# Patient Record
Sex: FEMALE | Race: WHITE | NOT HISPANIC OR LATINO | Employment: OTHER | URBAN - METROPOLITAN AREA
[De-identification: names, ages, dates, MRNs, and addresses within clinical notes are randomized per-mention and may not be internally consistent; named-entity substitution may affect disease eponyms.]

---

## 2023-03-06 ENCOUNTER — OFFICE VISIT (OUTPATIENT)
Dept: PRIMARY CARE | Facility: CLINIC | Age: 78
End: 2023-03-06
Payer: MEDICARE

## 2023-03-06 VITALS
OXYGEN SATURATION: 97 % | DIASTOLIC BLOOD PRESSURE: 71 MMHG | HEART RATE: 77 BPM | SYSTOLIC BLOOD PRESSURE: 106 MMHG | TEMPERATURE: 97.5 F

## 2023-03-06 DIAGNOSIS — N30.90 CYSTITIS: Primary | ICD-10-CM

## 2023-03-06 DIAGNOSIS — R05.1 ACUTE COUGH: ICD-10-CM

## 2023-03-06 LAB
POC APPEARANCE, URINE: CLEAR
POC BILIRUBIN, URINE: NEGATIVE
POC BLOOD, URINE: NEGATIVE
POC COLOR, URINE: YELLOW
POC GLUCOSE, URINE: NEGATIVE MG/DL
POC KETONES, URINE: NEGATIVE MG/DL
POC LEUKOCYTES, URINE: NEGATIVE
POC NITRITE,URINE: NEGATIVE
POC PH, URINE: 6 PH
POC PROTEIN, URINE: NORMAL MG/DL
POC SPECIFIC GRAVITY, URINE: >=1.03
POC UROBILINOGEN, URINE: 0.2 EU/DL

## 2023-03-06 PROCEDURE — 87636 SARSCOV2 & INF A&B AMP PRB: CPT

## 2023-03-06 PROCEDURE — 81003 URINALYSIS AUTO W/O SCOPE: CPT | Performed by: INTERNAL MEDICINE

## 2023-03-06 PROCEDURE — 87086 URINE CULTURE/COLONY COUNT: CPT

## 2023-03-06 PROCEDURE — U0005 INFEC AGEN DETEC AMPLI PROBE: HCPCS

## 2023-03-06 PROCEDURE — U0004 COV-19 TEST NON-CDC HGH THRU: HCPCS

## 2023-03-06 PROCEDURE — 99213 OFFICE O/P EST LOW 20 MIN: CPT | Performed by: INTERNAL MEDICINE

## 2023-03-06 NOTE — PROGRESS NOTES
Subjective complains of fatigue, uti symptoms and cough     [facility] 77 [facility] who presents for a check on if she still has a UTI. She took the antibiotic and was feeling better but at the same time she also got taken off her antiarrythmic.  She is feeling very tired.  She was feeling more tired the last week.  She doesn't know if its because she has an new infection or her afib.   She is in constant afib.  But She feels her urine is cloudy and dark.  Looks like she is dehydrated but she is drinking constantly. She is very thristy.  She had been on cipro for 7 days about 2 weeks ago and was better  for a couple of weeks.  For the past week she has noticed an increase in her urine frequency.  She had a fever last week. She denies any nausea or vomiting.     The ROS was negative except what was documented in the HPI.        Objective   /71 (Patient Position: Sitting)   Pulse 77   Temp 36.4 °C (97.5 °F)   SpO2 97%    Physical Exam  Constitutional:       Appearance: Normal appearance.   HENT:      Mouth/Throat:      Comments: tacky  Eyes:      Conjunctiva/sclera: Conjunctivae normal.   Cardiovascular:      Rate and Rhythm: Normal rate. Rhythm irregular.   Pulmonary:      Effort: Pulmonary effort is normal.      Breath sounds: Normal breath sounds.   Abdominal:      Tenderness: There is no abdominal tenderness. There is no right CVA tenderness or left CVA tenderness.   Musculoskeletal:         General: Normal range of motion.   Lymphadenopathy:      Cervical: No cervical adenopathy.   Skin:     General: Skin is warm and dry.   Neurological:      Mental Status: She is alert.         Assessment/Plan   Mild dehydration, Encouraged fluids   Will repeat urine culture  For her fatigue and cough will check covid  Problem List Items Addressed This Visit    None

## 2023-03-07 ENCOUNTER — TELEPHONE (OUTPATIENT)
Dept: PRIMARY CARE | Facility: CLINIC | Age: 78
End: 2023-03-07
Payer: MEDICARE

## 2023-03-07 LAB
FLU A RESULT: NOT DETECTED
FLU B RESULT: NOT DETECTED
SARS-COV-2 RESULT: NOT DETECTED

## 2023-03-07 NOTE — TELEPHONE ENCOUNTER
I called to let her know that her urine appeared concentrated but there did not appear to be any infection  She states that she has been drinking Gatorade and increasing her fluids and is feeling less fatigued and overall a little better.  She does inform me that she did talk to Dr. Ramirez who restarted another medication for her and put in lab work orders for her.

## 2023-03-09 LAB — URINE CULTURE: NORMAL

## 2023-03-21 LAB
ALBUMIN (G/DL) IN SER/PLAS: 3.6 G/DL (ref 3.4–5)
ANION GAP IN SER/PLAS: 14 MMOL/L (ref 10–20)
CALCIUM (MG/DL) IN SER/PLAS: 9.6 MG/DL (ref 8.6–10.6)
CARBON DIOXIDE, TOTAL (MMOL/L) IN SER/PLAS: 27 MMOL/L (ref 21–32)
CHLORIDE (MMOL/L) IN SER/PLAS: 101 MMOL/L (ref 98–107)
CREATININE (MG/DL) IN SER/PLAS: 0.91 MG/DL (ref 0.5–1.05)
GFR FEMALE: 65 ML/MIN/1.73M2
GLUCOSE (MG/DL) IN SER/PLAS: 116 MG/DL (ref 74–99)
NATRIURETIC PEPTIDE B (PG/ML) IN SER/PLAS: 160 PG/ML (ref 0–99)
PHOSPHATE (MG/DL) IN SER/PLAS: 4.7 MG/DL (ref 2.5–4.9)
POTASSIUM (MMOL/L) IN SER/PLAS: 4 MMOL/L (ref 3.5–5.3)
SODIUM (MMOL/L) IN SER/PLAS: 138 MMOL/L (ref 136–145)
UREA NITROGEN (MG/DL) IN SER/PLAS: 15 MG/DL (ref 6–23)

## 2023-04-03 DIAGNOSIS — I10 PRIMARY HYPERTENSION: Primary | ICD-10-CM

## 2023-04-03 DIAGNOSIS — E11.69 TYPE 2 DIABETES MELLITUS WITH OTHER SPECIFIED COMPLICATION, WITHOUT LONG-TERM CURRENT USE OF INSULIN (MULTI): ICD-10-CM

## 2023-04-03 RX ORDER — AMLODIPINE BESYLATE 2.5 MG/1
2.5 TABLET ORAL DAILY
Qty: 90 TABLET | Refills: 1 | Status: SHIPPED | OUTPATIENT
Start: 2023-04-03 | End: 2023-11-13

## 2023-04-03 RX ORDER — DAPAGLIFLOZIN 10 MG/1
10 TABLET, FILM COATED ORAL DAILY
Qty: 90 TABLET | Refills: 1 | Status: SHIPPED | OUTPATIENT
Start: 2023-04-03 | End: 2023-09-28 | Stop reason: SDUPTHER

## 2023-04-03 RX ORDER — AMLODIPINE BESYLATE 2.5 MG/1
1 TABLET ORAL DAILY
COMMUNITY
Start: 2023-04-01 | End: 2023-04-03 | Stop reason: SDUPTHER

## 2023-04-03 RX ORDER — DAPAGLIFLOZIN 10 MG/1
1 TABLET, FILM COATED ORAL DAILY
COMMUNITY
Start: 2023-03-31 | End: 2023-04-03 | Stop reason: SDUPTHER

## 2023-06-26 DIAGNOSIS — F32.A DEPRESSION, UNSPECIFIED DEPRESSION TYPE: Primary | ICD-10-CM

## 2023-06-27 RX ORDER — DULOXETIN HYDROCHLORIDE 60 MG/1
CAPSULE, DELAYED RELEASE ORAL
Qty: 90 CAPSULE | Refills: 3 | Status: SHIPPED | OUTPATIENT
Start: 2023-06-27 | End: 2024-03-18

## 2023-07-31 DIAGNOSIS — K27.9 PUD (PEPTIC ULCER DISEASE): Primary | ICD-10-CM

## 2023-07-31 RX ORDER — PANTOPRAZOLE SODIUM 40 MG/1
40 TABLET, DELAYED RELEASE ORAL 2 TIMES DAILY
COMMUNITY
Start: 2020-08-19 | End: 2023-07-31 | Stop reason: SDUPTHER

## 2023-07-31 RX ORDER — PANTOPRAZOLE SODIUM 40 MG/1
40 TABLET, DELAYED RELEASE ORAL 2 TIMES DAILY
Qty: 180 TABLET | Refills: 3 | Status: SHIPPED | OUTPATIENT
Start: 2023-07-31 | End: 2024-03-18

## 2023-08-18 DIAGNOSIS — I50.20 HFREF (HEART FAILURE WITH REDUCED EJECTION FRACTION) (MULTI): Primary | ICD-10-CM

## 2023-08-19 PROBLEM — I50.20 HFREF (HEART FAILURE WITH REDUCED EJECTION FRACTION) (MULTI): Status: ACTIVE | Noted: 2023-08-19

## 2023-08-19 RX ORDER — FUROSEMIDE 20 MG/1
20 TABLET ORAL DAILY
Qty: 90 TABLET | Refills: 3 | Status: SHIPPED | OUTPATIENT
Start: 2023-08-19 | End: 2024-03-18

## 2023-09-28 DIAGNOSIS — E11.69 TYPE 2 DIABETES MELLITUS WITH OTHER SPECIFIED COMPLICATION, WITHOUT LONG-TERM CURRENT USE OF INSULIN (MULTI): ICD-10-CM

## 2023-09-28 RX ORDER — DAPAGLIFLOZIN 10 MG/1
10 TABLET, FILM COATED ORAL DAILY
Qty: 90 TABLET | Refills: 1 | Status: SHIPPED | OUTPATIENT
Start: 2023-09-28 | End: 2023-10-23 | Stop reason: SDUPTHER

## 2023-10-04 PROBLEM — N30.01 ACUTE CYSTITIS WITH HEMATURIA: Status: ACTIVE | Noted: 2023-10-04

## 2023-10-04 PROBLEM — I47.19 ATRIAL TACHYCARDIA (CMS-HCC): Status: ACTIVE | Noted: 2023-10-04

## 2023-10-04 PROBLEM — B37.31 VAGINAL YEAST INFECTION: Status: ACTIVE | Noted: 2023-10-04

## 2023-10-04 PROBLEM — I25.10 CAD (CORONARY ARTERY DISEASE): Status: ACTIVE | Noted: 2023-10-04

## 2023-10-04 PROBLEM — I48.19 ATRIAL FIBRILLATION, PERSISTENT (MULTI): Status: ACTIVE | Noted: 2023-10-04

## 2023-10-04 PROBLEM — G45.1 CAROTID ARTERY OCCLUSION SYNDROME: Status: ACTIVE | Noted: 2023-10-04

## 2023-10-04 PROBLEM — R53.83 FATIGUE: Status: ACTIVE | Noted: 2023-10-04

## 2023-10-04 PROBLEM — R26.89 POOR BALANCE: Status: ACTIVE | Noted: 2023-10-04

## 2023-10-04 PROBLEM — Z85.3 PERSONAL HISTORY OF BREAST CANCER: Status: ACTIVE | Noted: 2023-10-04

## 2023-10-04 PROBLEM — R09.81 NASAL SINUS CONGESTION: Status: ACTIVE | Noted: 2023-10-04

## 2023-10-04 PROBLEM — I73.9 CLAUDICATION, INTERMITTENT (CMS-HCC): Status: ACTIVE | Noted: 2023-10-04

## 2023-10-04 PROBLEM — K21.9 ESOPHAGEAL REFLUX: Status: ACTIVE | Noted: 2023-10-04

## 2023-10-04 PROBLEM — D50.0 IRON DEFICIENCY ANEMIA DUE TO CHRONIC BLOOD LOSS: Status: ACTIVE | Noted: 2023-10-04

## 2023-10-04 PROBLEM — K21.9 REFLUX LARYNGITIS: Status: ACTIVE | Noted: 2023-10-04

## 2023-10-04 PROBLEM — R06.09 DYSPNEA ON EXERTION: Status: ACTIVE | Noted: 2023-10-04

## 2023-10-04 PROBLEM — C02.3 CANCER OF ANTERIOR TWO-THIRDS OF TONGUE (MULTI): Status: ACTIVE | Noted: 2023-10-04

## 2023-10-04 PROBLEM — E55.9 VITAMIN D DEFICIENCY: Status: ACTIVE | Noted: 2023-10-04

## 2023-10-04 PROBLEM — E78.2 MIXED HYPERLIPIDEMIA: Status: ACTIVE | Noted: 2023-10-04

## 2023-10-04 PROBLEM — I65.29 CAROTID STENOSIS: Status: ACTIVE | Noted: 2023-10-04

## 2023-10-04 PROBLEM — I71.21 ANEURYSM, ASCENDING AORTA (CMS-HCC): Status: ACTIVE | Noted: 2023-10-04

## 2023-10-04 PROBLEM — Z15.01 BRCA POSITIVE: Status: ACTIVE | Noted: 2023-10-04

## 2023-10-04 PROBLEM — M19.90 OSTEOARTHRITIS: Status: ACTIVE | Noted: 2023-10-04

## 2023-10-04 PROBLEM — I73.9 PAD (PERIPHERAL ARTERY DISEASE) (CMS-HCC): Status: ACTIVE | Noted: 2023-10-04

## 2023-10-04 PROBLEM — J45.909 ASTHMA (HHS-HCC): Status: ACTIVE | Noted: 2023-10-04

## 2023-10-04 PROBLEM — I34.0 MITRAL REGURGITATION: Status: ACTIVE | Noted: 2023-10-04

## 2023-10-04 PROBLEM — F32.A DEPRESSION: Status: ACTIVE | Noted: 2023-10-04

## 2023-10-04 PROBLEM — J04.0 REFLUX LARYNGITIS: Status: ACTIVE | Noted: 2023-10-04

## 2023-10-04 PROBLEM — R43.0 ANOSMIA: Status: ACTIVE | Noted: 2023-10-04

## 2023-10-04 PROBLEM — Z15.09 BRCA POSITIVE: Status: ACTIVE | Noted: 2023-10-04

## 2023-10-04 PROBLEM — R00.2 PALPITATIONS: Status: ACTIVE | Noted: 2023-10-04

## 2023-10-04 PROBLEM — I10 HYPERTENSION: Status: ACTIVE | Noted: 2023-10-04

## 2023-10-04 PROBLEM — E04.1 THYROID NODULE: Status: ACTIVE | Noted: 2023-10-04

## 2023-10-04 PROBLEM — Z95.818 PRESENCE OF WATCHMAN LEFT ATRIAL APPENDAGE CLOSURE DEVICE: Status: ACTIVE | Noted: 2023-10-04

## 2023-10-04 RX ORDER — BUPROPION HYDROCHLORIDE 300 MG/1
1 TABLET ORAL DAILY
COMMUNITY
Start: 2021-03-31 | End: 2024-01-09

## 2023-10-04 RX ORDER — DOFETILIDE 0.25 MG/1
CAPSULE ORAL EVERY 12 HOURS
COMMUNITY
Start: 2016-11-03 | End: 2023-12-13 | Stop reason: ALTCHOICE

## 2023-10-04 RX ORDER — ATORVASTATIN CALCIUM 20 MG/1
20 TABLET, FILM COATED ORAL
COMMUNITY
Start: 2019-07-24 | End: 2023-12-04

## 2023-10-04 RX ORDER — CLOPIDOGREL BISULFATE 75 MG/1
75 TABLET ORAL DAILY
COMMUNITY
Start: 2019-06-10 | End: 2023-12-14

## 2023-10-04 RX ORDER — SPIRONOLACTONE 25 MG/1
25 TABLET ORAL DAILY
COMMUNITY
Start: 2023-06-14

## 2023-10-04 RX ORDER — METOPROLOL TARTRATE 25 MG/1
2 TABLET, FILM COATED ORAL 2 TIMES DAILY
COMMUNITY
Start: 2020-08-20 | End: 2024-02-22 | Stop reason: SDUPTHER

## 2023-10-04 RX ORDER — FLUCONAZOLE 150 MG/1
150 TABLET ORAL
COMMUNITY
Start: 2022-10-25 | End: 2024-01-09 | Stop reason: ALTCHOICE

## 2023-10-04 RX ORDER — MAGNESIUM HYDROXIDE 400 MG/5ML
1 SUSPENSION, ORAL (FINAL DOSE FORM) ORAL DAILY
COMMUNITY

## 2023-10-04 RX ORDER — METOPROLOL SUCCINATE 25 MG/1
25 TABLET, EXTENDED RELEASE ORAL 2 TIMES DAILY
COMMUNITY
End: 2023-12-13 | Stop reason: ALTCHOICE

## 2023-10-04 RX ORDER — LANOLIN ALCOHOL/MO/W.PET/CERES
1 CREAM (GRAM) TOPICAL DAILY
COMMUNITY
Start: 2014-01-30

## 2023-10-04 RX ORDER — TRIAMCINOLONE ACETONIDE 1 MG/G
CREAM TOPICAL
COMMUNITY
Start: 2023-05-23 | End: 2024-01-09 | Stop reason: ALTCHOICE

## 2023-10-04 RX ORDER — CHOLECALCIFEROL (VITAMIN D3) 25 MCG
1 TABLET ORAL 2 TIMES DAILY
COMMUNITY
Start: 2014-01-30

## 2023-10-04 RX ORDER — BUPROPION HYDROCHLORIDE 150 MG/1
150 TABLET, EXTENDED RELEASE ORAL 2 TIMES DAILY
COMMUNITY
Start: 2023-01-16

## 2023-10-04 RX ORDER — FLUTICASONE PROPIONATE AND SALMETEROL 100; 50 UG/1; UG/1
1 POWDER RESPIRATORY (INHALATION) EVERY 12 HOURS
Status: ON HOLD | COMMUNITY
Start: 2021-08-10 | End: 2024-01-19 | Stop reason: ENTERED-IN-ERROR

## 2023-10-04 RX ORDER — DOXYCYCLINE HYCLATE 100 MG
1 TABLET ORAL 2 TIMES DAILY
COMMUNITY
Start: 2022-10-11 | End: 2024-01-09 | Stop reason: ALTCHOICE

## 2023-10-04 RX ORDER — LOSARTAN POTASSIUM 50 MG/1
1 TABLET ORAL DAILY
COMMUNITY
Start: 2013-11-18

## 2023-10-06 ENCOUNTER — OFFICE VISIT (OUTPATIENT)
Dept: PRIMARY CARE | Facility: CLINIC | Age: 78
End: 2023-10-06
Payer: MEDICARE

## 2023-10-06 VITALS
SYSTOLIC BLOOD PRESSURE: 118 MMHG | OXYGEN SATURATION: 98 % | HEART RATE: 78 BPM | DIASTOLIC BLOOD PRESSURE: 78 MMHG | TEMPERATURE: 97.8 F

## 2023-10-06 DIAGNOSIS — M15.9 PRIMARY OSTEOARTHRITIS INVOLVING MULTIPLE JOINTS: Primary | ICD-10-CM

## 2023-10-06 PROBLEM — H40.003 GLAUCOMA SUSPECT, BOTH EYES: Status: ACTIVE | Noted: 2023-08-24

## 2023-10-06 PROBLEM — H34.231 BRANCH RETINAL ARTERY OCCLUSION OF RIGHT EYE: Status: RESOLVED | Noted: 2023-08-24 | Resolved: 2023-10-06

## 2023-10-06 PROBLEM — I21.9 MYOCARDIAL INFARCTION (MULTI): Status: RESOLVED | Noted: 2023-10-06 | Resolved: 2023-10-06

## 2023-10-06 PROBLEM — Z95.5 H/O HEART ARTERY STENT: Status: ACTIVE | Noted: 2018-01-25

## 2023-10-06 PROCEDURE — 1126F AMNT PAIN NOTED NONE PRSNT: CPT | Performed by: INTERNAL MEDICINE

## 2023-10-06 PROCEDURE — 3074F SYST BP LT 130 MM HG: CPT | Performed by: INTERNAL MEDICINE

## 2023-10-06 PROCEDURE — 1159F MED LIST DOCD IN RCRD: CPT | Performed by: INTERNAL MEDICINE

## 2023-10-06 PROCEDURE — 3078F DIAST BP <80 MM HG: CPT | Performed by: INTERNAL MEDICINE

## 2023-10-06 PROCEDURE — 99213 OFFICE O/P EST LOW 20 MIN: CPT | Performed by: INTERNAL MEDICINE

## 2023-10-06 RX ORDER — ACETAMINOPHEN 325 MG/1
650 TABLET ORAL EVERY 6 HOURS PRN
COMMUNITY

## 2023-10-06 NOTE — PROGRESS NOTES
Amita Todd is a 78 y.o. female who presents Neck Pain      HPI:   Right shoulder pain and pain goes to her hand at times.  Her shoulder is cracking and certain movements hurt.  The pain is going up into her neck.  The pain occurs if she turns her head the wrong way.  Her shoulder clicks and hurts.  She thinks she may have strained it lifting something.   Pain for a couple a months.     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)    Carotid artery occlusion syndrome    Carotid stenosis    Claudication, intermittent (CMS/HCC)    Depression    Dyspnea on exertion    Esophageal reflux    Fatigue    Hypertension    Iron deficiency anemia due to chronic blood loss    Mitral regurgitation    Mixed hyperlipidemia    Nasal sinus congestion    Osteoarthritis    PAD (peripheral artery disease) (CMS/HCC)    Palpitations    Personal history of breast cancer    Poor balance    Presence of Watchman left atrial appendage closure device    Reflux laryngitis    Thyroid nodule    Vaginal yeast infection    Vitamin D deficiency        Patient Active Problem List   Diagnosis    HFrEF (heart failure with reduced ejection fraction) (CMS/HCC)    Acute cystitis with hematuria    Aneurysm, ascending aorta (CMS/HCC)    Anosmia    Asthma    Atrial fibrillation, persistent (CMS/HCC)    Atrial tachycardia    BRCA positive    CAD (coronary artery disease)    Cancer of anterior two-thirds of tongue (CMS/HCC)    Carotid artery occlusion syndrome    Carotid stenosis    Claudication, intermittent (CMS/HCC)    Depression    Dyspnea on exertion    Esophageal reflux    Fatigue    Hypertension    Iron deficiency anemia  due to chronic blood loss    Mitral regurgitation    Mixed hyperlipidemia    Nasal sinus congestion    Osteoarthritis    PAD (peripheral artery disease) (CMS/[facility])    Palpitations    Personal history of breast cancer    Poor balance    Presence of Watchman left atrial appendage closure device    Reflux laryngitis    Thyroid nodule    Vaginal yeast infection    Vitamin D deficiency        Past Medical History:   Diagnosis Date    Acute vaginitis 10/25/2021    Acute vaginitis    Alcohol dependence, in remission (CMS/[facility]) 06/16/2016    History of alcoholism    Other bursitis of hip, unspecified hip 11/21/2017    Hip bursitis    Other conditions influencing health status 01/05/2014    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    Pain in leg, unspecified 01/25/2022    Leg pain    Pain in right shoulder 07/01/2020    Bilateral shoulder pain    Pain in unspecified knee 03/31/2021    Joint pain, knee    Paroxysmal atrial fibrillation (CMS/[facility]) 02/15/2022    Paroxysmal atrial fibrillation    Personal history of diseases of the blood and blood-forming organs and certain disorders involving the immune mechanism 02/09/2019    History of anemia    Personal history of malignant neoplasm of breast 04/29/2014    History of malignant neoplasm of female breast    Personal history of other diseases of the circulatory system 04/03/2014    Personal history of subarachnoid hemorrhage    Personal history of other diseases of the digestive system 03/08/2021    History of upper gastrointestinal hemorrhage    Personal history of other diseases of the digestive system 03/08/2021    History of melena    Personal history of other diseases of the nervous system and sense organs 08/12/2020    History of Bell's palsy    Personal history of other infectious and parasitic diseases 06/14/2019    History of herpes zoster    Personal history of other specified conditions 03/03/2020    History of lump of left breast    Personal history of other  specified conditions 02/22/2021    History of shortness of breath    Personal history of transient ischemic attack (TIA), and cerebral infarction without residual deficits 04/24/2022    History of transient ischemic attack    Trigger finger, unspecified finger 04/24/2017    Acquired trigger finger        Past Surgical History:   Procedure Laterality Date    CT HEAD ANGIO W AND WO IV CONTRAST  6/6/2019    CT HEAD ANGIO W AND WO IV CONTRAST 6/6/2019 [facility] CLINICAL LEGACY    CT HEAD ANGIO W AND WO IV CONTRAST  1/4/2021    CT HEAD ANGIO W AND WO IV CONTRAST 1/4/2021 [facility] ANCILLARY LEGACY    CT NECK ANGIO W AND WO IV CONTRAST  6/6/2019    CT NECK ANGIO W AND WO IV CONTRAST 6/6/2019 [facility] CLINICAL LEGACY    CT NECK ANGIO W AND WO IV CONTRAST  1/4/2021    CT NECK ANGIO W AND WO IV CONTRAST 1/4/2021 [facility]LL[facility]    MR HEAD ANGIO WO IV CONTRAST  6/6/2019    MR HEAD ANGIO WO IV CONTRAST 6/6/2019 [facility] CLINICAL LEGACY    MR NECK ANGIO WO IV CONTRAST  6/6/2019    MR NECK ANGIO WO IV CONTRAST 6/6/2019 [facility] CLINICAL LEGACY    OTHER SURGICAL HISTORY  03/03/2020    Oophorectomy    OTHER SURGICAL HISTORY  06/17/2019    Miscarriage surgical treatment    OTHER SURGICAL HISTORY  08/29/2019    Catheter Ablation Atrial Flutter    OTHER SURGICAL HISTORY  04/23/2014    Previous Stent Placement    SEPTOPLASTY  09/22/2017    Septoplasty               Current Outpatient Medications:     amLODIPine (Norvasc) 2.5 mg tablet, Take 1 tablet (2.5 mg) by mouth once daily., Disp: 90 tablet, Rfl: 1    atorvastatin (Lipitor) 20 mg tablet, Take 1 tablet (20 mg) by mouth. PER DIRECTED, Disp: , Rfl:     buPROPion SR (Wellbutrin SR) 150 mg 12 hr tablet, Take 1 tablet (150 mg) by mouth. TAKE PER DIRECTED, Disp: , Rfl:     buPROPion XL (Wellbutrin XL) 300 mg 24 hr tablet, Take 1 tablet (300 mg) by mouth once daily., Disp: , Rfl:     cholecalciferol (Vitamin D-3) 25 MCG (1000 UT) tablet, Take 1 tablet (25 mcg) by mouth 2 times a day., Disp: , Rfl:      clopidogrel (Plavix) 75 mg tablet, Take 1 tablet (75 mg) by mouth once daily., Disp: , Rfl:     dapagliflozin propanediol (Farxiga) 10 mg, Take 1 tablet (10 mg) by mouth once daily., Disp: 90 tablet, Rfl: 1    dofetilide (Tikosyn) 250 mcg capsule, Take by mouth every 12 hours. 250 MCG ORAL CAPSULE  TAKE PER DIRECTED, Disp: , Rfl:     doxycycline (Vibra-Tabs) 100 mg tablet, Take 1 tablet (100 mg) by mouth 2 times a day., Disp: , Rfl:     DULoxetine (Cymbalta) 60 mg DR capsule, TAKE 1 CAPSULE BY MOUTH  DAILY, Disp: 90 capsule, Rfl: 3    fluconazole (Diflucan) 150 mg tablet, Take 1 tablet (150 mg) by mouth. TAKE PER DIRECTED, Disp: , Rfl:     fluticasone propion-salmeteroL (Advair Diskus) 100-50 mcg/dose diskus inhaler, Inhale 1 puff every 12 hours., Disp: , Rfl:     furosemide (Lasix) 20 mg tablet, TAKE 1 TABLET BY MOUTH  DAILY, Disp: 90 tablet, Rfl: 3    herbal complex no.239 (Whole Body Joint Support) capsule, Take 1 capsule by mouth once daily., Disp: , Rfl:     losartan (Cozaar) 50 mg tablet, Take 1 tablet (50 mg) by mouth once daily., Disp: , Rfl:     magnesium oxide (Mag-Ox) 400 mg (241.3 mg magnesium) tablet, Take 1 tablet (400 mg) by mouth once daily., Disp: , Rfl:     metoprolol succinate XL (Toprol-XL) 25 mg 24 hr tablet, Take 1 tablet (25 mg) by mouth twice a day., Disp: , Rfl:     metoprolol tartrate (Lopressor) 25 mg tablet, Take 2 tablets (50 mg) by mouth 2 times a day., Disp: , Rfl:     pantoprazole (ProtoNix) 40 mg EC tablet, Take 1 tablet (40 mg) by mouth 2 times a day., Disp: 180 tablet, Rfl: 3    spironolactone (Aldactone) 25 mg tablet, Take 1 tablet (25 mg) by mouth. PER DIRECTED, Disp: , Rfl:     triamcinolone (Kenalog) 0.1 % cream, Apply topically. APPLY PER DIRECTED, Disp: , Rfl:      Allergies   Allergen Reactions    Oxycodone-Acetaminophen Unknown       Review of Systems     /78 (BP Location: Left arm, Patient Position: Sitting)   Pulse 78   Temp 36.6 °C (97.8 °F)   SpO2 98%  There is  no height or weight on file to calculate BMI.     Physical Exam  Musculoskeletal:      Right shoulder: Tenderness present. No swelling, deformity, effusion or crepitus. Normal range of motion. Normal strength. Normal pulse.      Left shoulder: No swelling or deformity.        Arms:       Comments: Area of tenderness to palpation   Neg near sigmn            Problem List Items Addressed This Visit       Osteoarthritis - Primary    Relevant Orders    Referral to Physical Therapy        Assessment/Plan   Neck strain and shoulder OA with potential rotator cuff disease. Offered an injection today which she declines.  She is unable to take NSAIDs because of the plaquenil she is on.  She does take tylenol with some relief. Topical Voltaren not too effective for her. She will increase her use of both the tylenol and the Voltaren gel and try to stretch.        Caitie Leal MD    TVT of 20 minutes with greater than 50% spent FTF in assessment/discussion and care coordination

## 2023-10-23 DIAGNOSIS — E11.69 TYPE 2 DIABETES MELLITUS WITH OTHER SPECIFIED COMPLICATION, WITHOUT LONG-TERM CURRENT USE OF INSULIN (MULTI): ICD-10-CM

## 2023-10-23 RX ORDER — DAPAGLIFLOZIN 10 MG/1
10 TABLET, FILM COATED ORAL DAILY
Qty: 30 TABLET | Refills: 1 | Status: SHIPPED | OUTPATIENT
Start: 2023-10-23 | End: 2024-01-02 | Stop reason: SDUPTHER

## 2023-11-03 ENCOUNTER — TELEPHONE (OUTPATIENT)
Dept: PRIMARY CARE | Facility: CLINIC | Age: 78
End: 2023-11-03
Payer: MEDICARE

## 2023-11-03 DIAGNOSIS — M25.511 RIGHT SHOULDER PAIN, UNSPECIFIED CHRONICITY: Primary | ICD-10-CM

## 2023-11-03 NOTE — TELEPHONE ENCOUNTER
Update-  Patient still in pain right shoulder.    She wants to know if she should get an xray?    Please advise  I can call her back  [phone]

## 2023-11-07 ENCOUNTER — ANCILLARY PROCEDURE (OUTPATIENT)
Dept: RADIOLOGY | Facility: CLINIC | Age: 78
End: 2023-11-07
Payer: MEDICARE

## 2023-11-07 DIAGNOSIS — M25.511 RIGHT SHOULDER PAIN, UNSPECIFIED CHRONICITY: ICD-10-CM

## 2023-11-07 PROCEDURE — 73030 X-RAY EXAM OF SHOULDER: CPT | Mod: RT,FY

## 2023-11-07 PROCEDURE — 73030 X-RAY EXAM OF SHOULDER: CPT | Mod: RIGHT SIDE | Performed by: RADIOLOGY

## 2023-11-13 DIAGNOSIS — I10 PRIMARY HYPERTENSION: ICD-10-CM

## 2023-11-13 RX ORDER — AMLODIPINE BESYLATE 2.5 MG/1
2.5 TABLET ORAL DAILY
Qty: 90 TABLET | Refills: 2 | Status: SHIPPED | OUTPATIENT
Start: 2023-11-13 | End: 2023-12-13 | Stop reason: SINTOL

## 2023-11-22 ENCOUNTER — APPOINTMENT (OUTPATIENT)
Dept: ORTHOPEDIC SURGERY | Facility: HOSPITAL | Age: 78
End: 2023-11-22
Payer: MEDICARE

## 2023-11-22 ENCOUNTER — OFFICE VISIT (OUTPATIENT)
Dept: ORTHOPEDIC SURGERY | Facility: HOSPITAL | Age: 78
End: 2023-11-22
Payer: MEDICARE

## 2023-11-22 VITALS — BODY MASS INDEX: 24.91 KG/M2 | WEIGHT: 155 LBS | HEIGHT: 66 IN

## 2023-11-22 DIAGNOSIS — M25.511 RIGHT SHOULDER PAIN, UNSPECIFIED CHRONICITY: ICD-10-CM

## 2023-11-22 DIAGNOSIS — M75.81 ROTATOR CUFF TENDINITIS, RIGHT: ICD-10-CM

## 2023-11-22 PROCEDURE — 1159F MED LIST DOCD IN RCRD: CPT | Performed by: ORTHOPAEDIC SURGERY

## 2023-11-22 PROCEDURE — 99213 OFFICE O/P EST LOW 20 MIN: CPT | Performed by: ORTHOPAEDIC SURGERY

## 2023-11-22 PROCEDURE — 1036F TOBACCO NON-USER: CPT | Performed by: ORTHOPAEDIC SURGERY

## 2023-11-22 PROCEDURE — 1126F AMNT PAIN NOTED NONE PRSNT: CPT | Performed by: ORTHOPAEDIC SURGERY

## 2023-11-22 ASSESSMENT — PAIN - FUNCTIONAL ASSESSMENT: PAIN_FUNCTIONAL_ASSESSMENT: 0-10

## 2023-11-22 ASSESSMENT — PAIN SCALES - GENERAL: PAINLEVEL_OUTOF10: 3

## 2023-11-22 ASSESSMENT — PAIN DESCRIPTION - DESCRIPTORS: DESCRIPTORS: DULL;STABBING

## 2023-11-22 NOTE — PROGRESS NOTES
HPI  This is a pleasant 78 y.o. female here today for evaluation of right  shoulder pain.  The pain started years ago and was previously seen by Dr Mayer. She was diagnosed with RC disease at that time but underwent conservative management with near-complete resolution of symptoms. Her pain then returned 5 months ago. It is worse with lifting and reaching, and has woken her from sleep.  It is improved with rest and tylenol .  The pain comes on only intermittently with movement and is described as aching and sharp at a 4/10 in severity. Mainly located in her posterior shoulder. She does also report some neck pain and occasional shock like sensations down the arm. They have not had any treatment such as PT or injections. They are here today for further evaluation. She is relatively active but this pain is making it difficult for her to take care of her  who injured his clavicle.      ROS  Reviewed and all pertinent positives mentioned in HPI.      EXAM  Patient in no acute distress, alert and oriented x3, of normal mood and affect    There is no cervical or axillary lymphadenopathy.  They are breathing without any evidence of accessory musculature.  EOMI.    Their neck is supple, nontender  They have intact sensation to light touch in all upper extremity dermatomes.  Their skin is intact  Forward flexion and abduction of the shoulder  160 °, external rotation of  80 °  5 out of 5 Rotator cuff strength testing with resisted supraspinatus testing, 5/5 strength with infraspinatus testing  They have positive belly press and lift off      They have a negative Neer test.  positive Rodriguez test    Positive for AC joint tenderness.      IMAGING  X-rays reviewed today reveal no gross fracture or dislocation. Moderate glenohumeral OA.  MRI reveals No MRI available for review      ASSESSMENT  Right Rotator cuff tendonitis and osteoarthritis      PLAN  78F presenting today with intermittent atraumatic right shoulder  pain with activity. Good RC strength, pain with some maneuvers. We will have the patient initiate a course of physical therapy and continue NSAIDs and activity modification.  If symptoms persist, we will see them back for re-evaluation and discussion of a corticosteroid injection.     Matthew Edwards MD

## 2023-12-02 DIAGNOSIS — E78.2 MIXED HYPERLIPIDEMIA: ICD-10-CM

## 2023-12-04 RX ORDER — ATORVASTATIN CALCIUM 20 MG/1
20 TABLET, FILM COATED ORAL DAILY
Qty: 90 TABLET | Refills: 3 | Status: SHIPPED | OUTPATIENT
Start: 2023-12-04 | End: 2024-03-18

## 2023-12-05 NOTE — PROGRESS NOTES
Provider Impressions     No evidence of any recurrence of the patient's previously treated anterior tongue cancer.      Recent issues with dysphagia.  She now has a visible lesion in the left hypopharynx.  A CT scan of her neck will be obtained.  I will also bring her to the operating room for panendoscopy and biopsy.  She understands the surgery and wishes to proceed.    I will see her at the time of the surgery.    Chief Complaint     Follow-up status post treatment of an anterior tongue cancer      History of Present Illness    This patient has been followed for quite some time. She had a T1 N0 lesion of her right lateral tongue which was removed back in May of 2008. She did not receive any further treatments. Her last chest x-ray was in October 2022. This was normal. She also has gastroesophageal reflux disease for which she is now on omeprazole 20 mg daily. She was also having some issues with her sense of smell and taste. I did put her on Flonase. She said that it helped with the congestion but did not change her sense of smell and taste. She rarely uses the spray. In March 2019 I ordered a swallow evaluation. This was normal.  She comes in today complaining that a few weeks ago she got a pill stuck in her throat and that she has had discomfort since.    Physical Exam    Examination of the oral cavity and oropharynx is normal. There is no evidence of any mucosal lesions. There is good mobility of the tongue and palate. She does have some scarring on the right side of her tongue where the lesion was previously. There is good mandibular excursion. Palpation of the parotid, neck, and thyroid field fails to show any worrisome masses or adenopathies.     A flexible laryngoscopy was carried out. Under topical Xylocaine and Fran-Synephrine the scope was introduced through the nostril. The nasopharynx, base of tongue, hypopharynx, and larynx are visualized. The vocal cords are normally mobile.  There is a definite  lesion involving the left piriform sinus.  That measures more than a centimeter.

## 2023-12-06 ENCOUNTER — OFFICE VISIT (OUTPATIENT)
Dept: OTOLARYNGOLOGY | Facility: CLINIC | Age: 78
End: 2023-12-06
Payer: MEDICARE

## 2023-12-06 ENCOUNTER — TELEPHONE (OUTPATIENT)
Dept: PRIMARY CARE | Facility: CLINIC | Age: 78
End: 2023-12-06

## 2023-12-06 ENCOUNTER — LAB (OUTPATIENT)
Dept: LAB | Facility: LAB | Age: 78
End: 2023-12-06
Payer: MEDICARE

## 2023-12-06 VITALS — HEIGHT: 66 IN | WEIGHT: 159 LBS | TEMPERATURE: 97.6 F | BODY MASS INDEX: 25.55 KG/M2

## 2023-12-06 DIAGNOSIS — C02.3 CANCER OF ANTERIOR TWO-THIRDS OF TONGUE (MULTI): Primary | ICD-10-CM

## 2023-12-06 DIAGNOSIS — C02.3 CANCER OF ANTERIOR TWO-THIRDS OF TONGUE (MULTI): ICD-10-CM

## 2023-12-06 DIAGNOSIS — R13.12 OROPHARYNGEAL DYSPHAGIA: ICD-10-CM

## 2023-12-06 DIAGNOSIS — K21.9 REFLUX LARYNGITIS: ICD-10-CM

## 2023-12-06 DIAGNOSIS — J04.0 REFLUX LARYNGITIS: ICD-10-CM

## 2023-12-06 DIAGNOSIS — J39.2 HYPOPHARYNGEAL LESION: ICD-10-CM

## 2023-12-06 LAB
ALBUMIN SERPL BCP-MCNC: 4.1 G/DL (ref 3.4–5)
ALP SERPL-CCNC: 73 U/L (ref 33–136)
ALT SERPL W P-5'-P-CCNC: 16 U/L (ref 7–45)
ANION GAP SERPL CALC-SCNC: 13 MMOL/L (ref 10–20)
AST SERPL W P-5'-P-CCNC: 23 U/L (ref 9–39)
BILIRUB SERPL-MCNC: 0.9 MG/DL (ref 0–1.2)
BUN SERPL-MCNC: 26 MG/DL (ref 6–23)
CALCIUM SERPL-MCNC: 9.9 MG/DL (ref 8.6–10.6)
CHLORIDE SERPL-SCNC: 98 MMOL/L (ref 98–107)
CO2 SERPL-SCNC: 30 MMOL/L (ref 21–32)
CREAT SERPL-MCNC: 1.27 MG/DL (ref 0.5–1.05)
ERYTHROCYTE [DISTWIDTH] IN BLOOD BY AUTOMATED COUNT: 13.9 % (ref 11.5–14.5)
GFR SERPL CREATININE-BSD FRML MDRD: 44 ML/MIN/1.73M*2
GLUCOSE SERPL-MCNC: 91 MG/DL (ref 74–99)
HCT VFR BLD AUTO: 43.1 % (ref 36–46)
HGB BLD-MCNC: 13.8 G/DL (ref 12–16)
MCH RBC QN AUTO: 30.1 PG (ref 26–34)
MCHC RBC AUTO-ENTMCNC: 32 G/DL (ref 32–36)
MCV RBC AUTO: 94 FL (ref 80–100)
NRBC BLD-RTO: 0 /100 WBCS (ref 0–0)
PLATELET # BLD AUTO: 220 X10*3/UL (ref 150–450)
POTASSIUM SERPL-SCNC: 4.3 MMOL/L (ref 3.5–5.3)
PROT SERPL-MCNC: 7.5 G/DL (ref 6.4–8.2)
RBC # BLD AUTO: 4.58 X10*6/UL (ref 4–5.2)
SODIUM SERPL-SCNC: 137 MMOL/L (ref 136–145)
TSH SERPL-ACNC: 1.36 MIU/L (ref 0.44–3.98)
WBC # BLD AUTO: 8.3 X10*3/UL (ref 4.4–11.3)

## 2023-12-06 PROCEDURE — 1036F TOBACCO NON-USER: CPT | Performed by: OTOLARYNGOLOGY

## 2023-12-06 PROCEDURE — 1126F AMNT PAIN NOTED NONE PRSNT: CPT | Performed by: OTOLARYNGOLOGY

## 2023-12-06 PROCEDURE — 36415 COLL VENOUS BLD VENIPUNCTURE: CPT

## 2023-12-06 PROCEDURE — 1160F RVW MEDS BY RX/DR IN RCRD: CPT | Performed by: OTOLARYNGOLOGY

## 2023-12-06 PROCEDURE — 31575 DIAGNOSTIC LARYNGOSCOPY: CPT | Performed by: OTOLARYNGOLOGY

## 2023-12-06 PROCEDURE — 84443 ASSAY THYROID STIM HORMONE: CPT

## 2023-12-06 PROCEDURE — 85027 COMPLETE CBC AUTOMATED: CPT

## 2023-12-06 PROCEDURE — 99214 OFFICE O/P EST MOD 30 MIN: CPT | Performed by: OTOLARYNGOLOGY

## 2023-12-06 PROCEDURE — 1159F MED LIST DOCD IN RCRD: CPT | Performed by: OTOLARYNGOLOGY

## 2023-12-06 PROCEDURE — 80053 COMPREHEN METABOLIC PANEL: CPT

## 2023-12-06 ASSESSMENT — PATIENT HEALTH QUESTIONNAIRE - PHQ9
2. FEELING DOWN, DEPRESSED OR HOPELESS: NOT AT ALL
SUM OF ALL RESPONSES TO PHQ9 QUESTIONS 1 AND 2: 0
1. LITTLE INTEREST OR PLEASURE IN DOING THINGS: NOT AT ALL

## 2023-12-06 NOTE — TELEPHONE ENCOUNTER
Patient was in to see Dr. Tobin for fuv.  She is scheduled for a biopsy of left side throat and possibly surgery in January.    Please call to discuss  [phone]

## 2023-12-07 ENCOUNTER — ANCILLARY PROCEDURE (OUTPATIENT)
Dept: RADIOLOGY | Facility: CLINIC | Age: 78
End: 2023-12-07
Payer: MEDICARE

## 2023-12-07 DIAGNOSIS — R13.12 OROPHARYNGEAL DYSPHAGIA: ICD-10-CM

## 2023-12-07 DIAGNOSIS — J39.2 HYPOPHARYNGEAL LESION: ICD-10-CM

## 2023-12-07 DIAGNOSIS — C02.3 CANCER OF ANTERIOR TWO-THIRDS OF TONGUE (MULTI): ICD-10-CM

## 2023-12-07 PROCEDURE — 71046 X-RAY EXAM CHEST 2 VIEWS: CPT | Performed by: RADIOLOGY

## 2023-12-07 PROCEDURE — 71046 X-RAY EXAM CHEST 2 VIEWS: CPT

## 2023-12-08 ENCOUNTER — APPOINTMENT (OUTPATIENT)
Dept: PHYSICAL THERAPY | Facility: CLINIC | Age: 78
End: 2023-12-08
Payer: MEDICARE

## 2023-12-11 ENCOUNTER — HOSPITAL ENCOUNTER (OUTPATIENT)
Dept: RADIOLOGY | Facility: HOSPITAL | Age: 78
Discharge: HOME | End: 2023-12-11
Payer: MEDICARE

## 2023-12-11 DIAGNOSIS — I71.21 ANEURYSM OF THE ASCENDING AORTA, WITHOUT RUPTURE (CMS-HCC): ICD-10-CM

## 2023-12-11 PROCEDURE — 71275 CT ANGIOGRAPHY CHEST: CPT | Performed by: INTERNAL MEDICINE

## 2023-12-11 PROCEDURE — 2550000001 HC RX 255 CONTRASTS: Performed by: PHYSICIAN ASSISTANT

## 2023-12-11 PROCEDURE — 71275 CT ANGIOGRAPHY CHEST: CPT

## 2023-12-11 RX ADMIN — IOHEXOL 75 ML: 350 INJECTION, SOLUTION INTRAVENOUS at 11:25

## 2023-12-12 ENCOUNTER — ANCILLARY PROCEDURE (OUTPATIENT)
Dept: RADIOLOGY | Facility: CLINIC | Age: 78
End: 2023-12-12
Payer: MEDICARE

## 2023-12-12 ENCOUNTER — APPOINTMENT (OUTPATIENT)
Dept: RADIOLOGY | Facility: CLINIC | Age: 78
End: 2023-12-12
Payer: MEDICARE

## 2023-12-12 DIAGNOSIS — J39.2 HYPOPHARYNGEAL LESION: ICD-10-CM

## 2023-12-12 DIAGNOSIS — C02.3 CANCER OF ANTERIOR TWO-THIRDS OF TONGUE (MULTI): ICD-10-CM

## 2023-12-12 PROCEDURE — 70491 CT SOFT TISSUE NECK W/DYE: CPT

## 2023-12-12 PROCEDURE — 70491 CT SOFT TISSUE NECK W/DYE: CPT | Performed by: RADIOLOGY

## 2023-12-12 PROCEDURE — 2550000001 HC RX 255 CONTRASTS: Performed by: OTOLARYNGOLOGY

## 2023-12-12 RX ADMIN — IOHEXOL 75 ML: 350 INJECTION, SOLUTION INTRAVENOUS at 11:32

## 2023-12-13 ENCOUNTER — OFFICE VISIT (OUTPATIENT)
Dept: CARDIOLOGY | Facility: CLINIC | Age: 78
End: 2023-12-13
Payer: MEDICARE

## 2023-12-13 VITALS
HEART RATE: 88 BPM | BODY MASS INDEX: 25.32 KG/M2 | WEIGHT: 157.56 LBS | DIASTOLIC BLOOD PRESSURE: 68 MMHG | OXYGEN SATURATION: 96 % | SYSTOLIC BLOOD PRESSURE: 101 MMHG | HEIGHT: 66 IN

## 2023-12-13 DIAGNOSIS — I25.118 CORONARY ARTERY DISEASE OF NATIVE ARTERY OF NATIVE HEART WITH STABLE ANGINA PECTORIS (CMS-HCC): ICD-10-CM

## 2023-12-13 DIAGNOSIS — I73.9 CLAUDICATION, INTERMITTENT (CMS-HCC): ICD-10-CM

## 2023-12-13 DIAGNOSIS — I73.9 PAD (PERIPHERAL ARTERY DISEASE) (CMS-HCC): ICD-10-CM

## 2023-12-13 DIAGNOSIS — I10 PRIMARY HYPERTENSION: ICD-10-CM

## 2023-12-13 DIAGNOSIS — I65.21 STENOSIS OF RIGHT CAROTID ARTERY: ICD-10-CM

## 2023-12-13 DIAGNOSIS — I48.19 ATRIAL FIBRILLATION, PERSISTENT (MULTI): ICD-10-CM

## 2023-12-13 DIAGNOSIS — I71.21 ANEURYSM OF ASCENDING AORTA WITHOUT RUPTURE (CMS-HCC): ICD-10-CM

## 2023-12-13 DIAGNOSIS — I50.20 HFREF (HEART FAILURE WITH REDUCED EJECTION FRACTION) (MULTI): Primary | ICD-10-CM

## 2023-12-13 PROCEDURE — 93005 ELECTROCARDIOGRAM TRACING: CPT | Mod: PO | Performed by: INTERNAL MEDICINE

## 2023-12-13 PROCEDURE — 3074F SYST BP LT 130 MM HG: CPT | Performed by: INTERNAL MEDICINE

## 2023-12-13 PROCEDURE — 99215 OFFICE O/P EST HI 40 MIN: CPT | Mod: PO,25 | Performed by: INTERNAL MEDICINE

## 2023-12-13 PROCEDURE — 93010 ELECTROCARDIOGRAM REPORT: CPT | Performed by: INTERNAL MEDICINE

## 2023-12-13 PROCEDURE — 1036F TOBACCO NON-USER: CPT | Performed by: INTERNAL MEDICINE

## 2023-12-13 PROCEDURE — 3078F DIAST BP <80 MM HG: CPT | Performed by: INTERNAL MEDICINE

## 2023-12-13 PROCEDURE — 99215 OFFICE O/P EST HI 40 MIN: CPT | Performed by: INTERNAL MEDICINE

## 2023-12-13 PROCEDURE — 1160F RVW MEDS BY RX/DR IN RCRD: CPT | Performed by: INTERNAL MEDICINE

## 2023-12-13 PROCEDURE — 1126F AMNT PAIN NOTED NONE PRSNT: CPT | Performed by: INTERNAL MEDICINE

## 2023-12-13 PROCEDURE — 1159F MED LIST DOCD IN RCRD: CPT | Performed by: INTERNAL MEDICINE

## 2023-12-13 ASSESSMENT — ENCOUNTER SYMPTOMS
DEPRESSION: 0
OCCASIONAL FEELINGS OF UNSTEADINESS: 1
LOSS OF SENSATION IN FEET: 0

## 2023-12-13 ASSESSMENT — COLUMBIA-SUICIDE SEVERITY RATING SCALE - C-SSRS
2. HAVE YOU ACTUALLY HAD ANY THOUGHTS OF KILLING YOURSELF?: NO
6. HAVE YOU EVER DONE ANYTHING, STARTED TO DO ANYTHING, OR PREPARED TO DO ANYTHING TO END YOUR LIFE?: NO
1. IN THE PAST MONTH, HAVE YOU WISHED YOU WERE DEAD OR WISHED YOU COULD GO TO SLEEP AND NOT WAKE UP?: NO

## 2023-12-13 ASSESSMENT — PAIN SCALES - GENERAL: PAINLEVEL: 0-NO PAIN

## 2023-12-13 NOTE — PROGRESS NOTES
Primary Care Physician: Caitie Leal MD  Date of Visit: 12/13/2023  1:20 PM EST  Location of visit: [facility] [place] [address]   Last office visit: Visit date not found     Chief Complaint:     Follow-up 6 months.    HPI/Summary  Amtia Todd is a 78 y.o. female who presents for followup cardiology evaluation.     The patient presents with longstanding coronary artery disease, atrial fibrillation/flutter, multiple catheter ablations for management of atrial fibrillation and atrial flutter, peripheral vascular disease with a stable 4.4 cm enlargement of the mid ascending aorta, and a history of abdominal aortic aneurysm in 2019.  At that time, she was recognized with severe long segment right carotid stenosis.  Medical therapy has been maintained.  She has also been documented with lower extremity peripheral vascular disease.  PVR with exercise in 2022 showed moderate right lower extremity disease.  CT angiogram on December 16, 2021 showed stable 4.4 cm ascending aneurysm.  She is status post watchman left atrial appendage occlusion on April 29, 2021.  In 2022, we added amlodipine for management of hypertension.  A repeat echocardiogram on February 17, 2022 showed mild MR and TR, and LV ejection fraction 45 to 50%.  She was seen by electrophysiology for persistent atrial fibrillation, Tikosyn was withdrawn, a BNP was 177.  An echocardiogram on March 6, 2023 showed that the ejection fraction was 50 to 55%, with mild aortic regurgitation.  The ascending aorta measured 4.2 cm, stable since the previous study.  Ezetimibe was added for management of hyperlipidemia.    She is now treated with amlodipine, atorvastatin, Plavix, Farxiga, furosemide, losartan and metoprolol.    She reports fatigue and occasional lightheadedness.  She has noticed a resting tremor, mainly in the right upper extremity.  She is scheduled for interventional cardiology evaluation tomorrow.  She will undergo follow-up PVR with exercise and a carotid  duplex.  CT angiography on December 11, 2023 shows a stable thoracic aortic aneurysm measuring 4.5 cm.    Specialty Problems          Cardiology Problems    H/O heart artery stent    HFrEF (heart failure with reduced ejection fraction) (CMS/[facility])    Aneurysm, ascending aorta (CMS/[facility])     December, 2022: Stable 4.5 cm thoracic aortic aneurysm.  First noted, 2018.         Atrial fibrillation, persistent (CMS/[facility])    Atrial tachycardia    CAD (coronary artery disease)     · Status post multiple percutaneous coronary interventions, from 2005 through May 03,      2007. Coronary angiography at [facility] on January 14, 2013      demonstrated widely patent stents and no flow-limiting lesions.         Carotid stenosis    Claudication, intermittent (CMS/[facility])    Dyspnea on exertion    Hypertension    Mitral regurgitation    Mixed hyperlipidemia    PAD (peripheral artery disease) (CMS/[facility])    Palpitations    Presence of Watchman left atrial appendage closure device     April, 2021             Past Medical History:   Diagnosis Date    Acute vaginitis 10/25/2021    Acute vaginitis    Alcohol dependence, in remission (CMS/[facility]) 06/16/2016    History of alcoholism    Branch retinal artery occlusion of right eye 08/24/2023    Myocardial infarction (CMS/[facility]) 10/06/2023    Other bursitis of hip, unspecified hip 11/21/2017    Hip bursitis    Other conditions influencing health status 01/05/2014    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    Pain in leg, unspecified 01/25/2022    Leg pain    Pain in right shoulder 07/01/2020    Bilateral shoulder pain    Pain in unspecified knee 03/31/2021    Joint pain, knee    Personal history of diseases of the blood and blood-forming organs and certain disorders involving the immune mechanism 02/09/2019    History of anemia    Personal history of malignant neoplasm of breast 04/29/2014    History of malignant neoplasm of female breast    Personal history of other diseases  of the circulatory system 04/03/2014    Personal history of subarachnoid hemorrhage    Personal history of other diseases of the digestive system 03/08/2021    History of upper gastrointestinal hemorrhage    Personal history of other diseases of the digestive system 03/08/2021    History of melena    Personal history of other diseases of the nervous system and sense organs 08/12/2020    History of Bell's palsy    Personal history of other infectious and parasitic diseases 06/14/2019    History of herpes zoster    Personal history of other specified conditions 03/03/2020    History of lump of left breast    Personal history of other specified conditions 02/22/2021    History of shortness of breath    Personal history of transient ischemic attack (TIA), and cerebral infarction without residual deficits 04/24/2022    History of transient ischemic attack    Trigger finger, unspecified finger 04/24/2017    Acquired trigger finger          Past Surgical History:   Procedure Laterality Date    CT ANGIO NECK  6/6/2019    CT NECK ANGIO W AND WO IV CONTRAST 6/6/2019 [facility]ICAL [facility]GAC[facility]    CT ANGIO NECK  1/4/2021    CT NECK ANGIO W AND WO IV CONTRAST 1/4/2021 [facility]    CT HEAD ANGIO W AND WO IV CONTRAST  6/6/2019    CT HEAD ANGIO W AND WO IV CONTRAST 6/6/2019 [facility] CLINICAL LEGACY    CT HEAD ANGIO W AND WO IV CONTRAST  1/4/2021    CT HEAD ANGIO W AND WO IV CONTRAST 1/4/2021 [facility]    MR HEAD ANGIO WO IV CONTRAST  6/6/2019    MR HEAD ANGIO WO IV CONTRAST 6/6/2019 [facility] CLINICAL LEGACY    MR NECK ANGIO WO IV CONTRAST  6/6/2019    MR NECK ANGIO WO IV CONTRAST 6/6/2019 [facility] CLINICAL LEGAC[facility]    OTHER SURGICAL HISTORY  03/03/2020    Oophorectomy    OTHER SURGICAL HISTORY  06/17/2019    Miscarriage surgical treatment    OTHER SURGICAL HISTORY  08/29/2019    Catheter Ablation Atrial Flutter    OTHER SURGICAL HISTORY  04/23/2014    Previous Stent Placement    SEPTOPLASTY  09/22/2017    Septoplasty             Social History     Tobacco Use    Smoking status: Never    Smokeless tobacco: Never   Vaping Use    Vaping Use: Never used   Substance Use Topics    Alcohol use: Never    Drug use: Never        Physical Activity: Not on file            Allergies   Allergen Reactions    Oxycodone Hcl-Oxycodone-Asa Unknown    Oxycodone-Acetaminophen Unknown    Penicillin G Other     Nausea         Current Outpatient Medications   Medication Instructions    acetaminophen (TYLENOL) 650 mg, oral, Every 6 hours PRN    amLODIPine (NORVASC) 2.5 mg, oral, Daily    atorvastatin (LIPITOR) 20 mg, oral, Daily, as directed    buPROPion SR (WELLBUTRIN SR) 150 mg, oral, TAKE PER DIRECTED    buPROPion XL (Wellbutrin XL) 300 mg 24 hr tablet 1 tablet, oral, Daily    cholecalciferol (Vitamin D-3) 25 MCG (1000 UT) tablet 1 tablet, oral, 2 times daily    clopidogrel (PLAVIX) 75 mg, oral, Daily    dapagliflozin propanediol (FARXIGA) 10 mg, oral, Daily    dofetilide (Tikosyn) 250 mcg capsule oral, Every 12 hours, 250 MCG ORAL CAPSULE  TAKE PER DIRECTED    doxycycline (Vibra-Tabs) 100 mg tablet 1 tablet, oral, 2 times daily    DULoxetine (Cymbalta) 60 mg DR capsule TAKE 1 CAPSULE BY MOUTH  DAILY    fluconazole (DIFLUCAN) 150 mg, oral, TAKE PER DIRECTED    fluticasone propion-salmeteroL (Advair Diskus) 100-50 mcg/dose diskus inhaler 1 puff, inhalation, Every 12 hours    furosemide (LASIX) 20 mg, oral, Daily    herbal complex no.239 (Whole Body Joint Support) capsule 1 capsule, oral, Daily    losartan (Cozaar) 50 mg tablet 1 tablet, oral, Daily    magnesium oxide (Mag-Ox) 400 mg (241.3 mg magnesium) tablet 1 tablet, oral, Daily    metoprolol succinate XL (TOPROL-XL) 25 mg, oral, 2 times daily    metoprolol tartrate (Lopressor) 25 mg tablet 2 tablets, oral, 2 times daily    pantoprazole (PROTONIX) 40 mg, oral, 2 times daily    spironolactone (ALDACTONE) 25 mg, oral, PER DIRECTED    triamcinolone (Kenalog) 0.1 % cream Topical, APPLY PER DIRECTED       ROS  "    Vital Signs:  Vitals:    12/13/23 1329   BP: 101/68   BP Location: Left arm   Patient Position: Sitting   BP Cuff Size: Adult   Pulse: 88   SpO2: 96%   Weight: 71.5 kg (157 lb 9 oz)   Height: 1.676 m (5' 6\")     Wt Readings from Last 5 Encounters:   12/13/23 71.5 kg (157 lb 9 oz)   12/06/23 72.1 kg (159 lb)   11/22/23 70.3 kg (155 lb)   06/14/23 73.7 kg (162 lb 6 oz)   06/07/23 74.4 kg (164 lb)     Body mass index is 25.43 kg/m².     Physical Exam:    She appeared in no distress.  She appeared somewhat less animated than previously.  The lungs were clear.  Heart sounds were irregular.  I did not detect a significant murmur.  No edema.  We did not check peripheral pulses.     Last Labs:  CMP:  Recent Labs     12/06/23  1318 03/21/23  1008 10/18/22  1548 07/25/22  1331 12/09/21  1502    138 142 139 141   K 4.3 4.0 4.0 3.9 4.1   CL 98 101 102 102 103   CO2 30 27 27 29 30   ANIONGAP 13 14 17 12 12   BUN 26* 15 15 14 12   CREATININE 1.27* 0.91 1.04 0.91 0.99   EGFR 44*  --   --   --   --    GLUCOSE 91 116* 114* 90 100*     Recent Labs     12/06/23  1318 03/21/23  1008 10/18/22  1548 07/25/22  1331 12/09/21  1502 11/09/21  1509 06/15/21  1021 03/29/21  1227 08/13/20  0540 08/12/20  1601   ALBUMIN 4.1 3.6 4.1 3.9 4.0 3.9 4.0 4.1   < > 3.8   ALKPHOS 73  --   --  78  --  71 75 67   < > 51   ALT 16  --   --  18  --  17 18 16   < > 15   AST 23  --   --  23  --  21 28 23   < > 20   BILITOT 0.9  --   --  0.8  --  0.7 0.8 0.8   < > 0.6   LIPASE  --   --   --   --   --   --   --   --   --  44    < > = values in this interval not displayed.     CBC:  Recent Labs     12/06/23  1318 10/18/22  1548 07/25/22  1331 11/09/21  1509 06/15/21  1021   WBC 8.3 7.7 6.1 6.0 6.1   HGB 13.8 15.2 13.0 12.8 12.9   HCT 43.1 47.6* 40.5 42.0 39.8    240 215 220 228   MCV 94 94 92 95 89     COAG:   Recent Labs     03/29/21  1229 02/17/21  1627 08/12/20  1601 06/06/19  1826 10/16/18  1010   INR 1.5* 1.6* 1.4* 1.5*  --    DDIMERVTE  --   " --   --   --  538*     HEME/ENDO:  Recent Labs     12/06/23  1318 11/09/21  1509 03/29/21  1227 03/02/21  1158 12/04/20  1215 09/09/20  1341 03/09/20  0926 12/05/19  1008   FERRITIN  --   --   --   --  140 38 47 78   IRONSAT  --   --   --   --  30 14* 19* 21*   TSH 1.36 0.92 0.82 0.79  --   --   --   --       CARDIAC:   Recent Labs     03/21/23  1008 02/17/23  1115 10/25/18  1153   LDH  --   --  129   * 177*  --      Recent Labs     03/29/21  1227 03/02/21  1158 07/23/20  1226   CHOL 123 118 125   LDLF 54 53 57   HDL 50.5 41.6 49.7   TRIG 92 119 92       Last Cardiology Tests:    ECG:    Atrial fibrillation and voltage LVH.  Nonspecific ST abnormality.    Echo:  Echo Results:  No results found for this or any previous visit from the past 3650 days.       Cath:      Stress Test:  Stress Results:  No results found for this or any previous visit from the past 365 days.         Cardiac Imaging:        Assessment/Plan     In summary, the patient has generalized atherosclerosis with stable carotid atherosclerosis, peripheral vascular disease, and coronary artery disease.  No recent anginal symptoms.  She has undergone multiple remote percutaneous interventions.  She does not have evidence for decompensated heart failure.  She has a stable thoracic aortic aneurysm which is being monitored.  She is scheduled for interventional cardiology follow-up shortly.  The lightheadedness may reflect relatively low blood pressures.  We reviewed all of her medications and decided to discontinue amlodipine.  She will continue to record her blood pressures at home and when she is seen by her various physicians.  We suggested that she contact her primary care physician for consideration of a neurology consultation.  She has some features which may suggest early parkinsonism.  Return visit in 6 months.      Orders:  Orders Placed This Encounter   Procedures    ECG 12 lead (Clinic Performed)      Followup Appts:  Future Appointments    Date Time Provider Department [place]   12/14/2023  8:30 AM [facility] [place] PAT NURSE 01 [facility]   12/14/2023  9:00 AM [facility] VASC 2 [facility] Rad   12/14/2023 10:00 AM [facility] VASC 2 [facility] U Rad   12/14/2023 11:00 AM Marlin Tarango PA-C [id number] [facility]   12/15/2023  9:30 AM [facility] [place] PAT ROOM 06 [facility]   1/3/2024  2:00 PM Clemente Tobin MD [id number] [facility]           ____________________________________________________________  Ulysses Nova MD    Senior Attending Physician  [facility] [place]  [facility]    [facility]  [employer]

## 2023-12-14 ENCOUNTER — APPOINTMENT (OUTPATIENT)
Dept: VASCULAR MEDICINE | Facility: HOSPITAL | Age: 78
End: 2023-12-14
Payer: MEDICARE

## 2023-12-14 ENCOUNTER — DOCUMENTATION (OUTPATIENT)
Dept: OTOLARYNGOLOGY | Facility: HOSPITAL | Age: 78
End: 2023-12-14
Payer: MEDICARE

## 2023-12-14 ENCOUNTER — APPOINTMENT (OUTPATIENT)
Dept: PREADMISSION TESTING | Facility: HOSPITAL | Age: 78
End: 2023-12-14
Payer: MEDICARE

## 2023-12-14 ENCOUNTER — APPOINTMENT (OUTPATIENT)
Dept: CARDIOLOGY | Facility: HOSPITAL | Age: 78
End: 2023-12-14
Payer: MEDICARE

## 2023-12-14 DIAGNOSIS — I65.21 STENOSIS OF RIGHT CAROTID ARTERY: Primary | ICD-10-CM

## 2023-12-14 RX ORDER — CLOPIDOGREL BISULFATE 75 MG/1
75 TABLET ORAL DAILY
Qty: 90 TABLET | Refills: 3 | Status: SHIPPED | OUTPATIENT
Start: 2023-12-14 | End: 2024-03-18

## 2023-12-15 ENCOUNTER — APPOINTMENT (OUTPATIENT)
Dept: VASCULAR MEDICINE | Facility: HOSPITAL | Age: 78
End: 2023-12-15
Payer: MEDICARE

## 2023-12-15 ENCOUNTER — PRE-ADMISSION TESTING (OUTPATIENT)
Dept: PREADMISSION TESTING | Facility: HOSPITAL | Age: 78
End: 2023-12-15
Payer: MEDICARE

## 2023-12-15 VITALS
BODY MASS INDEX: 25.43 KG/M2 | TEMPERATURE: 96.4 F | DIASTOLIC BLOOD PRESSURE: 70 MMHG | WEIGHT: 158.2 LBS | HEART RATE: 71 BPM | OXYGEN SATURATION: 96 % | HEIGHT: 66 IN | RESPIRATION RATE: 71 BRPM | SYSTOLIC BLOOD PRESSURE: 104 MMHG

## 2023-12-15 DIAGNOSIS — Z01.818 ENCOUNTER FOR PREADMISSION TESTING: Primary | ICD-10-CM

## 2023-12-15 LAB
ABO GROUP (TYPE) IN BLOOD: NORMAL
ALBUMIN SERPL BCP-MCNC: 3.6 G/DL (ref 3.4–5)
ALP SERPL-CCNC: 67 U/L (ref 33–136)
ALT SERPL W P-5'-P-CCNC: 14 U/L (ref 7–45)
ANION GAP SERPL CALC-SCNC: 14 MMOL/L (ref 10–20)
ANTIBODY SCREEN: NORMAL
AST SERPL W P-5'-P-CCNC: 24 U/L (ref 9–39)
BILIRUB SERPL-MCNC: 1 MG/DL (ref 0–1.2)
BUN SERPL-MCNC: 13 MG/DL (ref 6–23)
CALCIUM SERPL-MCNC: 9.3 MG/DL (ref 8.6–10.6)
CHLORIDE SERPL-SCNC: 105 MMOL/L (ref 98–107)
CO2 SERPL-SCNC: 23 MMOL/L (ref 21–32)
CREAT SERPL-MCNC: 1.09 MG/DL (ref 0.5–1.05)
ERYTHROCYTE [DISTWIDTH] IN BLOOD BY AUTOMATED COUNT: 14 % (ref 11.5–14.5)
GFR SERPL CREATININE-BSD FRML MDRD: 52 ML/MIN/1.73M*2
GLUCOSE SERPL-MCNC: 93 MG/DL (ref 74–99)
HCT VFR BLD AUTO: 40.2 % (ref 36–46)
HGB BLD-MCNC: 12.9 G/DL (ref 12–16)
MCH RBC QN AUTO: 29.8 PG (ref 26–34)
MCHC RBC AUTO-ENTMCNC: 32.1 G/DL (ref 32–36)
MCV RBC AUTO: 93 FL (ref 80–100)
NRBC BLD-RTO: 0 /100 WBCS (ref 0–0)
PLATELET # BLD AUTO: 210 X10*3/UL (ref 150–450)
POTASSIUM SERPL-SCNC: 4.3 MMOL/L (ref 3.5–5.3)
PROT SERPL-MCNC: 7 G/DL (ref 6.4–8.2)
RBC # BLD AUTO: 4.33 X10*6/UL (ref 4–5.2)
RH FACTOR (ANTIGEN D): NORMAL
SODIUM SERPL-SCNC: 138 MMOL/L (ref 136–145)
WBC # BLD AUTO: 8 X10*3/UL (ref 4.4–11.3)

## 2023-12-15 PROCEDURE — 85027 COMPLETE CBC AUTOMATED: CPT | Performed by: ANESTHESIOLOGY

## 2023-12-15 PROCEDURE — 86901 BLOOD TYPING SEROLOGIC RH(D): CPT | Performed by: ANESTHESIOLOGY

## 2023-12-15 PROCEDURE — 80053 COMPREHEN METABOLIC PANEL: CPT | Performed by: ANESTHESIOLOGY

## 2023-12-15 PROCEDURE — 87081 CULTURE SCREEN ONLY: CPT | Performed by: ANESTHESIOLOGY

## 2023-12-15 PROCEDURE — 36415 COLL VENOUS BLD VENIPUNCTURE: CPT

## 2023-12-15 ASSESSMENT — ENCOUNTER SYMPTOMS
RESPIRATORY NEGATIVE: 1
MUSCULOSKELETAL NEGATIVE: 1
PALPITATIONS: 1
GASTROINTESTINAL NEGATIVE: 1
EYES NEGATIVE: 1
CONSTITUTIONAL NEGATIVE: 1
NEUROLOGICAL NEGATIVE: 1
NECK NEGATIVE: 1
ENDOCRINE NEGATIVE: 1

## 2023-12-15 ASSESSMENT — DUKE ACTIVITY SCORE INDEX (DASI)
CAN YOU DO YARD WORK LIKE RAKING LEAVES, WEEDING OR PUSHING A MOWER: NO
CAN YOU DO LIGHT WORK AROUND THE HOUSE LIKE DUSTING OR WASHING DISHES: YES
CAN YOU RUN A SHORT DISTANCE: NO
CAN YOU PARTICIPATE IN MODERATE RECREATIONAL ACTIVITIES LIKE GOLF, BOWLING, DANCING, DOUBLES TENNIS OR THROWING A BASEBALL OR FOOTBALL: NO
CAN YOU PARTICIPATE IN STRENOUS SPORTS LIKE SWIMMING, SINGLES TENNIS, FOOTBALL, BASKETBALL, OR SKIING: NO
CAN YOU TAKE CARE OF YOURSELF (EAT, DRESS, BATHE, OR USE TOILET): YES
CAN YOU DO MODERATE WORK AROUND THE HOUSE LIKE VACUUMING, SWEEPING FLOORS OR CARRYING GROCERIES: YES
CAN YOU CLIMB A FLIGHT OF STAIRS OR WALK UP A HILL: YES
CAN YOU WALK A BLOCK OR TWO ON LEVEL GROUND: YES
CAN YOU HAVE SEXUAL RELATIONS: NO
CAN YOU WALK INDOORS, SUCH AS AROUND YOUR HOUSE: YES
DASI METS SCORE: 5.1
TOTAL_SCORE: 18.95
CAN YOU DO HEAVY WORK AROUND THE HOUSE LIKE SCRUBBING FLOORS OR LIFTING AND MOVING HEAVY FURNITURE: NO

## 2023-12-15 ASSESSMENT — CHADS2 SCORE
CHF: YES
CHADS2 SCORE: 5
DIABETES: NO
PRIOR STROKE OR TIA OR THROMBOEMBOLISM: YES
AGE GREATER THAN OR EQUAL TO 75: YES
HYPERTENSION: YES

## 2023-12-15 ASSESSMENT — LIFESTYLE VARIABLES: SMOKING_STATUS: NONSMOKER

## 2023-12-15 NOTE — CPM/PAT H&P
CPM/PAT Evaluation       Name: Amita CHARLES Todd (Amita Todd)  /Age: 1945/78 y.o.     In-Person       Chief Complaint: perioperative risk stratification and optimization    HPI  78yF scheduled for DL and biopsy on  with Dr. Tobin for anterior tongue cancer and hypopharynx lesion.  PMHX includes CKD, MI 10/23, CAD, HTN, ascending aortic aneurysm, a fib, asthma, R carotid stenosis, R eye retinal artery occlusion.  Presents to CPM today for perioperative risk stratification and optimization    Past Medical History:   Diagnosis Date    Alcohol dependence, in remission (CMS/[facility]) 2016    History of alcoholism    Anemia     Aneurysm of ascending aorta (CMS/[facility])     Arrhythmia     a-fib. a-flutter    Branch retinal artery occlusion of right eye 2023    CKD (chronic kidney disease)     Coronary artery disease     Depression     GERD (gastroesophageal reflux disease)     Hypertension     Intermittent claudication (CMS/[facility])     Myocardial infarction (CMS/[facility]) 10/06/2023    Other bursitis of hip, unspecified hip 2017    Hip bursitis    Other conditions influencing health status 2014    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    Pain in leg, unspecified 2022    Leg pain    Pain in right shoulder 2020    Bilateral shoulder pain    Pain in unspecified knee 2021    Joint pain, knee    Personal history of diseases of the blood and blood-forming organs and certain disorders involving the immune mechanism 2019    History of anemia    Personal history of malignant neoplasm of breast 2014    History of malignant neoplasm of female breast    Personal history of other diseases of the circulatory system 2014    Personal history of subarachnoid hemorrhage    Personal history of other diseases of the digestive system 2021    History of upper gastrointestinal hemorrhage    Personal history of other diseases of the digestive system  03/08/2021    History of melena    Personal history of other diseases of the nervous system and sense organs 08/12/2020    History of Bell's palsy    Personal history of other infectious and parasitic diseases 06/14/2019    History of herpes zoster    Personal history of other specified conditions 03/03/2020    History of lump of left breast    Personal history of other specified conditions 02/22/2021    History of shortness of breath    Personal history of transient ischemic attack (TIA), and cerebral infarction without residual deficits 04/24/2022    History of transient ischemic attack    Stenosis of carotid artery     right    Trigger finger, unspecified finger 04/24/2017    Acquired trigger finger       Past Surgical History:   Procedure Laterality Date    CT ANGIO NECK  06/06/2019    CT NECK ANGIO W AND WO IV CONTRAST 6/6/2019 [facility] CLINICAL LEGACY    CT ANGIO NECK  01/04/2021    CT NECK ANGIO W AND WO IV CONTRAST 1/4/2021 [facility]    CT HEAD ANGIO W AND WO IV CONTRAST  06/06/2019    CT HEAD ANGIO W AND WO IV CONTRAST 6/6/2019 [facility] CLINICAL LEGACY    CT HEAD ANGIO W AND WO IV CONTRAST  01/04/2021    CT HEAD ANGIO W AND WO IV CONTRAST 1/4/2021 [facility]    MR HEAD ANGIO WO IV CONTRAST  06/06/2019    MR HEAD ANGIO WO IV CONTRAST 6/6/2019 [facility] CLINICAL LEGACY    MR NECK ANGIO WO IV CONTRAST  06/06/2019    MR NECK ANGIO WO IV CONTRAST 6/6/2019 [facility] CLINICAL LEGACY    OTHER SURGICAL HISTORY  03/03/2020    Oophorectomy    OTHER SURGICAL HISTORY  06/17/2019    Miscarriage surgical treatment    OTHER SURGICAL HISTORY  08/29/2019    multiple Catheter Ablation Atrial Flutter    OTHER SURGICAL HISTORY  04/23/2014    Previous Stent Placement    SEPTOPLASTY  09/22/2017    Septoplasty       Patient  has no history on file for sexual activity.    Family History   Problem Relation Name Age of Onset    Breast cancer Mother      Other (GOODPASTURE'S DISEASE) Father      BRCA2 Positive Sister      Lung  cancer Sister      Neuroblastoma Sister      BRCA2 Positive Daughter      Breast cancer Other G/P        Allergies   Allergen Reactions    Oxycodone Hcl-Oxycodone-Asa Unknown    Oxycodone-Acetaminophen Unknown    Penicillin G Other     Nausea       Prior to Admission medications    Medication Sig Start Date End Date Taking? Authorizing Provider   acetaminophen (Tylenol) 325 mg tablet Take 2 tablets (650 mg) by mouth every 6 hours if needed.    Historical Provider, MD   atorvastatin (Lipitor) 20 mg tablet TAKE 1 TABLET BY MOUTH DAILY AS  DIRECTED 12/4/23   Ulysses Nova MD   buPROPion SR (Wellbutrin SR) 150 mg 12 hr tablet Take 1 tablet (150 mg) by mouth. TAKE PER DIRECTED 1/16/23   Historical Provider, MD   buPROPion XL (Wellbutrin XL) 300 mg 24 hr tablet Take 1 tablet (300 mg) by mouth once daily. 3/31/21   Historical Provider, MD   cholecalciferol (Vitamin D-3) 25 MCG (1000 UT) tablet Take 1 tablet (25 mcg) by mouth 2 times a day. 1/30/14   Historical Provider, MD   clopidogrel (Plavix) 75 mg tablet TAKE 1 TABLET BY MOUTH DAILY 12/14/23   Caitie Leal MD   dapagliflozin propanediol (Farxiga) 10 mg Take 1 tablet (10 mg) by mouth once daily. 10/23/23   Caitie Leal MD   doxycycline (Vibra-Tabs) 100 mg tablet Take 1 tablet (100 mg) by mouth 2 times a day. 10/11/22   Historical Provider, MD   DULoxetine (Cymbalta) 60 mg DR capsule TAKE 1 CAPSULE BY MOUTH  DAILY 6/27/23   Caitie Leal MD   fluconazole (Diflucan) 150 mg tablet Take 1 tablet (150 mg) by mouth. TAKE PER DIRECTED 10/25/22   Historical Provider, MD   fluticasone propion-salmeteroL (Advair Diskus) 100-50 mcg/dose diskus inhaler Inhale 1 puff every 12 hours. 8/10/21   Historical Provider, MD   furosemide (Lasix) 20 mg tablet TAKE 1 TABLET BY MOUTH  DAILY 8/19/23   Caitie Leal MD   herbal complex no.239 (Whole Body Joint Support) capsule Take 1 capsule by mouth once daily.    Historical Provider, MD   losartan (Cozaar) 50 mg tablet Take 1 tablet (50  mg) by mouth once daily. 11/18/13   Historical Provider, MD   magnesium oxide (Mag-Ox) 400 mg (241.3 mg magnesium) tablet Take 1 tablet (400 mg) by mouth once daily. 1/30/14   Historical Provider, MD   metoprolol tartrate (Lopressor) 25 mg tablet Take 2 tablets (50 mg) by mouth 2 times a day. 8/20/20   Historical Provider, MD   pantoprazole (ProtoNix) 40 mg EC tablet Take 1 tablet (40 mg) by mouth 2 times a day. 7/31/23 7/30/24  Caitie Leal MD   spironolactone (Aldactone) 25 mg tablet Take 1 tablet (25 mg) by mouth. PER DIRECTED 6/14/23   Historical Provider, MD   triamcinolone (Kenalog) 0.1 % cream Apply topically. APPLY PER DIRECTED 5/23/23   Historical Provider, MD   amLODIPine (Norvasc) 2.5 mg tablet TAKE 1 TABLET BY MOUTH EVERY DAY 11/13/23 12/13/23  Ulysses Nova MD   clopidogrel (Plavix) 75 mg tablet Take 1 tablet (75 mg) by mouth once daily. 6/10/19 12/14/23  Historical Provider, MD   dofetilide (Tikosyn) 250 mcg capsule Take by mouth every 12 hours. 250 MCG ORAL CAPSULE  TAKE PER DIRECTED 11/3/16 12/13/23  Historical Provider, MD   metoprolol succinate XL (Toprol-XL) 25 mg 24 hr tablet Take 1 tablet (25 mg) by mouth twice a day.  12/13/23  Historical Provider, MD JORDAN ROS:   Constitutional:   neg    Neuro/Psych:   neg    Eyes:   neg    Ears:   neg    Nose:   neg    Mouth:   neg    Throat:   neg    Neck:   neg    Cardio:    chronic   palpitations  Respiratory:   neg    Endocrine:   neg    GI:   neg    :   neg    Musculoskeletal:   neg    Hematologic:   neg    Skin:  neg        Physical Exam  Constitutional:       Appearance: Normal appearance.   HENT:      Head: Normocephalic.      Comments: Small swelling in L parietal-occipital region from fall  Eyes:      Extraocular Movements: Extraocular movements intact.   Cardiovascular:      Rate and Rhythm: Normal rate. Rhythm irregular.      Heart sounds: Normal heart sounds.   Pulmonary:      Effort: Pulmonary effort is normal.      Breath sounds:  Normal breath sounds.   Abdominal:      Palpations: Abdomen is soft.   Musculoskeletal:         General: Normal range of motion.   Skin:     General: Skin is warm and dry.   Neurological:      General: No focal deficit present.      Mental Status: She is alert and oriented to person, place, and time.   Psychiatric:         Mood and Affect: Mood normal.         Behavior: Behavior normal.         Thought Content: Thought content normal.          PAT AIRWAY:   Airway:     Mallampati::  III    TM distance::  >3 FB    Neck ROM::  Limited  normal        Visit Vitals  /70   Pulse 71   Temp 35.8 °C (96.4 °F)   Resp (!) 71       DASI Risk Score      Flowsheet Row Most Recent Value   DASI SCORE 18.95   METS Score (Will be calculated only when all the questions are answered) 5.1          Caprini DVT Assessment    No data to display       Modified Frailty Index    No data to display       CHADS2 Stroke Risk  Current as of 21 minutes ago        18.2% 3 - 100%: High Risk   2 - 3%: Medium Risk   0 - 2%: Low Risk     Last Change:           This score determines the patient's risk of having a stroke if the patient has atrial fibrillation.          Points Metrics   1 Has Congestive Heart Failure:  Yes     Patients with congestive heart failure get 1 point.    Current as of 21 minutes ago   1 Has Hypertension:  Yes     Patients with hypertension get 1 point.    Current as of 21 minutes ago   1 Age:  78     Patients who are 75 years of age or older get 1 point.    Current as of 21 minutes ago   1 Has Diabetes:  Yes     Patients with diabetes get 1 point.    Current as of 21 minutes ago   2 Had Stroke:  No  Had TIA:  Yes  Had Thromboembolism:  No     Patients who have had a stroke, TIA, or thromboembolism get 2 points.    Current as of 21 minutes ago             Revised Cardiac Risk Index    No data to display       Apfel Simplified Score    No data to display       Risk Analysis Index Results This Encounter    No data found in  the last 1 encounters.       Stop Bang Score      Flowsheet Row Most Recent Value   Do you snore loudly? 0   Do you often feel tired or fatigued after your sleep? 1   Has anyone ever observed you stop breathing in your sleep? 0   Do you have or are you being treated for high blood pressure? 1   Recent BMI (Calculated) 25.4   Is BMI greater than 35 kg/m2? 0=No   Age older than 50 years old? 1=Yes   Is your neck circumference greater than 17 inches (Male) or 16 inches (Female)? 0   Gender - Male 0=No   STOP-BANG Total Score 3          Results for orders placed or performed in visit on 12/15/23 (from the past 24 hour(s))   Comprehensive Metabolic Panel   Result Value Ref Range    Glucose 93 74 - 99 mg/dL    Sodium 138 136 - 145 mmol/L    Potassium 4.3 3.5 - 5.3 mmol/L    Chloride 105 98 - 107 mmol/L    Bicarbonate 23 21 - 32 mmol/L    Anion Gap 14 10 - 20 mmol/L    Urea Nitrogen 13 6 - 23 mg/dL    Creatinine 1.09 (H) 0.50 - 1.05 mg/dL    eGFR 52 (L) >60 mL/min/1.73m*2    Calcium 9.3 8.6 - 10.6 mg/dL    Albumin 3.6 3.4 - 5.0 g/dL    Alkaline Phosphatase 67 33 - 136 U/L    Total Protein 7.0 6.4 - 8.2 g/dL    AST 24 9 - 39 U/L    Bilirubin, Total 1.0 0.0 - 1.2 mg/dL    ALT 14 7 - 45 U/L   CBC   Result Value Ref Range    WBC 8.0 4.4 - 11.3 x10*3/uL    nRBC 0.0 0.0 - 0.0 /100 WBCs    RBC 4.33 4.00 - 5.20 x10*6/uL    Hemoglobin 12.9 12.0 - 16.0 g/dL    Hematocrit 40.2 36.0 - 46.0 %    MCV 93 80 - 100 fL    MCH 29.8 26.0 - 34.0 pg    MCHC 32.1 32.0 - 36.0 g/dL    RDW 14.0 11.5 - 14.5 %    Platelets 210 150 - 450 x10*3/uL        Assessment and Plan:   78yF scheduled for DL and biopsy on 12/21 with Dr. Tobin for anterior tongue cancer and hypopharynx lesion.  PMHX includes CKD, MI 10/23, CAD, HTN, ascending aortic aneurysm, a fib, asthma, R carotid stenosis, R eye retinal artery occlusion, breast CA BRCA pos sp lumpectomy and radiation,  Presents to CPM today for perioperative risk stratification and optimization    Neuro:  R  eye retinal a. occlusion, depression, carotid stenosis, TIA, bells palsy. On plavix Recent fall, seen in ED-no hemorrhage. Currently in process of work up  - can continue cymbalta and wellbutrin until night of surgery  - follows with cardiology for carotid stenosis with regular imaging. R side mostly occluded and L side ~50%. Scheduled duplex on 12/28   - elevated risk for stroke     HEENT/Airway:  Tongue cancer sp removal 2008, hypopharynx lesion, post nasal drip.  - scheduled for surgery. Not on medications     Cardiovascular:  HFrEF, Afib s/p watchman, HTN, HLD, CAD sp LAD stent (2006), mid RCA (2005), & RCA (2007), MI, aortic aneurysm, PAD, mitral regurg, carotid stenosis  - has palpitations 2/2 afib, denied chest pain and [place]  - on Norvasc, atorvastatin, Plavix, furosemide, losartan, metoprolol, farxiga  - will continue metoprolol morning of surgery and hold others night before surgery  - has stopped taking plavix already. Will be held for 5 days   METS: 5  RCRI: 2 point, 10% risk for postoperative MACE   DENISE: 1.1% risk for postoperative MACE  EKG - afib  ECHO - Ef ~55%, mild AI  High risk for afib stroke    Pulmonary:  Asthma, doesn't use inhalers but has albuterol if needed  ARISCAT: <26 points, 1.6% risk of in-hospital postoperative pulmonary complication  PRODIGY: Moderate risk for opioid induced respiratory depression  Discussed smoking cessation and deep breathing handout given    Renal:   CKD, stable  Pt at High risk for perioperative NAM based on Dynamic Predictive Scoring Tool for Perioperative NAM  CMP ordered - sCr 1.09    Endocrine:  Thyroid nodule    Hematologic:  Anemia, holding plavix  Hgb 12.9  T&S, CBC ordered   Caprini Score 7, patient at high risk for postoperative DVT. Pt supplied education/VTE handout    Gastrointestinal:   GERD on omeprazole, diverticulitis sp colectomy 2005, GI bleed 2021,     Infectious disease:   No diagnosis or significant findings on chart review or clinical  presentation and evaluation.   [facility] screen collected    Musculoskeletal:   OA, tylenol as needed. Scc of L eyelid sp excision, RLE cancer sp excision    Anesthesia:  No anesthesia complications    Labs & Imaging ordered:  CBC, CMP, MRSA, T&S    Overall, patient at moderate risk for the scheduled surgery, though surgery itself is low risk. Okay to proceed. Discussed with patient medication instructions, NPO guidelines, and any questions or concerns.     Patient seen & discussed with attending, Dr. Santos .

## 2023-12-15 NOTE — PREPROCEDURE INSTRUCTIONS
Medication List            Accurate as of December 15, 2023 11:11 AM. Always use your most recent med list.                acetaminophen 325 mg tablet  Commonly known as: Tylenol  Medication Adjustments for Surgery: Take morning of surgery with sip of water, no other fluids     atorvastatin 20 mg tablet  Commonly known as: Lipitor  TAKE 1 TABLET BY MOUTH DAILY AS  DIRECTED  Medication Adjustments for Surgery: Continue until night before surgery     * buPROPion  mg 24 hr tablet  Commonly known as: Wellbutrin XL  Medication Adjustments for Surgery: Continue until night before surgery     * Wellbutrin  mg 12 hr tablet  Generic drug: buPROPion SR  Medication Adjustments for Surgery: Continue until night before surgery     cholecalciferol 25 MCG (1000 UT) tablet  Commonly known as: Vitamin D-3  Medication Adjustments for Surgery: Continue until night before surgery     clopidogrel 75 mg tablet  Commonly known as: Plavix  TAKE 1 TABLET BY MOUTH DAILY  Medication Adjustments for Surgery: Other (Comment)  Notes to patient: Stop 5 days prior to surgery     dapagliflozin propanediol 10 mg  Commonly known as: Farxiga  Take 1 tablet (10 mg) by mouth once daily.  Medication Adjustments for Surgery: Stop 3 days before surgery     doxycycline 100 mg tablet  Commonly known as: Vibra-Tabs  Medication Adjustments for Surgery: Other (Comment)     DULoxetine 60 mg DR capsule  Commonly known as: Cymbalta  TAKE 1 CAPSULE BY MOUTH  DAILY  Medication Adjustments for Surgery: Continue until night before surgery     fluconazole 150 mg tablet  Commonly known as: Diflucan  Medication Adjustments for Surgery: Other (Comment)     fluticasone propion-salmeteroL 100-50 mcg/dose diskus inhaler  Commonly known as: Advair Diskus  Medication Adjustments for Surgery: Other (Comment)     furosemide 20 mg tablet  Commonly known as: Lasix  TAKE 1 TABLET BY MOUTH  DAILY  Medication Adjustments for Surgery: Continue until night before  surgery     losartan 50 mg tablet  Commonly known as: Cozaar  Medication Adjustments for Surgery: Continue until night before surgery     magnesium oxide 400 mg (241.3 mg magnesium) tablet  Commonly known as: Mag-Ox  Medication Adjustments for Surgery: Continue until night before surgery     metoprolol tartrate 25 mg tablet  Commonly known as: Lopressor  Medication Adjustments for Surgery: Take morning of surgery with sip of water, no other fluids     pantoprazole 40 mg EC tablet  Commonly known as: ProtoNix  Take 1 tablet (40 mg) by mouth 2 times a day.  Medication Adjustments for Surgery: Continue until night before surgery     spironolactone 25 mg tablet  Commonly known as: Aldactone  Medication Adjustments for Surgery: Continue until night before surgery     triamcinolone 0.1 % cream  Commonly known as: Kenalog  Medication Adjustments for Surgery: Continue until night before surgery     Whole Body Joint Support capsule  Generic drug: herbal complex no.239  Medication Adjustments for Surgery: Continue until night before surgery           * This list has 2 medication(s) that are the same as other medications prescribed for you. Read the directions carefully, and ask your doctor or other care provider to review them with you.                                  NPO Instructions:    Do not eat any food after midnight the night before your surgery/procedure.  You may have clear liquids until TWO hours before surgery/procedure. This includes water, black tea/coffee, (no milk or cream) apple juice and electrolyte drinks (Gatorade).  You may chew gum up to TWO hours before your surgery/procedure.    Additional Instructions:     Review your medication instructions, take indicated medications  You may have clear liquids until TWO hours before surgery/procedure.  This includes water, black tea/coffee, (no milk or cream) apple juice and electrolyte drinks (Gatorade)  You may chew gum up to TWO hours before your  surgery/procedure  Wear  comfortable loose fitting clothing  Do not use moisturizers, creams, lotions or perfume  All jewelry and valuables should be left at home                                                 no

## 2023-12-17 LAB — STAPHYLOCOCCUS SPEC CULT: NORMAL

## 2023-12-20 ENCOUNTER — ANESTHESIA EVENT (OUTPATIENT)
Dept: OPERATING ROOM | Facility: HOSPITAL | Age: 78
End: 2023-12-20
Payer: MEDICARE

## 2023-12-20 ENCOUNTER — APPOINTMENT (OUTPATIENT)
Dept: VASCULAR MEDICINE | Facility: HOSPITAL | Age: 78
End: 2023-12-20
Payer: MEDICARE

## 2023-12-21 ENCOUNTER — HOSPITAL ENCOUNTER (OUTPATIENT)
Facility: HOSPITAL | Age: 78
Setting detail: OUTPATIENT SURGERY
Discharge: HOME | End: 2023-12-21
Attending: OTOLARYNGOLOGY | Admitting: OTOLARYNGOLOGY
Payer: MEDICARE

## 2023-12-21 ENCOUNTER — ANESTHESIA (OUTPATIENT)
Dept: OPERATING ROOM | Facility: HOSPITAL | Age: 78
End: 2023-12-21
Payer: MEDICARE

## 2023-12-21 VITALS
TEMPERATURE: 97.3 F | SYSTOLIC BLOOD PRESSURE: 130 MMHG | DIASTOLIC BLOOD PRESSURE: 68 MMHG | RESPIRATION RATE: 16 BRPM | HEART RATE: 70 BPM | OXYGEN SATURATION: 97 %

## 2023-12-21 DIAGNOSIS — J39.2 HYPOPHARYNGEAL LESION: ICD-10-CM

## 2023-12-21 LAB
ATRIAL RATE: 90 BPM
Q ONSET: 220 MS
QRS COUNT: 13 BEATS
QRS DURATION: 96 MS
QT INTERVAL: 382 MS
QTC CALCULATION(BAZETT): 446 MS
QTC FREDERICIA: 424 MS
R AXIS: 80 DEGREES
T AXIS: 122 DEGREES
T OFFSET: 411 MS
VENTRICULAR RATE: 82 BPM

## 2023-12-21 PROCEDURE — 31622 DX BRONCHOSCOPE/WASH: CPT | Performed by: OTOLARYNGOLOGY

## 2023-12-21 PROCEDURE — 3700000001 HC GENERAL ANESTHESIA TIME - INITIAL BASE CHARGE: Performed by: OTOLARYNGOLOGY

## 2023-12-21 PROCEDURE — 2500000001 HC RX 250 WO HCPCS SELF ADMINISTERED DRUGS (ALT 637 FOR MEDICARE OP): Performed by: OTOLARYNGOLOGY

## 2023-12-21 PROCEDURE — 43200 ESOPHAGOSCOPY FLEXIBLE BRUSH: CPT | Performed by: OTOLARYNGOLOGY

## 2023-12-21 PROCEDURE — 99100 ANES PT EXTEME AGE<1 YR&>70: CPT | Performed by: ANESTHESIOLOGY

## 2023-12-21 PROCEDURE — 3700000002 HC GENERAL ANESTHESIA TIME - EACH INCREMENTAL 1 MINUTE: Performed by: OTOLARYNGOLOGY

## 2023-12-21 PROCEDURE — 3600000003 HC OR TIME - INITIAL BASE CHARGE - PROCEDURE LEVEL THREE: Performed by: OTOLARYNGOLOGY

## 2023-12-21 PROCEDURE — 2500000004 HC RX 250 GENERAL PHARMACY W/ HCPCS (ALT 636 FOR OP/ED)

## 2023-12-21 PROCEDURE — 2500000005 HC RX 250 GENERAL PHARMACY W/O HCPCS

## 2023-12-21 PROCEDURE — 2500000004 HC RX 250 GENERAL PHARMACY W/ HCPCS (ALT 636 FOR OP/ED): Performed by: OTOLARYNGOLOGY

## 2023-12-21 PROCEDURE — 88305 TISSUE EXAM BY PATHOLOGIST: CPT | Mod: TC,SUR | Performed by: OTOLARYNGOLOGY

## 2023-12-21 PROCEDURE — 31535 LARYNGOSCOPY W/BIOPSY: CPT | Performed by: OTOLARYNGOLOGY

## 2023-12-21 PROCEDURE — 7100000002 HC RECOVERY ROOM TIME - EACH INCREMENTAL 1 MINUTE: Performed by: OTOLARYNGOLOGY

## 2023-12-21 PROCEDURE — A31535 PR LARYNGOSCOPY,DIRCT,OP,BIOPSY: Performed by: ANESTHESIOLOGY

## 2023-12-21 PROCEDURE — A31535 PR LARYNGOSCOPY,DIRCT,OP,BIOPSY

## 2023-12-21 PROCEDURE — 7100000009 HC PHASE TWO TIME - INITIAL BASE CHARGE: Performed by: OTOLARYNGOLOGY

## 2023-12-21 PROCEDURE — 88305 TISSUE EXAM BY PATHOLOGIST: CPT | Performed by: PATHOLOGY

## 2023-12-21 PROCEDURE — 88332 PATH CONSLTJ SURG EA ADD BLK: CPT | Performed by: PATHOLOGY

## 2023-12-21 PROCEDURE — 88331 PATH CONSLTJ SURG 1 BLK 1SPC: CPT | Mod: TC,SUR | Performed by: PATHOLOGY

## 2023-12-21 PROCEDURE — 3600000008 HC OR TIME - EACH INCREMENTAL 1 MINUTE - PROCEDURE LEVEL THREE: Performed by: OTOLARYNGOLOGY

## 2023-12-21 PROCEDURE — 7100000010 HC PHASE TWO TIME - EACH INCREMENTAL 1 MINUTE: Performed by: OTOLARYNGOLOGY

## 2023-12-21 PROCEDURE — A4217 STERILE WATER/SALINE, 500 ML: HCPCS | Performed by: OTOLARYNGOLOGY

## 2023-12-21 PROCEDURE — 7100000001 HC RECOVERY ROOM TIME - INITIAL BASE CHARGE: Performed by: OTOLARYNGOLOGY

## 2023-12-21 RX ORDER — FENTANYL CITRATE 50 UG/ML
50 INJECTION, SOLUTION INTRAMUSCULAR; INTRAVENOUS EVERY 5 MIN PRN
Status: DISCONTINUED | OUTPATIENT
Start: 2023-12-21 | End: 2023-12-21 | Stop reason: HOSPADM

## 2023-12-21 RX ORDER — LIDOCAINE HCL/PF 100 MG/5ML
SYRINGE (ML) INTRAVENOUS AS NEEDED
Status: DISCONTINUED | OUTPATIENT
Start: 2023-12-21 | End: 2023-12-21

## 2023-12-21 RX ORDER — WATER 1 ML/ML
IRRIGANT IRRIGATION AS NEEDED
Status: DISCONTINUED | OUTPATIENT
Start: 2023-12-21 | End: 2023-12-21 | Stop reason: HOSPADM

## 2023-12-21 RX ORDER — OXYCODONE HYDROCHLORIDE 5 MG/1
5 TABLET ORAL EVERY 4 HOURS PRN
Status: DISCONTINUED | OUTPATIENT
Start: 2023-12-21 | End: 2023-12-21 | Stop reason: HOSPADM

## 2023-12-21 RX ORDER — ONDANSETRON HYDROCHLORIDE 2 MG/ML
4 INJECTION, SOLUTION INTRAVENOUS ONCE AS NEEDED
Status: DISCONTINUED | OUTPATIENT
Start: 2023-12-21 | End: 2023-12-21 | Stop reason: HOSPADM

## 2023-12-21 RX ORDER — ACETAMINOPHEN 325 MG/1
650 TABLET ORAL EVERY 4 HOURS PRN
Status: DISCONTINUED | OUTPATIENT
Start: 2023-12-21 | End: 2023-12-21 | Stop reason: HOSPADM

## 2023-12-21 RX ORDER — ONDANSETRON HYDROCHLORIDE 2 MG/ML
INJECTION, SOLUTION INTRAVENOUS AS NEEDED
Status: DISCONTINUED | OUTPATIENT
Start: 2023-12-21 | End: 2023-12-21

## 2023-12-21 RX ORDER — PROPOFOL 10 MG/ML
INJECTION, EMULSION INTRAVENOUS AS NEEDED
Status: DISCONTINUED | OUTPATIENT
Start: 2023-12-21 | End: 2023-12-21

## 2023-12-21 RX ORDER — FENTANYL CITRATE 50 UG/ML
INJECTION, SOLUTION INTRAMUSCULAR; INTRAVENOUS AS NEEDED
Status: DISCONTINUED | OUTPATIENT
Start: 2023-12-21 | End: 2023-12-21

## 2023-12-21 RX ORDER — SODIUM CHLORIDE, SODIUM LACTATE, POTASSIUM CHLORIDE, CALCIUM CHLORIDE 600; 310; 30; 20 MG/100ML; MG/100ML; MG/100ML; MG/100ML
100 INJECTION, SOLUTION INTRAVENOUS CONTINUOUS
Status: DISCONTINUED | OUTPATIENT
Start: 2023-12-21 | End: 2023-12-21 | Stop reason: HOSPADM

## 2023-12-21 RX ORDER — ROCURONIUM BROMIDE 10 MG/ML
INJECTION, SOLUTION INTRAVENOUS AS NEEDED
Status: DISCONTINUED | OUTPATIENT
Start: 2023-12-21 | End: 2023-12-21

## 2023-12-21 RX ORDER — PHENYLEPHRINE HCL IN 0.9% NACL 0.4MG/10ML
SYRINGE (ML) INTRAVENOUS AS NEEDED
Status: DISCONTINUED | OUTPATIENT
Start: 2023-12-21 | End: 2023-12-21

## 2023-12-21 RX ORDER — LIDOCAINE HYDROCHLORIDE 10 MG/ML
0.1 INJECTION INFILTRATION; PERINEURAL ONCE
Status: DISCONTINUED | OUTPATIENT
Start: 2023-12-21 | End: 2023-12-21 | Stop reason: HOSPADM

## 2023-12-21 RX ORDER — DEXAMETHASONE SODIUM PHOSPHATE 4 MG/ML
INJECTION, SOLUTION INTRA-ARTICULAR; INTRALESIONAL; INTRAMUSCULAR; INTRAVENOUS; SOFT TISSUE AS NEEDED
Status: DISCONTINUED | OUTPATIENT
Start: 2023-12-21 | End: 2023-12-21

## 2023-12-21 RX ADMIN — SUGAMMADEX 200 MG: 100 INJECTION, SOLUTION INTRAVENOUS at 09:35

## 2023-12-21 RX ADMIN — ROCURONIUM BROMIDE 50 MG: 10 INJECTION INTRAVENOUS at 08:55

## 2023-12-21 RX ADMIN — PROPOFOL 120 MG: 10 INJECTION, EMULSION INTRAVENOUS at 08:54

## 2023-12-21 RX ADMIN — PROPOFOL 30 MG: 10 INJECTION, EMULSION INTRAVENOUS at 09:07

## 2023-12-21 RX ADMIN — FENTANYL CITRATE 25 MCG: 50 INJECTION, SOLUTION INTRAMUSCULAR; INTRAVENOUS at 08:54

## 2023-12-21 RX ADMIN — DEXAMETHASONE SODIUM PHOSPHATE 10 MG: 4 INJECTION, SOLUTION INTRA-ARTICULAR; INTRALESIONAL; INTRAMUSCULAR; INTRAVENOUS; SOFT TISSUE at 09:07

## 2023-12-21 RX ADMIN — LIDOCAINE HYDROCHLORIDE 70 MG: 20 INJECTION INTRAVENOUS at 08:54

## 2023-12-21 RX ADMIN — FENTANYL CITRATE 25 MCG: 50 INJECTION, SOLUTION INTRAMUSCULAR; INTRAVENOUS at 09:02

## 2023-12-21 RX ADMIN — Medication 120 MCG: at 09:17

## 2023-12-21 RX ADMIN — SODIUM CHLORIDE, SODIUM LACTATE, POTASSIUM CHLORIDE, AND CALCIUM CHLORIDE: 600; 310; 30; 20 INJECTION, SOLUTION INTRAVENOUS at 08:54

## 2023-12-21 RX ADMIN — ONDANSETRON 4 MG: 2 INJECTION INTRAMUSCULAR; INTRAVENOUS at 09:07

## 2023-12-21 ASSESSMENT — PAIN - FUNCTIONAL ASSESSMENT
PAIN_FUNCTIONAL_ASSESSMENT: 0-10

## 2023-12-21 ASSESSMENT — PAIN SCALES - GENERAL
PAINLEVEL_OUTOF10: 0 - NO PAIN

## 2023-12-21 ASSESSMENT — COLUMBIA-SUICIDE SEVERITY RATING SCALE - C-SSRS
2. HAVE YOU ACTUALLY HAD ANY THOUGHTS OF KILLING YOURSELF?: NO
1. IN THE PAST MONTH, HAVE YOU WISHED YOU WERE DEAD OR WISHED YOU COULD GO TO SLEEP AND NOT WAKE UP?: NO
6. HAVE YOU EVER DONE ANYTHING, STARTED TO DO ANYTHING, OR PREPARED TO DO ANYTHING TO END YOUR LIFE?: NO

## 2023-12-21 NOTE — ANESTHESIA POSTPROCEDURE EVALUATION
Patient: Amita Todd    Procedure Summary       Date: 12/21/23 Room / Location: [facility] OR 03 / [facility]    Anesthesia Start: 0846 Anesthesia Stop: 0951    Procedures:       Direct Laryngoscopy      Bronchoscopy      Esophagoscopy Diagnosis:       Hypopharyngeal lesion      (Hypopharyngeal lesion [J39.2])    Surgeons: Clemente Tobin MD Responsible Provider: Karissa Santos MD    Anesthesia Type: general ASA Status: 3            Anesthesia Type: general    Vitals Value Taken Time   /69 12/21/23 1033   Temp 36.3 °C (97.3 °F) 12/21/23 0945   Pulse 79 12/21/23 1040   Resp 15 12/21/23 1040   SpO2 98 % 12/21/23 1040   Vitals shown include unvalidated device data.    Anesthesia Post Evaluation    Patient location during evaluation: PACU  Patient participation: complete - patient participated  Level of consciousness: awake  Pain management: adequate  Airway patency: patent  Cardiovascular status: acceptable  Respiratory status: acceptable  Hydration status: acceptable  Postoperative Nausea and Vomiting: none    No notable events documented.

## 2023-12-21 NOTE — ANESTHESIA PROCEDURE NOTES
Airway  Date/Time: 12/21/2023 8:57 AM  Urgency: elective    Airway not difficult    Staffing  Performed: SRNA   Authorized by: Karissa Santos MD    Performed by: JESSICA Corley  Patient location during procedure: OR    Indications and Patient Condition  Indications for airway management: anesthesia  Spontaneous Ventilation: absent  Sedation level: deep  Preoxygenated: yes  Patient position: sniffing  Mask difficulty assessment: 2 - vent by mask + OA or adjuvant +/- NMBA  Planned trial extubation    Final Airway Details  Final airway type: endotracheal airway      Successful airway: ETT  Cuffed: yes   Successful intubation technique: video laryngoscopy  Facilitating devices/methods: intubating stylet and anterior pressure/BURP  Endotracheal tube insertion site: oral  Blade size: #3  ETT size (mm): 5.5  Cormack-Lehane Classification: grade I - full view of glottis  Placement verified by: bronchoscopy and capnometry   Measured from: lips  ETT to lips (cm): 20  Number of attempts at approach: 1

## 2023-12-21 NOTE — OP NOTE
[facility] - Operative Report  Name: Amita Todd   MRN: [id number]  Date of Procedure: 12/21/23  Location: [facility]    Attending Surgeon: Clemente Tobin MD     Resident/Fellow: Alba Quach    Preoperative Diagnosis:  Hypopharyngeal lesion    Postoperative Diagnosis:  Hypopharyngeal cancer    Operative indications:  This patient recently presented with a mass in the postcricoid area.  This is highly suspicious.  The patient is here today to undergo a panendoscopy and a biopsy.  She understands the surgery and the possible complication and wishes to proceed.    Procedure:  Direct laryngoscopy and biopsy, flexible bronchoscopy, flexible esophagoscopy  Operative procedure:  Under general anesthesia the patient was properly positioned.  The Dedo laryngoscope was introduced.  Careful examination of the oral cavity oropharynx hypopharynx and larynx is carried out.The only finding is this exophytic lesion in the right postcricoid area.  Multiple biopsies were obtained.  This was sent for frozen section.  In the meantime a flexible bronchoscopy was carried out.  The laryngoscope was suspended over the larynx.  The bronchoscope was inserted through the laryngoscope beside the endotracheal tube into the tracheobronchial tree.  All the different bronchial segments are visualized and are felt to be within normal limits.  The bronchoscope was then pulled.  The laryngoscope was resuspended over the esophageal enteritis.  The gastroscope was introduced all the way down into the stomach.  The entire length of the esophagus is visualized on the way out and found to be negative.  The frozen section was suspicious for squamous cell carcinoma.  Further biopsies were obtained.  The procedure was terminated and the patient was sent back to recovery room in fair condition.  This is dictated on December 21, 2023    I was present for the entire procedure    Clemente Tobin MD

## 2023-12-21 NOTE — ANESTHESIA PREPROCEDURE EVALUATION
Patient: Amita Todd    Procedure Information       Date/Time: 12/21/23 1435    Procedures:       Direct Laryngoscopy      Bronchoscopy      Esophagoscopy    Location: [facility] OR 03 / [facility] OR    Surgeons: Clemente Tobin MD            Relevant Problems   Cardiovascular   (+) Aneurysm, ascending aorta (CMS/HCC)   (+) Atrial fibrillation, persistent (CMS/HCC)   (+) Atrial tachycardia   (+) CAD (coronary artery disease)   (+) Carotid stenosis   (+) HFrEF (heart failure with reduced ejection fraction) (CMS/HCC)   (+) Hypertension   (+) Mitral regurgitation   (+) Mixed hyperlipidemia   (+) PAD (peripheral artery disease) (CMS/HCC)      GI   (+) Esophageal reflux      /Renal   (+) Acute cystitis with hematuria      Neuro/Psych   (+) Carotid stenosis   (+) Depression      Pulmonary   (+) Asthma   (+) Dyspnea on exertion      GI/Hepatic   (+) Cancer of anterior two-thirds of tongue (CMS/HCC)      Hematology   (+) Iron deficiency anemia due to chronic blood loss      Musculoskeletal   (+) Osteoarthritis      Infectious Disease   (+) Vaginal yeast infection       Clinical information reviewed:                   NPO Detail:  No data recorded     Physical Exam    Airway  Mallampati: III  TM distance: >3 FB  Neck ROM: limited     Cardiovascular    Dental - normal exam     Pulmonary    Abdominal        Anesthesia Plan    ASA 3     general     intravenous induction   Postoperative administration of opioids is intended.  Trial extubation is planned.  Anesthetic plan and risks discussed with patient.

## 2023-12-21 NOTE — BRIEF OP NOTE
Date: 2023  OR Location: [facility] OR    Name: Amita Todd, : 1945, Age: 78 y.o., MRN: [id number], Sex: female    Diagnosis  Pre-op Diagnosis     * Hypopharyngeal lesion [J39.2] Post-op Diagnosis     * Hypopharyngeal lesion [J39.2]     Procedures  Direct Laryngoscopy  00334 - AL LARYNGOSCOPY DIRECT OPERATIVE W/BIOPSY    Bronchoscopy  73567 - AL [facility] INCL FLUOR GDNCE DX W/CELL WASHG SPX    Esophagoscopy  61254 - AL ESOPHAGOSCOPY FLEXIBLE TRANSORAL DIAGNOSTIC      Surgeons      * Clemente Tobin - Primary    Resident/Fellow/Other Assistant:  Surgeon(s) and Role:     * Alba Quach MD - Resident - Assisting    Procedure Summary  Anesthesia: General  ASA: III  Anesthesia Staff: Anesthesiologist: Karissa Santos MD  CRNA: Angel Chavez APRN-CRNA  SRNA: KIMBER Corley-CRNA  Estimated Blood Loss: 2mL  Intra-op Medications: * No intraprocedure medications in log *        Specimen:   ID Type Source Tests Collected by Time   1 : LEFT HYPOPHARYNGEAL LESION Tissue PHARYNX BIOPSY SURGICAL PATHOLOGY EXAM Clemente Tobin MD 2023 0903        Staff:   Circulator: Radha Chance RN  Scrub Person: Angelica Arredondo; Maritza Bergeron; Jo Ann Gaviria          Findings: Left 2-3cm exophytic mass along aryepiglottic fold with extension into piriform sinus, normal bronchoscopy and esophagoscopy. Frozen showing atypical cells most consistent with carcinoma.     Complications:  None; patient tolerated the procedure well.     Disposition: PACU - hemodynamically stable.  Condition: stable  Specimens Collected:   ID Type Source Tests Collected by Time   1 : LEFT HYPOPHARYNGEAL LESION Tissue PHARYNX BIOPSY SURGICAL PATHOLOGY EXAM Clemente Tobin MD 2023 0903     Attending Attestation:     Clemente Tobin  Phone Number: [phone]

## 2023-12-22 ASSESSMENT — PAIN SCALES - GENERAL: PAINLEVEL_OUTOF10: 1

## 2023-12-28 ENCOUNTER — HOSPITAL ENCOUNTER (OUTPATIENT)
Dept: VASCULAR MEDICINE | Facility: HOSPITAL | Age: 78
Discharge: HOME | End: 2023-12-28
Payer: MEDICARE

## 2023-12-28 ENCOUNTER — APPOINTMENT (OUTPATIENT)
Dept: CARDIOLOGY | Facility: HOSPITAL | Age: 78
End: 2023-12-28
Payer: MEDICARE

## 2023-12-28 DIAGNOSIS — G45.1 CAROTID ARTERY SYNDROME (HEMISPHERIC): ICD-10-CM

## 2023-12-28 DIAGNOSIS — I65.23 OCCLUSION AND STENOSIS OF BILATERAL CAROTID ARTERIES: ICD-10-CM

## 2023-12-28 DIAGNOSIS — I73.9 PERIPHERAL VASCULAR DISEASE, UNSPECIFIED (CMS-HCC): ICD-10-CM

## 2023-12-28 DIAGNOSIS — I65.29 OCCLUSION AND STENOSIS OF UNSPECIFIED CAROTID ARTERY: ICD-10-CM

## 2023-12-28 PROCEDURE — 93922 UPR/L XTREMITY ART 2 LEVELS: CPT | Performed by: SURGERY

## 2023-12-28 PROCEDURE — 93880 EXTRACRANIAL BILAT STUDY: CPT | Performed by: SURGERY

## 2023-12-28 PROCEDURE — 93922 UPR/L XTREMITY ART 2 LEVELS: CPT

## 2023-12-28 PROCEDURE — 93880 EXTRACRANIAL BILAT STUDY: CPT

## 2023-12-29 ENCOUNTER — APPOINTMENT (OUTPATIENT)
Dept: VASCULAR MEDICINE | Facility: HOSPITAL | Age: 78
End: 2023-12-29
Payer: MEDICARE

## 2024-01-02 DIAGNOSIS — E11.69 TYPE 2 DIABETES MELLITUS WITH OTHER SPECIFIED COMPLICATION, WITHOUT LONG-TERM CURRENT USE OF INSULIN (MULTI): ICD-10-CM

## 2024-01-02 DIAGNOSIS — C13.9 MALIGNANT NEOPLASM OF HYPOPHARYNX (MULTI): ICD-10-CM

## 2024-01-02 RX ORDER — DAPAGLIFLOZIN 10 MG/1
10 TABLET, FILM COATED ORAL DAILY
Qty: 90 TABLET | Refills: 1 | Status: SHIPPED | OUTPATIENT
Start: 2024-01-02 | End: 2024-01-23 | Stop reason: SDUPTHER

## 2024-01-02 NOTE — PROGRESS NOTES
Provider Impressions     No evidence of any recurrence of the patient's previously treated anterior tongue cancer.      Recent diagnosis of a hypopharyngeal lesion which is most likely squamous cell carcinoma.  The final pathology report is not available.  I discussed with her and her family the treatment options.  She will be sent for chemotherapy and radiation.  She will be presented at our tumor board in the near future.    I will see her in 1 month.  Chief Complaint     Follow-up status post endoscopy and biopsy of a hypopharyngeal lesion     History of Present Illness    This patient has been followed for quite some time. She had a T1 N0 lesion of her right lateral tongue which was removed back in May of 2008. She did not receive any further treatments. Her last chest x-ray was in October 2022. This was normal. She also has gastroesophageal reflux disease for which she is now on omeprazole 20 mg daily. She was also having some issues with her sense of smell and taste. I did put her on Flonase. She said that it helped with the congestion but did not change her sense of smell and taste. She rarely uses the spray. In March 2019 I ordered a swallow evaluation. This was normal.  She was seen in December and was found to have a lesion in her hypopharynx.  On December 21, 2023 she underwent biopsy.  The frozen section was suspicious for squamous cell carcinoma.  The final pathology report is not available.  She had a CT scan of her neck which was done on December 12, 2023 which showed the hypopharyngeal lesion as well as at least 1 suspicious node in the left level 2.  She is here today to discuss further management.    Physical Exam

## 2024-01-03 ENCOUNTER — OFFICE VISIT (OUTPATIENT)
Dept: OTOLARYNGOLOGY | Facility: CLINIC | Age: 79
End: 2024-01-03
Payer: MEDICARE

## 2024-01-03 VITALS — WEIGHT: 156 LBS | BODY MASS INDEX: 25.18 KG/M2 | TEMPERATURE: 97.5 F

## 2024-01-03 DIAGNOSIS — C13.9 MALIGNANT NEOPLASM OF HYPOPHARYNX (MULTI): Primary | ICD-10-CM

## 2024-01-03 PROCEDURE — 1126F AMNT PAIN NOTED NONE PRSNT: CPT | Performed by: OTOLARYNGOLOGY

## 2024-01-03 PROCEDURE — 1036F TOBACCO NON-USER: CPT | Performed by: OTOLARYNGOLOGY

## 2024-01-03 PROCEDURE — 1159F MED LIST DOCD IN RCRD: CPT | Performed by: OTOLARYNGOLOGY

## 2024-01-03 PROCEDURE — 99214 OFFICE O/P EST MOD 30 MIN: CPT | Performed by: OTOLARYNGOLOGY

## 2024-01-03 ASSESSMENT — PATIENT HEALTH QUESTIONNAIRE - PHQ9: 2. FEELING DOWN, DEPRESSED OR HOPELESS: NOT AT ALL

## 2024-01-04 LAB
LABORATORY COMMENT REPORT: NORMAL
Lab: NORMAL
PATH REPORT.FINAL DX SPEC: NORMAL
PATH REPORT.GROSS SPEC: NORMAL
PATH REPORT.RELEVANT HX SPEC: NORMAL
PATH REPORT.TOTAL CANCER: NORMAL

## 2024-01-05 ENCOUNTER — TUMOR BOARD CONFERENCE (OUTPATIENT)
Dept: HEMATOLOGY/ONCOLOGY | Facility: HOSPITAL | Age: 79
End: 2024-01-05
Payer: MEDICARE

## 2024-01-05 DIAGNOSIS — C13.9 MALIGNANT NEOPLASM OF HYPOPHARYNX (MULTI): ICD-10-CM

## 2024-01-05 NOTE — TUMOR BOARD NOTE
OTOLARYNGOLOGY - HEAD AND NECK SURGERY MULTIDISCIPLINARY TUMOR BOARD NOTE    Amita Todd is a 78 y.o. female who was presented at the multidisciplinary Head and Neck Tumor Board on 1/5/24.    Review of history:  She has a hx of a T1N0 R tongue lesion s/presection in 2008 and has been followed by Dr. Tobin since, presented to Dr. Tobin's clinic in December 2023 for complaints of difficulty swallowing x a few months.  Office history and examination revealed a left hypopharyngeal mass.    PMH/PSH  Symptomatic CAD, afib/flutter s/p multiple ablations, PVD - with 4.4 cm dilation of mid ascending aorta, hx of AAA in 2019, severe long segment right carotid stenosis  T1N0M0 tongue cancer s/p resection in 2008    Social history:  Social History     Tobacco Use    Smoking status: Never    Smokeless tobacco: Never   Vaping Use    Vaping Use: Never used   Substance Use Topics    Alcohol use: Never    Drug use: Never       Workup:  1. Imaging:  CTN 12/12/2023 with a 2.5 x 2.5 cm hypopharyngeal lesion (within the L AE fold) and multiple suspicious level 2 lymph nodes, the largest of which is 1x1.5 cm     2. Surgery: was taken to the OR for triple with biopsy on 12/21/23 with Dr. Tobin Intraoperative findings included exophytic lesion in the right postcricoid region    3. 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 Surgical resection.     Mervin Dawn MD  Dept. of Otolaryngology - Head and Neck Surgery, PGY-2    Note:  Current national standards and recommendations, as well as review of the current literature, were included in the discussions that led to the recommendations above.

## 2024-01-08 ENCOUNTER — TELEPHONE (OUTPATIENT)
Dept: RADIATION ONCOLOGY | Facility: HOSPITAL | Age: 79
End: 2024-01-08
Payer: MEDICARE

## 2024-01-08 NOTE — TELEPHONE ENCOUNTER
Called pt to remind of appointment on 1/9/24 at 1:30 Pt confirmed appointment.     Pascual Glasgow MA

## 2024-01-09 ENCOUNTER — HOSPITAL ENCOUNTER (OUTPATIENT)
Dept: RADIATION ONCOLOGY | Facility: HOSPITAL | Age: 79
Setting detail: RADIATION/ONCOLOGY SERIES
Discharge: HOME | End: 2024-01-09
Payer: MEDICARE

## 2024-01-09 ENCOUNTER — LAB (OUTPATIENT)
Dept: LAB | Facility: HOSPITAL | Age: 79
End: 2024-01-09
Payer: MEDICARE

## 2024-01-09 ENCOUNTER — NUTRITION (OUTPATIENT)
Dept: HEMATOLOGY/ONCOLOGY | Facility: HOSPITAL | Age: 79
End: 2024-01-09
Payer: MEDICARE

## 2024-01-09 VITALS
WEIGHT: 156.53 LBS | TEMPERATURE: 97.5 F | OXYGEN SATURATION: 95 % | BODY MASS INDEX: 25.26 KG/M2 | HEART RATE: 117 BPM | DIASTOLIC BLOOD PRESSURE: 71 MMHG | SYSTOLIC BLOOD PRESSURE: 107 MMHG | RESPIRATION RATE: 18 BRPM

## 2024-01-09 DIAGNOSIS — C13.9 MALIGNANT NEOPLASM OF HYPOPHARYNX (MULTI): Primary | ICD-10-CM

## 2024-01-09 DIAGNOSIS — C76.0 HEAD AND NECK CANCER (MULTI): Primary | ICD-10-CM

## 2024-01-09 DIAGNOSIS — C13.9 MALIGNANT NEOPLASM OF HYPOPHARYNX (MULTI): ICD-10-CM

## 2024-01-09 LAB
ALBUMIN SERPL BCP-MCNC: 3.9 G/DL (ref 3.4–5)
ALP SERPL-CCNC: 69 U/L (ref 33–136)
ALT SERPL W P-5'-P-CCNC: 13 U/L (ref 7–45)
ANION GAP SERPL CALC-SCNC: 14 MMOL/L (ref 10–20)
AST SERPL W P-5'-P-CCNC: 19 U/L (ref 9–39)
BASOPHILS # BLD AUTO: 0.07 X10*3/UL (ref 0–0.1)
BASOPHILS NFR BLD AUTO: 1 %
BILIRUB SERPL-MCNC: 1 MG/DL (ref 0–1.2)
BUN SERPL-MCNC: 16 MG/DL (ref 6–23)
CALCIUM SERPL-MCNC: 9.8 MG/DL (ref 8.6–10.3)
CHLORIDE SERPL-SCNC: 100 MMOL/L (ref 98–107)
CO2 SERPL-SCNC: 28 MMOL/L (ref 21–32)
CREAT SERPL-MCNC: 1.13 MG/DL (ref 0.5–1.05)
EGFRCR SERPLBLD CKD-EPI 2021: 50 ML/MIN/1.73M*2
EOSINOPHIL # BLD AUTO: 0.21 X10*3/UL (ref 0–0.4)
EOSINOPHIL NFR BLD AUTO: 3.1 %
ERYTHROCYTE [DISTWIDTH] IN BLOOD BY AUTOMATED COUNT: 14.9 % (ref 11.5–14.5)
GLUCOSE SERPL-MCNC: 108 MG/DL (ref 74–99)
HCT VFR BLD AUTO: 43.3 % (ref 36–46)
HGB BLD-MCNC: 14.1 G/DL (ref 12–16)
IMM GRANULOCYTES # BLD AUTO: 0.02 X10*3/UL (ref 0–0.5)
IMM GRANULOCYTES NFR BLD AUTO: 0.3 % (ref 0–0.9)
LYMPHOCYTES # BLD AUTO: 0.85 X10*3/UL (ref 0.8–3)
LYMPHOCYTES NFR BLD AUTO: 12.5 %
MCH RBC QN AUTO: 30.2 PG (ref 26–34)
MCHC RBC AUTO-ENTMCNC: 32.6 G/DL (ref 32–36)
MCV RBC AUTO: 93 FL (ref 80–100)
MONOCYTES # BLD AUTO: 0.55 X10*3/UL (ref 0.05–0.8)
MONOCYTES NFR BLD AUTO: 8.1 %
NEUTROPHILS # BLD AUTO: 5.11 X10*3/UL (ref 1.6–5.5)
NEUTROPHILS NFR BLD AUTO: 75 %
NRBC BLD-RTO: 0 /100 WBCS (ref 0–0)
PLATELET # BLD AUTO: 230 X10*3/UL (ref 150–450)
POTASSIUM SERPL-SCNC: 3.9 MMOL/L (ref 3.5–5.3)
PROT SERPL-MCNC: 7.6 G/DL (ref 6.4–8.2)
RBC # BLD AUTO: 4.67 X10*6/UL (ref 4–5.2)
SODIUM SERPL-SCNC: 138 MMOL/L (ref 136–145)
WBC # BLD AUTO: 6.8 X10*3/UL (ref 4.4–11.3)

## 2024-01-09 PROCEDURE — 85025 COMPLETE CBC W/AUTO DIFF WBC: CPT

## 2024-01-09 PROCEDURE — 99205 OFFICE O/P NEW HI 60 MIN: CPT | Performed by: RADIOLOGY

## 2024-01-09 PROCEDURE — 99215 OFFICE O/P EST HI 40 MIN: CPT | Performed by: RADIOLOGY

## 2024-01-09 PROCEDURE — 80053 COMPREHEN METABOLIC PANEL: CPT

## 2024-01-09 PROCEDURE — 36415 COLL VENOUS BLD VENIPUNCTURE: CPT

## 2024-01-09 ASSESSMENT — ENCOUNTER SYMPTOMS
LIGHT-HEADEDNESS: 1
LOSS OF SENSATION IN FEET: 0
VOICE CHANGE: 1
CARDIOVASCULAR NEGATIVE: 1
ENDOCRINE NEGATIVE: 1
GASTROINTESTINAL NEGATIVE: 1
COUGH: 1
APPETITE CHANGE: 1
CHEST TIGHTNESS: 1
DEPRESSION: 0
NECK PAIN: 1
PSYCHIATRIC NEGATIVE: 1
OCCASIONAL FEELINGS OF UNSTEADINESS: 0
HEMATOLOGIC/LYMPHATIC NEGATIVE: 1
TROUBLE SWALLOWING: 1
EYES NEGATIVE: 1

## 2024-01-09 ASSESSMENT — PATIENT HEALTH QUESTIONNAIRE - PHQ9
10. IF YOU CHECKED OFF ANY PROBLEMS, HOW DIFFICULT HAVE THESE PROBLEMS MADE IT FOR YOU TO DO YOUR WORK, TAKE CARE OF THINGS AT HOME, OR GET ALONG WITH OTHER PEOPLE: SOMEWHAT DIFFICULT
1. LITTLE INTEREST OR PLEASURE IN DOING THINGS: MORE THAN HALF THE DAYS
SUM OF ALL RESPONSES TO PHQ9 QUESTIONS 1 AND 2: 2
2. FEELING DOWN, DEPRESSED OR HOPELESS: NOT AT ALL

## 2024-01-09 ASSESSMENT — COLUMBIA-SUICIDE SEVERITY RATING SCALE - C-SSRS
1. IN THE PAST MONTH, HAVE YOU WISHED YOU WERE DEAD OR WISHED YOU COULD GO TO SLEEP AND NOT WAKE UP?: NO
2. HAVE YOU ACTUALLY HAD ANY THOUGHTS OF KILLING YOURSELF?: NO
6. HAVE YOU EVER DONE ANYTHING, STARTED TO DO ANYTHING, OR PREPARED TO DO ANYTHING TO END YOUR LIFE?: NO

## 2024-01-09 NOTE — PROGRESS NOTES
Radiation Oncology Outpatient Consult    Patient Name:  Amita Todd  MRN:  [id number]  :  1945    Referring Provider: Clemente Tobin MD  Primary Care Provider: Caitie Leal MD  Care Team: Patient Care Team:  Caitie Leal MD as PCP - General  Caitie Leal MD as PCP - [facility] [place] [place] [facility] Attributed Provider  Kyunghee Burkitt, DO as Consulting Physician (Hematology and Oncology)    Date of Service: 2024     SUBJECTIVE  History of Present Illness:  Amita Todd is a 78 y.o. female who was referred by Clemente Tobin MD, for a consultation to the [facility] Department of Radiation Oncology.  She is presenting for evaluation and management of T2N1M0 hypopharynx SCC.     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  In 2008 she underwent excision of a right lateral tongue lesion.  Did not receive any adjuvant therapy.    She has been following up with Dr. Tobin ever since her oral cavity cancer.  She presented to Dr. Tobin's clinic in 2023 for complaints of new difficulty swallowing over the last several months.  On fiberoptic laryngoscopy, she was found to have a lesion involving the left piriform sinus measuring more than 1 cm.    CT neck from 23 revealed:  FINDINGS:  *  The visualized paranasal sinuses nasopharynx and oropharynx are  unremarkable. *The mandible, maxilla and temporomandibular joints are  normal. *The major salivary glands are normal.  *There is a 2.5 cm by 2.5 cm x 2 cm mass within the left  aryepiglottic fold. There is extension to the left piriformis sinus.  The mass extends to the margin of the laryngeal cartilages without  cartilaginous changes or extra laryngeal abnormality. There is no  evidence of extension to the left carotid sheath. *There is a 1 cm x  1.5 cm lymph node at level 2A/2B on the left with peripheral  enhancement consistent with extra capsular extension. There is a 5 mm  enhancing  lymph node at left level 3 with central lucency concerning  for additional chris metastasis. *The thyroid gland is normal.  *Unchanged dilatation of the ascending aorta which currently measures  a proximally 4.3 cm in diameter. The thoracic inlet is otherwise  normal.    On 12/21/23 she underwent triple endoscopy:  Operative indications:  This patient recently presented with a mass in the postcricoid area.  This is highly suspicious.  The patient is here today to undergo a panendoscopy and a biopsy.  She understands the surgery and the possible complication and wishes to proceed.  Procedure:  Direct laryngoscopy and biopsy, flexible bronchoscopy, flexible esophagoscopy  Operative procedure:  Under general anesthesia the patient was properly positioned.  The Dedo laryngoscope was introduced.  Careful examination of the oral cavity oropharynx hypopharynx and larynx is carried out.The only finding is this exophytic lesion in the right postcricoid area.  Multiple biopsies were obtained.  This was sent for frozen section.  In the meantime a flexible bronchoscopy was carried out.  The laryngoscope was suspended over the larynx.  The bronchoscope was inserted through the laryngoscope beside the endotracheal tube into the tracheobronchial tree.  All the different bronchial segments are visualized and are felt to be within normal limits.  The bronchoscope was then pulled.  The laryngoscope was resuspended over the esophageal enteritis.  The gastroscope was introduced all the way down into the stomach.  The entire length of the esophagus is visualized on the way out and found to be negative.  The frozen section was suspicious for squamous cell carcinoma.  Further biopsies were obtained.  The procedure was terminated and the patient was sent back to recovery room in fair condition.  This is dictated on December 21, 2023    Currently, the patient reports that she has difficulty with thick secretions feeling like they are  choking her at night.  She is difficulty clearing the secretions.  She also reports hoarseness.  She has trouble swallowing solids and liquids.  She reports that she has regurgitation of some foods on a daily basis due to trouble swallowing.  She has been eating softer foods including bananas and applesauce, but has persistent difficulties with some regurgitation.  She has lost approximately 10 pounds recently.  She reports that she lost her sense of taste back in 2011 after head injury and this has been stable since then.    She continues to see Dr. Ramirez for her longstanding history of cardiovascular disease, atrial fibrillation, and aneurysm.  She takes multiple medications for her cardiovascular history and does not report any swelling in her lower extremities or shortness of breath.  She does not report any COPD exacerbations that she can remember.  She reports that she last had symptoms of a TIA 6 or 7 years ago.  She has known carotid stenosis with no recent symptoms.    Of note, she has a history of smoking from her 20s up until her 60s for several packs a day, so at least 70 pack years.  She quit in 2006 use of all tobacco.    Prior Radiotherapy:  Yes, describe: left breast 2001  Radiation Treatments       No radiation treatments to show. (Treatments may have been administered in another system.)          Current Systemic Treatment:  No     Presence of Pacemaker or ICD:  No    Past Medical History:    - 3/2001: s/p lumpectomy for left breast cancer followed by adjuvant RT and adjuvant tamoxifen   -Symptomatic CAD, afib/flutter s/p multiple ablations, PVD - with 4.4 cm dilation of mid ascending aorta, hx of AAA in 2019, severe long segment right carotid stenosis  Most recent echo was in 3/2023 and showed normal ejection fraction.    Past Medical History:   Diagnosis Date    Alcohol dependence, in remission (CMS/[facility]) 06/16/2016    History of alcoholism    Anemia     Aneurysm of ascending aorta (CMS/[facility])      Arrhythmia     a-fib. a-flutter    Branch retinal artery occlusion of right eye 08/24/2023    CKD (chronic kidney disease)     Coronary artery disease     Depression     GERD (gastroesophageal reflux disease)     Hypertension     Intermittent claudication (CMS/[facility])     Myocardial infarction (CMS/HCC) 10/06/2023    Other bursitis of hip, unspecified hip 11/21/2017    Hip bursitis    Other conditions influencing health status 01/05/2014    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    Pain in leg, unspecified 01/25/2022    Leg pain    Pain in right shoulder 07/01/2020    Bilateral shoulder pain    Pain in unspecified knee 03/31/2021    Joint pain, knee    Personal history of diseases of the blood and blood-forming organs and certain disorders involving the immune mechanism 02/09/2019    History of anemia    Personal history of malignant neoplasm of breast 04/29/2014    History of malignant neoplasm of female breast    Personal history of other diseases of the circulatory system 04/03/2014    Personal history of subarachnoid hemorrhage    Personal history of other diseases of the digestive system 03/08/2021    History of upper gastrointestinal hemorrhage    Personal history of other diseases of the digestive system 03/08/2021    History of melena    Personal history of other diseases of the nervous system and sense organs 08/12/2020    History of Bell's palsy    Personal history of other infectious and parasitic diseases 06/14/2019    History of herpes zoster    Personal history of other specified conditions 03/03/2020    History of lump of left breast    Personal history of other specified conditions 02/22/2021    History of shortness of breath    Personal history of transient ischemic attack (TIA), and cerebral infarction without residual deficits 04/24/2022    History of transient ischemic attack    Stenosis of carotid artery     right    Trigger finger, unspecified finger 04/24/2017    Acquired trigger  finger        Past Surgical History:    Past Surgical History:   Procedure Laterality Date    CT ANGIO NECK  06/06/2019    CT NECK ANGIO W AND WO IV CONTRAST 6/6/2019 [facility] CLINICAL LEGACY    CT ANGIO NECK  01/04/2021    CT NECK ANGIO W AND WO IV CONTRAST 1/4/2021 [facility]    CT HEAD ANGIO W AND WO IV CONTRAST  06/06/2019    CT HEAD ANGIO W AND WO IV CONTRAST 6/6/2019 [facility] CLINICAL LEGACY    CT HEAD ANGIO W AND WO IV CONTRAST  01/04/2021    CT HEAD ANGIO W AND WO IV CONTRAST 1/4/2021 [facility]    MR HEAD ANGIO WO IV CONTRAST  06/06/2019    MR HEAD ANGIO WO IV CONTRAST 6/6/2019 [facility] CLINICAL LEGACY    MR NECK ANGIO WO IV CONTRAST  06/06/2019    MR NECK ANGIO WO IV CONTRAST 6/6/2019 [facility] CLINICAL LEGACY    OTHER SURGICAL HISTORY  03/03/2020    Oophorectomy    OTHER SURGICAL HISTORY  06/17/2019    Miscarriage surgical treatment    OTHER SURGICAL HISTORY  08/29/2019    multiple Catheter Ablation Atrial Flutter    OTHER SURGICAL HISTORY  04/23/2014    Previous Stent Placement    SEPTOPLASTY  09/22/2017    Septoplasty        Family History:  Cancer-related family history includes Breast cancer in her mother and another family member; Lung cancer in her sister.    Social History:    History of tobacco use as described in the HPI      Allergies:    Allergies   Allergen Reactions    Oxycodone Hcl-Oxycodone-Asa Unknown    Oxycodone-Acetaminophen Unknown    Penicillin G Other     Nausea    Penicillins GI Upset and Other        Medications:    Current Outpatient Medications:     acetaminophen (Tylenol) 325 mg tablet, Take 2 tablets (650 mg) by mouth every 6 hours if needed., Disp: , Rfl:     atorvastatin (Lipitor) 20 mg tablet, TAKE 1 TABLET BY MOUTH DAILY AS  DIRECTED, Disp: 90 tablet, Rfl: 3    buPROPion SR (Wellbutrin SR) 150 mg 12 hr tablet, Take 1 tablet (150 mg) by mouth. TAKE PER DIRECTED, Disp: , Rfl:     cholecalciferol (Vitamin D-3) 25 MCG (1000 UT) tablet, Take 1 tablet (25 mcg) by mouth 2 times  a day., Disp: , Rfl:     clopidogrel (Plavix) 75 mg tablet, TAKE 1 TABLET BY MOUTH DAILY, Disp: 90 tablet, Rfl: 3    dapagliflozin propanediol (Farxiga) 10 mg, Take 1 tablet (10 mg) by mouth once daily., Disp: 90 tablet, Rfl: 1    DULoxetine (Cymbalta) 60 mg DR capsule, TAKE 1 CAPSULE BY MOUTH  DAILY, Disp: 90 capsule, Rfl: 3    fluticasone propion-salmeteroL (Advair Diskus) 100-50 mcg/dose diskus inhaler, Inhale 1 puff every 12 hours., Disp: , Rfl:     furosemide (Lasix) 20 mg tablet, TAKE 1 TABLET BY MOUTH  DAILY, Disp: 90 tablet, Rfl: 3    losartan (Cozaar) 50 mg tablet, Take 1 tablet (50 mg) by mouth once daily., Disp: , Rfl:     magnesium oxide (Mag-Ox) 400 mg (241.3 mg magnesium) tablet, Take 1 tablet (400 mg) by mouth once daily., Disp: , Rfl:     metoprolol tartrate (Lopressor) 25 mg tablet, Take 2 tablets (50 mg) by mouth 2 times a day., Disp: , Rfl:     pantoprazole (ProtoNix) 40 mg EC tablet, Take 1 tablet (40 mg) by mouth 2 times a day., Disp: 180 tablet, Rfl: 3    spironolactone (Aldactone) 25 mg tablet, Take 1 tablet (25 mg) by mouth. PER DIRECTED, Disp: , Rfl:     herbal complex no.239 (Whole Body Joint Support) capsule, Take 1 capsule by mouth once daily., Disp: , Rfl:       Review of Systems:  Review of Systems   Constitutional:  Positive for appetite change.   HENT:   Positive for lump/mass, tinnitus, trouble swallowing and voice change.    Eyes: Negative.    Respiratory:  Positive for chest tightness and cough.    Cardiovascular: Negative.    Gastrointestinal: Negative.    Endocrine: Negative.    Genitourinary: Negative.     Musculoskeletal:  Positive for gait problem and neck pain.   Skin: Negative.    Neurological:  Positive for gait problem and light-headedness.   Hematological: Negative.    Psychiatric/Behavioral: Negative.       The patient's current pain level was assessed.  They report currently having a pain of 4 out of 10.  They feel their pain is under control with the use of pain  medications.    Performance Status:  The Karnofsky performance scale today is 100, Fully active, able to carry on all pre-disease performed without restriction (ECOG equivalent 0).        OBJECTIVE  Physical Exam:  /71   Pulse (!) 117   Temp 36.4 °C (97.5 °F) (Skin)   Resp 18   Wt 71 kg (156 lb 8.4 oz)   SpO2 95%   BMI 25.26 kg/m²    Physical Exam   HEENT: No masses appreciated in the oral cavity on inspection or palpation.  Dentition in fair repair.  No masses palpable in the bilateral necks.  Heart irregular rate and rhythm  Lungs: Clear to auscultation bilaterally  Abdomen: Soft nontender nondistended  Extremities no cyanosis clubbing or edema  Psych normal affect, patient has fair insight into her medical history, but her daughter does answer specific questions about dates  Neuro: normal grossly  Skin: No lesions visualized    ASSESSMENT:  Amita Todd is a 78 y.o. female with No matching staging information was found for the patient..  She has a locally advanced hypopharynx cancer.    PLAN:    I discussed with the patient our recommendations that she proceed with definitive chemoradiation with the goal of preserving her larynx/pharynx and curing her cancer.  The other alternative would be for her to undergo laryngectomy/pharyngectomy followed by adjuvant therapy guided by postoperative/pathologic risk factors.  She has a strong preference to not undergo laryngectomy/pharyngectomy and to attempt a larynx preservation.    We discussed the chance for larynx preservation with CRT. While the rates of larynx preservation were 42% in [place]OR[place] 88148, this study investigated use of induction chemo followed by RT. The combined use of CRT has much better rates of complete response/local regional control for larynx cancer in 80% of patients who were treated on RTOG 91-11. I told her our recommendations for combined CRT. She will meet with Dr. Burkitt to review options for concurrent systemic therapy.      We discussed the acute side effects of radiation including fatigue, skin redness/peeling, mucositis and pain with swallowing, risk of needing a feeding tube in the short and/or long-term due to worsening trouble swallowing, infection, dehydration.  We discussed long-term risks including feeding tube dependence, worsening carotid stenosis and stroke, hypothyroidism, neck fibrosis, altered taste, and dry mouth.  The patient consents to proceed with chemoradiation.    I reached out to the patient's primary physician as well as cardiologist to review use of diuretics and to determine whether the dose could be reduced in order to reduce her risk of dehydration and altered kidney function during chemoradiation.      I have scheduled her to complete a staging PET/CT scan as well as CT simulation scan later this week.  She will also meet with our speech-language pathologist and undergo modified barium swallow to determine whether she has aspiration and if there are any techniques and/or exercises to improve her swallowing function and reduce risk of aspiration.  She understands that it is possible she might need a feeding tube placed if she has abnormalities seen on her swallow functioning.    NCCN Guidelines were applicable to guide this patients treatment plan.   -Tahira Cochran MD    ADDENDUM 1/12/24:  A fiberoptic NPL was performed after viscous lidocaine was applied to the R naris. The visualized NP, OP were within normal limits. There was a notable mass in the L hypopharynx involving the postcricoid region and extending to the medial wall of the HP. The lateral and posterior walls of the HP were not involved. There was mass effect on the L larynx with thickening of the L AE fold and extrinsic compression of the L larynx to obscure the view of the vocal cords. but no surface mucosal abnormalities of the larynx. The vocal cords were mobile and approximated well bilaterally.

## 2024-01-09 NOTE — PROGRESS NOTES
NUTRITION Assessment NOTE    Nutrition Assessment     Reason for Visit:  Amita Todd is a 78 y.o. 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 I was asked to see her in RT today as he is meeting with MD.    5-2008 had a lesion on her right lateral tongue removed- she did not receive further intervention since then she was being followed closely until December when a lesion was found and biopsied     Has a MBSS scheduled for 1/15/2024    Tati is daughter and she is with the patient     Lab Results   Component Value Date/Time    GLUCOSE 108 (H) 01/09/2024 1619     01/09/2024 1619    K 3.9 01/09/2024 1619     01/09/2024 1619    CO2 28 01/09/2024 1619    ANIONGAP 14 01/09/2024 1619    BUN 16 01/09/2024 1619    CREATININE 1.13 (H) 01/09/2024 1619    EGFR 50 (L) 01/09/2024 1619    CALCIUM 9.8 01/09/2024 1619    ALBUMIN 3.9 01/09/2024 1619    ALKPHOS 69 01/09/2024 1619    PROT 7.6 01/09/2024 1619    AST 19 01/09/2024 1619    BILITOT 1.0 01/09/2024 1619    ALT 13 01/09/2024 1619     Lab Results   Component Value Date/Time    VITD25 83 03/29/2021 1227       Anthropometrics:       HT: 167.6 cm (5'6)  BMI: 25.28  IBW: 59.1  120.1% IBW  In the past month she is down 1.1 kg (1.5%)   In the pat year she is down 3.6 kg (4.8%)     Wt Readings from Last 10 Encounters:   01/09/24 71 kg (156 lb 8.4 oz)   01/03/24 70.8 kg (156 lb)   12/15/23 71.8 kg (158 lb 3.2 oz)   12/13/23 71.5 kg (157 lb 9 oz)   12/06/23 72.1 kg (159 lb)   11/22/23 70.3 kg (155 lb)   06/14/23 73.7 kg (162 lb 6 oz)   06/07/23 74.4 kg (164 lb)   02/17/23 74 kg (163 lb 3 oz)   01/09/23 74.6 kg (164 lb 8 oz)        Food And Nutrient Intake:      Patient reports = Pain with swallowing  Crushing pills and putting in applesauce   Had been taking a variety of vitamins and minerals from [employer] - I did not get full list as patient has not recently been taking them due to  "swallowing issues her main focus are her prescription meds.        Modified diet  Applesauce   Mac and cheese  Pears   bananas  berries  Gatorade  Ensure Complete- 1 per day - dilutes with skim milk  350 calories and 30 g protein   Still sometimes has to regurgitate these types of foods as well  This might happen if takes more than a tsp     Needs tender meats- fish  Not able to chew beef and pork- gets stuck on left side- might cough and spit out food-     SLP will see next week  She is coughing a lot- even with supplements  Cold is not good  Does better with room temperature foods and beverages     Has moucus drainage down the back of her throat.   Does want a suction machine.   Starting mucinex     Not hungry  Brain bleed years ago and sense of taste and smell are not there   She grazes    She was able to trial Boost VHC in clinic - she felt that this was \"smoother\" going down.  She is aware of calories provided in product and how to obtain                                                              Nutrition Focused Physical Exam Findings:      Subcutaneous Fat Loss  Orbital Fat Pads: Well nourshed (slightly bulging fat pads)  Buccal Fat Pads: Mild-Moderate (flat cheeks, minimal bounce)    Muscle Wasting  Temporalis: Well nourished (well-defined muscle)  Pectoralis (Clavicular Region): Well nourished (clavicle not visible)  Quadriceps: Well nourished (well developed, well rounded)    Edema  Edema: none    Physical Findings (Nutrition Deficiency/Toxicity)  Hair: Negative  Eyes: Negative  Mouth: Negative  Nails: Negative  Skin: Negative    Energy Needs  Estimated Energy Needs  Total Energy Estimated Needs (kCal):  (2130)  Total Estimated Energy Need per Day (kCal/kg):  (30 rick/kg)  Estimated Fluid Needs  Total Fluid Estimated Needs (mL):  (2100)  Total Fluid Estimated Needs (mL/kg):  (30)  Estimated Protein Needs  Total Protein Estimated Needs (g):  (70 to 85)  Total Protein Estimated Needs (g/kg):  (1.2 g/kg of " actual body weight and IBW)        Nutrition Diagnosis   Malnutrition Diagnosis  Patient has Malnutrition Diagnosis: No    Nutrition Diagnosis  Patient has Nutrition Diagnosis: Yes  Diagnosis Status (1): New  Nutrition Diagnosis 1: Swallowing difficulity  Related to (1): diagnosis  As Evidenced by (1): kofin reports and need for MBSS    Nutrition Interventions/Recommendations   Nutrition Prescription  Individualized Nutrition Prescription Provided for : Conitnue with soft moist foods for now as well as high calorie beverages (Pateint is aware that diet instructions might change after MBSS and there might be a need for a feeding tube)    Food and Nutrition Delivery  Food and Nutrition Delivery  Medical Food Supplement: Boost Very High Calorie (vignesheitheidi is aware that she can sustain herself on this if needed. Discussed where to purchase. Pharmacy numbers provided)    Nutrition Education       Coordination of Care  Coordination of Nutrition Care by a Nutrition Professional  Collaboration and referral of nutrition care: Collaboration by nutrition professional with other providers (Dr. Cochran was present for some of the discussion with patient)    There are no Patient Instructions on file for this visit.    Nutrition Monitoring and Evaluation   Food/Nutrient Related History Monitoring  Monitoring and Evaluation Plan: Energy intake  Plan to review results of MBSS on 1/15/2023 and follow up accordingly         Time Spent  Prep time on day of patient encounter: 10 minutes  Time spent directly with patient, family or caregiver: 20 minutes  Additional Time Spent on Patient Care Activities: 5 minutes  Documentation Time: 20 minutes  Other Time Spent: 5 minutes  Total: 60 minutes

## 2024-01-10 ENCOUNTER — OFFICE VISIT (OUTPATIENT)
Dept: HEMATOLOGY/ONCOLOGY | Facility: HOSPITAL | Age: 79
End: 2024-01-10
Payer: MEDICARE

## 2024-01-10 VITALS
HEART RATE: 72 BPM | TEMPERATURE: 98.2 F | BODY MASS INDEX: 25.56 KG/M2 | RESPIRATION RATE: 18 BRPM | DIASTOLIC BLOOD PRESSURE: 66 MMHG | OXYGEN SATURATION: 96 % | HEIGHT: 65 IN | WEIGHT: 153.44 LBS | SYSTOLIC BLOOD PRESSURE: 99 MMHG

## 2024-01-10 DIAGNOSIS — C13.9 MALIGNANT NEOPLASM OF HYPOPHARYNX (MULTI): Primary | ICD-10-CM

## 2024-01-10 PROCEDURE — 99215 OFFICE O/P EST HI 40 MIN: CPT | Performed by: INTERNAL MEDICINE

## 2024-01-10 PROCEDURE — 3074F SYST BP LT 130 MM HG: CPT | Performed by: INTERNAL MEDICINE

## 2024-01-10 PROCEDURE — 1036F TOBACCO NON-USER: CPT | Performed by: INTERNAL MEDICINE

## 2024-01-10 PROCEDURE — 3078F DIAST BP <80 MM HG: CPT | Performed by: INTERNAL MEDICINE

## 2024-01-10 PROCEDURE — 99205 OFFICE O/P NEW HI 60 MIN: CPT | Performed by: INTERNAL MEDICINE

## 2024-01-10 PROCEDURE — 1126F AMNT PAIN NOTED NONE PRSNT: CPT | Performed by: INTERNAL MEDICINE

## 2024-01-10 PROCEDURE — 1159F MED LIST DOCD IN RCRD: CPT | Performed by: INTERNAL MEDICINE

## 2024-01-10 PROCEDURE — G2212 PROLONG OUTPT/OFFICE VIS: HCPCS | Performed by: INTERNAL MEDICINE

## 2024-01-10 RX ORDER — PROCHLORPERAZINE MALEATE 10 MG
10 TABLET ORAL EVERY 6 HOURS PRN
Qty: 30 TABLET | Refills: 5 | Status: SHIPPED | OUTPATIENT
Start: 2024-01-10 | End: 2024-03-18

## 2024-01-10 RX ORDER — ONDANSETRON HYDROCHLORIDE 8 MG/1
8 TABLET, FILM COATED ORAL EVERY 8 HOURS PRN
Qty: 30 TABLET | Refills: 5 | Status: SHIPPED | OUTPATIENT
Start: 2024-01-10 | End: 2024-03-18

## 2024-01-10 ASSESSMENT — ENCOUNTER SYMPTOMS
DIFFICULTY URINATING: 0
UNEXPECTED WEIGHT CHANGE: 1
ABDOMINAL PAIN: 0
BLOOD IN STOOL: 0
HEADACHES: 0
VOICE CHANGE: 1
MYALGIAS: 0
DEPRESSION: 0
OCCASIONAL FEELINGS OF UNSTEADINESS: 0
DIARRHEA: 0
TROUBLE SWALLOWING: 1
CONSTIPATION: 0
CHILLS: 0
VOMITING: 0
FEVER: 0
LEG SWELLING: 0
ARTHRALGIAS: 0
EYE PROBLEMS: 0
COUGH: 0
SHORTNESS OF BREATH: 0
CONFUSION: 0
LOSS OF SENSATION IN FEET: 0
NAUSEA: 0
SORE THROAT: 0
NUMBNESS: 0

## 2024-01-10 ASSESSMENT — PAIN SCALES - GENERAL: PAINLEVEL: 0-NO PAIN

## 2024-01-10 NOTE — PROGRESS NOTES
Patient ID: Amita Todd is a 78 y.o. female.  Diagnosis: Hypopharyngeal SCC  Staging: [id number]  Date of Diagnosis: 12/21/23    Providers:  ENT Surgeon: Dr. Mahad Aguila: De. Burkitt RadOnc: Dr. 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 a-flutter  08/24/2023: Branch retinal artery occlusion of right eye  No date: CKD (chronic kidney disease)  No date: Coronary artery disease  No date: Depression  No date: GERD (gastroesophageal reflux disease)  No date: Hypertension  No date: Intermittent claudication (CMS/[facility])  10/06/2023: Myocardial infarction (CMS/[facility])  11/21/2017: Other bursitis of hip, unspecified hip      Comment:  Hip bursitis  01/05/2014: Other conditions influencing health status      Comment:  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  01/25/2022: Pain in leg, unspecified      Comment:  Leg pain  07/01/2020: Pain in right shoulder      Comment:  Bilateral shoulder pain  03/31/2021: Pain in unspecified knee      Comment:  Joint pain, knee  02/09/2019: Personal history of diseases of the blood and blood-  forming organs and certain disorders involving the immune mechanism       Comment:  History of anemia  04/29/2014: Personal history of malignant neoplasm of breast      Comment:  History of malignant neoplasm of female breast  04/03/2014: Personal history of other diseases of the circulatory   system      Comment:  Personal history of subarachnoid hemorrhage  03/08/2021: Personal history of other diseases of the digestive system      Comment:  History of upper gastrointestinal hemorrhage  03/08/2021: Personal history of other diseases of the digestive system      Comment:  History of melena  08/12/2020: Personal history of other diseases of the nervous system   and sense organs      Comment:  History of Bell's palsy  06/14/2019: Personal history of other infectious and parasitic   diseases      Comment:  History of herpes zoster  03/03/2020: Personal history of other specified conditions      Comment:  History of lump of left breast  02/22/2021: Personal history of other specified conditions      Comment:  History of shortness of breath  04/24/2022: Personal history of transient ischemic attack (TIA), and   cerebral infarction without residual deficits      Comment:  History of transient ischemic attack  No date: Stenosis of carotid artery      Comment:  right  04/24/2017: Trigger finger, unspecified finger      Comment:  Acquired trigger finger   Surgical History:    Past Surgical History:   Procedure Laterality Date    CT ANGIO NECK  06/06/2019    CT NECK ANGIO W AND WO IV CONTRAST 6/6/2019 [facility] CLINICAL LEGACY    CT ANGIO NECK  01/04/2021    CT NECK ANGIO W AND WO IV CONTRAST 1/4/2021 [facility] ANCILLARY LEGACY    CT HEAD ANGIO W AND WO IV CONTRAST  06/06/2019    CT HEAD ANGIO W AND WO IV CONTRAST 6/6/2019 [facility] CLINICAL LEGACY    CT HEAD ANGIO W AND WO IV CONTRAST  01/04/2021    CT HEAD ANGIO W AND WO IV CONTRAST 1/4/2021 [facility] ANCILLARY LEGACY    MR HEAD ANGIO WO IV CONTRAST  06/06/2019    MR HEAD ANGIO WO IV CONTRAST 6/6/2019 [facility] CLINICAL LEGACY    MR NECK ANGIO WO IV CONTRAST  06/06/2019     MR NECK ANGIO WO IV CONTRAST 6/6/2019 [facility] CLINICAL LEGACY    OTHER SURGICAL HISTORY  03/03/2020    Oophorectomy    OTHER SURGICAL HISTORY  06/17/2019    Miscarriage surgical treatment    OTHER SURGICAL HISTORY  08/29/2019    multiple Catheter Ablation Atrial Flutter    OTHER SURGICAL HISTORY  04/23/2014    Previous Stent Placement    SEPTOPLASTY  09/22/2017    Septoplasty      Family History:    Family History   Problem Relation Name Age of Onset    Breast cancer Mother      Other (GOODPASTURE'S DISEASE) Father      BRCA2 Positive Sister      Lung cancer Sister      Neuroblastoma Sister      BRCA2 Positive Daughter      Breast cancer Other G/P      Family Oncology History:    Cancer-related family history includes Breast cancer in her mother and another family member; Lung cancer in her sister.  Social History:    Social History     Tobacco Use    Smoking status: Never    Smokeless tobacco: Never   Vaping Use    Vaping Use: Never used   Substance Use Topics    Alcohol use: Never    Drug use: Never          Subjective   Chief Complaint: Establish Care for Hypopharyngeal SCC    HPI  Interval History  Amita Todd is a 78 y.o. female with a PMH of CAD, aflutter s/p multiple ablations, PVD, AAA, right carotid stenosis, heart failure, and CKD. Patient present for new diagnosis of hypopharyngeal SCC.     Patient reports she feels overall well. She does reports new onset dysphagia over the past month but has been able to swallow solids and liquids without major issue. Patient denies odynophagia or pain in her throat. She does endorse a 5-10 lbs weight loss over the past 3-4 months. She is able to walk unassisted, but experience SOB after walking 20 feet due to her heart failure diagnosis. Otherwise, she denies fevers/chills, CP, SOB, N/V, abdominal pain, or diarrhea.     Of note, patient reports 60 pack year history of smoking and prior ETOH use; she quit both in early 2000s after she had a heart attack. She  reports a strong family history of cancer.       ROS  Review of Systems   Constitutional:  Positive for unexpected weight change. Negative for chills and fever.   HENT:   Positive for trouble swallowing and voice change. Negative for hearing loss, mouth sores, sore throat and tinnitus.    Eyes:  Negative for eye problems.   Respiratory:  Negative for cough and shortness of breath.    Cardiovascular:  Negative for chest pain and leg swelling.   Gastrointestinal:  Negative for abdominal pain, blood in stool, constipation, diarrhea, nausea and vomiting.   Genitourinary:  Negative for difficulty urinating.    Musculoskeletal:  Negative for arthralgias, gait problem and myalgias.   Skin:  Negative for rash.   Neurological:  Negative for gait problem, headaches and numbness.   Psychiatric/Behavioral:  Negative for confusion and depression.        Allergies  Allergies   Allergen Reactions    Oxycodone Hcl-Oxycodone-Asa Unknown    Oxycodone-Acetaminophen Unknown    Penicillin G Other     Nausea    Penicillins GI Upset and Other        Medications  Current Outpatient Medications   Medication Instructions    acetaminophen (TYLENOL) 650 mg, oral, Every 6 hours PRN    atorvastatin (LIPITOR) 20 mg, oral, Daily, as directed    buPROPion SR (WELLBUTRIN SR) 150 mg, oral, TAKE PER DIRECTED    cholecalciferol (Vitamin D-3) 25 MCG (1000 UT) tablet 1 tablet, oral, 2 times daily    clopidogrel (PLAVIX) 75 mg, oral, Daily    dapagliflozin propanediol (FARXIGA) 10 mg, oral, Daily    DULoxetine (Cymbalta) 60 mg DR capsule TAKE 1 CAPSULE BY MOUTH  DAILY    fluticasone propion-salmeteroL (Advair Diskus) 100-50 mcg/dose diskus inhaler 1 puff, inhalation, Every 12 hours    furosemide (LASIX) 20 mg, oral, Daily    herbal complex no.239 (Whole Body Joint Support) capsule 1 capsule, oral, Daily    losartan (Cozaar) 50 mg tablet 1 tablet, oral, Daily    magnesium oxide (Mag-Ox) 400 mg (241.3 mg magnesium) tablet 1 tablet, oral, Daily    metoprolol  tartrate (Lopressor) 25 mg tablet 2 tablets, oral, 2 times daily    pantoprazole (PROTONIX) 40 mg, oral, 2 times daily    spironolactone (ALDACTONE) 25 mg, oral, PER DIRECTED          Objective   VS: There were no vitals taken for this visit.  Weight: Daily Weight  01/09/24 : 71 kg (156 lb 8.4 oz)  01/03/24 : 70.8 kg (156 lb)  12/15/23 : 71.8 kg (158 lb 3.2 oz)  12/13/23 : 71.5 kg (157 lb 9 oz)  12/06/23 : 72.1 kg (159 lb)  11/22/23 : 70.3 kg (155 lb)  06/14/23 : 73.7 kg (162 lb 6 oz)      Physical Exam  Constitutional:       General: She is not in acute distress.     Appearance: Normal appearance.   HENT:      Head: Normocephalic and atraumatic.      Nose: Nose normal.      Mouth/Throat:      Mouth: Mucous membranes are moist.   Eyes:      Extraocular Movements: Extraocular movements intact.      Conjunctiva/sclera: Conjunctivae normal.   Cardiovascular:      Rate and Rhythm: Normal rate. Rhythm irregular.      Heart sounds: No murmur heard.  Pulmonary:      Effort: Pulmonary effort is normal. No respiratory distress.   Abdominal:      General: There is no distension.      Palpations: Abdomen is soft. There is no mass.      Tenderness: There is no abdominal tenderness.   Musculoskeletal:         General: No tenderness.      Cervical back: Normal range of motion and neck supple.   Skin:     General: Skin is warm and dry.   Neurological:      General: No focal deficit present.      Mental Status: She is alert and oriented to person, place, and time. Mental status is at baseline.   Psychiatric:         Mood and Affect: Mood normal.         Diagnostic Results   Labs  Below labs are reviewed today.  Results from last 7 days   Lab Units 01/09/24  1619   WBC AUTO x10*3/uL 6.8   HEMOGLOBIN g/dL 14.1   HEMATOCRIT % 43.3   PLATELETS AUTO x10*3/uL 230   NEUTROS ABS x10*3/uL 5.11   LYMPHS ABS AUTO x10*3/uL 0.85   MONOS ABS AUTO x10*3/uL 0.55   EOS ABS AUTO x10*3/uL 0.21   NEUTROS PCT AUTO % 75.0   LYMPHS PCT AUTO % 12.5    MONOS PCT AUTO % 8.1   EOS PCT AUTO % 3.1      Results from last 7 days   Lab Units 01/09/24  1619   GLUCOSE mg/dL 108*   SODIUM mmol/L 138   POTASSIUM mmol/L 3.9   CHLORIDE mmol/L 100   CO2 mmol/L 28   BUN mg/dL 16   CREATININE mg/dL 1.13*   EGFR mL/min/1.73m*2 50*   CALCIUM mg/dL 9.8   ALBUMIN g/dL 3.9   PROTEIN TOTAL g/dL 7.6   BILIRUBIN TOTAL mg/dL 1.0   ALK PHOS U/L 69   ALT U/L 13   AST U/L 19                   Images     CT Neck 12/12/23  FINDINGS:  *  The visualized paranasal sinuses nasopharynx and oropharynx are  unremarkable. *The mandible, maxilla and temporomandibular joints are  normal. *The major salivary glands are normal.  *There is a 2.5 cm by 2.5 cm x 2 cm mass within the left  aryepiglottic fold. There is extension to the left piriformis sinus.  The mass extends to the margin of the laryngeal cartilages without  cartilaginous changes or extra laryngeal abnormality. There is no  evidence of extension to the left carotid sheath. *There is a 1 cm x  1.5 cm lymph node at level 2A/2B on the left with peripheral  enhancement consistent with extra capsular extension. There is a 5 mm  enhancing lymph node at left level 3 with central lucency concerning  for additional chris metastasis. *The thyroid gland is normal.  *Unchanged dilatation of the ascending aorta which currently measures  a proximally 4.3 cm in diameter. The thoracic inlet is otherwise  normal.      IMPRESSION:  *Left-sided hypopharyngeal mass without extra laryngeal extension  *Left-sided regional lymph node metastases as described    Pathology  Lab Results   Component Value Date    PATHREP  02/18/2021     Name ANDER AVELAR                                                                                                   Accession #: [id number]            Pathologist:                   J LUIS DOWD MD  Date of Procedure:    2/18/2021  Date Received:          2/18/2021  Date Reported           2/24/2021  Submitting Physician:  "  SAGAR MARIN MD  Location:                    [facility]  Other External #                                                                    FINAL DIAGNOSIS  A.  GASTRIC ANTRUM & BODY COLD BX:    --GASTRIC MUCOSA WITH CHANGES CONSISTENT WITH IRON PILL GASTRITIS.  SEE NOTE.    Note: An iron stain highlights mucosal iron deposition.                                                                                                                                                                                                                                                                                                                                                                                                                                                                                       Electronically Signed Out By J LUIS DOWD MD/RFA  By the signature on this report, the individual or group listed as making the  Final Interpretation/Diagnosis certifies that they have reviewed this case.           Clinical History:  Gastritis    Specimens Submitted As:  A: GASTRIC ANTRUM & BODY COLD BX     Gross Description:  Received in formalin, labeled with the patient's name and hospital number and  \"A. Gastric antrum body\", are 2 fragments of tan, soft tissue aggregating to  0.3 x 0.3 x 0.2 cm and 0.4 x 0.3 x 0.2 cm. The specimen is submitted in toto in  one cassette.  MO    o/2/18/2021           The assays/tests were performed with appropriate positive and negative controls  which stained appropriately.    [facility]  Department of Pathology   [address] [place] [address]        PATHREP  01/15/2015     Name ANDER AVELAR                                                                                                   Accession #: [id number]            Pathologist:                   CAMPOS VILLANUEVA DO  Date of Procedure:    1/15/2015  Date Received:          " 1/15/2015  Date Reported           1/19/2015  Submitting Physician:   SHER CHAVEZ M.D.  Location:                    [facility]  Other External #                                                                    FINAL DIAGNOSIS  A.  RIGHT FALLOPIAN TUBE + OVARY AND LEFT FALLOPIAN TUBE, RIGHT  SALPINGO-OOPHORECTOMY AND LEFT SALPINGECTOMY:  -- LEFT OVARY: ATROPHIC OVARIAN PARENCHYMA WITH A BENIGN ABHINAV TUMOR (1.0  CM). SEE NOTE.  -- LEFT FALLOPIAN TUBE: PARATUBAL CYSTS.   -- RIGHT OVARY: ATROPHIC OVARIAN PARENCHYMA WITH CORTICAL AND SURFACE  INCLUSIONS.   -- RIGHT FALLOPIAN TUBE: PARATUBAL CYSTS.  -- SEE NOTE.    Note:  There is a 1.6 cm fragment of atrophic ovary attached to the left  fallopian tube which contains a 1.0 cm benign abhinav tumor.  The specimen was  submitted entirely, and there is no evidence of carcinoma.                                                                                                                                                                                                                                                                                                                                                                                                                                                                                        Electronically Signed Out By CAMPOS VILLANUEVA DO/SHAYAN  By the signature on this report, the individual or group listed as making the  Final Interpretation/Diagnosis certifies that they have reviewed this case.           Clinical History:  BRCA gene      Specimens Submitted As:  A: RIGHT TUBE + OVARY AND LEFT TUBE     Gross Description:  A.Received in formalin labeled with patient's name and hospital number,  is a  right ovary and attached fallopian tube, and left fallopian tube.  The right  fallopian tube measures 4.0 x 0.5 x 0.5 cm and has a no fimbriated end. A  paratubal cyst is present measuring 0.3 cm in greatest dimension.   The ovary  measures 2.0 x 1.5 x 1.5 cm and has a white crinkled surface.  The specimen  weighs 5.5 grams.  The specimen is inked and serially sectioned from the  isthmus end.  Sectioning of the ovary reveals yellow corpora.   The left  fallopian tube measures 3.5 x 0.5 x 0.5 cm and has a no fimbriated end. A  paratubal cyst is present measuring 0.2 cm in greatest dimension.   The  fallopian tubes and ovary are entirely submitted in 12 cassettes.  TARAH    Cassette Summary  A     1     right fallopian tube, interstitial       2     right fallopian tube isthmus       3     right fallopian tube ampullary       4-7     right ovary and tube       8     left fallopian tube, isthmus       9-12     left fallopian tube ampullary           .            tarah/1/15/2015               [facility]  Department of Pathology   [id number] [place] [address]             Assessment/Plan   ASSESSMENT  Amita Todd is a 78 y.o. female with [id number] hypopharyngeal squamous mariana carcinoma.     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  - no plans for surgery as patient wishes for laryngeal preservation  - we discussed treatment with definitive concurrent chemoRT; would favor treatment with weekly carboplatin + paclitaxel vs cisplatin given decreased GFR, heart failure, age, and other co-morbidities  - planned PET scan 1/12  - upcoming barium swallow; we discussed the role of PEG tube placement  - plan to start concurrent chemoRT 1/24/24 with weekly carboplatin + paclitaxel x 7 weeks    #Dysphagia  - upcoming barium swallow  - discussed role of PEG tube placement    #Congestive Heart Failure  - follows with Cardiology  - hold lasix unless >3 lbs weight gain at home    PLAN  - to start concurrent definitive  chemoRT 1/24/24 with weekly carboplatin + paclitaxel x 7 weeks    Patient seen and discussed with attending physician, Dr. Burkitt, who agrees with the above.      Lc Louis MD  Heme/Onc Fellow, PGY5  [employer] Preferred

## 2024-01-10 NOTE — PROGRESS NOTES
History of Present Illness  Amita Todd is a 78 y.o. year old female with PMH of CAD, s/p multiple stents (pt of Dr Nova), CONNOR, known JILLIAN occlusion, stable AAA (4.4 cm), H/o TIAs in the past, A-fib, s/p RFA, s/p Watchman placement, hip and knee arthritis, H/o anterior tongue CA, recent diagnosis of a locally advanced hypopharynx squamous cell carcinoma (planning to treat with chemoradiation), who we follow for AAA, PAD and carotid stenosis.    On 8/3/22 diagnostic carotid angiogram revealed: Calcified left ICA with 40% stenosis with excellent collaterals from left intracerebral circulation to the right with robust flow in right KATARINA and MCA. Right ICA was not visualized on cranial images. No interventions were performed. Pt was discharged home on Plavix and statin.  She is presenting today  with complains of bilateral  leg tightness with walking R>L, no pain but rather a sensation of tight socks (up to 3/10), relieved with walking. It does not limit her activities.  No recent stroke like symptoms. No CP/SOB.     Past Medical History:   Diagnosis Date    Alcohol dependence, in remission (CMS/[facility]) 06/16/2016    History of alcoholism    Anemia     Aneurysm of ascending aorta (CMS/[facility])     Arrhythmia     a-fib. a-flutter    Branch retinal artery occlusion of right eye 08/24/2023    CKD (chronic kidney disease)     Coronary artery disease     Depression     GERD (gastroesophageal reflux disease)     Hypertension     Intermittent claudication (CMS/[facility])     Myocardial infarction (CMS/[facility]) 10/06/2023    Other bursitis of hip, unspecified hip 11/21/2017    Hip bursitis    Other conditions influencing health status 01/05/2014    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    Pain in leg, unspecified 01/25/2022    Leg pain    Pain in right shoulder 07/01/2020    Bilateral shoulder pain    Pain in unspecified knee 03/31/2021    Joint pain, knee    Personal history of diseases of the blood and blood-forming  organs and certain disorders involving the immune mechanism 02/09/2019    History of anemia    Personal history of malignant neoplasm of breast 04/29/2014    History of malignant neoplasm of female breast    Personal history of other diseases of the circulatory system 04/03/2014    Personal history of subarachnoid hemorrhage    Personal history of other diseases of the digestive system 03/08/2021    History of upper gastrointestinal hemorrhage    Personal history of other diseases of the digestive system 03/08/2021    History of melena    Personal history of other diseases of the nervous system and sense organs 08/12/2020    History of Bell's palsy    Personal history of other infectious and parasitic diseases 06/14/2019    History of herpes zoster    Personal history of other specified conditions 03/03/2020    History of lump of left breast    Personal history of other specified conditions 02/22/2021    History of shortness of breath    Personal history of transient ischemic attack (TIA), and cerebral infarction without residual deficits 04/24/2022    History of transient ischemic attack    Stenosis of carotid artery     right    Trigger finger, unspecified finger 04/24/2017    Acquired trigger finger       Social History     Tobacco Use    Smoking status: Never    Smokeless tobacco: Never   Vaping Use    Vaping Use: Never used   Substance Use Topics    Alcohol use: Never    Drug use: Never       Family History   Problem Relation Name Age of Onset    Breast cancer Mother      Other (GOODPASTURE'S DISEASE) Father      BRCA2 Positive Sister      Lung cancer Sister      Neuroblastoma Sister      BRCA2 Positive Daughter      Breast cancer Other G/P        Review of Systems  As per HPI, all other systems reviewed and negative.    Outpatient Medications:  Current Outpatient Medications   Medication Instructions    acetaminophen (TYLENOL) 650 mg, oral, Every 6 hours PRN    atorvastatin (LIPITOR) 20 mg, oral, Daily, as  directed    buPROPion SR (WELLBUTRIN SR) 150 mg, oral, TAKE PER DIRECTED    cholecalciferol (Vitamin D-3) 25 MCG (1000 UT) tablet 1 tablet, oral, 2 times daily    clopidogrel (PLAVIX) 75 mg, oral, Daily    dapagliflozin propanediol (FARXIGA) 10 mg, oral, Daily    DULoxetine (Cymbalta) 60 mg DR capsule TAKE 1 CAPSULE BY MOUTH  DAILY    fluticasone propion-salmeteroL (Advair Diskus) 100-50 mcg/dose diskus inhaler 1 puff, inhalation, Every 12 hours    furosemide (LASIX) 20 mg, oral, Daily    herbal complex no.239 (Whole Body Joint Support) capsule 1 capsule, oral, Daily    losartan (Cozaar) 50 mg tablet 1 tablet, oral, Daily    magnesium oxide (Mag-Ox) 400 mg (241.3 mg magnesium) tablet 1 tablet, oral, Daily    metoprolol tartrate (Lopressor) 25 mg tablet 2 tablets, oral, 2 times daily    ondansetron (ZOFRAN) 8 mg, oral, Every 8 hours PRN    pantoprazole (PROTONIX) 40 mg, oral, 2 times daily    prochlorperazine (COMPAZINE) 10 mg, oral, Every 6 hours PRN    spironolactone (ALDACTONE) 25 mg, oral, PER DIRECTED         Vitals:  Vitals:    01/11/24 0936   BP: 122/80   Pulse: 82   SpO2: 96%       Physical Exam:  AOx3, NAD  Appropriate mood and behavior  Breathing unlabored,  JACKSON  Skin: Warm and dry, No discolorations, dry toe calluses   Extremities: No edema, no open sores, Distal Pulses dopplerable, skin warm to touch, motor and sensitivity preserved       Reviewed Study(s):    Vascular US carotid artery duplex bilateral 12/28/2023  [place] [address]  Tel [phone] and Fax [phone]    Vascular Lab Report  [facility] US CAROTID ARTERY DUPLEX BILATERAL    Patient Name:      ANDER AVELAR Reading Physician:  [id number] Sean Holcomb DO  Study Date:        12/28/2023           Ordering Physician: [id number] MANN  MRN/PID:           [id number]             Technologist:       Santo Doran RVT  Accession#:        [id number]         Technologist 2:  Date of  Birth/Age: 1945 / 78 years Encounter#:         [id number]  Gender:            F  Admission Status:  Outpatient           Location Performed: [facility]    Diagnosis/ICD: Occlusion and stenosis of bilateral carotid arteries-I65.23  CPT Codes:     09485 Cerebrovascular Carotid Duplex scan complete      CONCLUSIONS:  Right Carotid: Findings are consistent with an occlusion of the right internal carotid artery. Unable to obtain the previosly documented velocities in the right external carotid artery may be due to calcified plaque/shadowing. Unable to rule out stenosis in the right common carotid artery due to calcified plaque. The right vertebral artery is not visualized. No evidence of hemodynamically significant stenosis in the right subclavian artery.  Left Carotid: Findings are consistent with 50 to 69% stenosis of the left proximal internal carotid artery. Left external carotid artery appears patent with no evidence of stenosis. The left vertebral artery is patent with antegrade flow. No evidence of hemodynamically significant stenosis in the left subclavian artery.    Comparison:  Compared with study from 12/15/2022, no significant change.    Imaging & Doppler Findings:  Right Plaque Morph: The proximal right internal carotid artery demonstrates heterogenous plaque. The proximal right external carotid artery demonstrates heterogenous and calcified plaque. The proximal right common carotid artery demonstrates calcified plaque. The mid right common carotid artery demonstrates calcified plaque. The distal right common carotid artery demonstrates calcified plaque.  Left Plaque Morph: The proximal left internal carotid artery demonstrates heterogenous and calcified plaque. The proximal left external carotid artery demonstrates calcified plaque. The proximal left common carotid artery demonstrates heterogenous plaque. The mid left common carotid artery demonstrates calcified plaque. The distal left common  carotid artery demonstrates calcified plaque. The left carotid bulb demonstrates calcified plaque.    Right                       Left  PSV     EDV                PSV      EDV  32 cm/s           CCA P    67 cm/s  16 cm/s  31 cm/s           CCA D    77 cm/s  28 cm/s  0 cm/s  0 cm/s   ICA P    154 cm/s 41 cm/s  ICA M    59 cm/s  20 cm/s  ICA D    59 cm/s  17 cm/s  147 cm/s           ECA     48 cm/s  8 cm/s  Vertebral  42 cm/s  12 cm/s  122 cm/s        Subclavian 68 cm/s    Right Left  ICA/CCA Ratio  0.0  2.0        [id number] Sean Holcomb DO  Electronically signed by [id number] Sean Holcomb DO on 12/29/2023 at 9:33:28 AM          Vascular US ankle brachial index (DILLON) without exercise 12/28/2023    [place] [address]  Tel [phone] and Fax [phone]      Vascular Lab Report  VASC US ANKLE BRACHIAL INDEX (DILLON) WITHOUT EXERCISE      Patient Name:      ANDER AVELAR Reading Physician:  [id number] Sena Holcomb DO  Study Date:        12/28/2023           Ordering Physician: [id number] MANN  MRN/PID:           [id number]             Technologist:       Santo Doran RVT  Accession#:        [id number]         Technologist 2:  Date of Birth/Age: 1945 / 78 years Encounter#:         [id number]  Gender:            F  Admission Status:  Outpatient           Location Performed: [facility]      Diagnosis/ICD: Peripheral vascular disease, unspecified-I73.9  CPT Codes:     60284 Peripheral artery DILLON Only      CONCLUSIONS:  Right Lower PVR: Evidence of mild arterial occlusive disease in the right lower extremity at rest. Decreased digital perfusion noted. Biphasic flow is noted in the right posterior tibial artery and right dorsalis pedis artery. Triphasic flow is noted in the right common femoral artery.  Left Lower PVR: No evidence of arterial occlusive disease in the left lower extremity at rest. Decreased digital perfusion noted. Triphasic flow is noted in  the left common femoral artery, left posterior tibial artery and left dorsalis pedis artery. Patient refused to exercise.    Comparison:  Compared with study from 1/27/2022, no significant change in bilateral ABIs and tracings at rest. Left TBI has decreased from 0.75 to 0.37.    Imaging & Doppler Findings:    RIGHT Lower PVR                Pressures Ratios  Right Posterior Tibial (Ankle) 111 mmHg  0.89  Right Dorsalis Pedis (Ankle)   69 mmHg   0.55  Right Digit (Great Toe)        60 mmHg   0.48        LEFT Lower PVR                Pressures Ratios  Left Posterior Tibial (Ankle) 138 mmHg  1.10  Left Dorsalis Pedis (Ankle)   123 mmHg  0.98  Left Digit (Great Toe)        46 mmHg   0.37      Right     Left  Brachial Pressure 125 mmHg 110 mmHg        [id number] Sean Holcomb DO  Electronically signed by [id number] Sean Holcomb DO on 12/28/2023 at 3:18:49 PM        ** Final **     Assessment/Plan     Amita Todd is a 78 y.o. year old female with PMH of CAD, s/p multiple stents (pt of Dr Nova), CONNOR, known JILLIAN occlusion, stable AAA (4.4 cm), H/o TIAs in the past, A-fib, s/p RFA, s/p Watchman placement, CHF, hip and knee arthritis, H/o breast CA and anterior tongue CA, recent diagnosis of a locally advanced hypopharynx squamous cell carcinoma (planning to treat with chemoradiation), who we follow for AAA, PAD and carotid stenosis.    On 8/3/22 diagnostic carotid angiogram revealed: Calcified left ICA with 40% stenosis with excellent collaterals from left intracerebral circulation to the right with robust flow in right KATARINA and MCA. Right ICA was not visualized on cranial images. No interventions were performed. Pt was discharged home on Plavix and statin.  She is presenting today  with complains of bilateral  leg tightness with walking R>L, no pain but rather a sensation of tight socks (up to 3/10), relieved with walking. It does not limit her activities.  No recent stroke like symptoms. No CP/SOB.     Carotid artery duplex  US- no significant change from 12/22.  DILLON/TBI ( with no exercise portion)- Compared with study from 1/27/2022, no significant change in bilateral ABIs and tracings at rest. Left TBI has decreased from 0.75 to 0.37 - since there ar no open sores, no indications for invasive procedures at this time.  CTA of the thoracic aorta: Unchanged aneurysmal dilatation of the thoracic aorta measuring up to 4.5 cm in mid ascending portion.    Pt is to continue with current medical treatment, walking/exercise, foot protection, podiatry follow up and to keep skin moisturized.   F/u in 6 month with repeat PVR WITH EXERCISE and carotid duplex US, or sooner if needed.       Marlin Tarango PA-C

## 2024-01-11 ENCOUNTER — OFFICE VISIT (OUTPATIENT)
Dept: CARDIOLOGY | Facility: HOSPITAL | Age: 79
End: 2024-01-11
Payer: MEDICARE

## 2024-01-11 ENCOUNTER — TELEPHONE (OUTPATIENT)
Dept: RADIATION ONCOLOGY | Facility: HOSPITAL | Age: 79
End: 2024-01-11

## 2024-01-11 ENCOUNTER — HOSPITAL ENCOUNTER (OUTPATIENT)
Dept: RADIOLOGY | Facility: EXTERNAL LOCATION | Age: 79
Discharge: HOME | End: 2024-01-11

## 2024-01-11 VITALS
HEART RATE: 82 BPM | WEIGHT: 155 LBS | OXYGEN SATURATION: 96 % | BODY MASS INDEX: 24.33 KG/M2 | SYSTOLIC BLOOD PRESSURE: 122 MMHG | DIASTOLIC BLOOD PRESSURE: 80 MMHG | HEIGHT: 67 IN

## 2024-01-11 DIAGNOSIS — I73.9 PAD (PERIPHERAL ARTERY DISEASE) (CMS-HCC): ICD-10-CM

## 2024-01-11 DIAGNOSIS — I65.29 ASYMPTOMATIC CAROTID ARTERY STENOSIS, UNSPECIFIED LATERALITY: Primary | ICD-10-CM

## 2024-01-11 DIAGNOSIS — C13.9 MALIGNANT NEOPLASM OF HYPOPHARYNX (MULTI): ICD-10-CM

## 2024-01-11 DIAGNOSIS — I71.21 ANEURYSM OF ASCENDING AORTA WITHOUT RUPTURE (CMS-HCC): ICD-10-CM

## 2024-01-11 DIAGNOSIS — C32.9 LARYNGEAL CANCER (MULTI): ICD-10-CM

## 2024-01-11 DIAGNOSIS — I65.21 OCCLUSION OF RIGHT CAROTID ARTERY: ICD-10-CM

## 2024-01-11 PROCEDURE — 99214 OFFICE O/P EST MOD 30 MIN: CPT | Performed by: PHYSICIAN ASSISTANT

## 2024-01-11 PROCEDURE — 1126F AMNT PAIN NOTED NONE PRSNT: CPT | Performed by: PHYSICIAN ASSISTANT

## 2024-01-11 PROCEDURE — 3074F SYST BP LT 130 MM HG: CPT | Performed by: PHYSICIAN ASSISTANT

## 2024-01-11 PROCEDURE — 1159F MED LIST DOCD IN RCRD: CPT | Performed by: PHYSICIAN ASSISTANT

## 2024-01-11 PROCEDURE — 1036F TOBACCO NON-USER: CPT | Performed by: PHYSICIAN ASSISTANT

## 2024-01-11 PROCEDURE — 1160F RVW MEDS BY RX/DR IN RCRD: CPT | Performed by: PHYSICIAN ASSISTANT

## 2024-01-11 PROCEDURE — 3079F DIAST BP 80-89 MM HG: CPT | Performed by: PHYSICIAN ASSISTANT

## 2024-01-11 ASSESSMENT — ENCOUNTER SYMPTOMS
OCCASIONAL FEELINGS OF UNSTEADINESS: 0
DEPRESSION: 0
LOSS OF SENSATION IN FEET: 0

## 2024-01-11 NOTE — TELEPHONE ENCOUNTER
Called pt to remind of appointment on 1/12/2024 at 8:30. Pt's phone went to voicemail left number if needs to reschedule.

## 2024-01-11 NOTE — ADDENDUM NOTE
Encounter addended by: Tahira Cochran MD on: 1/11/2024 12:05 PM   Actions taken: Order list changed, Diagnosis association updated

## 2024-01-11 NOTE — ADDENDUM NOTE
Encounter addended by: Tahira Cochran MD on: 1/11/2024 11:46 AM   Actions taken: Level of Service modified, Clinical Note Signed

## 2024-01-11 NOTE — PATIENT INSTRUCTIONS
Thank you for coming to see us in the [facility] today.   We have reviewed the results of the vascular test, the blood flow to your legs and carotid arteries looks stable. The aortic aneurysm is also stable from last year.  Please continue taking all your medications, continue walking/exercise. Keep your leg/foot skin moisturized to prevent skin brakes. Please see a podiatrist for toe nail care and calluses.   The chemotherapy and radiation might affect your carotid stenosis, therefore, we will be closely watching them for a sign of worsening.  We will see you back in 6 months with repeat carotid ultrasound and vascular leg test with exercise.   If you start experiencing pain at rest, severe pain that is different from baseline, or develop any new open wounds on your lower legs, please call and schedule an appointment sooner

## 2024-01-12 ENCOUNTER — RADIATION ONCOLOGY OTV (OUTPATIENT)
Dept: RADIATION ONCOLOGY | Facility: HOSPITAL | Age: 79
End: 2024-01-12

## 2024-01-12 ENCOUNTER — HOSPITAL ENCOUNTER (OUTPATIENT)
Dept: RADIOLOGY | Facility: HOSPITAL | Age: 79
Discharge: HOME | End: 2024-01-12
Payer: MEDICARE

## 2024-01-12 VITALS
RESPIRATION RATE: 18 BRPM | OXYGEN SATURATION: 95 % | HEIGHT: 66 IN | WEIGHT: 155.65 LBS | BODY MASS INDEX: 25.01 KG/M2 | SYSTOLIC BLOOD PRESSURE: 91 MMHG | HEART RATE: 73 BPM | TEMPERATURE: 96.8 F | DIASTOLIC BLOOD PRESSURE: 64 MMHG

## 2024-01-12 DIAGNOSIS — C13.9 MALIGNANT NEOPLASM OF HYPOPHARYNX (MULTI): ICD-10-CM

## 2024-01-12 LAB — GLUCOSE BLD MANUAL STRIP-MCNC: 101 MG/DL (ref 74–99)

## 2024-01-12 PROCEDURE — 3430000001 HC RX 343 DIAGNOSTIC RADIOPHARMACEUTICALS: Mod: MUE | Performed by: RADIOLOGY

## 2024-01-12 PROCEDURE — 82947 ASSAY GLUCOSE BLOOD QUANT: CPT

## 2024-01-12 PROCEDURE — A9552 F18 FDG: HCPCS | Mod: MUE | Performed by: RADIOLOGY

## 2024-01-12 PROCEDURE — 78815 PET IMAGE W/CT SKULL-THIGH: CPT | Mod: PI

## 2024-01-12 PROCEDURE — 78815 PET IMAGE W/CT SKULL-THIGH: CPT | Mod: PET TUMOR INIT TX STRAT | Performed by: STUDENT IN AN ORGANIZED HEALTH CARE EDUCATION/TRAINING PROGRAM

## 2024-01-12 RX ORDER — LIDOCAINE HYDROCHLORIDE 20 MG/ML
JELLY TOPICAL
Status: DISCONTINUED
Start: 2024-01-12 | End: 2024-01-12 | Stop reason: HOSPADM

## 2024-01-12 RX ORDER — FLUDEOXYGLUCOSE F 18 200 MCI/ML
11.9 INJECTION, SOLUTION INTRAVENOUS
Status: COMPLETED | OUTPATIENT
Start: 2024-01-12 | End: 2024-01-12

## 2024-01-12 RX ADMIN — FLUDEOXYGLUCOSE F 18 11.9 MILLICURIE: 200 INJECTION, SOLUTION INTRAVENOUS at 13:31

## 2024-01-12 NOTE — ADDENDUM NOTE
Encounter addended by: Tahira Cochran MD on: 1/12/2024 2:16 PM   Actions taken: Clinical Note Signed

## 2024-01-15 ENCOUNTER — APPOINTMENT (OUTPATIENT)
Dept: RADIOLOGY | Facility: HOSPITAL | Age: 79
End: 2024-01-15
Payer: MEDICARE

## 2024-01-16 ENCOUNTER — HOSPITAL ENCOUNTER (OUTPATIENT)
Dept: RADIOLOGY | Facility: HOSPITAL | Age: 79
Discharge: HOME | End: 2024-01-16
Payer: MEDICARE

## 2024-01-16 ENCOUNTER — HOSPITAL ENCOUNTER (OUTPATIENT)
Dept: RADIATION ONCOLOGY | Facility: HOSPITAL | Age: 79
Setting detail: RADIATION/ONCOLOGY SERIES
Discharge: HOME | End: 2024-01-16
Payer: MEDICARE

## 2024-01-16 ENCOUNTER — EVALUATION (OUTPATIENT)
Dept: SPEECH THERAPY | Facility: HOSPITAL | Age: 79
End: 2024-01-16
Payer: MEDICARE

## 2024-01-16 ENCOUNTER — LAB (OUTPATIENT)
Dept: LAB | Facility: HOSPITAL | Age: 79
End: 2024-01-16
Payer: MEDICARE

## 2024-01-16 DIAGNOSIS — C13.9 MALIGNANT NEOPLASM OF HYPOPHARYNX (MULTI): ICD-10-CM

## 2024-01-16 DIAGNOSIS — R13.12 OROPHARYNGEAL DYSPHAGIA: Primary | ICD-10-CM

## 2024-01-16 DIAGNOSIS — C76.0 HEAD AND NECK CANCER (MULTI): ICD-10-CM

## 2024-01-16 LAB
ALBUMIN SERPL BCP-MCNC: 3.9 G/DL (ref 3.4–5)
ALP SERPL-CCNC: 65 U/L (ref 33–136)
ALT SERPL W P-5'-P-CCNC: 12 U/L (ref 7–45)
ANION GAP SERPL CALC-SCNC: 13 MMOL/L (ref 10–20)
AST SERPL W P-5'-P-CCNC: 18 U/L (ref 9–39)
BASOPHILS # BLD AUTO: 0.06 X10*3/UL (ref 0–0.1)
BASOPHILS NFR BLD AUTO: 0.8 %
BILIRUB SERPL-MCNC: 1 MG/DL (ref 0–1.2)
BUN SERPL-MCNC: 18 MG/DL (ref 6–23)
CALCIUM SERPL-MCNC: 10 MG/DL (ref 8.6–10.3)
CHLORIDE SERPL-SCNC: 101 MMOL/L (ref 98–107)
CO2 SERPL-SCNC: 29 MMOL/L (ref 21–32)
CREAT SERPL-MCNC: 1.02 MG/DL (ref 0.5–1.05)
EGFRCR SERPLBLD CKD-EPI 2021: 57 ML/MIN/1.73M*2
EOSINOPHIL # BLD AUTO: 0.26 X10*3/UL (ref 0–0.4)
EOSINOPHIL NFR BLD AUTO: 3.3 %
ERYTHROCYTE [DISTWIDTH] IN BLOOD BY AUTOMATED COUNT: 14.7 % (ref 11.5–14.5)
GLUCOSE SERPL-MCNC: 128 MG/DL (ref 74–99)
HBV CORE AB SER QL: NONREACTIVE
HBV SURFACE AB SER-ACNC: <3.1 MIU/ML
HBV SURFACE AG SERPL QL IA: NONREACTIVE
HCT VFR BLD AUTO: 41.8 % (ref 36–46)
HGB BLD-MCNC: 13.8 G/DL (ref 12–16)
IMM GRANULOCYTES # BLD AUTO: 0.02 X10*3/UL (ref 0–0.5)
IMM GRANULOCYTES NFR BLD AUTO: 0.3 % (ref 0–0.9)
LYMPHOCYTES # BLD AUTO: 0.68 X10*3/UL (ref 0.8–3)
LYMPHOCYTES NFR BLD AUTO: 8.6 %
MAGNESIUM SERPL-MCNC: 2.09 MG/DL (ref 1.6–2.4)
MCH RBC QN AUTO: 30.1 PG (ref 26–34)
MCHC RBC AUTO-ENTMCNC: 33 G/DL (ref 32–36)
MCV RBC AUTO: 91 FL (ref 80–100)
MONOCYTES # BLD AUTO: 0.51 X10*3/UL (ref 0.05–0.8)
MONOCYTES NFR BLD AUTO: 6.4 %
NEUTROPHILS # BLD AUTO: 6.39 X10*3/UL (ref 1.6–5.5)
NEUTROPHILS NFR BLD AUTO: 80.6 %
NRBC BLD-RTO: 0 /100 WBCS (ref 0–0)
PLATELET # BLD AUTO: 237 X10*3/UL (ref 150–450)
POTASSIUM SERPL-SCNC: 4.6 MMOL/L (ref 3.5–5.3)
PROT SERPL-MCNC: 7.4 G/DL (ref 6.4–8.2)
RBC # BLD AUTO: 4.58 X10*6/UL (ref 4–5.2)
SODIUM SERPL-SCNC: 138 MMOL/L (ref 136–145)
TSH SERPL-ACNC: 1.52 MIU/L (ref 0.44–3.98)
WBC # BLD AUTO: 7.9 X10*3/UL (ref 4.4–11.3)

## 2024-01-16 PROCEDURE — 77470 SPECIAL RADIATION TREATMENT: CPT | Performed by: RADIOLOGY

## 2024-01-16 PROCEDURE — 87340 HEPATITIS B SURFACE AG IA: CPT | Performed by: INTERNAL MEDICINE

## 2024-01-16 PROCEDURE — 84443 ASSAY THYROID STIM HORMONE: CPT | Performed by: INTERNAL MEDICINE

## 2024-01-16 PROCEDURE — 83735 ASSAY OF MAGNESIUM: CPT

## 2024-01-16 PROCEDURE — 86704 HEP B CORE ANTIBODY TOTAL: CPT | Performed by: INTERNAL MEDICINE

## 2024-01-16 PROCEDURE — 74230 X-RAY XM SWLNG FUNCJ C+: CPT

## 2024-01-16 PROCEDURE — 77263 THER RADIOLOGY TX PLNG CPLX: CPT | Performed by: RADIOLOGY

## 2024-01-16 PROCEDURE — 86706 HEP B SURFACE ANTIBODY: CPT | Performed by: INTERNAL MEDICINE

## 2024-01-16 PROCEDURE — 80053 COMPREHEN METABOLIC PANEL: CPT

## 2024-01-16 PROCEDURE — 92526 ORAL FUNCTION THERAPY: CPT | Mod: GN

## 2024-01-16 PROCEDURE — 3430000001 HC RX 343 DIAGNOSTIC RADIOPHARMACEUTICALS: Performed by: RADIOLOGY

## 2024-01-16 PROCEDURE — 85025 COMPLETE CBC W/AUTO DIFF WBC: CPT

## 2024-01-16 PROCEDURE — 92611 MOTION FLUOROSCOPY/SWALLOW: CPT | Mod: GN

## 2024-01-16 PROCEDURE — 2500000001 HC RX 250 WO HCPCS SELF ADMINISTERED DRUGS (ALT 637 FOR MEDICARE OP): Performed by: RADIOLOGY

## 2024-01-16 PROCEDURE — 36415 COLL VENOUS BLD VENIPUNCTURE: CPT

## 2024-01-16 RX ADMIN — BARIUM SULFATE 30 ML: 400 PASTE ORAL at 11:07

## 2024-01-16 RX ADMIN — BARIUM SULFATE 20 ML: 400 SUSPENSION ORAL at 11:04

## 2024-01-16 RX ADMIN — BARIUM SULFATE 60 ML: 400 SUSPENSION ORAL at 11:08

## 2024-01-16 RX ADMIN — BARIUM SULFATE 20 ML: 400 SUSPENSION ORAL at 11:06

## 2024-01-16 NOTE — PROGRESS NOTES
Speech-Language Pathology        Modified Barium Swallow Study     Patient Name: Amita Todd  MRN: [id number]  : 1945  Today's Date: 24          Recommendations:  Began #5 minced moist solids with thin liquids.  Chin tuck with every swallow.  Double swallows per bite and sip.  Occasional throat clear or cough through meal.  Small frequent meals throughout the day.  Meds crushed in purees.  Continue outpatient SLP services for treatment give post radiation effects of the swallow mechanism.     Assessment/Impression:  Mild Oropharyngeal dysphagia secondary to hypopharyngeal mass.   Pt with intact oral prepping across all consistencies. Notable tissue protrusions within the hypopharynx. During the swallow bolus flow impeded by mass presence which resulted in nasal regurgitation of thin liquids despite overall intact velopharyngeal port closure. Additional bolus flow of thicker viscosities (moderately thick and mildly thick liquids), purees and solids impeded by mass; resulting in increasing accumulating pharyngeal stasis after the swallow. Pharyngeal stasis of thin liquids resulted in trace reoccurring penetration above the cords and deep penetration with overt aspiration of trace to min amounts of thicker viscosities, purees and solids. Pt with reflexive throat clear or cough to clear aspirate materials. Compensatory strategies of head rotation and chin tuck initiated. Chin tuck resulted in elimination of nasal regurgitation and improved bolus clearance of thin liquids and purees through the pharynx/past the mass; with less opportunities for aspiration of pharyngeal stasis after the swallow. The A-P bolus follow-through is not intended to be utilized as a diagnostic assessment of the esophagus, rather a tool to observe the biomechanically aspects of the swallow continuum and to inform the need for further evaluation by medical specialists, as applicable.    Biofeedback provided during exam  "utilizing videofluoroscopy to train chin tuck and effortful swallow techniques.       Full detailed SLP/Radiologist Modified Barium Swallow study report can be found under Chart Review tab, Imaging tab and  titled \"[place] Modified Barium Swallow Study\"        Plan:  Treatment/Interventions: Pharyngeal exercises, Oral motor exercises, Patient/family education, Bolus trials  SLP Plan: Skilled SLP warranted  SLP Frequency: Next appointment 3 weeks after initiation of chemoRT 1/24/24  Duration: 30 days    Discussed POC: Patient, daughter  Discussed Risks/Benefits: Yes  Patient/Caregiver Agreeable: Yes    GOALS:  Pt. to tolerate least restrictive diet without pulmonary compromise  Pt will utilize and recall compensatory strategies independent of cues 100% of the time.  Pt will complete effortful swallows 10 reps, 3 times per day for 7 days a week.   Pt will complete isometric shaker for 1 min 3 reps at 3 sets.   Pt will complete Arminda maneuver 10 times 3 times per day for 7 days a week.  Pt will sustain tongue protrusion for 5 seconds 10 reps, 3 times per day, 7 days a week.  Pt will retract tongue far back into the mouth and hold for 5 seconds 10 reps, 3 times per day, 7 days a week.  Pt will lateralize tongue to the left and right with 5 sec hold 10 reps, 3 times per day for 7 days a week.  Pt will extend tongue tip upward with wide open mouth and hold for 5 seconds, 10 reps, 3 times per day for 7 days.    Tx- session: Dysphagia exercises provided with demonstration. Pt able to demon and recall compensatory strategies without difficulty. Pt tolerated small sips of water via chin tuck. Additional discussion regarding weight loss and feeding tubes. Pt encouraged to discuss options to max caloric intake with MD and dietician. Over all patient encouraged to consume PO throughout treatment as able utilizing compensatory strategies and diet modifications to prevent further decline in swallow function. Patient is at a low risk " of developing pulmonary complications associated with suspected aspiration given the identified risk factors and prognostic factors.      Education:   Pt. Educated on results of MBS study, recommended diet and recommended safe swallow strategies. Handouts provided.

## 2024-01-17 ENCOUNTER — HOSPITAL ENCOUNTER (OUTPATIENT)
Dept: RADIATION ONCOLOGY | Facility: HOSPITAL | Age: 79
Setting detail: RADIATION/ONCOLOGY SERIES
Discharge: HOME | End: 2024-01-17
Payer: MEDICARE

## 2024-01-17 PROCEDURE — 77300 RADIATION THERAPY DOSE PLAN: CPT | Performed by: RADIOLOGY

## 2024-01-17 PROCEDURE — 77301 RADIOTHERAPY DOSE PLAN IMRT: CPT | Performed by: RADIOLOGY

## 2024-01-17 PROCEDURE — 77338 DESIGN MLC DEVICE FOR IMRT: CPT | Performed by: RADIOLOGY

## 2024-01-18 ENCOUNTER — APPOINTMENT (OUTPATIENT)
Dept: RADIOLOGY | Facility: HOSPITAL | Age: 79
End: 2024-01-18
Payer: MEDICARE

## 2024-01-18 ENCOUNTER — NURSE TRIAGE (OUTPATIENT)
Dept: ADMISSION | Facility: HOSPITAL | Age: 79
End: 2024-01-18
Payer: MEDICARE

## 2024-01-18 ENCOUNTER — HOSPITAL ENCOUNTER (OUTPATIENT)
Facility: HOSPITAL | Age: 79
Setting detail: OBSERVATION
Discharge: HOME | End: 2024-01-19
Attending: GENERAL PRACTICE | Admitting: INTERNAL MEDICINE
Payer: MEDICARE

## 2024-01-18 DIAGNOSIS — I10 PRIMARY HYPERTENSION: ICD-10-CM

## 2024-01-18 DIAGNOSIS — I50.20 HFREF (HEART FAILURE WITH REDUCED EJECTION FRACTION) (MULTI): ICD-10-CM

## 2024-01-18 DIAGNOSIS — I34.0 MITRAL VALVE INSUFFICIENCY, UNSPECIFIED ETIOLOGY: ICD-10-CM

## 2024-01-18 DIAGNOSIS — R06.09 EXERTIONAL DYSPNEA: Primary | ICD-10-CM

## 2024-01-18 LAB
ALBUMIN SERPL BCP-MCNC: 3.8 G/DL (ref 3.4–5)
ALP SERPL-CCNC: 70 U/L (ref 33–136)
ALT SERPL W P-5'-P-CCNC: 14 U/L (ref 7–45)
ANION GAP BLDV CALCULATED.4IONS-SCNC: 8 MMOL/L (ref 10–25)
ANION GAP SERPL CALC-SCNC: 14 MMOL/L (ref 10–20)
AST SERPL W P-5'-P-CCNC: 22 U/L (ref 9–39)
BASE EXCESS BLDV CALC-SCNC: 2.5 MMOL/L (ref -2–3)
BASOPHILS # BLD AUTO: 0.08 X10*3/UL (ref 0–0.1)
BASOPHILS NFR BLD AUTO: 1 %
BILIRUB SERPL-MCNC: 0.9 MG/DL (ref 0–1.2)
BNP SERPL-MCNC: 232 PG/ML (ref 0–99)
BODY TEMPERATURE: 37 DEGREES CELSIUS
BUN SERPL-MCNC: 24 MG/DL (ref 6–23)
CA-I BLDV-SCNC: 1.27 MMOL/L (ref 1.1–1.33)
CALCIUM SERPL-MCNC: 9.8 MG/DL (ref 8.6–10.3)
CARDIAC TROPONIN I PNL SERPL HS: 8 NG/L (ref 0–13)
CHLORIDE BLDV-SCNC: 101 MMOL/L (ref 98–107)
CHLORIDE SERPL-SCNC: 100 MMOL/L (ref 98–107)
CO2 SERPL-SCNC: 26 MMOL/L (ref 21–32)
CREAT SERPL-MCNC: 1.06 MG/DL (ref 0.5–1.05)
EGFRCR SERPLBLD CKD-EPI 2021: 54 ML/MIN/1.73M*2
EOSINOPHIL # BLD AUTO: 0.21 X10*3/UL (ref 0–0.4)
EOSINOPHIL NFR BLD AUTO: 2.7 %
ERYTHROCYTE [DISTWIDTH] IN BLOOD BY AUTOMATED COUNT: 14.8 % (ref 11.5–14.5)
FLUAV RNA RESP QL NAA+PROBE: NOT DETECTED
FLUBV RNA RESP QL NAA+PROBE: NOT DETECTED
GLUCOSE BLDV-MCNC: 126 MG/DL (ref 74–99)
GLUCOSE SERPL-MCNC: 120 MG/DL (ref 74–99)
HCO3 BLDV-SCNC: 29.3 MMOL/L (ref 22–26)
HCT VFR BLD AUTO: 44.6 % (ref 36–46)
HCT VFR BLD EST: 44 % (ref 36–46)
HGB BLD-MCNC: 14.1 G/DL (ref 12–16)
HGB BLDV-MCNC: 14.5 G/DL (ref 12–16)
IMM GRANULOCYTES # BLD AUTO: 0.02 X10*3/UL (ref 0–0.5)
IMM GRANULOCYTES NFR BLD AUTO: 0.3 % (ref 0–0.9)
INHALED O2 CONCENTRATION: 21 %
LACTATE BLDV-SCNC: 2.2 MMOL/L (ref 0.4–2)
LYMPHOCYTES # BLD AUTO: 0.84 X10*3/UL (ref 0.8–3)
LYMPHOCYTES NFR BLD AUTO: 10.9 %
MAGNESIUM SERPL-MCNC: 1.9 MG/DL (ref 1.6–2.4)
MCH RBC QN AUTO: 30.6 PG (ref 26–34)
MCHC RBC AUTO-ENTMCNC: 31.6 G/DL (ref 32–36)
MCV RBC AUTO: 97 FL (ref 80–100)
MONOCYTES # BLD AUTO: 0.55 X10*3/UL (ref 0.05–0.8)
MONOCYTES NFR BLD AUTO: 7.2 %
NEUTROPHILS # BLD AUTO: 5.99 X10*3/UL (ref 1.6–5.5)
NEUTROPHILS NFR BLD AUTO: 77.9 %
NRBC BLD-RTO: 0 /100 WBCS (ref 0–0)
OXYHGB MFR BLDV: 47.6 % (ref 45–75)
PCO2 BLDV: 53 MM HG (ref 41–51)
PH BLDV: 7.35 PH (ref 7.33–7.43)
PLATELET # BLD AUTO: 248 X10*3/UL (ref 150–450)
PO2 BLDV: 35 MM HG (ref 35–45)
POTASSIUM BLDV-SCNC: 4.4 MMOL/L (ref 3.5–5.3)
POTASSIUM SERPL-SCNC: 4.2 MMOL/L (ref 3.5–5.3)
PROT SERPL-MCNC: 7.3 G/DL (ref 6.4–8.2)
RBC # BLD AUTO: 4.61 X10*6/UL (ref 4–5.2)
SAO2 % BLDV: 48 % (ref 45–75)
SARS-COV-2 RNA RESP QL NAA+PROBE: NOT DETECTED
SODIUM BLDV-SCNC: 134 MMOL/L (ref 136–145)
SODIUM SERPL-SCNC: 136 MMOL/L (ref 136–145)
WBC # BLD AUTO: 7.7 X10*3/UL (ref 4.4–11.3)

## 2024-01-18 PROCEDURE — 71045 X-RAY EXAM CHEST 1 VIEW: CPT

## 2024-01-18 PROCEDURE — 2500000004 HC RX 250 GENERAL PHARMACY W/ HCPCS (ALT 636 FOR OP/ED): Mod: MUE | Performed by: PHYSICIAN ASSISTANT

## 2024-01-18 PROCEDURE — 87636 SARSCOV2 & INF A&B AMP PRB: CPT | Performed by: GENERAL PRACTICE

## 2024-01-18 PROCEDURE — 71275 CT ANGIOGRAPHY CHEST: CPT

## 2024-01-18 PROCEDURE — 85025 COMPLETE CBC W/AUTO DIFF WBC: CPT | Performed by: GENERAL PRACTICE

## 2024-01-18 PROCEDURE — G0378 HOSPITAL OBSERVATION PER HR: HCPCS

## 2024-01-18 PROCEDURE — 83735 ASSAY OF MAGNESIUM: CPT | Performed by: GENERAL PRACTICE

## 2024-01-18 PROCEDURE — 36415 COLL VENOUS BLD VENIPUNCTURE: CPT | Performed by: GENERAL PRACTICE

## 2024-01-18 PROCEDURE — 83880 ASSAY OF NATRIURETIC PEPTIDE: CPT | Performed by: GENERAL PRACTICE

## 2024-01-18 PROCEDURE — 84484 ASSAY OF TROPONIN QUANT: CPT | Performed by: GENERAL PRACTICE

## 2024-01-18 PROCEDURE — 99285 EMERGENCY DEPT VISIT HI MDM: CPT | Performed by: GENERAL PRACTICE

## 2024-01-18 PROCEDURE — 71045 X-RAY EXAM CHEST 1 VIEW: CPT | Performed by: RADIOLOGY

## 2024-01-18 PROCEDURE — 71275 CT ANGIOGRAPHY CHEST: CPT | Performed by: RADIOLOGY

## 2024-01-18 PROCEDURE — 84132 ASSAY OF SERUM POTASSIUM: CPT | Mod: 59,91 | Performed by: GENERAL PRACTICE

## 2024-01-18 PROCEDURE — 94640 AIRWAY INHALATION TREATMENT: CPT

## 2024-01-18 PROCEDURE — 84132 ASSAY OF SERUM POTASSIUM: CPT | Performed by: GENERAL PRACTICE

## 2024-01-18 PROCEDURE — 2550000001 HC RX 255 CONTRASTS: Performed by: GENERAL PRACTICE

## 2024-01-18 PROCEDURE — 2500000001 HC RX 250 WO HCPCS SELF ADMINISTERED DRUGS (ALT 637 FOR MEDICARE OP): Performed by: PHYSICIAN ASSISTANT

## 2024-01-18 PROCEDURE — 2500000002 HC RX 250 W HCPCS SELF ADMINISTERED DRUGS (ALT 637 FOR MEDICARE OP, ALT 636 FOR OP/ED): Performed by: GENERAL PRACTICE

## 2024-01-18 RX ORDER — PANTOPRAZOLE SODIUM 40 MG/1
40 TABLET, DELAYED RELEASE ORAL 2 TIMES DAILY
Status: DISCONTINUED | OUTPATIENT
Start: 2024-01-18 | End: 2024-01-19 | Stop reason: HOSPADM

## 2024-01-18 RX ORDER — METOPROLOL TARTRATE 50 MG/1
50 TABLET ORAL 2 TIMES DAILY
Status: DISCONTINUED | OUTPATIENT
Start: 2024-01-18 | End: 2024-01-19 | Stop reason: HOSPADM

## 2024-01-18 RX ORDER — IPRATROPIUM BROMIDE AND ALBUTEROL SULFATE 2.5; .5 MG/3ML; MG/3ML
3 SOLUTION RESPIRATORY (INHALATION) ONCE
Status: COMPLETED | OUTPATIENT
Start: 2024-01-18 | End: 2024-01-18

## 2024-01-18 RX ORDER — CLOPIDOGREL BISULFATE 75 MG/1
75 TABLET ORAL DAILY
Status: DISCONTINUED | OUTPATIENT
Start: 2024-01-19 | End: 2024-01-19 | Stop reason: HOSPADM

## 2024-01-18 RX ORDER — DULOXETIN HYDROCHLORIDE 30 MG/1
60 CAPSULE, DELAYED RELEASE ORAL DAILY
Status: DISCONTINUED | OUTPATIENT
Start: 2024-01-18 | End: 2024-01-19 | Stop reason: HOSPADM

## 2024-01-18 RX ORDER — PANTOPRAZOLE SODIUM 40 MG/1
40 TABLET, DELAYED RELEASE ORAL
Status: DISCONTINUED | OUTPATIENT
Start: 2024-01-19 | End: 2024-01-18 | Stop reason: SDUPTHER

## 2024-01-18 RX ORDER — PROCHLORPERAZINE MALEATE 5 MG
10 TABLET ORAL EVERY 6 HOURS PRN
Status: DISCONTINUED | OUTPATIENT
Start: 2024-01-18 | End: 2024-01-19 | Stop reason: HOSPADM

## 2024-01-18 RX ORDER — NITROGLYCERIN 0.4 MG/1
0.4 TABLET SUBLINGUAL EVERY 5 MIN PRN
Status: DISCONTINUED | OUTPATIENT
Start: 2024-01-18 | End: 2024-01-19 | Stop reason: HOSPADM

## 2024-01-18 RX ORDER — FLUTICASONE FUROATE AND VILANTEROL 100; 25 UG/1; UG/1
1 POWDER RESPIRATORY (INHALATION)
Status: DISCONTINUED | OUTPATIENT
Start: 2024-01-19 | End: 2024-01-18

## 2024-01-18 RX ORDER — ONDANSETRON 4 MG/1
4 TABLET, ORALLY DISINTEGRATING ORAL ONCE
Status: DISCONTINUED | OUTPATIENT
Start: 2024-01-18 | End: 2024-01-19 | Stop reason: HOSPADM

## 2024-01-18 RX ORDER — TALC
3 POWDER (GRAM) TOPICAL
Status: DISCONTINUED | OUTPATIENT
Start: 2024-01-19 | End: 2024-01-19 | Stop reason: HOSPADM

## 2024-01-18 RX ORDER — DOCUSATE SODIUM 100 MG/1
100 CAPSULE, LIQUID FILLED ORAL 2 TIMES DAILY
Status: DISCONTINUED | OUTPATIENT
Start: 2024-01-18 | End: 2024-01-19 | Stop reason: HOSPADM

## 2024-01-18 RX ORDER — ATORVASTATIN CALCIUM 20 MG/1
20 TABLET, FILM COATED ORAL DAILY
Status: DISCONTINUED | OUTPATIENT
Start: 2024-01-19 | End: 2024-01-19 | Stop reason: HOSPADM

## 2024-01-18 RX ORDER — BUPROPION HYDROCHLORIDE 150 MG/1
150 TABLET, EXTENDED RELEASE ORAL DAILY
Status: DISCONTINUED | OUTPATIENT
Start: 2024-01-18 | End: 2024-01-19

## 2024-01-18 RX ORDER — HEPARIN SODIUM,PORCINE/PF 10 UNIT/ML
50 SYRINGE (ML) INTRAVENOUS AS NEEDED
Status: CANCELLED | OUTPATIENT
Start: 2024-01-18

## 2024-01-18 RX ORDER — SPIRONOLACTONE 25 MG/1
25 TABLET ORAL
Status: DISCONTINUED | OUTPATIENT
Start: 2024-01-19 | End: 2024-01-19 | Stop reason: HOSPADM

## 2024-01-18 RX ORDER — LANOLIN ALCOHOL/MO/W.PET/CERES
400 CREAM (GRAM) TOPICAL DAILY
Status: DISCONTINUED | OUTPATIENT
Start: 2024-01-19 | End: 2024-01-19 | Stop reason: HOSPADM

## 2024-01-18 RX ORDER — HEPARIN 100 UNIT/ML
500 SYRINGE INTRAVENOUS AS NEEDED
Status: CANCELLED | OUTPATIENT
Start: 2024-01-18

## 2024-01-18 RX ORDER — LOSARTAN POTASSIUM 50 MG/1
50 TABLET ORAL DAILY
Status: DISCONTINUED | OUTPATIENT
Start: 2024-01-19 | End: 2024-01-18

## 2024-01-18 RX ORDER — DULOXETIN HYDROCHLORIDE 30 MG/1
60 CAPSULE, DELAYED RELEASE ORAL DAILY
Status: DISCONTINUED | OUTPATIENT
Start: 2024-01-19 | End: 2024-01-18

## 2024-01-18 RX ORDER — DAPAGLIFLOZIN 10 MG/1
10 TABLET, FILM COATED ORAL DAILY
Status: DISCONTINUED | OUTPATIENT
Start: 2024-01-19 | End: 2024-01-19 | Stop reason: HOSPADM

## 2024-01-18 RX ORDER — BUPROPION HYDROCHLORIDE 150 MG/1
150 TABLET, EXTENDED RELEASE ORAL DAILY
Status: DISCONTINUED | OUTPATIENT
Start: 2024-01-19 | End: 2024-01-18

## 2024-01-18 RX ADMIN — METOPROLOL TARTRATE 50 MG: 50 TABLET, FILM COATED ORAL at 23:25

## 2024-01-18 RX ADMIN — PANTOPRAZOLE SODIUM 40 MG: 40 TABLET, DELAYED RELEASE ORAL at 23:25

## 2024-01-18 RX ADMIN — IPRATROPIUM BROMIDE AND ALBUTEROL SULFATE 3 ML: 2.5; .5 SOLUTION RESPIRATORY (INHALATION) at 17:54

## 2024-01-18 RX ADMIN — IOHEXOL 65 ML: 350 INJECTION, SOLUTION INTRAVENOUS at 18:59

## 2024-01-18 SDOH — SOCIAL STABILITY: SOCIAL INSECURITY: DO YOU FEEL UNSAFE GOING BACK TO THE PLACE WHERE YOU ARE LIVING?: NO

## 2024-01-18 SDOH — SOCIAL STABILITY: SOCIAL INSECURITY: WERE YOU ABLE TO COMPLETE ALL THE BEHAVIORAL HEALTH SCREENINGS?: YES

## 2024-01-18 SDOH — SOCIAL STABILITY: SOCIAL INSECURITY: ABUSE: ADULT

## 2024-01-18 SDOH — SOCIAL STABILITY: SOCIAL INSECURITY: HAVE YOU HAD THOUGHTS OF HARMING ANYONE ELSE?: NO

## 2024-01-18 SDOH — SOCIAL STABILITY: SOCIAL INSECURITY: ARE YOU OR HAVE YOU BEEN THREATENED OR ABUSED PHYSICALLY, EMOTIONALLY, OR SEXUALLY BY ANYONE?: NO

## 2024-01-18 SDOH — SOCIAL STABILITY: SOCIAL INSECURITY: HAS ANYONE EVER THREATENED TO HURT YOUR FAMILY OR YOUR PETS?: NO

## 2024-01-18 SDOH — SOCIAL STABILITY: SOCIAL INSECURITY: DOES ANYONE TRY TO KEEP YOU FROM HAVING/CONTACTING OTHER FRIENDS OR DOING THINGS OUTSIDE YOUR HOME?: NO

## 2024-01-18 SDOH — SOCIAL STABILITY: SOCIAL INSECURITY: DO YOU FEEL ANYONE HAS EXPLOITED OR TAKEN ADVANTAGE OF YOU FINANCIALLY OR OF YOUR PERSONAL PROPERTY?: NO

## 2024-01-18 SDOH — SOCIAL STABILITY: SOCIAL INSECURITY: ARE THERE ANY APPARENT SIGNS OF INJURIES/BEHAVIORS THAT COULD BE RELATED TO ABUSE/NEGLECT?: NO

## 2024-01-18 ASSESSMENT — PATIENT HEALTH QUESTIONNAIRE - PHQ9
1. LITTLE INTEREST OR PLEASURE IN DOING THINGS: NOT AT ALL
2. FEELING DOWN, DEPRESSED OR HOPELESS: NOT AT ALL
SUM OF ALL RESPONSES TO PHQ9 QUESTIONS 1 & 2: 0

## 2024-01-18 ASSESSMENT — PAIN SCALES - GENERAL
PAINLEVEL_OUTOF10: 0 - NO PAIN
PAINLEVEL_OUTOF10: 0 - NO PAIN

## 2024-01-18 ASSESSMENT — COGNITIVE AND FUNCTIONAL STATUS - GENERAL
MOBILITY SCORE: 24
DAILY ACTIVITIY SCORE: 24
PATIENT BASELINE BEDBOUND: NO

## 2024-01-18 ASSESSMENT — PAIN - FUNCTIONAL ASSESSMENT
PAIN_FUNCTIONAL_ASSESSMENT: 0-10
PAIN_FUNCTIONAL_ASSESSMENT: 0-10

## 2024-01-18 ASSESSMENT — ACTIVITIES OF DAILY LIVING (ADL)
WALKS IN HOME: INDEPENDENT
TOILETING: INDEPENDENT
GROOMING: INDEPENDENT
PATIENT'S MEMORY ADEQUATE TO SAFELY COMPLETE DAILY ACTIVITIES?: YES
BATHING: INDEPENDENT
LACK_OF_TRANSPORTATION: NO
HEARING - RIGHT EAR: FUNCTIONAL
FEEDING YOURSELF: INDEPENDENT
ADEQUATE_TO_COMPLETE_ADL: YES
DRESSING YOURSELF: INDEPENDENT
HEARING - LEFT EAR: FUNCTIONAL
JUDGMENT_ADEQUATE_SAFELY_COMPLETE_DAILY_ACTIVITIES: YES

## 2024-01-18 ASSESSMENT — PAIN DESCRIPTION - PROGRESSION: CLINICAL_PROGRESSION: NOT CHANGED

## 2024-01-18 ASSESSMENT — LIFESTYLE VARIABLES
AUDIT-C TOTAL SCORE: 0
AUDIT-C TOTAL SCORE: 0
SUBSTANCE_ABUSE_PAST_12_MONTHS: NO
HOW OFTEN DO YOU HAVE A DRINK CONTAINING ALCOHOL: NEVER
HOW MANY STANDARD DRINKS CONTAINING ALCOHOL DO YOU HAVE ON A TYPICAL DAY: PATIENT DOES NOT DRINK
SKIP TO QUESTIONS 9-10: 1
HOW OFTEN DO YOU HAVE 6 OR MORE DRINKS ON ONE OCCASION: NEVER
PRESCIPTION_ABUSE_PAST_12_MONTHS: NO

## 2024-01-18 ASSESSMENT — COLUMBIA-SUICIDE SEVERITY RATING SCALE - C-SSRS
6. HAVE YOU EVER DONE ANYTHING, STARTED TO DO ANYTHING, OR PREPARED TO DO ANYTHING TO END YOUR LIFE?: NO
2. HAVE YOU ACTUALLY HAD ANY THOUGHTS OF KILLING YOURSELF?: NO
1. IN THE PAST MONTH, HAVE YOU WISHED YOU WERE DEAD OR WISHED YOU COULD GO TO SLEEP AND NOT WAKE UP?: NO

## 2024-01-18 NOTE — TELEPHONE ENCOUNTER
Patient returned call. States after discussing with  will try some other techniques and go to ED tomorrow if they do not work. Advised that with chest pressure and sob should not delay ED evaluation. Patient agreeable and will go now.

## 2024-01-18 NOTE — TELEPHONE ENCOUNTER
"Patient called the office because her chest feels \"mildly heavy\" and she is experiencing intermittent SOB since yesterday.  States she was at the dentist today as well and felt she could not catch her breath so she had him stop the cleaning so that she could sit up and catch her breath.  She has had 2-3 episodes of this in the past 24 hours.   Outside of the dentist, the SOB seems to happen with activity.   Last night she noted some wheezing sounds from her chest when laying in bed.   She does have CHF and A-Fib as well.  No fever or chills.   Patient going to [facility] ED to be evaluated.   Team notified.     Additional Information   Negative: Do you have a cough?     Not coughing though feels like she needs to   Commented on: Do you have chest pain?     Chest feels \"mildly\" heavy per patient   Commented on: Has your doctor or nurse told you to limit how much fluid you drink?     She does have CHF and A-Fib    Protocols used: Dyspena (Shortness of Breath)    "

## 2024-01-18 NOTE — ED PROVIDER NOTES
HPI   Chief Complaint   Patient presents with    Shortness of Breath       HPI: 78-year-old female with a history of active throat cancer and CAD status post PCI with stenting presents with chest heaviness and shortness of breath.  For the past days she has been experiencing exertional dyspnea and chest heaviness.  She denies any provocative or palliative factors associated with the chest heaviness and states that it is intermittent.  She denies leg swelling history of DVT/PE.  She is scheduled to start chemotherapy next week for her throat cancer.  She denies fever, chills, cough, abdominal pain, nausea and vomiting.      Limitations to history: None  Independent Historians: Patient  External Records Reviewed: HIE, outpatient notes, inpatient notes  ------------------------------------------------------------------------------------------------------------------------------------------  ROS: a ten point review of systems was performed and was negative except as per HPI.  ------------------------------------------------------------------------------------------------------------------------------------------  PMH / PSH: as per HPI, otherwise reviewed in EMR  MEDS: as per HPI, otherwise reviewed in EMR  ALLERGIES: as per HPI, otherwise reviewed in EMR  SocH:  as per HPI, otherwise reviewed in EMR  FH:  as per HPI, otherwise reviewed in EMR  ------------------------------------------------------------------------------------------------------------------------------------------  Physical Exam:  VS: As documented in the triage note and EMR flowsheet from this visit was reviewed  General: Well appearing. No acute distress.   Eyes:  Extraocular movements grossly intact. No scleral icterus. No discharge  HEENT:  Normocephalic.  Atraumatic  Neck: Moves neck freely. No gross masses  CV: Regular rhythm. No murmurs, rubs or gallops   Resp: Clear to auscultation bilaterally. No respiratory distress.    GI: Soft, no masses,  nontender. No rebound tenderness or guarding  MSK: Symmetric muscle bulk. No deformities. No lower extremity edema.    Skin: Warm, dry, intact.   Neuro: No focal deficits.  A&O x3.   Psych: Appropriate for situation  ------------------------------------------------------------------------------------------------------------------------------------------  Hospital Course / Medical Decision Making:  Independent Interpretations: CT chest  EKG as interpreted by me: Atrial fibrillation with an approximate ventricular rate of 81 bpm with a normal axis, no bundle branch block and no signs of acute ischemia    MDM: This is a 78-year-old female with a history of active throat cancer and CAD status post PCI with stenting presenting with chest heaviness and shortness of breath.  She is not hypoxic in the ED and is in no distress.  She has no shortness of breath at rest and has no stridor or drooling.  BNP elevated at 232.  Troponin nonelevated.  She is not acidotic.  CT shows no pulmonary embolism.  There is aneurysmal dilation of the ascending and descending thoracic aorta that is similar to recent CT.  There is partially visualized pharyngeal mass with effacement of the left sinus compatible with the recent malignancy.  The patient has no stridor or drooling and is reporting chest heaviness.  Her symptoms are exertional in nature.  I do not feel that her shortness of breath is related to her malignancy; I am concerned that her symptoms are cardiac in nature and feel she should be admitted for cardiac evaluation.  She agrees to admission.  Patient was admitted to the medicine service    Discussion of Management with Other Providers:   I discussed the patient/results with: Emergency medicine team    Final diagnosis and disposition as below.    Results for orders placed or performed during the hospital encounter of 01/18/24  -CBC and Auto Differential:        Result                      Value             Ref Range           WBC                          7.7               4.4 - 11.3 x*       nRBC                        0.0               0.0 - 0.0 /1*       RBC                         4.61              4.00 - 5.20 *       Hemoglobin                  14.1              12.0 - 16.0 *       Hematocrit                  44.6              36.0 - 46.0 %       MCV                         97                80 - 100 fL         MCH                         30.6              26.0 - 34.0 *       MCHC                        31.6 (L)          32.0 - 36.0 *       RDW                         14.8 (H)          11.5 - 14.5 %       Platelets                   248               150 - 450 x1*       Neutrophils %               77.9              40.0 - 80.0 %       Immature Granulocytes *     0.3               0.0 - 0.9 %         Lymphocytes %               10.9              13.0 - 44.0 %       Monocytes %                 7.2               2.0 - 10.0 %        Eosinophils %               2.7               0.0 - 6.0 %         Basophils %                 1.0               0.0 - 2.0 %         Neutrophils Absolute        5.99 (H)          1.60 - 5.50 *       Immature Granulocytes *     0.02              0.00 - 0.50 *       Lymphocytes Absolute        0.84              0.80 - 3.00 *       Monocytes Absolute          0.55              0.05 - 0.80 *       Eosinophils Absolute        0.21              0.00 - 0.40 *       Basophils Absolute          0.08              0.00 - 0.10 *  -Magnesium:        Result                      Value             Ref Range           Magnesium                   1.90              1.60 - 2.40 *  -Comprehensive metabolic panel:        Result                      Value             Ref Range           Glucose                     120 (H)           74 - 99 mg/dL       Sodium                      136               136 - 145 mm*       Potassium                   4.2               3.5 - 5.3 mm*       Chloride                    100               98 - 107  mmo*       Bicarbonate                 26                21 - 32 mmol*       Anion Gap                   14                10 - 20 mmol*       Urea Nitrogen               24 (H)            6 - 23 mg/dL        Creatinine                  1.06 (H)          0.50 - 1.05 *       eGFR                        54 (L)            >60 mL/min/1*       Calcium                     9.8               8.6 - 10.3 m*       Albumin                     3.8               3.4 - 5.0 g/*       Alkaline Phosphatase        70                33 - 136 U/L        Total Protein               7.3               6.4 - 8.2 g/*       AST                         22                9 - 39 U/L          Bilirubin, Total            0.9               0.0 - 1.2 mg*       ALT                         14                7 - 45 U/L     -Troponin I, High Sensitivity:        Result                      Value             Ref Range           Troponin I, High Sensi*     8                 0 - 13 ng/L    -B-Type Natriuretic Peptide:        Result                      Value             Ref Range           BNP                         232 (H)           0 - 99 pg/mL   -BLOOD GAS VENOUS FULL PANEL:        Result                      Value             Ref Range           POCT pH, Venous             7.35              7.33 - 7.43 *       POCT pCO2, Venous           53 (H)            41 - 51 mm Hg       POCT pO2, Venous            35                35 - 45 mm Hg       POCT SO2, Venous            48                45 - 75 %           POCT Oxy Hemoglobin, V*     47.6              45.0 - 75.0 %       POCT Hematocrit Calcul*     44.0              36.0 - 46.0 %       POCT Sodium, Venous         134 (L)           136 - 145 mm*       POCT Potassium, Venous      4.4               3.5 - 5.3 mm*       POCT Chloride, Venous       101               98 - 107 mmo*       POCT Ionized Calicum, *     1.27              1.10 - 1.33 *       POCT Glucose, Venous        126 (H)           74 - 99  mg/dL       POCT Lactate, Venous        2.2 (H)           0.4 - 2.0 mm*       POCT Base Excess, Veno*     2.5               -2.0 - 3.0 m*       POCT HCO3 Calculated, *     29.3 (H)          22.0 - 26.0 *       POCT Hemoglobin, Venous     14.5              12.0 - 16.0 *       POCT Anion Gap, Venous      8.0 (L)           10.0 - 25.0 *       Patient Temperature         37.0              degrees Cels*       FiO2                        21                %              -Sars-CoV-2 and Influenza A/B PCR:        Result                      Value             Ref Range           Flu A Result                Not Detected      Not Detected        Flu B Result                Not Detected      Not Detected        Coronavirus 2019, PCR       Not Detected      Not Detected   CT angio chest for pulmonary embolism   Final Result    1. No evidence of pulmonary embolism to the level of the segmental    arteries. Small distal subsegmental filling defects can not be    excluded.    2. Aneurysmal dilatation of the ascending and descending thoracic    aorta similar to recent CTA chest. Please see that examination for    further discussion and recommendations.    3. Cardiomegaly with particular prominence of the left and right    atria.    4. Mild septal thickening and scattered areas of ground-glass    suggestive of mild edema.    5. 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          MACRO:    None          Signed by: jA Almanza 1/18/2024 7:32 PM    Dictation workstation:   [id number]     XR chest 1 view   Final Result    1.  No radiographic evidence of acute cardiopulmonary process.                      MACRO:    None.          Signed by: Marlin Cisneros 1/18/2024 6:11 PM    Dictation workstation:   [id number]                                 Sharmaine Coma Scale Score: 15                  Patient History   Past Medical History:    Diagnosis Date    Alcohol dependence, in remission (CMS/Formerly [facility]) 06/16/2016    History of alcoholism    Anemia     Aneurysm of ascending aorta (CMS/Formerly [facility])     Arrhythmia     a-fib. 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    Pain in leg, unspecified 01/25/2022    Leg pain    Pain in right shoulder 07/01/2020    Bilateral shoulder pain    Pain in unspecified knee 03/31/2021    Joint pain, knee    Personal history of diseases of the blood and blood-forming organs and certain disorders involving the immune mechanism 02/09/2019    History of anemia    Personal history of malignant neoplasm of breast 04/29/2014    History of malignant neoplasm of female breast    Personal history of other diseases of the circulatory system 04/03/2014    Personal history of subarachnoid hemorrhage    Personal history of other diseases of the digestive system 03/08/2021    History of upper gastrointestinal hemorrhage    Personal history of other diseases of the digestive system 03/08/2021    History of melena    Personal history of other diseases of the nervous system and sense organs 08/12/2020    History of Bell's palsy    Personal history of other infectious and parasitic diseases 06/14/2019    History of herpes zoster    Personal history of other specified conditions 03/03/2020    History of lump of left breast    Personal history of other specified conditions 02/22/2021    History of shortness of breath    Personal history of transient ischemic attack (TIA), and cerebral infarction without residual deficits  04/24/2022    History of transient ischemic attack    Stenosis of carotid artery     right    Trigger finger, unspecified finger 04/24/2017    Acquired trigger finger     Past Surgical History:   Procedure Laterality Date    CT ANGIO NECK  06/06/2019    CT NECK ANGIO W AND WO IV CONTRAST 6/6/2019 [facility] CLINICAL LEGACY    CT ANGIO NECK  01/04/2021    CT NECK ANGIO W AND WO IV CONTRAST 1/4/2021 [facility] ANCILLARY LEGACY    CT HEAD ANGIO W AND WO IV CONTRAST  06/06/2019    CT HEAD ANGIO W AND WO IV CONTRAST 6/6/2019 [facility] CLINICAL LEGACY    CT HEAD ANGIO W AND WO IV CONTRAST  01/04/2021    CT HEAD ANGIO W AND WO IV CONTRAST 1/4/2021 [facility]    MR HEAD ANGIO WO IV CONTRAST  06/06/2019    MR HEAD ANGIO WO IV CONTRAST 6/6/2019 [facility] CLINICAL LEGACY    MR NECK ANGIO WO IV CONTRAST  06/06/2019    MR NECK ANGIO WO IV CONTRAST 6/6/2019 [facility] CLINICAL LEGACY    OTHER SURGICAL HISTORY  03/03/2020    Oophorectomy    OTHER SURGICAL HISTORY  06/17/2019    Miscarriage surgical treatment    OTHER SURGICAL HISTORY  08/29/2019    multiple Catheter Ablation Atrial Flutter    OTHER SURGICAL HISTORY  04/23/2014    Previous Stent Placement    SEPTOPLASTY  09/22/2017    Septoplasty     Family History   Problem Relation Name Age of Onset    Breast cancer Mother      Other (GOODPASTURE'S DISEASE) Father      BRCA2 Positive Sister      Lung cancer Sister      Neuroblastoma Sister      BRCA2 Positive Daughter      Breast cancer Other G/P      Social History     Tobacco Use    Smoking status: Never    Smokeless tobacco: Never   Vaping Use    Vaping Use: Never used   Substance Use Topics    Alcohol use: Never    Drug use: Never       Physical Exam   ED Triage Vitals [01/18/24 1632]   Temp Heart Rate Resp BP   36.9 °C (98.4 °F) 91 15 124/90      SpO2 Temp src Heart Rate Source Patient Position   99 % -- -- --      BP Location FiO2 (%)     -- --       Physical Exam    ED Course & MDM   Diagnoses as of 01/24/24 2032   Exertional dyspnea        Medical Decision Making      Procedure  Procedures     Steve Rubio,   01/24/24 2039

## 2024-01-19 ENCOUNTER — HOSPITAL ENCOUNTER (EMERGENCY)
Facility: HOSPITAL | Age: 79
Discharge: HOME | End: 2024-01-19
Attending: EMERGENCY MEDICINE
Payer: MEDICARE

## 2024-01-19 ENCOUNTER — APPOINTMENT (OUTPATIENT)
Dept: CARDIOLOGY | Facility: HOSPITAL | Age: 79
End: 2024-01-19
Payer: MEDICARE

## 2024-01-19 ENCOUNTER — APPOINTMENT (OUTPATIENT)
Dept: RADIOLOGY | Facility: HOSPITAL | Age: 79
End: 2024-01-19
Payer: MEDICARE

## 2024-01-19 VITALS
RESPIRATION RATE: 16 BRPM | SYSTOLIC BLOOD PRESSURE: 115 MMHG | HEART RATE: 83 BPM | OXYGEN SATURATION: 96 % | DIASTOLIC BLOOD PRESSURE: 78 MMHG | TEMPERATURE: 96.8 F

## 2024-01-19 VITALS
OXYGEN SATURATION: 94 % | WEIGHT: 153.66 LBS | HEART RATE: 71 BPM | SYSTOLIC BLOOD PRESSURE: 138 MMHG | TEMPERATURE: 97.2 F | HEIGHT: 66 IN | RESPIRATION RATE: 16 BRPM | BODY MASS INDEX: 24.7 KG/M2 | DIASTOLIC BLOOD PRESSURE: 93 MMHG

## 2024-01-19 DIAGNOSIS — R06.02 SOB (SHORTNESS OF BREATH): Primary | ICD-10-CM

## 2024-01-19 LAB
ALBUMIN SERPL BCP-MCNC: 3.3 G/DL (ref 3.4–5)
ALP SERPL-CCNC: 56 U/L (ref 33–136)
ALT SERPL W P-5'-P-CCNC: 12 U/L (ref 7–45)
ANION GAP BLDV CALCULATED.4IONS-SCNC: 13 MMOL/L (ref 10–25)
ANION GAP SERPL CALC-SCNC: 11 MMOL/L (ref 10–20)
AST SERPL W P-5'-P-CCNC: 18 U/L (ref 9–39)
BASE EXCESS BLDV CALC-SCNC: 1.5 MMOL/L (ref -2–3)
BASOPHILS # BLD AUTO: 0.05 X10*3/UL (ref 0–0.1)
BASOPHILS NFR BLD AUTO: 0.7 %
BILIRUB SERPL-MCNC: 0.6 MG/DL (ref 0–1.2)
BODY TEMPERATURE: 37 DEGREES CELSIUS
BUN SERPL-MCNC: 20 MG/DL (ref 6–23)
CA-I BLDV-SCNC: 1.24 MMOL/L (ref 1.1–1.33)
CALCIUM SERPL-MCNC: 9.6 MG/DL (ref 8.6–10.3)
CARDIAC TROPONIN I PNL SERPL HS: 6 NG/L (ref 0–13)
CARDIAC TROPONIN I PNL SERPL HS: 7 NG/L (ref 0–13)
CHLORIDE BLDV-SCNC: 105 MMOL/L (ref 98–107)
CHLORIDE SERPL-SCNC: 102 MMOL/L (ref 98–107)
CO2 SERPL-SCNC: 26 MMOL/L (ref 21–32)
CREAT SERPL-MCNC: 0.81 MG/DL (ref 0.5–1.05)
EGFRCR SERPLBLD CKD-EPI 2021: 75 ML/MIN/1.73M*2
EOSINOPHIL # BLD AUTO: 0.35 X10*3/UL (ref 0–0.4)
EOSINOPHIL NFR BLD AUTO: 5 %
ERYTHROCYTE [DISTWIDTH] IN BLOOD BY AUTOMATED COUNT: 14.9 % (ref 11.5–14.5)
GLUCOSE BLDV-MCNC: 134 MG/DL (ref 74–99)
GLUCOSE SERPL-MCNC: 99 MG/DL (ref 74–99)
HCO3 BLDV-SCNC: 26.6 MMOL/L (ref 22–26)
HCT VFR BLD AUTO: 39.9 % (ref 36–46)
HCT VFR BLD EST: 37 % (ref 36–46)
HGB BLD-MCNC: 12.6 G/DL (ref 12–16)
HGB BLDV-MCNC: 12.4 G/DL (ref 12–16)
HOLD SPECIMEN: NORMAL
IMM GRANULOCYTES # BLD AUTO: 0.03 X10*3/UL (ref 0–0.5)
IMM GRANULOCYTES NFR BLD AUTO: 0.4 % (ref 0–0.9)
LACTATE BLDV-SCNC: 1.7 MMOL/L (ref 0.4–2)
LYMPHOCYTES # BLD AUTO: 0.87 X10*3/UL (ref 0.8–3)
LYMPHOCYTES NFR BLD AUTO: 12.3 %
MAGNESIUM SERPL-MCNC: 1.8 MG/DL (ref 1.6–2.4)
MCH RBC QN AUTO: 29.4 PG (ref 26–34)
MCHC RBC AUTO-ENTMCNC: 31.6 G/DL (ref 32–36)
MCV RBC AUTO: 93 FL (ref 80–100)
MONOCYTES # BLD AUTO: 0.72 X10*3/UL (ref 0.05–0.8)
MONOCYTES NFR BLD AUTO: 10.2 %
NEUTROPHILS # BLD AUTO: 5.05 X10*3/UL (ref 1.6–5.5)
NEUTROPHILS NFR BLD AUTO: 71.4 %
NRBC BLD-RTO: 0 /100 WBCS (ref 0–0)
OXYHGB MFR BLDV: 45.9 % (ref 45–75)
PCO2 BLDV: 43 MM HG (ref 41–51)
PH BLDV: 7.4 PH (ref 7.33–7.43)
PLATELET # BLD AUTO: 212 X10*3/UL (ref 150–450)
PO2 BLDV: 33 MM HG (ref 35–45)
POTASSIUM BLDV-SCNC: 4.9 MMOL/L (ref 3.5–5.3)
POTASSIUM SERPL-SCNC: 4 MMOL/L (ref 3.5–5.3)
PROT SERPL-MCNC: 6.8 G/DL (ref 6.4–8.2)
Q ONSET: 221 MS
QRS COUNT: 13 BEATS
QRS DURATION: 90 MS
QT INTERVAL: 392 MS
QTC CALCULATION(BAZETT): 455 MS
QTC FREDERICIA: 433 MS
R AXIS: 21 DEGREES
RBC # BLD AUTO: 4.28 X10*6/UL (ref 4–5.2)
SAO2 % BLDV: 47 % (ref 45–75)
SODIUM BLDV-SCNC: 140 MMOL/L (ref 136–145)
SODIUM SERPL-SCNC: 135 MMOL/L (ref 136–145)
T AXIS: 82 DEGREES
T OFFSET: 417 MS
VENTRICULAR RATE: 81 BPM
WBC # BLD AUTO: 7.1 X10*3/UL (ref 4.4–11.3)

## 2024-01-19 PROCEDURE — 84132 ASSAY OF SERUM POTASSIUM: CPT

## 2024-01-19 PROCEDURE — 85025 COMPLETE CBC W/AUTO DIFF WBC: CPT | Performed by: PHYSICIAN ASSISTANT

## 2024-01-19 PROCEDURE — 80053 COMPREHEN METABOLIC PANEL: CPT | Performed by: PHYSICIAN ASSISTANT

## 2024-01-19 PROCEDURE — 2500000001 HC RX 250 WO HCPCS SELF ADMINISTERED DRUGS (ALT 637 FOR MEDICARE OP): Performed by: PHYSICIAN ASSISTANT

## 2024-01-19 PROCEDURE — G0378 HOSPITAL OBSERVATION PER HR: HCPCS

## 2024-01-19 PROCEDURE — 99222 1ST HOSP IP/OBS MODERATE 55: CPT | Performed by: PHYSICIAN ASSISTANT

## 2024-01-19 PROCEDURE — 31575 DIAGNOSTIC LARYNGOSCOPY: CPT | Performed by: STUDENT IN AN ORGANIZED HEALTH CARE EDUCATION/TRAINING PROGRAM

## 2024-01-19 PROCEDURE — 93005 ELECTROCARDIOGRAM TRACING: CPT

## 2024-01-19 PROCEDURE — 36415 COLL VENOUS BLD VENIPUNCTURE: CPT | Performed by: PHYSICIAN ASSISTANT

## 2024-01-19 PROCEDURE — 99284 EMERGENCY DEPT VISIT MOD MDM: CPT

## 2024-01-19 PROCEDURE — 99212 OFFICE O/P EST SF 10 MIN: CPT | Performed by: STUDENT IN AN ORGANIZED HEALTH CARE EDUCATION/TRAINING PROGRAM

## 2024-01-19 PROCEDURE — 99285 EMERGENCY DEPT VISIT HI MDM: CPT | Performed by: EMERGENCY MEDICINE

## 2024-01-19 PROCEDURE — 99283 EMERGENCY DEPT VISIT LOW MDM: CPT | Performed by: EMERGENCY MEDICINE

## 2024-01-19 PROCEDURE — 83735 ASSAY OF MAGNESIUM: CPT | Performed by: PHYSICIAN ASSISTANT

## 2024-01-19 PROCEDURE — 84484 ASSAY OF TROPONIN QUANT: CPT | Mod: 91 | Performed by: PHYSICIAN ASSISTANT

## 2024-01-19 PROCEDURE — 84484 ASSAY OF TROPONIN QUANT: CPT | Performed by: PHYSICIAN ASSISTANT

## 2024-01-19 PROCEDURE — 2500000004 HC RX 250 GENERAL PHARMACY W/ HCPCS (ALT 636 FOR OP/ED): Performed by: PHYSICIAN ASSISTANT

## 2024-01-19 RX ORDER — MULTIVIT-MIN/IRON FUM/FOLIC AC 7.5 MG-4
1 TABLET ORAL DAILY
COMMUNITY

## 2024-01-19 RX ORDER — ASCORBIC ACID 500 MG
500 TABLET ORAL DAILY
COMMUNITY

## 2024-01-19 RX ORDER — ENOXAPARIN SODIUM 100 MG/ML
40 INJECTION SUBCUTANEOUS DAILY
Status: DISCONTINUED | OUTPATIENT
Start: 2024-01-19 | End: 2024-01-19 | Stop reason: HOSPADM

## 2024-01-19 RX ORDER — BUPROPION HYDROCHLORIDE 150 MG/1
150 TABLET, EXTENDED RELEASE ORAL 2 TIMES DAILY
Status: DISCONTINUED | OUTPATIENT
Start: 2024-01-19 | End: 2024-01-19 | Stop reason: HOSPADM

## 2024-01-19 RX ORDER — LORATADINE 10 MG/1
10 TABLET ORAL DAILY
Status: DISCONTINUED | OUTPATIENT
Start: 2024-01-19 | End: 2024-01-19 | Stop reason: HOSPADM

## 2024-01-19 RX ORDER — EPINEPHRINE 0.22MG
100 AEROSOL WITH ADAPTER (ML) INHALATION 2 TIMES DAILY
COMMUNITY

## 2024-01-19 RX ADMIN — DOCUSATE SODIUM 100 MG: 100 CAPSULE, LIQUID FILLED ORAL at 08:57

## 2024-01-19 RX ADMIN — CLOPIDOGREL 75 MG: 75 TABLET ORAL at 08:57

## 2024-01-19 RX ADMIN — Medication 400 MG: at 08:57

## 2024-01-19 RX ADMIN — PANTOPRAZOLE SODIUM 40 MG: 40 TABLET, DELAYED RELEASE ORAL at 08:57

## 2024-01-19 RX ADMIN — DAPAGLIFLOZIN 10 MG: 10 TABLET, FILM COATED ORAL at 08:57

## 2024-01-19 RX ADMIN — DULOXETINE HYDROCHLORIDE 60 MG: 30 CAPSULE, DELAYED RELEASE ORAL at 00:19

## 2024-01-19 RX ADMIN — BUPROPION HYDROCHLORIDE 150 MG: 150 TABLET, EXTENDED RELEASE ORAL at 00:19

## 2024-01-19 RX ADMIN — SPIRONOLACTONE 25 MG: 25 TABLET ORAL at 08:57

## 2024-01-19 RX ADMIN — METOPROLOL TARTRATE 50 MG: 50 TABLET, FILM COATED ORAL at 08:57

## 2024-01-19 RX ADMIN — ENOXAPARIN SODIUM 40 MG: 40 INJECTION SUBCUTANEOUS at 08:57

## 2024-01-19 RX ADMIN — LORATADINE 10 MG: 10 TABLET ORAL at 10:15

## 2024-01-19 RX ADMIN — ATORVASTATIN CALCIUM 20 MG: 20 TABLET, FILM COATED ORAL at 08:57

## 2024-01-19 ASSESSMENT — COGNITIVE AND FUNCTIONAL STATUS - GENERAL
DAILY ACTIVITIY SCORE: 24
MOBILITY SCORE: 24

## 2024-01-19 ASSESSMENT — PAIN SCALES - GENERAL: PAINLEVEL_OUTOF10: 0 - NO PAIN

## 2024-01-19 NOTE — PROGRESS NOTES
Pharmacy Medication History Review    Amita Todd is a 78 y.o. female admitted for Exertional dyspnea. Pharmacy reviewed the patient's [id number] medications and allergies for accuracy.    Below are additional concerns with the patient's PTA list.  Collected from patient    The list below reflectives the updated PTA list. Please review each medication in order reconciliation for additional clarification and justification.  Medications Prior to Admission   Medication Sig Dispense Refill Last Dose    acetaminophen (Tylenol) 325 mg tablet Take 2 tablets (650 mg) by mouth every 6 hours if needed.   1/18/2024    albuterol 90 mcg/actuation aerosol powdr breath activated inhaler Inhale 2 puffs every 6 hours if needed for wheezing or shortness of breath.       ascorbic acid (Vitamin C) 500 mg tablet Take 1 tablet (500 mg) by mouth once daily.       atorvastatin (Lipitor) 20 mg tablet TAKE 1 TABLET BY MOUTH DAILY AS  DIRECTED 90 tablet 3 1/17/2024    buPROPion SR (Wellbutrin SR) 150 mg 12 hr tablet Take 1 tablet (150 mg) by mouth 2 times a day. TAKE PER DIRECTED   1/17/2024    cholecalciferol (Vitamin D-3) 25 MCG (1000 UT) tablet Take 1 tablet (25 mcg) by mouth 2 times a day.   1/18/2024    clopidogrel (Plavix) 75 mg tablet TAKE 1 TABLET BY MOUTH DAILY 90 tablet 3 1/18/2024    coenzyme Q-10 (CoQ-10) 100 mg capsule Take 1 capsule (100 mg) by mouth 2 times a day.       dapagliflozin propanediol (Farxiga) 10 mg Take 1 tablet (10 mg) by mouth once daily. 90 tablet 1 1/18/2024    DULoxetine (Cymbalta) 60 mg DR capsule TAKE 1 CAPSULE BY MOUTH  DAILY (Patient taking differently: Take 1 capsule (60 mg) by mouth once daily at bedtime.) 90 capsule 3 1/17/2024    furosemide (Lasix) 20 mg tablet TAKE 1 TABLET BY MOUTH  DAILY (Patient taking differently: Take 1 tablet (20 mg) by mouth once daily as needed (swelling or 3lb weight gain).) 90 tablet 3     herbal complex no.239 (Whole Body Joint Support) capsule Take 1  capsule by mouth once daily.   More than a month    losartan (Cozaar) 50 mg tablet Take 1 tablet (50 mg) by mouth once daily.   1/17/2024    magnesium oxide (Mag-Ox) 400 mg (241.3 mg magnesium) tablet Take 1 tablet (400 mg) by mouth once daily.   More than a month    metoprolol tartrate (Lopressor) 25 mg tablet Take 2 tablets (50 mg) by mouth 2 times a day.   1/18/2024    multivitamin with minerals tablet Take 1 tablet by mouth once daily.               pantoprazole (ProtoNix) 40 mg EC tablet Take 1 tablet (40 mg) by mouth 2 times a day. 180 tablet 3 1/18/2024            spironolactone (Aldactone) 25 mg tablet Take 1 tablet (25 mg) by mouth once daily. PER DIRECTED   1/18/2024              Angelina Croft, PharmD

## 2024-01-19 NOTE — CONSULTS
Nutrition Assessment Note  Nutrition Assessment      Reason for Assessment  Reason for Assessment: Admission nursing screening    Chart reviewed and pt visited.    Amita is a 78 y.o. female admitted for exertional dyspnea.  PMH includes: breast cancer, heart attack, cholecystectomy, a-fib, (CHF per family)    Visited pt- family was present.   Pt states that she is having surgery/chemo next week for a mass in her throat.  Per chart review- pt has been anxious. Pt states that she's been trying to gain wt by drinking boosts and snacking- pt checks her wts daily. Pt states she not a big eater. Pt states that it's uncomfortable to swallow d/t the cancer in her throat but does fine with soft foods- would like her diet changed to soft foods. Family states that pt can't smell or taste well- part of reason why po intake has decreased.     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    Dietary Orders (From admission, onward)       Start     Ordered    01/19/24 1145  Oral nutritional supplements  Until discontinued        Question Answer Comment   Deliver with Lunch    Deliver with Dinner    Select supplement: Ensure Clear        01/19/24 1144    01/18/24 2312  May Participate in Room Service  Once        Question:  .  Answer:  Yes    01/18/24 2312    01/18/24 2245  Adult diet Cardiac; 70 gm fat; 2 - 3 grams Sodium  Diet effective now        Question Answer Comment   Diet type Cardiac    Fat restriction: 70 gm fat    Sodium restriction: 2 - 3 grams Sodium        01/18/24 2244                  History:  Scheduled medications  atorvastatin, 20 mg, oral, Daily  buPROPion SR, 150 mg, oral, BID  clopidogrel, 75 mg, oral, Daily  dapagliflozin propanediol, 10 mg, oral, Daily  docusate sodium, 100 mg, oral, BID  DULoxetine, 60 mg, oral, Daily  enoxaparin, 40 mg, subcutaneous, Daily  loratadine, 10 mg, oral, Daily  magnesium oxide, 400 mg, oral, Daily  melatonin, 3 mg,  "oral, Daily  metoprolol tartrate, 50 mg, oral, BID  ondansetron ODT, 4 mg, oral, Once  pantoprazole, 40 mg, oral, BID  spironolactone, 25 mg, oral, q24h MINDA      Continuous medications     PRN medications  PRN medications: nitroglycerin, prochlorperazine     Latest Reference Range & Units 01/18/24 16:48 01/19/24 04:58   GLUCOSE 74 - 99 mg/dL 120 (H) 99   SODIUM 136 - 145 mmol/L 136 135 (L)   POTASSIUM 3.5 - 5.3 mmol/L 4.2 4.0   CHLORIDE 98 - 107 mmol/L 100 102   Bicarbonate 21 - 32 mmol/L 26 26   Anion Gap 10 - 20 mmol/L 14 11   Blood Urea Nitrogen 6 - 23 mg/dL 24 (H) 20   Creatinine 0.50 - 1.05 mg/dL 1.06 (H) 0.81   EGFR >60 mL/min/1.73m*2 54 (L) 75   Calcium 8.6 - 10.3 mg/dL 9.8 9.6   Albumin 3.4 - 5.0 g/dL 3.8 3.3 (L)   (H): Data is abnormally high  (L): Data is abnormally low  Rpt: View report in Results Review for more information  Food and Nutrient History  Energy Intake: Poor < 50 %  Food and Nutrient History: Pt states that she does not eat big meals- mostly grazes, small bites  Vitamin/Herbal Supplement Use: Boost at home     Anthropometrics:  Height: 167.6 cm (5' 5.98\")  Weight: 69.7 kg (153 lb 10.6 oz)  BMI (Calculated): 24.81    Weight Change: 0.43    Wt Readings from Last 10 Encounters:   01/19/24 69.7 kg (153 lb 10.6 oz)   01/12/24 70.6 kg (155 lb 10.3 oz)   01/11/24 70.3 kg (155 lb)   01/10/24 69.6 kg (153 lb 7 oz)   01/09/24 71 kg (156 lb 8.4 oz)   01/03/24 70.8 kg (156 lb)   12/15/23 71.8 kg (158 lb 3.2 oz)   12/13/23 71.5 kg (157 lb 9 oz)   12/06/23 72.1 kg (159 lb)   11/22/23 70.3 kg (155 lb)   Got a new wt- 69.7kg today    Weight Change  Significant Weight Loss: Yes  Interpretation of Weight Loss: 1-2% in 1 week     IBW/kg (Dietitian Calculated): 59.1 kg  Percent of IBW: 117 %     Energy Needs:  Calculated Energy Needs Using Equations  Height: 167.6 cm (5' 5.98\")  Temp: 36.2 °C (97.2 °F)    Estimated Energy Needs  Total Energy Estimated Needs (kCal): 2100 kCal  Total Estimated Energy Need per Day " (kCal/kg): 2400 kCal/kg  Method for Estimating Needs: 30-35kcal/kg    Estimated Protein Needs  Total Protein Estimated Needs (g): 104 g  Total Protein Estimated Needs (g/kg): 118 g/kg  Method for Estimating Needs: 1.5-1.7g/kg    Estimated Fluid Needs  Method for Estimating Needs: 1ml/kcal or MD recommendations     Nutrition Focused Physical Findings:  Subcutaneous Fat Loss  Orbital Fat Pads: Mild-Moderate (slight dark circles and slight hollowing)  Buccal Fat Pads: Mild-Moderate (flat cheeks, minimal bounce)  Triceps: Well nourished (ample fat tissue)    Muscle Wasting  Temporalis: Mild-Moderate (slight depression)  Pectoralis (Clavicular Region): Well nourished (clavicle not visible)  Interosseous: Well nourished (muscle bulges)    Edema  Edema: none     Physical Findings (Nutrition Deficiency/Toxicity)  Skin: Negative       Nutrition Diagnosis   Malnutrition Diagnosis  Patient has Malnutrition Diagnosis: Yes  Diagnosis Status: New  Malnutrition Diagnosis: Moderate malnutrition related to acute disease or injury  As Evidenced by: 1-2% wt loss in 1 week, mild subcutaneous fats losses, mild muscle wasting, and less than 75% of estimated energy needs consumed for greater than 7 days.       Nutrition Interventions/Recommendations      Food and/or Nutrient Delivery Interventions  Goal: As clinically indicated      Medical Food Supplement: Commercial beverage  Goal: Ensure clear BID    If appetite does not improve, pt may benefit from appetite stimulant.    Pt asking that diet changed to easy to chew.  - no current issues with soft foods but if that changes in the future, pt may benefit from SLP consult.    Pt has esophageal cancer- chemo and surgery scheduled for next week. Pt may have difficulties eating po, if that is the case,  may need to consider alternate route for nutrition.    Nutrition Monitoring and Evaluation   Food and Nutrient Related History  Energy Intake: Estimated energy intake  Criteria: Greater than  50% of estimated energy needs to be consumed.     Amount of Food: Medical food intake  Criteria: greater than 50% of nutritional supplement to be consumed.     Anthropometrics: Body Composition/Growth/Weight History  Weight: Measured weight, Weight change  Criteria: As clinically indicated    Weight Change: Weight change percentage, Weight loss, Weight gain  Criteria: As clinically indicated    Body Mass: Body mass index (BMI)  Criteria: As clinically indicated     Biochemical Data, Medical Tests and Procedures  Electrolyte and Renal Panel: Calcium, ionized, BUN, Calcium, serum, Chloride, Creatinine, Magnesium, Phosphorus, Potassium, Sodium  Criteria: As clinically indicated     Glucose/Endocrine Profile: Glucose, casual  Criteria: As clinically indicated    Nutritional Anemia Profile: Ferritin, serum, Folate, serum, Iron, serum, Total iron binding capacity  Criteria: As clinically indicated    Vitamin Profile: Vitamin C, plasma or serum, Vitamin D, 25 hydroxy  Criteria: As clinically indicated    Nutrition Focused Physical Findings  Adipose: Loss of subcutaneous fat  Criteria: As clinically indicated    Muscles: Muscle atrophy  Criteria: As clinically indicated     Follow Up  Time Spent (min): 70 minutes  Last Date of Nutrition Visit: 01/19/24  Nutrition Follow-Up Needed?: Dietitian to reassess per policy  Follow up Comment: Sahara SANDERS

## 2024-01-19 NOTE — CONSULTS
ENT DEPARTMENT CONSULTATION NOTE  Name: Amita Todd  MRN: [id number]  : 1945  Consulting Team: ED  Reason for Consult: SOB with diagnosis of Squamous Cell Carcinoma of the Hypopharynx (Biopsy proven on )     History of Present Illness  The patient is a 78 y.o. female who presented to [facility] on 2024 for shortness of breath.  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  Her recent appointment with Dr. Tobin on January on 24. Recommendations at that time were chemotherapy and radiation. On most recent flexible  Nasopharyngolaryngoscopy, both of the patient's vocal cords were mobile.     ENT was consulted for airway evaluation.         Review of Systems  14 point review of systems completed and all negative except as noted in HPI.    Past Medical History  Past Medical History:   Diagnosis Date    Alcohol dependence, in remission (CMS/[facility]) 2016    History of alcoholism    Anemia     Aneurysm of ascending aorta (CMS/[facility])     Arrhythmia     a-fib. 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    Pain in leg, unspecified 2022    Leg pain    Pain in right shoulder 2020    Bilateral shoulder pain    Pain in unspecified knee 2021    Joint pain, knee    Personal history of diseases of the blood and blood-forming organs and certain disorders involving the immune mechanism 2019    History of anemia    Personal history of malignant neoplasm of breast 2014     History of malignant neoplasm of female breast    Personal history of other diseases of the circulatory system 04/03/2014    Personal history of subarachnoid hemorrhage    Personal history of other diseases of the digestive system 03/08/2021    History of upper gastrointestinal hemorrhage    Personal history of other diseases of the digestive system 03/08/2021    History of melena    Personal history of other diseases of the nervous system and sense organs 08/12/2020    History of Bell's palsy    Personal history of other infectious and parasitic diseases 06/14/2019    History of herpes zoster    Personal history of other specified conditions 03/03/2020    History of lump of left breast    Personal history of other specified conditions 02/22/2021    History of shortness of breath    Personal history of transient ischemic attack (TIA), and cerebral infarction without residual deficits 04/24/2022    History of transient ischemic attack    Stenosis of carotid artery     right    Trigger finger, unspecified finger 04/24/2017    Acquired trigger finger       Past Surgical History  Past Surgical History:   Procedure Laterality Date    CT ANGIO NECK  06/06/2019    CT NECK ANGIO W AND WO IV CONTRAST 6/6/2019 [facility] CLINICAL LEGACY    CT ANGIO NECK  01/04/2021    CT NECK ANGIO W AND WO IV CONTRAST 1/4/2021 [facility]    CT HEAD ANGIO W AND WO IV CONTRAST  06/06/2019    CT HEAD ANGIO W AND WO IV CONTRAST 6/6/2019 [facility] CLINICAL LEGACY    CT HEAD ANGIO W AND WO IV CONTRAST  01/04/2021    CT HEAD ANGIO W AND WO IV CONTRAST 1/4/2021 [facility] ANCILLARY LEGACY    MR HEAD ANGIO WO IV CONTRAST  06/06/2019    MR HEAD ANGIO WO IV CONTRAST 6/6/2019 [facility] CLINICAL LEGACY    MR NECK ANGIO WO IV CONTRAST  06/06/2019    MR NECK ANGIO WO IV CONTRAST 6/6/2019 [facility] CLINICAL LEGACY    OTHER SURGICAL HISTORY  03/03/2020    Oophorectomy    OTHER SURGICAL HISTORY  06/17/2019    Miscarriage surgical treatment    OTHER SURGICAL HISTORY  08/29/2019     multiple Catheter Ablation Atrial Flutter    OTHER SURGICAL HISTORY  04/23/2014    Previous Stent Placement    SEPTOPLASTY  09/22/2017    Septoplasty       Allergies  Allergies   Allergen Reactions    Oxycodone Hcl-Oxycodone-Asa Unknown    Oxycodone-Acetaminophen Unknown    Penicillin G Other     Nausea    Penicillins GI Upset and Other       Medications  No current facility-administered medications for this encounter.    Current Outpatient Medications:     acetaminophen (Tylenol) 325 mg tablet, Take 2 tablets (650 mg) by mouth every 6 hours if needed., Disp: , Rfl:     albuterol 90 mcg/actuation aerosol powdr breath activated inhaler, Inhale 2 puffs every 6 hours if needed for wheezing or shortness of breath., Disp: , Rfl:     ascorbic acid (Vitamin C) 500 mg tablet, Take 1 tablet (500 mg) by mouth once daily., Disp: , Rfl:     atorvastatin (Lipitor) 20 mg tablet, TAKE 1 TABLET BY MOUTH DAILY AS  DIRECTED, Disp: 90 tablet, Rfl: 3    buPROPion SR (Wellbutrin SR) 150 mg 12 hr tablet, Take 1 tablet (150 mg) by mouth 2 times a day. TAKE PER DIRECTED, Disp: , Rfl:     cholecalciferol (Vitamin D-3) 25 MCG (1000 UT) tablet, Take 1 tablet (25 mcg) by mouth 2 times a day., Disp: , Rfl:     clopidogrel (Plavix) 75 mg tablet, TAKE 1 TABLET BY MOUTH DAILY, Disp: 90 tablet, Rfl: 3    coenzyme Q-10 (CoQ-10) 100 mg capsule, Take 1 capsule (100 mg) by mouth 2 times a day., Disp: , Rfl:     dapagliflozin propanediol (Farxiga) 10 mg, Take 1 tablet (10 mg) by mouth once daily., Disp: 90 tablet, Rfl: 1    DULoxetine (Cymbalta) 60 mg DR capsule, TAKE 1 CAPSULE BY MOUTH  DAILY (Patient taking differently: Take 1 capsule (60 mg) by mouth once daily at bedtime.), Disp: 90 capsule, Rfl: 3    furosemide (Lasix) 20 mg tablet, TAKE 1 TABLET BY MOUTH  DAILY (Patient taking differently: Take 1 tablet (20 mg) by mouth once daily as needed (swelling or 3lb weight gain).), Disp: 90 tablet, Rfl: 3    herbal complex no.239 (Whole Body Joint  Support) capsule, Take 1 capsule by mouth once daily., Disp: , Rfl:     losartan (Cozaar) 50 mg tablet, Take 1 tablet (50 mg) by mouth once daily., Disp: , Rfl:     magnesium oxide (Mag-Ox) 400 mg (241.3 mg magnesium) tablet, Take 1 tablet (400 mg) by mouth once daily., Disp: , Rfl:     metoprolol tartrate (Lopressor) 25 mg tablet, Take 2 tablets (50 mg) by mouth 2 times a day., Disp: , Rfl:     multivitamin with minerals tablet, Take 1 tablet by mouth once daily., Disp: , Rfl:     ondansetron (Zofran) 8 mg tablet, Take 1 tablet (8 mg) by mouth every 8 hours if needed for nausea or vomiting. (Patient not taking: Reported on 1/18/2024), Disp: 30 tablet, Rfl: 5    pantoprazole (ProtoNix) 40 mg EC tablet, Take 1 tablet (40 mg) by mouth 2 times a day., Disp: 180 tablet, Rfl: 3    prochlorperazine (Compazine) 10 mg tablet, Take 1 tablet (10 mg) by mouth every 6 hours if needed for nausea or vomiting. (Patient not taking: Reported on 1/18/2024), Disp: 30 tablet, Rfl: 5    spironolactone (Aldactone) 25 mg tablet, Take 1 tablet (25 mg) by mouth once daily. PER DIRECTED, Disp: , Rfl:     Facility-Administered Medications Ordered in Other Encounters:     [MAR Hold - Suspended Admission] atorvastatin (Lipitor) tablet 20 mg, 20 mg, oral, Daily, Alli Leal PA-C, 20 mg at 01/19/24 0857    [MAR Hold - Suspended Admission] buPROPion SR (Wellbutrin SR) 12 hr tablet 150 mg, 150 mg, oral, BID, Alli Leal PA-C    [MAR Hold - Suspended Admission] clopidogrel (Plavix) tablet 75 mg, 75 mg, oral, Daily, Alli Leal PA-C, 75 mg at 01/19/24 0857    [MAR Hold - Suspended Admission] dapagliflozin propanediol (Farxiga) tablet 10 mg, 10 mg, oral, Daily, Alli Leal PA-C, 10 mg at 01/19/24 0857    [MAR Hold - Suspended Admission] docusate sodium (Colace) capsule 100 mg, 100 mg, oral, BID, Alli Leal PA-C, 100 mg at 01/19/24 0857    [MAR Hold - Suspended Admission] DULoxetine (Cymbalta) DR capsule 60 mg,  60 mg, oral, Daily, Alli Leal PA-C, 60 mg at 01/19/24 0019    [MAR Hold - Suspended Admission] enoxaparin (Lovenox) syringe 40 mg, 40 mg, subcutaneous, Daily, Alli Leal PA-C, 40 mg at 01/19/24 0857    [MAR Hold - Suspended Admission] loratadine (Claritin) tablet 10 mg, 10 mg, oral, Daily, Alli Leal PA-C, 10 mg at 01/19/24 1015    [MAR Hold - Suspended Admission] magnesium oxide (Mag-Ox) tablet 400 mg, 400 mg, oral, Daily, Alli Leal PA-C, 400 mg at 01/19/24 0857    [MAR Hold - Suspended Admission] melatonin tablet 3 mg, 3 mg, oral, Daily, Alli Leal PA-C    [MAR Hold - Suspended Admission] metoprolol tartrate (Lopressor) tablet 50 mg, 50 mg, oral, BID, Alli Leal PA-C, 50 mg at 01/19/24 0857    [MAR Hold - Suspended Admission] nitroglycerin (Nitrostat) SL tablet 0.4 mg, 0.4 mg, sublingual, q5 min PRN, Alli Leal PA-C    [MAR Hold - Suspended Admission] ondansetron ODT (Zofran-ODT) disintegrating tablet 4 mg, 4 mg, oral, Once, Alli Leal PA-C    [MAR Hold - Suspended Admission] pantoprazole (ProtoNix) EC tablet 40 mg, 40 mg, oral, BID, Alli Leal PA-C, 40 mg at 01/19/24 0857    [MAR Hold - Suspended Admission] prochlorperazine (Compazine) tablet 10 mg, 10 mg, oral, q6h PRN, Alli Leal PA-C    [MAR Hold - Suspended Admission] spironolactone (Aldactone) tablet 25 mg, 25 mg, oral, q24h MINDA, Alli Leal PA-C, 25 mg at 01/19/24 0857    Family History  Family History   Problem Relation Name Age of Onset    Breast cancer Mother      Other (GOODPASTURE'S DISEASE) Father      BRCA2 Positive Sister      Lung cancer Sister      Neuroblastoma Sister      BRCA2 Positive Daughter      Breast cancer Other G/P        Social History  Social History     Socioeconomic History    Marital status:      Spouse name: Not on file    Number of children: Not on file    Years of education: Not on file    Highest education level: Not on  file   Occupational History    Not on file   Tobacco Use    Smoking status: Never    Smokeless tobacco: Never   Vaping Use    Vaping Use: Never used   Substance and Sexual Activity    Alcohol use: Never    Drug use: Never    Sexual activity: Not on file   Other Topics Concern    Not on file   Social History Narrative    Not on file     Social Determinants of Health     Financial Resource Strain: Low Risk  (1/18/2024)    Overall Financial Resource Strain (CARDIA)     Difficulty of Paying Living Expenses: Not hard at all   Food Insecurity: Not on file   Transportation Needs: No Transportation Needs (1/18/2024)    PRAPARE - Transportation     Lack of Transportation (Medical): No     Lack of Transportation (Non-Medical): No   Physical Activity: Not on file   Stress: Not on file   Social Connections: Not on file   Intimate Partner Violence: Not on file   Housing Stability: Low Risk  (1/18/2024)    Housing Stability Vital Sign     Unable to Pay for Housing in the Last Year: No     Number of Places Lived in the Last Year: 1     Unstable Housing in the Last Year: No       Vital Signs  Vitals:    01/19/24 1320   BP:    Pulse:    Resp:    Temp:    SpO2: 94%       Physical Examination  GEN: The patient appears stated age in no acute distress  VOICE: soft spoke, breathy   RESP: Unlabored on room air with no audible stertor or stridor  CV: Clinically well perfused   EYES: EOM grossly intact with no scleral icterus  NEURO: Alert and oriented with no focal deficits and CN II-XII symmetric and intact bilaterally  HEAD: Scalp is normocephalic and atraumatic  FACE: No abrasions or lacerations, no maxillary or mandibular stepoffs  SAL: No parotid or submandibular masses or tenderness to palpation and clear saliva expressed from ducts  EARS: Normal external ears, normal hearing to spoken voice  NOSE: External nose appears normal, no significant septal deviation, anterior rhinoscopy limited with no active bleeding or lesions  OC: Normal  lips, normal buccal mucosa, normal alveolar ridge, normal floor of mouth, normal tongue, normal hard palate, normal retromolar trigone  OP: normal pharyngeal walls, normal soft palate  NECK: Trachea midline, no significant lymphadenopathy    Laboratory and Data  Results for orders placed or performed during the hospital encounter of 01/19/24 (from the past 24 hour(s))   Blood Gas Venous Full Panel Unsolicited   Result Value Ref Range    POCT pH, Venous 7.40 7.33 - 7.43 pH    POCT pCO2, Venous 43 41 - 51 mm Hg    POCT pO2, Venous 33 (L) 35 - 45 mm Hg    POCT SO2, Venous 47 45 - 75 %    POCT Oxy Hemoglobin, Venous 45.9 45.0 - 75.0 %    POCT Hematocrit Calculated, Venous 37.0 36.0 - 46.0 %    POCT Sodium, Venous 140 136 - 145 mmol/L    POCT Potassium, Venous 4.9 3.5 - 5.3 mmol/L    POCT Chloride, Venous 105 98 - 107 mmol/L    POCT Ionized Calicum, Venous 1.24 1.10 - 1.33 mmol/L    POCT Glucose, Venous 134 (H) 74 - 99 mg/dL    POCT Lactate, Venous 1.7 0.4 - 2.0 mmol/L    POCT Base Excess, Venous 1.5 -2.0 - 3.0 mmol/L    POCT HCO3 Calculated, Venous 26.6 (H) 22.0 - 26.0 mmol/L    POCT Hemoglobin, Venous 12.4 12.0 - 16.0 g/dL    POCT Anion Gap, Venous 13.0 10.0 - 25.0 mmol/L    Patient Temperature 37.0 degrees Celsius       PROCEDURE NOTE:  Nasopharyngolaryngoscopy    For better visualization because of known squamous cell carcinoma of the hypopharynx, a flexible fiberoptic nasopharyngolaryngoscopy was performed. Patient was correctly identified and verbal consent obtained.  After topical anesthesia with atomized 4% Lidocaine with Afrin, the flexible scope was introduced into the right naris.    The following areas were visualized:  Nasal septum, turbinates, nasopharynx, oropharynx, hypopharynx, soft palate, base of tongue, epiglottis, and larynx.    These structures were found to be normal with the following notable findings:     - Lesion of of left pyriform sinus  - Both vocal cords are mobile; there is concern the left  cord is hypomobile      Assessment  Amita Todd is a 78 y.o. female who presented to the ED secondary to shortness of breath. She has biopsy proven squamous cell carcinoma of the hypopharynx. She is a patient of Dr. Tobin. Treatment plan is chemo/rads. She starts chemo 1/24/24.    Nasopharyngolaryngoscopy confirmed her known tumor. Her oxygen saturation was stable on room air.    Extensive discussion was had with the patient and her daughter about being sent home versus tracheostomy.  They were educated that the tumor has a chance of enlarging during the treatment process before decreasing in size.  But if the patient is able/ feels safe to eat, drink and maintain her oxygen, she does not need a trach at this time.  The patient states she is able to eat, drink and feels safe protecting her airway.    She is completing her treatment course at the [facility].  She and her family were informed that if she were to start experiencing airway symptoms she should immediately call the ENT department and/or present to the emergency department immediately for evaluation.    Recommendations  - No acute ENT intervention  - Continue with current treatment course  - Maintain already scheduled follow-up appointment with Dr. Wu 2    Jude Titus, DO PGY-2    This patient was evaluated and scoped bedside with Dr. Randhawa. She agrees with the plan.

## 2024-01-19 NOTE — Clinical Note
Amita Todd was seen and treated in our emergency department on 1/19/2024.  She may return to work on 01/22/2024.       If you have any questions or concerns, please don't hesitate to call.      Memo Rosenthal MD

## 2024-01-19 NOTE — DISCHARGE SUMMARY
Discharge Diagnosis  Exertional dyspnea    Issues Requiring Follow-Up  SOB/CP  Hypopharynx squamous cell carcinoma  HFrEF  CAD s/p PCI    Discharge Meds     Your medication list        ASK your doctor about these medications        Instructions Last Dose Given Next Dose Due   acetaminophen 325 mg tablet  Commonly known as: Tylenol           albuterol 90 mcg/actuation aerosol powdr breath activated inhaler           ascorbic acid 500 mg tablet  Commonly known as: Vitamin C           atorvastatin 20 mg tablet  Commonly known as: Lipitor      TAKE 1 TABLET BY MOUTH DAILY AS  DIRECTED       cholecalciferol 25 MCG (1000 UT) tablet  Commonly known as: Vitamin D-3           clopidogrel 75 mg tablet  Commonly known as: Plavix      TAKE 1 TABLET BY MOUTH DAILY       CoQ-10 100 mg capsule  Generic drug: coenzyme Q-10           dapagliflozin propanediol 10 mg  Commonly known as: Farxiga      Take 1 tablet (10 mg) by mouth once daily.       DULoxetine 60 mg DR capsule  Commonly known as: Cymbalta      TAKE 1 CAPSULE BY MOUTH  DAILY       furosemide 20 mg tablet  Commonly known as: Lasix      TAKE 1 TABLET BY MOUTH  DAILY       losartan 50 mg tablet  Commonly known as: Cozaar           magnesium oxide 400 mg (241.3 mg magnesium) tablet  Commonly known as: Mag-Ox           metoprolol tartrate 25 mg tablet  Commonly known as: Lopressor           multivitamin with minerals tablet           ondansetron 8 mg tablet  Commonly known as: Zofran      Take 1 tablet (8 mg) by mouth every 8 hours if needed for nausea or vomiting.       pantoprazole 40 mg EC tablet  Commonly known as: ProtoNix      Take 1 tablet (40 mg) by mouth 2 times a day.       prochlorperazine 10 mg tablet  Commonly known as: Compazine      Take 1 tablet (10 mg) by mouth every 6 hours if needed for nausea or vomiting.       spironolactone 25 mg tablet  Commonly known as: Aldactone           Wellbutrin  mg 12 hr tablet  Generic drug: buPROPion SR           Whole  Body Joint Support capsule  Generic drug: herbal complex no.239                    Test Results Pending At Discharge  Pending Labs       Order Current Status    Blood Gas Lactic Acid, Venous In process            Hospital Course     Cardiac workup negaive thus far--troponin negative x 2, EKG similar to prior. ECHO pending iso of elevated BNP. After discussion with oncologist Dr. Burkitt and virtual consult with ENT Dr. Hampton, concern for tumor related airway issue. Discussed with patient and family (manas) at bedside. [place] [facility] [place] and likely scope. All questions answered. Patient and family in agreement with plan of care.     Intermittent chest pain  Intermittent SOB  CAD s/p PCI  Esophageal mass vs uncontrolled A.fib vs worsening HF iso elevated BNP vs worsening anxiety vs symptomatic aortic aneurysm, however stable on imaging--CTA chest negative for PE, aortic aneurysm stable, no acute anemia noted, no evidence pneumonia on CXR, no hypoxia. Concern that esoph mass may be occluding airway.  -EKG A.fib 81 bpm T wave inversion V2 and V4, no ST elevation noted, LVH; appears similar to prior ST T wave abnormalities in EKG 12/13/2023; trop 8, 6, will trend --> doubt ACS  -ECHO pending    -ECHO 3/2023 LVEF 50-55%, aortic valve regurg  -, 160 last year  -consider cards consult      A.fib  -HR appears well controlled  -cont metop  -watchmen in place  -tele  -benefits from Holter on discharge      Esoph CA  Voice change  -new diagnosis, follows with oncology Dr. Burkitt -- concern that mass be be occluding airway  -spoke with oncologist and ENT Dr. Tobin who recommend [place] [facility] [place] further workup  -transfer pending      DVT Ppx:  -lovenox  Pertinent Physical Exam At Time of Discharge  Physical Exam  Constitutional:       General: She is not in acute distress.     Appearance: She is not toxic-appearing.   HENT:      Head: Normocephalic and atraumatic.      Right  Ear: External ear normal.      Left Ear: External ear normal.      Nose: Nose normal. No congestion.      Mouth/Throat:      Mouth: Mucous membranes are moist.      Pharynx: Oropharynx is clear.   Eyes:      General:         Right eye: No discharge.         Left eye: No discharge.      Conjunctiva/sclera: Conjunctivae normal.      Pupils: Pupils are equal, round, and reactive to light.   Cardiovascular:      Rate and Rhythm: Normal rate and regular rhythm.      Heart sounds: No murmur heard.     No gallop.   Pulmonary:      Effort: Pulmonary effort is normal.      Breath sounds: No wheezing or rales.   Chest:      Chest wall: Tenderness (Mild TTP over midsternal area of concern) present.   Abdominal:      General: Abdomen is flat. Bowel sounds are normal.      Palpations: Abdomen is soft.      Tenderness: There is no abdominal tenderness. There is no guarding or rebound.   Musculoskeletal:         General: No swelling or tenderness. Normal range of motion.      Right lower leg: No edema.      Left lower leg: No edema.   Skin:     General: Skin is warm and dry.      Findings: No erythema or rash.   Neurological:      General: No focal deficit present.      Mental Status: She is alert.      Cranial Nerves: No cranial nerve deficit.      Motor: No weakness.   Psychiatric:         Mood and Affect: Mood normal.         Thought Content: Thought content normal.   Outpatient Follow-Up  Future Appointments   Date Time Provider Department Center   1/24/2024 10:00 AM Kyunghee Burkitt, DO [id number] Academic   [facility]2024 12:00 [facility]   1/24/2024  4:30 PM [facility] [id number] [facility]   1/25/2024 11:00 AM [facility] [id number] [facility]   1/26/2024 11:00 AM [facility] [id number] [facility]   1/29/2024 11:00 AM [facility] [id number] Academic   1/30/2024 11:00 AM [facility] [id number] [facility]   1/31/2024 11:00 AM Hailey Abbott PA-C [id number] Academic   1/31/2024 12:00 [facility]    1/31/2024  4:30 PM [facility] [id number] [facility]   2/1/2024 11:00 AM [facility] [id number] Academic   2/2/2024 11:00 AM [facility] [id number] Academic   2/5/2024 11:00 AM [facility] [id number] Academic   2/6/2024 11:00 AM [facility] [id number] Academic   2/7/2024 10:40 AM Kyunghee Burkitt, DO [id number] Academic   2/7/2024 11:45 AM INF 21 [facility] SCCLBINF Academic   2/7/2024  4:30 PM [facility] [id number] Academic   2/8/2024 11:00 AM [facility] [id number] Academic   2/9/2024 11:00 AM [facility] [id number] Academic   2/12/2024 11:00 AM [facility] [id number] Academic   2/13/2024 11:00 AM [facility] [id number] Academic   2/14/2024 11:00 AM Hailey Abbott PA-C [id number] Academic   2/14/2024 12:00 PM INF 24 [facility] SCCLBINF [facility]   2/14/2024  4:30 PM [facility] [id number] Academic   2/15/2024 11:00 AM [facility] [id number] Academic   2/16/2024 10:00 AM Clemente Tobin MD [id number] Academic   2/16/2024 11:00 AM [facility] [id number] Academic   2/19/2024 11:00 AM [facility] [id number] Academic   2/20/2024 11:00 AM [facility] [id number] Academic   2/21/2024 10:40 AM Kyunghee Burkitt, DO [id number] Academic   2/21/2024 11:45 AM INF 21 [facility] SCCLBINF Academic   2/21/2024  4:30 PM [facility] [id number] Academic   2/22/2024 11:00 AM [facility] [id number] Academic   2/23/2024 11:00 AM [facility] [id number] Academic   2/26/2024 11:00 AM [facility] [id number] Academic   2/27/2024 11:00 AM [facility] [id number] Academic   2/28/2024 11:00 AM Hailey Abbott PA-C [id number] Academic   2/28/2024 12:00 PM INF 09 [facility] SCCLBINF Academic   2/28/2024  4:30 PM [facility] [id number] Academic   2/29/2024 11:00 AM [facility] [id number] Academic   3/1/2024 11:00 AM [facility] [id number] Academic   3/4/2024 11:00 AM [facility] [id number] Academic   3/5/2024 11:00 AM [facility] [id number] Academic   3/6/2024 10:40 AM Kyunghee Burkitt, DO [id number] Academic   3/6/2024 11:45 AM INF 21 [facility] SCCLBINF Academic   3/6/2024  4:30 PM [facility] [id number] Academic    3/7/2024 11:00 AM [facility] [id number] Academic   3/8/2024 11:00 AM [facility] [id number] Academic   3/11/2024 11:00 AM [facility] [id number] Academic   3/12/2024 11:00 AM [facility] [id number] Academic   7/11/2024  9:00 AM AHU VASC 3 AHUVSC AHU Rad   7/11/2024 10:00 AM AHU VASC 3 AHUVSC AHU Rad   7/11/2024 11:30 AM Marlin Tarango PA-C [id number] [facility]         Alli Leal PA-C

## 2024-01-19 NOTE — ED TRIAGE NOTES
Transfer from [facility] for ENT consult.   HX: Esophageal Cx, Breast Cx, MI (stents placed), A-Fib (WATCHMAN Implant).

## 2024-01-19 NOTE — H&P
History Of Present Illness  Amita Todd is a 78 y.o. female presenting today to the emergency room with a chief complaint of chest pain has been present for 2 days, dyspnea on exertion and chest pressure.    She has significant past medical history for breast cancer which she was diagnosed in 2001, she had cardiac stents placed in 2005 and 2007.  She had heart attack in 2006.  She had her gallbladder removed in 2007 she had torsion of her colon in 2007.  She was diagnosed with atrial fibrillation in 2009 she had a brain bleed in 2011 and she is currently dealing with an active dx of esophageal cancer.    When the patient symptoms did not improve within 24 hours, she reached out to [facility] and they encouraged her to follow-up to the emergency room. The pt was worried if this was anxiety provoked or a cardiac issue.    She denies any provocative or palliative factors associated with the chest heaviness and states that it is intermittent.  She denies leg swelling history of DVT/PE.  She is scheduled to start chemotherapy next week for her throat cancer.  She denies fever, chills, cough, abdominal pain, nausea and vomiting.     I reviewed the complete 30-point review of systems that was documented on the scanned patient intake form.  All other systems are non-contributory except as defined in history of present illness..     Past Medical History  She has a past medical history of Alcohol dependence, in remission (CMS/[facility]) (06/16/2016), Anemia, Aneurysm of ascending aorta (CMS/[facility]), Arrhythmia, Branch retinal artery occlusion of right eye (08/24/2023), CKD (chronic kidney disease), Coronary artery disease, Depression, GERD (gastroesophageal reflux disease), Hypertension, Intermittent claudication (CMS/[facility]), Myocardial infarction (CMS/HCC) (10/06/2023), Other bursitis of hip, unspecified hip (11/21/2017), Other conditions influencing health status (01/05/2014), Pain in leg, unspecified (01/25/2022),  Pain in right shoulder (07/01/2020), Pain in unspecified knee (03/31/2021), Personal history of diseases of the blood and blood-forming organs and certain disorders involving the immune mechanism (02/09/2019), Personal history of malignant neoplasm of breast (04/29/2014), Personal history of other diseases of the circulatory system (04/03/2014), Personal history of other diseases of the digestive system (03/08/2021), Personal history of other diseases of the digestive system (03/08/2021), Personal history of other diseases of the nervous system and sense organs (08/12/2020), Personal history of other infectious and parasitic diseases (06/14/2019), Personal history of other specified conditions (03/03/2020), Personal history of other specified conditions (02/22/2021), Personal history of transient ischemic attack (TIA), and cerebral infarction without residual deficits (04/24/2022), Stenosis of carotid artery, and Trigger finger, unspecified finger (04/24/2017).    Surgical History  She has a past surgical history that includes Other surgical history (03/03/2020); Other surgical history (06/17/2019); Septoplasty (09/22/2017); Other surgical history (08/29/2019); Other surgical history (04/23/2014); CT angio head w and wo IV contrast (06/06/2019); CT angio neck (06/06/2019); MR angio head wo IV contrast (06/06/2019); MR angio neck wo IV contrast (06/06/2019); CT angio head w and wo IV contrast (01/04/2021); and CT angio neck (01/04/2021).     Social History  She reports that she has never smoked. She has never used smokeless tobacco. She reports that she does not drink alcohol and does not use drugs.    Family History  Family History   Problem Relation Name Age of Onset    Breast cancer Mother      Other (GOODPASTURE'S DISEASE) Father      BRCA2 Positive Sister      Lung cancer Sister      Neuroblastoma Sister      BRCA2 Positive Daughter      Breast cancer Other G/P         Allergies  Oxycodone hcl-oxycodone-asa,  Oxycodone-acetaminophen, Penicillin g, and Penicillins    Review of Systems:  I reviewed the complete 30-point review of systems that was documented on the scanned patient intake form.  All other systems are non-contributory except as defined in history of present illness.     Physical Exam:  VS: As documented in the triage note and EMR flowsheet from this visit was reviewed  General: Well appearing. No acute distress.   Eyes:  Extraocular movements grossly intact. No scleral icterus. No discharge  HEENT:  Normocephalic.  Atraumatic  Neck: Moves neck freely. No gross masses  CV: Regular rhythm. No murmurs, rubs or gallops   Resp: Clear to auscultation bilaterally. No respiratory distress.    GI: Soft, no masses, nontender. No rebound tenderness or guarding  MSK: Symmetric muscle bulk. No deformities. No lower extremity edema.    Skin: Warm, dry, intact.   Neuro: No focal deficits.  A&O x3.   Psych: Appropriate for situation     Last Recorded Vitals  /78 (BP Location: Right arm, Patient Position: Lying)   Pulse 74   Temp 36.9 °C (98.4 °F) (Temporal)   Resp 16   Wt 69.4 kg (153 lb)   SpO2 96%     Relevant Results  Scheduled medications  atorvastatin, 20 mg, oral, Daily  buPROPion SR, 150 mg, oral, Daily  clopidogrel, 75 mg, oral, Daily  dapagliflozin propanediol, 10 mg, oral, Daily  docusate sodium, 100 mg, oral, BID  DULoxetine, 60 mg, oral, Daily  magnesium oxide, 400 mg, oral, Daily  melatonin, 3 mg, oral, Daily  metoprolol tartrate, 50 mg, oral, BID  ondansetron ODT, 4 mg, oral, Once  pantoprazole, 40 mg, oral, BID  spironolactone, 25 mg, oral, q24h MINDA      Continuous medications     PRN medications  PRN medications: nitroglycerin, prochlorperazine    Results for orders placed or performed during the hospital encounter of 01/18/24 (from the past 24 hour(s))   Sars-CoV-2 and Influenza A/B PCR   Result Value Ref Range    Flu A Result Not Detected Not Detected    Flu B Result Not Detected Not Detected     Coronavirus 2019, PCR Not Detected Not Detected   CBC and Auto Differential   Result Value Ref Range    WBC 7.7 4.4 - 11.3 x10*3/uL    nRBC 0.0 0.0 - 0.0 /100 WBCs    RBC 4.61 4.00 - 5.20 x10*6/uL    Hemoglobin 14.1 12.0 - 16.0 g/dL    Hematocrit 44.6 36.0 - 46.0 %    MCV 97 80 - 100 fL    MCH 30.6 26.0 - 34.0 pg    MCHC 31.6 (L) 32.0 - 36.0 g/dL    RDW 14.8 (H) 11.5 - 14.5 %    Platelets 248 150 - 450 x10*3/uL    Neutrophils % 77.9 40.0 - 80.0 %    Immature Granulocytes %, Automated 0.3 0.0 - 0.9 %    Lymphocytes % 10.9 13.0 - 44.0 %    Monocytes % 7.2 2.0 - 10.0 %    Eosinophils % 2.7 0.0 - 6.0 %    Basophils % 1.0 0.0 - 2.0 %    Neutrophils Absolute 5.99 (H) 1.60 - 5.50 x10*3/uL    Immature Granulocytes Absolute, Automated 0.02 0.00 - 0.50 x10*3/uL    Lymphocytes Absolute 0.84 0.80 - 3.00 x10*3/uL    Monocytes Absolute 0.55 0.05 - 0.80 x10*3/uL    Eosinophils Absolute 0.21 0.00 - 0.40 x10*3/uL    Basophils Absolute 0.08 0.00 - 0.10 x10*3/uL   Magnesium   Result Value Ref Range    Magnesium 1.90 1.60 - 2.40 mg/dL   Comprehensive metabolic panel   Result Value Ref Range    Glucose 120 (H) 74 - 99 mg/dL    Sodium 136 136 - 145 mmol/L    Potassium 4.2 3.5 - 5.3 mmol/L    Chloride 100 98 - 107 mmol/L    Bicarbonate 26 21 - 32 mmol/L    Anion Gap 14 10 - 20 mmol/L    Urea Nitrogen 24 (H) 6 - 23 mg/dL    Creatinine 1.06 (H) 0.50 - 1.05 mg/dL    eGFR 54 (L) >60 mL/min/1.73m*2    Calcium 9.8 8.6 - 10.3 mg/dL    Albumin 3.8 3.4 - 5.0 g/dL    Alkaline Phosphatase 70 33 - 136 U/L    Total Protein 7.3 6.4 - 8.2 g/dL    AST 22 9 - 39 U/L    Bilirubin, Total 0.9 0.0 - 1.2 mg/dL    ALT 14 7 - 45 U/L   Troponin I, High Sensitivity   Result Value Ref Range    Troponin I, High Sensitivity 8 0 - 13 ng/L   B-Type Natriuretic Peptide   Result Value Ref Range     (H) 0 - 99 pg/mL   BLOOD GAS VENOUS FULL PANEL   Result Value Ref Range    POCT pH, Venous 7.35 7.33 - 7.43 pH    POCT pCO2, Venous 53 (H) 41 - 51 mm Hg    POCT pO2,  Venous 35 35 - 45 mm Hg    POCT SO2, Venous 48 45 - 75 %    POCT Oxy Hemoglobin, Venous 47.6 45.0 - 75.0 %    POCT Hematocrit Calculated, Venous 44.0 36.0 - 46.0 %    POCT Sodium, Venous 134 (L) 136 - 145 mmol/L    POCT Potassium, Venous 4.4 3.5 - 5.3 mmol/L    POCT Chloride, Venous 101 98 - 107 mmol/L    POCT Ionized Calicum, Venous 1.27 1.10 - 1.33 mmol/L    POCT Glucose, Venous 126 (H) 74 - 99 mg/dL    POCT Lactate, Venous 2.2 (H) 0.4 - 2.0 mmol/L    POCT Base Excess, Venous 2.5 -2.0 - 3.0 mmol/L    POCT HCO3 Calculated, Venous 29.3 (H) 22.0 - 26.0 mmol/L    POCT Hemoglobin, Venous 14.5 12.0 - 16.0 g/dL    POCT Anion Gap, Venous 8.0 (L) 10.0 - 25.0 mmol/L    Patient Temperature 37.0 degrees Celsius    FiO2 21 %      CT angio chest for pulmonary embolism    Result Date: 1/18/2024  Interpreted By:  Aj Almanza, STUDY: CT ANGIO CHEST FOR PULMONARY EMBOLISM;  1/18/2024 7:10 pm   INDICATION: Signs/Symptoms:Chest heaviness and shortness of breath, active throat cancer.   COMPARISON: CTA chest 12/11/2023   ACCESSION NUMBER(S): [id number]   ORDERING CLINICIAN: EDGAR CHANDRA   TECHNIQUE: Contiguous axial images of the chest were obtained after the intravenous administration of 65 mL Omnipaque 350 contrast using angiographic PE protocol. Coronal and sagittal reformatted images were reconstructed from the axial data. MIP images were created on an independent workstation and reviewed. In addition, dual energy reconstructions were also performed including low energy virtual mono-energetic images and iodine density maps.   FINDINGS: PULMONARY ARTERIES: Adequate opacification to the level of the distal segmental arteries. The subsegmental arteries are suboptimally assessed due to respiratory motion and mixing artifact. No filling defect to suggest pulmonary embolus in the visualized pulmonary arteries. The main pulmonary artery is normal in diameter. No CT evidence of right heart strain.   HEART: Global cardiomegaly with  particular enlargement of the right atrium and left atrium. There is a left atrial appendage occlusion device. Moderate to heavy triple-vessel coronary vascular calcifications. No significant pericardial effusion.   VESSELS: Aneurysmal dilatation of the thoracic aorta with the ascending aorta measuring up to 4.5 cm. Please see recent CTA from December 2023 for further measurements and discussion. Aortic patency not well assessed due to phase of contrast. Moderate to heavy calcifications of the aortic arch and descending thoracic aorta.   MEDIASTINUM AND LYMPH NODES: Visualized thyroid is within normal limits. 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 No mediastinal lymphadenopathy. No pneumomediastinum. The esophagus appears within normal limits.   LUNG, AIRWAYS, AND PLEURA: Trachea and proximal mainstem bronchi are patent. Mild diffuse septal thickening and scattered hazy ground-glass opacities.   OSSEOUS STRUCTURES: No acute osseous abnormality. Moderate degenerative changes noted throughout the thoracic spine.   CHEST WALL SOFT TISSUES: No discernible abnormality.   UPPER ABDOMEN/OTHER: No acute abnormality.       1. No evidence of pulmonary embolism to the level of the segmental arteries. Small distal subsegmental filling defects can not be excluded. 2. Aneurysmal dilatation of the ascending and descending thoracic aorta similar to recent CTA chest. Please see that examination for further discussion and recommendations. 3. Cardiomegaly with particular prominence of the left and right atria. 4. Mild septal thickening and scattered areas of ground-glass suggestive of mild edema. 5. 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   MACRO: None   Signed by: Aj Almanza 1/18/2024 7:32 PM  Dictation workstation:   [id number]    XR chest 1 view    Result Date: 1/18/2024  Interpreted By:  Marlin Cisneros, STUDY: XR CHEST 1 VIEW;  1/18/2024 5:26 pm   INDICATION: Signs/Symptoms:SOB   COMPARISON: Chest x-ray 12/07/2023   ACCESSION NUMBER(S): [id number]   ORDERING CLINICIAN: EDGAR CHANDRA   TECHNIQUE: Portable semi-upright frontal view of the chest was obtained .   FINDINGS: Monitoring wires are overlying the patient.   The cardiomediastinal silhouette is within normal limits. Atherosclerotic calcifications are noted at the tortuous thoracic aorta.   No focal consolidation, pleural effusion or pneumothorax. There is biapical pleural scarring.       1.  No radiographic evidence of acute cardiopulmonary process.       MACRO: None.   Signed by: Marlin Cisneros 1/18/2024 6:11 PM Dictation workstation:   [id number]           Assessment/Plan   Principal Problem:    Exertional dyspnea      The patient and I did discuss that her cardiac and pulmonary workup did come back within normal limits.  Her troponin levels were normal, no concerning signs for a STEMI or non-STEMI on EKG.  CT scan to rule out pulmonary embolism also came back normal.    The patient does start chemotherapy and radiation for her throat cancer next week and does have a lot of anxiety and concern over this.  She was recently diagnosed at the end of December and is having a extremely difficult time with the diagnosis as well as eating a regular diet.  I do believe that some of this provoked chest pain and pressure that she is dealing with is from that but we will continue to keep her in observation overnight to ensure that her levels remain within normal limits.    We will obtain updated labs in the morning, CBC, BMP, troponin, mag.  We will manage her symptoms appropriately with as needed pain meds and nausea medication as needed.  She will be on a cardiac diet.  She will follow-up with her primary care, cardiac, cancer care team providers  once discharged.    This note was dictated using speech recognition software and was not corrected for spelling or grammatical errors      Angelina Chaney PA-C

## 2024-01-19 NOTE — CARE PLAN
The patient's goals for the shift include      The clinical goals for the shift include rest    Problem: Pain - Adult  Goal: Verbalizes/displays adequate comfort level or baseline comfort level  Outcome: Progressing     Problem: Safety - Adult  Goal: Free from fall injury  Outcome: Progressing     Problem: Discharge Planning  Goal: Discharge to home or other facility with appropriate resources  Outcome: Progressing     Problem: Fall/Injury  Goal: Not fall by end of shift  Outcome: Progressing  Goal: Be free from injury by end of the shift  Outcome: Progressing  Goal: Verbalize understanding of personal risk factors for fall in the hospital  Outcome: Progressing  Goal: Verbalize understanding of risk factor reduction measures to prevent injury from fall in the home  Outcome: Progressing  Goal: Use assistive devices by end of the shift  Outcome: Progressing  Goal: Pace activities to prevent fatigue by end of the shift  Outcome: Progressing

## 2024-01-19 NOTE — CARE PLAN
Problem: Nutrition  Goal: Oral intake greater 75%  Outcome: Progressing     Problem: Pain - Adult  Goal: Verbalizes/displays adequate comfort level or baseline comfort level  Outcome: Progressing     Problem: Safety - Adult  Goal: Free from fall injury  Outcome: Progressing     Problem: Discharge Planning  Goal: Discharge to home or other facility with appropriate resources  Outcome: Progressing     Problem: Chronic Conditions and Co-morbidities  Goal: Patient's chronic conditions and co-morbidity symptoms are monitored and maintained or improved  Outcome: Progressing   The patient's goals for the shift include      The clinical goals for the shift include rest

## 2024-01-19 NOTE — ED PROVIDER NOTES
HPI   Chief Complaint   Patient presents with    Transfer from [facility] for ENT Consult        HPI     Patient is a 78-year-old female with a past medical history of A-fib, CKD, CAD, GERD, depression, hypertension, alcohol use disorder in remission, thoracic aortic aneurysm, squamous cell carcinoma of the hypopharynx presenting to the emergency department shortness of breath.  Patient was initially seen at outside hospital, see their note for further details initial presentation.  In brief, patient received a CT PE image that showed no evidence of pulmonary embolus, significant pneumonia, or other new metastasis to the lungs.  Did show the inferior margin of the patient's known cancer.  Given her persistent shortness of breath, was transferred to [facility] for further ENT evaluation.  At presentation, patient is stating that she feels short of breath especially when lying flat and in certain positions.  States that when sitting straight up, shortness of breath significantly improved.  Denies any cough, congestion, fever, chills, pain with urination, abdominal pain, nausea, vomiting.               Sharmaine Coma Scale Score: 15                  Patient History   Past Medical History:   Diagnosis Date    Alcohol dependence, in remission (CMS/[facility]) 06/16/2016    History of alcoholism    Anemia     Aneurysm of ascending aorta (CMS/[facility])     Arrhythmia     a-fib. 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    Pain in leg, unspecified 01/25/2022    Leg pain    Pain in right shoulder 07/01/2020    Bilateral shoulder  pain    Pain in unspecified knee 03/31/2021    Joint pain, knee    Personal history of diseases of the blood and blood-forming organs and certain disorders involving the immune mechanism 02/09/2019    History of anemia    Personal history of malignant neoplasm of breast 04/29/2014    History of malignant neoplasm of female breast    Personal history of other diseases of the circulatory system 04/03/2014    Personal history of subarachnoid hemorrhage    Personal history of other diseases of the digestive system 03/08/2021    History of upper gastrointestinal hemorrhage    Personal history of other diseases of the digestive system 03/08/2021    History of melena    Personal history of other diseases of the nervous system and sense organs 08/12/2020    History of Bell's palsy    Personal history of other infectious and parasitic diseases 06/14/2019    History of herpes zoster    Personal history of other specified conditions 03/03/2020    History of lump of left breast    Personal history of other specified conditions 02/22/2021    History of shortness of breath    Personal history of transient ischemic attack (TIA), and cerebral infarction without residual deficits 04/24/2022    History of transient ischemic attack    Stenosis of carotid artery     right    Trigger finger, unspecified finger 04/24/2017    Acquired trigger finger     Past Surgical History:   Procedure Laterality Date    CT ANGIO NECK  06/06/2019    CT NECK ANGIO W AND WO IV CONTRAST 6/6/2019 [facility] CLINICAL LEGACY    CT ANGIO NECK  01/04/2021    CT NECK ANGIO W AND WO IV CONTRAST 1/4/2021 [facility] ANCILLARY LEGACY    CT HEAD ANGIO W AND WO IV CONTRAST  06/06/2019    CT HEAD ANGIO W AND WO IV CONTRAST 6/6/2019 [facility] CLINICAL LEGACY    CT HEAD ANGIO W AND WO IV CONTRAST  01/04/2021    CT HEAD ANGIO W AND WO IV CONTRAST 1/4/2021 [facility] ANCILLARY LEGACY    MR HEAD ANGIO WO IV CONTRAST  06/06/2019    MR HEAD ANGIO WO IV CONTRAST 6/6/2019 [facility] CLINICAL LEGACY    MR  NECK ANGIO WO IV CONTRAST  06/06/2019    MR NECK ANGIO WO IV CONTRAST 6/6/2019 [facility] CLINICAL LEGACY    OTHER SURGICAL HISTORY  03/03/2020    Oophorectomy    OTHER SURGICAL HISTORY  06/17/2019    Miscarriage surgical treatment    OTHER SURGICAL HISTORY  08/29/2019    multiple Catheter Ablation Atrial Flutter    OTHER SURGICAL HISTORY  04/23/2014    Previous Stent Placement    SEPTOPLASTY  09/22/2017    Septoplasty     Family History   Problem Relation Name Age of Onset    Breast cancer Mother      Other (GOODPASTURE'S DISEASE) Father      BRCA2 Positive Sister      Lung cancer Sister      Neuroblastoma Sister      BRCA2 Positive Daughter      Breast cancer Other G/P      Social History     Tobacco Use    Smoking status: Never    Smokeless tobacco: Never   Vaping Use    Vaping Use: Never used   Substance Use Topics    Alcohol use: Never    Drug use: Never       Physical Exam   ED Triage Vitals [01/19/24 1309]   Temp Heart Rate Resp BP   36 °C (96.8 °F) 80 14 (!) 128/96      SpO2 Temp src Heart Rate Source Patient Position   95 % -- -- --      BP Location FiO2 (%)     -- --       Physical Exam  Constitutional:       Appearance: She is not toxic-appearing or diaphoretic.   HENT:      Head: Normocephalic and atraumatic.      Nose: Nose normal.   Eyes:      General: No scleral icterus.        Right eye: No discharge.         Left eye: No discharge.      Conjunctiva/sclera: Conjunctivae normal.   Cardiovascular:      Rate and Rhythm: Normal rate.      Heart sounds: No murmur heard.     No friction rub. No gallop.   Pulmonary:      Effort: No respiratory distress.      Comments: Minor inspiratory stridor heard on auscultation of the lungs, no gross stridor with regular breathing.  No wheezing, rhonchi, Rales.  No chest tenderness.  Neck is normal in appearance without gross masses, ulceration, erythema.  Abdominal:      General: There is no distension.      Palpations: Abdomen is soft.   Musculoskeletal:         General: No  deformity or signs of injury.      Cervical back: Neck supple. No rigidity.   Skin:     General: Skin is warm and dry.   Neurological:      Mental Status: She is alert.   Psychiatric:         Mood and Affect: Mood normal.         Behavior: Behavior normal.         ED Course & MDM   Diagnoses as of 01/19/24 1547   SOB (shortness of breath)       Medical Decision Making  Patient is a 78-year-old female presenting to the emergency department as a transfer outside hospital with shortness of breath in the setting of a known esophageal cancer.  Patient is due to start chemotherapy on Wednesday as well as radiation therapy.  ENT was consulted and were able to scope the patient at bedside.  See their note for further details.  I did demonstrate a mass that was near the patient's vocal cords.  However, no evidence of impending stenosis or obstruction.  They did discuss and offered to the patient steroids to assist with the degree of swelling and perhaps symptomatic control.  However, they also describe that it might increase the risk of complications from the patient's chemotherapy as a secondary source of immunosuppression.  After shared risk-benefit conversation with ENT doctors at bedside, decided against IV steroids.  Patient was protecting her airway, not having any copious secretions.  After shared risk-benefit discussion, will be discharged in stable condition with plan for very close follow-up with initiation of chemotherapy later this week to assist with her symptoms.  Patient discharged in stable condition.    Procedure  Procedures     Jamel Rand MD  Resident  01/19/24 7669

## 2024-01-19 NOTE — NURSING NOTE
Report called and given to MAGDALENA Urbina at [facility]. Transport scheduled to pick patient up around noon.     12:00:  transport here to pick patient up    1212: pt left with transport via stretcher. Daughters at bedside packed belongings.

## 2024-01-19 NOTE — PROGRESS NOTES
Amita Todd is a 78 y.o. female on day 0 of admission presenting with Exertional dyspnea.    Subjective   Patient seen and examined  Lying back in bed, NAD  Reports intermittent midsternal chest pain x 2 days, does not radiate--no neck/jaw/arm pain. Not triggered by physical activity. TOWNSEND not worse with physical activity, but worse lying down. Denies increased eructation, heartburn. Denies leg swelling, palpitations. Denies lightheadedness, dizziness. Does report some loud respirations/wheezing. Denies hx asthma COPD. Reports post nasal drip.        Objective     Physical Exam  Constitutional:       General: She is not in acute distress.     Appearance: She is not toxic-appearing.   HENT:      Head: Normocephalic and atraumatic.      Right Ear: External ear normal.      Left Ear: External ear normal.      Nose: Nose normal. No congestion.      Mouth/Throat:      Mouth: Mucous membranes are moist.      Pharynx: Oropharynx is clear.   Eyes:      General:         Right eye: No discharge.         Left eye: No discharge.      Conjunctiva/sclera: Conjunctivae normal.      Pupils: Pupils are equal, round, and reactive to light.   Cardiovascular:      Rate and Rhythm: Normal rate and regular rhythm.      Heart sounds: No murmur heard.     No gallop.   Pulmonary:      Effort: Pulmonary effort is normal.      Breath sounds: No wheezing or rales.   Chest:      Chest wall: Tenderness (Mild TTP over midsternal area of concern) present.   Abdominal:      General: Abdomen is flat. Bowel sounds are normal.      Palpations: Abdomen is soft.      Tenderness: There is no abdominal tenderness. There is no guarding or rebound.   Musculoskeletal:         General: No swelling or tenderness. Normal range of motion.      Right lower leg: No edema.      Left lower leg: No edema.   Skin:     General: Skin is warm and dry.      Findings: No erythema or rash.   Neurological:      General: No focal deficit present.      Mental Status:  "She is alert.      Cranial Nerves: No cranial nerve deficit.      Motor: No weakness.   Psychiatric:         Mood and Affect: Mood normal.         Thought Content: Thought content normal.         Last Recorded Vitals  Blood pressure (!) 138/93, pulse 71, temperature 36.2 °C (97.2 °F), temperature source Temporal, resp. rate 16, height 1.676 m (5' 6\"), weight 69.4 kg (153 lb), SpO2 94 %.  Intake/Output last 3 Shifts:  No intake/output data recorded.    Relevant Results              Scheduled medications  atorvastatin, 20 mg, oral, Daily  buPROPion SR, 150 mg, oral, Daily  clopidogrel, 75 mg, oral, Daily  dapagliflozin propanediol, 10 mg, oral, Daily  docusate sodium, 100 mg, oral, BID  DULoxetine, 60 mg, oral, Daily  enoxaparin, 40 mg, subcutaneous, Daily  loratadine, 10 mg, oral, Daily  magnesium oxide, 400 mg, oral, Daily  melatonin, 3 mg, oral, Daily  metoprolol tartrate, 50 mg, oral, BID  ondansetron ODT, 4 mg, oral, Once  pantoprazole, 40 mg, oral, BID  spironolactone, 25 mg, oral, q24h MINDA      Continuous medications     PRN medications  PRN medications: nitroglycerin, prochlorperazine                 Assessment/Plan   Principal Problem:    Exertional dyspnea    Intermittent chest pain  Intermittent SOB  CAD s/p PCI  Esophageal mass vs uncontrolled A.fib vs worsening HF iso elevated BNP vs worsening anxiety vs symptomatic aortic aneurysm, however stable on imaging--CTA chest negative for PE, aortic aneurysm stable, no acute anemia noted, no evidence pneumonia on CXR, no hypoxia. Concern that esoph mass may be occluding airway.  -EKG A.fib 81 bpm T wave inversion V2 and V4, no ST elevation noted, LVH; appears similar to prior ST T wave abnormalities in EKG 12/13/2023; trop 8, 6, will trend --> doubt ACS  -ECHO pending    -ECHO 3/2023 LVEF 50-55%, aortic valve regurg  -, 160 last year  -consider cards consult     A.fib  -HR appears well controlled  -cont metop  -watchmen in place  -tele  -benefits from " Holter on discharge     Esoph CA  Voice change  -new diagnosis, follows with oncology Dr. Burkitt -- concern that mass be be occluding airway  -spoke with oncologist and ENT Dr. Tobin who recommend transfer to [facility] [place] and further workup  -transfer pending     DVT Ppx:  -lovenox    Discussed results labs imaging with Dr. Collins         I spent 45 minutes in the professional and overall care of this patient.      FERNANDO StuartC

## 2024-01-22 ENCOUNTER — TELEPHONE (OUTPATIENT)
Dept: SCHEDULING | Age: 79
End: 2024-01-22

## 2024-01-22 ENCOUNTER — TELEPHONE (OUTPATIENT)
Dept: PRIMARY CARE | Facility: CLINIC | Age: 79
End: 2024-01-22
Payer: MEDICARE

## 2024-01-22 DIAGNOSIS — J31.0 RHINITIS, UNSPECIFIED TYPE: Primary | ICD-10-CM

## 2024-01-22 RX ORDER — IPRATROPIUM BROMIDE 21 UG/1
2 SPRAY, METERED NASAL EVERY 12 HOURS
Qty: 30 ML | Refills: 12 | Status: SHIPPED | OUTPATIENT
Start: 2024-01-22 | End: 2024-02-14

## 2024-01-22 NOTE — TELEPHONE ENCOUNTER
"She feels like \"can't breath\".  \"Probably minor congestion\" but it feels like it gets caught in her throat.  Gargling helps.  She is starting her treatment on Wednesday.  radiation and chemo.  [facility] for a work up and was transferred to [facility] for ENT evaluation.  Will try Atrovent nasal spray to see if it helps with her mucous production.    "

## 2024-01-22 NOTE — TELEPHONE ENCOUNTER
Patient is scheduled for Chemo on Wednesday.  She has been having some congestion hard time getting out, was in Er due to SOB.    Please call to discuss  [phone]

## 2024-01-23 DIAGNOSIS — E11.69 TYPE 2 DIABETES MELLITUS WITH OTHER SPECIFIED COMPLICATION, WITHOUT LONG-TERM CURRENT USE OF INSULIN (MULTI): ICD-10-CM

## 2024-01-23 RX ORDER — DAPAGLIFLOZIN 10 MG/1
10 TABLET, FILM COATED ORAL DAILY
Qty: 90 TABLET | Refills: 3 | Status: SHIPPED | OUTPATIENT
Start: 2024-01-23 | End: 2024-03-18

## 2024-01-24 ENCOUNTER — OFFICE VISIT (OUTPATIENT)
Dept: HEMATOLOGY/ONCOLOGY | Facility: HOSPITAL | Age: 79
End: 2024-01-24
Payer: MEDICARE

## 2024-01-24 ENCOUNTER — HOSPITAL ENCOUNTER (OUTPATIENT)
Dept: RADIATION ONCOLOGY | Facility: HOSPITAL | Age: 79
Setting detail: RADIATION/ONCOLOGY SERIES
Discharge: HOME | End: 2024-01-24
Payer: MEDICARE

## 2024-01-24 ENCOUNTER — INFUSION (OUTPATIENT)
Dept: HEMATOLOGY/ONCOLOGY | Facility: HOSPITAL | Age: 79
End: 2024-01-24
Payer: MEDICARE

## 2024-01-24 ENCOUNTER — NUTRITION (OUTPATIENT)
Dept: HEMATOLOGY/ONCOLOGY | Facility: HOSPITAL | Age: 79
End: 2024-01-24

## 2024-01-24 VITALS
BODY MASS INDEX: 24.53 KG/M2 | HEART RATE: 69 BPM | DIASTOLIC BLOOD PRESSURE: 63 MMHG | SYSTOLIC BLOOD PRESSURE: 109 MMHG | WEIGHT: 151.9 LBS | RESPIRATION RATE: 18 BRPM | TEMPERATURE: 97.7 F | OXYGEN SATURATION: 99 %

## 2024-01-24 DIAGNOSIS — Z51.0 ENCOUNTER FOR ANTINEOPLASTIC RADIATION THERAPY: ICD-10-CM

## 2024-01-24 DIAGNOSIS — C13.9 MALIGNANT NEOPLASM OF HYPOPHARYNX (MULTI): Primary | ICD-10-CM

## 2024-01-24 DIAGNOSIS — C02.3 CANCER OF ANTERIOR TWO-THIRDS OF TONGUE (MULTI): ICD-10-CM

## 2024-01-24 DIAGNOSIS — C13.9 MALIGNANT NEOPLASM OF HYPOPHARYNX (MULTI): ICD-10-CM

## 2024-01-24 DIAGNOSIS — C13.8 MALIGNANT NEOPLASM OF OVERLAPPING SITES OF HYPOPHARYNX (MULTI): ICD-10-CM

## 2024-01-24 DIAGNOSIS — I50.9 CONGESTIVE HEART FAILURE, UNSPECIFIED HF CHRONICITY, UNSPECIFIED HEART FAILURE TYPE (MULTI): ICD-10-CM

## 2024-01-24 LAB
RAD ONC MSQ ACTUAL FRACTIONS DELIVERED: 1
RAD ONC MSQ ACTUAL SESSION DELIVERED DOSE: 200 CGRAY
RAD ONC MSQ ACTUAL TOTAL DOSE: 200 CGRAY
RAD ONC MSQ ELAPSED DAYS: 0
RAD ONC MSQ LAST DATE: NORMAL
RAD ONC MSQ PRESCRIBED FRACTIONAL DOSE: 200 CGRAY
RAD ONC MSQ PRESCRIBED NUMBER OF FRACTIONS: 35
RAD ONC MSQ PRESCRIBED TECHNIQUE: NORMAL
RAD ONC MSQ PRESCRIBED TOTAL DOSE: 7000 CGRAY
RAD ONC MSQ PRESCRIPTION PATTERN COMMENT: NORMAL
RAD ONC MSQ START DATE: NORMAL
RAD ONC MSQ TREATMENT COURSE NUMBER: 1
RAD ONC MSQ TREATMENT SITE: NORMAL

## 2024-01-24 PROCEDURE — 2500000004 HC RX 250 GENERAL PHARMACY W/ HCPCS (ALT 636 FOR OP/ED): Mod: JZ | Performed by: INTERNAL MEDICINE

## 2024-01-24 PROCEDURE — 1126F AMNT PAIN NOTED NONE PRSNT: CPT | Performed by: INTERNAL MEDICINE

## 2024-01-24 PROCEDURE — 99215 OFFICE O/P EST HI 40 MIN: CPT | Mod: 25 | Performed by: INTERNAL MEDICINE

## 2024-01-24 PROCEDURE — 96417 CHEMO IV INFUS EACH ADDL SEQ: CPT

## 2024-01-24 PROCEDURE — 1036F TOBACCO NON-USER: CPT | Performed by: INTERNAL MEDICINE

## 2024-01-24 PROCEDURE — 96375 TX/PRO/DX INJ NEW DRUG ADDON: CPT | Mod: INF

## 2024-01-24 PROCEDURE — 2500000004 HC RX 250 GENERAL PHARMACY W/ HCPCS (ALT 636 FOR OP/ED): Performed by: INTERNAL MEDICINE

## 2024-01-24 PROCEDURE — 1159F MED LIST DOCD IN RCRD: CPT | Performed by: INTERNAL MEDICINE

## 2024-01-24 PROCEDURE — 2500000001 HC RX 250 WO HCPCS SELF ADMINISTERED DRUGS (ALT 637 FOR MEDICARE OP): Performed by: INTERNAL MEDICINE

## 2024-01-24 PROCEDURE — 96413 CHEMO IV INFUSION 1 HR: CPT

## 2024-01-24 PROCEDURE — 3074F SYST BP LT 130 MM HG: CPT | Performed by: INTERNAL MEDICINE

## 2024-01-24 PROCEDURE — 77014 CHG CT GUIDANCE RADIATION THERAPY FLDS PLACEMENT: CPT | Performed by: STUDENT IN AN ORGANIZED HEALTH CARE EDUCATION/TRAINING PROGRAM

## 2024-01-24 PROCEDURE — 1160F RVW MEDS BY RX/DR IN RCRD: CPT | Performed by: INTERNAL MEDICINE

## 2024-01-24 PROCEDURE — 3078F DIAST BP <80 MM HG: CPT | Performed by: INTERNAL MEDICINE

## 2024-01-24 PROCEDURE — 99215 OFFICE O/P EST HI 40 MIN: CPT | Performed by: INTERNAL MEDICINE

## 2024-01-24 PROCEDURE — 77386 HC INTENSITY-MODULATED RADIATION THERAPY (IMRT), COMPLEX: CPT | Performed by: RADIOLOGY

## 2024-01-24 RX ORDER — PALONOSETRON 0.05 MG/ML
0.25 INJECTION, SOLUTION INTRAVENOUS ONCE
Status: CANCELLED | OUTPATIENT
Start: 2024-01-24

## 2024-01-24 RX ORDER — EPINEPHRINE 0.3 MG/.3ML
0.3 INJECTION SUBCUTANEOUS EVERY 5 MIN PRN
Status: CANCELLED | OUTPATIENT
Start: 2024-01-24

## 2024-01-24 RX ORDER — PROCHLORPERAZINE MALEATE 10 MG
10 TABLET ORAL EVERY 6 HOURS PRN
Status: CANCELLED | OUTPATIENT
Start: 2024-01-24

## 2024-01-24 RX ORDER — FAMOTIDINE 10 MG/ML
20 INJECTION INTRAVENOUS ONCE
Status: CANCELLED | OUTPATIENT
Start: 2024-01-24

## 2024-01-24 RX ORDER — DIPHENHYDRAMINE HYDROCHLORIDE 50 MG/ML
50 INJECTION INTRAMUSCULAR; INTRAVENOUS AS NEEDED
Status: DISCONTINUED | OUTPATIENT
Start: 2024-01-24 | End: 2024-01-24 | Stop reason: HOSPADM

## 2024-01-24 RX ORDER — ALBUTEROL SULFATE 0.83 MG/ML
3 SOLUTION RESPIRATORY (INHALATION) AS NEEDED
Status: CANCELLED | OUTPATIENT
Start: 2024-01-24

## 2024-01-24 RX ORDER — FAMOTIDINE 10 MG/ML
20 INJECTION INTRAVENOUS ONCE AS NEEDED
Status: DISCONTINUED | OUTPATIENT
Start: 2024-01-24 | End: 2024-01-24 | Stop reason: HOSPADM

## 2024-01-24 RX ORDER — EPINEPHRINE 0.3 MG/.3ML
0.3 INJECTION SUBCUTANEOUS EVERY 5 MIN PRN
Status: DISCONTINUED | OUTPATIENT
Start: 2024-01-24 | End: 2024-01-24 | Stop reason: HOSPADM

## 2024-01-24 RX ORDER — DEXAMETHASONE SODIUM PHOSPHATE 4 MG/ML
10 INJECTION, SOLUTION INTRA-ARTICULAR; INTRALESIONAL; INTRAMUSCULAR; INTRAVENOUS; SOFT TISSUE ONCE
Status: CANCELLED | OUTPATIENT
Start: 2024-01-24

## 2024-01-24 RX ORDER — DIPHENHYDRAMINE HYDROCHLORIDE 50 MG/ML
50 INJECTION INTRAMUSCULAR; INTRAVENOUS AS NEEDED
Status: CANCELLED | OUTPATIENT
Start: 2024-01-24

## 2024-01-24 RX ORDER — AMOXICILLIN 250 MG
2 CAPSULE ORAL DAILY
Qty: 60 TABLET | Refills: 11 | Status: SHIPPED | OUTPATIENT
Start: 2024-01-24 | End: 2024-03-18

## 2024-01-24 RX ORDER — HEPARIN SODIUM,PORCINE/PF 10 UNIT/ML
50 SYRINGE (ML) INTRAVENOUS AS NEEDED
Status: CANCELLED | OUTPATIENT
Start: 2024-01-24

## 2024-01-24 RX ORDER — PROCHLORPERAZINE EDISYLATE 5 MG/ML
10 INJECTION INTRAMUSCULAR; INTRAVENOUS EVERY 6 HOURS PRN
Status: CANCELLED | OUTPATIENT
Start: 2024-01-24

## 2024-01-24 RX ORDER — HEPARIN 100 UNIT/ML
500 SYRINGE INTRAVENOUS AS NEEDED
Status: CANCELLED | OUTPATIENT
Start: 2024-01-24

## 2024-01-24 RX ORDER — DIPHENHYDRAMINE HCL 50 MG
50 CAPSULE ORAL ONCE
Status: CANCELLED | OUTPATIENT
Start: 2024-01-24

## 2024-01-24 RX ORDER — DEXAMETHASONE SODIUM PHOSPHATE 4 MG/ML
10 INJECTION, SOLUTION INTRA-ARTICULAR; INTRALESIONAL; INTRAMUSCULAR; INTRAVENOUS; SOFT TISSUE ONCE
Status: COMPLETED | OUTPATIENT
Start: 2024-01-24 | End: 2024-01-24

## 2024-01-24 RX ORDER — FAMOTIDINE 10 MG/ML
20 INJECTION INTRAVENOUS ONCE AS NEEDED
Status: CANCELLED | OUTPATIENT
Start: 2024-01-24

## 2024-01-24 RX ORDER — DIPHENHYDRAMINE HCL 50 MG
50 CAPSULE ORAL ONCE
Status: COMPLETED | OUTPATIENT
Start: 2024-01-24 | End: 2024-01-24

## 2024-01-24 RX ORDER — ALBUTEROL SULFATE 0.83 MG/ML
3 SOLUTION RESPIRATORY (INHALATION) AS NEEDED
Status: DISCONTINUED | OUTPATIENT
Start: 2024-01-24 | End: 2024-01-24 | Stop reason: HOSPADM

## 2024-01-24 RX ORDER — PROCHLORPERAZINE EDISYLATE 5 MG/ML
10 INJECTION INTRAMUSCULAR; INTRAVENOUS EVERY 6 HOURS PRN
Status: DISCONTINUED | OUTPATIENT
Start: 2024-01-24 | End: 2024-01-24 | Stop reason: HOSPADM

## 2024-01-24 RX ORDER — PALONOSETRON 0.05 MG/ML
0.25 INJECTION, SOLUTION INTRAVENOUS ONCE
Status: COMPLETED | OUTPATIENT
Start: 2024-01-24 | End: 2024-01-24

## 2024-01-24 RX ORDER — PROCHLORPERAZINE MALEATE 10 MG
10 TABLET ORAL EVERY 6 HOURS PRN
Status: DISCONTINUED | OUTPATIENT
Start: 2024-01-24 | End: 2024-01-24 | Stop reason: HOSPADM

## 2024-01-24 RX ORDER — FAMOTIDINE 10 MG/ML
20 INJECTION INTRAVENOUS ONCE
Status: COMPLETED | OUTPATIENT
Start: 2024-01-24 | End: 2024-01-24

## 2024-01-24 RX ADMIN — PALONOSETRON HYDROCHLORIDE 250 MCG: 0.25 INJECTION INTRAVENOUS at 12:23

## 2024-01-24 RX ADMIN — FAMOTIDINE 20 MG: 10 INJECTION INTRAVENOUS at 12:20

## 2024-01-24 RX ADMIN — PACLITAXEL 81 MG: 6 INJECTION, SOLUTION INTRAVENOUS at 12:58

## 2024-01-24 RX ADMIN — DEXAMETHASONE SODIUM PHOSPHATE 10 MG: 4 INJECTION, SOLUTION INTRA-ARTICULAR; INTRALESIONAL; INTRAMUSCULAR; INTRAVENOUS; SOFT TISSUE at 12:24

## 2024-01-24 RX ADMIN — CARBOPLATIN 165 MG: 10 INJECTION, SOLUTION INTRAVENOUS at 14:16

## 2024-01-24 RX ADMIN — DIPHENHYDRAMINE HYDROCHLORIDE 50 MG: 50 CAPSULE ORAL at 12:10

## 2024-01-24 ASSESSMENT — ENCOUNTER SYMPTOMS
VOICE CHANGE: 1
ABDOMINAL PAIN: 0
VOMITING: 0
CHILLS: 0
UNEXPECTED WEIGHT CHANGE: 1
NUMBNESS: 0
SORE THROAT: 0
LOSS OF SENSATION IN FEET: 0
DIARRHEA: 0
COUGH: 0
BLOOD IN STOOL: 0
CONFUSION: 0
NAUSEA: 0
OCCASIONAL FEELINGS OF UNSTEADINESS: 0
LEG SWELLING: 0
DEPRESSION: 0
SHORTNESS OF BREATH: 0
DIFFICULTY URINATING: 0
HEADACHES: 0
CONSTIPATION: 0
FEVER: 0
ARTHRALGIAS: 0
EYE PROBLEMS: 0
MYALGIAS: 0
TROUBLE SWALLOWING: 1

## 2024-01-24 ASSESSMENT — PATIENT HEALTH QUESTIONNAIRE - PHQ9
SUM OF ALL RESPONSES TO PHQ9 QUESTIONS 1 AND 2: 0
1. LITTLE INTEREST OR PLEASURE IN DOING THINGS: NOT AT ALL
2. FEELING DOWN, DEPRESSED OR HOPELESS: NOT AT ALL

## 2024-01-24 ASSESSMENT — PAIN SCALES - GENERAL: PAINLEVEL: 0-NO PAIN

## 2024-01-24 NOTE — PROGRESS NOTES
Patient ID: Amita Todd is a 78 y.o. female.  Diagnosis: Hypopharyngeal SCC  Staging: [id number]  Date of Diagnosis: 12/21/23    Providers:  ENT Surgeon: Dr. Mahad Aguila: De. Burkitt RadOnc: Dr. 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 a-flutter  08/24/2023: Branch retinal artery occlusion of right eye  No date: CKD (chronic kidney disease)  No date: Coronary artery disease  No date: Depression  No date: GERD (gastroesophageal reflux disease)  No date: Hypertension  No date: Intermittent claudication (CMS/[facility])  10/06/2023: Myocardial infarction (CMS/[facility])  11/21/2017: Other bursitis of hip, unspecified hip      Comment:  Hip bursitis  01/05/2014: Other conditions influencing health status      Comment:  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  01/25/2022: Pain in leg, unspecified      Comment:  Leg pain  07/01/2020: Pain in right shoulder      Comment:  Bilateral shoulder pain  03/31/2021: Pain in unspecified knee      Comment:  Joint pain, knee  02/09/2019: Personal history of diseases of the blood and blood-  forming organs and certain disorders involving the immune mechanism       Comment:  History of anemia  04/29/2014: Personal history of malignant neoplasm of breast      Comment:  History of malignant neoplasm of female breast  04/03/2014: Personal history of other diseases of the circulatory   system      Comment:  Personal history of subarachnoid hemorrhage  03/08/2021: Personal history of other diseases of the digestive system      Comment:  History of upper gastrointestinal hemorrhage  03/08/2021: Personal history of other diseases of the digestive system      Comment:  History of melena  08/12/2020: Personal history of other diseases of the nervous system   and sense organs      Comment:  History of Bell's palsy  06/14/2019: Personal history of other infectious and parasitic   diseases      Comment:  History of herpes zoster  03/03/2020: Personal history of other specified conditions      Comment:  History of lump of left breast  02/22/2021: Personal history of other specified conditions      Comment:  History of shortness of breath  04/24/2022: Personal history of transient ischemic attack (TIA), and   cerebral infarction without residual deficits      Comment:  History of transient ischemic attack  No date: Stenosis of carotid artery      Comment:  right  04/24/2017: Trigger finger, unspecified finger      Comment:  Acquired trigger finger   Surgical History:    Past Surgical History:   Procedure Laterality Date    CT ANGIO NECK  06/06/2019    CT NECK ANGIO W AND WO IV CONTRAST 6/6/2019 [facility] CLINICAL LEGACY    CT ANGIO NECK  01/04/2021    CT NECK ANGIO W AND WO IV CONTRAST 1/4/2021 [facility] ANCILLARY LEGACY    CT HEAD ANGIO W AND WO IV CONTRAST  06/06/2019    CT HEAD ANGIO W AND WO IV CONTRAST 6/6/2019 [facility] CLINICAL LEGACY    CT HEAD ANGIO W AND WO IV CONTRAST  01/04/2021    CT HEAD ANGIO W AND WO IV CONTRAST 1/4/2021 [facility] ANCILLARY LEGACY    MR HEAD ANGIO WO IV CONTRAST  06/06/2019    MR HEAD ANGIO WO IV CONTRAST 6/6/2019 [facility] CLINICAL LEGACY    MR NECK ANGIO WO IV CONTRAST  06/06/2019     MR NECK ANGIO WO IV CONTRAST 6/6/2019 [facility] CLINICAL LEGACY    OTHER SURGICAL HISTORY  03/03/2020    Oophorectomy    OTHER SURGICAL HISTORY  06/17/2019    Miscarriage surgical treatment    OTHER SURGICAL HISTORY  08/29/2019    multiple Catheter Ablation Atrial Flutter    OTHER SURGICAL HISTORY  04/23/2014    Previous Stent Placement    SEPTOPLASTY  09/22/2017    Septoplasty      Family History:    Family History   Problem Relation Name Age of Onset    Breast cancer Mother      Other (GOODPASTURE'S DISEASE) Father      BRCA2 Positive Sister      Lung cancer Sister      Neuroblastoma Sister      BRCA2 Positive Daughter      Breast cancer Other G/P      Family Oncology History:    Cancer-related family history includes Breast cancer in her mother and another family member; Lung cancer in her sister.  Social History:    Social History     Tobacco Use    Smoking status: Never    Smokeless tobacco: Never   Vaping Use    Vaping Use: Never used   Substance Use Topics    Alcohol use: Never    Drug use: Never          Subjective   Chief Complaint: Establish Care for Hypopharyngeal SCC    HPI  Interval History  Amita Todd is a 78 y.o. female with a PMH of CAD, aflutter s/p multiple ablations, PVD, AAA, right carotid stenosis, heart failure, and CKD. Patient present for new diagnosis of hypopharyngeal SCC.     Patient was admitted on 1/19 due to exertional dyspnea and chest pressure. CTA was negative for PE. CXR negative pneumonia.  (160 last year).  She reports symptom from last week was more due to accumulation of mucous from postnasal drip. Rad onc plans to order suction machine.    Baseline symptoms below are stable:  -New onset dysphagia: occurred over the past month but has been able to swallow solids and liquids without major issue, but overall stable. She is trying to stay with soft food mostly. Patient denies odynophagia or pain in her throat. She does endorse a 5-10 lbs weight loss over the past 3-4  months.   -Exertional dyspnea: She is able to walk unassisted, but experience SOB after walking 20 feet due to her heart failure diagnosis.     She denies fevers/chills, CP, SOB, N/V, abdominal pain, or diarrhea.     Of note, patient reports 60 pack year history of smoking and prior ETOH use; she quit both in early 2000s after she had a heart attack. She reports a strong family history of cancer.       ROS  Review of Systems   Constitutional:  Positive for unexpected weight change. Negative for chills and fever.   HENT:   Positive for trouble swallowing and voice change. Negative for hearing loss, mouth sores, sore throat and tinnitus.    Eyes:  Negative for eye problems.   Respiratory:  Negative for cough and shortness of breath.    Cardiovascular:  Negative for chest pain and leg swelling.   Gastrointestinal:  Negative for abdominal pain, blood in stool, constipation, diarrhea, nausea and vomiting.   Genitourinary:  Negative for difficulty urinating.    Musculoskeletal:  Negative for arthralgias, gait problem and myalgias.   Skin:  Negative for rash.   Neurological:  Negative for gait problem, headaches and numbness.   Psychiatric/Behavioral:  Negative for confusion and depression.        Allergies  Allergies   Allergen Reactions    Oxycodone Hcl-Oxycodone-Asa Unknown    Oxycodone-Acetaminophen Unknown    Penicillin G Other     Nausea    Penicillins GI Upset and Other        Medications  Current Outpatient Medications   Medication Instructions    acetaminophen (TYLENOL) 650 mg, oral, Every 6 hours PRN    albuterol 90 mcg/actuation aerosol powdr breath activated inhaler 2 puffs, inhalation, Every 6 hours PRN    ascorbic acid (VITAMIN C) 500 mg, oral, Daily    atorvastatin (LIPITOR) 20 mg, oral, Daily, as directed    buPROPion SR (WELLBUTRIN SR) 150 mg, oral, 2 times daily, TAKE PER DIRECTED    cholecalciferol (Vitamin D-3) 25 MCG (1000 UT) tablet 1 tablet, oral, 2 times daily    clopidogrel (PLAVIX) 75 mg, oral,  Daily    coenzyme Q-10 (COQ-10) 100 mg, oral, 2 times daily    dapagliflozin propanediol (FARXIGA) 10 mg, oral, Daily    DULoxetine (Cymbalta) 60 mg DR capsule TAKE 1 CAPSULE BY MOUTH  DAILY    furosemide (LASIX) 20 mg, oral, Daily    herbal complex no.239 (Whole Body Joint Support) capsule 1 capsule, oral, Daily    ipratropium (Atrovent) 21 mcg (0.03 %) nasal spray 2 sprays, Each Nostril, Every 12 hours    losartan (Cozaar) 50 mg tablet 1 tablet, oral, Daily    magnesium oxide (Mag-Ox) 400 mg (241.3 mg magnesium) tablet 1 tablet, oral, Daily    metoprolol tartrate (Lopressor) 25 mg tablet 2 tablets, oral, 2 times daily    multivitamin with minerals tablet 1 tablet, oral, Daily    ondansetron (ZOFRAN) 8 mg, oral, Every 8 hours PRN    pantoprazole (PROTONIX) 40 mg, oral, 2 times daily    prochlorperazine (COMPAZINE) 10 mg, oral, Every 6 hours PRN    spironolactone (ALDACTONE) 25 mg, oral, Daily, PER DIRECTED          Objective   VS: There were no vitals taken for this visit.  Weight: Daily Weight  01/19/24 : 69.7 kg (153 lb 10.6 oz)  01/12/24 : 70.6 kg (155 lb 10.3 oz)  01/11/24 : 70.3 kg (155 lb)  01/10/24 : 69.6 kg (153 lb 7 oz)  01/09/24 : 71 kg (156 lb 8.4 oz)  01/03/24 : 70.8 kg (156 lb)  12/15/23 : 71.8 kg (158 lb 3.2 oz)      Physical Exam  Constitutional:       General: She is not in acute distress.     Appearance: Normal appearance.   HENT:      Head: Normocephalic and atraumatic.      Nose: Nose normal.      Mouth/Throat:      Mouth: Mucous membranes are moist.   Eyes:      Extraocular Movements: Extraocular movements intact.      Conjunctiva/sclera: Conjunctivae normal.   Cardiovascular:      Rate and Rhythm: Normal rate. Rhythm irregular.      Heart sounds: No murmur heard.  Pulmonary:      Effort: Pulmonary effort is normal. No respiratory distress.   Abdominal:      General: There is no distension.      Palpations: Abdomen is soft. There is no mass.      Tenderness: There is no abdominal tenderness.    Musculoskeletal:         General: No tenderness.      Cervical back: Normal range of motion and neck supple.   Skin:     General: Skin is warm and dry.   Neurological:      General: No focal deficit present.      Mental Status: She is alert and oriented to person, place, and time. Mental status is at baseline.   Psychiatric:         Mood and Affect: Mood normal.         Diagnostic Results   Labs  Below labs are reviewed today.  Results from last 7 days   Lab Units 01/19/24  0458 01/18/24  1648   WBC AUTO x10*3/uL 7.1 7.7   HEMOGLOBIN g/dL 12.6 14.1   HEMATOCRIT % 39.9 44.6   PLATELETS AUTO x10*3/uL 212 248   NEUTROS ABS x10*3/uL 5.05 5.99*   LYMPHS ABS AUTO x10*3/uL 0.87 0.84   MONOS ABS AUTO x10*3/uL 0.72 0.55   EOS ABS AUTO x10*3/uL 0.35 0.21   NEUTROS PCT AUTO % 71.4 77.9   LYMPHS PCT AUTO % 12.3 10.9   MONOS PCT AUTO % 10.2 7.2   EOS PCT AUTO % 5.0 2.7        Results from last 7 days   Lab Units 01/19/24  0458 01/18/24  1648   GLUCOSE mg/dL 99 120*   SODIUM mmol/L 135* 136   POTASSIUM mmol/L 4.0 4.2   CHLORIDE mmol/L 102 100   CO2 mmol/L 26 26   BUN mg/dL 20 24*   CREATININE mg/dL 0.81 1.06*   EGFR mL/min/1.73m*2 75 54*   CALCIUM mg/dL 9.6 9.8   MAGNESIUM mg/dL 1.80 1.90   ALBUMIN g/dL 3.3* 3.8   PROTEIN TOTAL g/dL 6.8 7.3   BILIRUBIN TOTAL mg/dL 0.6 0.9   ALK PHOS U/L 56 70   ALT U/L 12 14   AST U/L 18 22                     Images     CT Neck 12/12/23  FINDINGS:  *  The visualized paranasal sinuses nasopharynx and oropharynx are  unremarkable. *The mandible, maxilla and temporomandibular joints are  normal. *The major salivary glands are normal.  *There is a 2.5 cm by 2.5 cm x 2 cm mass within the left  aryepiglottic fold. There is extension to the left piriformis sinus.  The mass extends to the margin of the laryngeal cartilages without  cartilaginous changes or extra laryngeal abnormality. There is no  evidence of extension to the left carotid sheath. *There is a 1 cm x  1.5 cm lymph node at level 2A/2B on  the left with peripheral  enhancement consistent with extra capsular extension. There is a 5 mm  enhancing lymph node at left level 3 with central lucency concerning  for additional chris metastasis. *The thyroid gland is normal.  *Unchanged dilatation of the ascending aorta which currently measures  a proximally 4.3 cm in diameter. The thoracic inlet is otherwise  normal.      IMPRESSION:  *Left-sided hypopharyngeal mass without extra laryngeal extension  *Left-sided regional lymph node metastases as described    Pathology  Lab Results   Component Value Date    PATHREP  02/18/2021     Name ANDER AVELAR                                                                                                   Accession #: [id number]            Pathologist:                   J LUIS DOWD MD  Date of Procedure:    2/18/2021  Date Received:          2/18/2021  Date Reported           2/24/2021  Submitting Physician:   SAGAR MARIN MD  Location:                    [facility]  Other External #                                                                    FINAL DIAGNOSIS  A.  GASTRIC ANTRUM & BODY COLD BX:    --GASTRIC MUCOSA WITH CHANGES CONSISTENT WITH IRON PILL GASTRITIS.  SEE NOTE.    Note: An iron stain highlights mucosal iron deposition.                                                                                                                                                                                                                                                                                                                                                                                                                                                                                       Electronically Signed Out By J LUIS DOWD MD/RFA  By the signature on this report, the individual or group listed as making the  Final Interpretation/Diagnosis certifies that they have reviewed this case.       "     Clinical History:  Gastritis    Specimens Submitted As:  A: GASTRIC ANTRUM & BODY COLD BX     Gross Description:  Received in formalin, labeled with the patient's name and hospital number and  \"A. Gastric antrum body\", are 2 fragments of tan, soft tissue aggregating to  0.3 x 0.3 x 0.2 cm and 0.4 x 0.3 x 0.2 cm. The specimen is submitted in toto in  one cassette.  MO    mjo/2/18/2021           The assays/tests were performed with appropriate positive and negative controls  which stained appropriately.    [facility]  Department of Pathology   [address] [place] [address]        PATHREP  01/15/2015     Name ANDER AVELAR                                                                                                   Accession #: [id number]            Pathologist:                   CAMPOS VILLANUEVA DO  Date of Procedure:    1/15/2015  Date Received:          1/15/2015  Date Reported           1/19/2015  Submitting Physician:   SHER CHAVEZ M.D.  Location:                    [facility]  Other External #                                                                    FINAL DIAGNOSIS  A.  RIGHT FALLOPIAN TUBE + OVARY AND LEFT FALLOPIAN TUBE, RIGHT  SALPINGO-OOPHORECTOMY AND LEFT SALPINGECTOMY:  -- LEFT OVARY: ATROPHIC OVARIAN PARENCHYMA WITH A BENIGN ABHINAV TUMOR (1.0  CM). SEE NOTE.  -- LEFT FALLOPIAN TUBE: PARATUBAL CYSTS.   -- RIGHT OVARY: ATROPHIC OVARIAN PARENCHYMA WITH CORTICAL AND SURFACE  INCLUSIONS.   -- RIGHT FALLOPIAN TUBE: PARATUBAL CYSTS.  -- SEE NOTE.    Note:  There is a 1.6 cm fragment of atrophic ovary attached to the left  fallopian tube which contains a 1.0 cm benign abhinav tumor.  The specimen was  submitted entirely, and there is no evidence of carcinoma.                                                                                                                                                                                           "                                                                                                                                                                                                                                                                                              Electronically Signed Out By CAMPOS VILLANUEVA DO/SHAYAN  By the signature on this report, the individual or group listed as making the  Final Interpretation/Diagnosis certifies that they have reviewed this case.           Clinical History:  BRCA gene      Specimens Submitted As:  A: RIGHT TUBE + OVARY AND LEFT TUBE     Gross Description:  A.Received in formalin labeled with patient's name and hospital number,  is a  right ovary and attached fallopian tube, and left fallopian tube.  The right  fallopian tube measures 4.0 x 0.5 x 0.5 cm and has a no fimbriated end. A  paratubal cyst is present measuring 0.3 cm in greatest dimension.  The ovary  measures 2.0 x 1.5 x 1.5 cm and has a white crinkled surface.  The specimen  weighs 5.5 grams.  The specimen is inked and serially sectioned from the  isthmus end.  Sectioning of the ovary reveals yellow corpora.   The left  fallopian tube measures 3.5 x 0.5 x 0.5 cm and has a no fimbriated end. A  paratubal cyst is present measuring 0.2 cm in greatest dimension.   The  fallopian tubes and ovary are entirely submitted in 12 cassettes.  TARAH    Cassette Summary  A     1     right fallopian tube, interstitial       2     right fallopian tube isthmus       3     right fallopian tube ampullary       4-7     right ovary and tube       8     left fallopian tube, isthmus       9-12     left fallopian tube ampullary           .            tarah/1/15/2015               [facility]  Department of Pathology   [id number] [place] [address]             Assessment/Plan   ASSESSMENT  Amita Todd is a 78 y.o. female with [id number] hypopharyngeal squamous mariana  carcinoma.     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  - no plans for surgery as patient wishes for laryngeal preservation  - we discussed treatment with definitive concurrent chemoRT; would favor treatment with weekly carboplatin + paclitaxel vs cisplatin given decreased GFR, heart failure, age, and other co-morbidities  - PET scan 1/12 showed no distant mets  - upcoming barium swallow; we discussed the role of PEG tube placement  - ok to start concurrent chemoRT with weekly carboplatin + paclitaxel x 7 weeks today    # Hx of CHF, CAD s/p PCI, A.fib, AAA  -recent admission due to worsening exertional dyspnea  -CTA was negative for PE, aortic aneurysm stable, no acute anemia noted, no evidence pneumonia on CXR, no hypoxia.   -EKG A.fib rate controlled  -ECHO 3/2023 LVEF 50-55%, aortic valve regurg  -, 160 last year  -per Dr. Nova, hold lasix unless >3 lbs weight gain during treatment    # Dysphagia  - discussed role of PEG tube placement during initial visit  - sees SLP 1/25    PLAN  - ok to start concurrent definitive chemoRT 1/24/24 with weekly carboplatin + paclitaxel x 7 weeks  - RTC in 1 week  - follow up with cardiologist (Dr. Nova)

## 2024-01-24 NOTE — PROGRESS NOTES
Pt was seen by provider prior to infusion, Dr. Burkitt answered all her questions/concerns. Pt was educated on first dose Carbo/Taxol and given handouts on medications/side effects/ when and who to call with concerns. Pt tolerated infusions without incidence and discharged via wheelchair by daughter in stable condition.

## 2024-01-24 NOTE — PROGRESS NOTES
Patient to have cycle 1 of chemo today. Patient provided with print out of supportive home medication instructions. Dr. Burkitt also prescribed senna for constipation.     Patient appreciative.

## 2024-01-24 NOTE — PROGRESS NOTES
NUTRITION Assessment NOTE    Nutrition Assessment     Reason for Visit:  Amita Todd is a 78 y.o. 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 I was asked to see her in RT today as he is meeting with MD.    5-2008 had a lesion on her right lateral tongue removed- she did not receive further intervention since then she was being followed closely until December when a lesion was found and biopsied     Has a MBSS scheduled for 1/15/2024  Recommendations:  Began #5 minced moist solids with thin liquids.  Chin tuck with every swallow.  Double swallows per bite and sip.  Occasional throat clear or cough through meal.  Small frequent meals throughout the day.  Meds crushed in purees.  Continue outpatient SLP services for treatment give post radiation effects of the swallow mechanism.     Tati is daughter and she is with the patient     Lab Results   Component Value Date/Time    GLUCOSE 99 01/19/2024 0458     (L) 01/19/2024 0458    K 4.0 01/19/2024 0458     01/19/2024 0458    CO2 26 01/19/2024 0458    ANIONGAP 11 01/19/2024 0458    BUN 20 01/19/2024 0458    CREATININE 0.81 01/19/2024 0458    EGFR 75 01/19/2024 0458    CALCIUM 9.6 01/19/2024 0458    ALBUMIN 3.3 (L) 01/19/2024 0458    ALKPHOS 56 01/19/2024 0458    PROT 6.8 01/19/2024 0458    AST 18 01/19/2024 0458    BILITOT 0.6 01/19/2024 0458    ALT 12 01/19/2024 0458     Lab Results   Component Value Date/Time    VITD25 83 03/29/2021 1227       Anthropometrics:       HT: 167.6 cm (5'6)  BMI: 25.28  IBW: 59.1  120.1% IBW  In the past month she is down 1.1 kg (1.5%)   In the pat year she is down 3.6 kg (4.8%)     Wt Readings from Last 10 Encounters:   01/24/24 68.9 kg (151 lb 14.4 oz)   01/19/24 69.7 kg (153 lb 10.6 oz)   01/12/24 70.6 kg (155 lb 10.3 oz)   01/11/24 70.3 kg (155 lb)   01/10/24 69.6 kg (153 lb 7 oz)   01/09/24 71 kg (156 lb 8.4 oz)   01/03/24 70.8 kg (156 lb)  "  12/15/23 71.8 kg (158 lb 3.2 oz)   12/13/23 71.5 kg (157 lb 9 oz)   12/06/23 72.1 kg (159 lb)        Food And Nutrient Intake:      Patient reports = Pain with swallowing  Crushing pills and putting in applesauce   Had been taking a variety of vitamins and minerals from [employer] - I did not get full list as patient has not recently been taking them due to swallowing issues her main focus are her prescription meds.        Modified diet  Applesauce   Mac and cheese  Pears   bananas  berries  Gatorade  Ensure Complete- 1 per day - dilutes with skim milk  350 calories and 30 g protein   Still sometimes has to regurgitate these types of foods as well  This might happen if takes more than a tsp     Needs tender meats- fish  Not able to chew beef and pork- gets stuck on left side- might cough and spit out food-     SLP will see next week  She is coughing a lot- even with supplements  Cold is not good  Does better with room temperature foods and beverages     Has moucus drainage down the back of her throat.   Does want a suction machine.   Starting mucinex     Not hungry  Brain bleed years ago and sense of taste and smell are not there   She grazes    She was able to trial Boost VHC in clinic - she felt that this was \"smoother\" going down.  She is aware of calories provided in product and how to obtain                                                              Nutrition Focused Physical Exam Findings:                          Energy Needs           Nutrition Diagnosis             Nutrition Interventions/Recommendations   Nutrition Prescription       Food and Nutrition Delivery       Nutrition Education       Coordination of Care       There are no Patient Instructions on file for this visit.    Nutrition Monitoring and Evaluation      Plan to review results of MBSS on 1/15/2023 and follow up accordingly                    "

## 2024-01-25 ENCOUNTER — HOSPITAL ENCOUNTER (OUTPATIENT)
Dept: RADIATION ONCOLOGY | Facility: HOSPITAL | Age: 79
Setting detail: RADIATION/ONCOLOGY SERIES
Discharge: HOME | End: 2024-01-25
Payer: MEDICARE

## 2024-01-25 ENCOUNTER — TREATMENT (OUTPATIENT)
Dept: SPEECH THERAPY | Facility: HOSPITAL | Age: 79
End: 2024-01-25
Payer: MEDICARE

## 2024-01-25 DIAGNOSIS — R13.12 OROPHARYNGEAL DYSPHAGIA: Primary | ICD-10-CM

## 2024-01-25 DIAGNOSIS — C13.8 MALIGNANT NEOPLASM OF OVERLAPPING SITES OF HYPOPHARYNX (MULTI): ICD-10-CM

## 2024-01-25 DIAGNOSIS — Z51.0 ENCOUNTER FOR ANTINEOPLASTIC RADIATION THERAPY: ICD-10-CM

## 2024-01-25 LAB
RAD ONC MSQ ACTUAL FRACTIONS DELIVERED: 2
RAD ONC MSQ ACTUAL SESSION DELIVERED DOSE: 200 CGRAY
RAD ONC MSQ ACTUAL TOTAL DOSE: 400 CGRAY
RAD ONC MSQ ELAPSED DAYS: 1
RAD ONC MSQ LAST DATE: NORMAL
RAD ONC MSQ PRESCRIBED FRACTIONAL DOSE: 200 CGRAY
RAD ONC MSQ PRESCRIBED NUMBER OF FRACTIONS: 35
RAD ONC MSQ PRESCRIBED TECHNIQUE: NORMAL
RAD ONC MSQ PRESCRIBED TOTAL DOSE: 7000 CGRAY
RAD ONC MSQ PRESCRIPTION PATTERN COMMENT: NORMAL
RAD ONC MSQ START DATE: NORMAL
RAD ONC MSQ TREATMENT COURSE NUMBER: 1
RAD ONC MSQ TREATMENT SITE: NORMAL

## 2024-01-25 PROCEDURE — 92526 ORAL FUNCTION THERAPY: CPT | Mod: GN

## 2024-01-25 PROCEDURE — 77386 HC INTENSITY-MODULATED RADIATION THERAPY (IMRT), COMPLEX: CPT | Performed by: RADIOLOGY

## 2024-01-25 PROCEDURE — 77014 CHG CT GUIDANCE RADIATION THERAPY FLDS PLACEMENT: CPT | Performed by: RADIOLOGY

## 2024-01-25 NOTE — PROGRESS NOTES
Speech-Language Pathology    Outpatient Speech-Language Pathology Treatment     Patient Name: Amita Todd  MRN: [id number]  Today's Date: 1/25/2024            Current Problem:   MBSS completed on 1/16/24. Mild Oropharyngeal dysphagia secondary to hypopharyngeal mass. Pt currently completing radiation and chemo tx.  Pt to began week 2 of radiation.     Recommendations:  Continue #5 minced moist solids with thin liquids.  Chin tuck with every swallow.  Double swallows per bite and sip.  Occasional throat clear or cough through meal.  Small frequent meals throughout the day.  Meds crushed in purees.  Continue outpatient SLP services for treatment give post radiation effects of the swallow mechanism.  Schedule appointment the last week of radiation or if pt begins to have worsening swallow function.      Plan:  SLP TX Plan: Continue Plan of Care  SLP Frequency: BID  SLP Discharge Recommendations: Outpatient SLP  Discussed POC: Patient  Discussed Risks/Benefits: Yes  Patient/Caregiver Agreeable: Yes      Subjective : Pt alert and cooperative.   Current Problem:  Pharyngeal dysphagia with modified diet textures requiring compensatory strategies to consume PO to improve safety and efficiency. Pt currently receiving radiation and will need support for maintaining swallow function and diet tolerance given post radiation effects to the swallow.     Most Recent Visit:  SLP Received On: 01/25/24    General Visit Information:   Reason for Referral: Dysphagia treatement for pahryngeal dysphagia during radiation for laryngeal mass  Patient Seen During This Visit: Yes  Pt reports no weight loss, good appetite, no pain, no PNA's.    Pain Assessment:    0/10      Therapeutic Swallow:  Therapeutic Swallow Intervention : Compensatory Strategies, PO Trials, Respiratory Strength Training, Caregiver Education, Pharyngeal Strengthening Techniques  Pharyngeal Strengthening Techniques: Effortful Swallow, Arminda Maneuver  Solid Diet  Recommendations: Minced & moist/ground (IDDSI Level 5)  Liquid Diet Recommendations: Thin (IDDSI Level 0)  Swallow Comments: Chin tuck with all swallows    Prophoyaltic exercises reviewed with patient along with biofeedback from images of MBSS. Pt independently utilizing chin tuck maneuver during PO trials of purees and thin liquids. SLP noted occasional throat clear during PO trials during this session with purees and thin liquids  but no coughing or choking observed. Pt introduced to EMST 150. Pt completed 5 breaths at [id number] with fair-good effort. Pt demonstrated adequate utilization of pharyngeal, laryngeal, jaw and neck exercises.     Outpatient Education:  Adult Outpatient Education  Individual(s) Educated: Patient, Caregiver  Risk and Benefits Discussed with Patient/Caregiver/Other: yes  Patient/Caregiver Demonstrated Understanding: yes  Plan of Care Discussed and Agreed Upon: yes  Patient Response to Education: Patient/Caregiver Verbalized Understanding of Information        Consultations/Referrals/Coordination of Services: n/a

## 2024-01-26 ENCOUNTER — TELEPHONE (OUTPATIENT)
Dept: RADIATION ONCOLOGY | Facility: HOSPITAL | Age: 79
End: 2024-01-26
Payer: MEDICARE

## 2024-01-26 ENCOUNTER — RADIATION ONCOLOGY OTV (OUTPATIENT)
Dept: RADIATION ONCOLOGY | Facility: HOSPITAL | Age: 79
End: 2024-01-26
Payer: MEDICARE

## 2024-01-26 ENCOUNTER — HOSPITAL ENCOUNTER (OUTPATIENT)
Dept: RADIATION ONCOLOGY | Facility: HOSPITAL | Age: 79
Setting detail: RADIATION/ONCOLOGY SERIES
Discharge: HOME | End: 2024-01-26
Payer: MEDICARE

## 2024-01-26 VITALS
SYSTOLIC BLOOD PRESSURE: 112 MMHG | OXYGEN SATURATION: 98 % | WEIGHT: 154.54 LBS | HEART RATE: 78 BPM | RESPIRATION RATE: 18 BRPM | TEMPERATURE: 97.2 F | DIASTOLIC BLOOD PRESSURE: 75 MMHG | BODY MASS INDEX: 24.96 KG/M2

## 2024-01-26 DIAGNOSIS — Z51.0 ENCOUNTER FOR ANTINEOPLASTIC RADIATION THERAPY: ICD-10-CM

## 2024-01-26 DIAGNOSIS — C13.8 MALIGNANT NEOPLASM OF OVERLAPPING SITES OF HYPOPHARYNX (MULTI): ICD-10-CM

## 2024-01-26 DIAGNOSIS — C13.9 HYPOPHARYNGEAL CANCER (MULTI): Primary | ICD-10-CM

## 2024-01-26 LAB
RAD ONC MSQ ACTUAL FRACTIONS DELIVERED: 3
RAD ONC MSQ ACTUAL SESSION DELIVERED DOSE: 200 CGRAY
RAD ONC MSQ ACTUAL TOTAL DOSE: 600 CGRAY
RAD ONC MSQ ELAPSED DAYS: 2
RAD ONC MSQ LAST DATE: NORMAL
RAD ONC MSQ PRESCRIBED FRACTIONAL DOSE: 200 CGRAY
RAD ONC MSQ PRESCRIBED NUMBER OF FRACTIONS: 35
RAD ONC MSQ PRESCRIBED TECHNIQUE: NORMAL
RAD ONC MSQ PRESCRIBED TOTAL DOSE: 7000 CGRAY
RAD ONC MSQ PRESCRIPTION PATTERN COMMENT: NORMAL
RAD ONC MSQ START DATE: NORMAL
RAD ONC MSQ TREATMENT COURSE NUMBER: 1
RAD ONC MSQ TREATMENT SITE: NORMAL

## 2024-01-26 PROCEDURE — 77427 RADIATION TX MANAGEMENT X5: CPT | Performed by: RADIOLOGY

## 2024-01-26 PROCEDURE — 77386 HC INTENSITY-MODULATED RADIATION THERAPY (IMRT), COMPLEX: CPT | Performed by: RADIOLOGY

## 2024-01-26 PROCEDURE — 77014 CHG CT GUIDANCE RADIATION THERAPY FLDS PLACEMENT: CPT | Performed by: RADIOLOGY

## 2024-01-26 RX ORDER — DEXAMETHASONE 4 MG/1
4 TABLET ORAL
Qty: 60 TABLET | Refills: 0 | Status: SHIPPED | OUTPATIENT
Start: 2024-01-26 | End: 2024-02-25

## 2024-01-26 ASSESSMENT — PAIN SCALES - GENERAL: PAINLEVEL: 1

## 2024-01-26 NOTE — PROGRESS NOTES
Radiation Oncology On Treatment Visit    Patient Name:  Amita Todd  MRN:  [id number]  :  1945    Referring Provider: No ref. 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             SUBJECTIVE: Patient started CRT this week. She is feeling much better since hospital admission for SOB earlier this week. A cardiac workup was negative for worsening function and chest imaging was clear. She is bothered by thick post nasal drip. Able to swallow pureed foods and drinking 3-4 VHC daily.    OBJECTIVE:   Vital Signs:  /75   Pulse 78   Temp 36.2 °C (97.2 °F)   Resp 18   Wt 70.1 kg (154 lb 8.7 oz)   SpO2 98%   BMI 24.96 kg/m²    Pain Scale: The patient's current pain level was assessed.  They report currently having a pain of 1 out of 10.    Other Pertinent Findings:   No thrush, mucosits, or erythema of the neck  Labs including CMP/CBC from  look much improved with normalization of GFR  No LE edema    Toxicity Assessment          2024    10:50   Toxicity Assessment   Adverse Events Reviewed (WDL)  No (Exceptions to WDL)   Treatment  and neck   Anorexia Grade 0       First weight during treatment   Dehydration Grade 0       swallowing ok   Dermatitis Radiation Grade 0       creams provided   Fatigue Grade 0   Nausea Grade 0   Pain Grade 0   Tumor Pain Grade 0   Vomiting Grade 0   Dysphagia Grade 0       passed SLP appointment   Mucositis Oral Grade 0   Dry Mouth Grade 1       talked about OTC meds   Oral Pain Grade 0   Aspiration Grade 0   Hoarseness Grade 0   Voice Alteration Grade 0        Assessment / Plan:  The patient is tolerating radiation therapy as anticipated.  Continue per current treatment plan.       Reviewed with patient their Symptom Management Form for Head and Neck Cancer:    Cancer Treatment: patient is doing well. Started CRT with carbo/taxol this week. No nausea from chemo. She has ongoing dysphagia to solids and difficulty with clearing post nasal drip. However, breathing is improved since her hospitalization last week. No need currently for PEG or trach. I gave her a rx for Dex 4 mg BID to decrease change of tumor edema causing worsening symptoms as we start RT. Will watch her closely for changes. Also gave her omeprazole for GI ppx from steroid effects.    FEN: patient's weight has increased. Using liquid supplements. Aim for >2000 calories and >60grams of protein daily. Will see a dietician for more specific recommendations.  All diet is oral and no feeding tube.  Met with SLP prior to starting RT and doing daily exercises to maintain swallowing function.  Currently drinking 3-4 VHC daily and weight is stable      Fluid balance in setting of CHF: Currently appears to have good fluid balance off Lasix and Creatinine has normalized. No evidence of volume overloading with weight 156 at dx and 154 today. Will continue per Dr. Nova's directives to use Lasix PRN if weight increases by 3lbs in short time interval.    Nausea: Reviewed use of  Compazine/Zofran PRN for breakthrough  nausea    Pain control: Patient reporting no pain.    Oral Care: Patient will continue fluoride trays per dentist. Patient is using Glutamine 10g TID on days of RT to promote ongoing healing of mucositis. Rec uaifenesin for thick saliva or Xylimelt for dry mouth    Skin Care: Continue Aquaphor within the RT field. Reviewed how to apply and to avoid within 3 hours prior to RT.     Diarrhea/Constipation: Reviewed use of Immodium for diarrhea. Reviewed use of Miralax or Senna if develops constipation.

## 2024-01-28 DIAGNOSIS — E11.69 TYPE 2 DIABETES MELLITUS WITH OTHER SPECIFIED COMPLICATION, WITHOUT LONG-TERM CURRENT USE OF INSULIN (MULTI): ICD-10-CM

## 2024-01-28 RX ORDER — DAPAGLIFLOZIN 10 MG/1
10 TABLET, FILM COATED ORAL DAILY
Qty: 30 TABLET | Refills: 1 | Status: CANCELLED | OUTPATIENT
Start: 2024-01-28

## 2024-01-29 ENCOUNTER — OFFICE VISIT (OUTPATIENT)
Dept: HEMATOLOGY/ONCOLOGY | Facility: HOSPITAL | Age: 79
End: 2024-01-29
Payer: MEDICARE

## 2024-01-29 ENCOUNTER — HOSPITAL ENCOUNTER (OUTPATIENT)
Dept: RADIATION ONCOLOGY | Facility: HOSPITAL | Age: 79
Setting detail: RADIATION/ONCOLOGY SERIES
Discharge: HOME | End: 2024-01-29
Payer: MEDICARE

## 2024-01-29 VITALS
OXYGEN SATURATION: 99 % | WEIGHT: 150.8 LBS | BODY MASS INDEX: 24.35 KG/M2 | HEART RATE: 79 BPM | RESPIRATION RATE: 18 BRPM | DIASTOLIC BLOOD PRESSURE: 66 MMHG | SYSTOLIC BLOOD PRESSURE: 93 MMHG | TEMPERATURE: 96.8 F

## 2024-01-29 VITALS
SYSTOLIC BLOOD PRESSURE: 81 MMHG | WEIGHT: 151.3 LBS | HEART RATE: 80 BPM | DIASTOLIC BLOOD PRESSURE: 48 MMHG | BODY MASS INDEX: 24.43 KG/M2 | RESPIRATION RATE: 18 BRPM | TEMPERATURE: 96.6 F | OXYGEN SATURATION: 98 %

## 2024-01-29 DIAGNOSIS — Z15.09 BRCA POSITIVE: ICD-10-CM

## 2024-01-29 DIAGNOSIS — Z15.01 BRCA POSITIVE: ICD-10-CM

## 2024-01-29 DIAGNOSIS — Z51.0 ENCOUNTER FOR ANTINEOPLASTIC RADIATION THERAPY: ICD-10-CM

## 2024-01-29 DIAGNOSIS — C13.8 MALIGNANT NEOPLASM OF OVERLAPPING SITES OF HYPOPHARYNX (MULTI): ICD-10-CM

## 2024-01-29 DIAGNOSIS — E86.0 DEHYDRATION: Primary | ICD-10-CM

## 2024-01-29 DIAGNOSIS — C13.9 MALIGNANT NEOPLASM OF HYPOPHARYNX (MULTI): ICD-10-CM

## 2024-01-29 LAB
ALBUMIN SERPL BCP-MCNC: 3.7 G/DL (ref 3.4–5)
ALP SERPL-CCNC: 61 U/L (ref 33–136)
ALT SERPL W P-5'-P-CCNC: 18 U/L (ref 7–45)
ANION GAP SERPL CALC-SCNC: 12 MMOL/L (ref 10–20)
AST SERPL W P-5'-P-CCNC: 21 U/L (ref 9–39)
BASOPHILS # BLD AUTO: 0.04 X10*3/UL (ref 0–0.1)
BASOPHILS NFR BLD AUTO: 0.5 %
BILIRUB SERPL-MCNC: 1.8 MG/DL (ref 0–1.2)
BUN SERPL-MCNC: 19 MG/DL (ref 6–23)
BURR CELLS BLD QL SMEAR: NORMAL
CALCIUM SERPL-MCNC: 9.5 MG/DL (ref 8.6–10.3)
CHLORIDE SERPL-SCNC: 97 MMOL/L (ref 98–107)
CO2 SERPL-SCNC: 28 MMOL/L (ref 21–32)
CREAT SERPL-MCNC: 0.94 MG/DL (ref 0.5–1.05)
EGFRCR SERPLBLD CKD-EPI 2021: 63 ML/MIN/1.73M*2
EOSINOPHIL # BLD AUTO: 0.12 X10*3/UL (ref 0–0.4)
EOSINOPHIL NFR BLD AUTO: 1.5 %
ERYTHROCYTE [DISTWIDTH] IN BLOOD BY AUTOMATED COUNT: 14.4 % (ref 11.5–14.5)
GLUCOSE SERPL-MCNC: 91 MG/DL (ref 74–99)
HCT VFR BLD AUTO: 42.8 % (ref 36–46)
HGB BLD-MCNC: 13.4 G/DL (ref 12–16)
IMM GRANULOCYTES # BLD AUTO: 0.05 X10*3/UL (ref 0–0.5)
IMM GRANULOCYTES NFR BLD AUTO: 0.6 % (ref 0–0.9)
LYMPHOCYTES # BLD AUTO: 0.5 X10*3/UL (ref 0.8–3)
LYMPHOCYTES NFR BLD AUTO: 6.1 %
MAGNESIUM SERPL-MCNC: 1.93 MG/DL (ref 1.6–2.4)
MCH RBC QN AUTO: 30.4 PG (ref 26–34)
MCHC RBC AUTO-ENTMCNC: 31.3 G/DL (ref 32–36)
MCV RBC AUTO: 97 FL (ref 80–100)
MONOCYTES # BLD AUTO: 0.1 X10*3/UL (ref 0.05–0.8)
MONOCYTES NFR BLD AUTO: 1.2 %
NEUTROPHILS # BLD AUTO: 7.35 X10*3/UL (ref 1.6–5.5)
NEUTROPHILS NFR BLD AUTO: 90.1 %
NRBC BLD-RTO: 0 /100 WBCS (ref 0–0)
OVALOCYTES BLD QL SMEAR: NORMAL
PLATELET # BLD AUTO: 161 X10*3/UL (ref 150–450)
POTASSIUM SERPL-SCNC: 5 MMOL/L (ref 3.5–5.3)
PROT SERPL-MCNC: 7.3 G/DL (ref 6.4–8.2)
RAD ONC MSQ ACTUAL FRACTIONS DELIVERED: 4
RAD ONC MSQ ACTUAL SESSION DELIVERED DOSE: 200 CGRAY
RAD ONC MSQ ACTUAL TOTAL DOSE: 800 CGRAY
RAD ONC MSQ ELAPSED DAYS: 5
RAD ONC MSQ LAST DATE: NORMAL
RAD ONC MSQ PRESCRIBED FRACTIONAL DOSE: 200 CGRAY
RAD ONC MSQ PRESCRIBED NUMBER OF FRACTIONS: 35
RAD ONC MSQ PRESCRIBED TECHNIQUE: NORMAL
RAD ONC MSQ PRESCRIBED TOTAL DOSE: 7000 CGRAY
RAD ONC MSQ PRESCRIPTION PATTERN COMMENT: NORMAL
RAD ONC MSQ START DATE: NORMAL
RAD ONC MSQ TREATMENT COURSE NUMBER: 1
RAD ONC MSQ TREATMENT SITE: NORMAL
RBC # BLD AUTO: 4.41 X10*6/UL (ref 4–5.2)
RBC MORPH BLD: NORMAL
SODIUM SERPL-SCNC: 132 MMOL/L (ref 136–145)
WBC # BLD AUTO: 8.2 X10*3/UL (ref 4.4–11.3)

## 2024-01-29 PROCEDURE — 85025 COMPLETE CBC W/AUTO DIFF WBC: CPT | Performed by: NURSE PRACTITIONER

## 2024-01-29 PROCEDURE — 99215 OFFICE O/P EST HI 40 MIN: CPT | Mod: ZK | Performed by: NURSE PRACTITIONER

## 2024-01-29 PROCEDURE — 96360 HYDRATION IV INFUSION INIT: CPT | Mod: INF

## 2024-01-29 PROCEDURE — 1160F RVW MEDS BY RX/DR IN RCRD: CPT | Performed by: NURSE PRACTITIONER

## 2024-01-29 PROCEDURE — 83735 ASSAY OF MAGNESIUM: CPT | Performed by: NURSE PRACTITIONER

## 2024-01-29 PROCEDURE — 1126F AMNT PAIN NOTED NONE PRSNT: CPT | Performed by: NURSE PRACTITIONER

## 2024-01-29 PROCEDURE — 1159F MED LIST DOCD IN RCRD: CPT | Performed by: NURSE PRACTITIONER

## 2024-01-29 PROCEDURE — 2500000004 HC RX 250 GENERAL PHARMACY W/ HCPCS (ALT 636 FOR OP/ED): Performed by: NURSE PRACTITIONER

## 2024-01-29 PROCEDURE — 77386 HC INTENSITY-MODULATED RADIATION THERAPY (IMRT), COMPLEX: CPT | Performed by: RADIOLOGY

## 2024-01-29 PROCEDURE — 77014 CHG CT GUIDANCE RADIATION THERAPY FLDS PLACEMENT: CPT | Performed by: RADIOLOGY

## 2024-01-29 PROCEDURE — 3078F DIAST BP <80 MM HG: CPT | Performed by: NURSE PRACTITIONER

## 2024-01-29 PROCEDURE — 1036F TOBACCO NON-USER: CPT | Performed by: NURSE PRACTITIONER

## 2024-01-29 PROCEDURE — 80053 COMPREHEN METABOLIC PANEL: CPT | Performed by: NURSE PRACTITIONER

## 2024-01-29 PROCEDURE — 3074F SYST BP LT 130 MM HG: CPT | Performed by: NURSE PRACTITIONER

## 2024-01-29 PROCEDURE — 99215 OFFICE O/P EST HI 40 MIN: CPT | Performed by: NURSE PRACTITIONER

## 2024-01-29 PROCEDURE — 96361 HYDRATE IV INFUSION ADD-ON: CPT | Mod: INF

## 2024-01-29 RX ADMIN — SODIUM CHLORIDE 500 ML: 9 INJECTION, SOLUTION INTRAVENOUS at 13:32

## 2024-01-29 RX ADMIN — SODIUM CHLORIDE 250 ML: 9 INJECTION, SOLUTION INTRAVENOUS at 14:56

## 2024-01-29 ASSESSMENT — ENCOUNTER SYMPTOMS
DEPRESSION: 0
COUGH: 0
SORE THROAT: 0
TROUBLE SWALLOWING: 1
CHILLS: 0
VOICE CHANGE: 1
LEG SWELLING: 0
DIARRHEA: 0
ABDOMINAL PAIN: 0
MYALGIAS: 0
DIFFICULTY URINATING: 0
SHORTNESS OF BREATH: 0
EYE PROBLEMS: 0
BLOOD IN STOOL: 0
HEADACHES: 0
NUMBNESS: 0
CONFUSION: 0
CONSTIPATION: 0
FEVER: 0
NAUSEA: 0
VOMITING: 0
ARTHRALGIAS: 0

## 2024-01-29 ASSESSMENT — PAIN SCALES - GENERAL: PAINLEVEL: 0-NO PAIN

## 2024-01-29 NOTE — PROGRESS NOTES
Patient ID: Amita Todd is a 78 y.o. female  Diagnosis: Hypopharyngeal SCC  Staging: [id number]  Date of Diagnosis: 12/21/23    Providers:  ENT Surgeon: Dr. Mahad Aguila: De. Burkitt RadOnc: Dr. Cochran    Current Therapy  Paclitaxel/carboplatin to start 1/31 with concurrent radiation   Surgery  - 5/2008: s/p excision of right lateral tongue lesion     Sites of Disease  Hypopharynx, LN (Left level II)     Current Oncologic Issues  Dysphagia       Past Medical History:   Past Medical History:  06/16/2016: Alcohol dependence, in remission (CMS/[facility])      Comment:  History of alcoholism  No date: Anemia  No date: Aneurysm of ascending aorta (CMS/[facility])  No date: Arrhythmia      Comment:  a-fib. a-flutter  08/24/2023: Branch retinal artery occlusion of right eye  No date: CKD (chronic kidney disease)  No date: Coronary artery disease  No date: Depression  No date: GERD (gastroesophageal reflux disease)  No date: Hypertension  No date: Intermittent claudication (CMS/[facility])  10/06/2023: Myocardial infarction (CMS/[facility])  11/21/2017: Other bursitis of hip, unspecified hip      Comment:  Hip bursitis  01/05/2014: Other conditions influencing health status      Comment:  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  01/25/2022: Pain in leg, unspecified      Comment:  Leg pain  07/01/2020: Pain in right shoulder      Comment:  Bilateral shoulder pain  03/31/2021: Pain in unspecified knee      Comment:  Joint pain, knee  02/09/2019: Personal history of diseases of the blood and blood-  forming organs and certain disorders involving the immune mechanism      Comment:  History of anemia  04/29/2014: Personal history of malignant neoplasm of breast      Comment:  History of malignant neoplasm of female breast  04/03/2014: Personal history of other diseases of the circulatory   system      Comment:  Personal history of subarachnoid hemorrhage  03/08/2021: Personal history of other diseases of the digestive system       Comment:  History of upper gastrointestinal hemorrhage  03/08/2021: Personal history of other diseases of the digestive system      Comment:  History of melena  08/12/2020: Personal history of other diseases of the nervous system   and sense organs      Comment:  History of Bell's palsy  06/14/2019: Personal history of other infectious and parasitic   diseases      Comment:  History of herpes zoster  03/03/2020: Personal history of other specified conditions      Comment:  History of lump of left breast  02/22/2021: Personal history of other specified conditions      Comment:  History of shortness of breath  04/24/2022: Personal history of transient ischemic attack (TIA), and   cerebral infarction without residual deficits      Comment:  History of transient ischemic attack  No date: Stenosis of carotid artery      Comment:  right  04/24/2017: Trigger finger, unspecified finger      Comment:  Acquired trigger finger   Surgical History:    Past Surgical History:   Procedure Laterality Date    CT ANGIO NECK  06/06/2019    CT NECK ANGIO W AND WO IV CONTRAST 6/6/2019 [facility] CLINICAL LEGACY    CT ANGIO NECK  01/04/2021    CT NECK ANGIO W AND WO IV CONTRAST 1/4/2021 [facility]    CT HEAD ANGIO W AND WO IV CONTRAST  06/06/2019    CT HEAD ANGIO W AND WO IV CONTRAST 6/6/2019 [facility] CLINICAL LEGACY    CT HEAD ANGIO W AND WO IV CONTRAST  01/04/2021    CT HEAD ANGIO W AND WO IV CONTRAST 1/4/2021 [facility]    MR HEAD ANGIO WO IV CONTRAST  06/06/2019    MR HEAD ANGIO WO IV CONTRAST 6/6/2019 [facility] CLINICAL LEGACY    MR NECK ANGIO WO IV CONTRAST  06/06/2019    MR NECK ANGIO WO IV CONTRAST 6/6/2019 [facility] CLINICAL LEGACY    OTHER SURGICAL HISTORY  03/03/2020    Oophorectomy    OTHER SURGICAL HISTORY  06/17/2019    Miscarriage surgical treatment    OTHER SURGICAL HISTORY  08/29/2019    multiple Catheter Ablation Atrial Flutter    OTHER SURGICAL HISTORY  04/23/2014    Previous Stent Placement    SEPTOPLASTY  09/22/2017     Septoplasty      Family History:    Family History   Problem Relation Name Age of Onset    Breast cancer Mother      Other (GOODPASTURE'S DISEASE) Father      BRCA2 Positive Sister      Lung cancer Sister      Neuroblastoma Sister      BRCA2 Positive Daughter      Breast cancer Other G/P      Family Oncology History:    Cancer-related family history includes Breast cancer in her mother and another family member; Lung cancer in her sister.  Social History:    Social History     Tobacco Use    Smoking status: Never    Smokeless tobacco: Never   Vaping Use    Vaping Use: Never used   Substance Use Topics    Alcohol use: Never    Drug use: Never          Subjective   Chief Complaint: dehydration     ANAID Levi is a 78 y.o. female with  PMH of CAD, afib/aflutter s/p multiple ablations and watchman procedure, PVD, AAA, right carotid stenosis, heart failure, and CKD.  hypopharyngeal SCC, who presents to [facility] after radiation for hypotension, dizzy and nausea over the weekend. PO intake has been decreased and took in very little yesterday. Has not taken compazine or zofran for nausea. Denies nausea at this time. She did take her home losartan and spironolactone this am. Denies vomiting diarrhea or abdominal pain. Last BM was on 1/27, passing flatus. Did take mucinex this am for post nasal gtt. Pt denies chest pain, cough, SOB, headaches, blurry vision, falls, fever or chills,  or urinary complaints.          ROS  Review of Systems - Oncology    Allergies  Allergies   Allergen Reactions    Oxycodone Hcl-Oxycodone-Asa Unknown    Oxycodone-Acetaminophen Unknown    Penicillin G Other     Nausea    Penicillins GI Upset and Other        Medications  Current Outpatient Medications   Medication Instructions    acetaminophen (TYLENOL) 650 mg, oral, Every 6 hours PRN    albuterol 90 mcg/actuation aerosol powdr breath activated inhaler 2 puffs, inhalation, Every 6 hours PRN    ascorbic acid (VITAMIN C) 500 mg, oral, Daily     atorvastatin (LIPITOR) 20 mg, oral, Daily, as directed    buPROPion SR (WELLBUTRIN SR) 150 mg, oral, 2 times daily, TAKE PER DIRECTED    cholecalciferol (Vitamin D-3) 25 MCG (1000 UT) tablet 1 tablet, oral, 2 times daily    clopidogrel (PLAVIX) 75 mg, oral, Daily    coenzyme Q-10 (COQ-10) 100 mg, oral, 2 times daily    dapagliflozin propanediol (FARXIGA) 10 mg, oral, Daily    dexAMETHasone (DECADRON) 4 mg, oral, 2 times daily with meals    DULoxetine (Cymbalta) 60 mg DR capsule TAKE 1 CAPSULE BY MOUTH  DAILY    furosemide (LASIX) 20 mg, oral, Daily    herbal complex no.239 (Whole Body Joint Support) capsule 1 capsule, oral, Daily    ipratropium (Atrovent) 21 mcg (0.03 %) nasal spray 2 sprays, Each Nostril, Every 12 hours    losartan (Cozaar) 50 mg tablet 1 tablet, oral, Daily    magnesium oxide (Mag-Ox) 400 mg (241.3 mg magnesium) tablet 1 tablet, oral, Daily    metoprolol tartrate (Lopressor) 25 mg tablet 2 tablets, oral, 2 times daily    multivitamin with minerals tablet 1 tablet, oral, Daily    ondansetron (ZOFRAN) 8 mg, oral, Every 8 hours PRN    pantoprazole (PROTONIX) 40 mg, oral, 2 times daily    prochlorperazine (COMPAZINE) 10 mg, oral, Every 6 hours PRN    sennosides-docusate sodium (Kalee-Colace) 8.6-50 mg tablet 2 tablets, oral, Daily    spironolactone (ALDACTONE) 25 mg, oral, Daily, PER DIRECTED          Objective   Vitals: BP 94/51 (BP Location: Right arm, Patient Position: 1 minute standing)   Pulse 62   Temp 36 °C (96.8 °F) (Temporal)   Resp 18   Wt 68.4 kg (150 lb 12.8 oz)   SpO2 99%   BMI 24.35 kg/m²   Weight:   Vitals:    01/29/24 1258   Weight: 68.4 kg (150 lb 12.8 oz)         Physical Exam  Vitals reviewed.   HENT:      Nose: Congestion present.      Mouth/Throat:      Mouth: Mucous membranes are moist.      Pharynx: Oropharynx is clear.   Eyes:      Conjunctiva/sclera: Conjunctivae normal.   Cardiovascular:      Rate and Rhythm: Normal rate. Rhythm irregular.      Pulses: Normal pulses.       Heart sounds: Normal heart sounds.   Pulmonary:      Effort: Pulmonary effort is normal.      Breath sounds: Normal breath sounds.   Abdominal:      General: Abdomen is flat. Bowel sounds are normal. There is no distension.      Palpations: Abdomen is soft.      Tenderness: There is no abdominal tenderness.   Musculoskeletal:         General: No swelling.      Right lower leg: No edema.      Left lower leg: No edema.   Skin:     General: Skin is warm and dry.   Neurological:      General: No focal deficit present.      Mental Status: She is alert and oriented to person, place, and time.   Psychiatric:         Mood and Affect: Mood normal.         Behavior: Behavior normal.           Diagnostic Results     Labs  Results from last 7 days   Lab Units 01/29/24  1331   WBC AUTO x10*3/uL 8.2   HEMOGLOBIN g/dL 13.4   HEMATOCRIT % 42.8   PLATELETS AUTO x10*3/uL 161   NEUTROS ABS x10*3/uL 7.35*   LYMPHS ABS AUTO x10*3/uL 0.50*   MONOS ABS AUTO x10*3/uL 0.10   EOS ABS AUTO x10*3/uL 0.12   NEUTROS PCT AUTO % 90.1   LYMPHS PCT AUTO % 6.1   MONOS PCT AUTO % 1.2   EOS PCT AUTO % 1.5      Results from last 7 days   Lab Units 01/29/24  1331   GLUCOSE mg/dL 91   SODIUM mmol/L 132*   POTASSIUM mmol/L 5.0   CHLORIDE mmol/L 97*   CO2 mmol/L 28   BUN mg/dL 19   CREATININE mg/dL 0.94   EGFR mL/min/1.73m*2 63   CALCIUM mg/dL 9.5   MAGNESIUM mg/dL 1.93   ALBUMIN g/dL 3.7   PROTEIN TOTAL g/dL 7.3   BILIRUBIN TOTAL mg/dL 1.8*   ALK PHOS U/L 61   ALT U/L 18   AST U/L 21                     Images         Assessment/Plan   ASSESSMENT  Amita Todd is a 78 y.o. female with PMH of CAD, afib/aflutter s/p multiple ablations and watchman procedure, PVD, AAA, right carotid stenosis, heart failure, and CKD.  hypopharyngeal SCC, who presents to [facility] after radiation for hypotension, dizzy and nausea over the weekend likely secondary to dehydration.         [facility]  - [facility]  - Labs s/f mild hyponatremia, hyperbilirubinemia  - 750 ml NS for  dehydration, vitals improved post   - pre-scheduled for tomorrow after radiation to re-assess dehydration   - advised patient to take BP in am and hold losartan tomorrow am if hypotensive  - advised to start taking zofran or compazine for nausea as needed.         Dispo:  - Patient discharged home with no needs after ACC course complete  - Return to clinic/ED instructions given to patient  - Follow up w/ Oncology as scheduled        Gabby Arellano, KIMBER-CNP

## 2024-01-29 NOTE — PROGRESS NOTES
Patient ID: Amita Todd is a 78 y.o. female.  Diagnosis: Squamous Cell Carcinoma of Hypopharynx  Staging: [id number], stage 3  Date of Diagnosis: 12/21/23    Providers:  ENT Surgeon: Dr. Clemente Tobin  MedOnc: Dr. Kyunghee Burkitt / ZINA Hinojosa  RadOnc: Dr. Tahira Cochran    Current Therapy  1/24/24: Started concurrent chemoRT with weekly Carboplatin + Paclitaxel, planned for 7 cycles and 70 gy RT in 35fx.     Surgery  5/2008: s/p excision of right lateral tongue lesion. No adjuvant therapy    Sites of Disease  Hypopharynx, LN (Left level II)     Current Oncologic Issues  Dysphagia    ONCOLOGIC HISTORY  - 3/2001: S/p lumpectomy for left Breast Cancer followed by adjuvant RT and adjuvant tamoxifen  - 5/2008: History of Stage 1, T1N0M0 Oral Cavity cancer. Lesion of right lateral tongue removed, no adjuvant therapy.  - 12/6/23: Presented to Dr. Tobin with increased dysphagia over several months. Fiberoptic laryngoscopy reviewed lesion involving left piriform sinus, >1cm.  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 FDG- avid b/l cervical leverl II nodes. NO distant mets.   - 1/24/24: Started concurrent chemoRT with weekly Carboplatin + Paclitaxel    Admission  1/18 - 1/19/24: Admitted due to exertional dyspnea and chest pressure. CTA was negative for PE. CXR negative pneumonia.  (160 last year).    Past Medical History:   Past Medical History:  06/16/2016: Alcohol dependence, in remission (CMS/HCC)      Comment:  History of alcoholism  No date: Anemia  No date: Aneurysm of ascending aorta (CMS/HCC)  No date:  Arrhythmia      Comment:  a-fib. a-flutter  08/24/2023: Branch retinal artery occlusion of right eye  No date: CKD (chronic kidney disease)  No date: Coronary artery disease  No date: Depression  No date: GERD (gastroesophageal reflux disease)  No date: Hypertension  No date: Intermittent claudication (CMS/[facility])  10/06/2023: Myocardial infarction (CMS/[facility])  11/21/2017: Other bursitis of hip, unspecified hip      Comment:  Hip bursitis  01/05/2014: Other conditions influencing health status      Comment:  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  01/25/2022: Pain in leg, unspecified      Comment:  Leg pain  07/01/2020: Pain in right shoulder      Comment:  Bilateral shoulder pain  03/31/2021: Pain in unspecified knee      Comment:  Joint pain, knee  02/09/2019: Personal history of diseases of the blood and blood-  forming organs and certain disorders involving the immune mechanism      Comment:  History of anemia  04/29/2014: Personal history of malignant neoplasm of breast      Comment:  History of malignant neoplasm of female breast  04/03/2014: Personal history of other diseases of the circulatory   system      Comment:  Personal history of subarachnoid hemorrhage  03/08/2021: Personal history of other diseases of the digestive system      Comment:  History of upper gastrointestinal hemorrhage  03/08/2021: Personal history of other diseases of the digestive system      Comment:  History of melena  08/12/2020: Personal history of other diseases of the nervous system   and sense organs      Comment:  History of Bell's palsy  06/14/2019: Personal history of other infectious and parasitic   diseases      Comment:  History of herpes zoster  03/03/2020: Personal history of other specified conditions      Comment:  History of lump of left breast  02/22/2021: Personal history of other specified conditions      Comment:  History of shortness of breath  04/24/2022: Personal history of transient ischemic attack  (TIA), and   cerebral infarction without residual deficits      Comment:  History of transient ischemic attack  No date: Stenosis of carotid artery      Comment:  right  04/24/2017: Trigger finger, unspecified finger      Comment:  Acquired trigger finger   Surgical History:    Past Surgical History:   Procedure Laterality Date    CT ANGIO NECK  06/06/2019    CT NECK ANGIO W AND WO IV CONTRAST 6/6/2019 [facility] CLINICAL LEGACY    CT ANGIO NECK  01/04/2021    CT NECK ANGIO W AND WO IV CONTRAST 1/4/2021 [facility] ANCILLARY LEGACY    CT HEAD ANGIO W AND WO IV CONTRAST  06/06/2019    CT HEAD ANGIO W AND WO IV CONTRAST 6/6/2019 [facility] CLINICAL LEGACY    CT HEAD ANGIO W AND WO IV CONTRAST  01/04/2021    CT HEAD ANGIO W AND WO IV CONTRAST 1/4/2021 [facility]    MR HEAD ANGIO WO IV CONTRAST  06/06/2019    MR HEAD ANGIO WO IV CONTRAST 6/6/2019 [facility] CLINICAL LEGACY    MR NECK ANGIO WO IV CONTRAST  06/06/2019    MR NECK ANGIO WO IV CONTRAST 6/6/2019 [facility] CLINICAL LEGACY    OTHER SURGICAL HISTORY  03/03/2020    Oophorectomy    OTHER SURGICAL HISTORY  06/17/2019    Miscarriage surgical treatment    OTHER SURGICAL HISTORY  08/29/2019    multiple Catheter Ablation Atrial Flutter    OTHER SURGICAL HISTORY  04/23/2014    Previous Stent Placement    SEPTOPLASTY  09/22/2017    Septoplasty      Family History:    Family History   Problem Relation Name Age of Onset    Breast cancer Mother      Other (GOODPASTURE'S DISEASE) Father      BRCA2 Positive Sister      Lung cancer Sister      Neuroblastoma Sister      BRCA2 Positive Daughter      Breast cancer Other G/P      Family Oncology History:    Cancer-related family history includes Breast cancer in her mother and another family member; Lung cancer in her sister. She reports a strong family history of cancer.   Social History:    Social History     Tobacco Use    Smoking status: Former     Packs/day: 2.00     Years: 30.00     Additional pack years: 0.00     Total pack years: 60.00      Types: Cigarettes     Quit date: 2000     Years since quittin.0    Smokeless tobacco: Never   Vaping Use    Vaping Use: Never used   Substance Use Topics    Alcohol use: Not Currently     Comment: Prior Alcohol use. Stopped in early     Drug use: Never   60 pack year history of smoking and prior ETOH use; she quit both in early  after she had a heart attack.       Subjective   Chief Complaint: Hypopharyngeal SCC    HPI  Interval History  Amita Todd is a 78 y.o. female with a PMH of CAD, aflutter s/p multiple ablations, PVD, AAA, right carotid stenosis, heart failure, and CKD. Recently diagnosed with hypopharyngeal Squamous Cell Carcinoma, started concurrent chemoRT with weekly Carboplatin + Paclitaxel on 24.    Patient presents for C2 Carbo/Taxol:    She reports feeling well this week. Energy and appetite are stable.  Had mild nausea after C1, resolved with anti-emetics.   SOB and TOWNSEND has resolved since discharge from hospital on . She does endorse increased mucous production due to PND. She's taking Mucinex DM, 4 times a day. She has a suction machine and will reach out to [facility] on instruction to use.   Has dysphagia due to left side neck mass. Met with SLP and rec minced and moist solids with thin liquids. Denies odynophagia.  Patient's weight has been stable from last week    Baseline symptoms below are stable:  - 5-10 lbs weight loss over the past 3-4 months.   - Exertional dyspnea: She is able to walk unassisted, but experience SOB after walking 20 feet due to her heart failure diagnosis.     ROS  Review of Systems   Constitutional:  Negative for chills, fever and unexpected weight change.   HENT:   Positive for trouble swallowing and voice change. Negative for hearing loss, mouth sores, sore throat and tinnitus.    Eyes:  Negative for eye problems.   Respiratory:  Negative for cough and shortness of breath.    Cardiovascular:  Negative for chest pain and leg swelling.    Gastrointestinal:  Negative for abdominal pain, blood in stool, constipation, diarrhea, nausea and vomiting.   Genitourinary:  Negative for difficulty urinating.    Musculoskeletal:  Negative for arthralgias, gait problem and myalgias.   Skin:  Negative for rash.   Neurological:  Negative for gait problem, headaches and numbness.   Psychiatric/Behavioral:  Negative for confusion and depression.        Allergies  Allergies   Allergen Reactions    Oxycodone Hcl-Oxycodone-Asa Unknown    Oxycodone-Acetaminophen Unknown    Penicillin G Other     Nausea    Penicillins GI Upset and Other        Medications  Current Outpatient Medications   Medication Instructions    acetaminophen (TYLENOL) 650 mg, oral, Every 6 hours PRN    albuterol 90 mcg/actuation aerosol [facility] breath activated inhaler 2 puffs, inhalation, Every 6 hours PRN    ascorbic acid (VITAMIN C) 500 mg, oral, Daily    atorvastatin (LIPITOR) 20 mg, oral, Daily, as directed    buPROPion SR (WELLBUTRIN SR) 150 mg, oral, 2 times daily, TAKE PER DIRECTED    cholecalciferol (Vitamin D-3) 25 MCG (1000 UT) tablet 1 tablet, oral, 2 times daily    clopidogrel (PLAVIX) 75 mg, oral, Daily    coenzyme Q-10 (COQ-10) 100 mg, oral, 2 times daily    dapagliflozin propanediol (FARXIGA) 10 mg, oral, Daily    dexAMETHasone (DECADRON) 4 mg, oral, 2 times daily with meals    DULoxetine (Cymbalta) 60 mg DR capsule TAKE 1 CAPSULE BY MOUTH  DAILY    furosemide (LASIX) 20 mg, oral, Daily    herbal complex no.239 (Whole Body Joint Support) capsule 1 capsule, oral, Daily    ipratropium (Atrovent) 21 mcg (0.03 %) nasal spray 2 sprays, Each Nostril, Every 12 hours    losartan (Cozaar) 50 mg tablet 1 tablet, oral, Daily    magnesium oxide (Mag-Ox) 400 mg (241.3 mg magnesium) tablet 1 tablet, oral, Daily    metoprolol tartrate (Lopressor) 25 mg tablet 2 tablets, oral, 2 times daily    multivitamin with minerals tablet 1 tablet, oral, Daily    ondansetron (ZOFRAN) 8 mg, oral, Every 8 hours PRN     pantoprazole (PROTONIX) 40 mg, oral, 2 times daily    prochlorperazine (COMPAZINE) 10 mg, oral, Every 6 hours PRN    sennosides-docusate sodium (Kalee-Colace) 8.6-50 mg tablet 2 tablets, oral, Daily    spironolactone (ALDACTONE) 25 mg, oral, Daily, PER DIRECTED          Objective   VS: BP 99/64   Pulse 72   Temp 36.7 °C (98.1 °F) (Core)   Resp 18   Wt 69.1 kg (152 lb 5.4 oz)   SpO2 100%   BMI 24.60 kg/m²   Weight: Daily Weight  01/31/24 : 69.1 kg (152 lb 5.4 oz)  01/30/24 : 69.4 kg (153 lb 1.6 oz)  01/29/24 : 68.6 kg (151 lb 4.8 oz)  01/29/24 : 68.4 kg (150 lb 12.8 oz)  01/26/24 : 70.1 kg (154 lb 8.7 oz)  01/24/24 : 68.9 kg (151 lb 14.4 oz)  01/19/24 : 69.7 kg (153 lb 10.6 oz)      Physical Exam  Constitutional:       General: She is not in acute distress.     Appearance: Normal appearance.   HENT:      Head: Normocephalic and atraumatic.      Nose: Nose normal.      Mouth/Throat:      Lips: Pink.      Mouth: Mucous membranes are dry and cyanotic.   Eyes:      Extraocular Movements: Extraocular movements intact.      Conjunctiva/sclera: Conjunctivae normal.   Neck:      Comments: Left sided neck mass, non-tender to palpation    Cardiovascular:      Rate and Rhythm: Normal rate and regular rhythm.      Heart sounds: No murmur heard.  Pulmonary:      Effort: Pulmonary effort is normal. No respiratory distress.   Abdominal:      General: There is no distension.      Palpations: Abdomen is soft. There is no mass.      Tenderness: There is no abdominal tenderness.   Musculoskeletal:         General: No tenderness.      Cervical back: Normal range of motion and neck supple.   Skin:     General: Skin is warm and dry.   Neurological:      General: No focal deficit present.      Mental Status: She is alert and oriented to person, place, and time. Mental status is at baseline.   Psychiatric:         Mood and Affect: Mood normal.         Diagnostic Results   Labs  Below labs are reviewed today.  Results from last 7  days   Lab Units 01/30/24  1134 01/29/24  1331   WBC AUTO x10*3/uL 5.9 8.2   HEMOGLOBIN g/dL 12.5 13.4   HEMATOCRIT % 39.1 42.8   PLATELETS AUTO x10*3/uL 160 161   NEUTROS ABS x10*3/uL 5.22 7.35*   LYMPHS ABS AUTO x10*3/uL 0.35* 0.50*   MONOS ABS AUTO x10*3/uL 0.08 0.10   EOS ABS AUTO x10*3/uL 0.13 0.12   NEUTROS PCT AUTO % 88.1 90.1   LYMPHS PCT AUTO % 5.9 6.1   MONOS PCT AUTO % 1.3 1.2   EOS PCT AUTO % 2.2 1.5      Results from last 7 days   Lab Units 01/30/24  1134 01/29/24  1331   GLUCOSE mg/dL 94 91   SODIUM mmol/L 131* 132*   POTASSIUM mmol/L 4.5 5.0   CHLORIDE mmol/L 99 97*   CO2 mmol/L 25 28   BUN mg/dL 17 19   CREATININE mg/dL 0.90 0.94   EGFR mL/min/1.73m*2 66 63   CALCIUM mg/dL 9.1 9.5   MAGNESIUM mg/dL 1.79 1.93   ALBUMIN g/dL 3.6 3.7   PROTEIN TOTAL g/dL 6.9 7.3   BILIRUBIN TOTAL mg/dL 1.2 1.8*   ALK PHOS U/L 58 61   ALT U/L 16 18   AST U/L 18 21                   Images  12/12/23 CT Neck   FINDINGS:  *The visualized paranasal sinuses nasopharynx and oropharynx are unremarkable.   *The mandible, maxilla and temporomandibular joints are normal. *The major salivary glands are normal.  *There is a 2.5 cm by 2.5 cm x 2 cm mass within the left aryepiglottic fold. There is extension to the left piriformis sinus. The mass extends to the margin of the laryngeal cartilages without cartilaginous changes or extra laryngeal abnormality. There is no evidence of extension to the left carotid sheath. *There is a 1 cm x 1.5 cm lymph node at level 2A/2B on the left with peripheral enhancement consistent with extra capsular extension. There is a 5 mm enhancing lymph node at left level 3 with central lucency concerning for additional chris metastasis. *The thyroid gland is normal.  *Unchanged dilatation of the ascending aorta which currently measures a proximally 4.3 cm in diameter. The thoracic inlet is otherwise normal.      IMPRESSION:  *Left-sided hypopharyngeal mass without extra laryngeal extension  *Left-sided  regional lymph node metastases as described    1/12/2024 NM PET/CT Head & Neck Staging  IMPRESSION:  1. FDG avid large soft tissue mass in the left hypopharynx with extension to the level of the laryngeal cartilage, consistent with biopsy-proven squamous cell carcinoma.  2. FDG-avid bilateral cervical level II nodes, likely representing chris metastases.  3. No PET evidence of distant metastasis      Pathology        Assessment/Plan   ASSESSMENT  Amita Todd is a 78 y.o. female with [id number] hypopharyngeal squamous mariana carcinoma. Started concurrent chemoRT with weekly Carboplatin + Paclitaxel on 1/24/24.    #cT2N1 Hypopharyngeal SCC  - 12/12/23 CT neck showed 2.5 x 2.5 cm aryepiglottic mass, 1.5 x 1 cm LN at level 2A/2B  - 12/21/23 Hypopharyngeal mass biopsy  - invasive moderately differentiated keratinizing squamous cell carcinoma  - No plans for surgery as patient wishes for laryngeal preservation  - we discussed treatment with definitive concurrent chemoRT; would favor treatment with weekly carboplatin + paclitaxel vs cisplatin given decreased GFR, heart failure, age, and other co-morbidities  - 1/12/24 PET showed no distant mets  - 1/24/24: Started concurrent chemoRT with weekly Carboplatin + Paclitaxel  - 1/31/24: Patient tolerated C1 Carbo/Taxol well. Mild nausea controlled with anti-emetics. She's a bit hypotensive today and endorse occasional dizziness.     # Dysphagia  - 1/16/24 Barium Swallow showed mild oropharyngeal dysphagia secondary to hypopharyngeal mass.   - PEG placement discussed with patient and she's open to this if swallowing function declines during CRT.  - 1/31/24: Pt currently tolerating soft/minced foods well and weight is stable from last week. 1lb weight gain.     # Hx of CHF, CAD s/p PCI, A.fib, AAA  - 1/18 - 1/19/24 Admission due to worsening exertional dyspnea  - CTA was negative for PE, aortic aneurysm stable, no acute anemia noted, no evidence pneumonia on CXR, no hypoxia.    - EKG A.fib rate controlled  - ECHO 3/2023 LVEF 50-55%, aortic valve regurg  - , 160 last year  - per Dr. Nova, hold lasix unless >3 lbs weight gain during treatment  - 1/31/24: instructed patient to reach out to Cardiologist regarding any adjustments needed on her anti-hypertensives (Losartan, Metoprolol, Spironolactone) as patient has symptomatic hypotension .    # Hyponatremia  - 1/30 Na 131.  Encourage PO hydration with electrolytes    PLAN  -- Proceed to C2 Carbo/Taxol today.   -- RTC 1 week for C3  -- 2/12 FUV with SLP  -- 2/16 FUV w/ Dr. Tobin  -- Follow up with cardiologist (Dr. Nova/ ZINA Fernando) regarding hypotension. Next visit 7/11 with repeat PVR w/ exercise and carotid duplex US.

## 2024-01-29 NOTE — ADDENDUM NOTE
Addended by: RAMAKRISHNA BARROS on: 12/6/2023 11:51 AM     Modules accepted: Orders    
good, to achieve stated therapy goals

## 2024-01-30 ENCOUNTER — HOSPITAL ENCOUNTER (OUTPATIENT)
Dept: RADIATION ONCOLOGY | Facility: HOSPITAL | Age: 79
Setting detail: RADIATION/ONCOLOGY SERIES
Discharge: HOME | End: 2024-01-30
Payer: MEDICARE

## 2024-01-30 ENCOUNTER — OFFICE VISIT (OUTPATIENT)
Dept: HEMATOLOGY/ONCOLOGY | Facility: HOSPITAL | Age: 79
End: 2024-01-30
Payer: MEDICARE

## 2024-01-30 ENCOUNTER — NUTRITION (OUTPATIENT)
Dept: HEMATOLOGY/ONCOLOGY | Facility: HOSPITAL | Age: 79
End: 2024-01-30

## 2024-01-30 VITALS
HEART RATE: 85 BPM | RESPIRATION RATE: 18 BRPM | DIASTOLIC BLOOD PRESSURE: 65 MMHG | SYSTOLIC BLOOD PRESSURE: 118 MMHG | TEMPERATURE: 98.6 F | BODY MASS INDEX: 24.72 KG/M2 | OXYGEN SATURATION: 99 % | WEIGHT: 153.1 LBS

## 2024-01-30 DIAGNOSIS — C13.9 MALIGNANT NEOPLASM OF HYPOPHARYNX (MULTI): ICD-10-CM

## 2024-01-30 DIAGNOSIS — E86.0 DEHYDRATION: Primary | ICD-10-CM

## 2024-01-30 LAB
ALBUMIN SERPL BCP-MCNC: 3.6 G/DL (ref 3.4–5)
ALP SERPL-CCNC: 58 U/L (ref 33–136)
ALT SERPL W P-5'-P-CCNC: 16 U/L (ref 7–45)
ANION GAP SERPL CALC-SCNC: 12 MMOL/L (ref 10–20)
AST SERPL W P-5'-P-CCNC: 18 U/L (ref 9–39)
BASOPHILS # BLD AUTO: 0.05 X10*3/UL (ref 0–0.1)
BASOPHILS NFR BLD AUTO: 0.8 %
BILIRUB SERPL-MCNC: 1.2 MG/DL (ref 0–1.2)
BUN SERPL-MCNC: 17 MG/DL (ref 6–23)
CALCIUM SERPL-MCNC: 9.1 MG/DL (ref 8.6–10.3)
CHLORIDE SERPL-SCNC: 99 MMOL/L (ref 98–107)
CO2 SERPL-SCNC: 25 MMOL/L (ref 21–32)
CREAT SERPL-MCNC: 0.9 MG/DL (ref 0.5–1.05)
EGFRCR SERPLBLD CKD-EPI 2021: 66 ML/MIN/1.73M*2
EOSINOPHIL # BLD AUTO: 0.13 X10*3/UL (ref 0–0.4)
EOSINOPHIL NFR BLD AUTO: 2.2 %
ERYTHROCYTE [DISTWIDTH] IN BLOOD BY AUTOMATED COUNT: 14.2 % (ref 11.5–14.5)
GLUCOSE SERPL-MCNC: 94 MG/DL (ref 74–99)
HCT VFR BLD AUTO: 39.1 % (ref 36–46)
HGB BLD-MCNC: 12.5 G/DL (ref 12–16)
IMM GRANULOCYTES # BLD AUTO: 0.1 X10*3/UL (ref 0–0.5)
IMM GRANULOCYTES NFR BLD AUTO: 1.7 % (ref 0–0.9)
LYMPHOCYTES # BLD AUTO: 0.35 X10*3/UL (ref 0.8–3)
LYMPHOCYTES NFR BLD AUTO: 5.9 %
MAGNESIUM SERPL-MCNC: 1.79 MG/DL (ref 1.6–2.4)
MCH RBC QN AUTO: 30.6 PG (ref 26–34)
MCHC RBC AUTO-ENTMCNC: 32 G/DL (ref 32–36)
MCV RBC AUTO: 96 FL (ref 80–100)
MONOCYTES # BLD AUTO: 0.08 X10*3/UL (ref 0.05–0.8)
MONOCYTES NFR BLD AUTO: 1.3 %
NEUTROPHILS # BLD AUTO: 5.22 X10*3/UL (ref 1.6–5.5)
NEUTROPHILS NFR BLD AUTO: 88.1 %
NRBC BLD-RTO: 0 /100 WBCS (ref 0–0)
PLATELET # BLD AUTO: 160 X10*3/UL (ref 150–450)
POTASSIUM SERPL-SCNC: 4.5 MMOL/L (ref 3.5–5.3)
PROT SERPL-MCNC: 6.9 G/DL (ref 6.4–8.2)
RBC # BLD AUTO: 4.09 X10*6/UL (ref 4–5.2)
SODIUM SERPL-SCNC: 131 MMOL/L (ref 136–145)
WBC # BLD AUTO: 5.9 X10*3/UL (ref 4.4–11.3)

## 2024-01-30 PROCEDURE — 1036F TOBACCO NON-USER: CPT | Performed by: NURSE PRACTITIONER

## 2024-01-30 PROCEDURE — G2212 PROLONG OUTPT/OFFICE VIS: HCPCS | Performed by: NURSE PRACTITIONER

## 2024-01-30 PROCEDURE — 1159F MED LIST DOCD IN RCRD: CPT | Performed by: NURSE PRACTITIONER

## 2024-01-30 PROCEDURE — 77014 CHG CT GUIDANCE RADIATION THERAPY FLDS PLACEMENT: CPT | Performed by: RADIOLOGY

## 2024-01-30 PROCEDURE — 80053 COMPREHEN METABOLIC PANEL: CPT | Performed by: NURSE PRACTITIONER

## 2024-01-30 PROCEDURE — 1160F RVW MEDS BY RX/DR IN RCRD: CPT | Performed by: NURSE PRACTITIONER

## 2024-01-30 PROCEDURE — 3074F SYST BP LT 130 MM HG: CPT | Performed by: NURSE PRACTITIONER

## 2024-01-30 PROCEDURE — 1126F AMNT PAIN NOTED NONE PRSNT: CPT | Performed by: NURSE PRACTITIONER

## 2024-01-30 PROCEDURE — 36415 COLL VENOUS BLD VENIPUNCTURE: CPT

## 2024-01-30 PROCEDURE — 99215 OFFICE O/P EST HI 40 MIN: CPT | Performed by: NURSE PRACTITIONER

## 2024-01-30 PROCEDURE — 77336 RADIATION PHYSICS CONSULT: CPT | Performed by: RADIOLOGY

## 2024-01-30 PROCEDURE — 85025 COMPLETE CBC W/AUTO DIFF WBC: CPT | Performed by: NURSE PRACTITIONER

## 2024-01-30 PROCEDURE — 83735 ASSAY OF MAGNESIUM: CPT | Performed by: NURSE PRACTITIONER

## 2024-01-30 PROCEDURE — 77386 HC INTENSITY-MODULATED RADIATION THERAPY (IMRT), COMPLEX: CPT | Performed by: RADIOLOGY

## 2024-01-30 PROCEDURE — 99215 OFFICE O/P EST HI 40 MIN: CPT | Mod: ZK | Performed by: NURSE PRACTITIONER

## 2024-01-30 PROCEDURE — 3078F DIAST BP <80 MM HG: CPT | Performed by: NURSE PRACTITIONER

## 2024-01-30 ASSESSMENT — PAIN SCALES - GENERAL: PAINLEVEL: 0-NO PAIN

## 2024-01-30 NOTE — PROGRESS NOTES
NUTRITION Follow-up NOTE    Nutrition Assessment     Reason for Visit:  Amita Todd is a 78 y.o. female who presents for appointment in [facility] today  I am told this weekend she had nausea and was found to be dehydrated- yesterday she received 750 ml of IVF  Today I am seeing her in follow up  She is with her sister.    Please remember:  recent diagnosis of a hypopharyngeal lesion which is most likely squamous cell carcinoma. I  5-2008 had a lesion on her right lateral tongue removed- she did not receive further intervention since then she was being followed closely until December when a lesion was found and biopsied    MBSS scheduled on 1/15/2024  Recommendations:  Began #5 minced moist solids with thin liquids.  Chin tuck with every swallow.  Double swallows per bite and sip.  Occasional throat clear or cough through meal.  Small frequent meals throughout the day.  Meds crushed in purees.  Continue outpatient SLP services for treatment give post radiation effects of the swallow mechanism.         Lab Results   Component Value Date/Time    GLUCOSE 94 01/30/2024 1134     (L) 01/30/2024 1134    K 4.5 01/30/2024 1134    CL 99 01/30/2024 1134    CO2 25 01/30/2024 1134    ANIONGAP 12 01/30/2024 1134    BUN 17 01/30/2024 1134    CREATININE 0.90 01/30/2024 1134    EGFR 66 01/30/2024 1134    CALCIUM 9.1 01/30/2024 1134    ALBUMIN 3.6 01/30/2024 1134    ALKPHOS 58 01/30/2024 1134    PROT 6.9 01/30/2024 1134    AST 18 01/30/2024 1134    BILITOT 1.2 01/30/2024 1134    ALT 16 01/30/2024 1134     Lab Results   Component Value Date/Time    VITD25 83 03/29/2021 1227       Anthropometrics:       HT: 167.6 cm (5'6)  BMI: 24.7  IBW: 59.1  117.4% IBW    Wt Readings from Last 10 Encounters:   01/30/24 69.4 kg (153 lb 1.6 oz)   01/29/24 68.6 kg (151 lb 4.8 oz)   01/29/24 68.4 kg (150 lb 12.8 oz)   01/26/24 70.1 kg (154 lb 8.7 oz)   01/24/24 68.9 kg (151 lb 14.4 oz)   01/19/24 69.7 kg (153 lb 10.6 oz)   01/12/24 70.6 kg (155  lb 10.3 oz)   01/11/24 70.3 kg (155 lb)   01/10/24 69.6 kg (153 lb 7 oz)   01/09/24 71 kg (156 lb 8.4 oz)        Food And Nutrient Intake:      Patient is now back to more soft foods  Taking   Pieces of fruit  Crackers  Cheerios handful- no milk     Supplement  3 per day- Boost VHC  1590 calories and 66 g protein     Things are still getting caught  But whatever happened with biopsy is improved  Food is passing better and has not begun decadron yet - was planning on picking this up today                                                                        Nutrition Focused Physical Exam Findings:      Subcutaneous Fat Loss  Orbital Fat Pads: Mild-Moderate (slight dark circles and slight hollowing)  Buccal Fat Pads: Mild-Moderate (flat cheeks, minimal bounce)  Triceps: Well nourished (ample fat tissue)    Muscle Wasting  Temporalis: Mild-Moderate (slight depression)  Pectoralis (Clavicular Region): Well nourished (clavicle not visible)  Interosseous: Well nourished (muscle bulges)  Quadriceps: Well nourished (well developed, well rounded)    Edema  Edema: none         Energy Needs  Estimated Energy Needs  Total Energy Estimated Needs (kCal): 2100 kCal  Total Estimated Energy Need per Day (kCal/kg): 2400 kCal/kg  Method for Estimating Needs: 30-35kcal/kg  Estimated Fluid Needs  Total Fluid Estimated Needs (mL): 2100 mL  Estimated Protein Needs  Total Protein Estimated Needs (g):  (84 to 104)  Method for Estimating Needs: 1.2 to 1.5        Nutrition Diagnosis        Nutrition Diagnosis  Patient has Nutrition Diagnosis: Yes  Diagnosis Status (1): Ongoing  Nutrition Diagnosis 1: Swallowing difficulity  Related to (1): diagnosis  As Evidenced by (1): patietn reports and results of the  MBSS    Nutrition Interventions/Recommendations   Nutrition Prescription   At this time calorie needs appear met and approximating estimated  protein needs     Food and Nutrition Delivery   Continue with Diet and supplements   Monitor  impact of steroid on swallow function and appetite - feeding tube remains on hold       Nutrition Education       Coordination of Care   Med onc   Rad onc     There are no Patient Instructions on file for this visit.    Nutrition Monitoring and Evaluation      Will continue to follow up with patient weekly during her concurrent treatment         Time Spent  Prep time on day of patient encounter: 5 minutes  Time spent directly with patient, family or caregiver: 15 minutes  Additional Time Spent on Patient Care Activities: 5 minutes  Documentation Time: 10 minutes  Other Time Spent: 0 minutes  Total: 35 minutes                Anthropometrics:           Wt Readings from Last 10 Encounters:   01/30/24 69.4 kg (153 lb 1.6 oz)   01/29/24 68.6 kg (151 lb 4.8 oz)   01/29/24 68.4 kg (150 lb 12.8 oz)   01/26/24 70.1 kg (154 lb 8.7 oz)   01/24/24 68.9 kg (151 lb 14.4 oz)   01/19/24 69.7 kg (153 lb 10.6 oz)   01/12/24 70.6 kg (155 lb 10.3 oz)   01/11/24 70.3 kg (155 lb)   01/10/24 69.6 kg (153 lb 7 oz)   01/09/24 71 kg (156 lb 8.4 oz)        Food And Nutrient Intake:                                                                 Nutrition Focused Physical Exam Findings:      Subcutaneous Fat Loss  Orbital Fat Pads: Mild-Moderate (slight dark circles and slight hollowing)  Buccal Fat Pads: Mild-Moderate (flat cheeks, minimal bounce)  Triceps: Well nourished (ample fat tissue)    Muscle Wasting  Temporalis: Mild-Moderate (slight depression)  Pectoralis (Clavicular Region): Well nourished (clavicle not visible)  Interosseous: Well nourished (muscle bulges)  Quadriceps: Well nourished (well developed, well rounded)    Edema  Edema: none         Energy Needs  Estimated Energy Needs  Total Energy Estimated Needs (kCal): 2100 kCal  Total Estimated Energy Need per Day (kCal/kg): 2400 kCal/kg  Method for Estimating Needs: 30-35kcal/kg  Estimated Fluid Needs  Total Fluid Estimated Needs (mL): 2100 mL  Estimated Protein Needs  Total  Protein Estimated Needs (g):  (84 to 104)  Method for Estimating Needs: 1.2 to 1.5        Nutrition Diagnosis        Nutrition Diagnosis  Patient has Nutrition Diagnosis: Yes  Diagnosis Status (1): Ongoing  Nutrition Diagnosis 1: Swallowing difficulity  Related to (1): diagnosis  As Evidenced by (1): kofin reports and results of the  MBSS    Nutrition Interventions/Recommendations   Nutrition Prescription       Food and Nutrition Delivery       Nutrition Education       Coordination of Care       There are no Patient Instructions on file for this visit.    Nutrition Monitoring and Evaluation              Time Spent  Prep time on day of patient encounter: 5 minutes  Time spent directly with patient, family or caregiver: 15 minutes  Additional Time Spent on Patient Care Activities: 5 minutes  Documentation Time: 10 minutes  Other Time Spent: 0 minutes  Total: 35 minutes

## 2024-01-30 NOTE — PROGRESS NOTES
Patient ID: Amita Todd is a 78 y.o. female  Diagnosis: Hypopharyngeal SCC  Staging: [id number]  Date of Diagnosis: 12/21/23    Providers:  ENT Surgeon: Dr. Mahad Aguila: De. Burkitt RadOnc: Dr. Cochran    Current Therapy  Paclitaxel/carboplatin to start 1/31 with concurrent radiation   Surgery  - 5/2008: s/p excision of right lateral tongue lesion     Sites of Disease  Hypopharynx, LN (Left level II)     Current Oncologic Issues  Dysphagia       Past Medical History:   Past Medical History:  06/16/2016: Alcohol dependence, in remission (CMS/[facility])      Comment:  History of alcoholism  No date: Anemia  No date: Aneurysm of ascending aorta (CMS/[facility])  No date: Arrhythmia      Comment:  a-fib. 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  01/25/2022: Pain in leg, unspecified      Comment:  Leg pain  07/01/2020: Pain in right shoulder      Comment:  Bilateral shoulder pain  03/31/2021: Pain in unspecified knee      Comment:  Joint pain, knee  02/09/2019: Personal history of diseases of the blood and blood-  forming organs and certain disorders involving the immune mechanism      Comment:  History of anemia  04/29/2014: Personal history of malignant neoplasm of breast      Comment:  History of malignant neoplasm of female breast  04/03/2014: Personal history of other diseases of the circulatory   system      Comment:  Personal history of subarachnoid hemorrhage  03/08/2021: Personal history of other diseases of the digestive system       Comment:  History of upper gastrointestinal hemorrhage  03/08/2021: Personal history of other diseases of the digestive system      Comment:  History of melena  08/12/2020: Personal history of other diseases of the nervous system   and sense organs      Comment:  History of Bell's palsy  06/14/2019: Personal history of other infectious and parasitic   diseases      Comment:  History of herpes zoster  03/03/2020: Personal history of other specified conditions      Comment:  History of lump of left breast  02/22/2021: Personal history of other specified conditions      Comment:  History of shortness of breath  04/24/2022: Personal history of transient ischemic attack (TIA), and   cerebral infarction without residual deficits      Comment:  History of transient ischemic attack  No date: Stenosis of carotid artery      Comment:  right  04/24/2017: Trigger finger, unspecified finger      Comment:  Acquired trigger finger   Surgical History:    Past Surgical History:   Procedure Laterality Date    CT ANGIO NECK  06/06/2019    CT NECK ANGIO W AND WO IV CONTRAST 6/6/2019 [facility]GAC[facility]    CT ANGIO NECK  01/04/2021    CT NECK ANGIO W AND WO IV CONTRAST 1/4/2021 [facility]    CT HEAD ANGIO W AND WO IV CONTRAST  06/06/2019    CT HEAD ANGIO W AND WO IV CONTRAST 6/6/2019 [facility] CLINICAL LEGACY    CT HEAD ANGIO W AND WO IV CONTRAST  01/04/2021    CT HEAD ANGIO W AND WO IV CONTRAST 1/4/2021 [facility]    MR HEAD ANGIO WO IV CONTRAST  06/06/2019    MR HEAD ANGIO WO IV CONTRAST 6/6/2019 [facility] CLINICAL LEGACY    MR NECK ANGIO WO IV CONTRAST  06/06/2019    MR NECK ANGIO WO IV CONTRAST 6/6/2019 [facility] CLINICAL LEGACY    OTHER SURGICAL HISTORY  03/03/2020    Oophorectomy    OTHER SURGICAL HISTORY  06/17/2019    Miscarriage surgical treatment    OTHER SURGICAL HISTORY  08/29/2019    multiple Catheter Ablation Atrial Flutter    OTHER SURGICAL HISTORY  04/23/2014    Previous Stent Placement    SEPTOPLASTY  09/22/2017     Septoplasty      Family History:    Family History   Problem Relation Name Age of Onset    Breast cancer Mother      Other (GOODPASTURE'S DISEASE) Father      BRCA2 Positive Sister      Lung cancer Sister      Neuroblastoma Sister      BRCA2 Positive Daughter      Breast cancer Other G/P      Family Oncology History:    Cancer-related family history includes Breast cancer in her mother and another family member; Lung cancer in her sister.  Social History:    Social History     Tobacco Use    Smoking status: Never    Smokeless tobacco: Never   Vaping Use    Vaping Use: Never used   Substance Use Topics    Alcohol use: Never    Drug use: Never          Subjective   Chief Complaint: follow up dehydration/nausea     ANAID Levi is a 78 y.o. female with  PMH of CAD, afib/aflutter s/p multiple ablations and watchman procedure, PVD, AAA, right carotid stenosis, heart failure, and CKD.  hypopharyngeal SCC, who presents to [facility] for follow up. Was seen yesterday 1/29 for hypotension, dizziness and nausea. Feels better today, tolerated additional po fluids/food yesterday afternoon. Denies nausea at this time. Denies dizziness with standing. Did not take losartan this am. SBP 120s-130s in ACC.     Pt denies chest pain, cough, SOB, headaches, blurry vision, falls, fever or chills, n/v/d  abdominal or urinary complaints.          ROS  Review of Systems - Oncology    Allergies  Allergies   Allergen Reactions    Oxycodone Hcl-Oxycodone-Asa Unknown    Oxycodone-Acetaminophen Unknown    Penicillin G Other     Nausea    Penicillins GI Upset and Other        Medications  Current Outpatient Medications   Medication Instructions    acetaminophen (TYLENOL) 650 mg, oral, Every 6 hours PRN    albuterol 90 mcg/actuation aerosol powdr breath activated inhaler 2 puffs, inhalation, Every 6 hours PRN    ascorbic acid (VITAMIN C) 500 mg, oral, Daily    atorvastatin (LIPITOR) 20 mg, oral, Daily, as directed    buPROPion SR (WELLBUTRIN SR) 150  mg, oral, 2 times daily, TAKE PER DIRECTED    cholecalciferol (Vitamin D-3) 25 MCG (1000 UT) tablet 1 tablet, oral, 2 times daily    clopidogrel (PLAVIX) 75 mg, oral, Daily    coenzyme Q-10 (COQ-10) 100 mg, oral, 2 times daily    dapagliflozin propanediol (FARXIGA) 10 mg, oral, Daily    dexAMETHasone (DECADRON) 4 mg, oral, 2 times daily with meals    DULoxetine (Cymbalta) 60 mg DR capsule TAKE 1 CAPSULE BY MOUTH  DAILY    furosemide (LASIX) 20 mg, oral, Daily    herbal complex no.239 (Whole Body Joint Support) capsule 1 capsule, oral, Daily    ipratropium (Atrovent) 21 mcg (0.03 %) nasal spray 2 sprays, Each Nostril, Every 12 hours    losartan (Cozaar) 50 mg tablet 1 tablet, oral, Daily    magnesium oxide (Mag-Ox) 400 mg (241.3 mg magnesium) tablet 1 tablet, oral, Daily    metoprolol tartrate (Lopressor) 25 mg tablet 2 tablets, oral, 2 times daily    multivitamin with minerals tablet 1 tablet, oral, Daily    ondansetron (ZOFRAN) 8 mg, oral, Every 8 hours PRN    pantoprazole (PROTONIX) 40 mg, oral, 2 times daily    prochlorperazine (COMPAZINE) 10 mg, oral, Every 6 hours PRN    sennosides-docusate sodium (Kalee-Colace) 8.6-50 mg tablet 2 tablets, oral, Daily    spironolactone (ALDACTONE) 25 mg, oral, Daily, PER DIRECTED          Objective   Vitals: /65 (BP Location: Right arm, Patient Position: 1 minute standing)   Pulse 85   Temp 37 °C (98.6 °F) (Temporal)   Resp 18   Wt 69.4 kg (153 lb 1.6 oz)   SpO2 99%   BMI 24.72 kg/m²   Weight:   Vitals:    01/30/24 1114   Weight: 69.4 kg (153 lb 1.6 oz)         Physical Exam  Vitals reviewed.   HENT:      Nose: Congestion present.      Mouth/Throat:      Mouth: Mucous membranes are moist.      Pharynx: Oropharynx is clear.   Eyes:      Conjunctiva/sclera: Conjunctivae normal.   Cardiovascular:      Rate and Rhythm: Normal rate and regular rhythm.      Pulses: Normal pulses.      Heart sounds: Normal heart sounds.   Pulmonary:      Effort: Pulmonary effort is normal.       Breath sounds: Normal breath sounds.   Abdominal:      General: Abdomen is flat. Bowel sounds are normal. There is no distension.      Palpations: Abdomen is soft.      Tenderness: There is no abdominal tenderness.   Musculoskeletal:         General: No swelling.      Right lower leg: No edema.      Left lower leg: No edema.   Skin:     General: Skin is warm and dry.   Neurological:      General: No focal deficit present.      Mental Status: She is alert and oriented to person, place, and time.   Psychiatric:         Mood and Affect: Mood normal.         Behavior: Behavior normal.           Diagnostic Results     Labs  Results from last 7 days   Lab Units 01/30/24  1134 01/29/24  1331   WBC AUTO x10*3/uL 5.9 8.2   HEMOGLOBIN g/dL 12.5 13.4   HEMATOCRIT % 39.1 42.8   PLATELETS AUTO x10*3/uL 160 161   NEUTROS ABS x10*3/uL 5.22 7.35*   LYMPHS ABS AUTO x10*3/uL 0.35* 0.50*   MONOS ABS AUTO x10*3/uL 0.08 0.10   EOS ABS AUTO x10*3/uL 0.13 0.12   NEUTROS PCT AUTO % 88.1 90.1   LYMPHS PCT AUTO % 5.9 6.1   MONOS PCT AUTO % 1.3 1.2   EOS PCT AUTO % 2.2 1.5        Results from last 7 days   Lab Units 01/30/24  1134 01/29/24  1331   GLUCOSE mg/dL 94 91   SODIUM mmol/L 131* 132*   POTASSIUM mmol/L 4.5 5.0   CHLORIDE mmol/L 99 97*   CO2 mmol/L 25 28   BUN mg/dL 17 19   CREATININE mg/dL 0.90 0.94   EGFR mL/min/1.73m*2 66 63   CALCIUM mg/dL 9.1 9.5   MAGNESIUM mg/dL 1.79 1.93   ALBUMIN g/dL 3.6 3.7   PROTEIN TOTAL g/dL 6.9 7.3   BILIRUBIN TOTAL mg/dL 1.2 1.8*   ALK PHOS U/L 58 61   ALT U/L 16 18   AST U/L 18 21                       Images         Assessment/Plan   ASSESSMENT  Amita Todd is a 78 y.o. female with PMH of CAD, afib/aflutter s/p multiple ablations and watchman procedure, PVD, AAA, right carotid stenosis, heart failure, and CKD.  hypopharyngeal SCC, who presents to [facility]  for follow up. Hypotension improved today. She held her home losartan this am. Dizziness resolved.         [facility]  - Labs s/f mild  hyponatremia, hyperbilirubinemia resolved.   - advised patient to take BP in am with home BP machine and hold losartan tomorrow am if hypotensive, if blood pressure low again advised to call back  - advised to start taking zofran or compazine for nausea as needed.         Dispo:  - Patient discharged home with no needs after ACC course complete  - Return to clinic/ED instructions given to patient  - Follow up w/ Oncology as scheduled        KIMBER Zaldivar-CNP

## 2024-01-30 NOTE — PROGRESS NOTES
Soft foods  Pieces of fruit  Crackers  Cheerios handfull    Supplement  3 per day    Things are still getting caught  But whatever happened biopsy is improved  Food is passing better and has not begun decadron yet     Lab Results   Component Value Date/Time    GLUCOSE 94 01/30/2024 1134    GLUCOSE 91 01/29/2024 1331    GLUCOSE 99 01/19/2024 0458     (L) 01/30/2024 1134     (L) 01/29/2024 1331     (L) 01/19/2024 0458    K 4.5 01/30/2024 1134    K 5.0 01/29/2024 1331    K 4.0 01/19/2024 0458    CL 99 01/30/2024 1134    CL 97 (L) 01/29/2024 1331     01/19/2024 0458    CO2 25 01/30/2024 1134    CO2 28 01/29/2024 1331    CO2 26 01/19/2024 0458    ANIONGAP 12 01/30/2024 1134    ANIONGAP 12 01/29/2024 1331    ANIONGAP 11 01/19/2024 0458    BUN 17 01/30/2024 1134    BUN 19 01/29/2024 1331    BUN 20 01/19/2024 0458    CREATININE 0.90 01/30/2024 1134    CREATININE 0.94 01/29/2024 1331    CREATININE 0.81 01/19/2024 0458    EGFR 66 01/30/2024 1134    EGFR 63 01/29/2024 1331    EGFR 75 01/19/2024 0458    CALCIUM 9.1 01/30/2024 1134    CALCIUM 9.5 01/29/2024 1331    CALCIUM 9.6 01/19/2024 0458    ALBUMIN 3.6 01/30/2024 1134    ALBUMIN 3.7 01/29/2024 1331    ALBUMIN 3.3 (L) 01/19/2024 0458    ALKPHOS 58 01/30/2024 1134    ALKPHOS 61 01/29/2024 1331    ALKPHOS 56 01/19/2024 0458    PROT 6.9 01/30/2024 1134    PROT 7.3 01/29/2024 1331    PROT 6.8 01/19/2024 0458    AST 18 01/30/2024 1134    AST 21 01/29/2024 1331    AST 18 01/19/2024 0458    BILITOT 1.2 01/30/2024 1134    BILITOT 1.8 (H) 01/29/2024 1331    BILITOT 0.6 01/19/2024 0458    ALT 16 01/30/2024 1134    ALT 18 01/29/2024 1331    ALT 12 01/19/2024 0458     Lab Results   Component Value Date/Time    VITD25 83 03/29/2021 1227    VITD25 88 03/02/2021 1158

## 2024-01-31 ENCOUNTER — TELEPHONE (OUTPATIENT)
Dept: ADMISSION | Facility: HOSPITAL | Age: 79
End: 2024-01-31

## 2024-01-31 ENCOUNTER — OFFICE VISIT (OUTPATIENT)
Dept: HEMATOLOGY/ONCOLOGY | Facility: HOSPITAL | Age: 79
End: 2024-01-31
Payer: MEDICARE

## 2024-01-31 ENCOUNTER — HOSPITAL ENCOUNTER (OUTPATIENT)
Dept: RADIATION ONCOLOGY | Facility: HOSPITAL | Age: 79
Setting detail: RADIATION/ONCOLOGY SERIES
Discharge: HOME | End: 2024-01-31
Payer: MEDICARE

## 2024-01-31 ENCOUNTER — INFUSION (OUTPATIENT)
Dept: HEMATOLOGY/ONCOLOGY | Facility: HOSPITAL | Age: 79
End: 2024-01-31
Payer: MEDICARE

## 2024-01-31 VITALS — BODY MASS INDEX: 25.54 KG/M2 | HEIGHT: 65 IN

## 2024-01-31 VITALS
SYSTOLIC BLOOD PRESSURE: 99 MMHG | BODY MASS INDEX: 24.6 KG/M2 | OXYGEN SATURATION: 100 % | TEMPERATURE: 98.1 F | DIASTOLIC BLOOD PRESSURE: 64 MMHG | HEART RATE: 72 BPM | WEIGHT: 152.34 LBS | RESPIRATION RATE: 18 BRPM

## 2024-01-31 DIAGNOSIS — C13.9 MALIGNANT NEOPLASM OF HYPOPHARYNX (MULTI): ICD-10-CM

## 2024-01-31 DIAGNOSIS — R19.7 DIARRHEA, UNSPECIFIED TYPE: ICD-10-CM

## 2024-01-31 DIAGNOSIS — R19.7 DIARRHEA, UNSPECIFIED TYPE: Primary | ICD-10-CM

## 2024-01-31 DIAGNOSIS — C76.0 HEAD AND NECK CANCER (MULTI): Primary | ICD-10-CM

## 2024-01-31 DIAGNOSIS — Z95.828 PORT-A-CATH IN PLACE: ICD-10-CM

## 2024-01-31 DIAGNOSIS — R05.8 OTHER COUGH: ICD-10-CM

## 2024-01-31 DIAGNOSIS — C13.8 MALIGNANT NEOPLASM OF OVERLAPPING SITES OF HYPOPHARYNX (MULTI): ICD-10-CM

## 2024-01-31 DIAGNOSIS — C02.3 CANCER OF ANTERIOR TWO-THIRDS OF TONGUE (MULTI): ICD-10-CM

## 2024-01-31 DIAGNOSIS — E87.1 HYPONATREMIA: ICD-10-CM

## 2024-01-31 DIAGNOSIS — Z51.0 ENCOUNTER FOR ANTINEOPLASTIC RADIATION THERAPY: ICD-10-CM

## 2024-01-31 DIAGNOSIS — R13.10 DYSPHAGIA, UNSPECIFIED TYPE: ICD-10-CM

## 2024-01-31 DIAGNOSIS — I95.89 OTHER SPECIFIED HYPOTENSION: ICD-10-CM

## 2024-01-31 DIAGNOSIS — R68.89 COPIOUS ORAL SECRETIONS: ICD-10-CM

## 2024-01-31 DIAGNOSIS — C13.9 MALIGNANT NEOPLASM OF HYPOPHARYNX (MULTI): Primary | ICD-10-CM

## 2024-01-31 LAB
INR PPP: 1.1 (ref 0.9–1.1)
PROTHROMBIN TIME: 12.5 SECONDS (ref 9.8–12.8)
RAD ONC MSQ ACTUAL FRACTIONS DELIVERED: 6
RAD ONC MSQ ACTUAL SESSION DELIVERED DOSE: 200 CGRAY
RAD ONC MSQ ACTUAL TOTAL DOSE: 1200 CGRAY
RAD ONC MSQ ELAPSED DAYS: 7
RAD ONC MSQ LAST DATE: NORMAL
RAD ONC MSQ PRESCRIBED FRACTIONAL DOSE: 200 CGRAY
RAD ONC MSQ PRESCRIBED NUMBER OF FRACTIONS: 35
RAD ONC MSQ PRESCRIBED TECHNIQUE: NORMAL
RAD ONC MSQ PRESCRIBED TOTAL DOSE: 7000 CGRAY
RAD ONC MSQ PRESCRIPTION PATTERN COMMENT: NORMAL
RAD ONC MSQ START DATE: NORMAL
RAD ONC MSQ TREATMENT COURSE NUMBER: 1
RAD ONC MSQ TREATMENT SITE: NORMAL

## 2024-01-31 PROCEDURE — 2500000001 HC RX 250 WO HCPCS SELF ADMINISTERED DRUGS (ALT 637 FOR MEDICARE OP): Performed by: STUDENT IN AN ORGANIZED HEALTH CARE EDUCATION/TRAINING PROGRAM

## 2024-01-31 PROCEDURE — 99215 OFFICE O/P EST HI 40 MIN: CPT | Performed by: STUDENT IN AN ORGANIZED HEALTH CARE EDUCATION/TRAINING PROGRAM

## 2024-01-31 PROCEDURE — 1036F TOBACCO NON-USER: CPT | Performed by: STUDENT IN AN ORGANIZED HEALTH CARE EDUCATION/TRAINING PROGRAM

## 2024-01-31 PROCEDURE — 3074F SYST BP LT 130 MM HG: CPT | Performed by: STUDENT IN AN ORGANIZED HEALTH CARE EDUCATION/TRAINING PROGRAM

## 2024-01-31 PROCEDURE — 2500000001 HC RX 250 WO HCPCS SELF ADMINISTERED DRUGS (ALT 637 FOR MEDICARE OP): Performed by: INTERNAL MEDICINE

## 2024-01-31 PROCEDURE — 77386 HC INTENSITY-MODULATED RADIATION THERAPY (IMRT), COMPLEX: CPT | Performed by: RADIOLOGY

## 2024-01-31 PROCEDURE — 96375 TX/PRO/DX INJ NEW DRUG ADDON: CPT | Mod: INF

## 2024-01-31 PROCEDURE — 2500000004 HC RX 250 GENERAL PHARMACY W/ HCPCS (ALT 636 FOR OP/ED): Performed by: STUDENT IN AN ORGANIZED HEALTH CARE EDUCATION/TRAINING PROGRAM

## 2024-01-31 PROCEDURE — 1126F AMNT PAIN NOTED NONE PRSNT: CPT | Performed by: STUDENT IN AN ORGANIZED HEALTH CARE EDUCATION/TRAINING PROGRAM

## 2024-01-31 PROCEDURE — 96413 CHEMO IV INFUSION 1 HR: CPT

## 2024-01-31 PROCEDURE — 3078F DIAST BP <80 MM HG: CPT | Performed by: STUDENT IN AN ORGANIZED HEALTH CARE EDUCATION/TRAINING PROGRAM

## 2024-01-31 PROCEDURE — 85610 PROTHROMBIN TIME: CPT | Performed by: INTERNAL MEDICINE

## 2024-01-31 PROCEDURE — 77014 CHG CT GUIDANCE RADIATION THERAPY FLDS PLACEMENT: CPT | Performed by: RADIOLOGY

## 2024-01-31 PROCEDURE — 1160F RVW MEDS BY RX/DR IN RCRD: CPT | Performed by: STUDENT IN AN ORGANIZED HEALTH CARE EDUCATION/TRAINING PROGRAM

## 2024-01-31 PROCEDURE — 96417 CHEMO IV INFUS EACH ADDL SEQ: CPT

## 2024-01-31 PROCEDURE — 1159F MED LIST DOCD IN RCRD: CPT | Performed by: STUDENT IN AN ORGANIZED HEALTH CARE EDUCATION/TRAINING PROGRAM

## 2024-01-31 PROCEDURE — 99215 OFFICE O/P EST HI 40 MIN: CPT | Mod: 25 | Performed by: STUDENT IN AN ORGANIZED HEALTH CARE EDUCATION/TRAINING PROGRAM

## 2024-01-31 PROCEDURE — 1157F ADVNC CARE PLAN IN RCRD: CPT | Performed by: STUDENT IN AN ORGANIZED HEALTH CARE EDUCATION/TRAINING PROGRAM

## 2024-01-31 RX ORDER — DIPHENHYDRAMINE HCL 50 MG
50 CAPSULE ORAL ONCE
Status: CANCELLED | OUTPATIENT
Start: 2024-01-31

## 2024-01-31 RX ORDER — ALBUTEROL SULFATE 0.83 MG/ML
3 SOLUTION RESPIRATORY (INHALATION) AS NEEDED
Status: DISCONTINUED | OUTPATIENT
Start: 2024-01-31 | End: 2024-01-31 | Stop reason: HOSPADM

## 2024-01-31 RX ORDER — DEXAMETHASONE SODIUM PHOSPHATE 4 MG/ML
10 INJECTION, SOLUTION INTRA-ARTICULAR; INTRALESIONAL; INTRAMUSCULAR; INTRAVENOUS; SOFT TISSUE ONCE
Status: CANCELLED | OUTPATIENT
Start: 2024-01-31

## 2024-01-31 RX ORDER — HEPARIN SODIUM,PORCINE/PF 10 UNIT/ML
50 SYRINGE (ML) INTRAVENOUS AS NEEDED
Status: CANCELLED | OUTPATIENT
Start: 2024-01-31

## 2024-01-31 RX ORDER — FAMOTIDINE 10 MG/ML
20 INJECTION INTRAVENOUS ONCE
Status: COMPLETED | OUTPATIENT
Start: 2024-01-31 | End: 2024-01-31

## 2024-01-31 RX ORDER — DIPHENHYDRAMINE HYDROCHLORIDE 50 MG/ML
50 INJECTION INTRAMUSCULAR; INTRAVENOUS AS NEEDED
Status: CANCELLED | OUTPATIENT
Start: 2024-01-31

## 2024-01-31 RX ORDER — PROCHLORPERAZINE EDISYLATE 5 MG/ML
10 INJECTION INTRAMUSCULAR; INTRAVENOUS EVERY 6 HOURS PRN
Status: DISCONTINUED | OUTPATIENT
Start: 2024-01-31 | End: 2024-01-31 | Stop reason: HOSPADM

## 2024-01-31 RX ORDER — PALONOSETRON 0.05 MG/ML
0.25 INJECTION, SOLUTION INTRAVENOUS ONCE
Status: COMPLETED | OUTPATIENT
Start: 2024-01-31 | End: 2024-01-31

## 2024-01-31 RX ORDER — EPINEPHRINE 0.3 MG/.3ML
0.3 INJECTION SUBCUTANEOUS EVERY 5 MIN PRN
Status: CANCELLED | OUTPATIENT
Start: 2024-01-31

## 2024-01-31 RX ORDER — DIPHENHYDRAMINE HCL 50 MG
50 CAPSULE ORAL ONCE
Status: COMPLETED | OUTPATIENT
Start: 2024-01-31 | End: 2024-01-31

## 2024-01-31 RX ORDER — HEPARIN 100 UNIT/ML
500 SYRINGE INTRAVENOUS AS NEEDED
Status: CANCELLED | OUTPATIENT
Start: 2024-01-31

## 2024-01-31 RX ORDER — PALONOSETRON 0.05 MG/ML
0.25 INJECTION, SOLUTION INTRAVENOUS ONCE
Status: CANCELLED | OUTPATIENT
Start: 2024-01-31

## 2024-01-31 RX ORDER — LOPERAMIDE HYDROCHLORIDE 2 MG/1
2 CAPSULE ORAL ONCE
Status: COMPLETED | OUTPATIENT
Start: 2024-01-31 | End: 2024-01-31

## 2024-01-31 RX ORDER — DEXAMETHASONE SODIUM PHOSPHATE 4 MG/ML
10 INJECTION, SOLUTION INTRA-ARTICULAR; INTRALESIONAL; INTRAMUSCULAR; INTRAVENOUS; SOFT TISSUE ONCE
Status: COMPLETED | OUTPATIENT
Start: 2024-01-31 | End: 2024-01-31

## 2024-01-31 RX ORDER — FAMOTIDINE 10 MG/ML
20 INJECTION INTRAVENOUS ONCE
Status: CANCELLED | OUTPATIENT
Start: 2024-01-31

## 2024-01-31 RX ORDER — FAMOTIDINE 10 MG/ML
20 INJECTION INTRAVENOUS ONCE AS NEEDED
Status: DISCONTINUED | OUTPATIENT
Start: 2024-01-31 | End: 2024-01-31 | Stop reason: HOSPADM

## 2024-01-31 RX ORDER — PROCHLORPERAZINE MALEATE 10 MG
10 TABLET ORAL EVERY 6 HOURS PRN
Status: CANCELLED | OUTPATIENT
Start: 2024-01-31

## 2024-01-31 RX ORDER — ALBUTEROL SULFATE 0.83 MG/ML
3 SOLUTION RESPIRATORY (INHALATION) AS NEEDED
Status: CANCELLED | OUTPATIENT
Start: 2024-01-31

## 2024-01-31 RX ORDER — PROCHLORPERAZINE MALEATE 10 MG
10 TABLET ORAL EVERY 6 HOURS PRN
Status: DISCONTINUED | OUTPATIENT
Start: 2024-01-31 | End: 2024-01-31 | Stop reason: HOSPADM

## 2024-01-31 RX ORDER — FAMOTIDINE 10 MG/ML
20 INJECTION INTRAVENOUS ONCE AS NEEDED
Status: CANCELLED | OUTPATIENT
Start: 2024-01-31

## 2024-01-31 RX ORDER — LOPERAMIDE HCL 2 MG
2 TABLET ORAL ONCE
Status: CANCELLED | OUTPATIENT
Start: 2024-01-31 | End: 2024-01-31

## 2024-01-31 RX ORDER — EPINEPHRINE 0.3 MG/.3ML
0.3 INJECTION SUBCUTANEOUS EVERY 5 MIN PRN
Status: DISCONTINUED | OUTPATIENT
Start: 2024-01-31 | End: 2024-01-31 | Stop reason: HOSPADM

## 2024-01-31 RX ORDER — DIPHENHYDRAMINE HYDROCHLORIDE 50 MG/ML
50 INJECTION INTRAMUSCULAR; INTRAVENOUS AS NEEDED
Status: DISCONTINUED | OUTPATIENT
Start: 2024-01-31 | End: 2024-01-31 | Stop reason: HOSPADM

## 2024-01-31 RX ORDER — PROCHLORPERAZINE EDISYLATE 5 MG/ML
10 INJECTION INTRAMUSCULAR; INTRAVENOUS EVERY 6 HOURS PRN
Status: CANCELLED | OUTPATIENT
Start: 2024-01-31

## 2024-01-31 RX ADMIN — FAMOTIDINE 20 MG: 10 INJECTION INTRAVENOUS at 13:03

## 2024-01-31 RX ADMIN — PALONOSETRON HYDROCHLORIDE 250 MCG: 0.25 INJECTION INTRAVENOUS at 13:03

## 2024-01-31 RX ADMIN — CARBOPLATIN 150 MG: 10 INJECTION, SOLUTION INTRAVENOUS at 14:58

## 2024-01-31 RX ADMIN — LOPERAMIDE HYDROCHLORIDE 2 MG: 2 CAPSULE ORAL at 15:22

## 2024-01-31 RX ADMIN — DEXAMETHASONE SODIUM PHOSPHATE 10 MG: 4 INJECTION, SOLUTION INTRA-ARTICULAR; INTRALESIONAL; INTRAMUSCULAR; INTRAVENOUS; SOFT TISSUE at 13:03

## 2024-01-31 RX ADMIN — DIPHENHYDRAMINE HYDROCHLORIDE 50 MG: 50 CAPSULE ORAL at 13:03

## 2024-01-31 RX ADMIN — PACLITAXEL 81 MG: 6 INJECTION, SOLUTION INTRAVENOUS at 13:50

## 2024-01-31 ASSESSMENT — ENCOUNTER SYMPTOMS
DEPRESSION: 0
UNEXPECTED WEIGHT CHANGE: 0
LOSS OF SENSATION IN FEET: 0
OCCASIONAL FEELINGS OF UNSTEADINESS: 0

## 2024-01-31 ASSESSMENT — PAIN SCALES - GENERAL: PAINLEVEL: 0-NO PAIN

## 2024-01-31 NOTE — TELEPHONE ENCOUNTER
Call placed to daughter and let her know mediport orders are placed and a team member will be contacting her to schedule.   Daughter also advised that patient will need PT and INR drawn in the infusion department prior to procedure.

## 2024-01-31 NOTE — TELEPHONE ENCOUNTER
Patient's daughter Tati is calling to see about a mediport placement. The patient is currently receiving weekly chemo treatments, and the daughter reports difficulty getting IV access.

## 2024-02-01 ENCOUNTER — HOSPITAL ENCOUNTER (OUTPATIENT)
Dept: RADIATION ONCOLOGY | Facility: HOSPITAL | Age: 79
Setting detail: RADIATION/ONCOLOGY SERIES
Discharge: HOME | End: 2024-02-01
Payer: MEDICARE

## 2024-02-01 ENCOUNTER — TELEPHONE (OUTPATIENT)
Dept: HEMATOLOGY/ONCOLOGY | Facility: HOSPITAL | Age: 79
End: 2024-02-01
Payer: MEDICARE

## 2024-02-01 DIAGNOSIS — C13.9 MALIGNANT NEOPLASM OF HYPOPHARYNX (MULTI): Primary | ICD-10-CM

## 2024-02-01 DIAGNOSIS — C13.8 MALIGNANT NEOPLASM OF OVERLAPPING SITES OF HYPOPHARYNX (MULTI): ICD-10-CM

## 2024-02-01 DIAGNOSIS — Z51.0 ENCOUNTER FOR ANTINEOPLASTIC RADIATION THERAPY: ICD-10-CM

## 2024-02-01 LAB
RAD ONC MSQ ACTUAL FRACTIONS DELIVERED: 7
RAD ONC MSQ ACTUAL SESSION DELIVERED DOSE: 200 CGRAY
RAD ONC MSQ ACTUAL TOTAL DOSE: 1400 CGRAY
RAD ONC MSQ ELAPSED DAYS: 8
RAD ONC MSQ LAST DATE: NORMAL
RAD ONC MSQ PRESCRIBED FRACTIONAL DOSE: 200 CGRAY
RAD ONC MSQ PRESCRIBED NUMBER OF FRACTIONS: 35
RAD ONC MSQ PRESCRIBED TECHNIQUE: NORMAL
RAD ONC MSQ PRESCRIBED TOTAL DOSE: 7000 CGRAY
RAD ONC MSQ PRESCRIPTION PATTERN COMMENT: NORMAL
RAD ONC MSQ START DATE: NORMAL
RAD ONC MSQ TREATMENT COURSE NUMBER: 1
RAD ONC MSQ TREATMENT SITE: NORMAL

## 2024-02-01 PROCEDURE — 77386 HC INTENSITY-MODULATED RADIATION THERAPY (IMRT), COMPLEX: CPT | Performed by: RADIOLOGY

## 2024-02-01 PROCEDURE — 77014 CHG CT GUIDANCE RADIATION THERAPY FLDS PLACEMENT: CPT | Performed by: RADIOLOGY

## 2024-02-01 NOTE — TELEPHONE ENCOUNTER
Attempted to contact patient's daughter with two options for port placement.     The available options at the moment are Feb. 5th at 7AM and Feb. 6th at 12PM. I left the office number for her to call back and confirm if either time worked for the patient. Office number provided.

## 2024-02-01 NOTE — TELEPHONE ENCOUNTER
Attempted to contact patient for her port placement date and time. Patient's phone went to  so asked that the patient call back to confirm a date and time. Office number provided.

## 2024-02-02 ENCOUNTER — HOSPITAL ENCOUNTER (OUTPATIENT)
Dept: RADIATION ONCOLOGY | Facility: HOSPITAL | Age: 79
Setting detail: RADIATION/ONCOLOGY SERIES
Discharge: HOME | End: 2024-02-02
Payer: MEDICARE

## 2024-02-02 DIAGNOSIS — C13.8 MALIGNANT NEOPLASM OF OVERLAPPING SITES OF HYPOPHARYNX (MULTI): ICD-10-CM

## 2024-02-02 DIAGNOSIS — Z51.0 ENCOUNTER FOR ANTINEOPLASTIC RADIATION THERAPY: ICD-10-CM

## 2024-02-02 LAB
RAD ONC MSQ ACTUAL FRACTIONS DELIVERED: 8
RAD ONC MSQ ACTUAL SESSION DELIVERED DOSE: 200 CGRAY
RAD ONC MSQ ACTUAL TOTAL DOSE: 1600 CGRAY
RAD ONC MSQ ELAPSED DAYS: 9
RAD ONC MSQ LAST DATE: NORMAL
RAD ONC MSQ PRESCRIBED FRACTIONAL DOSE: 200 CGRAY
RAD ONC MSQ PRESCRIBED NUMBER OF FRACTIONS: 35
RAD ONC MSQ PRESCRIBED TECHNIQUE: NORMAL
RAD ONC MSQ PRESCRIBED TOTAL DOSE: 7000 CGRAY
RAD ONC MSQ PRESCRIPTION PATTERN COMMENT: NORMAL
RAD ONC MSQ START DATE: NORMAL
RAD ONC MSQ TREATMENT COURSE NUMBER: 1
RAD ONC MSQ TREATMENT SITE: NORMAL

## 2024-02-02 PROCEDURE — 77014 CHG CT GUIDANCE RADIATION THERAPY FLDS PLACEMENT: CPT | Performed by: RADIOLOGY

## 2024-02-02 PROCEDURE — 77386 HC INTENSITY-MODULATED RADIATION THERAPY (IMRT), COMPLEX: CPT | Performed by: RADIOLOGY

## 2024-02-02 NOTE — TELEPHONE ENCOUNTER
Called and left VM for patient's daughter informing her that the Mediport placement is confirmed for 2/6 at 12 and that radiation was moved to 10AM on 2/6. Instructed her to call back if she had any questions or concerns- office number provided.

## 2024-02-05 ENCOUNTER — RADIATION ONCOLOGY OTV (OUTPATIENT)
Dept: RADIATION ONCOLOGY | Facility: HOSPITAL | Age: 79
End: 2024-02-05
Payer: MEDICARE

## 2024-02-05 ENCOUNTER — HOSPITAL ENCOUNTER (OUTPATIENT)
Dept: RADIATION ONCOLOGY | Facility: HOSPITAL | Age: 79
Setting detail: RADIATION/ONCOLOGY SERIES
Discharge: HOME | End: 2024-02-05
Payer: MEDICARE

## 2024-02-05 VITALS
WEIGHT: 151.9 LBS | TEMPERATURE: 96.8 F | BODY MASS INDEX: 25.93 KG/M2 | HEIGHT: 64 IN | SYSTOLIC BLOOD PRESSURE: 97 MMHG | HEART RATE: 83 BPM | DIASTOLIC BLOOD PRESSURE: 67 MMHG | OXYGEN SATURATION: 100 % | RESPIRATION RATE: 18 BRPM

## 2024-02-05 DIAGNOSIS — Z51.0 ENCOUNTER FOR ANTINEOPLASTIC RADIATION THERAPY: ICD-10-CM

## 2024-02-05 DIAGNOSIS — C13.8 MALIGNANT NEOPLASM OF OVERLAPPING SITES OF HYPOPHARYNX (MULTI): ICD-10-CM

## 2024-02-05 LAB
RAD ONC MSQ ACTUAL FRACTIONS DELIVERED: 9
RAD ONC MSQ ACTUAL SESSION DELIVERED DOSE: 200 CGRAY
RAD ONC MSQ ACTUAL TOTAL DOSE: 1800 CGRAY
RAD ONC MSQ ELAPSED DAYS: 12
RAD ONC MSQ LAST DATE: NORMAL
RAD ONC MSQ PRESCRIBED FRACTIONAL DOSE: 200 CGRAY
RAD ONC MSQ PRESCRIBED NUMBER OF FRACTIONS: 35
RAD ONC MSQ PRESCRIBED TECHNIQUE: NORMAL
RAD ONC MSQ PRESCRIBED TOTAL DOSE: 7000 CGRAY
RAD ONC MSQ PRESCRIPTION PATTERN COMMENT: NORMAL
RAD ONC MSQ START DATE: NORMAL
RAD ONC MSQ TREATMENT COURSE NUMBER: 1
RAD ONC MSQ TREATMENT SITE: NORMAL

## 2024-02-05 PROCEDURE — 77386 HC INTENSITY-MODULATED RADIATION THERAPY (IMRT), COMPLEX: CPT | Performed by: RADIOLOGY

## 2024-02-05 PROCEDURE — 77014 CHG CT GUIDANCE RADIATION THERAPY FLDS PLACEMENT: CPT | Performed by: RADIOLOGY

## 2024-02-05 PROCEDURE — 77427 RADIATION TX MANAGEMENT X5: CPT | Performed by: RADIOLOGY

## 2024-02-05 ASSESSMENT — PAIN SCALES - GENERAL: PAINLEVEL: 1

## 2024-02-05 NOTE — PROGRESS NOTES
Radiation Oncology On Treatment Visit    Patient Name:  Amita Todd  MRN:  [id number]  :  1945    Referring Provider: No ref. provider found  Primary Care Provider: Caitie Leal MD  Care Team: Patient Care Team:  Caitie Leal MD as PCP - General  Cait[facility] Leal [facility]  Kyunghee Burkitt, DO as Medical Oncologist (Hematology and Oncology)  Clemente Tobin MD as Surgeon (Otolaryngology)  Tahira Cochran MD as Radiation Oncologist (Radiation Oncology)  Hailey Abbott PA-C as Physician Assistant (Hematology and Oncology)    Date of Service: 2024     Diagnosis:   Specialty Problems          Radiation Oncology Problems    Cancer of anterior two-thirds of tongue (CMS/HCC)        Malignant neoplasm of hypopharynx (CMS/HCC)         Treatment Summary:  Radiation Treatments       Active   hypophar + neck (Started on 2024)   Most recent fraction: 200 cGy given on 2024   Total given: 1,800 cGy / 7,000 cGy  (9 of 35 fractions)   Elapsed Days: 12   Technique: VMAT           Completed   No historical radiation treatments to show.             SUBJECTIVE: Patient is doing well.  Continues to use at least 3 very high-calorie shakes daily.  Also drinking Gatorade.  No trouble breathing or swelling that she reports.  No pain with swallowing.  She does not have nausea.  She does report intermittent diarrhea that has happened up to 3 times over the last week.  She reports some new blurriness in her vision that started after treatment began.  She has some stomach discomfort occurs when she lies down to sleep at night, but this is not severe and is intermittent.  No heartburn.  She started taking dexamethasone 4 mg daily and reports that her appetite is improved, as is her energy level.  She is currently able to swallow soft foods as well as drink liquids without    OBJECTIVE:   Vital Signs:  BP 97/67   Pulse 83   Temp 36 °C (96.8 °F) (Skin)   Resp 18   Ht 1.626 m (5'  "4\")   Wt 68.9 kg (151 lb 14.4 oz)   SpO2 100%   BMI 26.07 kg/m²    Pain Scale: The patient's current pain level was assessed.  They report currently having a pain of 1 out of 10.    Other Pertinent Findings:   No thrush, mucosits, or erythema of the neck  Labs including CMP/CBC from last week look much improved with normalization of GFR  No LE edema    Toxicity Assessment          1/26/2024    10:50 2/5/2024    12:01   Toxicity Assessment   Adverse Events Reviewed (WDL) No (Exceptions to WDL) No (Exceptions to WDL)   Treatment  and neck Head and neck   Anorexia Grade 0       First weight during treatment Grade 0       steroids are helping, increase in weight by .5lbs since last week   Dehydration Grade 0       swallowing ok Grade 0   Dermatitis Radiation Grade 0       creams provided Grade 0       creams at home   Diarrhea  Grade 2       having some episodes, discussed OTC meds   Fatigue Grade 0 Grade 1       starting to feel tired, Rest and activity balance   Nausea Grade 0 Grade 0   Pain Grade 0 Grade 0   Tumor Pain Grade 0    Vomiting Grade 0 Grade 0   Dysphagia Grade 0       passed SLP appointment Grade 1       better with steriods   Mucositis Oral Grade 0 Grade 0       GLutamine at home   Blurred Vision  Grade 1       both eyes, been consistent making it hard to read   Dry Mouth Grade 1       talked about OTC meds Grade 1       OTC meds at home   Oral Pain Grade 0 Grade 0   Aspiration Grade 0 Grade 0   Hoarseness Grade 0 Grade 0   Voice Alteration Grade 0 Grade 0        Assessment / Plan:  The patient is tolerating radiation therapy as anticipated.  Continue per current treatment plan.        Cancer Treatment: patient is doing well. Started CRT with carbo/taxol this week. No nausea from chemo. She has ongoing dysphagia to solids and difficulty with clearing post nasal drip. However, breathing is improved since her hospitalization last week. No need currently for PEG or trach. She is taking Dex 4 mg " daily to decrease chance of tumor edema causing worsening symptoms as we start RT. Will watch her closely for changes.      FEN: patient's weight is stable. Using liquid supplements. Aim for >2000 calories and >60grams of protein daily. Will see a dietician for more specific recommendations.  All diet is oral and no feeding tube.  Met with SLP prior to starting RT and doing daily exercises to maintain swallowing function.  Currently drinking 3-4 VHC daily and weight is stable     Fluid balance in setting of CHF: Currently appears to have good fluid balance off Lasix and Creatinine has normalized. No evidence of volume overloading with weight 156 at dx and 154 today. Will continue per Dr. Nova's directives to use Lasix PRN if weight increases by 3lbs in short time interval.     Nausea: Reviewed use of  Compazine/Zofran PRN for breakthrough nausea     Pain control: Patient reporting no pain.     Oral Care: Patient will continue fluoride trays per dentist. Patient is using Glutamine 10g TID on days of RT to promote ongoing healing of mucositis. Rec uaifenesin for thick saliva or Xylimelt for dry mouth     Skin Care: Continue Aquaphor within the RT field. Reviewed how to apply and to avoid within 3 hours prior to RT.      Diarrhea/Constipation: Reviewed use of Immodium for diarrhea. Reviewed use of Miralax or Senna if develops constipation.

## 2024-02-06 ENCOUNTER — NUTRITION (OUTPATIENT)
Dept: HEMATOLOGY/ONCOLOGY | Facility: HOSPITAL | Age: 79
End: 2024-02-06
Payer: MEDICARE

## 2024-02-06 ENCOUNTER — OFFICE VISIT (OUTPATIENT)
Dept: HEMATOLOGY/ONCOLOGY | Facility: HOSPITAL | Age: 79
End: 2024-02-06
Payer: MEDICARE

## 2024-02-06 ENCOUNTER — HOSPITAL ENCOUNTER (OUTPATIENT)
Dept: RADIOLOGY | Facility: HOSPITAL | Age: 79
Discharge: HOME | End: 2024-02-06
Payer: MEDICARE

## 2024-02-06 ENCOUNTER — TELEPHONE (OUTPATIENT)
Dept: ADMISSION | Facility: HOSPITAL | Age: 79
End: 2024-02-06
Payer: MEDICARE

## 2024-02-06 ENCOUNTER — HOSPITAL ENCOUNTER (EMERGENCY)
Facility: HOSPITAL | Age: 79
Discharge: HOME | End: 2024-02-07
Attending: EMERGENCY MEDICINE
Payer: MEDICARE

## 2024-02-06 ENCOUNTER — TELEPHONE (OUTPATIENT)
Dept: RADIATION ONCOLOGY | Facility: HOSPITAL | Age: 79
End: 2024-02-06
Payer: MEDICARE

## 2024-02-06 ENCOUNTER — APPOINTMENT (OUTPATIENT)
Dept: CARDIOLOGY | Facility: HOSPITAL | Age: 79
End: 2024-02-06
Payer: MEDICARE

## 2024-02-06 ENCOUNTER — APPOINTMENT (OUTPATIENT)
Dept: RADIOLOGY | Facility: HOSPITAL | Age: 79
End: 2024-02-06
Payer: MEDICARE

## 2024-02-06 ENCOUNTER — HOSPITAL ENCOUNTER (OUTPATIENT)
Dept: RADIATION ONCOLOGY | Facility: HOSPITAL | Age: 79
Setting detail: RADIATION/ONCOLOGY SERIES
Discharge: HOME | End: 2024-02-06
Payer: MEDICARE

## 2024-02-06 VITALS
BODY MASS INDEX: 25.39 KG/M2 | WEIGHT: 152.6 LBS | HEART RATE: 84 BPM | DIASTOLIC BLOOD PRESSURE: 62 MMHG | SYSTOLIC BLOOD PRESSURE: 108 MMHG | OXYGEN SATURATION: 97 % | TEMPERATURE: 96.6 F | RESPIRATION RATE: 18 BRPM

## 2024-02-06 VITALS
OXYGEN SATURATION: 99 % | SYSTOLIC BLOOD PRESSURE: 138 MMHG | DIASTOLIC BLOOD PRESSURE: 66 MMHG | HEART RATE: 106 BPM | RESPIRATION RATE: 17 BRPM

## 2024-02-06 VITALS
WEIGHT: 150 LBS | HEART RATE: 99 BPM | OXYGEN SATURATION: 98 % | RESPIRATION RATE: 18 BRPM | BODY MASS INDEX: 24.99 KG/M2 | HEIGHT: 65 IN | SYSTOLIC BLOOD PRESSURE: 119 MMHG | DIASTOLIC BLOOD PRESSURE: 78 MMHG

## 2024-02-06 DIAGNOSIS — C13.9 MALIGNANT NEOPLASM OF HYPOPHARYNX (MULTI): Primary | ICD-10-CM

## 2024-02-06 DIAGNOSIS — D70.1 CHEMOTHERAPY-INDUCED NEUTROPENIA (CMS-HCC): ICD-10-CM

## 2024-02-06 DIAGNOSIS — R11.0 NAUSEA: Primary | ICD-10-CM

## 2024-02-06 DIAGNOSIS — R11.2 CHEMOTHERAPY INDUCED NAUSEA AND VOMITING: ICD-10-CM

## 2024-02-06 DIAGNOSIS — T45.1X5A CHEMOTHERAPY INDUCED NAUSEA AND VOMITING: ICD-10-CM

## 2024-02-06 DIAGNOSIS — C76.0 HEAD AND NECK CANCER (MULTI): ICD-10-CM

## 2024-02-06 DIAGNOSIS — T45.1X5A CHEMOTHERAPY-INDUCED NEUTROPENIA (CMS-HCC): ICD-10-CM

## 2024-02-06 DIAGNOSIS — E87.1 HYPONATREMIA: ICD-10-CM

## 2024-02-06 DIAGNOSIS — R19.7 DIARRHEA, UNSPECIFIED TYPE: ICD-10-CM

## 2024-02-06 DIAGNOSIS — C13.8 MALIGNANT NEOPLASM OF OVERLAPPING SITES OF HYPOPHARYNX (MULTI): ICD-10-CM

## 2024-02-06 DIAGNOSIS — Z51.0 ENCOUNTER FOR ANTINEOPLASTIC RADIATION THERAPY: ICD-10-CM

## 2024-02-06 LAB
ALBUMIN SERPL BCP-MCNC: 3.1 G/DL (ref 3.4–5)
ALP SERPL-CCNC: 40 U/L (ref 33–136)
ALT SERPL W P-5'-P-CCNC: 15 U/L (ref 7–45)
ANION GAP SERPL CALC-SCNC: 13 MMOL/L (ref 10–20)
AST SERPL W P-5'-P-CCNC: 18 U/L (ref 9–39)
BASOPHILS # BLD AUTO: 0 X10*3/UL (ref 0–0.1)
BASOPHILS NFR BLD AUTO: 0 %
BILIRUB SERPL-MCNC: 1.6 MG/DL (ref 0–1.2)
BUN SERPL-MCNC: 29 MG/DL (ref 6–23)
BURR CELLS BLD QL SMEAR: NORMAL
CALCIUM SERPL-MCNC: 8.4 MG/DL (ref 8.6–10.3)
CHLORIDE SERPL-SCNC: 102 MMOL/L (ref 98–107)
CO2 SERPL-SCNC: 21 MMOL/L (ref 21–32)
CREAT SERPL-MCNC: 0.64 MG/DL (ref 0.5–1.05)
EGFRCR SERPLBLD CKD-EPI 2021: >90 ML/MIN/1.73M*2
EOSINOPHIL # BLD AUTO: 0.01 X10*3/UL (ref 0–0.4)
EOSINOPHIL NFR BLD AUTO: 3.1 %
ERYTHROCYTE [DISTWIDTH] IN BLOOD BY AUTOMATED COUNT: 14.5 % (ref 11.5–14.5)
GLUCOSE SERPL-MCNC: 139 MG/DL (ref 74–99)
HCT VFR BLD AUTO: 36.2 % (ref 36–46)
HGB BLD-MCNC: 11.8 G/DL (ref 12–16)
IMM GRANULOCYTES # BLD AUTO: 0 X10*3/UL (ref 0–0.5)
IMM GRANULOCYTES NFR BLD AUTO: 0 % (ref 0–0.9)
LYMPHOCYTES # BLD AUTO: 0.07 X10*3/UL (ref 0.8–3)
LYMPHOCYTES NFR BLD AUTO: 21.9 %
MAGNESIUM SERPL-MCNC: 1.7 MG/DL (ref 1.6–2.4)
MCH RBC QN AUTO: 30.4 PG (ref 26–34)
MCHC RBC AUTO-ENTMCNC: 32.6 G/DL (ref 32–36)
MCV RBC AUTO: 93 FL (ref 80–100)
MONOCYTES # BLD AUTO: 0.03 X10*3/UL (ref 0.05–0.8)
MONOCYTES NFR BLD AUTO: 9.4 %
NEUTROPHILS # BLD AUTO: 0.21 X10*3/UL (ref 1.6–5.5)
NEUTROPHILS NFR BLD AUTO: 65.6 %
NRBC BLD-RTO: 0 /100 WBCS (ref 0–0)
OVALOCYTES BLD QL SMEAR: NORMAL
PHOSPHATE SERPL-MCNC: 2.5 MG/DL (ref 2.5–4.9)
PLATELET # BLD AUTO: 104 X10*3/UL (ref 150–450)
POTASSIUM SERPL-SCNC: 4.6 MMOL/L (ref 3.5–5.3)
PROT SERPL-MCNC: 5.9 G/DL (ref 6.4–8.2)
RAD ONC MSQ ACTUAL FRACTIONS DELIVERED: 10
RAD ONC MSQ ACTUAL SESSION DELIVERED DOSE: 200 CGRAY
RAD ONC MSQ ACTUAL TOTAL DOSE: 2000 CGRAY
RAD ONC MSQ ELAPSED DAYS: 13
RAD ONC MSQ LAST DATE: NORMAL
RAD ONC MSQ PRESCRIBED FRACTIONAL DOSE: 200 CGRAY
RAD ONC MSQ PRESCRIBED NUMBER OF FRACTIONS: 35
RAD ONC MSQ PRESCRIBED TECHNIQUE: NORMAL
RAD ONC MSQ PRESCRIBED TOTAL DOSE: 7000 CGRAY
RAD ONC MSQ PRESCRIPTION PATTERN COMMENT: NORMAL
RAD ONC MSQ START DATE: NORMAL
RAD ONC MSQ TREATMENT COURSE NUMBER: 1
RAD ONC MSQ TREATMENT SITE: NORMAL
RBC # BLD AUTO: 3.88 X10*6/UL (ref 4–5.2)
RBC MORPH BLD: NORMAL
SODIUM SERPL-SCNC: 131 MMOL/L (ref 136–145)
WBC # BLD AUTO: 0.3 X10*3/UL (ref 4.4–11.3)

## 2024-02-06 PROCEDURE — 1036F TOBACCO NON-USER: CPT | Performed by: STUDENT IN AN ORGANIZED HEALTH CARE EDUCATION/TRAINING PROGRAM

## 2024-02-06 PROCEDURE — 77386 HC INTENSITY-MODULATED RADIATION THERAPY (IMRT), COMPLEX: CPT | Performed by: RADIOLOGY

## 2024-02-06 PROCEDURE — 1157F ADVNC CARE PLAN IN RCRD: CPT | Performed by: STUDENT IN AN ORGANIZED HEALTH CARE EDUCATION/TRAINING PROGRAM

## 2024-02-06 PROCEDURE — 3074F SYST BP LT 130 MM HG: CPT | Performed by: STUDENT IN AN ORGANIZED HEALTH CARE EDUCATION/TRAINING PROGRAM

## 2024-02-06 PROCEDURE — 96361 HYDRATE IV INFUSION ADD-ON: CPT | Performed by: EMERGENCY MEDICINE

## 2024-02-06 PROCEDURE — 83735 ASSAY OF MAGNESIUM: CPT

## 2024-02-06 PROCEDURE — 99284 EMERGENCY DEPT VISIT MOD MDM: CPT | Mod: 25 | Performed by: EMERGENCY MEDICINE

## 2024-02-06 PROCEDURE — 85025 COMPLETE CBC W/AUTO DIFF WBC: CPT

## 2024-02-06 PROCEDURE — 99215 OFFICE O/P EST HI 40 MIN: CPT | Performed by: STUDENT IN AN ORGANIZED HEALTH CARE EDUCATION/TRAINING PROGRAM

## 2024-02-06 PROCEDURE — 84100 ASSAY OF PHOSPHORUS: CPT

## 2024-02-06 PROCEDURE — 96374 THER/PROPH/DIAG INJ IV PUSH: CPT | Performed by: EMERGENCY MEDICINE

## 2024-02-06 PROCEDURE — 2500000004 HC RX 250 GENERAL PHARMACY W/ HCPCS (ALT 636 FOR OP/ED)

## 2024-02-06 PROCEDURE — 3078F DIAST BP <80 MM HG: CPT | Performed by: STUDENT IN AN ORGANIZED HEALTH CARE EDUCATION/TRAINING PROGRAM

## 2024-02-06 PROCEDURE — 84075 ASSAY ALKALINE PHOSPHATASE: CPT

## 2024-02-06 PROCEDURE — 77336 RADIATION PHYSICS CONSULT: CPT | Performed by: RADIOLOGY

## 2024-02-06 PROCEDURE — 1159F MED LIST DOCD IN RCRD: CPT | Performed by: STUDENT IN AN ORGANIZED HEALTH CARE EDUCATION/TRAINING PROGRAM

## 2024-02-06 PROCEDURE — 1126F AMNT PAIN NOTED NONE PRSNT: CPT | Performed by: STUDENT IN AN ORGANIZED HEALTH CARE EDUCATION/TRAINING PROGRAM

## 2024-02-06 PROCEDURE — 1160F RVW MEDS BY RX/DR IN RCRD: CPT | Performed by: STUDENT IN AN ORGANIZED HEALTH CARE EDUCATION/TRAINING PROGRAM

## 2024-02-06 PROCEDURE — 93005 ELECTROCARDIOGRAM TRACING: CPT

## 2024-02-06 PROCEDURE — 36415 COLL VENOUS BLD VENIPUNCTURE: CPT

## 2024-02-06 PROCEDURE — 77014 CHG CT GUIDANCE RADIATION THERAPY FLDS PLACEMENT: CPT | Performed by: RADIOLOGY

## 2024-02-06 RX ORDER — CLOPIDOGREL BISULFATE 75 MG/1
75 TABLET ORAL DAILY
COMMUNITY
End: 2024-02-06 | Stop reason: ENTERED-IN-ERROR

## 2024-02-06 RX ORDER — ONDANSETRON HYDROCHLORIDE 2 MG/ML
4 INJECTION, SOLUTION INTRAVENOUS ONCE
Status: COMPLETED | OUTPATIENT
Start: 2024-02-06 | End: 2024-02-06

## 2024-02-06 RX ADMIN — ONDANSETRON 4 MG: 2 INJECTION INTRAMUSCULAR; INTRAVENOUS at 08:51

## 2024-02-06 RX ADMIN — SODIUM CHLORIDE 500 ML: 9 INJECTION, SOLUTION INTRAVENOUS at 08:52

## 2024-02-06 ASSESSMENT — ENCOUNTER SYMPTOMS
UNEXPECTED WEIGHT CHANGE: 0
FEVER: 0
ARTHRALGIAS: 0
EYE PROBLEMS: 0
BLOOD IN STOOL: 0
MYALGIAS: 0
CONFUSION: 0
NAUSEA: 0
CHILLS: 0
TROUBLE SWALLOWING: 1
DIFFICULTY URINATING: 0
LEG SWELLING: 0
CONSTIPATION: 0
DIARRHEA: 0
SORE THROAT: 0
ABDOMINAL PAIN: 0
VOICE CHANGE: 1
VOMITING: 0
HEADACHES: 0
DEPRESSION: 0
SHORTNESS OF BREATH: 0
COUGH: 0
NUMBNESS: 0

## 2024-02-06 ASSESSMENT — PAIN SCALES - GENERAL
PAINLEVEL: 0-NO PAIN
PAINLEVEL_OUTOF10: 0 - NO PAIN

## 2024-02-06 ASSESSMENT — PAIN - FUNCTIONAL ASSESSMENT: PAIN_FUNCTIONAL_ASSESSMENT: 0-10

## 2024-02-06 NOTE — ED PROVIDER NOTES
HPI   No chief complaint on file.      HPI  HISTORY OF PRESENT ILLNESS:  78 y.o. 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  Patient states she woke up and felt very nauseous, has been dry heaving, but has had no actual vomiting.  She states that she has had a large amount of mucus in her mouth and throat as well, states that she has been pulling mucus out of her mouth.  She has not specifically been coughing up mucus, has no hemoptysis.  She is not vomiting up mucus.  She also states that she has had 2 episodes of nonbloody diarrhea this morning, no melena or hematochezia.  She denies abdominal pain.  Patient states that she took a dose of Zofran at about 4 AM, which did not provide much relief.  She feels weak and shaky.  She states that she has been n.p.o. since midnight in preparation for chemotherapy as well as a port placement today.  She is currently undergoing treatment for oropharyngeal cancer.  She states that she called [facility] this morning due to her symptoms, reached the physician on-call for [facility], and was told to come to the ED. she denies dizziness or lightheadedness, no syncope.  No falls or trauma.  Denies chest pain or shortness of breath.  Denies fevers or chills.  No headache, no blurry vision.  No neck pain or stiffness.  No difficulty swallowing, she was able to swallow Zofran pill this morning without difficulty.  No throat pain or neck pain.  No other complaints or symptoms voiced.    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        No data recorded                Patient History   Past Medical History:   Diagnosis Date    Alcohol dependence, in remission (CMS/[facility]) 06/16/2016    History of alcoholism    Anemia     Aneurysm of ascending aorta (CMS/[facility])     Arrhythmia     a-fib. 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    Pain in leg, unspecified 01/25/2022    Leg pain    Pain in right shoulder 07/01/2020    Bilateral shoulder pain    Pain in unspecified knee 03/31/2021    Joint pain, knee    Personal history of diseases of the blood and blood-forming organs and certain disorders involving the immune mechanism 02/09/2019    History of anemia    Personal history of malignant neoplasm of breast 04/29/2014    History of malignant neoplasm of female breast    Personal history of other diseases of the circulatory system 04/03/2014    Personal history of subarachnoid hemorrhage    Personal history of other diseases of the digestive system 03/08/2021    History of upper gastrointestinal hemorrhage    Personal history of other diseases of the digestive system 03/08/2021    History of melena    Personal history of other diseases of the nervous system and sense organs 08/12/2020    History of Bell's palsy    Personal history of other infectious and parasitic diseases 06/14/2019    History of herpes zoster    Personal history of other specified conditions 03/03/2020    History of lump of left breast    Personal history of other specified conditions  2021    History of shortness of breath    Personal history of transient ischemic attack (TIA), and cerebral infarction without residual deficits 2022    History of transient ischemic attack    Stenosis of carotid artery     right    Trigger finger, unspecified finger 2017    Acquired trigger finger     Past Surgical History:   Procedure Laterality Date    CT ANGIO NECK  2019    CT NECK ANGIO W AND WO IV CONTRAST 2019 [facility] CLINICAL LEGACY    CT ANGIO NECK  2021    CT NECK ANGIO W AND WO IV CONTRAST 2021 [facility] ANCILLARY LEGACY    CT HEAD ANGIO W AND WO IV CONTRAST  2019    CT HEAD ANGIO W AND WO IV CONTRAST 2019 [facility] CLINICAL LEGACY    CT HEAD ANGIO W AND WO IV CONTRAST  2021    CT HEAD ANGIO W AND WO IV CONTRAST 2021 [facility]    MR HEAD ANGIO WO IV CONTRAST  2019    MR HEAD ANGIO WO IV CONTRAST 2019 [facility] CLINICAL LEGACY    MR NECK ANGIO WO IV CONTRAST  2019    MR NECK ANGIO WO IV CONTRAST 2019 [facility] CLINICAL LEGACY    OTHER SURGICAL HISTORY  2020    Oophorectomy    OTHER SURGICAL HISTORY  2019    Miscarriage surgical treatment    OTHER SURGICAL HISTORY  2019    multiple Catheter Ablation Atrial Flutter    OTHER SURGICAL HISTORY  2014    Previous Stent Placement    SEPTOPLASTY  2017    Septoplasty     Family History   Problem Relation Name Age of Onset    Breast cancer Mother      Other (GOODPASTURE'S DISEASE) Father      BRCA2 Positive Sister      Lung cancer Sister      Neuroblastoma Sister      BRCA2 Positive Daughter      Breast cancer Other G/P      Social History     Tobacco Use    Smoking status: Former     Packs/day: 2.00     Years: 30.00     Additional pack years: 0.00     Total pack years: 60.00     Types: Cigarettes     Quit date:      Years since quittin.1    Smokeless tobacco: Never   Vaping Use    Vaping Use: Never used   Substance Use Topics    Alcohol use: Not Currently      Comment: Prior Alcohol use. Stopped in early 2000s    Drug use: Never       Physical Exam   ED Triage Vitals   Temp Pulse Resp BP   -- -- -- --      SpO2 Temp src Heart Rate Source Patient Position   -- -- -- --      BP Location FiO2 (%)     -- --       Physical Exam  Constitutional:       General: She is not in acute distress.     Appearance: She is not toxic-appearing.   HENT:      Head: Normocephalic and atraumatic.      Mouth/Throat:      Mouth: Mucous membranes are moist.      Pharynx: No oropharyngeal exudate or posterior oropharyngeal erythema.   Eyes:      General: No scleral icterus.     Extraocular Movements: Extraocular movements intact.   Cardiovascular:      Rate and Rhythm: Normal rate.      Pulses: Normal pulses.   Pulmonary:      Effort: Pulmonary effort is normal. No respiratory distress.      Breath sounds: Normal breath sounds. No wheezing, rhonchi or rales.   Abdominal:      General: There is no distension.      Palpations: Abdomen is soft.      Tenderness: There is no abdominal tenderness. There is no guarding.   Musculoskeletal:         General: Normal range of motion.      Cervical back: Normal range of motion and neck supple. No rigidity.      Right lower leg: No edema.      Left lower leg: No edema.   Lymphadenopathy:      Cervical: No cervical adenopathy.   Skin:     General: Skin is warm and dry.      Capillary Refill: Capillary refill takes less than 2 seconds.   Neurological:      General: No focal deficit present.      Mental Status: She is alert and oriented to person, place, and time.      Cranial Nerves: No cranial nerve deficit.      Sensory: No sensory deficit.      Motor: No weakness.      Coordination: Coordination normal.   Psychiatric:         Mood and Affect: Mood normal.         Behavior: Behavior normal.         Judgment: Judgment normal.     ED Course & MDM   ED Course as of 02/06/24 1015   Tue Feb 06, 2024   0817 Refused chest xray. []   0844 08:41 12 lead EKG  interpreted by myself and my ED attending reveals atrial fibrillation with a rate of 90 beats per minute.  Normal Axis.  There are no ST elevations. T wave inversions in V1, V2. No acute ischemic changes identified.  [EH]      ED Course User Index  [EH] Shona Huerta PA-C         Diagnoses as of 02/06/24 1015   Nausea       Medical Decision Making  ED course / MDM     Summary:  Patient presented with nausea that began about 4 hours prior to arrival, as well as dry heaving, and mucus production.  She is also had loose stool.  No abdominal pain.  Patient is currently undergoing chemotherapy for oropharyngeal cancer, she does have an appointment at [facility] at noon that she is hoping to make for chemotherapy and a port placement.  Vital signs are stable, patient is alert and oriented, nontoxic-appearing, no acute distress.  Nonlabored respirations.  Lungs clear to auscultation, heart is regular rate, irregular rhythm.  Abdomen is entirely soft and nontender.  No CVA tenderness.  Tolerating secretions without difficulty.  No trouble swallowing.  No peripheral edema.  EKG shows A-fib with normal ventricular response, no ischemic changes.  IV established, labs drawn.  Labs show minor electrolyte abnormalities, normal kidney function, normal transaminases.  Normal magnesium and phosphorus.  Leukopenia, but blood cell count is 0.3, this is decreased from 5.9 one week ago.  She also has slightly decreased platelets of 104, which was 160 one week ago.  Patient was given a dose of Zofran and IV fluids in the ED, which significantly improved her symptoms. Patient case discussed with ED attending Dr. Ross, who also saw and evaluated the patient. Results and differential were discussed in detail with the patient.  Patient is very eager to be discharged because she has an appointment for chemotherapy and port placement in about 1 hour.  Her symptoms have significantly improved, she does not feel nauseous, is longer dry  demetrisil.  She is instructed to be n.p.o. for the port placement, p.o. trial was not attempted.  She does feel improved, is able to ambulate, and her family has arrived to provide transportation to [facility], and she has very close follow-up in 1 hour, reasonable for discharge.  Her lab results were printed for her and she understands the importance of discussing these with her oncologist, as they may affect her procedure and chemotherapy. Patient was given strict return precautions, understands reasons to return to the ED. Also discussed supportive care instructions. I expressed the importance of outpatient follow up with their PCP. All questions were answered, patient expressed understanding and stated that they would comply.    Patient was advised to follow up with PCP or recommended provider in 2-3 days for another evaluation and exam. I advised patient and family/friend/caregiver/guardian to return or go to closest emergency room immediately if symptoms change, get worse, new symptoms develop prior to follow up. If there is no improvement in symptoms in the next 24 hours they are advised to return for further evaluation and exam. I also explained the plan and treatment course. Patient and family/friend/caregiver/guardian is in agreement with plan, treatment course, and follow up and states verbally that they will comply.    Impression:  1. See diagnosis    Plan: Homegoing. I discussed the differential, results, and discharge plan with the patient and family/friend/caregiver. I emphasized the importance of follow-up with the physician I referred them to in the timeframe recommended.  I explained reasons for the patient to return to the Emergency Department. They agreed that if they feel their condition is worsening or if they have any other concern they should call 911 immediately for further assistance. We also discussed medications that were prescribed including common side effects and interactions.  The patient was advised to abstain from driving, operating heavy machinery, or making significant decisions while taking medications such as opiates and muscle relaxers that may impair this. I gave the patient an opportunity to ask all questions they had and answered all of them accordingly. They understand return precautions and discharge instructions. The patient and family/friend/caregiver expressed understanding verbally and that they would comply.       Disposition: Discharge    Patient seen and discussed with Dr. Ross    This note has been transcribed using voice recognition and may contain grammatical errors, misplaced words, incorrect words, incorrect phrases or other errors.   Procedure  Procedures      ________________________________________________________________________    ED attending attestation note:    This patient was seen by the advanced practice provider/resident.  I have personally performed a substantive portion of the encounter.  I have seen and examined the patient, agree with the workup, evaluation, MDM, management and diagnosis.  The care plan has been discussed.    I personally saw the patient and made/improved the management of the plan and take responsibility for the patient management.    History: 78-year-old malignancy history as noted above.  Nausea, concern for dehydration.  No new pain is different.  Exam: Dry mucous membranes.  Vital signs stable.  Abdomen benign overall.  Extremities warm dry no signs of injury or trauma.  MDM: Hydrated.  Basic labs demonstrate leukopenia and slight lower platelet count but no dangerously low thrombocytopenia.  Patient and family made aware of her low blood cell counts.  She is not actively on chemo or radiation therapy for her malignancy.  They are made aware of the counts and need for follow-up.  She has an appointment today for port placement which they want to keep.  Family is comfortable and wants to take her to her port placement  appointment at [facility].  Aware of blood counts.  Comfortable for discharge at this time.      Jae Day MD,MPH  [facility] [place] Emergency Department  [phone]   _________________________________________________________________________        Shona Huerta PA-C  02/06/24 [id number]

## 2024-02-06 NOTE — TELEPHONE ENCOUNTER
Pt's port placement will likely need rescheduled. However, rad onc was able to move her radiation appt to 3:45pm today in case she is discharged from the ER. I left the spouse a VM with this information. If she is not discharged before that time or is not feeling up to it then rad onc will reschedule her. Angel in rad onc is aware and said he will check in later this afternoon.

## 2024-02-06 NOTE — ED TRIAGE NOTES
Patient presents to the ED for nausea. Patient states that she has been coughing up mucus and dry heaving since this morning. Patient was d/t receive chemo and radiation today along with port placement for throat CA. Patient states she took a dose of zofran around 0430 and another medication at 0630 but can't remember the name

## 2024-02-06 NOTE — TELEPHONE ENCOUNTER
Pt's spouse called- she is in the [facility] ER for nausea/vomiting. He said she was due to have radiation today and also a port placement. She is scheduled for radiation/chemo tomorrow as well. He is unsure if she will be admitted at [facility] or not. I will make the team aware in case tomorrow's appts/her port placement need rescheduled. The spouse asked that we let him know if her appts tomorrow will be changed.

## 2024-02-06 NOTE — PROGRESS NOTES
Patient ID: Amita Todd is a 78 y.o. female.  Diagnosis: Squamous Cell Carcinoma of Hypopharynx  Staging: [id number], stage 3  Date of Diagnosis: 12/21/23    Providers:  ENT Surgeon: Dr. Clemente Tobin  MedOnc: Dr. Kyunghee Burkitt / ZINA Hinojosa  RadOnc: Dr. Tahira Cochran    Current Therapy  1/24/24: Started concurrent chemoRT with weekly Carboplatin + Paclitaxel, planned for 7 cycles and 70 gy RT in 35fx.     Surgery  5/2008: s/p excision of right lateral tongue lesion. No adjuvant therapy    Sites of Disease  Hypopharynx, LN (Left level II)     Current Oncologic Issues  Dysphagia    ONCOLOGIC HISTORY  - 3/2001: S/p lumpectomy for left Breast Cancer followed by adjuvant RT and adjuvant tamoxifen  - 5/2008: History of Stage 1, T1N0M0 Oral Cavity cancer. Lesion of right lateral tongue removed, no adjuvant therapy.  - 12/6/23: Presented to Dr. Tobin with increased dysphagia over several months. Fiberoptic laryngoscopy reviewed lesion involving left piriform sinus, >1cm.  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 FDG- avid b/l cervical leverl II nodes. NO distant mets.   - 1/24/24: Started concurrent chemoRT with weekly Carboplatin + Paclitaxel    Admission  1/18 - 1/19/24: Admitted due to exertional dyspnea and chest pressure. CTA was negative for PE. CXR negative pneumonia.  (160 last year).    Past Medical History:   Past Medical History:  06/16/2016: Alcohol dependence, in remission (CMS/HCC)      Comment:  History of alcoholism  No date: Anemia  No date: Aneurysm of ascending aorta (CMS/HCC)  No date:  Arrhythmia      Comment:  a-fib. a-flutter  08/24/2023: Branch retinal artery occlusion of right eye  No date: CKD (chronic kidney disease)  No date: Coronary artery disease  No date: Depression  No date: GERD (gastroesophageal reflux disease)  No date: Hypertension  No date: Intermittent claudication (CMS/[facility])  10/06/2023: Myocardial infarction (CMS/[facility])  11/21/2017: Other bursitis of hip, unspecified hip      Comment:  Hip bursitis  01/05/2014: Other conditions influencing health status      Comment:  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  01/25/2022: Pain in leg, unspecified      Comment:  Leg pain  07/01/2020: Pain in right shoulder      Comment:  Bilateral shoulder pain  03/31/2021: Pain in unspecified knee      Comment:  Joint pain, knee  02/09/2019: Personal history of diseases of the blood and blood-  forming organs and certain disorders involving the immune mechanism      Comment:  History of anemia  04/29/2014: Personal history of malignant neoplasm of breast      Comment:  History of malignant neoplasm of female breast  04/03/2014: Personal history of other diseases of the circulatory   system      Comment:  Personal history of subarachnoid hemorrhage  03/08/2021: Personal history of other diseases of the digestive system      Comment:  History of upper gastrointestinal hemorrhage  03/08/2021: Personal history of other diseases of the digestive system      Comment:  History of melena  08/12/2020: Personal history of other diseases of the nervous system   and sense organs      Comment:  History of Bell's palsy  06/14/2019: Personal history of other infectious and parasitic   diseases      Comment:  History of herpes zoster  03/03/2020: Personal history of other specified conditions      Comment:  History of lump of left breast  02/22/2021: Personal history of other specified conditions      Comment:  History of shortness of breath  04/24/2022: Personal history of transient ischemic attack  (TIA), and   cerebral infarction without residual deficits      Comment:  History of transient ischemic attack  No date: Stenosis of carotid artery      Comment:  right  04/24/2017: Trigger finger, unspecified finger      Comment:  Acquired trigger finger   Surgical History:    Past Surgical History:   Procedure Laterality Date    CT ANGIO NECK  06/06/2019    CT NECK ANGIO W AND WO IV CONTRAST 6/6/2019 [facility] CLINICAL LEGACY    CT ANGIO NECK  01/04/2021    CT NECK ANGIO W AND WO IV CONTRAST 1/4/2021 [facility] ANCILLARY LEGACY    CT HEAD ANGIO W AND WO IV CONTRAST  06/06/2019    CT HEAD ANGIO W AND WO IV CONTRAST 6/6/2019 [facility] CLINICAL LEGACY    CT HEAD ANGIO W AND WO IV CONTRAST  01/04/2021    CT HEAD ANGIO W AND WO IV CONTRAST 1/4/2021 [facility]    MR HEAD ANGIO WO IV CONTRAST  06/06/2019    MR HEAD ANGIO WO IV CONTRAST 6/6/2019 [facility] CLINICAL LEGACY    MR NECK ANGIO WO IV CONTRAST  06/06/2019    MR NECK ANGIO WO IV CONTRAST 6/6/2019 [facility] CLINICAL LEGACY    OTHER SURGICAL HISTORY  03/03/2020    Oophorectomy    OTHER SURGICAL HISTORY  06/17/2019    Miscarriage surgical treatment    OTHER SURGICAL HISTORY  08/29/2019    multiple Catheter Ablation Atrial Flutter    OTHER SURGICAL HISTORY  04/23/2014    Previous Stent Placement    SEPTOPLASTY  09/22/2017    Septoplasty      Family History:    Family History   Problem Relation Name Age of Onset    Breast cancer Mother      Other (GOODPASTURE'S DISEASE) Father      BRCA2 Positive Sister      Lung cancer Sister      Neuroblastoma Sister      BRCA2 Positive Daughter      Breast cancer Other G/P      Family Oncology History:    Cancer-related family history includes Breast cancer in her mother and another family member; Lung cancer in her sister. She reports a strong family history of cancer.   Social History:    Social History     Tobacco Use    Smoking status: Former     Packs/day: 2.00     Years: 30.00     Additional pack years: 0.00     Total pack years: 60.00      Types: Cigarettes     Quit date:      Years since quittin.    Smokeless tobacco: Never   Vaping Use    Vaping Use: Never used   Substance Use Topics    Alcohol use: Not Currently     Comment: Prior Alcohol use. Stopped in early     Drug use: Never   60 pack year history of smoking and prior ETOH use; she quit both in early  after she had a heart attack.       Subjective   Chief Complaint: Hypopharyngeal SCC    HPI  Interval History  Amita Todd is a 78 y.o. female with a PMH of CAD, aflutter s/p multiple ablations, PVD, AAA, right carotid stenosis, heart failure, and CKD. Recently diagnosed with hypopharyngeal Squamous Cell Carcinoma, started concurrent chemoRT with weekly Carboplatin + Paclitaxel on 24.    Patient was nauseous early this morning for the first time since starting CRT. Took Zofran and Compazine but was still nauseous. She went to [facility] ED and received 500cc IVF. Pt then presented to IR for mediport placement procedure was postponed 2/2 neutropenia, .    Reports feeling tired. She has been fasting since midnight yesterday and has had no caloric intake in over 12 hrs.   She had several episodes of watery diarrhea in the last week. She did take Imodium once which helped.   Started to have significant mucous production today. She's sporadic with taking her Mucinex. Encouraged her to stay on TID schedule.  She continue to have dizziness with position change.   She is in afib at baseline which makes her tired. Her Cardiologist /EP is aware.   Her TOWNSEND is largely unchanged. Has not had more than 3 lb of weight gain. She has not needed to take furosemide.   She's on amlodipine, atorvastatin, Plavix, Farxiga, losartan, metoprolol.    Baseline symptoms below are stable:  - Exertional dyspnea: She is able to walk unassisted, but experience SOB after walking 20 feet due to her heart failure diagnosis.     ROS  Review of Systems   Constitutional:  Negative for chills, fever  and unexpected weight change.   HENT:   Positive for trouble swallowing and voice change. Negative for hearing loss, mouth sores, sore throat and tinnitus.    Eyes:  Negative for eye problems.   Respiratory:  Negative for cough and shortness of breath.    Cardiovascular:  Negative for chest pain and leg swelling.   Gastrointestinal:  Negative for abdominal pain, blood in stool, constipation, diarrhea, nausea and vomiting.   Genitourinary:  Negative for difficulty urinating.    Musculoskeletal:  Negative for arthralgias, gait problem and myalgias.   Skin:  Negative for rash.   Neurological:  Negative for gait problem, headaches and numbness.   Psychiatric/Behavioral:  Negative for confusion and depression.        Allergies  Allergies   Allergen Reactions    Oxycodone Hcl-Oxycodone-Asa Unknown    Oxycodone-Acetaminophen Unknown    Penicillin G Other     Nausea    Penicillins GI Upset and Other        Medications  Current Outpatient Medications   Medication Instructions    acetaminophen (TYLENOL) 650 mg, oral, Every 6 hours PRN    albuterol 90 mcg/actuation aerosol powdr breath activated inhaler 2 puffs, inhalation, Every 6 hours PRN    ascorbic acid (VITAMIN C) 500 mg, oral, Daily    atorvastatin (LIPITOR) 20 mg, oral, Daily, as directed    buPROPion SR (WELLBUTRIN SR) 150 mg, oral, 2 times daily, TAKE PER DIRECTED    cholecalciferol (Vitamin D-3) 25 MCG (1000 UT) tablet 1 tablet, oral, 2 times daily    clopidogrel (PLAVIX) 75 mg, oral, Daily    coenzyme Q-10 (COQ-10) 100 mg, oral, 2 times daily    dapagliflozin propanediol (FARXIGA) 10 mg, oral, Daily    dexAMETHasone (DECADRON) 4 mg, oral, 2 times daily with meals    DULoxetine (Cymbalta) 60 mg DR capsule TAKE 1 CAPSULE BY MOUTH  DAILY    furosemide (LASIX) 20 mg, oral, Daily    guaiFENesin 200 mg/5 mL liquid 10 mL, oral, 4 times daily    herbal complex no.239 (Whole Body Joint Support) capsule 1 capsule, oral, Daily    ipratropium (Atrovent) 21 mcg (0.03 %) nasal  spray 2 sprays, Each Nostril, Every 12 hours    losartan (Cozaar) 50 mg tablet 1 tablet, oral, Daily    magnesium oxide (Mag-Ox) 400 mg (241.3 mg magnesium) tablet 1 tablet, oral, Daily    metoprolol tartrate (Lopressor) 25 mg tablet 2 tablets, oral, 2 times daily    multivitamin with minerals tablet 1 tablet, oral, Daily    ondansetron (ZOFRAN) 8 mg, oral, Every 8 hours PRN    pantoprazole (PROTONIX) 40 mg, oral, 2 times daily    prochlorperazine (COMPAZINE) 10 mg, oral, Every 6 hours PRN    sennosides-docusate sodium (Kalee-Colace) 8.6-50 mg tablet 2 tablets, oral, Daily    spironolactone (ALDACTONE) 25 mg, oral, Daily, PER DIRECTED          Objective   VS: /62 (BP Location: Left arm, Patient Position: Sitting, BP Cuff Size: Adult)   Pulse 84   Temp 35.9 °C (96.6 °F)   Resp 18   Wt 69.2 kg (152 lb 9.6 oz)   SpO2 97%   BMI 25.39 kg/m²   Weight: Daily Weight  02/06/24 : 68 kg (150 lb)  02/06/24 : 69.2 kg (152 lb 9.6 oz)  02/05/24 : 68.9 kg (151 lb 14.4 oz)  01/31/24 : 69.1 kg (152 lb 5.4 oz)  01/30/24 : 69.4 kg (153 lb 1.6 oz)  01/29/24 : 68.6 kg (151 lb 4.8 oz)  01/29/24 : 68.4 kg (150 lb 12.8 oz)      Physical Exam  Constitutional:       General: She is not in acute distress.     Appearance: Normal appearance.   HENT:      Head: Normocephalic and atraumatic.      Nose: Nose normal.      Mouth/Throat:      Lips: Pink.      Mouth: Mucous membranes are dry and cyanotic.   Eyes:      Extraocular Movements: Extraocular movements intact.      Conjunctiva/sclera: Conjunctivae normal.   Neck:      Comments: Left sided neck mass, non-tender to palpation    Cardiovascular:      Rate and Rhythm: Normal rate and regular rhythm.      Heart sounds: No murmur heard.  Pulmonary:      Effort: Pulmonary effort is normal. No respiratory distress.   Abdominal:      General: There is no distension.      Palpations: Abdomen is soft. There is no mass.      Tenderness: There is no abdominal tenderness.   Musculoskeletal:          General: No tenderness.      Cervical back: Normal range of motion and neck supple.   Skin:     General: Skin is warm and dry.   Neurological:      General: No focal deficit present.      Mental Status: She is alert and oriented to person, place, and time. Mental status is at baseline.   Psychiatric:         Mood and Affect: Mood normal.         Diagnostic Results   Labs  Below labs are reviewed today.  Results from last 7 days   Lab Units 02/06/24  0946   WBC AUTO x10*3/uL 0.3*   HEMOGLOBIN g/dL 11.8*   HEMATOCRIT % 36.2   PLATELETS AUTO x10*3/uL 104*   NEUTROS ABS x10*3/uL 0.21*   LYMPHS ABS AUTO x10*3/uL 0.07*   MONOS ABS AUTO x10*3/uL 0.03*   EOS ABS AUTO x10*3/uL 0.01   NEUTROS PCT AUTO % 65.6   LYMPHS PCT AUTO % 21.9   MONOS PCT AUTO % 9.4   EOS PCT AUTO % 3.1      Results from last 7 days   Lab Units 02/06/24  0845   GLUCOSE mg/dL 139*   SODIUM mmol/L 131*   POTASSIUM mmol/L 4.6   CHLORIDE mmol/L 102   CO2 mmol/L 21   BUN mg/dL 29*   CREATININE mg/dL 0.64   EGFR mL/min/1.73m*2 >90   CALCIUM mg/dL 8.4*   MAGNESIUM mg/dL 1.70   PHOSPHORUS mg/dL 2.5   ALBUMIN g/dL 3.1*   PROTEIN TOTAL g/dL 5.9*   BILIRUBIN TOTAL mg/dL 1.6*   ALK PHOS U/L 40   ALT U/L 15   AST U/L 18                   Images  12/12/23 CT Neck   FINDINGS:  *The visualized paranasal sinuses nasopharynx and oropharynx are unremarkable.   *The mandible, maxilla and temporomandibular joints are normal. *The major salivary glands are normal.  *There is a 2.5 cm by 2.5 cm x 2 cm mass within the left aryepiglottic fold. There is extension to the left piriformis sinus. The mass extends to the margin of the laryngeal cartilages without cartilaginous changes or extra laryngeal abnormality. There is no evidence of extension to the left carotid sheath. *There is a 1 cm x 1.5 cm lymph node at level 2A/2B on the left with peripheral enhancement consistent with extra capsular extension. There is a 5 mm enhancing lymph node at left level 3 with central lucency  concerning for additional chris metastasis. *The thyroid gland is normal.  *Unchanged dilatation of the ascending aorta which currently measures a proximally 4.3 cm in diameter. The thoracic inlet is otherwise normal.      IMPRESSION:  *Left-sided hypopharyngeal mass without extra laryngeal extension  *Left-sided regional lymph node metastases as described    1/12/2024 NM PET/CT Head & Neck Staging  IMPRESSION:  1. FDG avid large soft tissue mass in the left hypopharynx with extension to the level of the laryngeal cartilage, consistent with biopsy-proven squamous cell carcinoma.  2. FDG-avid bilateral cervical level II nodes, likely representing chris metastases.  3. No PET evidence of distant metastasis      Pathology        Assessment/Plan   ASSESSMENT  Amita Todd is a 78 y.o. female with [id number] hypopharyngeal squamous mariana carcinoma. Started concurrent chemoRT with weekly Carboplatin + Paclitaxel on 1/24/24.    #cT2N1 Hypopharyngeal SCC  - 12/12/23 CT neck showed 2.5 x 2.5 cm aryepiglottic mass, 1.5 x 1 cm LN at level 2A/2B  - 12/21/23 Hypopharyngeal mass biopsy  - invasive moderately differentiated keratinizing squamous cell carcinoma  - No plans for surgery as patient wishes for laryngeal preservation  - we discussed treatment with definitive concurrent chemoRT; would favor treatment with weekly carboplatin + paclitaxel vs cisplatin given decreased GFR, heart failure, age, and other co-morbidities  - 1/12/24 PET showed no distant mets  - 1/24/24: Started concurrent chemoRT with weekly Carboplatin + Paclitaxel  - 1/31/24: Patient tolerated C1 Carbo/Taxol well. Mild nausea controlled with anti-emetics. She's a bit hypotensive today and endorse occasional dizziness.   - 2/6/24: Patient felt poorly today with nausea and episodes of diarrhea. Electrolytes are normal except Na. Pt already had 500cc IVF today. Will re-evaluate tomorrow to see if she needs further IVF. ANC too low for C3 Carbo/Taxol  tomorrow    # Neutropenia  - 2/6/24: Patient is severely neutropenic today with . Mediport placement postponed.   - Neutropenic fever precautions provided to patient her family. Pt to come to ED for fever >100.4F. Pt and her family voiced understanding    # Hyponatremia  - Pt has been hyponatremic for a few weeks. Patient was instructed alternate Gatorade and water intake and add salt in her fluids.      # Dysphagia  - 1/16/24 Barium Swallow showed mild oropharyngeal dysphagia secondary to hypopharyngeal mass.   - PEG placement discussed with patient and she's open to this if swallowing function declines during CRT.  - 1/31/24: Pt currently tolerating soft/minced foods well and weight is stable from last week. 1lb weight gain.     # Hx of CHF, CAD s/p PCI, A.fib, AAA  - 1/18 - 1/19/24 Admission due to worsening exertional dyspnea  - CTA was negative for PE, aortic aneurysm stable, no acute anemia noted, no evidence pneumonia on CXR, no hypoxia.   - EKG A.fib rate controlled  - ECHO 3/2023 LVEF 50-55%, aortic valve regurg  - , 160 last year  - per Dr. Nova, hold lasix unless >3 lbs weight gain during treatment  - 1/31/24: instructed patient to reach out to Cardiologist regarding any adjustments needed on her anti-hypertensives (Losartan, Metoprolol, Spironolactone) as patient may become hypotensive later in the course of chemoRT.  - 2/6/24: Patient had EKG in ED today. PT is in afib, EKG looks better than EKG on 12/13. Cardiologist aware she's in afib at baseline. Her fatigue is likely worsened by afib. She is on amlodipine, atorvastatin, Plavix, Farxiga, losartan, metoprolol.    # Hyponatremia  - 1/30 Na 131.  Encourage PO hydration with electrolytes    PLAN  -- 2/7 FUV with Dr. Burkitt. Evaluate need to IVF. ANC too low for C3 Carbo/Taxol tomorrow.   -- Repeat labs on Monday 2/12  -- Mediport placement on 2/13 (if counts are stable)  -- RTC 1 week for C4 Carbo/Taxol on 2/14  -- 2/12 FUV with SLP  --  2/16 FUV w/ Dr. Tobin  -- Follow up with cardiologist (Dr. Nova/ ZINA Fernando) regarding hypotension. Next visit 7/11 with repeat PVR w/ exercise and carotid duplex US.

## 2024-02-06 NOTE — PROGRESS NOTES
"NUTRITION Follow-up NOTE    Nutrition Assessment     Reason for Visit:  Amita Todd is a 78 y.o. female who presents for appointment in RT today  She was unable to have her port placed today ay ida- anc too low  Met in RT  She was with her sister  She was wanting a list of soft moist foods to try to eat and see if she could find something appealing  This was provided to her sister  Her sister reported- \"she just needs to eat\", I\"I am making her these foods\"    She is taking Boost VHC    Did not get a full diet history as she is tired.    She did have some nausea- chemotherapy will be held tomorrow due to counts    Will follow   ______________    Please remember:  recent diagnosis of a hypopharyngeal lesion which is most likely squamous cell carcinoma. I  5-2008 had a lesion on her right lateral tongue removed- she did not receive further intervention since then she was being followed closely until December when a lesion was found and biopsied    MBSS scheduled on 1/15/2024  Recommendations:  Began #5 minced moist solids with thin liquids.  Chin tuck with every swallow.  Double swallows per bite and sip.  Occasional throat clear or cough through meal.  Small frequent meals throughout the day.  Meds crushed in purees.  Continue outpatient SLP services for treatment give post radiation effects of the swallow mechanism.         Lab Results   Component Value Date/Time    GLUCOSE 139 (H) 02/06/2024 0845     (L) 02/06/2024 0845    K 4.6 02/06/2024 0845     02/06/2024 0845    CO2 21 02/06/2024 0845    ANIONGAP 13 02/06/2024 0845    BUN 29 (H) 02/06/2024 0845    CREATININE 0.64 02/06/2024 0845    EGFR >90 02/06/2024 0845    CALCIUM 8.4 (L) 02/06/2024 0845    ALBUMIN 3.1 (L) 02/06/2024 0845    ALKPHOS 40 02/06/2024 0845    PROT 5.9 (L) 02/06/2024 0845    AST 18 02/06/2024 0845    BILITOT 1.6 (H) 02/06/2024 0845    ALT 15 02/06/2024 0845     Lab Results   Component Value Date/Time    VITD25 83 03/29/2021 " 1227       Anthropometrics:       HT: 167.6 cm (5'6)  BMI: 24.7  IBW: 59.1  117.4% IBW    Wt Readings from Last 10 Encounters:   02/06/24 68 kg (150 lb)   02/06/24 69.2 kg (152 lb 9.6 oz)   02/05/24 68.9 kg (151 lb 14.4 oz)   01/31/24 69.1 kg (152 lb 5.4 oz)   01/30/24 69.4 kg (153 lb 1.6 oz)   01/29/24 68.6 kg (151 lb 4.8 oz)   01/29/24 68.4 kg (150 lb 12.8 oz)   01/26/24 70.1 kg (154 lb 8.7 oz)   01/24/24 68.9 kg (151 lb 14.4 oz)   01/19/24 69.7 kg (153 lb 10.6 oz)        Food And Nutrient Intake:      When I saw pt she was drinking a Boost VHC and requesting  Cottage cheese  Applesauce and fruit    Supplement  3 per day- Boost VHC  1590 calories and 66 g protein                                                                          Nutrition Focused Physical Exam Findings:                          Energy Needs           Nutrition Diagnosis             Nutrition Interventions/Recommendations   Nutrition Prescription       Food and Nutrition Delivery   Continue with Diet and supplements   Monitor impact of steroid on swallow function and appetite - feeding tube remains on hold       Nutrition Education       Coordination of Care   Med onc   Rad onc     There are no Patient Instructions on file for this visit.    Nutrition Monitoring and Evaluation      Will continue to follow up with patient weekly during her concurrent treatment

## 2024-02-06 NOTE — TELEPHONE ENCOUNTER
Received a telephone call back request at 6:50am from patient Amita Todd regarding vomiting refractory to oral Zofran and Compazine at home.    The caller was counseled over the phone to come to the ER to be evaluated for dehydration and for IV antiemetics. Patient is worried about rad/onc appointment today. This will likely be cancelled.    All questions were answered, and the caller voiced understanding.    Notably, I was not the overnight triage fellow the [employer] incorrectly routed it to me.    Twin Concepcion MD  Hematology/Oncology Fellow  2/6/2024

## 2024-02-07 ENCOUNTER — HOSPITAL ENCOUNTER (OUTPATIENT)
Dept: RADIATION ONCOLOGY | Facility: HOSPITAL | Age: 79
Setting detail: RADIATION/ONCOLOGY SERIES
Discharge: HOME | End: 2024-02-07
Payer: MEDICARE

## 2024-02-07 ENCOUNTER — APPOINTMENT (OUTPATIENT)
Dept: HEMATOLOGY/ONCOLOGY | Facility: HOSPITAL | Age: 79
End: 2024-02-07
Payer: MEDICARE

## 2024-02-07 ENCOUNTER — OFFICE VISIT (OUTPATIENT)
Dept: HEMATOLOGY/ONCOLOGY | Facility: HOSPITAL | Age: 79
End: 2024-02-07
Payer: MEDICARE

## 2024-02-07 ENCOUNTER — APPOINTMENT (OUTPATIENT)
Dept: OTOLARYNGOLOGY | Facility: CLINIC | Age: 79
End: 2024-02-07
Payer: MEDICARE

## 2024-02-07 VITALS
BODY MASS INDEX: 25.39 KG/M2 | RESPIRATION RATE: 20 BRPM | WEIGHT: 152.56 LBS | OXYGEN SATURATION: 99 % | SYSTOLIC BLOOD PRESSURE: 90 MMHG | HEART RATE: 69 BPM | DIASTOLIC BLOOD PRESSURE: 55 MMHG | TEMPERATURE: 97.7 F

## 2024-02-07 DIAGNOSIS — C13.9 MALIGNANT NEOPLASM OF HYPOPHARYNX (MULTI): ICD-10-CM

## 2024-02-07 DIAGNOSIS — C13.8 MALIGNANT NEOPLASM OF OVERLAPPING SITES OF HYPOPHARYNX (MULTI): ICD-10-CM

## 2024-02-07 DIAGNOSIS — Z51.0 ENCOUNTER FOR ANTINEOPLASTIC RADIATION THERAPY: ICD-10-CM

## 2024-02-07 LAB
RAD ONC MSQ ACTUAL FRACTIONS DELIVERED: 11
RAD ONC MSQ ACTUAL SESSION DELIVERED DOSE: 200 CGRAY
RAD ONC MSQ ACTUAL TOTAL DOSE: 2200 CGRAY
RAD ONC MSQ ELAPSED DAYS: 14
RAD ONC MSQ LAST DATE: NORMAL
RAD ONC MSQ PRESCRIBED FRACTIONAL DOSE: 200 CGRAY
RAD ONC MSQ PRESCRIBED NUMBER OF FRACTIONS: 35
RAD ONC MSQ PRESCRIBED TECHNIQUE: NORMAL
RAD ONC MSQ PRESCRIBED TOTAL DOSE: 7000 CGRAY
RAD ONC MSQ PRESCRIPTION PATTERN COMMENT: NORMAL
RAD ONC MSQ START DATE: NORMAL
RAD ONC MSQ TREATMENT COURSE NUMBER: 1
RAD ONC MSQ TREATMENT SITE: NORMAL

## 2024-02-07 PROCEDURE — 77014 CHG CT GUIDANCE RADIATION THERAPY FLDS PLACEMENT: CPT | Performed by: RADIOLOGY

## 2024-02-07 PROCEDURE — 1157F ADVNC CARE PLAN IN RCRD: CPT | Performed by: INTERNAL MEDICINE

## 2024-02-07 PROCEDURE — 3074F SYST BP LT 130 MM HG: CPT | Performed by: INTERNAL MEDICINE

## 2024-02-07 PROCEDURE — 1036F TOBACCO NON-USER: CPT | Performed by: INTERNAL MEDICINE

## 2024-02-07 PROCEDURE — 77386 HC INTENSITY-MODULATED RADIATION THERAPY (IMRT), COMPLEX: CPT | Performed by: RADIOLOGY

## 2024-02-07 PROCEDURE — 1160F RVW MEDS BY RX/DR IN RCRD: CPT | Performed by: INTERNAL MEDICINE

## 2024-02-07 PROCEDURE — 3078F DIAST BP <80 MM HG: CPT | Performed by: INTERNAL MEDICINE

## 2024-02-07 PROCEDURE — 99215 OFFICE O/P EST HI 40 MIN: CPT | Performed by: INTERNAL MEDICINE

## 2024-02-07 PROCEDURE — 1126F AMNT PAIN NOTED NONE PRSNT: CPT | Performed by: INTERNAL MEDICINE

## 2024-02-07 PROCEDURE — 1159F MED LIST DOCD IN RCRD: CPT | Performed by: INTERNAL MEDICINE

## 2024-02-07 ASSESSMENT — ENCOUNTER SYMPTOMS
NUMBNESS: 0
NAUSEA: 0
CONSTIPATION: 0
ARTHRALGIAS: 0
UNEXPECTED WEIGHT CHANGE: 0
EYE PROBLEMS: 0
CONFUSION: 0
SHORTNESS OF BREATH: 0
DEPRESSION: 0
DIFFICULTY URINATING: 0
HEADACHES: 0
FEVER: 0
MYALGIAS: 0
VOMITING: 0
COUGH: 0
TROUBLE SWALLOWING: 1
DIARRHEA: 0
BLOOD IN STOOL: 0
SORE THROAT: 0
LEG SWELLING: 0
ABDOMINAL PAIN: 0
VOICE CHANGE: 1
CHILLS: 0

## 2024-02-07 ASSESSMENT — PAIN SCALES - GENERAL: PAINLEVEL: 0-NO PAIN

## 2024-02-07 NOTE — PROGRESS NOTES
Patient ID: Amita Todd is a 78 y.o. female.  Diagnosis: Squamous Cell Carcinoma of Hypopharynx  Staging: [id number], stage 3  Date of Diagnosis: 12/21/23    Providers:  ENT Surgeon: Dr. Clemente Tobin  MedOnc: Dr. Kyunghee Burkitt / ZINA Hinojosa  RadOnc: Dr. Tahira Cochran    Current Therapy  1/24/24: Started concurrent chemoRT with weekly Carboplatin + Paclitaxel, planned for 7 cycles and 70 gy RT in 35fx.     Surgery  5/2008: s/p excision of right lateral tongue lesion. No adjuvant therapy    Sites of Disease  Hypopharynx, LN (Left level II)     Current Oncologic Issues  Dysphagia    ONCOLOGIC HISTORY  - 3/2001: S/p lumpectomy for left Breast Cancer followed by adjuvant RT and adjuvant tamoxifen  - 5/2008: History of Stage 1, T1N0M0 Oral Cavity cancer. Lesion of right lateral tongue removed, no adjuvant therapy.  - 12/6/23: Presented to Dr. Tobin with increased dysphagia over several months. Fiberoptic laryngoscopy reviewed lesion involving left piriform sinus, >1cm.  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 FDG- avid b/l cervical leverl II nodes. NO distant mets.   - 1/24/24: Started concurrent chemoRT with weekly Carboplatin + Paclitaxel    Admission  1/18 - 1/19/24: Admitted due to exertional dyspnea and chest pressure. CTA was negative for PE. CXR negative pneumonia.  (160 last year).    Past Medical History:   Past Medical History:  06/16/2016: Alcohol dependence, in remission (CMS/HCC)      Comment:  History of alcoholism  No date: Anemia  No date: Aneurysm of ascending aorta (CMS/HCC)  No date:  Arrhythmia      Comment:  a-fib. a-flutter  08/24/2023: Branch retinal artery occlusion of right eye  No date: CKD (chronic kidney disease)  No date: Coronary artery disease  No date: Depression  No date: GERD (gastroesophageal reflux disease)  No date: Hypertension  No date: Intermittent claudication (CMS/[facility])  10/06/2023: Myocardial infarction (CMS/[facility])  11/21/2017: Other bursitis of hip, unspecified hip      Comment:  Hip bursitis  01/05/2014: Other conditions influencing health status      Comment:  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  01/25/2022: Pain in leg, unspecified      Comment:  Leg pain  07/01/2020: Pain in right shoulder      Comment:  Bilateral shoulder pain  03/31/2021: Pain in unspecified knee      Comment:  Joint pain, knee  02/09/2019: Personal history of diseases of the blood and blood-  forming organs and certain disorders involving the immune mechanism      Comment:  History of anemia  04/29/2014: Personal history of malignant neoplasm of breast      Comment:  History of malignant neoplasm of female breast  04/03/2014: Personal history of other diseases of the circulatory   system      Comment:  Personal history of subarachnoid hemorrhage  03/08/2021: Personal history of other diseases of the digestive system      Comment:  History of upper gastrointestinal hemorrhage  03/08/2021: Personal history of other diseases of the digestive system      Comment:  History of melena  08/12/2020: Personal history of other diseases of the nervous system   and sense organs      Comment:  History of Bell's palsy  06/14/2019: Personal history of other infectious and parasitic   diseases      Comment:  History of herpes zoster  03/03/2020: Personal history of other specified conditions      Comment:  History of lump of left breast  02/22/2021: Personal history of other specified conditions      Comment:  History of shortness of breath  04/24/2022: Personal history of transient ischemic attack  (TIA), and   cerebral infarction without residual deficits      Comment:  History of transient ischemic attack  No date: Stenosis of carotid artery      Comment:  right  04/24/2017: Trigger finger, unspecified finger      Comment:  Acquired trigger finger   Surgical History:    Past Surgical History:   Procedure Laterality Date    CT ANGIO NECK  06/06/2019    CT NECK ANGIO W AND WO IV CONTRAST 6/6/2019 [facility] CLINICAL LEGACY    CT ANGIO NECK  01/04/2021    CT NECK ANGIO W AND WO IV CONTRAST 1/4/2021 [facility] ANCILLARY LEGACY    CT HEAD ANGIO W AND WO IV CONTRAST  06/06/2019    CT HEAD ANGIO W AND WO IV CONTRAST 6/6/2019 [facility] CLINICAL LEGACY    CT HEAD ANGIO W AND WO IV CONTRAST  01/04/2021    CT HEAD ANGIO W AND WO IV CONTRAST 1/4/2021 [facility]    MR HEAD ANGIO WO IV CONTRAST  06/06/2019    MR HEAD ANGIO WO IV CONTRAST 6/6/2019 [facility] CLINICAL LEGACY    MR NECK ANGIO WO IV CONTRAST  06/06/2019    MR NECK ANGIO WO IV CONTRAST 6/6/2019 [facility] CLINICAL LEGACY    OTHER SURGICAL HISTORY  03/03/2020    Oophorectomy    OTHER SURGICAL HISTORY  06/17/2019    Miscarriage surgical treatment    OTHER SURGICAL HISTORY  08/29/2019    multiple Catheter Ablation Atrial Flutter    OTHER SURGICAL HISTORY  04/23/2014    Previous Stent Placement    SEPTOPLASTY  09/22/2017    Septoplasty      Family History:    Family History   Problem Relation Name Age of Onset    Breast cancer Mother      Other (GOODPASTURE'S DISEASE) Father      BRCA2 Positive Sister      Lung cancer Sister      Neuroblastoma Sister      BRCA2 Positive Daughter      Breast cancer Other G/P      Family Oncology History:    Cancer-related family history includes Breast cancer in her mother and another family member; Lung cancer in her sister. She reports a strong family history of cancer.   Social History:    Social History     Tobacco Use    Smoking status: Former     Packs/day: 2.00     Years: 30.00     Additional pack years: 0.00     Total pack years: 60.00      Types: Cigarettes     Quit date:      Years since quittin.    Smokeless tobacco: Never   Vaping Use    Vaping Use: Never used   Substance Use Topics    Alcohol use: Not Currently     Comment: Prior Alcohol use. Stopped in early     Drug use: Never   60 pack year history of smoking and prior ETOH use; she quit both in early  after she had a heart attack.       Subjective   Chief Complaint: Hypopharyngeal SCC    HPI  Interval History  Amita Todd is a 78 y.o. female with a PMH of CAD, aflutter s/p multiple ablations, PVD, AAA, right carotid stenosis, heart failure, and CKD. Recently diagnosed with hypopharyngeal Squamous Cell Carcinoma, started concurrent chemoRT with weekly Carboplatin + Paclitaxel on 24.    She reports burning senstation in mouth, she uses glutamine, drink liquids ease the burning sensation, she doesn't require any pain medications.  She has been feeling very tired, but today she is feeling better.   She reports last episode of diarrhea 2 days ago.  Started to have significant mucous production yesterday, she has been taking Mucinex.  She continue to have dizziness with position change.   She is in afib at baseline which makes her tired. Her Cardiologist /EP is aware.   Her TOWNSEND is largely unchanged with stable wt at home.  She's on amlodipine, atorvastatin, Plavix, Farxiga, losartan, metoprolol.    Baseline symptoms below are stable:  - Exertional dyspnea: She is able to walk unassisted, but experience SOB after walking 20 feet due to her heart failure diagnosis.     ROS  Review of Systems   Constitutional:  Negative for chills, fever and unexpected weight change.   HENT:   Positive for trouble swallowing and voice change. Negative for hearing loss, mouth sores, sore throat and tinnitus.    Eyes:  Negative for eye problems.   Respiratory:  Negative for cough and shortness of breath.    Cardiovascular:  Negative for chest pain and leg swelling.   Gastrointestinal:   Negative for abdominal pain, blood in stool, constipation, diarrhea, nausea and vomiting.   Genitourinary:  Negative for difficulty urinating.    Musculoskeletal:  Negative for arthralgias, gait problem and myalgias.   Skin:  Negative for rash.   Neurological:  Negative for gait problem, headaches and numbness.   Psychiatric/Behavioral:  Negative for confusion and depression.        Allergies  Allergies   Allergen Reactions    Oxycodone Hcl-Oxycodone-Asa Unknown    Oxycodone-Acetaminophen Unknown    Penicillin G Other     Nausea    Penicillins GI Upset and Other        Medications  Current Outpatient Medications   Medication Instructions    acetaminophen (TYLENOL) 650 mg, oral, Every 6 hours PRN    albuterol 90 mcg/actuation aerosol [facility] breath activated inhaler 2 puffs, inhalation, Every 6 hours PRN    ascorbic acid (VITAMIN C) 500 mg, oral, Daily    atorvastatin (LIPITOR) 20 mg, oral, Daily, as directed    buPROPion SR (WELLBUTRIN SR) 150 mg, oral, 2 times daily, TAKE PER DIRECTED    cholecalciferol (Vitamin D-3) 25 MCG (1000 UT) tablet 1 tablet, oral, 2 times daily    clopidogrel (PLAVIX) 75 mg, oral, Daily    coenzyme Q-10 (COQ-10) 100 mg, oral, 2 times daily    dapagliflozin propanediol (FARXIGA) 10 mg, oral, Daily    dexAMETHasone (DECADRON) 4 mg, oral, 2 times daily with meals    DULoxetine (Cymbalta) 60 mg DR capsule TAKE 1 CAPSULE BY MOUTH  DAILY    furosemide (LASIX) 20 mg, oral, Daily    guaiFENesin 200 mg/5 mL liquid 10 mL, oral, 4 times daily    herbal complex no.239 (Whole Body Joint Support) capsule 1 capsule, oral, Daily    ipratropium (Atrovent) 21 mcg (0.03 %) nasal spray 2 sprays, Each Nostril, Every 12 hours    losartan (Cozaar) 50 mg tablet 1 tablet, oral, Daily    magnesium oxide (Mag-Ox) 400 mg (241.3 mg magnesium) tablet 1 tablet, oral, Daily    metoprolol tartrate (Lopressor) 25 mg tablet 2 tablets, oral, 2 times daily    multivitamin with minerals tablet 1 tablet, oral, Daily     ondansetron (ZOFRAN) 8 mg, oral, Every 8 hours PRN    pantoprazole (PROTONIX) 40 mg, oral, 2 times daily    prochlorperazine (COMPAZINE) 10 mg, oral, Every 6 hours PRN    sennosides-docusate sodium (Kalee-Colace) 8.6-50 mg tablet 2 tablets, oral, Daily    spironolactone (ALDACTONE) 25 mg, oral, Daily, PER DIRECTED          Objective   VS: BP 90/55   Pulse 69   Temp 36.5 °C (97.7 °F) (Core)   Resp 20   Wt 69.2 kg (152 lb 8.9 oz)   SpO2 99%   BMI 25.39 kg/m²   Weight: Daily Weight  02/06/24 : 68 kg (150 lb)  02/07/24 : 69.2 kg (152 lb 8.9 oz)  02/06/24 : 69.2 kg (152 lb 9.6 oz)  02/05/24 : 68.9 kg (151 lb 14.4 oz)  01/31/24 : 69.1 kg (152 lb 5.4 oz)  01/30/24 : 69.4 kg (153 lb 1.6 oz)  01/29/24 : 68.6 kg (151 lb 4.8 oz)      Physical Exam  Constitutional:       General: She is not in acute distress.     Appearance: Normal appearance.   HENT:      Head: Normocephalic and atraumatic.      Nose: Nose normal.      Mouth/Throat:      Lips: Pink.      Mouth: Mucous membranes are dry and cyanotic.   Eyes:      Extraocular Movements: Extraocular movements intact.      Conjunctiva/sclera: Conjunctivae normal.   Neck:      Comments: Left sided neck mass, non-tender to palpation    Cardiovascular:      Rate and Rhythm: Normal rate and regular rhythm.      Heart sounds: No murmur heard.  Pulmonary:      Effort: Pulmonary effort is normal. No respiratory distress.   Abdominal:      General: There is no distension.      Palpations: Abdomen is soft. There is no mass.      Tenderness: There is no abdominal tenderness.   Musculoskeletal:         General: No tenderness.      Cervical back: Normal range of motion and neck supple.   Skin:     General: Skin is warm and dry.   Neurological:      General: No focal deficit present.      Mental Status: She is alert and oriented to person, place, and time. Mental status is at baseline.   Psychiatric:         Mood and Affect: Mood normal.         Diagnostic Results   Labs  Below labs are  reviewed today.  Results from last 7 days   Lab Units 02/06/24  0946   WBC AUTO x10*3/uL 0.3*   HEMOGLOBIN g/dL 11.8*   HEMATOCRIT % 36.2   PLATELETS AUTO x10*3/uL 104*   NEUTROS ABS x10*3/uL 0.21*   LYMPHS ABS AUTO x10*3/uL 0.07*   MONOS ABS AUTO x10*3/uL 0.03*   EOS ABS AUTO x10*3/uL 0.01   NEUTROS PCT AUTO % 65.6   LYMPHS PCT AUTO % 21.9   MONOS PCT AUTO % 9.4   EOS PCT AUTO % 3.1        Results from last 7 days   Lab Units 02/06/24  0845   GLUCOSE mg/dL 139*   SODIUM mmol/L 131*   POTASSIUM mmol/L 4.6   CHLORIDE mmol/L 102   CO2 mmol/L 21   BUN mg/dL 29*   CREATININE mg/dL 0.64   EGFR mL/min/1.73m*2 >90   CALCIUM mg/dL 8.4*   MAGNESIUM mg/dL 1.70   PHOSPHORUS mg/dL 2.5   ALBUMIN g/dL 3.1*   PROTEIN TOTAL g/dL 5.9*   BILIRUBIN TOTAL mg/dL 1.6*   ALK PHOS U/L 40   ALT U/L 15   AST U/L 18                     Images  12/12/23 CT Neck   FINDINGS:  *The visualized paranasal sinuses nasopharynx and oropharynx are unremarkable.   *The mandible, maxilla and temporomandibular joints are normal. *The major salivary glands are normal.  *There is a 2.5 cm by 2.5 cm x 2 cm mass within the left aryepiglottic fold. There is extension to the left piriformis sinus. The mass extends to the margin of the laryngeal cartilages without cartilaginous changes or extra laryngeal abnormality. There is no evidence of extension to the left carotid sheath. *There is a 1 cm x 1.5 cm lymph node at level 2A/2B on the left with peripheral enhancement consistent with extra capsular extension. There is a 5 mm enhancing lymph node at left level 3 with central lucency concerning for additional chris metastasis. *The thyroid gland is normal.  *Unchanged dilatation of the ascending aorta which currently measures a proximally 4.3 cm in diameter. The thoracic inlet is otherwise normal.      IMPRESSION:  *Left-sided hypopharyngeal mass without extra laryngeal extension  *Left-sided regional lymph node metastases as described    1/12/2024 NM PET/CT Head &  Neck Staging  IMPRESSION:  1. FDG avid large soft tissue mass in the left hypopharynx with extension to the level of the laryngeal cartilage, consistent with biopsy-proven squamous cell carcinoma.  2. FDG-avid bilateral cervical level II nodes, likely representing chris metastases.  3. No PET evidence of distant metastasis      Pathology        Assessment/Plan   ASSESSMENT  Amita Todd is a 78 y.o. female with [id number] hypopharyngeal squamous mariana carcinoma. Started concurrent chemoRT with weekly Carboplatin + Paclitaxel on 1/24/24.    #cT2N1 Hypopharyngeal SCC  - 12/12/23 CT neck showed 2.5 x 2.5 cm aryepiglottic mass, 1.5 x 1 cm LN at level 2A/2B  - 12/21/23 Hypopharyngeal mass biopsy  - invasive moderately differentiated keratinizing squamous cell carcinoma  - No plans for surgery as patient wishes for laryngeal preservation  - we discussed treatment with definitive concurrent chemoRT; would favor treatment with weekly carboplatin + paclitaxel vs cisplatin given decreased GFR, heart failure, age, and other co-morbidities  - 1/12/24 PET showed no distant mets  - 1/24/24: Started concurrent chemoRT with weekly Carboplatin + Paclitaxel  - 1/31/24: Patient tolerated C1 Carbo/Taxol well. Mild nausea controlled with anti-emetics. She's a bit hypotensive today and endorse occasional dizziness.   - 2/7/24: ongoing sensation in mouth/fatigue, will hold  chemo today given low ANC.    # Neutropenia  - 2/6/24: Patient is severely neutropenic today with . Mediport placement postponed.   - Neutropenic fever precautions provided to patient her family. Pt to come to ED for fever >100.4F. Pt and her family voiced understanding    # Hyponatremia  - Pt has been hyponatremic for a few weeks. Patient was instructed alternate Gatorade and water intake and add salt in her fluids.      # Dysphagia  - 1/16/24 Barium Swallow showed mild oropharyngeal dysphagia secondary to hypopharyngeal mass.   - PEG placement discussed  with patient and she's open to this if swallowing function declines during CRT.  - 1/31/24: Pt currently tolerating soft/minced foods well and weight is stable from last week. 1lb weight gain.     # Hx of CHF, CAD s/p PCI, A.fib, AAA  - 1/18 - 1/19/24 Admission due to worsening exertional dyspnea  - CTA was negative for PE, aortic aneurysm stable, no acute anemia noted, no evidence pneumonia on CXR, no hypoxia.   - EKG A.fib rate controlled  - ECHO 3/2023 LVEF 50-55%, aortic valve regurg  - , 160 last year  - per Dr. Nova, hold lasix unless >3 lbs weight gain during treatment  - 1/31/24: instructed patient to reach out to Cardiologist regarding any adjustments needed on her anti-hypertensives (Losartan, Metoprolol, Spironolactone) as patient may become hypotensive later in the course of chemoRT.  - 2/6/24: Patient had EKG in ED today. PT is in afib, EKG looks better than EKG on 12/13. Cardiologist aware she's in afib at baseline. Her fatigue is likely worsened by afib. She is on amlodipine, atorvastatin, Plavix, Farxiga, losartan, metoprolol.    # Hyponatremia  - 1/30 Na 131.  Encourage PO hydration with electrolytes    PLAN  -- ANC too low for C3 Carbo/Taxol, will hold chemo today  -- Repeat labs on Monday 2/12  -- Mediport placement on 2/13 (if counts are stable)  -- RTC 1 week for C4 Carbo/Taxol on 2/14  -- 2/12 FUV with SLP  -- 2/16 FUV w/ Dr. Tobin  -- Follow up with cardiologist (I let Dr. Nova know about her recent hypotension today, no concern from him). Next visit 7/11 with repeat PVR w/ exercise and carotid duplex US.

## 2024-02-08 ENCOUNTER — HOSPITAL ENCOUNTER (OUTPATIENT)
Dept: RADIATION ONCOLOGY | Facility: HOSPITAL | Age: 79
Setting detail: RADIATION/ONCOLOGY SERIES
Discharge: HOME | End: 2024-02-08
Payer: MEDICARE

## 2024-02-08 VITALS
HEIGHT: 65 IN | OXYGEN SATURATION: 97 % | WEIGHT: 153.8 LBS | HEART RATE: 82 BPM | SYSTOLIC BLOOD PRESSURE: 107 MMHG | RESPIRATION RATE: 18 BRPM | TEMPERATURE: 96.6 F | DIASTOLIC BLOOD PRESSURE: 68 MMHG | BODY MASS INDEX: 25.62 KG/M2

## 2024-02-08 DIAGNOSIS — Z51.0 ENCOUNTER FOR ANTINEOPLASTIC RADIATION THERAPY: ICD-10-CM

## 2024-02-08 DIAGNOSIS — C13.8 MALIGNANT NEOPLASM OF OVERLAPPING SITES OF HYPOPHARYNX (MULTI): ICD-10-CM

## 2024-02-08 LAB
RAD ONC MSQ ACTUAL FRACTIONS DELIVERED: 12
RAD ONC MSQ ACTUAL SESSION DELIVERED DOSE: 200 CGRAY
RAD ONC MSQ ACTUAL TOTAL DOSE: 2400 CGRAY
RAD ONC MSQ ELAPSED DAYS: 15
RAD ONC MSQ LAST DATE: NORMAL
RAD ONC MSQ PRESCRIBED FRACTIONAL DOSE: 200 CGRAY
RAD ONC MSQ PRESCRIBED NUMBER OF FRACTIONS: 35
RAD ONC MSQ PRESCRIBED TECHNIQUE: NORMAL
RAD ONC MSQ PRESCRIBED TOTAL DOSE: 7000 CGRAY
RAD ONC MSQ PRESCRIPTION PATTERN COMMENT: NORMAL
RAD ONC MSQ START DATE: NORMAL
RAD ONC MSQ TREATMENT COURSE NUMBER: 1
RAD ONC MSQ TREATMENT SITE: NORMAL

## 2024-02-08 PROCEDURE — 77386 HC INTENSITY-MODULATED RADIATION THERAPY (IMRT), COMPLEX: CPT | Performed by: RADIOLOGY

## 2024-02-08 PROCEDURE — 77014 CHG CT GUIDANCE RADIATION THERAPY FLDS PLACEMENT: CPT | Performed by: RADIOLOGY

## 2024-02-09 ENCOUNTER — HOSPITAL ENCOUNTER (OUTPATIENT)
Dept: RADIATION ONCOLOGY | Facility: HOSPITAL | Age: 79
Setting detail: RADIATION/ONCOLOGY SERIES
Discharge: HOME | End: 2024-02-09
Payer: MEDICARE

## 2024-02-09 DIAGNOSIS — C13.8 MALIGNANT NEOPLASM OF OVERLAPPING SITES OF HYPOPHARYNX (MULTI): ICD-10-CM

## 2024-02-09 DIAGNOSIS — Z51.0 ENCOUNTER FOR ANTINEOPLASTIC RADIATION THERAPY: ICD-10-CM

## 2024-02-09 LAB
RAD ONC MSQ ACTUAL FRACTIONS DELIVERED: 13
RAD ONC MSQ ACTUAL SESSION DELIVERED DOSE: 200 CGRAY
RAD ONC MSQ ACTUAL TOTAL DOSE: 2600 CGRAY
RAD ONC MSQ ELAPSED DAYS: 16
RAD ONC MSQ LAST DATE: NORMAL
RAD ONC MSQ PRESCRIBED FRACTIONAL DOSE: 200 CGRAY
RAD ONC MSQ PRESCRIBED NUMBER OF FRACTIONS: 35
RAD ONC MSQ PRESCRIBED TECHNIQUE: NORMAL
RAD ONC MSQ PRESCRIBED TOTAL DOSE: 7000 CGRAY
RAD ONC MSQ PRESCRIPTION PATTERN COMMENT: NORMAL
RAD ONC MSQ START DATE: NORMAL
RAD ONC MSQ TREATMENT COURSE NUMBER: 1
RAD ONC MSQ TREATMENT SITE: NORMAL

## 2024-02-09 PROCEDURE — 77014 CHG CT GUIDANCE RADIATION THERAPY FLDS PLACEMENT: CPT | Performed by: RADIOLOGY

## 2024-02-09 PROCEDURE — 77386 HC INTENSITY-MODULATED RADIATION THERAPY (IMRT), COMPLEX: CPT | Performed by: RADIOLOGY

## 2024-02-10 DIAGNOSIS — C13.9 HYPOPHARYNGEAL CANCER (MULTI): Primary | ICD-10-CM

## 2024-02-10 RX ORDER — OXYCODONE HYDROCHLORIDE 5 MG/1
5 TABLET ORAL EVERY 6 HOURS PRN
Qty: 15 TABLET | Refills: 0 | Status: SHIPPED | OUTPATIENT
Start: 2024-02-10 | End: 2024-02-12 | Stop reason: ALTCHOICE

## 2024-02-12 ENCOUNTER — HOSPITAL ENCOUNTER (OUTPATIENT)
Dept: RADIATION ONCOLOGY | Facility: HOSPITAL | Age: 79
Setting detail: RADIATION/ONCOLOGY SERIES
Discharge: HOME | End: 2024-02-12
Payer: MEDICARE

## 2024-02-12 ENCOUNTER — RADIATION ONCOLOGY OTV (OUTPATIENT)
Dept: RADIATION ONCOLOGY | Facility: HOSPITAL | Age: 79
End: 2024-02-12
Payer: MEDICARE

## 2024-02-12 ENCOUNTER — TELEPHONE (OUTPATIENT)
Dept: RADIATION ONCOLOGY | Facility: HOSPITAL | Age: 79
End: 2024-02-12
Payer: MEDICARE

## 2024-02-12 ENCOUNTER — TREATMENT (OUTPATIENT)
Dept: SPEECH THERAPY | Facility: HOSPITAL | Age: 79
End: 2024-02-12
Payer: MEDICARE

## 2024-02-12 ENCOUNTER — LAB (OUTPATIENT)
Dept: LAB | Facility: HOSPITAL | Age: 79
End: 2024-02-12
Payer: MEDICARE

## 2024-02-12 VITALS
BODY MASS INDEX: 25.81 KG/M2 | OXYGEN SATURATION: 96 % | HEART RATE: 72 BPM | WEIGHT: 154.9 LBS | HEIGHT: 65 IN | RESPIRATION RATE: 18 BRPM | SYSTOLIC BLOOD PRESSURE: 93 MMHG | TEMPERATURE: 97 F | DIASTOLIC BLOOD PRESSURE: 60 MMHG

## 2024-02-12 DIAGNOSIS — C13.9 MALIGNANT NEOPLASM OF HYPOPHARYNX (MULTI): ICD-10-CM

## 2024-02-12 DIAGNOSIS — Z51.0 ENCOUNTER FOR ANTINEOPLASTIC RADIATION THERAPY: ICD-10-CM

## 2024-02-12 DIAGNOSIS — R13.12 OROPHARYNGEAL DYSPHAGIA: ICD-10-CM

## 2024-02-12 DIAGNOSIS — I50.9 CONGESTIVE HEART FAILURE, UNSPECIFIED HF CHRONICITY, UNSPECIFIED HEART FAILURE TYPE (MULTI): ICD-10-CM

## 2024-02-12 DIAGNOSIS — I50.20 HFREF (HEART FAILURE WITH REDUCED EJECTION FRACTION) (MULTI): Primary | ICD-10-CM

## 2024-02-12 DIAGNOSIS — C13.8 MALIGNANT NEOPLASM OF OVERLAPPING SITES OF HYPOPHARYNX (MULTI): ICD-10-CM

## 2024-02-12 DIAGNOSIS — C13.9: Primary | ICD-10-CM

## 2024-02-12 LAB
ALBUMIN SERPL BCP-MCNC: 3.1 G/DL (ref 3.4–5)
ALP SERPL-CCNC: 44 U/L (ref 33–136)
ALT SERPL W P-5'-P-CCNC: 22 U/L (ref 7–45)
ANION GAP SERPL CALC-SCNC: 10 MMOL/L (ref 10–20)
AST SERPL W P-5'-P-CCNC: 14 U/L (ref 9–39)
BASOPHILS # BLD AUTO: 0 X10*3/UL (ref 0–0.1)
BASOPHILS NFR BLD AUTO: 0 %
BILIRUB SERPL-MCNC: 0.8 MG/DL (ref 0–1.2)
BNP SERPL-MCNC: 235 PG/ML (ref 0–99)
BUN SERPL-MCNC: 27 MG/DL (ref 6–23)
CALCIUM SERPL-MCNC: 8.5 MG/DL (ref 8.6–10.3)
CHLORIDE SERPL-SCNC: 103 MMOL/L (ref 98–107)
CO2 SERPL-SCNC: 26 MMOL/L (ref 21–32)
CREAT SERPL-MCNC: 0.78 MG/DL (ref 0.5–1.05)
EGFRCR SERPLBLD CKD-EPI 2021: 78 ML/MIN/1.73M*2
EOSINOPHIL # BLD AUTO: 0 X10*3/UL (ref 0–0.4)
EOSINOPHIL NFR BLD AUTO: 0 %
ERYTHROCYTE [DISTWIDTH] IN BLOOD BY AUTOMATED COUNT: 15.9 % (ref 11.5–14.5)
GLUCOSE SERPL-MCNC: 97 MG/DL (ref 74–99)
HCT VFR BLD AUTO: 37.1 % (ref 36–46)
HGB BLD-MCNC: 12.1 G/DL (ref 12–16)
IMM GRANULOCYTES # BLD AUTO: 0.05 X10*3/UL (ref 0–0.5)
IMM GRANULOCYTES NFR BLD AUTO: 0.9 % (ref 0–0.9)
LYMPHOCYTES # BLD AUTO: 0.03 X10*3/UL (ref 0.8–3)
LYMPHOCYTES NFR BLD AUTO: 0.5 %
MAGNESIUM SERPL-MCNC: 1.96 MG/DL (ref 1.6–2.4)
MCH RBC QN AUTO: 30.6 PG (ref 26–34)
MCHC RBC AUTO-ENTMCNC: 32.6 G/DL (ref 32–36)
MCV RBC AUTO: 94 FL (ref 80–100)
MONOCYTES # BLD AUTO: 0.18 X10*3/UL (ref 0.05–0.8)
MONOCYTES NFR BLD AUTO: 3.2 %
NEUTROPHILS # BLD AUTO: 5.45 X10*3/UL (ref 1.6–5.5)
NEUTROPHILS NFR BLD AUTO: 95.4 %
NRBC BLD-RTO: 0.5 /100 WBCS (ref 0–0)
PLATELET # BLD AUTO: 111 X10*3/UL (ref 150–450)
POTASSIUM SERPL-SCNC: 4.2 MMOL/L (ref 3.5–5.3)
PROT SERPL-MCNC: 5.6 G/DL (ref 6.4–8.2)
Q ONSET: 224 MS
QRS COUNT: 14 BEATS
QRS DURATION: 86 MS
QT INTERVAL: 362 MS
QTC CALCULATION(BAZETT): 442 MS
QTC FREDERICIA: 414 MS
R AXIS: 35 DEGREES
RAD ONC MSQ ACTUAL FRACTIONS DELIVERED: 14
RAD ONC MSQ ACTUAL SESSION DELIVERED DOSE: 200 CGRAY
RAD ONC MSQ ACTUAL TOTAL DOSE: 2800 CGRAY
RAD ONC MSQ ELAPSED DAYS: 19
RAD ONC MSQ LAST DATE: NORMAL
RAD ONC MSQ PRESCRIBED FRACTIONAL DOSE: 200 CGRAY
RAD ONC MSQ PRESCRIBED NUMBER OF FRACTIONS: 35
RAD ONC MSQ PRESCRIBED TECHNIQUE: NORMAL
RAD ONC MSQ PRESCRIBED TOTAL DOSE: 7000 CGRAY
RAD ONC MSQ PRESCRIPTION PATTERN COMMENT: NORMAL
RAD ONC MSQ START DATE: NORMAL
RAD ONC MSQ TREATMENT COURSE NUMBER: 1
RAD ONC MSQ TREATMENT SITE: NORMAL
RBC # BLD AUTO: 3.95 X10*6/UL (ref 4–5.2)
SODIUM SERPL-SCNC: 135 MMOL/L (ref 136–145)
T AXIS: 86 DEGREES
T OFFSET: 405 MS
VENTRICULAR RATE: 90 BPM
WBC # BLD AUTO: 5.7 X10*3/UL (ref 4.4–11.3)

## 2024-02-12 PROCEDURE — 36415 COLL VENOUS BLD VENIPUNCTURE: CPT

## 2024-02-12 PROCEDURE — 77386 HC INTENSITY-MODULATED RADIATION THERAPY (IMRT), COMPLEX: CPT | Performed by: RADIOLOGY

## 2024-02-12 PROCEDURE — 77427 RADIATION TX MANAGEMENT X5: CPT | Performed by: RADIOLOGY

## 2024-02-12 PROCEDURE — 85025 COMPLETE CBC W/AUTO DIFF WBC: CPT

## 2024-02-12 PROCEDURE — 92526 ORAL FUNCTION THERAPY: CPT | Mod: GN

## 2024-02-12 PROCEDURE — 83735 ASSAY OF MAGNESIUM: CPT

## 2024-02-12 PROCEDURE — 77014 CHG CT GUIDANCE RADIATION THERAPY FLDS PLACEMENT: CPT | Performed by: RADIOLOGY

## 2024-02-12 PROCEDURE — 80053 COMPREHEN METABOLIC PANEL: CPT

## 2024-02-12 PROCEDURE — 83880 ASSAY OF NATRIURETIC PEPTIDE: CPT | Performed by: INTERNAL MEDICINE

## 2024-02-12 RX ORDER — OXYCODONE HYDROCHLORIDE 5 MG/1
5 TABLET ORAL EVERY 6 HOURS PRN
Qty: 120 TABLET | Refills: 0 | Status: SHIPPED | OUTPATIENT
Start: 2024-02-12 | End: 2024-03-18

## 2024-02-12 ASSESSMENT — ENCOUNTER SYMPTOMS
VOMITING: 0
BLOOD IN STOOL: 0
FEVER: 0
TROUBLE SWALLOWING: 1
LEG SWELLING: 0
CHILLS: 0
HEADACHES: 0
CONFUSION: 0
COUGH: 0
VOICE CHANGE: 1
ABDOMINAL PAIN: 0
NUMBNESS: 0
MYALGIAS: 0
NAUSEA: 0
DIFFICULTY URINATING: 0
DEPRESSION: 0
ARTHRALGIAS: 0
CONSTIPATION: 0
SORE THROAT: 0
UNEXPECTED WEIGHT CHANGE: 0
DIARRHEA: 0
SHORTNESS OF BREATH: 0

## 2024-02-12 NOTE — PROGRESS NOTES
Radiation Oncology On Treatment Visit    Patient Name:  Amita Todd  MRN:  [id number]  :  1945    Referring Provider: No ref. provider found  Primary Care Provider: Caitie Leal MD  Care Team: Patient Care Team:  Caitie Leal MD as PCP - General  Caitie Leal MD as PCP - [facility]ributed [facility]  Kyunghee Burkitt, DO as Medical Oncologist (Hematology and Oncology)  Clemente Tobin MD as Surgeon (Otolaryngology)  Tahira Cochran MD as Radiation Oncologist (Radiation Oncology)  Hailey Abbott PA-C as Physician Assistant (Hematology and Oncology)    Date of Service: 2024     Diagnosis:   Specialty Problems          Radiation Oncology Problems    Cancer of anterior two-thirds of tongue (CMS/HCC)        Malignant neoplasm of hypopharynx (CMS/HCC)         Treatment Summary:  Radiation Treatments       Active   hypophar + neck (Started on 2024)   Most recent fraction: 200 cGy given on 2024   Total given: 2,800 cGy / 7,000 cGy  (14 of 35 fractions)   Elapsed Days: 19   Technique: VMAT           Completed   No historical radiation treatments to show.             SUBJECTIVE: Patient is doing well.  Continues to use at least 3 very high-calorie shakes daily.  Also drinking Gatorade.  No trouble breathing or swelling that she reports.  2-3/10 pain with swallowing.  She does not have nausea.  She does report intermittent diarrhea that has happened up to 3 times over the last week.  She reports some new blurriness in her vision that started after treatment began.  She has some stomach discomfort occurs when she lies down to sleep at night, but this is not severe and is intermittent.  No heartburn.  She started taking dexamethasone 4 mg daily and reports that her appetite is improved, as is her energy level.  She is currently able to swallow soft foods as well as drink liquids without  trouble.     OBJECTIVE:   Vital Signs:  Wt 154-151-154 over the last 3-4 weeks, otherwise VSS  Pain  Scale: The patient's current pain level was assessed.  They report currently having a pain of 3 out of 10.    Other Pertinent Findings:   No thrush, mucosits, or erythema of the neck  Labs including CMP/CBC from last week look much improved with normalization of GFR  No LE edema   last week and , now WBC 5.7K    Toxicity Assessment          1/26/2024    10:50 2/5/2024    12:01 2/12/2024    11:40   Toxicity Assessment   Adverse Events Reviewed (WDL) No (Exceptions to WDL) No (Exceptions to WDL) No (Exceptions to WDL)   Treatment  and neck Head and neck Head and neck   Anorexia Grade 0       First weight during treatment Grade 0       steroids are helping, increase in weight by .5lbs since last week Grade 0       gained 3 lbs since last week   Dehydration Grade 0       swallowing ok Grade 0 Grade 0   Dermatitis Radiation Grade 0       creams provided Grade 0       creams at home Grade 0   Diarrhea  Grade 2       having some episodes, discussed OTC meds Grade 0       has cleared up since last week   Fatigue Grade 0 Grade 1       starting to feel tired, Rest and activity balance Grade 1       going to be earlier, Rest and activity balance   Nausea Grade 0 Grade 0 Grade 1       sometimes, comes and goes, meds at home just in case   Pain Grade 0 Grade 0    Tumor Pain Grade 0     Vomiting Grade 0 Grade 0 Grade 0   Dysphagia Grade 0       passed SLP appointment Grade 1       better with steriods Grade 0   Mucositis Oral Grade 0 Grade 0       GLutamine at home Grade 1       increase in redness, Glutamine at home   Blurred Vision  Grade 1       both eyes, been consistent making it hard to read Grade 2       worse from last week   Dry Mouth Grade 1       talked about OTC meds Grade 1       OTC meds at home Grade 1       sipping to help, mouth washes.   Oral Pain Grade 0 Grade 0 Grade 1       Burning with food, pain meds new script   Aspiration Grade 0 Grade 0 Grade 0   Hoarseness Grade 0 Grade 0 Grade 0    Voice Alteration Grade 0 Grade 0 Grade 0        Assessment / Plan:  The patient is tolerating radiation therapy as anticipated.  Continue per current treatment plan.         Cancer Treatment: patient is doing well undergoing carbo/taxol with RT. Chemo was held last week due to neutropenia and port had to be canceled.  I reviewed the daily CBCT alignment imaging for RT with the patient and her family. She has had a tremendous response to CRT with near radiologic resolution of tumor in her posterior pharyngeal wall. I am thrilled to see this -- early response to CRT predicts better cure rates with CRT.      FEN: patient's weight is stable. Using liquid supplements. Aim for >2000 calories and >60grams of protein daily. Will see a dietician for more specific recommendations.  All diet is oral and no feeding tube.  Met with SLP prior to starting RT and doing daily exercises to maintain swallowing function.  Currently drinking 3-4 VHC daily and weight is stable  Her weight is increased but no LE edema or SOB.     Fluid balance in setting of CHF: Currently appears to have good fluid balance off Lasix and Creatinine has normalized. No evidence of volume overloading with weight 156 at dx and 154 today. Will continue per Dr. Nova's directives to use Lasix PRN if weight increases by 3lbs in short time interval.     Nausea: Reviewed use of  Compazine/Zofran PRN for breakthrough nausea     Pain control: Patient trpotyinh psin 2-3/10. Using Tylenol but would like something stronger. I gave her a rx for oxycodone.      Oral Care: Patient will continue fluoride trays per dentist. Patient is using Glutamine 10g TID on days of RT to promote ongoing healing of mucositis. Rec uaifenesin for thick saliva or Xylimelt for dry mouth     Skin Care: Continue Aquaphor within the RT field. Reviewed how to apply and to avoid within 3 hours prior to RT.      Diarrhea/Constipation: Reviewed use of Immodium for diarrhea. Reviewed use of  Miralax or Senna if develops constipation.

## 2024-02-12 NOTE — PROGRESS NOTES
Speech-Language Pathology    Reason for visit:  Swallow reassessment with continued implementation of prophylactic exercise program. 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 Mass has reduced in size significantly per rad/onc rounds and imaging.     Recommendations:  Upgraded to Soft solids and Thin liquids.  Continue Chin tuck with every swallow.  Double swallows per bite and sip.  Alternate liquids and solids.   Meds whole in purees.  Continue outpatient SLP services for treatment give post radiation effects of the swallow mechanism.    Tx- session: Dysphagia exercises for tongue, jaw, neck, laryngeal and pharyngeal muscles reviewed with demonstration. Pt able to demonstrate and recall compensatory swallow strategies without difficulty. EMST utilization demonstrated and calibrated for patient. Pt tolerated PO trials of thin liquids and solids via chin tuck without overt difficulty. Over all patient endorsed much improved swallow function despite odynophagia with xerostomia. Pt consuming more softer solids instead of the minced moist or total liquid diet with a 3 lb weight gain. Pt encouraged and happy with progress.     Functional Oral Intake Scale   FIOS level Comment    Level 1  Nothing by Mouth    Level 2 Tube dependent with minimal attempts of food and liquid.    Level 3 Tube dependent with consistent oral intake of food and liquid.    Level 4 Total oral diet of a single consistency.   X Level 5 Total oral diet with multiple consistencies, but requires special preparations and compensations.    Level 6 Total oral diet with multiple consistencies without special preparations but specific food limitations.    Level 7 Total oral diet with no restrictions.      Lingual ROM assessment scale (Lazarus 2014)   Protrusion  __X__ 100 Normal (Tongue protrudes >=15mm past upper lip margin)  ____ 50 Mild-mod impairment (Tongue protrudes >1 but <15mm)  ____ 25 Severely impaired (Some movement  but fails to reach upper lip margin)  ____ 0 Totally impaired (No movement)   Lateralization (each side)  __X__ 100 Normal (Tongue touches corner of mouth)  ____ 50 Mild-mod impairment (<50% reduction in movement from corner of the mouth)  ____ 25 Severely impaired (>50% reduction in movement from corner of the mouth)  ____ 0 Totally impaired (No tongue movement in either direction)   Elevation  __X__ 100 Normal (Tongue tip contact with upper alveolar ridge)  ____ 50 Moderately impaired ( tip elevates but no contact with upper alveolar ridge)  ____ 0 Severely impaired (No visible tongue tip elevation)    Maximal Inter-incisor Distance ___WFL_____  Functional cut off 30mm  Normal cut off 40mm  Estimated incisor height is 10mm (adjust for missing dentition)  60mm is normal cut off for edentulous status.       PHYSICAL EXAMINATION including ORAL MECHANISM  Trigeminal Nerve (V)     Jaw ROM: Intact  Facial Nerve (VII)     Asymmetry at rest: Impaired- residual facial weakness on the left.     Lip pucker: Impaired-residual facial weakness on the left     Smile: Impaired- baseline weakness     Lip/smile alternation: Impaired- baseline weakness  Glossopharyngeal, Vagus (IX, X)     Uvula at rest: Intact     Palate at rest: Intact     Palatal elevation /a/: Intact  Hypoglossal (XII)     Tongue at rest: Intact     Tongue protrusion: Intact     Side to side: Intact     Tongue elevation: Intact.  Diadochokinetic Rate /puh/ /tuh/ /kuh/, /puhtuhkuh/: Intact    Plan:  Continue and upgraded po diet  Follow up with SLP during last week of radiation.      Frequency BID  Start date of tx: 1/16/24 Eval/MBSS  First session: 1/25/24- sw tx  Today 2/12/24- sw tx    Goals:  Pt. to tolerate least restrictive diet without pulmonary compromise  Pt will utilize and recall compensatory strategies independent of cues 100% of the time.  Pt will complete effortful swallows 10 reps, 3 times per day for 7 days a week.   Pt will complete isometric  shaker for 1 min 3 reps at 3 sets.   Pt will complete [place] maneuver 10 times 3 times per day for 7 days a week.  Pt will sustain tongue protrusion for 5 seconds 10 reps, 3 times per day, 7 days a week.  Pt will retract tongue far back into the mouth and hold for 5 seconds 10 reps, 3 times per day, 7 days a week.  Pt will lateralize tongue to the left and right with 5 sec hold 10 reps, 3 times per day for 7 days a week.  Pt will extend tongue tip upward with wide open mouth and hold for 5 seconds, 10 reps, 3 times per day for 7 days  Pt will complete EMST 5 reps, 5x a day for 5 days @ 40 cmH2o, independent of cues to-strengthening movement of the hyolaryngeal complex necessary for airway protection during the swallow.     Education: SLP reviewed the expected changes to speech and swallow function during treatment. Dental care, and oral hygiene in relation to aspiration discussed. As well as the additional prognostic indicators that result in higher risks of development of aspiration pneumonia. Xerostomia alleviation and treatment discussed with the importance of initiating exercises to maintain strength, mobility and endurance.

## 2024-02-12 NOTE — PROGRESS NOTES
"Radiation Oncology On Treatment Visit    Patient Name:  Amita Todd  MRN:  [id number]  :  1945    Referring Provider: No ref. 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             SUBJECTIVE: .......      OBJECTIVE:   Vital Signs:  BP 93/60   Pulse 72   Temp 36.1 °C (97 °F) (Temporal)   Resp 18   Ht 1.651 m (5' 5\")   Wt 70.3 kg (154 lb 14.4 oz)   SpO2 96%   BMI 25.78 kg/m²    Pain Scale: The patient's current pain level was assessed.  They report currently having a pain of 2 out of 10.    Other Pertinent Findings:     Toxicity Assessment          2024    10:50 2024    12:01 2024    11:40   Toxicity Assessment   Adverse Events Reviewed (WDL) No (Exceptions to WDL) No (Exceptions to WDL) No (Exceptions to WDL)   Treatment  and neck Head and neck Head and neck   Anorexia Grade 0       First weight during treatment Grade 0       steroids are helping, increase in weight by .5lbs since last week Grade 0       gained 3 lbs since last week   Dehydration Grade 0       swallowing ok " Grade 0 Grade 0   Dermatitis Radiation Grade 0       creams provided Grade 0       creams at home Grade 0   Diarrhea  Grade 2       having some episodes, discussed OTC meds Grade 0       has cleared up since last week   Fatigue Grade 0 Grade 1       starting to feel tired, Rest and activity balance Grade 1       going to be earlier, Rest and activity balance   Nausea Grade 0 Grade 0 Grade 1       sometimes, comes and goes, meds at home just in case   Pain Grade 0 Grade 0    Tumor Pain Grade 0     Vomiting Grade 0 Grade 0 Grade 0   Dysphagia Grade 0       passed SLP appointment Grade 1       better with steriods Grade 0   Mucositis Oral Grade 0 Grade 0       GLutamine at home Grade 1       increase in redness, Glutamine at home   Blurred Vision  Grade 1       both eyes, been consistent making it hard to read    Dry Mouth Grade 1       talked about OTC meds Grade 1       OTC meds at home Grade 1       sipping to help, mouth washes.   Oral Pain Grade 0 Grade 0 Grade 1       Burning with food, pain meds new script   Aspiration Grade 0 Grade 0 Grade 0   Hoarseness Grade 0 Grade 0 Grade 0   Voice Alteration Grade 0 Grade 0 Grade 0        Assessment / Plan:  The patient is tolerating radiation therapy as anticipated.  Continue per current treatment plan.

## 2024-02-12 NOTE — PROGRESS NOTES
Patient ID: Amita Todd is a 78 y.o. female.  Diagnosis: Squamous Cell Carcinoma of Hypopharynx  Staging: [id number], stage 3  Date of Diagnosis: 12/21/23    Providers:  ENT Surgeon: Dr. Clemente Tobin  MedOnc: Dr. Kyunghee Burkitt / ZINA Hinojosa  RadOnc: Dr. Tahira Cochran    Current Therapy  1/24/24: Started concurrent chemoRT with weekly Carboplatin + Paclitaxel, planned for 7 cycles and 70 gy RT in 35fx.     Surgery  5/2008: s/p excision of right lateral tongue lesion. No adjuvant therapy    Sites of Disease  Hypopharynx, LN (Left level II)     Current Oncologic Issues  Dysphagia    ONCOLOGIC HISTORY  - 3/2001: S/p lumpectomy for left Breast Cancer followed by adjuvant RT and adjuvant tamoxifen  - 5/2008: History of Stage 1, T1N0M0 Oral Cavity cancer. Lesion of right lateral tongue removed, no adjuvant therapy.  - 12/6/23: Presented to Dr. Tobin with increased dysphagia over several months. Fiberoptic laryngoscopy reviewed lesion involving left piriform sinus, >1cm.  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 FDG- avid b/l cervical leverl II nodes. NO distant mets.   - 1/24/24: Started concurrent chemoRT with weekly Carboplatin + Paclitaxel    Admission  1/18 - 1/19/24: Admitted due to exertional dyspnea and chest pressure. CTA was negative for PE. CXR negative pneumonia.  (160 last year).    Past Medical History:   Past Medical History:  06/16/2016: Alcohol dependence, in remission (CMS/HCC)      Comment:  History of alcoholism  No date: Anemia  No date: Aneurysm of ascending aorta (CMS/HCC)  No date:  Arrhythmia      Comment:  a-fib. a-flutter  08/24/2023: Branch retinal artery occlusion of right eye  No date: CKD (chronic kidney disease)  No date: Coronary artery disease  No date: Depression  No date: GERD (gastroesophageal reflux disease)  No date: Hypertension  No date: Intermittent claudication (CMS/[facility])  10/06/2023: Myocardial infarction (CMS/[facility])  11/21/2017: Other bursitis of hip, unspecified hip      Comment:  Hip bursitis  01/05/2014: Other conditions influencing health status      Comment:  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  01/25/2022: Pain in leg, unspecified      Comment:  Leg pain  07/01/2020: Pain in right shoulder      Comment:  Bilateral shoulder pain  03/31/2021: Pain in unspecified knee      Comment:  Joint pain, knee  02/09/2019: Personal history of diseases of the blood and blood-  forming organs and certain disorders involving the immune mechanism      Comment:  History of anemia  04/29/2014: Personal history of malignant neoplasm of breast      Comment:  History of malignant neoplasm of female breast  04/03/2014: Personal history of other diseases of the circulatory   system      Comment:  Personal history of subarachnoid hemorrhage  03/08/2021: Personal history of other diseases of the digestive system      Comment:  History of upper gastrointestinal hemorrhage  03/08/2021: Personal history of other diseases of the digestive system      Comment:  History of melena  08/12/2020: Personal history of other diseases of the nervous system   and sense organs      Comment:  History of Bell's palsy  06/14/2019: Personal history of other infectious and parasitic   diseases      Comment:  History of herpes zoster  03/03/2020: Personal history of other specified conditions      Comment:  History of lump of left breast  02/22/2021: Personal history of other specified conditions      Comment:  History of shortness of breath  04/24/2022: Personal history of transient ischemic attack  (TIA), and   cerebral infarction without residual deficits      Comment:  History of transient ischemic attack  No date: Stenosis of carotid artery      Comment:  right  04/24/2017: Trigger finger, unspecified finger      Comment:  Acquired trigger finger   Surgical History:    Past Surgical History:   Procedure Laterality Date    CT ANGIO NECK  06/06/2019    CT NECK ANGIO W AND WO IV CONTRAST 6/6/2019 [facility] CLINICAL LEGACY    CT ANGIO NECK  01/04/2021    CT NECK ANGIO W AND WO IV CONTRAST 1/4/2021 [facility] ANCILLARY LEGACY    CT HEAD ANGIO W AND WO IV CONTRAST  06/06/2019    CT HEAD ANGIO W AND WO IV CONTRAST 6/6/2019 [facility] CLINICAL LEGACY    CT HEAD ANGIO W AND WO IV CONTRAST  01/04/2021    CT HEAD ANGIO W AND WO IV CONTRAST 1/4/2021 [facility]    MR HEAD ANGIO WO IV CONTRAST  06/06/2019    MR HEAD ANGIO WO IV CONTRAST 6/6/2019 [facility] CLINICAL LEGACY    MR NECK ANGIO WO IV CONTRAST  06/06/2019    MR NECK ANGIO WO IV CONTRAST 6/6/2019 [facility] CLINICAL LEGACY    OTHER SURGICAL HISTORY  03/03/2020    Oophorectomy    OTHER SURGICAL HISTORY  06/17/2019    Miscarriage surgical treatment    OTHER SURGICAL HISTORY  08/29/2019    multiple Catheter Ablation Atrial Flutter    OTHER SURGICAL HISTORY  04/23/2014    Previous Stent Placement    SEPTOPLASTY  09/22/2017    Septoplasty      Family History:    Family History   Problem Relation Name Age of Onset    Breast cancer Mother      Other (GOODPASTURE'S DISEASE) Father      BRCA2 Positive Sister      Lung cancer Sister      Neuroblastoma Sister      BRCA2 Positive Daughter      Breast cancer Other G/P      Family Oncology History:    Cancer-related family history includes Breast cancer in her mother and another family member; Lung cancer in her sister. She reports a strong family history of cancer.   Social History:    Social History     Tobacco Use    Smoking status: Former     Packs/day: 2.00     Years: 30.00     Additional pack years: 0.00     Total pack years: 60.00      Types: Cigarettes     Quit date:      Years since quittin.    Smokeless tobacco: Never   Vaping Use    Vaping Use: Never used   Substance Use Topics    Alcohol use: Not Currently     Comment: Prior Alcohol use. Stopped in early     Drug use: Never   60 pack year history of smoking and prior ETOH use; she quit both in early  after she had a heart attack.       Subjective   Chief Complaint: Hypopharyngeal SCC    HPI  Interval History  Amita Todd is a 78 y.o. female with a PMH of CAD, aflutter s/p multiple ablations, PVD, AAA, right carotid stenosis, heart failure, and CKD. Recently diagnosed with hypopharyngeal Squamous Cell Carcinoma, started concurrent chemoRT with weekly Carboplatin + Paclitaxel on 24.    Pt presents for C4 Carbo/Taxol. C3 held 2/2 neutropenia.     Patient reports symptoms and SEs are overall stable. Still tired, continue to have blurry vision since starting chemo.   Per Dr. Cochran, her tumor is shrinking, her swallowing is improved. Troat pain is managed with Tylenol 500mg Q6hr, and steroids. Tolerating soft foods, liquids, and drinking boost.   She has mouth sores in the right buccal region and right soft palate. Managed well with diligent glutamine and baking soda/salt rinses.   Endorse 4 lb weight gain in the last week, ankles are a little swollen today. She took 1 dose of Lasix 20mg today.   She continue to have dizziness with position change.   She is in afib at baseline which makes her tired. Her Cardiologist /EP is aware. She's on amlodipine, atorvastatin, Plavix, Farxiga, losartan, metoprolol. Her BP has been up and down. Instructed pt to make log of morning BP.    Baseline symptoms below are stable:  - Exertional dyspnea: She is able to walk unassisted, but experience SOB after walking 20 feet due to her heart failure diagnosis.     ROS  Review of Systems   Constitutional:  Positive for fatigue. Negative for chills, fever and unexpected weight change.    HENT:   Positive for trouble swallowing and voice change. Negative for hearing loss, mouth sores, sore throat and tinnitus.    Eyes:  Positive for eye problems (blurry vision).   Respiratory:  Negative for cough and shortness of breath.    Cardiovascular:  Negative for chest pain and leg swelling.   Gastrointestinal:  Negative for abdominal pain, blood in stool, constipation, diarrhea, nausea and vomiting.   Genitourinary:  Negative for difficulty urinating.    Musculoskeletal:  Negative for arthralgias, gait problem and myalgias.   Skin:  Negative for rash.   Neurological:  Negative for gait problem, headaches and numbness.   Psychiatric/Behavioral:  Negative for confusion and depression.        Allergies  Allergies   Allergen Reactions    Oxycodone Hcl-Oxycodone-Asa Unknown    Oxycodone-Acetaminophen Unknown    Penicillin G Other     Nausea    Penicillins GI Upset and Other        Medications  Current Outpatient Medications   Medication Instructions    acetaminophen (TYLENOL) 650 mg, oral, Every 6 hours PRN    albuterol 90 mcg/actuation aerosol powdr breath activated inhaler 2 puffs, inhalation, Every 6 hours PRN    ascorbic acid (VITAMIN C) 500 mg, oral, Daily    atorvastatin (LIPITOR) 20 mg, oral, Daily, as directed    buPROPion SR (WELLBUTRIN SR) 150 mg, oral, 2 times daily, TAKE PER DIRECTED    cholecalciferol (Vitamin D-3) 25 MCG (1000 UT) tablet 1 tablet, oral, 2 times daily    clopidogrel (PLAVIX) 75 mg, oral, Daily    coenzyme Q-10 (COQ-10) 100 mg, oral, 2 times daily    dapagliflozin propanediol (FARXIGA) 10 mg, oral, Daily    dexAMETHasone (DECADRON) 4 mg, oral, 2 times daily with meals    DULoxetine (Cymbalta) 60 mg DR capsule TAKE 1 CAPSULE BY MOUTH  DAILY    furosemide (LASIX) 20 mg, oral, Daily    guaiFENesin 200 mg/5 mL liquid 10 mL, oral, 4 times daily    herbal complex no.239 (Whole Body Joint Support) capsule 1 capsule, oral, Daily    ipratropium (Atrovent) 21 mcg (0.03 %) nasal spray 2 sprays,  Each Nostril, Every 12 hours    losartan (Cozaar) 50 mg tablet 1 tablet, oral, Daily    magnesium oxide (Mag-Ox) 400 mg (241.3 mg magnesium) tablet 1 tablet, oral, Daily    metoprolol tartrate (Lopressor) 25 mg tablet 2 tablets, oral, 2 times daily    multivitamin with minerals tablet 1 tablet, oral, Daily    ondansetron (ZOFRAN) 8 mg, oral, Every 8 hours PRN    oxyCODONE (ROXICODONE) 5 mg, oral, Every 6 hours PRN    pantoprazole (PROTONIX) 40 mg, oral, 2 times daily    prochlorperazine (COMPAZINE) 10 mg, oral, Every 6 hours PRN    sennosides-docusate sodium (Kalee-Colace) 8.6-50 mg tablet 2 tablets, oral, Daily    spironolactone (ALDACTONE) 25 mg, oral, Daily, PER DIRECTED          Objective   VS: BP 91/54 Comment: stand ing bp is 86/55 pulse 72  Pulse 64   Temp 36.4 °C (97.5 °F) (Core)   Resp 20   Wt 70.6 kg (155 lb 10.3 oz)   SpO2 100%   BMI 25.90 kg/m²   Weight: Daily Weight  02/15/24 : 69.8 kg (153 lb 14.4 oz)  02/14/24 : 70.6 kg (155 lb 10.3 oz)  02/12/24 : 70.3 kg (154 lb 14.4 oz)  02/08/24 : 69.8 kg (153 lb 12.8 oz)  02/06/24 : 68 kg (150 lb)  02/07/24 : 69.2 kg (152 lb 8.9 oz)  02/06/24 : 69.2 kg (152 lb 9.6 oz)      Physical Exam  Constitutional:       General: She is not in acute distress.     Appearance: Normal appearance.   HENT:      Head: Normocephalic and atraumatic.      Nose: Nose normal.      Mouth/Throat:      Lips: Pink.      Mouth: Mucous membranes are dry and cyanotic.   Eyes:      Extraocular Movements: Extraocular movements intact.      Conjunctiva/sclera: Conjunctivae normal.   Neck:      Comments: Left sided neck mass, non-tender to palpation    Cardiovascular:      Rate and Rhythm: Normal rate and regular rhythm.      Heart sounds: No murmur heard.  Pulmonary:      Effort: Pulmonary effort is normal. No respiratory distress.   Abdominal:      General: There is no distension.      Palpations: Abdomen is soft. There is no mass.      Tenderness: There is no abdominal tenderness.    Musculoskeletal:         General: No tenderness.      Cervical back: Normal range of motion and neck supple.   Skin:     General: Skin is warm and dry.   Neurological:      General: No focal deficit present.      Mental Status: She is alert and oriented to person, place, and time. Mental status is at baseline.   Psychiatric:         Mood and Affect: Mood normal.         Diagnostic Results   Labs  Below labs are reviewed today.  Results from last 7 days   Lab Units 02/14/24  1113 02/12/24  1313   WBC AUTO x10*3/uL 5.4 5.7   HEMOGLOBIN g/dL 11.5* 12.1   HEMATOCRIT % 35.4* 37.1   PLATELETS AUTO x10*3/uL 99* 111*   NEUTROS ABS x10*3/uL 4.96 5.45   LYMPHS ABS AUTO x10*3/uL 0.16* 0.03*   MONOS ABS AUTO x10*3/uL 0.23 0.18   EOS ABS AUTO x10*3/uL 0.00 0.00   NEUTROS PCT AUTO % 92.3 95.4   LYMPHS PCT AUTO % 3.0 0.5   MONOS PCT AUTO % 4.3 3.2   EOS PCT AUTO % 0.0 0.0      Results from last 7 days   Lab Units 02/14/24  1113 02/12/24  1313   GLUCOSE mg/dL 97 97   SODIUM mmol/L 137 135*   POTASSIUM mmol/L 4.1 4.2   CHLORIDE mmol/L 106 103   CO2 mmol/L 26 26   BUN mg/dL 22 27*   CREATININE mg/dL 0.69 0.78   EGFR mL/min/1.73m*2 90 78   CALCIUM mg/dL 8.4* 8.5*   MAGNESIUM mg/dL 1.93 1.96   ALBUMIN g/dL 3.0* 3.1*   PROTEIN TOTAL g/dL 5.4* 5.6*   BILIRUBIN TOTAL mg/dL 0.7 0.8   ALK PHOS U/L 45 44   ALT U/L 22 22   AST U/L 15 14                   Images  12/12/23 CT Neck   FINDINGS:  *The visualized paranasal sinuses nasopharynx and oropharynx are unremarkable.   *The mandible, maxilla and temporomandibular joints are normal. *The major salivary glands are normal.  *There is a 2.5 cm by 2.5 cm x 2 cm mass within the left aryepiglottic fold. There is extension to the left piriformis sinus. The mass extends to the margin of the laryngeal cartilages without cartilaginous changes or extra laryngeal abnormality. There is no evidence of extension to the left carotid sheath. *There is a 1 cm x 1.5 cm lymph node at level 2A/2B on the left  with peripheral enhancement consistent with extra capsular extension. There is a 5 mm enhancing lymph node at left level 3 with central lucency concerning for additional chris metastasis. *The thyroid gland is normal.  *Unchanged dilatation of the ascending aorta which currently measures a proximally 4.3 cm in diameter. The thoracic inlet is otherwise normal.      IMPRESSION:  *Left-sided hypopharyngeal mass without extra laryngeal extension  *Left-sided regional lymph node metastases as described    1/12/2024 NM PET/CT Head & Neck Staging  IMPRESSION:  1. FDG avid large soft tissue mass in the left hypopharynx with extension to the level of the laryngeal cartilage, consistent with biopsy-proven squamous cell carcinoma.  2. FDG-avid bilateral cervical level II nodes, likely representing chris metastases.  3. No PET evidence of distant metastasis      Pathology        Assessment/Plan   ASSESSMENT  Amita Todd is a 78 y.o. female with [id number] hypopharyngeal squamous mariana carcinoma. Started concurrent chemoRT with weekly Carboplatin + Paclitaxel on 1/24/24.    #cT2N1 Hypopharyngeal SCC  - 12/12/23 CT neck showed 2.5 x 2.5 cm aryepiglottic mass, 1.5 x 1 cm LN at level 2A/2B  - 12/21/23 Hypopharyngeal mass biopsy  - invasive moderately differentiated keratinizing squamous cell carcinoma  - No plans for surgery as patient wishes for laryngeal preservation  - we discussed treatment with definitive concurrent chemoRT; would favor treatment with weekly carboplatin + paclitaxel vs cisplatin given decreased GFR, heart failure, age, and other co-morbidities  - 1/12/24 PET showed no distant mets  - 1/24/24: Started concurrent chemoRT with weekly Carboplatin + Paclitaxel  - 1/31/24: Patient tolerated C1 Carbo/Taxol well. Mild nausea controlled with anti-emetics. She's a bit hypotensive today and endorse occasional dizziness.   - 2/7/24: ongoing sensation in mouth/fatigue, will hold  chemo today given low ANC.  - 2/14/24:  Pt's ANC recovered and R chest Mediport was placed on 2/13. Pt did have some ankle swelling with 4lb wt gain this week, took 20mg Lasix today. Otherwise symptoms are stable, swallowing improving, mild oral mucositis.     # Neutropenia  - 2/6/24: Patient is severely neutropenic today with . Mediport placement postponed.   - Neutropenic fever precautions provided to patient her family. Pt to come to ED for fever >100.4F. Pt and her family voiced understanding  - 2/15: ANC 4960. Ok for chemo today    # Thrombocytopenia  - 2/14: Plt 99. Pt aware to beware of prolonged bleeds and bruising .    # Hypotension  - She's on amlodipine, metoprolol, losartan, Spironolactone for hypertension  - BP in the last 2 weeks has been between 90s - 140s/ 50-80s. She does endorse dizziness upon standing. Instructed patient to log morning BP, reach out to Dr. Nova on need for dose adjustments on anti-hypertensives.    # Hyponatremia  - Pt has been hyponatremic for a few weeks. Patient was instructed alternate Gatorade and water intake and add salt in her fluids.    - 1/30 Na 131.   - 2/14: Na 137    # Dysphagia  - 1/16/24 Barium Swallow showed mild oropharyngeal dysphagia secondary to hypopharyngeal mass.   - PEG placement discussed with patient and she's open to this if swallowing function declines during CRT.  - 1/31/24: Pt currently tolerating soft/minced foods well and weight is stable from last week. 1lb weight gain.   - 2/14: Swallowing has improved. Dr Cochran notes decrease in size of tumor.     # Hx of CHF, CAD s/p PCI, A.fib, AAA  - 1/18 - 1/19/24 Admission due to worsening exertional dyspnea  - CTA was negative for PE, aortic aneurysm stable, no acute anemia noted, no evidence pneumonia on CXR, no hypoxia.   - EKG A.fib rate controlled  - ECHO 3/2023 LVEF 50-55%, aortic valve regurg  - , 160 last year  - per Dr. Nova, hold lasix unless >3 lbs weight gain during treatment  - 1/31/24: instructed patient to reach  out to Cardiologist regarding any adjustments needed on her anti-hypertensives (Losartan, Metoprolol, Spironolactone) as patient may become hypotensive later in the course of chemoRT.  - 2/6/24: Patient had EKG in ED today. PT is in afib, EKG looks better than EKG on 12/13. Cardiologist aware she's in afib at baseline. Her fatigue is likely worsened by afib. She is on amlodipine, atorvastatin, Plavix, Farxiga, losartan, metoprolol.      PLAN  -- Proceed to C4 Carbo/Taxol  -- RTC 1 week for C5. Monitor Platelets   -- 2/16 FUV w/ Dr. Tobin  -- Keep daily log of morning BP. Monitor hypotension symptoms  -- Follow up with cardiologist (I let Dr. Nova know about her recent hypotension today, no concern from him). Next visit 7/11 with repeat PVR w/ exercise and carotid duplex US.

## 2024-02-13 ENCOUNTER — HOSPITAL ENCOUNTER (OUTPATIENT)
Dept: RADIATION ONCOLOGY | Facility: HOSPITAL | Age: 79
Setting detail: RADIATION/ONCOLOGY SERIES
Discharge: HOME | End: 2024-02-13
Payer: MEDICARE

## 2024-02-13 ENCOUNTER — HOSPITAL ENCOUNTER (OUTPATIENT)
Dept: RADIOLOGY | Facility: HOSPITAL | Age: 79
Discharge: HOME | End: 2024-02-13
Payer: MEDICARE

## 2024-02-13 VITALS
RESPIRATION RATE: 16 BRPM | OXYGEN SATURATION: 97 % | SYSTOLIC BLOOD PRESSURE: 106 MMHG | HEART RATE: 98 BPM | TEMPERATURE: 98.4 F | DIASTOLIC BLOOD PRESSURE: 68 MMHG

## 2024-02-13 DIAGNOSIS — C13.8 MALIGNANT NEOPLASM OF OVERLAPPING SITES OF HYPOPHARYNX (MULTI): ICD-10-CM

## 2024-02-13 DIAGNOSIS — C76.0 HEAD AND NECK CANCER (MULTI): ICD-10-CM

## 2024-02-13 DIAGNOSIS — Z51.0 ENCOUNTER FOR ANTINEOPLASTIC RADIATION THERAPY: ICD-10-CM

## 2024-02-13 LAB
RAD ONC MSQ ACTUAL FRACTIONS DELIVERED: 15
RAD ONC MSQ ACTUAL SESSION DELIVERED DOSE: 200 CGRAY
RAD ONC MSQ ACTUAL TOTAL DOSE: 3000 CGRAY
RAD ONC MSQ ELAPSED DAYS: 20
RAD ONC MSQ LAST DATE: NORMAL
RAD ONC MSQ PRESCRIBED FRACTIONAL DOSE: 200 CGRAY
RAD ONC MSQ PRESCRIBED NUMBER OF FRACTIONS: 35
RAD ONC MSQ PRESCRIBED TECHNIQUE: NORMAL
RAD ONC MSQ PRESCRIBED TOTAL DOSE: 7000 CGRAY
RAD ONC MSQ PRESCRIPTION PATTERN COMMENT: NORMAL
RAD ONC MSQ START DATE: NORMAL
RAD ONC MSQ TREATMENT COURSE NUMBER: 1
RAD ONC MSQ TREATMENT SITE: NORMAL

## 2024-02-13 PROCEDURE — 2500000004 HC RX 250 GENERAL PHARMACY W/ HCPCS (ALT 636 FOR OP/ED): Performed by: RADIOLOGY

## 2024-02-13 PROCEDURE — 76937 US GUIDE VASCULAR ACCESS: CPT | Performed by: RADIOLOGY

## 2024-02-13 PROCEDURE — 77001 FLUOROGUIDE FOR VEIN DEVICE: CPT | Performed by: RADIOLOGY

## 2024-02-13 PROCEDURE — 99152 MOD SED SAME PHYS/QHP 5/>YRS: CPT

## 2024-02-13 PROCEDURE — 99152 MOD SED SAME PHYS/QHP 5/>YRS: CPT | Performed by: RADIOLOGY

## 2024-02-13 PROCEDURE — 99153 MOD SED SAME PHYS/QHP EA: CPT | Performed by: RADIOLOGY

## 2024-02-13 PROCEDURE — 77386 HC INTENSITY-MODULATED RADIATION THERAPY (IMRT), COMPLEX: CPT | Performed by: RADIOLOGY

## 2024-02-13 PROCEDURE — C1769 GUIDE WIRE: HCPCS

## 2024-02-13 PROCEDURE — 36561 INSERT TUNNELED CV CATH: CPT | Performed by: RADIOLOGY

## 2024-02-13 PROCEDURE — C1788 PORT, INDWELLING, IMP: HCPCS

## 2024-02-13 PROCEDURE — 77014 CHG CT GUIDANCE RADIATION THERAPY FLDS PLACEMENT: CPT | Performed by: RADIOLOGY

## 2024-02-13 PROCEDURE — 2780000003 HC OR 278 NO HCPCS

## 2024-02-13 PROCEDURE — 2720000007 HC OR 272 NO HCPCS

## 2024-02-13 RX ORDER — MIDAZOLAM HYDROCHLORIDE 1 MG/ML
INJECTION INTRAMUSCULAR; INTRAVENOUS
Status: COMPLETED | OUTPATIENT
Start: 2024-02-13 | End: 2024-02-13

## 2024-02-13 RX ORDER — FENTANYL CITRATE 50 UG/ML
INJECTION, SOLUTION INTRAMUSCULAR; INTRAVENOUS
Status: COMPLETED | OUTPATIENT
Start: 2024-02-13 | End: 2024-02-13

## 2024-02-13 RX ADMIN — MIDAZOLAM HYDROCHLORIDE 1 MG: 1 INJECTION, SOLUTION INTRAMUSCULAR; INTRAVENOUS at 10:23

## 2024-02-13 RX ADMIN — FENTANYL CITRATE 50 MCG: 50 INJECTION, SOLUTION INTRAMUSCULAR; INTRAVENOUS at 10:23

## 2024-02-13 ASSESSMENT — PAIN - FUNCTIONAL ASSESSMENT
PAIN_FUNCTIONAL_ASSESSMENT: 0-10

## 2024-02-13 ASSESSMENT — PAIN SCALES - GENERAL
PAINLEVEL_OUTOF10: 0 - NO PAIN

## 2024-02-13 NOTE — DISCHARGE INSTRUCTIONS
You received moderate sedation:  - Do not drive a car, or operate any machinery or power tools of any kind.  - Do not drink any alcoholic drinks.  - Do not take any over the counter medications that may cause drowsiness.  - Do not make any important decisions or sign any legal documents.  - You need to have a responsible adult accompany you home.  - You may resume your normal diet.  - We strongly suggest that a responsible adult be with you for the rest of the day and also during the night. This is for your protection and safety.     For questions related to your procedure:  Please call [phone] between the hours of 7:00am-5:00pm Monday through Friday.  Please call [phone] after 5:00pm and on weekends and holidays.     In the event of an emergency call 911 or go to your nearest emergency room.

## 2024-02-13 NOTE — PRE-PROCEDURE NOTE
Interventional Radiology Preprocedure Note    Indication for procedure: The encounter diagnosis was Head and neck cancer (CMS/HCC).    Relevant review of systems: NA    Relevant Labs:   Lab Results   Component Value Date    CREATININE 0.78 02/12/2024    EGFR 78 02/12/2024    INR 1.1 01/31/2024    PROTIME 12.5 01/31/2024       Planned Sedation/Anesthesia: Moderate    Airway assessment: normal    Directed physical examination:    A&Ox3, resting comfortably in bed  Breathing normal at RA    Mallampati: II (hard and soft palate, upper portion of tonsils anduvula visible)    ASA Score: ASA 4 - Patient with severe systemic disease that is a constant threat to life    Benefits, risks and alternatives of procedure and planned sedation have been discussed with the patient and/or their representative. All questions answered and they agree to proceed.

## 2024-02-13 NOTE — Clinical Note
R chest mediport placed. Pt received 1mg versed and 50mcg fentanyl for sedation; tolerated well. VSS throughout procedure. Dressing is clean, dry, and intact. Recovery time 1hr. Pt to RPCU; report to RPCU RN.

## 2024-02-14 ENCOUNTER — LAB (OUTPATIENT)
Dept: HEMATOLOGY/ONCOLOGY | Facility: HOSPITAL | Age: 79
End: 2024-02-14
Payer: MEDICARE

## 2024-02-14 ENCOUNTER — INFUSION (OUTPATIENT)
Dept: HEMATOLOGY/ONCOLOGY | Facility: HOSPITAL | Age: 79
End: 2024-02-14
Payer: MEDICARE

## 2024-02-14 ENCOUNTER — OFFICE VISIT (OUTPATIENT)
Dept: HEMATOLOGY/ONCOLOGY | Facility: HOSPITAL | Age: 79
End: 2024-02-14
Payer: MEDICARE

## 2024-02-14 ENCOUNTER — NUTRITION (OUTPATIENT)
Dept: HEMATOLOGY/ONCOLOGY | Facility: HOSPITAL | Age: 79
End: 2024-02-14

## 2024-02-14 ENCOUNTER — HOSPITAL ENCOUNTER (OUTPATIENT)
Dept: RADIATION ONCOLOGY | Facility: HOSPITAL | Age: 79
Setting detail: RADIATION/ONCOLOGY SERIES
Discharge: HOME | End: 2024-02-14
Payer: MEDICARE

## 2024-02-14 VITALS
SYSTOLIC BLOOD PRESSURE: 91 MMHG | TEMPERATURE: 97.5 F | RESPIRATION RATE: 20 BRPM | BODY MASS INDEX: 25.9 KG/M2 | DIASTOLIC BLOOD PRESSURE: 54 MMHG | WEIGHT: 155.65 LBS | OXYGEN SATURATION: 100 % | HEART RATE: 64 BPM

## 2024-02-14 DIAGNOSIS — R13.12 OROPHARYNGEAL DYSPHAGIA: ICD-10-CM

## 2024-02-14 DIAGNOSIS — C13.9 MALIGNANT NEOPLASM OF HYPOPHARYNX (MULTI): Primary | ICD-10-CM

## 2024-02-14 DIAGNOSIS — D69.6 THROMBOCYTOPENIA (CMS-HCC): ICD-10-CM

## 2024-02-14 DIAGNOSIS — M79.89 LEG SWELLING: ICD-10-CM

## 2024-02-14 DIAGNOSIS — C02.3 CANCER OF ANTERIOR TWO-THIRDS OF TONGUE (MULTI): ICD-10-CM

## 2024-02-14 DIAGNOSIS — C13.9 MALIGNANT NEOPLASM OF HYPOPHARYNX (MULTI): ICD-10-CM

## 2024-02-14 DIAGNOSIS — C13.8 MALIGNANT NEOPLASM OF OVERLAPPING SITES OF HYPOPHARYNX (MULTI): ICD-10-CM

## 2024-02-14 DIAGNOSIS — Z51.0 ENCOUNTER FOR ANTINEOPLASTIC RADIATION THERAPY: ICD-10-CM

## 2024-02-14 DIAGNOSIS — I95.89 OTHER SPECIFIED HYPOTENSION: ICD-10-CM

## 2024-02-14 DIAGNOSIS — J31.0 RHINITIS, UNSPECIFIED TYPE: ICD-10-CM

## 2024-02-14 LAB
ALBUMIN SERPL BCP-MCNC: 3 G/DL (ref 3.4–5)
ALP SERPL-CCNC: 45 U/L (ref 33–136)
ALT SERPL W P-5'-P-CCNC: 22 U/L (ref 7–45)
ANION GAP SERPL CALC-SCNC: 9 MMOL/L (ref 10–20)
AST SERPL W P-5'-P-CCNC: 15 U/L (ref 9–39)
BASOPHILS # BLD AUTO: 0 X10*3/UL (ref 0–0.1)
BASOPHILS NFR BLD AUTO: 0 %
BILIRUB SERPL-MCNC: 0.7 MG/DL (ref 0–1.2)
BUN SERPL-MCNC: 22 MG/DL (ref 6–23)
CALCIUM SERPL-MCNC: 8.4 MG/DL (ref 8.6–10.3)
CHLORIDE SERPL-SCNC: 106 MMOL/L (ref 98–107)
CO2 SERPL-SCNC: 26 MMOL/L (ref 21–32)
CREAT SERPL-MCNC: 0.69 MG/DL (ref 0.5–1.05)
EGFRCR SERPLBLD CKD-EPI 2021: 90 ML/MIN/1.73M*2
EOSINOPHIL # BLD AUTO: 0 X10*3/UL (ref 0–0.4)
EOSINOPHIL NFR BLD AUTO: 0 %
ERYTHROCYTE [DISTWIDTH] IN BLOOD BY AUTOMATED COUNT: 16.3 % (ref 11.5–14.5)
GLUCOSE SERPL-MCNC: 97 MG/DL (ref 74–99)
HCT VFR BLD AUTO: 35.4 % (ref 36–46)
HGB BLD-MCNC: 11.5 G/DL (ref 12–16)
IMM GRANULOCYTES # BLD AUTO: 0.02 X10*3/UL (ref 0–0.5)
IMM GRANULOCYTES NFR BLD AUTO: 0.4 % (ref 0–0.9)
LYMPHOCYTES # BLD AUTO: 0.16 X10*3/UL (ref 0.8–3)
LYMPHOCYTES NFR BLD AUTO: 3 %
MAGNESIUM SERPL-MCNC: 1.93 MG/DL (ref 1.6–2.4)
MCH RBC QN AUTO: 30.8 PG (ref 26–34)
MCHC RBC AUTO-ENTMCNC: 32.5 G/DL (ref 32–36)
MCV RBC AUTO: 95 FL (ref 80–100)
MONOCYTES # BLD AUTO: 0.23 X10*3/UL (ref 0.05–0.8)
MONOCYTES NFR BLD AUTO: 4.3 %
NEUTROPHILS # BLD AUTO: 4.96 X10*3/UL (ref 1.6–5.5)
NEUTROPHILS NFR BLD AUTO: 92.3 %
NRBC BLD-RTO: 0.6 /100 WBCS (ref 0–0)
PLATELET # BLD AUTO: 99 X10*3/UL (ref 150–450)
POTASSIUM SERPL-SCNC: 4.1 MMOL/L (ref 3.5–5.3)
PROT SERPL-MCNC: 5.4 G/DL (ref 6.4–8.2)
RAD ONC MSQ ACTUAL FRACTIONS DELIVERED: 16
RAD ONC MSQ ACTUAL SESSION DELIVERED DOSE: 200 CGRAY
RAD ONC MSQ ACTUAL TOTAL DOSE: 3200 CGRAY
RAD ONC MSQ ELAPSED DAYS: 21
RAD ONC MSQ LAST DATE: NORMAL
RAD ONC MSQ PRESCRIBED FRACTIONAL DOSE: 200 CGRAY
RAD ONC MSQ PRESCRIBED NUMBER OF FRACTIONS: 35
RAD ONC MSQ PRESCRIBED TECHNIQUE: NORMAL
RAD ONC MSQ PRESCRIBED TOTAL DOSE: 7000 CGRAY
RAD ONC MSQ PRESCRIPTION PATTERN COMMENT: NORMAL
RAD ONC MSQ START DATE: NORMAL
RAD ONC MSQ TREATMENT COURSE NUMBER: 1
RAD ONC MSQ TREATMENT SITE: NORMAL
RBC # BLD AUTO: 3.73 X10*6/UL (ref 4–5.2)
SODIUM SERPL-SCNC: 137 MMOL/L (ref 136–145)
WBC # BLD AUTO: 5.4 X10*3/UL (ref 4.4–11.3)

## 2024-02-14 PROCEDURE — 96413 CHEMO IV INFUSION 1 HR: CPT

## 2024-02-14 PROCEDURE — 99215 OFFICE O/P EST HI 40 MIN: CPT | Performed by: STUDENT IN AN ORGANIZED HEALTH CARE EDUCATION/TRAINING PROGRAM

## 2024-02-14 PROCEDURE — 1159F MED LIST DOCD IN RCRD: CPT | Performed by: STUDENT IN AN ORGANIZED HEALTH CARE EDUCATION/TRAINING PROGRAM

## 2024-02-14 PROCEDURE — 2500000004 HC RX 250 GENERAL PHARMACY W/ HCPCS (ALT 636 FOR OP/ED): Mod: JZ | Performed by: STUDENT IN AN ORGANIZED HEALTH CARE EDUCATION/TRAINING PROGRAM

## 2024-02-14 PROCEDURE — 1157F ADVNC CARE PLAN IN RCRD: CPT | Performed by: STUDENT IN AN ORGANIZED HEALTH CARE EDUCATION/TRAINING PROGRAM

## 2024-02-14 PROCEDURE — 1125F AMNT PAIN NOTED PAIN PRSNT: CPT | Performed by: STUDENT IN AN ORGANIZED HEALTH CARE EDUCATION/TRAINING PROGRAM

## 2024-02-14 PROCEDURE — 36591 DRAW BLOOD OFF VENOUS DEVICE: CPT

## 2024-02-14 PROCEDURE — 99215 OFFICE O/P EST HI 40 MIN: CPT | Mod: 25 | Performed by: STUDENT IN AN ORGANIZED HEALTH CARE EDUCATION/TRAINING PROGRAM

## 2024-02-14 PROCEDURE — 2500000004 HC RX 250 GENERAL PHARMACY W/ HCPCS (ALT 636 FOR OP/ED): Performed by: STUDENT IN AN ORGANIZED HEALTH CARE EDUCATION/TRAINING PROGRAM

## 2024-02-14 PROCEDURE — 96375 TX/PRO/DX INJ NEW DRUG ADDON: CPT | Mod: INF

## 2024-02-14 PROCEDURE — 83735 ASSAY OF MAGNESIUM: CPT

## 2024-02-14 PROCEDURE — 77336 RADIATION PHYSICS CONSULT: CPT | Performed by: RADIOLOGY

## 2024-02-14 PROCEDURE — 1160F RVW MEDS BY RX/DR IN RCRD: CPT | Performed by: STUDENT IN AN ORGANIZED HEALTH CARE EDUCATION/TRAINING PROGRAM

## 2024-02-14 PROCEDURE — 77014 CHG CT GUIDANCE RADIATION THERAPY FLDS PLACEMENT: CPT | Performed by: RADIOLOGY

## 2024-02-14 PROCEDURE — 2500000001 HC RX 250 WO HCPCS SELF ADMINISTERED DRUGS (ALT 637 FOR MEDICARE OP): Performed by: STUDENT IN AN ORGANIZED HEALTH CARE EDUCATION/TRAINING PROGRAM

## 2024-02-14 PROCEDURE — 3074F SYST BP LT 130 MM HG: CPT | Performed by: STUDENT IN AN ORGANIZED HEALTH CARE EDUCATION/TRAINING PROGRAM

## 2024-02-14 PROCEDURE — 3078F DIAST BP <80 MM HG: CPT | Performed by: STUDENT IN AN ORGANIZED HEALTH CARE EDUCATION/TRAINING PROGRAM

## 2024-02-14 PROCEDURE — 80053 COMPREHEN METABOLIC PANEL: CPT

## 2024-02-14 PROCEDURE — 85025 COMPLETE CBC W/AUTO DIFF WBC: CPT

## 2024-02-14 PROCEDURE — 96411 CHEMO IV PUSH ADDL DRUG: CPT

## 2024-02-14 PROCEDURE — 1036F TOBACCO NON-USER: CPT | Performed by: STUDENT IN AN ORGANIZED HEALTH CARE EDUCATION/TRAINING PROGRAM

## 2024-02-14 PROCEDURE — 77386 HC INTENSITY-MODULATED RADIATION THERAPY (IMRT), COMPLEX: CPT | Performed by: RADIOLOGY

## 2024-02-14 RX ORDER — DIPHENHYDRAMINE HYDROCHLORIDE 50 MG/ML
50 INJECTION INTRAMUSCULAR; INTRAVENOUS AS NEEDED
Status: CANCELLED | OUTPATIENT
Start: 2024-02-14

## 2024-02-14 RX ORDER — FAMOTIDINE 10 MG/ML
20 INJECTION INTRAVENOUS ONCE AS NEEDED
Status: CANCELLED | OUTPATIENT
Start: 2024-02-14

## 2024-02-14 RX ORDER — EPINEPHRINE 0.3 MG/.3ML
0.3 INJECTION SUBCUTANEOUS EVERY 5 MIN PRN
Status: DISCONTINUED | OUTPATIENT
Start: 2024-02-14 | End: 2024-02-14 | Stop reason: HOSPADM

## 2024-02-14 RX ORDER — FAMOTIDINE 10 MG/ML
20 INJECTION INTRAVENOUS ONCE
Status: CANCELLED | OUTPATIENT
Start: 2024-02-14

## 2024-02-14 RX ORDER — DIPHENHYDRAMINE HYDROCHLORIDE 50 MG/ML
50 INJECTION INTRAMUSCULAR; INTRAVENOUS AS NEEDED
Status: DISCONTINUED | OUTPATIENT
Start: 2024-02-14 | End: 2024-02-14 | Stop reason: HOSPADM

## 2024-02-14 RX ORDER — PALONOSETRON 0.05 MG/ML
0.25 INJECTION, SOLUTION INTRAVENOUS ONCE
Status: COMPLETED | OUTPATIENT
Start: 2024-02-14 | End: 2024-02-14

## 2024-02-14 RX ORDER — DIPHENHYDRAMINE HCL 50 MG
50 CAPSULE ORAL ONCE
Status: CANCELLED | OUTPATIENT
Start: 2024-02-14

## 2024-02-14 RX ORDER — HEPARIN SODIUM,PORCINE/PF 10 UNIT/ML
50 SYRINGE (ML) INTRAVENOUS AS NEEDED
Status: CANCELLED | OUTPATIENT
Start: 2024-02-14

## 2024-02-14 RX ORDER — PROCHLORPERAZINE MALEATE 10 MG
10 TABLET ORAL EVERY 6 HOURS PRN
Status: CANCELLED | OUTPATIENT
Start: 2024-02-14

## 2024-02-14 RX ORDER — DEXAMETHASONE SODIUM PHOSPHATE 4 MG/ML
10 INJECTION, SOLUTION INTRA-ARTICULAR; INTRALESIONAL; INTRAMUSCULAR; INTRAVENOUS; SOFT TISSUE ONCE
Status: COMPLETED | OUTPATIENT
Start: 2024-02-14 | End: 2024-02-14

## 2024-02-14 RX ORDER — PROCHLORPERAZINE EDISYLATE 5 MG/ML
10 INJECTION INTRAMUSCULAR; INTRAVENOUS EVERY 6 HOURS PRN
Status: DISCONTINUED | OUTPATIENT
Start: 2024-02-14 | End: 2024-02-14 | Stop reason: HOSPADM

## 2024-02-14 RX ORDER — DEXAMETHASONE SODIUM PHOSPHATE 4 MG/ML
10 INJECTION, SOLUTION INTRA-ARTICULAR; INTRALESIONAL; INTRAMUSCULAR; INTRAVENOUS; SOFT TISSUE ONCE
Status: CANCELLED | OUTPATIENT
Start: 2024-02-14

## 2024-02-14 RX ORDER — HEPARIN 100 UNIT/ML
500 SYRINGE INTRAVENOUS AS NEEDED
Status: DISCONTINUED | OUTPATIENT
Start: 2024-02-14 | End: 2024-02-14 | Stop reason: HOSPADM

## 2024-02-14 RX ORDER — PROCHLORPERAZINE EDISYLATE 5 MG/ML
10 INJECTION INTRAMUSCULAR; INTRAVENOUS EVERY 6 HOURS PRN
Status: CANCELLED | OUTPATIENT
Start: 2024-02-14

## 2024-02-14 RX ORDER — DIPHENHYDRAMINE HCL 50 MG
50 CAPSULE ORAL ONCE
Status: COMPLETED | OUTPATIENT
Start: 2024-02-14 | End: 2024-02-14

## 2024-02-14 RX ORDER — PALONOSETRON 0.05 MG/ML
0.25 INJECTION, SOLUTION INTRAVENOUS ONCE
Status: CANCELLED | OUTPATIENT
Start: 2024-02-14

## 2024-02-14 RX ORDER — EPINEPHRINE 0.3 MG/.3ML
0.3 INJECTION SUBCUTANEOUS EVERY 5 MIN PRN
Status: CANCELLED | OUTPATIENT
Start: 2024-02-14

## 2024-02-14 RX ORDER — ALBUTEROL SULFATE 0.83 MG/ML
3 SOLUTION RESPIRATORY (INHALATION) AS NEEDED
Status: DISCONTINUED | OUTPATIENT
Start: 2024-02-14 | End: 2024-02-14 | Stop reason: HOSPADM

## 2024-02-14 RX ORDER — PROCHLORPERAZINE MALEATE 10 MG
10 TABLET ORAL EVERY 6 HOURS PRN
Status: DISCONTINUED | OUTPATIENT
Start: 2024-02-14 | End: 2024-02-14 | Stop reason: HOSPADM

## 2024-02-14 RX ORDER — HEPARIN 100 UNIT/ML
500 SYRINGE INTRAVENOUS AS NEEDED
Status: CANCELLED | OUTPATIENT
Start: 2024-02-14

## 2024-02-14 RX ORDER — FAMOTIDINE 10 MG/ML
20 INJECTION INTRAVENOUS ONCE AS NEEDED
Status: DISCONTINUED | OUTPATIENT
Start: 2024-02-14 | End: 2024-02-14 | Stop reason: HOSPADM

## 2024-02-14 RX ORDER — ALBUTEROL SULFATE 0.83 MG/ML
3 SOLUTION RESPIRATORY (INHALATION) AS NEEDED
Status: CANCELLED | OUTPATIENT
Start: 2024-02-14

## 2024-02-14 RX ORDER — IPRATROPIUM BROMIDE 21 UG/1
2 SPRAY, METERED NASAL EVERY 12 HOURS
Qty: 90 ML | Refills: 3 | Status: SHIPPED | OUTPATIENT
Start: 2024-02-14 | End: 2024-03-18

## 2024-02-14 RX ORDER — FAMOTIDINE 10 MG/ML
20 INJECTION INTRAVENOUS ONCE
Status: COMPLETED | OUTPATIENT
Start: 2024-02-14 | End: 2024-02-14

## 2024-02-14 RX ADMIN — PALONOSETRON 250 MCG: 0.05 INJECTION, SOLUTION INTRAVENOUS at 13:34

## 2024-02-14 RX ADMIN — PACLITAXEL 81 MG: 6 INJECTION, SOLUTION INTRAVENOUS at 13:56

## 2024-02-14 RX ADMIN — HEPARIN 500 UNITS: 100 SYRINGE at 15:49

## 2024-02-14 RX ADMIN — DIPHENHYDRAMINE HYDROCHLORIDE 50 MG: 50 CAPSULE ORAL at 13:34

## 2024-02-14 RX ADMIN — FAMOTIDINE 20 MG: 10 INJECTION INTRAVENOUS at 13:34

## 2024-02-14 RX ADMIN — CARBOPLATIN 181 MG: 10 INJECTION, SOLUTION INTRAVENOUS at 15:01

## 2024-02-14 RX ADMIN — DEXAMETHASONE SODIUM PHOSPHATE 10 MG: 4 INJECTION, SOLUTION INTRA-ARTICULAR; INTRALESIONAL; INTRAMUSCULAR; INTRAVENOUS; SOFT TISSUE at 13:34

## 2024-02-14 ASSESSMENT — PAIN SCALES - GENERAL: PAINLEVEL: 2

## 2024-02-14 NOTE — PROGRESS NOTES
Amita Todd is a 78 y.o. female who presents for Chemotherapy.    She is on the following chemotherapy regimen:     Treatment Plans       Name Type Plan Dates Plan Provider         Active    PACLitaxel / CARBOplatin with Concurrent Radiation, Weekly - Head and Neck Oncology Treatment  1/10/2024 - Present Kyunghee Burkitt, DO                  .    Since her last visit, she has been doing well.  Overall, she states her energy level is stable.  Her appetite has been improved.  She reports neuropathies - mild numbness to both feet .  She has no other concerns.    Lines of note:  implanted port right chest intact.  Site Maintenance: Flushed and Positive blood return.  Line Removal: Yes, pulido needle flushed and removed per policy.    Patient tolerated infusions without any complications.  Discharged home with family in stable condition.

## 2024-02-14 NOTE — PROGRESS NOTES
NUTRITION Follow-up NOTE    Nutrition Assessment     Reason for Visit:  Amita Todd is a 78 y.o. 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 I  5-2008 had a lesion on her right lateral tongue removed- she did not receive further intervention since then she was being followed closely until December when a lesion was found and biopsied    MBSS scheduled on 1/15/2024  Recommendations:  Began #5 minced moist solids with thin liquids.  Chin tuck with every swallow.  Double swallows per bite and sip.  Occasional throat clear or cough through meal.  Small frequent meals throughout the day.  Meds crushed in purees.  Continue outpatient SLP services for treatment give post radiation effects of the swallow mechanism.   I am told that on Monday she got antonio good news about her tumor response -         Lab Results   Component Value Date/Time    GLUCOSE 97 02/14/2024 1113     02/14/2024 1113    K 4.1 02/14/2024 1113     02/14/2024 1113    CO2 26 02/14/2024 1113    ANIONGAP 9 (L) 02/14/2024 1113    BUN 22 02/14/2024 1113    CREATININE 0.69 02/14/2024 1113    EGFR 90 02/14/2024 1113    CALCIUM 8.4 (L) 02/14/2024 1113    ALBUMIN 3.0 (L) 02/14/2024 1113    ALKPHOS 45 02/14/2024 1113    PROT 5.4 (L) 02/14/2024 1113    AST 15 02/14/2024 1113    BILITOT 0.7 02/14/2024 1113    ALT 22 02/14/2024 1113     Lab Results   Component Value Date/Time    VITD25 83 03/29/2021 1227       Anthropometrics:       HT: 167.6 cm (5'6)  BMI: 24.7  IBW: 59.1  117.4% IBW    Wt Readings from Last 10 Encounters:   02/14/24 70.6 kg (155 lb 10.3 oz)   02/12/24 70.3 kg (154 lb 14.4  oz)   02/08/24 69.8 kg (153 lb 12.8 oz)   02/06/24 68 kg (150 lb)   02/07/24 69.2 kg (152 lb 8.9 oz)   02/06/24 69.2 kg (152 lb 9.6 oz)   02/05/24 68.9 kg (151 lb 14.4 oz)   01/31/24 69.1 kg (152 lb 5.4 oz)   01/30/24 69.4 kg (153 lb 1.6 oz)   01/29/24 68.6 kg (151 lb 4.8 oz)        Food And Nutrient Intake:        Still uses mucinex   No pain meds- just tylenol    2 to 3 Boost VHC per day intake continues  Also taking Protein powder- 1 scoop and water-   Eating better  --soft bagel with cream cheese- takes 1/2  ---jello with fruit (4 cups)cream of wheat with brown sugar) with baking soda and salt  PB milkshake  Egg bites- don't taste but eat   Mac and cheese  She is taking small amounts- not eating all of above everyday but po intake greatly improved.  Feel some of weight gain is due to fluid- but some is true weight gain due to improved po intake                                                                          Nutrition Focused Physical Exam Findings:                          Energy Needs  1800 calories  85 to 100 g protein   1800 ml of fluids per day       Nutrition Diagnosis        Nutrition Diagnosis  Patient has Nutrition Diagnosis: Yes  Diagnosis Status (1): Ongoing  Nutrition Diagnosis 1: Swallowing difficulity  Related to (1): diagnosis  As Evidenced by (1): patietn reports and results of the  MBSS- however this appears to be improving based on tumor size decreasing    Nutrition Interventions/Recommendations   Nutrition Prescription   Very impressed with improvement of po intake and continuation of supplements     Food and Nutrition Delivery   Continue with Diet and supplements   Monitor impact of steroid on swallow function and appetite - feeding tube remains on hold       Nutrition Education       Coordination of Care   Med onc   Rad onc     There are no Patient Instructions on file for this visit.    Nutrition Monitoring and Evaluation      Will continue to follow up with patient weekly during  her concurrent treatment         Time Spent  Prep time on day of patient encounter: 10 minutes  Time spent directly with patient, family or caregiver: 20 minutes  Additional Time Spent on Patient Care Activities: 0 minutes  Documentation Time: 10 minutes  Other Time Spent: 0 minutes  Total: 40 minutes                                              Time Spent  Prep time on day of patient encounter: 10 minutes  Time spent directly with patient, family or caregiver: 20 minutes  Additional Time Spent on Patient Care Activities: 0 minutes  Documentation Time: 10 minutes  Other Time Spent: 0 minutes  Total: 40 minutes

## 2024-02-15 ENCOUNTER — TELEPHONE (OUTPATIENT)
Dept: CARDIOLOGY | Facility: CLINIC | Age: 79
End: 2024-02-15
Payer: MEDICARE

## 2024-02-15 ENCOUNTER — HOSPITAL ENCOUNTER (OUTPATIENT)
Dept: RADIATION ONCOLOGY | Facility: HOSPITAL | Age: 79
Setting detail: RADIATION/ONCOLOGY SERIES
Discharge: HOME | End: 2024-02-15
Payer: MEDICARE

## 2024-02-15 VITALS
DIASTOLIC BLOOD PRESSURE: 73 MMHG | WEIGHT: 153.9 LBS | SYSTOLIC BLOOD PRESSURE: 108 MMHG | RESPIRATION RATE: 18 BRPM | BODY MASS INDEX: 25.61 KG/M2 | OXYGEN SATURATION: 98 % | HEART RATE: 82 BPM | TEMPERATURE: 96.8 F

## 2024-02-15 DIAGNOSIS — C13.8 MALIGNANT NEOPLASM OF OVERLAPPING SITES OF HYPOPHARYNX (MULTI): ICD-10-CM

## 2024-02-15 DIAGNOSIS — Z51.0 ENCOUNTER FOR ANTINEOPLASTIC RADIATION THERAPY: ICD-10-CM

## 2024-02-15 LAB
RAD ONC MSQ ACTUAL FRACTIONS DELIVERED: 17
RAD ONC MSQ ACTUAL SESSION DELIVERED DOSE: 200 CGRAY
RAD ONC MSQ ACTUAL TOTAL DOSE: 3400 CGRAY
RAD ONC MSQ ELAPSED DAYS: 22
RAD ONC MSQ LAST DATE: NORMAL
RAD ONC MSQ PRESCRIBED FRACTIONAL DOSE: 200 CGRAY
RAD ONC MSQ PRESCRIBED NUMBER OF FRACTIONS: 35
RAD ONC MSQ PRESCRIBED TECHNIQUE: NORMAL
RAD ONC MSQ PRESCRIBED TOTAL DOSE: 7000 CGRAY
RAD ONC MSQ PRESCRIPTION PATTERN COMMENT: NORMAL
RAD ONC MSQ START DATE: NORMAL
RAD ONC MSQ TREATMENT COURSE NUMBER: 1
RAD ONC MSQ TREATMENT SITE: NORMAL

## 2024-02-15 PROCEDURE — 77386 HC INTENSITY-MODULATED RADIATION THERAPY (IMRT), COMPLEX: CPT | Performed by: RADIOLOGY

## 2024-02-15 PROCEDURE — 77014 CHG CT GUIDANCE RADIATION THERAPY FLDS PLACEMENT: CPT | Performed by: RADIOLOGY

## 2024-02-15 ASSESSMENT — ENCOUNTER SYMPTOMS
EYE PROBLEMS: 1
FATIGUE: 1

## 2024-02-15 NOTE — TELEPHONE ENCOUNTER
Spoke with the patient.  She is receiving carboplatin and Taxol in addition to radiation therapy.  She does have furosemide, can take on an as-needed basis.  Reviewed recent CBC and comprehensive panel.  The furosemide can be daily, if necessary.

## 2024-02-15 NOTE — TELEPHONE ENCOUNTER
----- Message from Alba Turpin RN sent at 2/14/2024  3:35 PM EST -----  Regarding: FW: Update  Contact: [phone]  Looks like they have been 90s-low 100s  ----- Message -----  From: Amita Todd  Sent: 2/14/2024   3:31 PM EST  To: [facility] [id number] Clinical Support Staff  Subject: Update                                           Hi Dr Nova,  My oncology team wanted me to reach out to you to make sure my meds are properly adjusted. My blood pressure has been on the low side so they just asked that you take a look at my recent readings. Also my ankles began swelling yesterday and have worsened a little today, so I took a lasix. It’s the first time I’ve needed it since the start of treatment. I’ll keep taking it until my ankles are back to normal unless otherwise advised by you.  Thanks.

## 2024-02-15 NOTE — PROGRESS NOTES
Provider Impressions     No evidence of any recurrence of the patient's previously treated anterior tongue cancer.      Recent diagnosis of a hypopharyngeal cancer for which the patient is presently getting a combination of chemotherapy and radiation.  There has been a significant response in the primary lesion is almost completely gone.    I will see her in 2 months.      Chief Complaint     Follow-up status post diagnosis of a hypopharyngeal cancer     History of Present Illness    This patient has been followed for quite some time. She had a T1 N0 lesion of her right lateral tongue which was removed back in May of 2008. She did not receive any further treatments. Her last chest x-ray was in October 2022. This was normal. She also has gastroesophageal reflux disease for which she is now on omeprazole 20 mg daily. She was also having some issues with her sense of smell and taste. I did put her on Flonase. She said that it helped with the congestion but did not change her sense of smell and taste. She rarely uses the spray. In March 2019 I ordered a swallow evaluation. This was normal.  She was seen in December and was found to have a lesion in her hypopharynx.  On December 21, 2023 she was diagnosed with a squamous cell carcinoma of the hypopharynx.  She had a CT scan of her neck which was done on December 12, 2023 which showed the hypopharyngeal lesion as well as at least 1 suspicious node in the left level 2.  She is now approximately detention into her treatment of combination chemo and radiation.  She has noticed that she can swallow better.  She seems to be tolerating the treatments well.      Physical Exam    Examination of the oral cavity and oropharynx shows mucositis posteriorly.  Palpation of the neck shows a persistent palpable adenopathy in the left level 2-3.  This cannot measure more than a centimeter or so.    A flexible laryngoscopy was carried out. Under topical Xylocaine and Fran-Synephrine the scope  was introduced through the nostril. The nasopharynx, base of tongue, hypopharynx, and larynx are visualized.  The vocal cords are normally mobile. There is no pooling of secretions in the piriform sinuses.  The hypopharyngeal mass has almost completely disappeared.  There is some asymmetry of the postcricoid area.

## 2024-02-16 ENCOUNTER — OFFICE VISIT (OUTPATIENT)
Dept: OTOLARYNGOLOGY | Facility: HOSPITAL | Age: 79
End: 2024-02-16
Payer: MEDICARE

## 2024-02-16 VITALS — WEIGHT: 153.1 LBS | BODY MASS INDEX: 25.48 KG/M2

## 2024-02-16 DIAGNOSIS — C13.9 MALIGNANT NEOPLASM OF HYPOPHARYNX (MULTI): Primary | ICD-10-CM

## 2024-02-16 DIAGNOSIS — C02.3 CANCER OF ANTERIOR TWO-THIRDS OF TONGUE (MULTI): ICD-10-CM

## 2024-02-16 PROCEDURE — 99213 OFFICE O/P EST LOW 20 MIN: CPT | Performed by: OTOLARYNGOLOGY

## 2024-02-16 PROCEDURE — 1036F TOBACCO NON-USER: CPT | Performed by: OTOLARYNGOLOGY

## 2024-02-16 PROCEDURE — 31575 DIAGNOSTIC LARYNGOSCOPY: CPT | Performed by: OTOLARYNGOLOGY

## 2024-02-16 PROCEDURE — 1160F RVW MEDS BY RX/DR IN RCRD: CPT | Performed by: OTOLARYNGOLOGY

## 2024-02-16 PROCEDURE — 1125F AMNT PAIN NOTED PAIN PRSNT: CPT | Performed by: OTOLARYNGOLOGY

## 2024-02-16 PROCEDURE — 1159F MED LIST DOCD IN RCRD: CPT | Performed by: OTOLARYNGOLOGY

## 2024-02-16 PROCEDURE — 1157F ADVNC CARE PLAN IN RCRD: CPT | Performed by: OTOLARYNGOLOGY

## 2024-02-19 ENCOUNTER — HOSPITAL ENCOUNTER (OUTPATIENT)
Dept: RADIATION ONCOLOGY | Facility: HOSPITAL | Age: 79
Setting detail: RADIATION/ONCOLOGY SERIES
Discharge: HOME | End: 2024-02-19
Payer: MEDICARE

## 2024-02-19 DIAGNOSIS — Z51.0 ENCOUNTER FOR ANTINEOPLASTIC RADIATION THERAPY: ICD-10-CM

## 2024-02-19 DIAGNOSIS — C13.8 MALIGNANT NEOPLASM OF OVERLAPPING SITES OF HYPOPHARYNX (MULTI): ICD-10-CM

## 2024-02-19 LAB
RAD ONC MSQ ACTUAL FRACTIONS DELIVERED: 19
RAD ONC MSQ ACTUAL SESSION DELIVERED DOSE: 200 CGRAY
RAD ONC MSQ ACTUAL TOTAL DOSE: 3800 CGRAY
RAD ONC MSQ ELAPSED DAYS: 26
RAD ONC MSQ LAST DATE: NORMAL
RAD ONC MSQ PRESCRIBED FRACTIONAL DOSE: 200 CGRAY
RAD ONC MSQ PRESCRIBED NUMBER OF FRACTIONS: 35
RAD ONC MSQ PRESCRIBED TECHNIQUE: NORMAL
RAD ONC MSQ PRESCRIBED TOTAL DOSE: 7000 CGRAY
RAD ONC MSQ PRESCRIPTION PATTERN COMMENT: NORMAL
RAD ONC MSQ START DATE: NORMAL
RAD ONC MSQ TREATMENT COURSE NUMBER: 1
RAD ONC MSQ TREATMENT SITE: NORMAL

## 2024-02-19 PROCEDURE — 77386 HC INTENSITY-MODULATED RADIATION THERAPY (IMRT), COMPLEX: CPT | Performed by: RADIOLOGY

## 2024-02-19 PROCEDURE — 77014 CHG CT GUIDANCE RADIATION THERAPY FLDS PLACEMENT: CPT | Performed by: RADIOLOGY

## 2024-02-20 ENCOUNTER — HOSPITAL ENCOUNTER (OUTPATIENT)
Dept: RADIATION ONCOLOGY | Facility: HOSPITAL | Age: 79
Setting detail: RADIATION/ONCOLOGY SERIES
Discharge: HOME | End: 2024-02-20
Payer: MEDICARE

## 2024-02-20 ENCOUNTER — APPOINTMENT (OUTPATIENT)
Dept: PRIMARY CARE | Facility: CLINIC | Age: 79
End: 2024-02-20
Payer: MEDICARE

## 2024-02-20 ENCOUNTER — RADIATION ONCOLOGY OTV (OUTPATIENT)
Dept: RADIATION ONCOLOGY | Facility: HOSPITAL | Age: 79
End: 2024-02-20

## 2024-02-20 VITALS
RESPIRATION RATE: 18 BRPM | WEIGHT: 149.25 LBS | DIASTOLIC BLOOD PRESSURE: 58 MMHG | TEMPERATURE: 96.6 F | BODY MASS INDEX: 24.84 KG/M2 | OXYGEN SATURATION: 93 % | SYSTOLIC BLOOD PRESSURE: 87 MMHG | HEART RATE: 70 BPM

## 2024-02-20 DIAGNOSIS — Z51.0 ENCOUNTER FOR ANTINEOPLASTIC RADIATION THERAPY: ICD-10-CM

## 2024-02-20 DIAGNOSIS — C13.8 MALIGNANT NEOPLASM OF OVERLAPPING SITES OF HYPOPHARYNX (MULTI): ICD-10-CM

## 2024-02-20 LAB
RAD ONC MSQ ACTUAL FRACTIONS DELIVERED: 20
RAD ONC MSQ ACTUAL SESSION DELIVERED DOSE: 200 CGRAY
RAD ONC MSQ ACTUAL TOTAL DOSE: 4000 CGRAY
RAD ONC MSQ ELAPSED DAYS: 27
RAD ONC MSQ LAST DATE: NORMAL
RAD ONC MSQ PRESCRIBED FRACTIONAL DOSE: 200 CGRAY
RAD ONC MSQ PRESCRIBED NUMBER OF FRACTIONS: 35
RAD ONC MSQ PRESCRIBED TECHNIQUE: NORMAL
RAD ONC MSQ PRESCRIBED TOTAL DOSE: 7000 CGRAY
RAD ONC MSQ PRESCRIPTION PATTERN COMMENT: NORMAL
RAD ONC MSQ START DATE: NORMAL
RAD ONC MSQ TREATMENT COURSE NUMBER: 1
RAD ONC MSQ TREATMENT SITE: NORMAL

## 2024-02-20 PROCEDURE — 77427 RADIATION TX MANAGEMENT X5: CPT | Performed by: RADIOLOGY

## 2024-02-20 PROCEDURE — 77014 CHG CT GUIDANCE RADIATION THERAPY FLDS PLACEMENT: CPT | Performed by: RADIOLOGY

## 2024-02-20 PROCEDURE — 77386 HC INTENSITY-MODULATED RADIATION THERAPY (IMRT), COMPLEX: CPT | Performed by: RADIOLOGY

## 2024-02-20 RX ORDER — LIDOCAINE HYDROCHLORIDE 20 MG/ML
JELLY TOPICAL
Status: DISPENSED
Start: 2024-02-20 | End: 2024-02-20

## 2024-02-20 ASSESSMENT — PAIN SCALES - GENERAL: PAINLEVEL: 5

## 2024-02-20 NOTE — PROGRESS NOTES
Radiation Oncology On Treatment Visit    Patient Name:  Amita Todd  MRN:  [id number]  :  1945    Referring Provider: No ref. provider found  Primary Care Provider: Caitie Leal MD  Care Team: Patient Care Team:  Caitie Leal MD as PCP - General  Caitie Leal MD as PCP - [facility] Attributed Provider  Kyunghee Burkitt, DO as Medical Oncologist (Hematology and Oncology)  Clemente Tobin MD as Surgeon (Otolaryngology)  Tahira Cochran MD as Radiation Oncologist (Radiation Oncology)  Hailey Abbott PA-C as Physician Assistant (Hematology and Oncology)    Date of Service: 2024     Diagnosis:   Specialty Problems          Radiation Oncology Problems    Cancer of anterior two-thirds of tongue (CMS/HCC)        Malignant neoplasm of hypopharynx (CMS/HCC)         Treatment Summary:  Radiation Treatments       Active   hypophar + neck (Started on 2024)   Most recent fraction: 200 cGy given on 2024   Total given: 4,000 cGy / 7,000 cGy  (20 of 35 fractions)   Elapsed Days: 27   Technique: VMAT           Completed   No historical radiation treatments to show.             SUBJECTIVE: Patient is doing well.  Continues to use at least 2-3 very high-calorie shakes daily.      Also drinking Gatorade.  No trouble breathing or swelling that she reports.  2-3/10 pain with swallowing.  She does not have nausea.      She reports some new blurriness in her vision that started after treatment began.  She has some stomach discomfort occurs when she lies down to sleep at night, but this is not severe and is intermittent.      No heartburn.  She started taking dexamethasone 4 mg daily and reports that her appetite is improved, as is her energy level.  She is currently able to swallow soft foods as well as drink liquids without  trouble.      She reports new swelling in her ankles and started using Lasix daily for the last week due to this. She has new dizziness.     OBJECTIVE:   Vital Signs:  BP 87/58    Pulse 70   Temp 35.9 °C (96.6 °F)   Resp 18   Wt 67.7 kg (149 lb 4 oz)   SpO2 93%   BMI 24.84 kg/m²    Pain Scale: The patient's current pain level was assessed.  They report currently having a pain of 5 out of 10.    Other Pertinent Findings:   NPL was performed and revealed significant tumor shrinkage with mass no longer seen in the hypopharynx. There is some persistent thickening of the mid pyriform sinus / posterior AE fold region.   No thrush and minimal mucositis.      Toxicity Assessment          1/26/2024    10:50 2/5/2024    12:01 2/12/2024    11:40 2/20/2024    11:33   Toxicity Assessment   Adverse Events Reviewed (WDL) No (Exceptions to WDL) No (Exceptions to WDL) No (Exceptions to WDL) No (Exceptions to WDL)   Treatment  and neck Head and neck Head and neck Head and neck   Anorexia Grade 0       First weight during treatment Grade 0       steroids are helping, increase in weight by .5lbs since last week Grade 0       gained 3 lbs since last week Grade 1       loss of 5.7lbs since last week, scratchy throat   Dehydration Grade 0       swallowing ok Grade 0 Grade 0 Grade 1       always drinking water/gatorade. Taking the lasix for fluid retention   Dermatitis Radiation Grade 0       creams provided Grade 0       creams at home Grade 0 Grade 1       slight redness, creams at home   Diarrhea  Grade 2       having some episodes, discussed OTC meds Grade 0       has cleared up since last week    Fatigue Grade 0 Grade 1       starting to feel tired, Rest and activity balance Grade 1       going to be earlier, Rest and activity balance Grade 2       tired every day, naps and going to bed early. Rest and activity balance   Nausea Grade 0 Grade 0 Grade 1       sometimes, comes and goes, meds at home just in case Grade 0       meds at home   Pain Grade 0 Grade 0  Grade 1       Tylenol and  Oxy at home   Tumor Pain Grade 0      Vomiting Grade 0 Grade 0 Grade 0 Grade 0   Dysphagia Grade 0       passed  SLP appointment Grade 1       better with steriods Grade 0 Grade 0       scratchy throat starting   Mucositis Oral Grade 0 Grade 0       GLutamine at home Grade 1       increase in redness, Glutamine at home Grade 1       Couple visible sores, redness. Glutamine at home   Blurred Vision  Grade 1       both eyes, been consistent making it hard to read Grade 2       worse from last week Grade 2       Worse than last week, hard to read stuff   Dry Mouth Grade 1       talked about OTC meds Grade 1       OTC meds at home Grade 1       sipping to help, mouth washes. Grade 1       Biotine, lozenges at home that help   Oral Pain Grade 0 Grade 0 Grade 1       Burning with food, pain meds new script Grade 0   Aspiration Grade 0 Grade 0 Grade 0 Grade 0   Hoarseness Grade 0 Grade 0 Grade 0 Grade 0   Voice Alteration Grade 0 Grade 0 Grade 0 Grade 0        Assessment / Plan:  The patient is tolerating radiation therapy as anticipated.  Continue per current treatment plan.       Cancer Treatment: patient is doing well undergoing carbo/taxol with RT.   I reviewed the daily CBCT alignment imaging for RT with the patient and her family and performed a NPL exam today. She has had a tremendous response to CRT with near radiologic and endoscopic resolution of tumor in her posterior pharyngeal wall. I am thrilled to see this -- early response to CRT predicts better cure rates with CRT.      FEN: patient's weight is stable. Using liquid supplements. Aim for >2000 calories and >60grams of protein daily. Will see a dietician for more specific recommendations.  All diet is oral and no feeding tube.  Met with SLP prior to starting RT and doing daily exercises to maintain swallowing function.  Currently drinking 3-4 VHC daily and weight is stable     Fluid balance in setting of CHF: Patient resumed Lasix last week for LE edema. On exam today, she does not have notable LE edema, but does have hypotension. I told her to hold Lasix and drink  Gatorade 20-30 oz daily. She does not wish to have IVF today, but will be eval for IVF tomorrow with chemo. She will hold off on Lasix until further direction from Dr. Nova.     Nausea: Reviewed use of  Compazine/Zofran PRN for breakthrough nausea     Pain control: Patient reports psin 2-3/10. Controlled with Tylenol and intermittent Oxy.      Oral Care: Patient will continue fluoride trays per dentist. Patient is using Glutamine 10g TID on days of RT to promote ongoing healing of mucositis. Rec uaifenesin for thick saliva or Xylimelt for dry mouth     Skin Care: Continue Aquaphor within the RT field. Reviewed how to apply and to avoid within 3 hours prior to RT.      Diarrhea/Constipation: Reviewed use of Immodium for diarrhea. Reviewed use of Miralax or Senna if develops constipation.

## 2024-02-21 ENCOUNTER — OFFICE VISIT (OUTPATIENT)
Dept: HEMATOLOGY/ONCOLOGY | Facility: HOSPITAL | Age: 79
DRG: 314 | End: 2024-02-21
Payer: MEDICARE

## 2024-02-21 ENCOUNTER — HOSPITAL ENCOUNTER (OUTPATIENT)
Dept: RADIATION ONCOLOGY | Facility: HOSPITAL | Age: 79
Setting detail: RADIATION/ONCOLOGY SERIES
Discharge: HOME | End: 2024-02-21
Payer: MEDICARE

## 2024-02-21 VITALS
OXYGEN SATURATION: 99 % | TEMPERATURE: 97 F | RESPIRATION RATE: 17 BRPM | SYSTOLIC BLOOD PRESSURE: 94 MMHG | WEIGHT: 148.1 LBS | DIASTOLIC BLOOD PRESSURE: 60 MMHG | BODY MASS INDEX: 24.65 KG/M2 | HEART RATE: 76 BPM

## 2024-02-21 DIAGNOSIS — C02.3 CANCER OF ANTERIOR TWO-THIRDS OF TONGUE (MULTI): ICD-10-CM

## 2024-02-21 DIAGNOSIS — C13.9 MALIGNANT NEOPLASM OF HYPOPHARYNX (MULTI): ICD-10-CM

## 2024-02-21 DIAGNOSIS — C13.8 MALIGNANT NEOPLASM OF OVERLAPPING SITES OF HYPOPHARYNX (MULTI): ICD-10-CM

## 2024-02-21 DIAGNOSIS — Z51.0 ENCOUNTER FOR ANTINEOPLASTIC RADIATION THERAPY: ICD-10-CM

## 2024-02-21 DIAGNOSIS — I50.20 HFREF (HEART FAILURE WITH REDUCED EJECTION FRACTION) (MULTI): Primary | ICD-10-CM

## 2024-02-21 LAB
ALBUMIN SERPL BCP-MCNC: 3.1 G/DL (ref 3.4–5)
ALP SERPL-CCNC: 46 U/L (ref 33–136)
ALT SERPL W P-5'-P-CCNC: 20 U/L (ref 7–45)
ANION GAP SERPL CALC-SCNC: 10 MMOL/L (ref 10–20)
AST SERPL W P-5'-P-CCNC: 15 U/L (ref 9–39)
BASOPHILS # BLD AUTO: 0 X10*3/UL (ref 0–0.1)
BASOPHILS NFR BLD AUTO: 0 %
BILIRUB SERPL-MCNC: 1.3 MG/DL (ref 0–1.2)
BUN SERPL-MCNC: 25 MG/DL (ref 6–23)
CALCIUM SERPL-MCNC: 8.6 MG/DL (ref 8.6–10.3)
CHLORIDE SERPL-SCNC: 100 MMOL/L (ref 98–107)
CO2 SERPL-SCNC: 30 MMOL/L (ref 21–32)
CREAT SERPL-MCNC: 0.74 MG/DL (ref 0.5–1.05)
EGFRCR SERPLBLD CKD-EPI 2021: 83 ML/MIN/1.73M*2
EOSINOPHIL # BLD AUTO: 0.01 X10*3/UL (ref 0–0.4)
EOSINOPHIL NFR BLD AUTO: 2.4 %
ERYTHROCYTE [DISTWIDTH] IN BLOOD BY AUTOMATED COUNT: 15.7 % (ref 11.5–14.5)
GLUCOSE SERPL-MCNC: 110 MG/DL (ref 74–99)
HCT VFR BLD AUTO: 34.5 % (ref 36–46)
HGB BLD-MCNC: 11.2 G/DL (ref 12–16)
IMM GRANULOCYTES # BLD AUTO: 0 X10*3/UL (ref 0–0.5)
IMM GRANULOCYTES NFR BLD AUTO: 0 % (ref 0–0.9)
LYMPHOCYTES # BLD AUTO: 0.08 X10*3/UL (ref 0.8–3)
LYMPHOCYTES NFR BLD AUTO: 19 %
MAGNESIUM SERPL-MCNC: 1.93 MG/DL (ref 1.6–2.4)
MCH RBC QN AUTO: 30.8 PG (ref 26–34)
MCHC RBC AUTO-ENTMCNC: 32.5 G/DL (ref 32–36)
MCV RBC AUTO: 95 FL (ref 80–100)
MONOCYTES # BLD AUTO: 0.01 X10*3/UL (ref 0.05–0.8)
MONOCYTES NFR BLD AUTO: 2.4 %
NEUTROPHILS # BLD AUTO: 0.32 X10*3/UL (ref 1.6–5.5)
NEUTROPHILS NFR BLD AUTO: 76.2 %
NRBC BLD-RTO: 0 /100 WBCS (ref 0–0)
PLATELET # BLD AUTO: 81 X10*3/UL (ref 150–450)
POTASSIUM SERPL-SCNC: 4.2 MMOL/L (ref 3.5–5.3)
PROT SERPL-MCNC: 5.9 G/DL (ref 6.4–8.2)
RAD ONC MSQ ACTUAL FRACTIONS DELIVERED: 21
RAD ONC MSQ ACTUAL SESSION DELIVERED DOSE: 200 CGRAY
RAD ONC MSQ ACTUAL TOTAL DOSE: 4200 CGRAY
RAD ONC MSQ ELAPSED DAYS: 28
RAD ONC MSQ LAST DATE: NORMAL
RAD ONC MSQ PRESCRIBED FRACTIONAL DOSE: 200 CGRAY
RAD ONC MSQ PRESCRIBED NUMBER OF FRACTIONS: 35
RAD ONC MSQ PRESCRIBED TECHNIQUE: NORMAL
RAD ONC MSQ PRESCRIBED TOTAL DOSE: 7000 CGRAY
RAD ONC MSQ PRESCRIPTION PATTERN COMMENT: NORMAL
RAD ONC MSQ START DATE: NORMAL
RAD ONC MSQ TREATMENT COURSE NUMBER: 1
RAD ONC MSQ TREATMENT SITE: NORMAL
RBC # BLD AUTO: 3.64 X10*6/UL (ref 4–5.2)
SODIUM SERPL-SCNC: 136 MMOL/L (ref 136–145)
WBC # BLD AUTO: 0.4 X10*3/UL (ref 4.4–11.3)

## 2024-02-21 PROCEDURE — 2500000004 HC RX 250 GENERAL PHARMACY W/ HCPCS (ALT 636 FOR OP/ED): Performed by: INTERNAL MEDICINE

## 2024-02-21 PROCEDURE — 2500000004 HC RX 250 GENERAL PHARMACY W/ HCPCS (ALT 636 FOR OP/ED): Performed by: STUDENT IN AN ORGANIZED HEALTH CARE EDUCATION/TRAINING PROGRAM

## 2024-02-21 PROCEDURE — 77386 HC INTENSITY-MODULATED RADIATION THERAPY (IMRT), COMPLEX: CPT | Performed by: RADIOLOGY

## 2024-02-21 PROCEDURE — 36591 DRAW BLOOD OFF VENOUS DEVICE: CPT

## 2024-02-21 PROCEDURE — 1036F TOBACCO NON-USER: CPT | Performed by: INTERNAL MEDICINE

## 2024-02-21 PROCEDURE — 99215 OFFICE O/P EST HI 40 MIN: CPT | Performed by: INTERNAL MEDICINE

## 2024-02-21 PROCEDURE — 85025 COMPLETE CBC W/AUTO DIFF WBC: CPT

## 2024-02-21 PROCEDURE — 1160F RVW MEDS BY RX/DR IN RCRD: CPT | Performed by: INTERNAL MEDICINE

## 2024-02-21 PROCEDURE — 1126F AMNT PAIN NOTED NONE PRSNT: CPT | Performed by: INTERNAL MEDICINE

## 2024-02-21 PROCEDURE — 77014 CHG CT GUIDANCE RADIATION THERAPY FLDS PLACEMENT: CPT | Performed by: RADIOLOGY

## 2024-02-21 PROCEDURE — 83735 ASSAY OF MAGNESIUM: CPT

## 2024-02-21 PROCEDURE — 3078F DIAST BP <80 MM HG: CPT | Performed by: INTERNAL MEDICINE

## 2024-02-21 PROCEDURE — 80053 COMPREHEN METABOLIC PANEL: CPT

## 2024-02-21 PROCEDURE — 1157F ADVNC CARE PLAN IN RCRD: CPT | Performed by: INTERNAL MEDICINE

## 2024-02-21 PROCEDURE — 3074F SYST BP LT 130 MM HG: CPT | Performed by: INTERNAL MEDICINE

## 2024-02-21 PROCEDURE — 96360 HYDRATION IV INFUSION INIT: CPT | Mod: INF

## 2024-02-21 PROCEDURE — 77336 RADIATION PHYSICS CONSULT: CPT | Performed by: RADIOLOGY

## 2024-02-21 PROCEDURE — 1159F MED LIST DOCD IN RCRD: CPT | Performed by: INTERNAL MEDICINE

## 2024-02-21 RX ORDER — HEPARIN SODIUM,PORCINE/PF 10 UNIT/ML
50 SYRINGE (ML) INTRAVENOUS AS NEEDED
Status: CANCELLED | OUTPATIENT
Start: 2024-02-21

## 2024-02-21 RX ORDER — HEPARIN 100 UNIT/ML
500 SYRINGE INTRAVENOUS AS NEEDED
Status: CANCELLED | OUTPATIENT
Start: 2024-02-21

## 2024-02-21 RX ORDER — HEPARIN 100 UNIT/ML
500 SYRINGE INTRAVENOUS AS NEEDED
Status: DISCONTINUED | OUTPATIENT
Start: 2024-02-21 | End: 2024-02-21 | Stop reason: HOSPADM

## 2024-02-21 RX ADMIN — HEPARIN 500 UNITS: 100 SYRINGE at 14:15

## 2024-02-21 RX ADMIN — SODIUM CHLORIDE 1000 ML: 9 INJECTION, SOLUTION INTRAVENOUS at 12:45

## 2024-02-21 ASSESSMENT — ENCOUNTER SYMPTOMS
CHILLS: 0
SHORTNESS OF BREATH: 0
COUGH: 0
ARTHRALGIAS: 0
NUMBNESS: 0
VOICE CHANGE: 1
DIFFICULTY URINATING: 0
EYE PROBLEMS: 1
TROUBLE SWALLOWING: 1
SORE THROAT: 0
CONSTIPATION: 0
FEVER: 0
FATIGUE: 1
VOMITING: 0
DIARRHEA: 0
UNEXPECTED WEIGHT CHANGE: 0
HEADACHES: 0
DEPRESSION: 0
NAUSEA: 0
ABDOMINAL PAIN: 0
LEG SWELLING: 0
MYALGIAS: 0
CONFUSION: 0
BLOOD IN STOOL: 0

## 2024-02-21 ASSESSMENT — PAIN SCALES - GENERAL: PAINLEVEL: 0-NO PAIN

## 2024-02-21 NOTE — PROGRESS NOTES
Patient ID: Amita Todd is a 78 y.o. female.  Diagnosis: Squamous Cell Carcinoma of Hypopharynx  Staging: [id number], stage 3  Date of Diagnosis: 12/21/23    Providers:  ENT Surgeon: Dr. Clemente Tobin  MedOnc: Dr. Kyunghee Burkitt / ZINA Hinojosa  RadOnc: Dr. Tahira Cochran    Current Therapy  1/24/24: Started concurrent chemoRT with weekly Carboplatin + Paclitaxel, planned for 7 cycles and 70 gy RT in 35fx.     Surgery  5/2008: s/p excision of right lateral tongue lesion. No adjuvant therapy    Sites of Disease  Hypopharynx, LN (Left level II)     Current Oncologic Issues  Dysphagia    ONCOLOGIC HISTORY  - 3/2001: S/p lumpectomy for left Breast Cancer followed by adjuvant RT and adjuvant tamoxifen  - 5/2008: History of Stage 1, T1N0M0 Oral Cavity cancer. Lesion of right lateral tongue removed, no adjuvant therapy.  - 12/6/23: Presented to Dr. Tboin with increased dysphagia over several months. Fiberoptic laryngoscopy reviewed lesion involving left piriform sinus, >1cm.  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 FDG- avid b/l cervical leverl II nodes. NO distant mets.   - 1/24/24: Started concurrent chemoRT with weekly Carboplatin + Paclitaxel    Admission  1/18 - 1/19/24: Admitted due to exertional dyspnea and chest pressure. CTA was negative for PE. CXR negative pneumonia.  (160 last year).    Past Medical History:   Past Medical History:  06/16/2016: Alcohol dependence, in remission (CMS/HCC)      Comment:  History of alcoholism  No date: Anemia  No date: Aneurysm of ascending aorta (CMS/HCC)  No date:  Arrhythmia      Comment:  a-fib. a-flutter  08/24/2023: Branch retinal artery occlusion of right eye  No date: CKD (chronic kidney disease)  No date: Coronary artery disease  No date: Depression  No date: GERD (gastroesophageal reflux disease)  No date: Hypertension  No date: Intermittent claudication (CMS/[facility]ly [facility])  10/06/2023: Myocardial infarction (CMS/Formerly [facility])  11/21/2017: Other bursitis of hip, unspecified hip      Comment:  Hip bursitis  01/05/2014: Other conditions influencing health status      Comment:  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  01/25/2022: Pain in leg, unspecified      Comment:  Leg pain  07/01/2020: Pain in right shoulder      Comment:  Bilateral shoulder pain  03/31/2021: Pain in unspecified knee      Comment:  Joint pain, knee  02/09/2019: Personal history of diseases of the blood and blood-  forming organs and certain disorders involving the immune mechanism      Comment:  History of anemia  04/29/2014: Personal history of malignant neoplasm of breast      Comment:  History of malignant neoplasm of female breast  04/03/2014: Personal history of other diseases of the circulatory   system      Comment:  Personal history of subarachnoid hemorrhage  03/08/2021: Personal history of other diseases of the digestive system      Comment:  History of upper gastrointestinal hemorrhage  03/08/2021: Personal history of other diseases of the digestive system      Comment:  History of melena  08/12/2020: Personal history of other diseases of the nervous system   and sense organs      Comment:  History of Bell's palsy  06/14/2019: Personal history of other infectious and parasitic   diseases      Comment:  History of herpes zoster  03/03/2020: Personal history of other specified conditions      Comment:  History of lump of left breast  02/22/2021: Personal history of other specified conditions      Comment:  History of shortness of breath  04/24/2022: Personal history of transient ischemic attack  (TIA), and   cerebral infarction without residual deficits      Comment:  History of transient ischemic attack  No date: Stenosis of carotid artery      Comment:  right  04/24/2017: Trigger finger, unspecified finger      Comment:  Acquired trigger finger   Surgical History:    Past Surgical History:   Procedure Laterality Date    CT ANGIO NECK  06/06/2019    CT NECK ANGIO W AND WO IV CONTRAST 6/6/2019 [facility] CLINICAL LEGACY    CT ANGIO NECK  01/04/2021    CT NECK ANGIO W AND WO IV CONTRAST 1/4/2021 [facility] ANCILLARY LEGACY    CT HEAD ANGIO W AND WO IV CONTRAST  06/06/2019    CT HEAD ANGIO W AND WO IV CONTRAST 6/6/2019 [facility] CLINICAL LEGACY    CT HEAD ANGIO W AND WO IV CONTRAST  01/04/2021    CT HEAD ANGIO W AND WO IV CONTRAST 1/4/2021 [facility]    MR HEAD ANGIO WO IV CONTRAST  06/06/2019    MR HEAD ANGIO WO IV CONTRAST 6/6/2019 [facility] CLINICAL LEGACY    MR NECK ANGIO WO IV CONTRAST  06/06/2019    MR NECK ANGIO WO IV CONTRAST 6/6/2019 [facility] CLINICAL LEGACY    OTHER SURGICAL HISTORY  03/03/2020    Oophorectomy    OTHER SURGICAL HISTORY  06/17/2019    Miscarriage surgical treatment    OTHER SURGICAL HISTORY  08/29/2019    multiple Catheter Ablation Atrial Flutter    OTHER SURGICAL HISTORY  04/23/2014    Previous Stent Placement    SEPTOPLASTY  09/22/2017    Septoplasty      Family History:    Family History   Problem Relation Name Age of Onset    Breast cancer Mother      Other (GOODPASTURE'S DISEASE) Father      BRCA2 Positive Sister      Lung cancer Sister      Neuroblastoma Sister      BRCA2 Positive Daughter      Breast cancer Other G/P      Family Oncology History:    Cancer-related family history includes Breast cancer in her mother and another family member; Lung cancer in her sister. She reports a strong family history of cancer.   Social History:    Social History     Tobacco Use    Smoking status: Former     Packs/day: 2.00     Years: 30.00     Additional pack years: 0.00     Total pack years: 60.00      Types: Cigarettes     Quit date:      Years since quittin.    Smokeless tobacco: Never   Vaping Use    Vaping Use: Never used   Substance Use Topics    Alcohol use: Not Currently     Comment: Prior Alcohol use. Stopped in early     Drug use: Never   60 pack year history of smoking and prior ETOH use; she quit both in early  after she had a heart attack.       Subjective   Chief Complaint: Hypopharyngeal SCC    HPI  Interval History  Amita Todd is a 78 y.o. female with a PMH of CAD, aflutter s/p multiple ablations, PVD, AAA, right carotid stenosis, heart failure, and CKD. Recently diagnosed with hypopharyngeal Squamous Cell Carcinoma, started concurrent chemoRT with weekly Carboplatin + Paclitaxel on 24.    Pt presents for C4 Carbo/Taxol. C3 held 2/2 neutropenia.     Patient reports symptoms and SEs are overall stable. Still tired, continue to have blurry vision since starting chemo.   She had 2 episodes of diarrhea last , she took imodium which helped.  Per Dr. Cochran, her tumor is shrinking, her swallowing is improved. Troat pain is managed with Tylenol 500mg Q6hr. Tolerating soft foods, liquids, and drinking boost.   She has mouth sores in the right buccal region and right soft palate. Managed well with diligent glutamine and baking soda/salt rinses.   Endorse 4 lb weight gain in the last week, ankles are a little swollen today. She took 1 dose of Lasix 20mg today.   She continue to have dizziness with position change.   She is in afib at baseline which makes her tired. Her Cardiologist /EP is aware. She's on amlodipine, atorvastatin, Plavix, Farxiga, losartan, metoprolol. She forgot to make log of morning BP.    Baseline symptoms below are stable:  - Exertional dyspnea: She is able to walk unassisted, but experience SOB after walking 20 feet due to her heart failure diagnosis.     ROS  Review of Systems   Constitutional:  Positive for fatigue. Negative for chills, fever and  unexpected weight change.   HENT:   Positive for trouble swallowing and voice change. Negative for hearing loss, mouth sores, sore throat and tinnitus.    Eyes:  Positive for eye problems (blurry vision).   Respiratory:  Negative for cough and shortness of breath.    Cardiovascular:  Negative for chest pain and leg swelling.   Gastrointestinal:  Negative for abdominal pain, blood in stool, constipation, diarrhea, nausea and vomiting.   Genitourinary:  Negative for difficulty urinating.    Musculoskeletal:  Negative for arthralgias, gait problem and myalgias.   Skin:  Negative for rash.   Neurological:  Negative for gait problem, headaches and numbness.   Psychiatric/Behavioral:  Negative for confusion and depression.        Allergies  Allergies   Allergen Reactions    Oxycodone Hcl-Oxycodone-Asa Unknown    Oxycodone-Acetaminophen Unknown    Penicillin G Other     Nausea    Penicillins GI Upset and Other        Medications  Current Outpatient Medications   Medication Instructions    acetaminophen (TYLENOL) 650 mg, oral, Every 6 hours PRN    albuterol 90 mcg/actuation aerosol powdr breath activated inhaler 2 puffs, inhalation, Every 6 hours PRN    ascorbic acid (VITAMIN C) 500 mg, oral, Daily    atorvastatin (LIPITOR) 20 mg, oral, Daily, as directed    buPROPion SR (WELLBUTRIN SR) 150 mg, oral, 2 times daily, TAKE PER DIRECTED    cholecalciferol (Vitamin D-3) 25 MCG (1000 UT) tablet 1 tablet, oral, 2 times daily    clopidogrel (PLAVIX) 75 mg, oral, Daily    coenzyme Q-10 (COQ-10) 100 mg, oral, 2 times daily    dapagliflozin propanediol (FARXIGA) 10 mg, oral, Daily    dexAMETHasone (DECADRON) 4 mg, oral, 2 times daily with meals    DULoxetine (Cymbalta) 60 mg DR capsule TAKE 1 CAPSULE BY MOUTH  DAILY    furosemide (LASIX) 20 mg, oral, Daily    guaiFENesin 200 mg/5 mL liquid 10 mL, oral, 4 times daily    herbal complex no.239 (Whole Body Joint Support) capsule 1 capsule, oral, Daily    ipratropium (Atrovent) 21 mcg  (0.03 %) nasal spray 2 sprays, Each Nostril, Every 12 hours    losartan (Cozaar) 50 mg tablet 1 tablet, oral, Daily    magnesium oxide (Mag-Ox) 400 mg (241.3 mg magnesium) tablet 1 tablet, oral, Daily    metoprolol tartrate (Lopressor) 25 mg tablet 2 tablets, oral, 2 times daily    multivitamin with minerals tablet 1 tablet, oral, Daily    ondansetron (ZOFRAN) 8 mg, oral, Every 8 hours PRN    oxyCODONE (ROXICODONE) 5 mg, oral, Every 6 hours PRN    pantoprazole (PROTONIX) 40 mg, oral, 2 times daily    prochlorperazine (COMPAZINE) 10 mg, oral, Every 6 hours PRN    sennosides-docusate sodium (Kalee-Colace) 8.6-50 mg tablet 2 tablets, oral, Daily    spironolactone (ALDACTONE) 25 mg, oral, Daily, PER DIRECTED          Objective   VS: There were no vitals taken for this visit.  Weight: Daily Weight  02/20/24 : 67.7 kg (149 lb 4 oz)  02/16/24 : 69.4 kg (153 lb 1.6 oz)  02/15/24 : 69.8 kg (153 lb 14.4 oz)  02/14/24 : 70.6 kg (155 lb 10.3 oz)  02/12/24 : 70.3 kg (154 lb 14.4 oz)  02/08/24 : 69.8 kg (153 lb 12.8 oz)  02/06/24 : 68 kg (150 lb)      Physical Exam  Constitutional:       General: She is not in acute distress.     Appearance: Normal appearance.   HENT:      Head: Normocephalic and atraumatic.      Nose: Nose normal.      Mouth/Throat:      Lips: Pink.      Mouth: Mucous membranes are dry and cyanotic.   Eyes:      Extraocular Movements: Extraocular movements intact.      Conjunctiva/sclera: Conjunctivae normal.   Neck:      Comments: Left sided neck mass, non-tender to palpation    Cardiovascular:      Rate and Rhythm: Normal rate and regular rhythm.      Heart sounds: No murmur heard.  Pulmonary:      Effort: Pulmonary effort is normal. No respiratory distress.   Abdominal:      General: There is no distension.      Palpations: Abdomen is soft. There is no mass.      Tenderness: There is no abdominal tenderness.   Musculoskeletal:         General: No tenderness.      Cervical back: Normal range of motion and neck  supple.   Skin:     General: Skin is warm and dry.   Neurological:      General: No focal deficit present.      Mental Status: She is alert and oriented to person, place, and time. Mental status is at baseline.   Psychiatric:         Mood and Affect: Mood normal.       Diagnostic Results   Labs  Below labs are reviewed today.  Results from last 7 days   Lab Units 02/14/24  1113   WBC AUTO x10*3/uL 5.4   HEMOGLOBIN g/dL 11.5*   HEMATOCRIT % 35.4*   PLATELETS AUTO x10*3/uL 99*   NEUTROS ABS x10*3/uL 4.96   LYMPHS ABS AUTO x10*3/uL 0.16*   MONOS ABS AUTO x10*3/uL 0.23   EOS ABS AUTO x10*3/uL 0.00   NEUTROS PCT AUTO % 92.3   LYMPHS PCT AUTO % 3.0   MONOS PCT AUTO % 4.3   EOS PCT AUTO % 0.0        Results from last 7 days   Lab Units 02/14/24  1113   GLUCOSE mg/dL 97   SODIUM mmol/L 137   POTASSIUM mmol/L 4.1   CHLORIDE mmol/L 106   CO2 mmol/L 26   BUN mg/dL 22   CREATININE mg/dL 0.69   EGFR mL/min/1.73m*2 90   CALCIUM mg/dL 8.4*   MAGNESIUM mg/dL 1.93   ALBUMIN g/dL 3.0*   PROTEIN TOTAL g/dL 5.4*   BILIRUBIN TOTAL mg/dL 0.7   ALK PHOS U/L 45   ALT U/L 22   AST U/L 15                     Images  12/12/23 CT Neck   FINDINGS:  *The visualized paranasal sinuses nasopharynx and oropharynx are unremarkable.   *The mandible, maxilla and temporomandibular joints are normal. *The major salivary glands are normal.  *There is a 2.5 cm by 2.5 cm x 2 cm mass within the left aryepiglottic fold. There is extension to the left piriformis sinus. The mass extends to the margin of the laryngeal cartilages without cartilaginous changes or extra laryngeal abnormality. There is no evidence of extension to the left carotid sheath. *There is a 1 cm x 1.5 cm lymph node at level 2A/2B on the left with peripheral enhancement consistent with extra capsular extension. There is a 5 mm enhancing lymph node at left level 3 with central lucency concerning for additional chris metastasis. *The thyroid gland is normal.  *Unchanged dilatation of the  ascending aorta which currently measures a proximally 4.3 cm in diameter. The thoracic inlet is otherwise normal.      IMPRESSION:  *Left-sided hypopharyngeal mass without extra laryngeal extension  *Left-sided regional lymph node metastases as described    1/12/2024 NM PET/CT Head & Neck Staging  IMPRESSION:  1. FDG avid large soft tissue mass in the left hypopharynx with extension to the level of the laryngeal cartilage, consistent with biopsy-proven squamous cell carcinoma.  2. FDG-avid bilateral cervical level II nodes, likely representing chris metastases.  3. No PET evidence of distant metastasis      Pathology        Assessment/Plan   ASSESSMENT  Amita Todd is a 78 y.o. female with [id number] hypopharyngeal squamous mariana carcinoma. Started concurrent chemoRT with weekly Carboplatin + Paclitaxel on 1/24/24.    #cT2N1 Hypopharyngeal SCC  - 12/12/23 CT neck showed 2.5 x 2.5 cm aryepiglottic mass, 1.5 x 1 cm LN at level 2A/2B  - 12/21/23 Hypopharyngeal mass biopsy  - invasive moderately differentiated keratinizing squamous cell carcinoma  - No plans for surgery as patient wishes for laryngeal preservation  - we discussed treatment with definitive concurrent chemoRT; would favor treatment with weekly carboplatin + paclitaxel vs cisplatin given decreased GFR, heart failure, age, and other co-morbidities  - 1/12/24 PET showed no distant mets  - 1/24/24: Started concurrent chemoRT with weekly Carboplatin + Paclitaxel  - 1/31/24: Patient tolerated C1 Carbo/Taxol well. Mild nausea controlled with anti-emetics. She's a bit hypotensive today and endorse occasional dizziness.   - 2/7/24: ongoing sensation in mouth/fatigue, will hold  chemo today given low ANC.  - 2/14/24: Pt's ANC recovered and R chest Mediport was placed on 2/13. Pt did have some ankle swelling with 4lb wt gain this week, took 20mg Lasix today. Otherwise symptoms are stable, swallowing improving, mild oral mucositis.   -2/24/24: BP still lower side  with ongoing dizziness, diarrhea x2 over the weekend.  Will give her 1L NS today. Last RT is 3/12.    # Neutropenia  - 2/6/24: Patient is severely neutropenic today with . Mediport placement postponed.   - Neutropenic fever precautions provided to patient her family. Pt to come to ED for fever >100.4F. Pt and her family voiced understanding  - 2/21: . Ok for chemo today    # Thrombocytopenia  - 2/21: Plt 81. Pt aware to beware of prolonged bleeds and bruising .    # Hypotension  - She's on amlodipine, metoprolol, losartan, Spironolactone for hypertension  - BP in the last 2 weeks has been between 90s - 140s/ 50-80s. She does endorse dizziness upon standing. Advised pt to hold off on amlodipine.    # Hyponatremia  - Pt has been hyponatremic for a few weeks. Patient was instructed alternate Gatorade and water intake and add salt in her fluids.    - 1/30 Na 131.   - 2/21: Na 136    # Dysphagia  - 1/16/24 Barium Swallow showed mild oropharyngeal dysphagia secondary to hypopharyngeal mass.   - PEG placement discussed with patient and she's open to this if swallowing function declines during CRT.  - 1/31/24: Pt currently tolerating soft/minced foods well and weight is stable from last week. 1lb weight gain.   - Swallowing has improved. Dr Cochran notes decrease in size of tumor.     # Hx of CHF, CAD s/p PCI, A.fib, AAA  - 1/18 - 1/19/24 Admission due to worsening exertional dyspnea  - CTA was negative for PE, aortic aneurysm stable, no acute anemia noted, no evidence pneumonia on CXR, no hypoxia.   - EKG A.fib rate controlled  - ECHO 3/2023 LVEF 50-55%, aortic valve regurg  - , 160 last year  - per Dr. Nova, hold lasix unless >3 lbs weight gain during treatment  - 1/31/24: instructed patient to reach out to Cardiologist regarding any adjustments needed on her anti-hypertensives (Losartan, Metoprolol, Spironolactone) as patient may become hypotensive later in the course of chemoRT.  - 2/6/24: Patient  had EKG in ED today. PT is in afib, EKG looks better than EKG on 12/13. Cardiologist aware she's in afib at baseline. Her fatigue is likely worsened by afib. She is on amlodipine, atorvastatin, Plavix, Farxiga, losartan, metoprolol.   -2/21/24: symptomatic hypotension, will hold off on amlodipine      PLAN  -- hold chemo today  -- RTC 1 week for C4. Monitor ANC and Platelets   -- Keep daily log of morning BP. Monitor hypotension symptoms, hold amlodipine.  -- Follow up with cardiologist (I let Dr. Nova know about her recent hypotension today, no concern from him). Next visit 7/11 with repeat PVR w/ exercise and carotid duplex US.

## 2024-02-22 ENCOUNTER — HOSPITAL ENCOUNTER (INPATIENT)
Facility: HOSPITAL | Age: 79
LOS: 25 days | DRG: 314 | End: 2024-03-18
Attending: EMERGENCY MEDICINE | Admitting: HOSPITALIST
Payer: MEDICARE

## 2024-02-22 ENCOUNTER — APPOINTMENT (OUTPATIENT)
Dept: RADIOLOGY | Facility: HOSPITAL | Age: 79
DRG: 314 | End: 2024-02-22
Payer: MEDICARE

## 2024-02-22 ENCOUNTER — HOSPITAL ENCOUNTER (OUTPATIENT)
Dept: RADIATION ONCOLOGY | Facility: HOSPITAL | Age: 79
Setting detail: RADIATION/ONCOLOGY SERIES
Discharge: HOME | End: 2024-02-22
Payer: MEDICARE

## 2024-02-22 VITALS
RESPIRATION RATE: 18 BRPM | DIASTOLIC BLOOD PRESSURE: 50 MMHG | OXYGEN SATURATION: 95 % | BODY MASS INDEX: 25.58 KG/M2 | HEART RATE: 68 BPM | WEIGHT: 153.7 LBS | TEMPERATURE: 96.8 F | SYSTOLIC BLOOD PRESSURE: 66 MMHG

## 2024-02-22 DIAGNOSIS — C13.9 MALIGNANT NEOPLASM OF HYPOPHARYNX (MULTI): ICD-10-CM

## 2024-02-22 DIAGNOSIS — I95.9 HYPOTENSION, UNSPECIFIED HYPOTENSION TYPE: ICD-10-CM

## 2024-02-22 DIAGNOSIS — R13.12 OROPHARYNGEAL DYSPHAGIA: ICD-10-CM

## 2024-02-22 DIAGNOSIS — J39.2 HYPOPHARYNGEAL LESION: ICD-10-CM

## 2024-02-22 DIAGNOSIS — D70.1 CHEMOTHERAPY-INDUCED NEUTROPENIA (CMS-HCC): ICD-10-CM

## 2024-02-22 DIAGNOSIS — T45.1X5A CHEMOTHERAPY-INDUCED NEUTROPENIA (CMS-HCC): ICD-10-CM

## 2024-02-22 DIAGNOSIS — C13.8 MALIGNANT NEOPLASM OF OVERLAPPING SITES OF HYPOPHARYNX (MULTI): ICD-10-CM

## 2024-02-22 DIAGNOSIS — Z51.0 ENCOUNTER FOR ANTINEOPLASTIC RADIATION THERAPY: ICD-10-CM

## 2024-02-22 DIAGNOSIS — D69.6 THROMBOCYTOPENIA (CMS-HCC): Primary | ICD-10-CM

## 2024-02-22 DIAGNOSIS — R06.09 EXERTIONAL DYSPNEA: ICD-10-CM

## 2024-02-22 DIAGNOSIS — I50.22 ACC/AHA STAGE C CHRONIC SYSTOLIC HEART FAILURE (MULTI): ICD-10-CM

## 2024-02-22 DIAGNOSIS — I50.20 HFREF (HEART FAILURE WITH REDUCED EJECTION FRACTION) (MULTI): ICD-10-CM

## 2024-02-22 LAB
ALBUMIN SERPL BCP-MCNC: 2.7 G/DL (ref 3.4–5)
ALP SERPL-CCNC: 40 U/L (ref 33–136)
ALT SERPL W P-5'-P-CCNC: 18 U/L (ref 7–45)
ANION GAP SERPL CALC-SCNC: 10 MMOL/L (ref 10–20)
APPEARANCE UR: ABNORMAL
APTT PPP: 33 SECONDS (ref 27–38)
AST SERPL W P-5'-P-CCNC: 16 U/L (ref 9–39)
BACTERIA #/AREA URNS AUTO: ABNORMAL /HPF
BILIRUB SERPL-MCNC: 0.9 MG/DL (ref 0–1.2)
BILIRUB UR STRIP.AUTO-MCNC: NEGATIVE MG/DL
BNP SERPL-MCNC: 229 PG/ML (ref 0–99)
BUN SERPL-MCNC: 23 MG/DL (ref 6–23)
CALCIUM SERPL-MCNC: 8.1 MG/DL (ref 8.6–10.6)
CARDIAC TROPONIN I PNL SERPL HS: 7 NG/L (ref 0–34)
CHLORIDE SERPL-SCNC: 102 MMOL/L (ref 98–107)
CO2 SERPL-SCNC: 26 MMOL/L (ref 21–32)
COLOR UR: YELLOW
CREAT SERPL-MCNC: 0.7 MG/DL (ref 0.5–1.05)
EGFRCR SERPLBLD CKD-EPI 2021: 89 ML/MIN/1.73M*2
FERRITIN SERPL-MCNC: 390 NG/ML (ref 8–150)
FLUAV RNA RESP QL NAA+PROBE: NOT DETECTED
FLUBV RNA RESP QL NAA+PROBE: NOT DETECTED
FOLATE SERPL-MCNC: 16.5 NG/ML
GLUCOSE SERPL-MCNC: 110 MG/DL (ref 74–99)
GLUCOSE UR STRIP.AUTO-MCNC: ABNORMAL MG/DL
INR PPP: 1.2 (ref 0.9–1.1)
IRON SATN MFR SERPL: 14 % (ref 25–45)
IRON SERPL-MCNC: 29 UG/DL (ref 35–150)
KETONES UR STRIP.AUTO-MCNC: NEGATIVE MG/DL
LACTATE SERPL-SCNC: 1.7 MMOL/L (ref 0.4–2)
LEUKOCYTE ESTERASE UR QL STRIP.AUTO: NEGATIVE
MAGNESIUM SERPL-MCNC: 1.99 MG/DL (ref 1.6–2.4)
NITRITE UR QL STRIP.AUTO: NEGATIVE
PH UR STRIP.AUTO: 5.5 [PH]
POTASSIUM SERPL-SCNC: 4.1 MMOL/L (ref 3.5–5.3)
PROT SERPL-MCNC: 4.7 G/DL (ref 6.4–8.2)
PROT UR STRIP.AUTO-MCNC: ABNORMAL MG/DL
PROTHROMBIN TIME: 13.3 SECONDS (ref 9.8–12.8)
RAD ONC MSQ ACTUAL FRACTIONS DELIVERED: 22
RAD ONC MSQ ACTUAL SESSION DELIVERED DOSE: 200 CGRAY
RAD ONC MSQ ACTUAL TOTAL DOSE: 4400 CGRAY
RAD ONC MSQ ELAPSED DAYS: 29
RAD ONC MSQ LAST DATE: NORMAL
RAD ONC MSQ PRESCRIBED FRACTIONAL DOSE: 200 CGRAY
RAD ONC MSQ PRESCRIBED NUMBER OF FRACTIONS: 35
RAD ONC MSQ PRESCRIBED TECHNIQUE: NORMAL
RAD ONC MSQ PRESCRIBED TOTAL DOSE: 7000 CGRAY
RAD ONC MSQ PRESCRIPTION PATTERN COMMENT: NORMAL
RAD ONC MSQ START DATE: NORMAL
RAD ONC MSQ TREATMENT COURSE NUMBER: 1
RAD ONC MSQ TREATMENT SITE: NORMAL
RBC # UR STRIP.AUTO: ABNORMAL /UL
RBC #/AREA URNS AUTO: ABNORMAL /HPF
RSV RNA RESP QL NAA+PROBE: NOT DETECTED
SARS-COV-2 RNA RESP QL NAA+PROBE: NOT DETECTED
SODIUM SERPL-SCNC: 134 MMOL/L (ref 136–145)
SP GR UR STRIP.AUTO: 1.04
TIBC SERPL-MCNC: 208 UG/DL (ref 240–445)
UIBC SERPL-MCNC: 179 UG/DL (ref 110–370)
UROBILINOGEN UR STRIP.AUTO-MCNC: ABNORMAL MG/DL
VIT B12 SERPL-MCNC: 1099 PG/ML (ref 211–911)
WBC #/AREA URNS AUTO: ABNORMAL /HPF

## 2024-02-22 PROCEDURE — 83615 LACTATE (LD) (LDH) ENZYME: CPT

## 2024-02-22 PROCEDURE — 80053 COMPREHEN METABOLIC PANEL: CPT | Performed by: EMERGENCY MEDICINE

## 2024-02-22 PROCEDURE — 77386 HC INTENSITY-MODULATED RADIATION THERAPY (IMRT), COMPLEX: CPT | Performed by: RADIOLOGY

## 2024-02-22 PROCEDURE — 84484 ASSAY OF TROPONIN QUANT: CPT | Performed by: PHYSICIAN ASSISTANT

## 2024-02-22 PROCEDURE — 85610 PROTHROMBIN TIME: CPT | Performed by: EMERGENCY MEDICINE

## 2024-02-22 PROCEDURE — 83540 ASSAY OF IRON: CPT

## 2024-02-22 PROCEDURE — 2500000001 HC RX 250 WO HCPCS SELF ADMINISTERED DRUGS (ALT 637 FOR MEDICARE OP): Performed by: PHYSICIAN ASSISTANT

## 2024-02-22 PROCEDURE — 77014 CHG CT GUIDANCE RADIATION THERAPY FLDS PLACEMENT: CPT | Performed by: RADIOLOGY

## 2024-02-22 PROCEDURE — 93970 EXTREMITY STUDY: CPT

## 2024-02-22 PROCEDURE — 2500000005 HC RX 250 GENERAL PHARMACY W/O HCPCS

## 2024-02-22 PROCEDURE — 83735 ASSAY OF MAGNESIUM: CPT | Performed by: EMERGENCY MEDICINE

## 2024-02-22 PROCEDURE — 80076 HEPATIC FUNCTION PANEL: CPT | Mod: CCI

## 2024-02-22 PROCEDURE — 2500000002 HC RX 250 W HCPCS SELF ADMINISTERED DRUGS (ALT 637 FOR MEDICARE OP, ALT 636 FOR OP/ED): Performed by: PHYSICIAN ASSISTANT

## 2024-02-22 PROCEDURE — 82607 VITAMIN B-12: CPT

## 2024-02-22 PROCEDURE — 85045 AUTOMATED RETICULOCYTE COUNT: CPT

## 2024-02-22 PROCEDURE — 99222 1ST HOSP IP/OBS MODERATE 55: CPT

## 2024-02-22 PROCEDURE — 2500000004 HC RX 250 GENERAL PHARMACY W/ HCPCS (ALT 636 FOR OP/ED)

## 2024-02-22 PROCEDURE — 82746 ASSAY OF FOLIC ACID SERUM: CPT

## 2024-02-22 PROCEDURE — 82728 ASSAY OF FERRITIN: CPT

## 2024-02-22 PROCEDURE — 99285 EMERGENCY DEPT VISIT HI MDM: CPT | Performed by: PHYSICIAN ASSISTANT

## 2024-02-22 PROCEDURE — 93971 EXTREMITY STUDY: CPT | Performed by: RADIOLOGY

## 2024-02-22 PROCEDURE — 2500000001 HC RX 250 WO HCPCS SELF ADMINISTERED DRUGS (ALT 637 FOR MEDICARE OP)

## 2024-02-22 PROCEDURE — 1210000001 HC SEMI-PRIVATE ROOM DAILY

## 2024-02-22 PROCEDURE — 83880 ASSAY OF NATRIURETIC PEPTIDE: CPT | Performed by: PHYSICIAN ASSISTANT

## 2024-02-22 PROCEDURE — 85025 COMPLETE CBC W/AUTO DIFF WBC: CPT | Performed by: EMERGENCY MEDICINE

## 2024-02-22 PROCEDURE — 83010 ASSAY OF HAPTOGLOBIN QUANT: CPT | Mod: WESLAB

## 2024-02-22 PROCEDURE — 71045 X-RAY EXAM CHEST 1 VIEW: CPT

## 2024-02-22 PROCEDURE — 83605 ASSAY OF LACTIC ACID: CPT | Performed by: EMERGENCY MEDICINE

## 2024-02-22 PROCEDURE — 2500000002 HC RX 250 W HCPCS SELF ADMINISTERED DRUGS (ALT 637 FOR MEDICARE OP, ALT 636 FOR OP/ED)

## 2024-02-22 PROCEDURE — 81001 URINALYSIS AUTO W/SCOPE: CPT | Performed by: PHYSICIAN ASSISTANT

## 2024-02-22 PROCEDURE — 84075 ASSAY ALKALINE PHOSPHATASE: CPT

## 2024-02-22 PROCEDURE — 71045 X-RAY EXAM CHEST 1 VIEW: CPT | Performed by: RADIOLOGY

## 2024-02-22 PROCEDURE — 99285 EMERGENCY DEPT VISIT HI MDM: CPT | Mod: 25

## 2024-02-22 PROCEDURE — 76705 ECHO EXAM OF ABDOMEN: CPT

## 2024-02-22 PROCEDURE — 87637 SARSCOV2&INF A&B&RSV AMP PRB: CPT | Performed by: PHYSICIAN ASSISTANT

## 2024-02-22 RX ORDER — PANTOPRAZOLE SODIUM 40 MG/1
40 TABLET, DELAYED RELEASE ORAL 2 TIMES DAILY
Status: DISCONTINUED | OUTPATIENT
Start: 2024-02-22 | End: 2024-02-23

## 2024-02-22 RX ORDER — DULOXETIN HYDROCHLORIDE 30 MG/1
60 CAPSULE, DELAYED RELEASE ORAL ONCE
Status: DISCONTINUED | OUTPATIENT
Start: 2024-02-22 | End: 2024-02-22

## 2024-02-22 RX ORDER — HYDROMORPHONE HYDROCHLORIDE 2 MG/1
1 TABLET ORAL EVERY 4 HOURS PRN
Status: DISCONTINUED | OUTPATIENT
Start: 2024-02-22 | End: 2024-02-23

## 2024-02-22 RX ORDER — ONDANSETRON 4 MG/1
4 TABLET, ORALLY DISINTEGRATING ORAL ONCE
Status: DISCONTINUED | OUTPATIENT
Start: 2024-02-22 | End: 2024-02-26

## 2024-02-22 RX ORDER — CLOPIDOGREL BISULFATE 75 MG/1
75 TABLET ORAL DAILY
Status: DISCONTINUED | OUTPATIENT
Start: 2024-02-23 | End: 2024-02-26

## 2024-02-22 RX ORDER — FLUCONAZOLE 2 MG/ML
200 INJECTION, SOLUTION INTRAVENOUS EVERY 24 HOURS
Status: DISCONTINUED | OUTPATIENT
Start: 2024-02-22 | End: 2024-02-23

## 2024-02-22 RX ORDER — ASCORBIC ACID 500 MG
500 TABLET ORAL DAILY
Status: DISCONTINUED | OUTPATIENT
Start: 2024-02-23 | End: 2024-02-24

## 2024-02-22 RX ORDER — DEXAMETHASONE 4 MG/1
4 TABLET ORAL
Status: DISCONTINUED | OUTPATIENT
Start: 2024-02-23 | End: 2024-02-24

## 2024-02-22 RX ORDER — ATORVASTATIN CALCIUM 20 MG/1
20 TABLET, FILM COATED ORAL ONCE
Status: DISCONTINUED | OUTPATIENT
Start: 2024-02-22 | End: 2024-02-22

## 2024-02-22 RX ORDER — GUAIFENESIN 100 MG/5ML
200 SOLUTION ORAL EVERY 4 HOURS PRN
Status: DISCONTINUED | OUTPATIENT
Start: 2024-02-22 | End: 2024-02-28

## 2024-02-22 RX ORDER — ONDANSETRON HYDROCHLORIDE 8 MG/1
8 TABLET, FILM COATED ORAL EVERY 8 HOURS PRN
Status: DISCONTINUED | OUTPATIENT
Start: 2024-02-22 | End: 2024-02-24

## 2024-02-22 RX ORDER — GUAIFENESIN 100 MG/5ML
400 SOLUTION ORAL 3 TIMES DAILY
Status: DISCONTINUED | OUTPATIENT
Start: 2024-02-22 | End: 2024-02-23

## 2024-02-22 RX ORDER — LANOLIN ALCOHOL/MO/W.PET/CERES
400 CREAM (GRAM) TOPICAL DAILY
Status: DISCONTINUED | OUTPATIENT
Start: 2024-02-23 | End: 2024-02-28

## 2024-02-22 RX ORDER — CHOLECALCIFEROL (VITAMIN D3) 25 MCG
1000 TABLET ORAL 2 TIMES DAILY
Status: DISCONTINUED | OUTPATIENT
Start: 2024-02-22 | End: 2024-02-28

## 2024-02-22 RX ORDER — METOPROLOL TARTRATE 25 MG/1
50 TABLET, FILM COATED ORAL 2 TIMES DAILY
Qty: 360 TABLET | Refills: 3 | Status: SHIPPED | OUTPATIENT
Start: 2024-02-22 | End: 2024-03-18

## 2024-02-22 RX ORDER — OXYCODONE HYDROCHLORIDE 5 MG/1
5 TABLET ORAL ONCE
Status: COMPLETED | OUTPATIENT
Start: 2024-02-22 | End: 2024-02-22

## 2024-02-22 RX ORDER — MULTIVIT-MIN/IRON FUM/FOLIC AC 7.5 MG-4
1 TABLET ORAL DAILY
Status: DISCONTINUED | OUTPATIENT
Start: 2024-02-23 | End: 2024-02-28 | Stop reason: ALTCHOICE

## 2024-02-22 RX ORDER — BUPROPION HYDROCHLORIDE 150 MG/1
150 TABLET, EXTENDED RELEASE ORAL 2 TIMES DAILY
Status: DISCONTINUED | OUTPATIENT
Start: 2024-02-22 | End: 2024-02-27

## 2024-02-22 RX ORDER — SPIRONOLACTONE 25 MG/1
25 TABLET ORAL DAILY
Status: DISCONTINUED | OUTPATIENT
Start: 2024-02-22 | End: 2024-02-23

## 2024-02-22 RX ORDER — DULOXETIN HYDROCHLORIDE 60 MG/1
60 CAPSULE, DELAYED RELEASE ORAL NIGHTLY
Status: DISCONTINUED | OUTPATIENT
Start: 2024-02-22 | End: 2024-02-28 | Stop reason: ALTCHOICE

## 2024-02-22 RX ORDER — ATORVASTATIN CALCIUM 20 MG/1
20 TABLET, FILM COATED ORAL DAILY
Status: DISCONTINUED | OUTPATIENT
Start: 2024-02-22 | End: 2024-02-28

## 2024-02-22 RX ORDER — ACETAMINOPHEN 325 MG/1
975 TABLET ORAL ONCE
Status: COMPLETED | OUTPATIENT
Start: 2024-02-22 | End: 2024-02-22

## 2024-02-22 RX ORDER — BUPROPION HYDROCHLORIDE 75 MG/1
150 TABLET ORAL ONCE
Status: DISCONTINUED | OUTPATIENT
Start: 2024-02-22 | End: 2024-02-22

## 2024-02-22 RX ORDER — METOPROLOL TARTRATE 50 MG/1
50 TABLET ORAL 2 TIMES DAILY
Status: DISCONTINUED | OUTPATIENT
Start: 2024-02-22 | End: 2024-02-28

## 2024-02-22 RX ORDER — METOPROLOL TARTRATE 50 MG/1
50 TABLET ORAL ONCE
Status: DISCONTINUED | OUTPATIENT
Start: 2024-02-22 | End: 2024-02-22

## 2024-02-22 RX ORDER — PANTOPRAZOLE SODIUM 40 MG/1
40 TABLET, DELAYED RELEASE ORAL ONCE
Status: DISCONTINUED | OUTPATIENT
Start: 2024-02-22 | End: 2024-02-22

## 2024-02-22 RX ORDER — CHLORHEXIDINE GLUCONATE ORAL RINSE 1.2 MG/ML
15 SOLUTION DENTAL 2 TIMES DAILY
Status: DISCONTINUED | OUTPATIENT
Start: 2024-02-22 | End: 2024-02-22

## 2024-02-22 RX ORDER — DAPAGLIFLOZIN 10 MG/1
10 TABLET, FILM COATED ORAL DAILY
Status: DISCONTINUED | OUTPATIENT
Start: 2024-02-23 | End: 2024-03-14

## 2024-02-22 RX ORDER — LOSARTAN POTASSIUM 50 MG/1
50 TABLET ORAL DAILY
Status: DISCONTINUED | OUTPATIENT
Start: 2024-02-22 | End: 2024-02-22

## 2024-02-22 RX ADMIN — BUPROPION HYDROCHLORIDE 150 MG: 75 TABLET, FILM COATED ORAL at 20:04

## 2024-02-22 RX ADMIN — Medication 1000 UNITS: at 20:06

## 2024-02-22 RX ADMIN — OXYCODONE HYDROCHLORIDE 5 MG: 5 TABLET ORAL at 20:19

## 2024-02-22 RX ADMIN — ACETAMINOPHEN 975 MG: 325 TABLET ORAL at 17:29

## 2024-02-22 RX ADMIN — ONDANSETRON HYDROCHLORIDE 8 MG: 4 TABLET, FILM COATED ORAL at 20:20

## 2024-02-22 RX ADMIN — GUAIFENESIN 400 MG: 200 SOLUTION ORAL at 18:13

## 2024-02-22 RX ADMIN — PANTOPRAZOLE SODIUM 40 MG: 40 TABLET, DELAYED RELEASE ORAL at 20:06

## 2024-02-22 RX ADMIN — FLUCONAZOLE 200 MG: 2 INJECTION, SOLUTION INTRAVENOUS at 21:02

## 2024-02-22 RX ADMIN — SPIRONOLACTONE 25 MG: 25 TABLET, FILM COATED ORAL at 20:23

## 2024-02-22 RX ADMIN — BUPROPION HYDROCHLORIDE 150 MG: 150 TABLET, EXTENDED RELEASE ORAL at 21:00

## 2024-02-22 RX ADMIN — ATORVASTATIN CALCIUM 20 MG: 20 TABLET, FILM COATED ORAL at 20:19

## 2024-02-22 RX ADMIN — ONDANSETRON HYDROCHLORIDE 8 MG: 4 TABLET, FILM COATED ORAL at 20:19

## 2024-02-22 RX ADMIN — PANTOPRAZOLE SODIUM 40 MG: 40 TABLET, DELAYED RELEASE ORAL at 21:00

## 2024-02-22 RX ADMIN — DULOXETINE HYDROCHLORIDE 60 MG: 60 CAPSULE, DELAYED RELEASE ORAL at 20:07

## 2024-02-22 ASSESSMENT — PAIN DESCRIPTION - LOCATION: LOCATION: THROAT

## 2024-02-22 ASSESSMENT — COLUMBIA-SUICIDE SEVERITY RATING SCALE - C-SSRS
1. IN THE PAST MONTH, HAVE YOU WISHED YOU WERE DEAD OR WISHED YOU COULD GO TO SLEEP AND NOT WAKE UP?: NO
6. HAVE YOU EVER DONE ANYTHING, STARTED TO DO ANYTHING, OR PREPARED TO DO ANYTHING TO END YOUR LIFE?: NO
2. HAVE YOU ACTUALLY HAD ANY THOUGHTS OF KILLING YOURSELF?: NO

## 2024-02-22 ASSESSMENT — PAIN SCALES - GENERAL: PAINLEVEL_OUTOF10: 4

## 2024-02-22 ASSESSMENT — PAIN - FUNCTIONAL ASSESSMENT: PAIN_FUNCTIONAL_ASSESSMENT: 0-10

## 2024-02-22 NOTE — ED PROVIDER NOTES
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 She also has a known history of CHF, CAD, A-fib on Plavix, AAA that is stable from CT imaging last month. She follows with Dr. Burkitt who is her oncologist.  Per review of records, patient was seen yesterday outpatient, and there was some discussion about recent low blood pressure which was 80/50 for an office visit yesterday. At that time, they recommended discontinuation of amlodipine however patient is still on metoprolol, losartan, spironolactone as well as Lasix whenever she is up 3 pounds from her dry weight.  Patient has been taking all her medications as prescribed and reports that she took her metoprolol today. At her radiation treatment today, they noted that her blood pressure was low 66/56 and advised her to come to the ED.  Patient's initial BP here in the ED was 88/54, and when I personally rechecked her BP it was 103/67.  She has had gradually increasing generalized weakness that has been going on for some time, not necessarily significantly worse today. She denies any trauma, falls, or injuries. No passing out. She denies any vision changes, chest pain, shortness of breath, abdominal pain, vomiting,  infectious symptoms, fevers, chills, or urinary symptoms.  She has a runny nose from her chemo that she reports is chronic and unchanged. She has also noted some bilateral leg swelling (right more than left) recently. No numbness or tingling.                           Sharmaine Coma Scale Score: 15                     Patient History   Past Medical History:   Diagnosis Date    Alcohol dependence, in remission (CMS/[facility]) 06/16/2016    History of alcoholism    Anemia     Aneurysm of ascending aorta (CMS/[facility])     Arrhythmia     a-fib. 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    Pain in leg, unspecified 01/25/2022    Leg pain    Pain in right shoulder 07/01/2020    Bilateral shoulder pain    Pain in unspecified knee 03/31/2021    Joint pain, knee    Personal history of diseases of the blood and blood-forming organs and certain disorders involving the immune mechanism 02/09/2019    History of anemia    Personal history of malignant neoplasm of breast 04/29/2014    History of malignant neoplasm of female breast    Personal history of other diseases of the circulatory system 04/03/2014    Personal history of subarachnoid hemorrhage    Personal history of other diseases of the digestive system 03/08/2021    History of upper gastrointestinal hemorrhage    Personal history of other diseases of the digestive system 03/08/2021    History of melena    Personal history of other diseases of the nervous system and sense organs 08/12/2020    History of Bell's palsy    Personal history of other infectious and parasitic diseases 06/14/2019    History of herpes zoster    Personal history of other specified conditions 03/03/2020    History of lump of left breast    Personal history of other specified conditions 02/22/2021    History of shortness of breath    Personal history of transient ischemic attack (TIA), and cerebral infarction without residual deficits 04/24/2022    History of transient ischemic attack    Stenosis of carotid artery     right    Trigger finger, unspecified finger 04/24/2017    Acquired trigger finger     Past Surgical History:   Procedure Laterality Date    CT ANGIO NECK  06/06/2019    CT  NECK ANGIO W AND WO IV CONTRAST 2019 [facility] CL[facility]ICAL LEGAC[facility]    CT ANGIO NECK  2021    CT NECK ANGIO W AND WO IV CONTRAST 2021 [facility]    CT HEAD ANGIO W AND WO IV CONTRAST  2019    CT HEAD ANGIO W AND WO IV CONTRAST 2019 [facility] CLINICAL LEGACY    CT HEAD ANGIO W AND WO IV CONTRAST  2021    CT HEAD ANGIO W AND WO IV CONTRAST 2021 [facility]    MR HEAD ANGIO WO IV CONTRAST  2019    MR HEAD ANGIO WO IV CONTRAST 2019 [facility] CLINICAL LEGACY    MR NECK ANGIO WO IV CONTRAST  2019    MR NECK ANGIO WO IV CONTRAST 2019 [facility] CLINICAL LEGACY    OTHER SURGICAL HISTORY  2020    Oophorectomy    OTHER SURGICAL HISTORY  2019    Miscarriage surgical treatment    OTHER SURGICAL HISTORY  2019    multiple Catheter Ablation Atrial Flutter    OTHER SURGICAL HISTORY  2014    Previous Stent Placement    SEPTOPLASTY  2017    Septoplasty     Family History   Problem Relation Name Age of Onset    Breast cancer Mother      Other (GOODPASTURE'S DISEASE) Father      BRCA2 Positive Sister      Lung cancer Sister      Neuroblastoma Sister      BRCA2 Positive Daughter      Breast cancer Other G/P      Social History     Tobacco Use    Smoking status: Former     Packs/day: 2.00     Years: 30.00     Additional pack years: 0.00     Total pack years: 60.00     Types: Cigarettes     Quit date: 2000     Years since quittin.1    Smokeless tobacco: Never   Vaping Use    Vaping Use: Never used   Substance Use Topics    Alcohol use: Not Currently     Comment: Prior Alcohol use. Stopped in early     Drug use: Never       Physical Exam   ED Triage Vitals [24 1311]   Temperature Heart Rate Respirations BP   36.5 °C (97.7 °F) 76 16 88/54      Pulse Ox Temp src Heart Rate Source Patient Position   98 % -- -- --      BP Location FiO2 (%)     -- --       Physical Exam  Vitals and nursing note reviewed.   Constitutional:       General: She is not in  acute distress.     Appearance: Normal appearance. She is not toxic-appearing.   HENT:      Head: Normocephalic and atraumatic.      Nose: Nose normal.   Eyes:      General: No scleral icterus.     Extraocular Movements: Extraocular movements intact.   Cardiovascular:      Rate and Rhythm: Normal rate. Rhythm irregular.      Heart sounds: No murmur heard.     No gallop.      Comments: Irregularly irregular, known hx afib  Pulmonary:      Effort: Pulmonary effort is normal.      Breath sounds: Normal breath sounds. No wheezing, rhonchi or rales.   Abdominal:      General: There is no distension.      Palpations: Abdomen is soft.      Tenderness: There is no abdominal tenderness. There is no guarding.   Musculoskeletal:         General: Swelling present. No deformity. Normal range of motion.      Cervical back: Normal range of motion and neck supple.      Comments: Some edema to BLE.   Skin:     General: Skin is warm and dry.   Neurological:      General: No focal deficit present.      Mental Status: She is alert.      Cranial Nerves: No cranial nerve deficit.      Sensory: No sensory deficit.      Comments: Strength 5/5 in upper and lower extremities bilaterally.   Psychiatric:         Mood and Affect: Mood normal.         Thought Content: Thought content normal.       Point of Care Ultrasound         Vascular US lower extremity venous duplex bilateral   Final Result   Echogenic material within the left common femoral vein which may   represent subacute/chronic thrombus.        I personally reviewed the images/study and I agree with Amparo Bruner DO's (radiology resident) findings as stated. This study   was interpreted at [place], [place].        MACRO:   None        Signed by: Beny Stevens 2/22/2024 3:43 PM   Dictation workstation:   [id number]      XR chest 1 view   Final Result   No acute cardiopulmonary process is evident.        MACRO:   None         Signed by: Froilan Perdomo 2/22/2024 2:21 PM   Dictation workstation:   [id number]      Transthoracic Echo (TTE) Complete    (Results Pending)     Labs Reviewed   CBC WITH AUTO DIFFERENTIAL - Abnormal       Result Value    WBC 0.2 (*)     nRBC 13.6 (*)     RBC 3.50 (*)     Hemoglobin 10.9 (*)     Hematocrit 31.7 (*)     MCV 91      MCH 31.1      MCHC 34.4      RDW 16.0 (*)     Platelets 53 (*)     Neutrophils % 68.3      Immature Granulocytes %, Automated 0.0      Lymphocytes % 13.6      Monocytes % 13.6      Eosinophils % 4.5      Basophils % 0.0      Neutrophils Absolute 0.15 (*)     Immature Granulocytes Absolute, Automated 0.00      Lymphocytes Absolute 0.03 (*)     Monocytes Absolute 0.03 (*)     Eosinophils Absolute 0.01      Basophils Absolute 0.00     COMPREHENSIVE METABOLIC PANEL - Abnormal    Glucose 110 (*)     Sodium 134 (*)     Potassium 4.1      Chloride 102      Bicarbonate 26      Anion Gap 10      Urea Nitrogen 23      Creatinine 0.70      eGFR 89      Calcium 8.1 (*)     Albumin 2.7 (*)     Alkaline Phosphatase 40      Total Protein 4.7 (*)     AST 16      Bilirubin, Total 0.9      ALT 18     B-TYPE NATRIURETIC PEPTIDE - Abnormal     (*)     Narrative:        <100 pg/mL - Heart failure unlikely  100-299 pg/mL - Intermediate probability of acute heart                  failure exacerbation. Correlate with clinical                  context and patient history.    >=300 pg/mL - Heart Failure likely. Correlate with clinical                  context and patient history.     Biotin interference may cause falsely decreased results. Patients taking a Biotin dose of up to 5 mg/day should refrain from taking Biotin for 24 hours before sample  collection. Providers may contact their local laboratory for further information.   URINALYSIS WITH REFLEX CULTURE AND MICROSCOPIC - Abnormal    Color, Urine Yellow      Appearance, Urine Turbid (*)     Specific Gravity, Urine 1.036 (*)     pH, Urine 5.5       Protein, Urine 20 (TRACE)      Glucose, Urine OVER (4+) (*)     Blood, Urine 0.06 (1+) (*)     Ketones, Urine NEGATIVE      Bilirubin, Urine NEGATIVE      Urobilinogen, Urine 2 (1+) (*)     Nitrite, Urine NEGATIVE      Leukocyte Esterase, Urine NEGATIVE     COAGULATION SCREEN - Abnormal    Protime 13.3 (*)     INR 1.2 (*)     aPTT 33      Narrative:     The APTT is no longer used for monitoring Unfractionated Heparin Therapy. For monitoring Heparin Therapy, use the Heparin Assay.   IRON AND TIBC - Abnormal    Iron 29 (*)     UIBC 179      TIBC 208 (*)     % Saturation 14 (*)    FERRITIN - Abnormal    Ferritin 390 (*)    URINALYSIS MICROSCOPIC WITH REFLEX CULTURE - Abnormal    WBC, Urine 1-5      RBC, Urine 3-5      Bacteria, Urine 4+ (*)    LACTATE - Normal    Lactate 1.7      Narrative:     Venipuncture immediately after or during the administration of Metamizole may lead to falsely low results. Testing should be performed immediately  prior to Metamizole dosing.   SARS-COV-2 AND INFLUENZA A/B PCR - Normal    Flu A Result Not Detected      Flu B Result Not Detected      Coronavirus 2019, PCR Not Detected      Narrative:     This assay has received FDA Emergency Use Authorization (EUA) and  is only authorized for the duration of time that circumstances exist to justify the authorization of the emergency use of in vitro diagnostic tests for the detection of SARS-CoV-2 virus and/or diagnosis of COVID-19 infection under section 564(b)(1) of the Act, 21 U.S.C. 360bbb-3(b)(1). Testing for SARS-CoV-2 is only recommended for patients who meet current clinical and/or epidemiological criteria as defined by federal, state, or local public health directives. This assay is an in vitro diagnostic nucleic acid amplification test for the qualitative detection of SARS-CoV-2, Influenza A, and Influenza B from nasopharyngeal specimens and has been validated for use at [facility]. Negative results do not  preclude COVID-19 infections or Influenza A/B infections, and should not be used as the sole basis for diagnosis, treatment, or other management decisions. If Influenza A/B and RSV PCR results are negative, testing for Parainfluenza virus, Adenovirus and Metapneumovirus is routinely performed for [facility] pediatric oncology and intensive care inpatients, and is available on other patients by placing an add-on request.    RSV PCR - Normal    RSV PCR Not Detected      Narrative:     This assay is an FDA-cleared, in vitro diagnostic nucleic acid amplification test for the detection of RSV from nasopharyngeal specimens, and has been validated for use at [facility]. Negative results do not preclude RSV infections, and should not be used as the sole basis for diagnosis, treatment, or other management decisions. If Influenza A/B and RSV PCR results are negative, testing for Parainfluenza virus, Adenovirus and Metapneumovirus is routinely performed for pediatric oncology and intensive care inpatients at [facility], and is available on other patients by placing an add-on request.       TROPONIN I, HIGH SENSITIVITY - Normal    Troponin I, High Sensitivity 7      Narrative:     Less than 99th percentile of normal range cutoff-  Female and children under 18 years old <35 ng/L; Male <54 ng/L: Negative  Repeat testing should be performed if clinically indicated.     Female and children under 18 years old  ng/L; Male  ng/L:  Consistent with possible cardiac damage and possible increased clinical   risk. Serial measurements may help to assess extent of myocardial damage.     >120 ng/L: Consistent with cardiac damage, increased clinical risk and  myocardial infarction. Serial measurements may help assess extent of   myocardial damage.      NOTE: Children less than 1 year old may have higher baseline troponin   levels and results should be interpreted in conjunction with the overall   clinical  context.    NOTE: Troponin I testing is performed using a different   testing methodology at [facility] than at other   [facility]. Direct result comparisons should only   be made within the same method.     MAGNESIUM - Normal    Magnesium 1.99     BLOOD GAS VENOUS FULL PANEL   URINALYSIS WITH REFLEX CULTURE AND MICROSCOPIC    Narrative:     The following orders were created for panel order Urinalysis with Reflex Culture and Microscopic.  Procedure                               Abnormality         Status                     ---------                               -----------         ------                     Urinalysis with Reflex C...[[id number]]  Abnormal            Final result               Extra Urine Gray Tube[[id number]]                            In process                   Please view results for these tests on the individual orders.   EXTRA URINE GRAY TUBE   URINALYSIS WITH REFLEX CULTURE AND MICROSCOPIC    Narrative:     The following orders were created for panel order Urinalysis with Reflex Culture and Microscopic.  Procedure                               Abnormality         Status                     ---------                               -----------         ------                     Urinalysis with Reflex C...[[id number]]                                                 Extra Urine Gray Tube[[id number]]                                                         Please view results for these tests on the individual orders.   URINALYSIS WITH REFLEX CULTURE AND MICROSCOPIC   EXTRA URINE GRAY TUBE   TYPE AND SCREEN   VITAMIN B12   FOLATE         ED Course & MDM   ED Course as of 02/22/24 2250   Thu Feb 22, 2024   1751 Patient requesting mucinex [MK]   1839 Night meds ordered per patient request [MK]      ED Course User Index  [MK] Bailee Bhakta PA-C         Diagnoses as of 02/22/24 2250   Thrombocytopenia (CMS/HCC)   Hypotension, unspecified hypotension type       Medical Decision  Making      MDM: Patient is a 78-year-old female who presents today for evaluation of hypotension. Upon initial arrival to the ED, patient's BP was 88/54 but when I rechecked her blood pressure here without any intervention it was improved to 103/67 without treatment. Patient is afebrile. The patient is in no respiratory distress, satting well on room air. At this time, I will hold off on fluids given patient's history of CHF and her concern that she is already fluid overloaded, I did reach out to Dr. Burkitt and Amparo IZAGUIRRE when patient arrived to ED to include them in medical decision making.  Basic labs were obtained for cardiac and infectious workup.  Patient is otherwise clinically stable. Case d/w Dr Cook. Urinalysis showed negative nitrites and leukocyte Estrace and no elevated white cells but 4+ bacteria. Patient denies any urinary symptoms so will defer antibiotics at this time. Magnesium is 1.99, CMP unremarkable, white blood cell count is low at 0.2, hemoglobin 10.9, platelet count is low at 53, proBNP is 229 which is lower than 10 days ago, troponin is 7, lactate 1.7, INR 1.2. RSV flu and COVID swabs are negative. Bilateral lower extremity ultrasound showed a subacute versus chronic left common femoral DVT, but given that this is likely chronic, will defer anticoagulation at this time given patient's thrombocytopenia. Discussed case and findings with patient's oncology team, and they would like patient admitted for further management and monitoring. Discussed with [facility] admission coordinators, would recommend admission to oncology with cardiology as consult, admission orders placed. Patient remains stable and normotensive while in the ED.         Procedure  Procedures     Bailee Bhakta PA-C  02/22/24 9920    This is a shared visit. I have reviewed the Advanced Practice Provider's encounter note, approve the Advanced Practice Provider's documentation, and provide the following additional information  from my personal encounter. The patient remains stable, resting calmly. Her blood pressure spontaneously improved upon arrival to the ED without any intervention. She remains normotensive during the rest of my ED shift. She denies any pain. Patient was signed out to my colleague, Dr. Irma Mathew pending all her labwork, urinalysis, and imaging studies. Patient pending remainder of ED course and final disposition at this time.     MD Tahira Rose MD  02/23/24 [id number]

## 2024-02-22 NOTE — ED PROCEDURE NOTE
Procedure    Performed by: Dustin Wise MD  Authorized by: Tahira Cook MD  Cardiac Indications: fluid overload and hypotension                Procedure: Cardiac Ultrasound    Findings:   Views: parasternal long, parasternal short and apical four  The pericardial space was visualized and was NEGATIVE for a significant pericardial effusion.  Activity: Ventricular contractions were visualized.  LV: LV systolic function was DECREASED.  RV: RV size was NORMAL.    Impression:  Cardiac: The focused cardiac ultrasound exam had ABNORMAL findings as specified.  Procedure: Aorta Ultrasound    Findings:   Aorta: The abdominal aorta was visualized and measured < 3 cm (upper abdominal)        Impression:  Aorta: The Aorta exam was NEGATIVE for abdominal aortic abnormalities as described.  Procedure: Thoracic Ultrasound    Findings:  R Lung Sliding: The RIGHT chest was evaluated and LUNG SLIDING was visualized.  L Lung Sliding: The LEFT chest was evaluated and LUNG SLIDING was visualized.  R Effusion: The RIGHT chest was evaluated and there was NO PLEURAL EFFUSION.  L Effusion: The LEFT chest was evaluated and there was NO PLEURAL EFFUSION.  A-lines: The RIGHT chest was evaluated and there were NO A-LINES visualized  B-lines: The RIGHT chest was evaluated and multiple B-LINES were visualized        Impression:  Thorax: The focused thoracic ultrasound exam had ABNORMAL findings as specified.                   Dustin Wise MD  Resident  02/22/24 [id number]

## 2024-02-22 NOTE — H&P
[facility] Team H&P    History Of Present Illness  Amita Todd is a 78 y.o. female with PMHx of Recently diagnosed SCC of the hypopharynx in 12/2023 (concurrent chemoRT with weekly Carboplatin + Paclitaxel 1/24/24 w/ Dr. 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    Ramya 1/18 - 1/19 for chest pain and shortness of breath, during which time she EKG c/w afib, negative trop x2,  (160 previously), negative CTPE, stable AAA, no evidence of pneumonia on CXR or hypoxia. She was not given any diuresis and it was recommended by Oncology/ENT to transfer to [facility] for scope (?) due to concerns for mass occluding airway however this does not appear to have happened. In the interim the patient started chemoRT 1/24, and was seen multiple times in oncology clinic, and found to be symptomatically hypotensive multiple times to 90s/50s for which she was given a bolus of 750cc once with benefit, advised to hold her home losartan, and more recently her home amlodipine. EKGs during these appointments were c/w baseline afib. She was advised to reach out to her cardiologist (Dr. Nova) who was made aware. She was also noted to be severely neutropenic during this course and had chemotherapy held 2/6 and 2/21. She was seen again  2/22 and found to be hypotensive to 66/56 but was not given fluids and was advised to present to the ED due to concern for heart failure iso ankle swelling.    The patient was seen at the bedside at which time she reported she had been hypotensive over the past month and increasingly orthostatic over the past 10 days. She had discontinued her home losartan and more recently amlodipine, but notes she  "had been taking lasix 20mg for 5-6 days last week for weight gain >3lbs associated with ankle swelling and developed lower blood pressure and weight loss. She otherwise denies any fever, chills, shortness of breath or chest pain, but notes she sleeps sitting up - ever since her most recent cancer diagnosis. She states her last hospitalization for heart-related symptoms was in 7/2023 when she had chest pain but MI was ruled out, and does not remember her last hospitalization for heart failure symptoms.     She otherwise reports the onset of fatigue over the past ~1 week, as well as pain in her mouth/throat and \"mouth sores\" for which she has been taking Tylenol 500mg Q6H, but has used Oxycodone 5mg today due to worsening pain though this caused her to feel nauseous (required zofran) and \"out of it\".  Despite her pain the patient states she has been making efforts to maintain adequate nutrtion with po intake.    ROS: is positive for diarrhea last week, urinary frequency and change in urinary color, negative for dysuria    Oncologic History (Per Dr. Burkitt's Last Note)  - 3/2001: S/p lumpectomy for left Breast Cancer followed by adjuvant RT and adjuvant tamoxifen  - 5/2008: History of Stage 1, T1N0M0 Oral Cavity cancer. Lesion of right lateral tongue removed, no adjuvant therapy.  - 12/6/23: Presented to Dr. Tobin with increased dysphagia over several months. Fiberoptic laryngoscopy reviewed lesion involving left piriform sinus, >1cm.  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 FDG- avid b/l cervical leverl II nodes. NO distant mets.   - " 1/24/24: Started concurrent chemoRT with weekly Carboplatin + Paclitaxel    ED Course:  BP 94/66   Pulse 76   Temp 36.5 °C (97.7 °F)   Resp 16   SpO2 98%      Labs  Results from last 7 days   Lab Units 02/22/24  1606 02/21/24  1035   SODIUM mmol/L 134* 136   POTASSIUM mmol/L 4.1 4.2   CHLORIDE mmol/L 102 100   CO2 mmol/L 26 30   BUN mg/dL 23 25*   CREATININE mg/dL 0.70 0.74   CALCIUM mg/dL 8.1* 8.6      Results from last 7 days   Lab Units 02/22/24  1606 02/21/24  1035   ALK PHOS U/L 40 46   BILIRUBIN TOTAL mg/dL 0.9 1.3*   PROTEIN TOTAL g/dL 4.7* 5.9*   ALT U/L 18 20   AST U/L 16 15      Results from last 7 days   Lab Units 02/22/24  1606   APTT seconds 33   INR  1.2*      Lab Results   Component Value Date    WBC 0.2 (LL) 02/22/2024    HGB 10.9 (L) 02/22/2024    HCT 31.7 (L) 02/22/2024    MCV 91 02/22/2024    PLT 53 (L) 02/22/2024      BNP 2/22: 229 - BL ~230s  CXR 2/22: No acute cardiopulmonary process    ED Interventions: In the ED the patient had POCUS complete which showed no pericardial effusion, normal RV size, decreased LV function and abdominal aorta visualized <3cm.    Cardiac Hx:  Last echo:   TTE 7/2023: LVEF 55-60%, normal ventricle size bilaterally. Thickened aortic valve with moderate regurgitation. AV area is 2.4cm and mean gradient is 4mmHG. Mild MR/TR.  Last stress test:   7/2023: EF 54%, no ischemia     Last EKG:   Encounter Date: 02/06/24   ECG 12 lead   Result Value    Ventricular Rate 90    QRS Duration 86    QT Interval 362    QTC Calculation(Bazett) 442    R Axis 35    T Axis 86    QRS Count 14    Q Onset 224    T Offset 405    QTC Fredericia 414    Narrative    Atrial fibrillation  Minimal voltage criteria for LVH, may be normal variant ( Sokolow-Aguilar )  Septal infarct , age undetermined  Abnormal ECG  When compared with ECG of 18-JAN-2024 16:35,  No significant change was found  Confirmed by Matthew Canales ([id number]) on 2/12/2024 8:05:16 AM        GI Hx:  Last EGD: === 02/17/21  ===    EGD    Impression:            - Z-line, 39 cm from the incisors.  - Normal esophagus.  - 3 parallel streaks of hemorrhagic appearance and  linear erosions mucosa in the gastric body. Biopsied.  - Normal examined duodenum.  - Unclear if the gastric lesions are her sole source  of bleeding as not seen on two previous endoscopie  over past 6 months with recurrent UGI bleeding.    Recommendation:        - Return patient to hospital [facility] for ongoing care.  - Resume regular diet.  - Continue present medications.  - Await pathology results.  - To visualize the small bowel, perform video capsule  endoscopy today.  - Resume Plavix (clopidogrel) tomorrow and Xarelto  (rivaroxaban) in 1 week at prior doses.  Attending Participation:  I personally performed the entire procedure.  Jude Melo MD  2/18/2021 11:33:57 AM  This report has been signed electronically.  Number of Addenda: 0  Note Initiated On: 2/18/2021 11:08 AM  Total Procedure Duration Time 0 hours 14 minutes 43 seconds === Results for orders placed in visit on 02/17/21 ===    EGD [GI2]     Status: Normal    Past Medical History  She has a past medical history of Alcohol dependence, in remission (CMS/[facility]) (06/16/2016), Anemia, Aneurysm of ascending aorta (CMS/[facility]), Arrhythmia, Branch retinal artery occlusion of right eye (08/24/2023), CKD (chronic kidney disease), Coronary artery disease, Depression, GERD (gastroesophageal reflux disease), Hypertension, Intermittent claudication (CMS/[facility]), Myocardial infarction (CMS/[facility]) (10/06/2023), Other bursitis of hip, unspecified hip (11/21/2017), Other conditions influencing health status (01/05/2014), Pain in leg, unspecified (01/25/2022), Pain in right shoulder (07/01/2020), Pain in unspecified knee (03/31/2021), Personal history of diseases of the blood and blood-forming organs and certain disorders involving the immune mechanism (02/09/2019), Personal history of malignant neoplasm of breast (04/29/2014),  Personal history of other diseases of the circulatory system (04/03/2014), Personal history of other diseases of the digestive system (03/08/2021), Personal history of other diseases of the digestive system (03/08/2021), Personal history of other diseases of the nervous system and sense organs (08/12/2020), Personal history of other infectious and parasitic diseases (06/14/2019), Personal history of other specified conditions (03/03/2020), Personal history of other specified conditions (02/22/2021), Personal history of transient ischemic attack (TIA), and cerebral infarction without residual deficits (04/24/2022), Stenosis of carotid artery, and Trigger finger, unspecified finger (04/24/2017).    Surgical History  She has a past surgical history that includes Other surgical history (03/03/2020); Other surgical history (06/17/2019); Septoplasty (09/22/2017); Other surgical history (08/29/2019); Other surgical history (04/23/2014); CT angio head w and wo IV contrast (06/06/2019); CT angio neck (06/06/2019); MR angio head wo IV contrast (06/06/2019); MR angio neck wo IV contrast (06/06/2019); CT angio head w and wo IV contrast (01/04/2021); and CT angio neck (01/04/2021).     Social History  She reports that she quit smoking about 24 years ago. Her smoking use included cigarettes. She has a 60.00 pack-year smoking history. She has never used smokeless tobacco. She reports that she does not currently use alcohol. She reports that she does not use drugs.    Family History  Family History   Problem Relation Name Age of Onset    Breast cancer Mother      Other (GOODPASTURE'S DISEASE) Father      BRCA2 Positive Sister      Lung cancer Sister      Neuroblastoma Sister      BRCA2 Positive Daughter      Breast cancer Other G/P         Allergies  Oxycodone hcl-oxycodone-asa, Oxycodone-acetaminophen, Penicillin g, and Penicillins     Physical Exam  General: well-developed, frail, mildly uncomfortable on appearance  HEENT: dry  mouth, thrush in oral cavity with multiple confluent lesions >2mm in size, dry mouth, cracked corners of lips, burn-like erythematous discoloration of neck and chest  Heart: tachycardic and irregular, no m/r/g, trace pitting edema in ankles  Lungs: CTAB with no adventitious sounds  Abdo: soft, non-distended, BS+  Skin: very dry, discolored throughout, some bruising in arms, 1st degree burns in neck/chest     Last Recorded Vitals  BP 94/66   Pulse 76   Temp 36.5 °C (97.7 °F)   Resp 16   SpO2 98%     Medications  Scheduled medications  [START ON 2/23/2024] ascorbic acid, 500 mg, oral, Daily  atorvastatin, 20 mg, oral, Daily  buPROPion SR, 150 mg, oral, BID  cholecalciferol, 1,000 Units, oral, BID  [START ON 2/23/2024] clopidogrel, 75 mg, oral, Daily  [START ON 2/23/2024] dapagliflozin propanediol, 10 mg, oral, Daily  [START ON 2/23/2024] dexAMETHasone, 4 mg, oral, BID with meals  DULoxetine, 60 mg, oral, Nightly  fluconazole, 200 mg, intravenous, q24h  guaiFENesin, 400 mg, oral, TID  [START ON 2/23/2024] magnesium oxide, 400 mg, oral, Daily  metoprolol tartrate, 50 mg, oral, BID  [START ON 2/23/2024] multivitamin with minerals, 1 tablet, oral, Daily  ondansetron ODT, 4 mg, oral, Once  pantoprazole, 40 mg, oral, BID  perflutren lipid microspheres, 0.5-10 mL of dilution, intravenous, Once in imaging  perflutren protein A microsphere, 0.5 mL, intravenous, Once in imaging  [Held by provider] spironolactone, 25 mg, oral, Daily  sulfur hexafluoride microsphr, 2 mL, intravenous, Once in imaging      Continuous medications     PRN medications       Labs    Results from last 7 days   Lab Units 02/22/24  1606 02/21/24  1035   WBC AUTO x10*3/uL 0.2* 0.4*   HEMOGLOBIN g/dL 10.9* 11.2*   HEMATOCRIT % 31.7* 34.5*   PLATELETS AUTO x10*3/uL 53* 81*            Results from last 7 days   Lab Units 02/22/24  1606 02/21/24  1035   SODIUM mmol/L 134* 136   POTASSIUM mmol/L 4.1 4.2   CHLORIDE mmol/L 102 100   CO2 mmol/L 26 30   BUN  mg/dL 23 25*   CREATININE mg/dL 0.70 0.74   CALCIUM mg/dL 8.1* 8.6     Results from last 7 days   Lab Units 02/22/24  1606 02/21/24  1035   ALK PHOS U/L 40 46   BILIRUBIN TOTAL mg/dL 0.9 1.3*   PROTEIN TOTAL g/dL 4.7* 5.9*   ALT U/L 18 20   AST U/L 16 15      Results from last 7 days   Lab Units 02/22/24  1606   APTT seconds 33   INR  1.2*        Imaging  Point of Care Ultrasound         Vascular US lower extremity venous duplex bilateral   Final Result   Echogenic material within the left common femoral vein which may   represent subacute/chronic thrombus.        I personally reviewed the images/study and I agree with Amparo Bruner DO's (radiology resident) findings as stated. This study   was interpreted at [place], [place].        MACRO:   None        Signed by: Beny Stevens 2/22/2024 3:43 PM   Dictation workstation:   [id number]      XR chest 1 view   Final Result   No acute cardiopulmonary process is evident.        MACRO:   None        Signed by: Froilan Perdomo 2/22/2024 2:21 PM   Dictation workstation:   [id number]      Transthoracic Echo (TTE) Complete    (Results Pending)      Assessment/Plan   77yo F w PMHx squamous cell carcinoma of hypopharynx, HFpEF, CAD s/p PCI, Afib s/p watchman, GERD, depression, HTN, AUD, ascending aortic aneurysm, distant breast cancer (s/p chemoradiotherapy and lumpectomy) who was directed to ED for hypotension. In light of CHF history, there was some initial concern for decompensated heart failure. On evaluation, pt relates a history of frequent PRN lasix doses in the last week coinciding with hypotension, and her BP has improved rapidly while in the ED. Bedside ultrasound evaluation shows ~1.8cm IVC with collapse of ~50%, no sizeable pericardial effusion. Despite CHF history, suspect pt is overtreated for HTN and CHF and will hold off on diuresis or fluid replacement at this time.      #Hypotension, improved    #HFpEF, initial c/f ADHF  ::Patient presents with hypotension over the past month in oncology clinic, has received IVF and discontinued home losartan/amlodipine during this time, BP spontaneously improving without intervention today to 100s/70s, appears grossly dry to euvolemic on exam without any symptoms of acute heart failure  ::POCUS 2/22 with ~2IVC, >50% collapse with respirations, decrease in LV function   ::EKG on admission pending   ::BNP 2/22: 229 - appears to be at baseline  ::TTE 7/2023: EF 55-60%  Plan:  - Obtain orthostatic vitals, gentle IVF as tolerated, encourage PO intake   - TTE ordered to re-evaluate heart function  - Continue to hold home Losartan 50mg daily  - Continue to hold home Amlodipine 2.5mg daily  - Hold home Spironolactone 25mg daily   - c/w home Metoprolol 50mg BID (for rate control)  - c/w home Farxiga 10mg daily     #SCC of Hypopharynx (Dr. Burkitt)  ::SLP eval 2/12 recommended soft solids with thin liquids, patient would prefer oral feeding to PEG   - Contact primary oncology in the morning, need to reschedule chemo and RT  - Consider nutrition consult and calorie count in AM  - c/w home Guaifenesin  - c/w Zofran prn nausea  - Pain regimen: PO dilaudid 1mg q4h PRN (pt reports intolerance of oxycodone), holding home Tylenol so as to not mask fever in neutropenia.  - Will continue dexamethasone 4mg BID (unclear indication/palliative?) for now     #Oropharyngeal candidiasis  ::Moderate to severe candidiasis with multiple large lesions  - Fluconazole 200mg IV (2/22 - xx), typically for 7 days but reassess for duration  - Oral care per nursing     #Neutropenia  - Daily CBC  - Hold acetaminophen, NSAIDs   - Blood cultures STAT and zosyn if fever    #Thrombocytopenia  ::Plt 53 on admission, no current active bleeding  - T&S updated, transfuse for active bleeding <50, or for <10  - Daily CBC  - Will hold on DVT prophylaxis, SCDs only  - Will continue clopidogrel for now     #Normocytic  Anemia  ::HB 10.9 on presentation (BL 12-13), no active bleeding, no recent iron studies  - Likely d/t chronic dz vs myelosuppresion, though patient has had poor po intake  - Ordered iron studies, ferritin, B12/Folate    #Atrial Fibrillation  ::Patient previously on AC, now s/p watchman procedure and is rate controlled   ::Admission EKG pending   - Continue metoprolol tartrate 50mg BID    #Urinary Frequency  - UA with reflex     #Hx of TIA  - c/w home Plavix 75mg daily    #GERD  - c/w home Pantoprazole    #Mood Disorder  #Misc  - c/w home Cymbalta and Wellbutrin  - c/w home supplements, mag-ox    F : as needed  E: replete lytes prn, K>4, Mg >2  N: soft and minced, thin liquids  A: PIVs    DVT prophylaxis: SCDs  GI prophylaxis: home PPI    Code Status: FULL (confirmed on admission, though patient states she has living will she does not remember the details of, images not in system, likely DNR )     Justino Cooper MD  PGY-1, Internal Medicine

## 2024-02-22 NOTE — Clinical Note
ED  Recommendation: ED- Rec. Obs (02/22/24 2779 : Aarti Grey RN)   Verified Results  (1) CBC/PLT/DIFF [id number] 01:04PM [facility] Order Number: [id number]     Test Name Result Flag Reference   WBC COUNT 11 64 Thousand/uL H 4 31-10 16   RBC COUNT 4 69 Million/uL  3 81-5 12   HEMOGLOBIN 14 3 g/dL  11 5-15 4   HEMATOCRIT 42 4 %  34 8-46  1   MCV 90 fL  82-98   MCH 30 5 pg  26 8-34 3   MCHC 33 7 g/dL  31 4-37 4   RDW 12 9 %  11 6-15 1   MPV 9 4 fL  8 9-12 7   PLATELET COUNT 359 Thousands/uL  149-390   NEUTROPHILS RELATIVE PERCENT 67 %  43-75   LYMPHOCYTES RELATIVE PERCENT 24 %  14-44   MONOCYTES RELATIVE PERCENT 8 %  4-12   EOSINOPHILS RELATIVE PERCENT 1 %  0-6   BASOPHILS RELATIVE PERCENT 0 %  0-1   NEUTROPHILS ABSOLUTE COUNT 7 83 Thousands/? ??L H 1 85-7 62   LYMPHOCYTES ABSOLUTE COUNT 2 74 Thousands/? ??L  0 60-4 47   MONOCYTES ABSOLUTE COUNT 0 97 Thousand/? ??L  0 17-1 22   EOSINOPHILS ABSOLUTE COUNT 0 08 Thousand/? ??L  0 00-0 61   BASOPHILS ABSOLUTE COUNT 0 02 Thousands/? ??L  0 00-0 10     (1) COMPREHENSIVE METABOLIC PANEL [id number] 13:89EK [facility] Order Number: [id number]     Test Name Result Flag Reference   GLUCOSE,RANDM 98 mg/dL     If the patient is fasting, the ADA then defines impaired fasting glucose as > 100 mg/dL and diabetes as > or equal to 123 mg/dL  Specimen collection should occur prior to Sulfasalazine administration due to the potential for falsely depressed results  Specimen collection should occur prior to Sulfapyridine administration due to the potential for falsely elevated results     SODIUM 137 mmol/L  136-145   POTASSIUM 4 3 mmol/L  3 5-5 3   CHLORIDE 102 mmol/L  100-108   CARBON DIOXIDE 26 mmol/L  21-32   ANION GAP (CALC) 9 mmol/L  4-13   BLOOD UREA NITROGEN 7 mg/dL  5-25   CREATININE 0 78 mg/dL  0 60-1 30   Standardized to IDMS reference method   CALCIUM 8 7 mg/dL  8 3-10 1   BILI, TOTAL 1 20 mg/dL H 0 20-1 00   ALK PHOSPHATAS 68 U/L     ALT (SGPT) 20 U/L  12-78   Specimen collection should occur prior to Sulfasalazine administration due to the potential for falsely depressed results  AST(SGOT) 16 U/L  5-45   Specimen collection should occur prior to Sulfasalazine administration due to the potential for falsely depressed results  ALBUMIN 4 3 g/dL  3 5-5 0   TOTAL PROTEIN 6 9 g/dL  6 4-8 2   eGFR 99 ml/min/1 73sq m     National Kidney Disease Education Program recommendations are as follows:  GFR calculation is accurate only with a steady state creatinine  Chronic Kidney disease less than 60 ml/min/1 73 sq  meters  Kidney failure less than 15 ml/min/1 73 sq  meters  (1) D-DIMER [id number] 01:04PM [employer] Order Number: [id number]     Test Name Result Flag Reference   D-DIMER <270 ng/ml (FEU)  0-424   Reference and upper limits to exclude DVT and PE are the same  Do not use to exclude if clinical symptoms are present  Pregnant women:  1st trimester:  <220 - 1060 ng/ml (FEU)  2nd trimester:  <220 - 1880 ng/ml (FEU)  3rd trimester:   238 - 3280 ng/ml (FEU)     (1) TSH [id number] 01:04PM [employer] Order Number: [id number]     Test Name Result Flag Reference   TSH 2 008 uIU/mL  0 358-3 740   Patients undergoing fluorescein dye angiography may retain small amounts of fluorescein in the body for 48-72 hours post procedure  Samples containing fluorescein can produce falsely depressed TSH values  If the patient had this procedure,a specimen should be resubmitted post fluorescein clearance            The recommended reference ranges for TSH during pregnancy are as follows:  First trimester 0 1 to 2 5 uIU/mL  Second trimester  0 2 to 3 0 uIU/mL  Third trimester 0 3 to 3 0 uIU/m

## 2024-02-22 NOTE — ED TRIAGE NOTES
Pt brought down from radiation oncology for low BP. SBP reported to be in the 60s. Reports dizzy and lightheaded. Was able to finish a full radiation tx today.

## 2024-02-23 ENCOUNTER — CLINICAL SUPPORT (OUTPATIENT)
Dept: EMERGENCY MEDICINE | Facility: HOSPITAL | Age: 79
DRG: 314 | End: 2024-02-23
Payer: MEDICARE

## 2024-02-23 ENCOUNTER — APPOINTMENT (OUTPATIENT)
Dept: RADIOLOGY | Facility: HOSPITAL | Age: 79
DRG: 314 | End: 2024-02-23
Payer: MEDICARE

## 2024-02-23 LAB
ALBUMIN SERPL BCP-MCNC: 2.6 G/DL (ref 3.4–5)
ALP SERPL-CCNC: 44 U/L (ref 33–136)
ALT SERPL W P-5'-P-CCNC: 17 U/L (ref 7–45)
AST SERPL W P-5'-P-CCNC: 15 U/L (ref 9–39)
BASOPHILS # BLD AUTO: 0 X10*3/UL (ref 0–0.1)
BASOPHILS NFR BLD AUTO: 0 %
BILIRUB DIRECT SERPL-MCNC: 0.3 MG/DL (ref 0–0.3)
BILIRUB SERPL-MCNC: 0.9 MG/DL (ref 0–1.2)
EOSINOPHIL # BLD AUTO: 0.01 X10*3/UL (ref 0–0.4)
EOSINOPHIL NFR BLD AUTO: 4.5 %
ERYTHROCYTE [DISTWIDTH] IN BLOOD BY AUTOMATED COUNT: 16 % (ref 11.5–14.5)
HCT VFR BLD AUTO: 31.7 % (ref 36–46)
HGB BLD-MCNC: 10.9 G/DL (ref 12–16)
HGB RETIC QN: 37 PG (ref 28–38)
HOLD SPECIMEN: NORMAL
IMM GRANULOCYTES # BLD AUTO: 0 X10*3/UL (ref 0–0.5)
IMM GRANULOCYTES NFR BLD AUTO: 0 % (ref 0–0.9)
IMMATURE RETIC FRACTION: 21.9 %
LDH SERPL L TO P-CCNC: 137 U/L (ref 84–246)
LYMPHOCYTES # BLD AUTO: 0.03 X10*3/UL (ref 0.8–3)
LYMPHOCYTES NFR BLD AUTO: 13.6 %
MCH RBC QN AUTO: 31.1 PG (ref 26–34)
MCHC RBC AUTO-ENTMCNC: 34.4 G/DL (ref 32–36)
MCV RBC AUTO: 91 FL (ref 80–100)
MONOCYTES # BLD AUTO: 0.03 X10*3/UL (ref 0.05–0.8)
MONOCYTES NFR BLD AUTO: 13.6 %
NEUTROPHILS # BLD AUTO: 0.15 X10*3/UL (ref 1.6–5.5)
NEUTROPHILS NFR BLD AUTO: 68.3 %
NRBC BLD-RTO: 13.6 /100 WBCS (ref 0–0)
PLATELET # BLD AUTO: 53 X10*3/UL (ref 150–450)
PROT SERPL-MCNC: 4.8 G/DL (ref 6.4–8.2)
RAD ONC MSQ ACTUAL FRACTIONS DELIVERED: 23
RAD ONC MSQ ACTUAL SESSION DELIVERED DOSE: 200 CGRAY
RAD ONC MSQ ACTUAL TOTAL DOSE: 4600 CGRAY
RAD ONC MSQ ELAPSED DAYS: 30
RAD ONC MSQ LAST DATE: NORMAL
RAD ONC MSQ PRESCRIBED FRACTIONAL DOSE: 200 CGRAY
RAD ONC MSQ PRESCRIBED NUMBER OF FRACTIONS: 35
RAD ONC MSQ PRESCRIBED TECHNIQUE: NORMAL
RAD ONC MSQ PRESCRIBED TOTAL DOSE: 7000 CGRAY
RAD ONC MSQ PRESCRIPTION PATTERN COMMENT: NORMAL
RAD ONC MSQ START DATE: NORMAL
RAD ONC MSQ TREATMENT COURSE NUMBER: 1
RAD ONC MSQ TREATMENT SITE: NORMAL
RBC # BLD AUTO: 3.5 X10*6/UL (ref 4–5.2)
RETICS #: 0.03 X10*6/UL (ref 0.02–0.11)
RETICS/RBC NFR AUTO: 0.8 % (ref 0.5–2)
WBC # BLD AUTO: 0.2 X10*3/UL (ref 4.4–11.3)

## 2024-02-23 PROCEDURE — 77014 CHG CT GUIDANCE RADIATION THERAPY FLDS PLACEMENT: CPT | Performed by: RADIOLOGY

## 2024-02-23 PROCEDURE — 2500000001 HC RX 250 WO HCPCS SELF ADMINISTERED DRUGS (ALT 637 FOR MEDICARE OP)

## 2024-02-23 PROCEDURE — 1170000001 HC PRIVATE ONCOLOGY ROOM DAILY

## 2024-02-23 PROCEDURE — 2500000004 HC RX 250 GENERAL PHARMACY W/ HCPCS (ALT 636 FOR OP/ED)

## 2024-02-23 PROCEDURE — 2500000001 HC RX 250 WO HCPCS SELF ADMINISTERED DRUGS (ALT 637 FOR MEDICARE OP): Performed by: PHYSICIAN ASSISTANT

## 2024-02-23 PROCEDURE — D9031ZZ BEAM RADIATION OF HYPOPHARYNX USING PHOTONS 1 - 10 MEV: ICD-10-PCS

## 2024-02-23 PROCEDURE — 77386 HC INTENSITY-MODULATED RADIATION THERAPY (IMRT), COMPLEX: CPT | Performed by: RADIOLOGY

## 2024-02-23 PROCEDURE — 93005 ELECTROCARDIOGRAM TRACING: CPT

## 2024-02-23 PROCEDURE — D9031ZZ BEAM RADIATION OF HYPOPHARYNX USING PHOTONS 1 - 10 MEV: ICD-10-PCS | Performed by: HOSPITALIST

## 2024-02-23 PROCEDURE — 99233 SBSQ HOSP IP/OBS HIGH 50: CPT

## 2024-02-23 PROCEDURE — 2500000002 HC RX 250 W HCPCS SELF ADMINISTERED DRUGS (ALT 637 FOR MEDICARE OP, ALT 636 FOR OP/ED)

## 2024-02-23 RX ORDER — HEPARIN 100 UNIT/ML
SYRINGE INTRAVENOUS
Status: COMPLETED
Start: 2024-02-23 | End: 2024-02-23

## 2024-02-23 RX ORDER — OXYCODONE HYDROCHLORIDE 5 MG/1
5 TABLET ORAL EVERY 6 HOURS PRN
Status: DISCONTINUED | OUTPATIENT
Start: 2024-02-23 | End: 2024-02-24

## 2024-02-23 RX ORDER — FLUCONAZOLE 100 MG/1
200 TABLET ORAL DAILY
Status: DISCONTINUED | OUTPATIENT
Start: 2024-02-23 | End: 2024-02-23

## 2024-02-23 RX ORDER — FLUCONAZOLE 2 MG/ML
400 INJECTION, SOLUTION INTRAVENOUS EVERY 24 HOURS
Status: DISCONTINUED | OUTPATIENT
Start: 2024-02-23 | End: 2024-03-05

## 2024-02-23 RX ORDER — ACETAMINOPHEN 325 MG/1
650 TABLET ORAL EVERY 6 HOURS
Status: DISCONTINUED | OUTPATIENT
Start: 2024-02-23 | End: 2024-02-25

## 2024-02-23 RX ORDER — ACETAMINOPHEN 325 MG/1
TABLET ORAL
Status: COMPLETED
Start: 2024-02-23 | End: 2024-02-24

## 2024-02-23 RX ORDER — PANTOPRAZOLE SODIUM 40 MG/1
40 TABLET, DELAYED RELEASE ORAL DAILY
Status: DISCONTINUED | OUTPATIENT
Start: 2024-02-24 | End: 2024-02-24

## 2024-02-23 RX ORDER — ACETAMINOPHEN 325 MG/1
650 TABLET ORAL EVERY 6 HOURS PRN
Status: DISCONTINUED | OUTPATIENT
Start: 2024-02-23 | End: 2024-02-23

## 2024-02-23 RX ADMIN — PANTOPRAZOLE SODIUM 40 MG: 40 TABLET, DELAYED RELEASE ORAL at 10:11

## 2024-02-23 RX ADMIN — OXYCODONE HYDROCHLORIDE 5 MG: 5 TABLET ORAL at 22:44

## 2024-02-23 RX ADMIN — ACETAMINOPHEN 650 MG: 325 TABLET ORAL at 16:46

## 2024-02-23 RX ADMIN — Medication 1 TABLET: at 09:44

## 2024-02-23 RX ADMIN — GUAIFENESIN 400 MG: 200 SOLUTION ORAL at 09:56

## 2024-02-23 RX ADMIN — CLOPIDOGREL BISULFATE 75 MG: 75 TABLET ORAL at 09:44

## 2024-02-23 RX ADMIN — DEXAMETHASONE 4 MG: 2 TABLET ORAL at 16:41

## 2024-02-23 RX ADMIN — METOPROLOL TARTRATE 50 MG: 50 TABLET, FILM COATED ORAL at 20:06

## 2024-02-23 RX ADMIN — DULOXETINE HYDROCHLORIDE 60 MG: 60 CAPSULE, DELAYED RELEASE ORAL at 20:06

## 2024-02-23 RX ADMIN — METOPROLOL TARTRATE 50 MG: 50 TABLET, FILM COATED ORAL at 09:44

## 2024-02-23 RX ADMIN — DEXAMETHASONE 4 MG: 2 TABLET ORAL at 09:44

## 2024-02-23 RX ADMIN — GUAIFENESIN 200 MG: 200 SOLUTION ORAL at 22:43

## 2024-02-23 RX ADMIN — BUPROPION HYDROCHLORIDE 150 MG: 150 TABLET, EXTENDED RELEASE ORAL at 22:43

## 2024-02-23 RX ADMIN — Medication 1000 UNITS: at 09:59

## 2024-02-23 RX ADMIN — Medication 400 MG: at 09:44

## 2024-02-23 RX ADMIN — ATORVASTATIN CALCIUM 20 MG: 20 TABLET, FILM COATED ORAL at 09:44

## 2024-02-23 RX ADMIN — DAPAGLIFLOZIN 10 MG: 10 TABLET, FILM COATED ORAL at 09:44

## 2024-02-23 RX ADMIN — SODIUM CHLORIDE, SODIUM LACTATE, POTASSIUM CHLORIDE, AND CALCIUM CHLORIDE 500 ML: 600; 310; 30; 20 INJECTION, SOLUTION INTRAVENOUS at 15:51

## 2024-02-23 RX ADMIN — FLUCONAZOLE 200 MG: 100 TABLET ORAL at 11:02

## 2024-02-23 RX ADMIN — OXYCODONE HYDROCHLORIDE AND ACETAMINOPHEN 500 MG: 500 TABLET ORAL at 09:44

## 2024-02-23 RX ADMIN — ACETAMINOPHEN 650 MG: 325 TABLET ORAL at 22:43

## 2024-02-23 RX ADMIN — OXYCODONE HYDROCHLORIDE 5 MG: 5 TABLET ORAL at 16:41

## 2024-02-23 RX ADMIN — BUPROPION HYDROCHLORIDE 150 MG: 150 TABLET, EXTENDED RELEASE ORAL at 09:46

## 2024-02-23 RX ADMIN — Medication 1000 UNITS: at 20:06

## 2024-02-23 RX ADMIN — SODIUM CHLORIDE, PRESERVATIVE FREE: 5 INJECTION INTRAVENOUS at 11:30

## 2024-02-23 ASSESSMENT — PAIN - FUNCTIONAL ASSESSMENT
PAIN_FUNCTIONAL_ASSESSMENT: 0-10

## 2024-02-23 ASSESSMENT — COGNITIVE AND FUNCTIONAL STATUS - GENERAL
DRESSING REGULAR UPPER BODY CLOTHING: A LITTLE
DAILY ACTIVITIY SCORE: 20
MOBILITY SCORE: 17
TOILETING: A LITTLE
WALKING IN HOSPITAL ROOM: A LITTLE
STANDING UP FROM CHAIR USING ARMS: A LITTLE
DRESSING REGULAR LOWER BODY CLOTHING: A LITTLE
CLIMB 3 TO 5 STEPS WITH RAILING: A LOT
TURNING FROM BACK TO SIDE WHILE IN FLAT BAD: A LITTLE
HELP NEEDED FOR BATHING: A LITTLE
MOVING TO AND FROM BED TO CHAIR: A LITTLE
MOVING FROM LYING ON BACK TO SITTING ON SIDE OF FLAT BED WITH BEDRAILS: A LITTLE

## 2024-02-23 ASSESSMENT — PAIN SCALES - GENERAL
PAINLEVEL_OUTOF10: 7
PAINLEVEL_OUTOF10: 6
PAINLEVEL_OUTOF10: 5 - MODERATE PAIN

## 2024-02-23 ASSESSMENT — PAIN DESCRIPTION - LOCATION: LOCATION: THROAT

## 2024-02-23 ASSESSMENT — PAIN DESCRIPTION - DESCRIPTORS: DESCRIPTORS: SORE

## 2024-02-23 NOTE — ED NOTES
Assumed care of pt at this time. Diflucan shown as past due. Per MAGDALENA Goldberg (previous RN of this patient). Patient did not recieve dose over night on nightshift 2/22/23 at 2109 when shown marked off by MAGDALENA Patel (bag was found full on pole). MAGDALENA Goldberg gave medication at 1130 this morning and did not cuong off that she gave it at that time but stated that she provided a note in chart regarding medication. Due to medication being given at 1130 2/23/24 this morning medication will be rescheduled to tomorrow at 1130 per 24 hour protocol. Patient resting in bed, respirations even and unlabored. Family bedside at this time.      Jodi Bustos RN  02/23/24 [id number]       Jodi Bustos RN  02/23/24 9915

## 2024-02-23 NOTE — PROGRESS NOTES
Amita Todd is a 78 y.o. female on day 1 of admission presenting with Thrombocytopenia (CMS/HCC).    Subjective       Objective   Physical Exam  Constitutional:       Comments: underweight   HENT:      Mouth/Throat:      Mouth: Mucous membranes are dry.   Eyes:      Pupils: Pupils are equal, round, and reactive to light.   Cardiovascular:      Rate and Rhythm: Regular rhythm. Tachycardia present.      Pulses: Normal pulses.   Pulmonary:      Comments: Mild inspiratory crackles B/L lung bases.   Abdominal:      General: Abdomen is flat. Bowel sounds are normal.      Palpations: Abdomen is soft.   Musculoskeletal:         General: Normal range of motion.      Cervical back: Normal range of motion.      Comments: 1+ pitting edema B/L LE till ankles   Skin:     General: Skin is warm.      Capillary Refill: Capillary refill takes less than 2 seconds.      Findings: Bruising present.      Comments: Petechia over trunk   Neurological:      General: No focal deficit present.      Mental Status: She is alert and oriented to person, place, and time. Mental status is at baseline.   Psychiatric:         Mood and Affect: Mood normal.       Vitals:    02/23/24 1726   BP: 126/80   Pulse: 93   Resp: 16   Temp: 36.8 °C (98.2 °F)   SpO2: 94%     Scheduled medications  acetaminophen, , ,   acetaminophen, 650 mg, oral, q6h  ascorbic acid, 500 mg, oral, Daily  atorvastatin, 20 mg, oral, Daily  buPROPion SR, 150 mg, oral, BID  cholecalciferol, 1,000 Units, oral, BID  clopidogrel, 75 mg, oral, Daily  dapagliflozin propanediol, 10 mg, oral, Daily  dexAMETHasone, 4 mg, oral, BID with meals  DULoxetine, 60 mg, oral, Nightly  fluconazole, 400 mg, intravenous, q24h  magnesium oxide, 400 mg, oral, Daily  metoprolol tartrate, 50 mg, oral, BID  multivitamin with minerals, 1 tablet, oral, Daily  ondansetron ODT, 4 mg, oral, Once  [START ON 2/24/2024] pantoprazole, 40 mg, oral, Daily  perflutren lipid microspheres, 0.5-10 mL of dilution,  intravenous, Once in imaging  perflutren protein A microsphere, 0.5 mL, intravenous, Once in imaging  sulfur hexafluoride microsphr, 2 mL, intravenous, Once in imaging      Continuous medications     PRN medications  PRN medications: acetaminophen, guaiFENesin, ondansetron, oxyCODONE, phenoL      Relevant Results  Results for orders placed or performed during the hospital encounter of 02/22/24 (from the past 24 hour(s))   Urinalysis with Reflex Culture and Microscopic   Result Value Ref Range    Color, Urine Yellow Light-Yellow, Yellow, Dark-Yellow    Appearance, Urine Turbid (N) Clear    Specific Gravity, Urine 1.036 (N) 1.005 - 1.035    pH, Urine 5.5 5.0, 5.5, 6.0, 6.5, 7.0, 7.5, 8.0    Protein, Urine 20 (TRACE) NEGATIVE, 10 (TRACE), 20 (TRACE) mg/dL    Glucose, Urine OVER (4+) (A) Normal mg/dL    Blood, Urine 0.06 (1+) (A) NEGATIVE    Ketones, Urine NEGATIVE NEGATIVE mg/dL    Bilirubin, Urine NEGATIVE NEGATIVE    Urobilinogen, Urine 2 (1+) (A) Normal mg/dL    Nitrite, Urine NEGATIVE NEGATIVE    Leukocyte Esterase, Urine NEGATIVE NEGATIVE   Extra Urine Gray Tube   Result Value Ref Range    Extra Tube Hold for add-ons.    Urinalysis Microscopic   Result Value Ref Range    WBC, Urine 1-5 1-5, NONE /HPF    RBC, Urine 3-5 NONE, 1-2, 3-5 /HPF    Bacteria, Urine 4+ (A) NONE SEEN /HPF       Assessment/Plan   77yo F w PMHx squamous cell carcinoma of hypopharynx, HFpEF, CAD s/p PCI, Afib s/p watchman, GERD, depression, HTN, AUD, ascending aortic aneurysm, distant breast cancer (s/p chemoradiotherapy and lumpectomy) who was directed to ED for hypotension. In light of CHF history, there was some initial concern for decompensated heart failure. On evaluation, pt relates a history of frequent PRN lasix doses in the last week coinciding with hypotension, and her BP has improved rapidly while in the ED. BP spontaneously improving without intervention today to 100s/70s, appears grossly dry to euvolemic on exam without any  symptoms of acute heart failure + positive orthostats in ED.  Despite CHF history, suspect pt is overtreated for HTN and CHF and will hold off on diuresis, currently s/p 500cc LR bolus, will reassess orthotasts in evening.      #Hypotension, improved   #HFpEF, initial c/f ADHF  ::Patient presents with hypotension over the past month in oncology clinic, has received IVF and discontinued home losartan/amlodipine during this time, BP spontaneously improving without intervention today to 100s/70s, appears grossly dry to euvolemic on exam without any symptoms of acute heart failure + positive orthostats in ED  ::BNP 2/22: 229 - appears to be at baseline  ::TTE 7/2023: EF 55-60%  - Patient likely has less PO intake and with chemo does not need as many antihyptertensives/GDMT medication dose - emailed primary cardiologist  - s/p 500cc bolus. Gentle fluid boluses prn  - TTE ordered to re-evaluate heart function  - Continue to hold home Losartan 50mg daily  - Continue to hold home Amlodipine 2.5mg daily  - Hold home Spironolactone 25mg daily   - c/w home Metoprolol 50mg BID (for rate control)  - c/w home Farxiga 10mg daily      #SCC of Hypopharynx (Dr. Burkitt)  ::SLP librado 2/12 recommended soft solids with thin liquids, patient would prefer oral feeding to PEG   - nutrition consult   - SLP recs appreciated  - c/w home Guaifenesin  - c/w Zofran prn nausea  - resumed home oxycodone and tylenol, patient reports oxycodone works well for her  - Will continue dexamethasone 4mg BID      #Oropharyngeal candidiasis  #Dysphagia  ::Moderate to severe candidiasis with multiple large lesions  ::given ANC 0.16, consideration for opportunistic infections present. Entended antifungal course  - neupogen usually not given during active tx per primary oncologist, will reasses in a day or 2.   - Fluconazole 200mg IV (2/22 - xx), typically for 7 days but reassess for duration  - Oral care per nursing     #Pancytopenia  ::ANC 0.16 // Plts 53 on  admission with no concern for bleeding // HB 10.9 on presentation (BL 12-13)  - likely d/t myelosuppresion vs though patient has had poor po intake vs mixed  - neupogen usually not given during active tx per primary oncologist, will reasses in a day or 2.   - Daily CBC  - Hold acetaminophen, NSAIDs   - Blood cultures STAT and zosyn if fever  - T&S updated, transfuse for active bleeding <50, or for <10  - Will hold on DVT prophylaxis, SCDs only  - Will continue clopidogrel for now     #Normocytic Anemia  ::HB 10.9 on presentation (BL 12-13), no active bleeding, no recent iron studies  - Likely d/t chronic dz vs myelosuppresion, though patient has had poor po intake  - Ordered iron studies, ferritin, B12/Folate     #Atrial Fibrillation  ::Patient previously on AC, now s/p watchman procedure and is rate controlled   ::had 5 ablations per patient  - Continue metoprolol tartrate 50mg BID  - HR 90s to 120s     #Urinary Frequency  - UA with reflex      #Hx of TIA  - c/w home Plavix 75mg daily     #GERD  - c/w home Pantoprazole     #Mood Disorder  #Misc  - c/w home Cymbalta and Wellbutrin  - c/w home supplements, mag-ox     F : as needed  E: replete lytes prn, K>4, Mg >2  N: soft and minced, thin liquids  A: PIVs     DVT prophylaxis: SCDs  GI prophylaxis: home PPI     Code Status: FULL (confirmed on admission, though patient states she has living will she does not remember the details of, images not in system, likely DNR )     Angelic Sarmiento MD
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
## 2024-02-23 NOTE — PROGRESS NOTES
Speech-Language Pathology                 Therapy Communication Note    Patient Name: Amita Todd  MRN: [id number]  Today's Date: 2/23/2024     Discipline: Speech Language Pathology    Missed Visit Reason:  Attempted to see pt to assess tolerance of current diet per MBSS completed and most recent follow-up w/ OP SLP. Pt currently not in room. Family at bedside and repoted overall improvement in ability to swallow; however, c/o significant pain as pt in week 4 of radiation tx. SLP to complete MBSS to further evaluate.     Missed Time: Attempt    Comment:

## 2024-02-24 ENCOUNTER — APPOINTMENT (OUTPATIENT)
Dept: RADIOLOGY | Facility: HOSPITAL | Age: 79
DRG: 314 | End: 2024-02-24
Payer: MEDICARE

## 2024-02-24 LAB
ALBUMIN SERPL BCP-MCNC: 2.8 G/DL (ref 3.4–5)
ALP SERPL-CCNC: 56 U/L (ref 33–136)
ALT SERPL W P-5'-P-CCNC: 19 U/L (ref 7–45)
ANION GAP SERPL CALC-SCNC: 11 MMOL/L (ref 10–20)
APTT PPP: 33 SECONDS (ref 27–38)
AST SERPL W P-5'-P-CCNC: 13 U/L (ref 9–39)
BASOPHILS # BLD MANUAL: 0 X10*3/UL (ref 0–0.1)
BASOPHILS NFR BLD MANUAL: 0 %
BILIRUB SERPL-MCNC: 0.7 MG/DL (ref 0–1.2)
BUN SERPL-MCNC: 28 MG/DL (ref 6–23)
CALCIUM SERPL-MCNC: 8.9 MG/DL (ref 8.6–10.6)
CHLORIDE SERPL-SCNC: 101 MMOL/L (ref 98–107)
CO2 SERPL-SCNC: 27 MMOL/L (ref 21–32)
CREAT SERPL-MCNC: 0.73 MG/DL (ref 0.5–1.05)
EGFRCR SERPLBLD CKD-EPI 2021: 85 ML/MIN/1.73M*2
EOSINOPHIL # BLD MANUAL: 0 X10*3/UL (ref 0–0.4)
EOSINOPHIL NFR BLD MANUAL: 0 %
ERYTHROCYTE [DISTWIDTH] IN BLOOD BY AUTOMATED COUNT: 16.2 % (ref 11.5–14.5)
GLUCOSE SERPL-MCNC: 123 MG/DL (ref 74–99)
HAPTOGLOB SERPL-MCNC: 277 MG/DL (ref 37–246)
HCT VFR BLD AUTO: 31.7 % (ref 36–46)
HGB BLD-MCNC: 10 G/DL (ref 12–16)
IMM GRANULOCYTES # BLD AUTO: 0 X10*3/UL (ref 0–0.5)
IMM GRANULOCYTES NFR BLD AUTO: 0 % (ref 0–0.9)
INR PPP: 1.3 (ref 0.9–1.1)
LYMPHOCYTES # BLD MANUAL: 0.08 X10*3/UL (ref 0.8–3)
LYMPHOCYTES NFR BLD MANUAL: 9.5 %
MAGNESIUM SERPL-MCNC: 2.25 MG/DL (ref 1.6–2.4)
MCH RBC QN AUTO: 30.8 PG (ref 26–34)
MCHC RBC AUTO-ENTMCNC: 31.5 G/DL (ref 32–36)
MCV RBC AUTO: 98 FL (ref 80–100)
MONOCYTES # BLD MANUAL: 0.04 X10*3/UL (ref 0.05–0.8)
MONOCYTES NFR BLD MANUAL: 4.8 %
NEUTROPHILS # BLD MANUAL: 0.68 X10*3/UL (ref 1.6–5.5)
NEUTS BAND # BLD MANUAL: 0.04 X10*3/UL (ref 0–0.5)
NEUTS BAND NFR BLD MANUAL: 4.8 %
NEUTS SEG # BLD MANUAL: 0.64 X10*3/UL (ref 1.6–5)
NEUTS SEG NFR BLD MANUAL: 80.1 %
NRBC BLD-RTO: 3.8 /100 WBCS (ref 0–0)
PHOSPHATE SERPL-MCNC: 3 MG/DL (ref 2.5–4.9)
PLATELET # BLD AUTO: 78 X10*3/UL (ref 150–450)
POTASSIUM SERPL-SCNC: 4.5 MMOL/L (ref 3.5–5.3)
PROMYELOCYTES # BLD MANUAL: 0.01 X10*3/UL
PROMYELOCYTES NFR BLD MANUAL: 0.8 %
PROT SERPL-MCNC: 5 G/DL (ref 6.4–8.2)
PROTHROMBIN TIME: 14.7 SECONDS (ref 9.8–12.8)
RBC # BLD AUTO: 3.25 X10*6/UL (ref 4–5.2)
RBC MORPH BLD: ABNORMAL
SODIUM SERPL-SCNC: 134 MMOL/L (ref 136–145)
TOTAL CELLS COUNTED BLD: 126
WBC # BLD AUTO: 0.8 X10*3/UL (ref 4.4–11.3)

## 2024-02-24 PROCEDURE — 2500000004 HC RX 250 GENERAL PHARMACY W/ HCPCS (ALT 636 FOR OP/ED)

## 2024-02-24 PROCEDURE — 2500000002 HC RX 250 W HCPCS SELF ADMINISTERED DRUGS (ALT 637 FOR MEDICARE OP, ALT 636 FOR OP/ED)

## 2024-02-24 PROCEDURE — 85027 COMPLETE CBC AUTOMATED: CPT

## 2024-02-24 PROCEDURE — 2500000005 HC RX 250 GENERAL PHARMACY W/O HCPCS

## 2024-02-24 PROCEDURE — 92611 MOTION FLUOROSCOPY/SWALLOW: CPT | Mod: GN

## 2024-02-24 PROCEDURE — 74230 X-RAY XM SWLNG FUNCJ C+: CPT | Performed by: RADIOLOGY

## 2024-02-24 PROCEDURE — 1170000001 HC PRIVATE ONCOLOGY ROOM DAILY

## 2024-02-24 PROCEDURE — C9113 INJ PANTOPRAZOLE SODIUM, VIA: HCPCS

## 2024-02-24 PROCEDURE — 2500000001 HC RX 250 WO HCPCS SELF ADMINISTERED DRUGS (ALT 637 FOR MEDICARE OP): Performed by: EMERGENCY MEDICINE

## 2024-02-24 PROCEDURE — 85007 BL SMEAR W/DIFF WBC COUNT: CPT

## 2024-02-24 PROCEDURE — 84075 ASSAY ALKALINE PHOSPHATASE: CPT

## 2024-02-24 PROCEDURE — 92526 ORAL FUNCTION THERAPY: CPT | Mod: GN

## 2024-02-24 PROCEDURE — 84100 ASSAY OF PHOSPHORUS: CPT

## 2024-02-24 PROCEDURE — 3430000001 HC RX 343 DIAGNOSTIC RADIOPHARMACEUTICALS: Performed by: EMERGENCY MEDICINE

## 2024-02-24 PROCEDURE — 74230 X-RAY XM SWLNG FUNCJ C+: CPT

## 2024-02-24 PROCEDURE — 99233 SBSQ HOSP IP/OBS HIGH 50: CPT

## 2024-02-24 PROCEDURE — 85610 PROTHROMBIN TIME: CPT

## 2024-02-24 PROCEDURE — 2500000001 HC RX 250 WO HCPCS SELF ADMINISTERED DRUGS (ALT 637 FOR MEDICARE OP)

## 2024-02-24 PROCEDURE — 83735 ASSAY OF MAGNESIUM: CPT

## 2024-02-24 RX ORDER — ONDANSETRON HYDROCHLORIDE 2 MG/ML
4 INJECTION, SOLUTION INTRAVENOUS EVERY 6 HOURS PRN
Status: DISCONTINUED | OUTPATIENT
Start: 2024-02-24 | End: 2024-03-18 | Stop reason: HOSPADM

## 2024-02-24 RX ORDER — PANTOPRAZOLE SODIUM 40 MG/10ML
40 INJECTION, POWDER, LYOPHILIZED, FOR SOLUTION INTRAVENOUS DAILY
Status: DISCONTINUED | OUTPATIENT
Start: 2024-02-24 | End: 2024-02-28

## 2024-02-24 RX ORDER — DEXAMETHASONE SODIUM PHOSPHATE 4 MG/ML
4 INJECTION, SOLUTION INTRA-ARTICULAR; INTRALESIONAL; INTRAMUSCULAR; INTRAVENOUS; SOFT TISSUE EVERY 12 HOURS
Status: DISCONTINUED | OUTPATIENT
Start: 2024-02-24 | End: 2024-02-26

## 2024-02-24 RX ORDER — HYDROMORPHONE HYDROCHLORIDE 1 MG/ML
0.2 INJECTION, SOLUTION INTRAMUSCULAR; INTRAVENOUS; SUBCUTANEOUS
Status: DISCONTINUED | OUTPATIENT
Start: 2024-02-24 | End: 2024-02-26

## 2024-02-24 RX ADMIN — ATORVASTATIN CALCIUM 20 MG: 20 TABLET, FILM COATED ORAL at 09:31

## 2024-02-24 RX ADMIN — HYDROMORPHONE HYDROCHLORIDE 0.2 MG: 1 INJECTION, SOLUTION INTRAMUSCULAR; INTRAVENOUS; SUBCUTANEOUS at 21:56

## 2024-02-24 RX ADMIN — DEXAMETHASONE SODIUM PHOSPHATE 4 MG: 4 INJECTION, SOLUTION INTRAMUSCULAR; INTRAVENOUS at 17:16

## 2024-02-24 RX ADMIN — DAPAGLIFLOZIN 10 MG: 10 TABLET, FILM COATED ORAL at 09:31

## 2024-02-24 RX ADMIN — CLOPIDOGREL BISULFATE 75 MG: 75 TABLET ORAL at 09:30

## 2024-02-24 RX ADMIN — BARIUM SULFATE 30 ML: 400 SUSPENSION ORAL at 09:28

## 2024-02-24 RX ADMIN — METOPROLOL TARTRATE 50 MG: 50 TABLET, FILM COATED ORAL at 21:57

## 2024-02-24 RX ADMIN — BUPROPION HYDROCHLORIDE 150 MG: 150 TABLET, EXTENDED RELEASE ORAL at 09:31

## 2024-02-24 RX ADMIN — Medication 10 ML: at 17:16

## 2024-02-24 RX ADMIN — BARIUM SULFATE 30 ML: 400 PASTE ORAL at 09:28

## 2024-02-24 RX ADMIN — Medication 10 ML: at 09:56

## 2024-02-24 RX ADMIN — HYDROMORPHONE HYDROCHLORIDE 0.2 MG: 1 INJECTION, SOLUTION INTRAMUSCULAR; INTRAVENOUS; SUBCUTANEOUS at 17:16

## 2024-02-24 RX ADMIN — PANTOPRAZOLE SODIUM 40 MG: 40 TABLET, DELAYED RELEASE ORAL at 09:30

## 2024-02-24 RX ADMIN — Medication 400 MG: at 09:30

## 2024-02-24 RX ADMIN — FLUCONAZOLE 400 MG: 2 INJECTION, SOLUTION INTRAVENOUS at 11:04

## 2024-02-24 RX ADMIN — ACETAMINOPHEN 650 MG: 325 TABLET ORAL at 11:05

## 2024-02-24 RX ADMIN — DEXAMETHASONE 4 MG: 2 TABLET ORAL at 09:34

## 2024-02-24 RX ADMIN — BARIUM SULFATE 40 ML: 0.81 POWDER, FOR SUSPENSION ORAL at 09:27

## 2024-02-24 RX ADMIN — OXYCODONE HYDROCHLORIDE 5 MG: 5 TABLET ORAL at 11:03

## 2024-02-24 RX ADMIN — SODIUM CHLORIDE, POTASSIUM CHLORIDE, SODIUM LACTATE AND CALCIUM CHLORIDE 500 ML: 600; 310; 30; 20 INJECTION, SOLUTION INTRAVENOUS at 17:17

## 2024-02-24 RX ADMIN — METOPROLOL TARTRATE 50 MG: 50 TABLET, FILM COATED ORAL at 09:30

## 2024-02-24 RX ADMIN — Medication 1000 UNITS: at 21:57

## 2024-02-24 RX ADMIN — OXYCODONE HYDROCHLORIDE 5 MG: 5 TABLET ORAL at 05:59

## 2024-02-24 RX ADMIN — Medication 10 ML: at 21:57

## 2024-02-24 RX ADMIN — BUPROPION HYDROCHLORIDE 150 MG: 150 TABLET, EXTENDED RELEASE ORAL at 21:58

## 2024-02-24 RX ADMIN — Medication 1 TABLET: at 09:30

## 2024-02-24 RX ADMIN — PANTOPRAZOLE SODIUM 40 MG: 40 INJECTION, POWDER, FOR SOLUTION INTRAVENOUS at 17:22

## 2024-02-24 RX ADMIN — OXYCODONE HYDROCHLORIDE AND ACETAMINOPHEN 500 MG: 500 TABLET ORAL at 09:30

## 2024-02-24 RX ADMIN — DULOXETINE HYDROCHLORIDE 60 MG: 60 CAPSULE, DELAYED RELEASE ORAL at 21:57

## 2024-02-24 RX ADMIN — Medication 1000 UNITS: at 09:30

## 2024-02-24 RX ADMIN — ACETAMINOPHEN 650 MG: 325 TABLET ORAL at 21:57

## 2024-02-24 ASSESSMENT — COGNITIVE AND FUNCTIONAL STATUS - GENERAL
DRESSING REGULAR LOWER BODY CLOTHING: A LITTLE
CLIMB 3 TO 5 STEPS WITH RAILING: A LOT
MOVING TO AND FROM BED TO CHAIR: A LITTLE
TURNING FROM BACK TO SIDE WHILE IN FLAT BAD: A LITTLE
TOILETING: A LITTLE
STANDING UP FROM CHAIR USING ARMS: A LITTLE
MOVING FROM LYING ON BACK TO SITTING ON SIDE OF FLAT BED WITH BEDRAILS: A LITTLE
HELP NEEDED FOR BATHING: A LITTLE
DRESSING REGULAR UPPER BODY CLOTHING: A LITTLE
DAILY ACTIVITIY SCORE: 20
MOBILITY SCORE: 17
WALKING IN HOSPITAL ROOM: A LITTLE

## 2024-02-24 ASSESSMENT — PAIN SCALES - GENERAL
PAINLEVEL_OUTOF10: 6
PAINLEVEL_OUTOF10: 7
PAINLEVEL_OUTOF10: 7
PAINLEVEL_OUTOF10: 8

## 2024-02-24 ASSESSMENT — PAIN - FUNCTIONAL ASSESSMENT
PAIN_FUNCTIONAL_ASSESSMENT: 0-10

## 2024-02-24 ASSESSMENT — PAIN DESCRIPTION - DESCRIPTORS
DESCRIPTORS: SORE
DESCRIPTORS: SORE

## 2024-02-24 NOTE — CONSULTS
"Nutrition Initial Assessment:   Nutrition Assessment    Reason for Assessment: Provider consult order (Enteral assessment/recommendation)    Patient is a 78 y.o. female presenting with: Thrombocytopenia     PMHx squamous cell carcinoma of hypopharynx, HFpEF, CAD s/p PCI, Afib s/p watchman, GERD, depression, HTN, AUD, ascending aortic aneurysm, distant breast cancer (s/p chemoradiotherapy and lumpectomy)     Nutrition History:  Food and Nutrient History: Noted patient follows with outpatient oncology RDN.  Per last note 2/14/24 patient was taking 2-3 Boost VHC per day, protein powder and foods as tolerated.  On 2/24/24 patient underwent MBSS and was recommended NPO.  Patient visited this afternoon.  She reports at home was grazing over the day on foods she could tolerate such as a very soft bagel with cream cheese, peanut butter, tuna casserole, cottage and peaches and Boost shakes.  Patient describes weight loss from 170's (77kg) in December to 145# (66kg).  Patient reports plan is for Peg placement Monday.  Vitamin/Herbal Supplement Use: Per med list PTA: Vitamin C, D, CoQ10, herbal complex, mag-ox, multivitamin however patient reports not taking this recently as she was prioritizing trying to swallow her prescribed medications.  Food Allergies/Intolerances:  None  GI Symptoms:  [place]  Oral Problems: Swallowing difficulty       Anthropometrics:    2/22/24) 69.7kg  65\"         IBW/kg (Dietitian Calculated): 56.8 kg  Percent of IBW: 123 %       Weight History:   Wt Readings from Last 30 Encounters:   02/22/24 69.7 kg (153 lb 11.2 oz)   02/21/24 67.2 kg (148 lb 1.6 oz)   02/20/24 67.7 kg (149 lb 4 oz)   02/16/24 69.4 kg (153 lb 1.6 oz)   02/15/24 69.8 kg (153 lb 14.4 oz)   02/14/24 70.6 kg (155 lb 10.3 oz)   02/12/24 70.3 kg (154 lb 14.4 oz)   02/08/24 69.8 kg (153 lb 12.8 oz)   02/06/24 68 kg (150 lb)   02/07/24 69.2 kg (152 lb 8.9 oz)   02/06/24 69.2 kg (152 lb 9.6 oz)   02/05/24 68.9 kg (151 lb 14.4 oz) "   01/31/24 69.1 kg (152 lb 5.4 oz)   01/30/24 69.4 kg (153 lb 1.6 oz)   01/29/24 68.6 kg (151 lb 4.8 oz)   01/29/24 68.4 kg (150 lb 12.8 oz)   01/26/24 70.1 kg (154 lb 8.7 oz)   01/24/24 68.9 kg (151 lb 14.4 oz)   01/19/24 69.7 kg (153 lb 10.6 oz)   01/12/24 70.6 kg (155 lb 10.3 oz)   01/11/24 70.3 kg (155 lb)   01/10/24 69.6 kg (153 lb 7 oz)   01/09/24 71 kg (156 lb 8.4 oz)   01/03/24 70.8 kg (156 lb)   12/15/23 71.8 kg (158 lb 3.2 oz)   12/13/23 71.5 kg (157 lb 9 oz)   12/06/23 72.1 kg (159 lb)   11/22/23 70.3 kg (155 lb)   06/14/23 73.7 kg (162 lb 6 oz)   06/07/23 74.4 kg (164 lb)         Weight Change %:  Weight History / % Weight Change: Weight is generally stable based on objective weight hx however may be altered by fluid/volume status changes.  Patient described 25# weight loss over 3 months.    Nutrition Focused Physical Exam Findings:    Subcutaneous Fat Loss:   Orbital Fat Pads: Mild-Moderate (slight dark circles and slight hollowing)  Buccal Fat Pads: Mild-Moderate (flat cheeks, minimal bounce)  Triceps: Mild-moderate (less than ample fat tissue)  Ribs: Defer  Muscle Wasting:  Temporalis: Mild-Moderate (slight depression)  Pectoralis (Clavicular Region): Mild-Moderate (some protrusion of clavicle)  Deltoid/Trapezius: Mild-Moderate (slight protrusion of acromion process)  Interosseous: Mild-Moderate (slightly depressed area between thumb and forefinger)  Quadriceps: Mild-moderate (mild depression on inner and outer thigh)  Gastrocnemius: Mild-Moderate (not well developed muscle)  Edema:     Physical Findings:  Hair: Positive (loss)  Nails:  (painted)  Skin: Positive (bruised, thin/frail)    Nutrition Significant Labs:  CBC Trend:   Results from last 7 days   Lab Units 02/22/24  1606 02/21/24  1035   WBC AUTO x10*3/uL 0.2* 0.4*   RBC AUTO x10*6/uL 3.50* 3.64*   HEMOGLOBIN g/dL 10.9* 11.2*   HEMATOCRIT % 31.7* 34.5*   MCV fL 91 95   PLATELETS AUTO x10*3/uL 53* 81*    , BMP Trend:   Results from last 7 days    Lab Units 02/22/24  1606 02/21/24  1035   GLUCOSE mg/dL 110* 110*   CALCIUM mg/dL 8.1* 8.6   SODIUM mmol/L 134* 136   POTASSIUM mmol/L 4.1 4.2   CO2 mmol/L 26 30   CHLORIDE mmol/L 102 100   BUN mg/dL 23 25*   CREATININE mg/dL 0.70 0.74    , Renal Lab Trend:   Results from last 7 days   Lab Units 02/22/24  1606 02/21/24  1035   POTASSIUM mmol/L 4.1 4.2   SODIUM mmol/L 134* 136   MAGNESIUM mg/dL 1.99 1.93   EGFR mL/min/1.73m*2 89 83   BUN mg/dL 23 25*   CREATININE mg/dL 0.70 0.74        Nutrition Specific Medications:  Scheduled medications  acetaminophen, 650 mg, oral, q6h  ascorbic acid, 500 mg, oral, Daily  atorvastatin, 20 mg, oral, Daily  buPROPion SR, 150 mg, oral, BID  cholecalciferol, 1,000 Units, oral, BID  clopidogrel, 75 mg, oral, Daily  dapagliflozin propanediol, 10 mg, oral, Daily  dexAMETHasone, 4 mg, oral, BID with meals  DULoxetine, 60 mg, oral, Nightly  fluconazole, 400 mg, intravenous, q24h  magnesium oxide, 400 mg, oral, Daily  metoprolol tartrate, 50 mg, oral, BID  multivitamin with minerals, 1 tablet, oral, Daily  ondansetron ODT, 4 mg, oral, Once  pantoprazole, 40 mg, oral, Daily  perflutren lipid microspheres, 0.5-10 mL of dilution, intravenous, Once in imaging  perflutren protein A microsphere, 0.5 mL, intravenous, Once in imaging  sulfur hexafluoride microsphr, 2 mL, intravenous, Once in imaging      Dietary Orders (From admission, onward)       Start     Ordered    02/24/24 1119  NPO Diet; Effective now  Diet effective now         02/24/24 1119                     Estimated Needs:   Total Energy Estimated Needs (kCal): 1800 kCal  Method for Estimating Needs: 25kcal/kg  Total Protein Estimated Needs (g): 85 g  Method for Estimating Needs: 1.2g/kg              Nutrition Diagnosis   Malnutrition Diagnosis  Patient has Malnutrition Diagnosis: Yes  Diagnosis Status: New  Malnutrition Diagnosis: Moderate malnutrition related to chronic disease or condition  As Evidenced by: <75% estimated energy  requirement > 1month, suspected weight loss and mild to moderate muscle atrophy            Nutrition Interventions/Recommendations         Nutrition Prescription:  Individualized Nutrition Prescription Provided for : 1) Once enteral access obtained and cleared for use can begin with Isosource 1.5 @ 10-20ml/hr, increase by 10ml every 8-12hrs as tolerated to goal of 50ml/hr to provide 1800kcal, 82g protein, 917ml H20.  Additional fluid provision per provider discretion.  2) Order daily 100mg Thiamine supplement x 1 week and daily MVI with minerals per potential for refeeding risk.       Nutrition Education:      Explained nutrition support process to patient.     Nutrition Monitoring and Evaluation   Food/Nutrient Related History Monitoring  Monitoring and Evaluation Plan: Enteral and parenteral nutrition intake  Enteral and Parenteral Nutrition Intake: Enteral nutrition intake  Criteria: TF tolerance    Body Composition/Growth/Weight History  Monitoring and Evaluation Plan: Weight  Weight: Measured weight  Criteria: Stable                 Time Spent/Follow-up Reminder:   Time Spent (min): 60 minutes  Last Date of Nutrition Visit: 02/24/24  Nutrition Follow-Up Needed?: Dietitian to reassess per policy  Follow up Comment: TF recommendations

## 2024-02-24 NOTE — CARE PLAN
The patient's goals for the shift include      The clinical goals for the shift include        Problem: Skin  Goal: Decreased wound size/increased tissue granulation at next dressing change  Outcome: Progressing  Flowsheets (Taken 2/24/2024 0258)  Decreased wound size/increased tissue granulation at next dressing change: Promote sleep for wound healing  Goal: Participates in plan/prevention/treatment measures  Outcome: Progressing  Flowsheets (Taken 2/24/2024 0258)  Participates in plan/prevention/treatment measures: Elevate heels  Goal: Prevent/manage excess moisture  Outcome: Progressing  Flowsheets (Taken 2/24/2024 0258)  Prevent/manage excess moisture: Moisturize dry skin  Goal: Prevent/minimize sheer/friction injuries  Outcome: Progressing  Flowsheets (Taken 2/24/2024 0258)  Prevent/minimize sheer/friction injuries: Use pull sheet  Goal: Promote/optimize nutrition  Outcome: Progressing  Flowsheets (Taken 2/24/2024 0258)  Promote/optimize nutrition: Offer water/supplements/favorite foods  Goal: Promote skin healing  Outcome: Progressing  Flowsheets (Taken 2/24/2024 0258)  Promote skin healing: Assess skin/pad under line(s)/device(s)     Problem: Pain  Goal: My pain/discomfort is manageable  Outcome: Progressing     Problem: Safety  Goal: Patient will be injury free during hospitalization  Outcome: Progressing  Goal: I will remain free of falls  Outcome: Progressing     Problem: Daily Care  Goal: Daily care needs are met  Outcome: Progressing     Problem: Psychosocial Needs  Goal: Demonstrates ability to cope with hospitalization/illness  Outcome: Progressing  Goal: Collaborate with me, my family, and caregiver to identify my specific goals  Outcome: Progressing     Problem: Discharge Barriers  Goal: My discharge needs are met  Outcome: Progressing     Problem: Pain  Goal: Takes deep breaths with improved pain control throughout the shift  Outcome: Progressing  Goal: Turns in bed with improved pain control  throughout the shift  Outcome: Progressing  Goal: Walks with improved pain control throughout the shift  Outcome: Progressing  Goal: Performs ADL's with improved pain control throughout shift  Outcome: Progressing  Goal: Participates in PT with improved pain control throughout the shift  Outcome: Progressing  Goal: Free from opioid side effects throughout the shift  Outcome: Progressing  Goal: Free from acute confusion related to pain meds throughout the shift  Outcome: Progressing

## 2024-02-24 NOTE — PROGRESS NOTES
Attempted to speak with pt to complete discharge planning assessment but pt declined stating she would prefer assessment at another time.    Bertin Oliveira RN  Transitional Care Coordinator/[facility]  [phone]

## 2024-02-24 NOTE — PROGRESS NOTES
Amita Todd is a 78 y.o. female on day 2 of admission presenting with Thrombocytopenia (CMS/HCC).    Subjective   NAEON. Patient off the floor for [facility] [place].       Objective   Constitutional:       Comments: underweight   HENT:      Mouth/Throat:      Mouth: Mucous membranes are dry. +well circumscribed plaques   Eyes:      Pupils: Pupils are equal, round, and reactive to light.   Cardiovascular:      Rate and Rhythm: Regular rhythm. Tachycardia present.      Pulses: Normal pulses.   Pulmonary:      Comments: Mild inspiratory crackles B/L lung bases.   Abdominal:      General: Abdomen is flat. Bowel sounds are normal.      Palpations: Abdomen is soft.   Musculoskeletal:         General: Normal range of motion.      Cervical back: Normal range of motion.      Comments: 1+ pitting edema B/L LE till ankles   Skin:     General: Skin is warm.      Capillary Refill: Capillary refill takes less than 2 seconds.      Findings: Bruising present.      Comments: Petechia over trunk   Neurological:      General: No focal deficit present.      Mental Status: She is alert and oriented to person, place, and time. Mental status is at baseline.   Psychiatric:         Mood and Affect: Mood normal.      Last Recorded Vitals  Blood pressure 125/82, pulse 91, temperature 36.5 °C (97.7 °F), temperature source Temporal, resp. rate 16, SpO2 98 %.  Intake/Output last 3 Shifts:  No intake/output data recorded.    Relevant Results                  Malnutrition Diagnosis Status: New  Malnutrition Diagnosis: Moderate malnutrition related to chronic disease or condition  As Evidenced by: <75% estimated energy requirement > 1month, suspected weight loss and mild to moderate muscle atrophy  I agree with the dietitian's malnutrition diagnosis.    Assessment/Plan   Principal Problem:    Thrombocytopenia (CMS/HCC)    77yo F w PMHx squamous cell carcinoma of hypopharynx, HFpEF, CAD s/p PCI, Afib s/p watchman, GERD, depression, HTN, AUD, ascending  aortic aneurysm, distant breast cancer (s/p chemoradiotherapy and lumpectomy) who was directed to ED for hypotension. In light of CHF history, there was some initial concern for decompensated heart failure. On evaluation, pt relates a history of frequent PRN lasix doses in the last week coinciding with hypotension, and her BP has improved rapidly while in the ED. BP spontaneously improving without intervention today to 100s/70s, appears grossly dry to euvolemic on exam without any symptoms of acute heart failure + positive orthostats in ED.  Despite CHF history, suspect pt is overtreated for HTN and CHF and will hold off on diuresis, currently s/p 500cc LR bolus, will reassess orthotasts in evening.     2/24 updates:  -MBS with worsening results compared to prior, high risk for development of aspiration PNA, recommended NPO with plans for PEG (unclear timing, nutrition recs in to initiate TF)  -plan to consult GI tomorrow given c/f infectious vs radiation esophagitis (ANC likely too low for EGD at this time)  -continuing to treat empirically for candida esophagitis with IV fluconazole, still deferring HSV and CMV empiric treatment for now  -given 500 ml LR x1 time given NPO status  -BP acceptable, still holding losartan and aldactone    #Hypotension, improved   #HFpEF, initial c/f ADHF  ::Patient presents with hypotension over the past month in oncology clinic, has received IVF and discontinued home losartan/amlodipine during this time, BP spontaneously improving without intervention today to 100s/70s, appears grossly dry to euvolemic on exam without any symptoms of acute heart failure + positive orthostats in ED  ::BNP 2/22: 229 - appears to be at baseline  ::TTE 7/2023: EF 55-60%  - Patient likely has less PO intake and with chemo does not need as many antihyptertensives/GDMT medication dose - emailed primary cardiologist  - s/p 500cc bolus. Gentle fluid boluses prn  - TTE ordered to re-evaluate heart function  -  Continue to hold home Losartan 50mg daily  - Continue to hold home Amlodipine 2.5mg daily  - Hold home Spironolactone 25mg daily   - c/w home Metoprolol 50mg BID (for rate control)  - c/w home Farxiga 10mg daily      #SCC of Hypopharynx (Dr. Burkitt)  ::SLP librado 2/12 recommended soft solids with thin liquids, patient would prefer oral feeding to PEG   - nutrition consult   - SLP recs appreciated  - c/w home Guaifenesin  - c/w Zofran prn nausea  - resumed home oxycodone and tylenol, patient reports oxycodone works well for her  - Will continue dexamethasone 4mg BID      #Oropharyngeal candidiasis  #Dysphagia  ::Moderate to severe candidiasis with multiple large lesions  ::given ANC 0.16, consideration for opportunistic infections present. Entended antifungal course  - neupogen usually not given during active tx per primary oncologist, will reasses in a day or 2.   - Fluconazole 200mg IV (2/22 - xx), typically for 7 days but reassess for duration  - Oral care per nursing     #Pancytopenia  ::ANC 0.16 // Plts 53 on admission with no concern for bleeding // HB 10.9 on presentation (BL 12-13)  - likely d/t myelosuppresion vs though patient has had poor po intake vs mixed  - neupogen usually not given during active tx per primary oncologist, will reasses in a day or 2.   - Daily CBC  - Hold acetaminophen, NSAIDs   - Blood cultures STAT and zosyn if fever  - T&S updated, transfuse for active bleeding <50, or for <10  - Will hold on DVT prophylaxis, SCDs only  - Will continue clopidogrel for now     #Normocytic Anemia  ::HB 10.9 on presentation (BL 12-13), no active bleeding, no recent iron studies  - Likely d/t chronic dz vs myelosuppresion, though patient has had poor po intake  - Ordered iron studies, ferritin, B12/Folate     #Atrial Fibrillation  ::Patient previously on AC, now s/p watchman procedure and is rate controlled   ::had 5 ablations per patient  - Continue metoprolol tartrate 50mg BID  - HR 90s to 120s      #Urinary Frequency  - UA with reflex      #Hx of TIA  - c/w home Plavix 75mg daily     #GERD  - c/w home Pantoprazole     #Mood Disorder  #Misc  - c/w home Cymbalta and Wellbutrin  - c/w home supplements, mag-ox     F : as needed  E: replete lytes prn, K>4, Mg >2  N: soft and minced, thin liquids  A: PIVs     DVT prophylaxis: SCDs  GI prophylaxis: home PPI     Code Status: FULL (confirmed on admission, though patient states she has living will she does not remember the details of, images not in system, likely DNR )            Mary Corbett MD

## 2024-02-24 NOTE — PROCEDURES
"Speech-Language Pathology    SLP Inpatient MBSS Evaluation    Patient Name: Amita Todd  MRN: [id number]  Today's Date: 2/24/2024   Time Calculation  Start Time: 0825  Stop Time: 0853  Time Calculation (min): 28 min       Assessment/Impression:   Moderate/severe pharyngeal dysphagia impacted by presence of hypopharyngeal mass and current radiation treatment; worsened compared to MBSS 1/25/24.  Aspiration of multiple consistencies during and following the swallow. Weak cough response; unable to clear. High risk for development of aspiration PNA.     Given pt's complaints of odynophagia, and dysphagia noted on this assessment, recommend further conversation w/ pt and family re: an alternative means of nutrition/hydration.      Full detailed SLP/Radiologist Modified Barium Swallow study report can be found under Chart Review tab, Imaging tab and  titled \"FL Modified Barium Swallow Study\"       Recommendations:  Solid Diet Recommendations : NPO; allow 4 oz puree solids 2x/day for oral pleasure and to prevent swallow disuse atrophy; perform oral care before and following   Liquid Diet Recommendations: NPO; allow 4-5 ice chips/hr for oral comfort and to prevent swallow disuse atrophy; perform oral care before and following     Aspiration PNA Mitigation Strategies:  - Thorough oral infection control 3x/day  - Out of bed/mobile as tolerated           Plan:  SLP Plan: Skilled SLP warranted inpatient; recommend continued outpatient SLP at discharge   IP SLP Frequency: 2x per week  Duration: 4 weeks      Discussed POC: Patient, Medical Team   Discussed Risks/Benefits: Yes  Patient/Caregiver Agreeable: Yes        Goals:  Pt will indicate understanding of dysphagia, risk for aspiration, and plan of care via teach back method with > 90% accuracy.    Pt will continue completion of laryngeal and pharyngeal exercises as tolerated prescribed following initial MBSS on 1/25/24:  Pt will complete effortful swallows 10 reps, 3 " times per day for 7 days a week.   Pt will complete isometric shaker for 1 min 3 reps at 3 sets.   Pt will complete [place] maneuver 10 times 3 times per day for 7 days a week.  Pt will sustain tongue protrusion for 5 seconds 10 reps, 3 times per day, 7 days a week.  Pt will retract tongue far back into the mouth and hold for 5 seconds 10 reps, 3 times per day, 7 days a week.  Pt will lateralize tongue to the left and right with 5 sec hold 10 reps, 3 times per day for 7 days a week.  Pt will extend tongue tip upward with wide open mouth and hold for 5 seconds, 10 reps, 3 times per day for 7 days.       Subjective:  Pt arrived via transport for MBSS.  Verbally consented to the exam and tolerated with no s/s of distress.  Room air.        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  MBSS 1/25/24 showed mild oropharyngeal dysphagia and recommended minced & moist solids w/ thin liquids (chin tuck w/ all swallows).  Pt has completed 4 of 7 weeks of xRT.  Reporting increased odynophagia and dysphagia with frequent s/s of aspiration w/ PO intake.  Admitted 2' to hypotension.  MBSS warranted for further evaluation.         Objective:   General Visit Information:  Type of Study: Initial MBS  Consistencies Trialed: Thin (IDDSI Level 0) - Spoon and Straw, Mildly/[place] Thick Liquids (IDDSI Level 2)- Spoon and Straw, Moderately/Honey Thick Liquids (IDDSI Level 3)- Spoon and Straw, Pureed/extremely thick (IDDSI Level 4)     Oral/Motor Assessment:  Oral Hygiene: Candidiasis w/ large lesions per MD, xerostomia   Dentition: Present         Oral Phase:  WFL    Adequate bolus acceptance, formation, and transit w/ all consistencies assessed.      -Pt does note significant odynophagia (pain orally and pharyngeally) with all PO intake.     Pharyngeal Phase:  Moderate/severe deficits    Tissue protrusion within  hypopharynx impacting bolus flow.  No nasal regurgitation on this assessment, but backflow of boluses observed.  Hyolaryngeal elevation/excursion impaired.  Reduced airway protection during the swallow resulting in deep penetration to the vocal cords w/ all liquid consistencies, and eventual trace aspiration of thin and mildly thick liquids.  Independent cough/throat clear, but this did not clear aspirated material. Pharyngeal stasis at the valleculae and piriforms following the swallow with all liquid consistencies.  Aspiration in min-mod amounts following the swallow with thin and mildly thick liquids as a result of residue.  Again, cough response did not clear.  Chin tuck and head turn R did not improve safety or efficiency of swallow.  Head turn L (toward side of mass) improved swallow partially, but did not entirely resolve deficits.  With most viscous consistency assessed (puree) , there was no penetration/aspiration or significant stasis.      Esophageal Phase:  No overt deficits      [place]:  Thin: 7 - Material enters airway, passes below the folds, not ejected despite effort.   Nectar: 7 - Material enters airway, passes below the folds, not ejected despite effort.   Honey: 5 - Material enters airway, contacts the folds, not ejected from airway.  Puree: 1 - Material does not enter airway.     Pt seen 8:25-8:53 for MBSS and 15:05-15:19 for Dysphagia Therapy.    Dysphagia Therapy  SLP provided education re: results/recommendations from MBSS.  Pt able to indicate understanding of dysphagia, risk for aspiration, and plan of care with ~75% accuracy.  No needs voiced at end of session.  Call bell within reach.      02/24/24 at 4:21 PM - Aarti Alford, SLP

## 2024-02-25 LAB
ALBUMIN SERPL BCP-MCNC: 2.7 G/DL (ref 3.4–5)
ALP SERPL-CCNC: 50 U/L (ref 33–136)
ALT SERPL W P-5'-P-CCNC: 18 U/L (ref 7–45)
ANION GAP SERPL CALC-SCNC: 15 MMOL/L (ref 10–20)
AST SERPL W P-5'-P-CCNC: 13 U/L (ref 9–39)
BASOPHILS # BLD MANUAL: 0 X10*3/UL (ref 0–0.1)
BASOPHILS NFR BLD MANUAL: 0 %
BILIRUB SERPL-MCNC: 0.6 MG/DL (ref 0–1.2)
BUN SERPL-MCNC: 28 MG/DL (ref 6–23)
BURR CELLS BLD QL SMEAR: ABNORMAL
CALCIUM SERPL-MCNC: 8.6 MG/DL (ref 8.6–10.6)
CHLORIDE SERPL-SCNC: 100 MMOL/L (ref 98–107)
CO2 SERPL-SCNC: 23 MMOL/L (ref 21–32)
CREAT SERPL-MCNC: 0.73 MG/DL (ref 0.5–1.05)
EGFRCR SERPLBLD CKD-EPI 2021: 85 ML/MIN/1.73M*2
EOSINOPHIL # BLD MANUAL: 0 X10*3/UL (ref 0–0.4)
EOSINOPHIL NFR BLD MANUAL: 0 %
GLUCOSE SERPL-MCNC: 155 MG/DL (ref 74–99)
LYMPHOCYTES # BLD MANUAL: 0.02 X10*3/UL (ref 0.8–3)
LYMPHOCYTES NFR BLD MANUAL: 1.7 %
MAGNESIUM SERPL-MCNC: 2.26 MG/DL (ref 1.6–2.4)
MONOCYTES # BLD MANUAL: 0.06 X10*3/UL (ref 0.05–0.8)
MONOCYTES NFR BLD MANUAL: 6 %
NEUTS SEG # BLD MANUAL: 0.91 X10*3/UL (ref 1.6–5)
NEUTS SEG NFR BLD MANUAL: 91.4 %
OVALOCYTES BLD QL SMEAR: ABNORMAL
PHOSPHATE SERPL-MCNC: 3.2 MG/DL (ref 2.5–4.9)
POLYCHROMASIA BLD QL SMEAR: ABNORMAL
POTASSIUM SERPL-SCNC: 4.7 MMOL/L (ref 3.5–5.3)
PROT SERPL-MCNC: 4.9 G/DL (ref 6.4–8.2)
RBC MORPH BLD: ABNORMAL
SODIUM SERPL-SCNC: 133 MMOL/L (ref 136–145)
TOTAL CELLS COUNTED BLD: 117
VARIANT LYMPHS # BLD MANUAL: 0.01 X10*3/UL (ref 0–0.3)
VARIANT LYMPHS NFR BLD: 0.9 %

## 2024-02-25 PROCEDURE — 1170000001 HC PRIVATE ONCOLOGY ROOM DAILY

## 2024-02-25 PROCEDURE — 2500000001 HC RX 250 WO HCPCS SELF ADMINISTERED DRUGS (ALT 637 FOR MEDICARE OP)

## 2024-02-25 PROCEDURE — 85007 BL SMEAR W/DIFF WBC COUNT: CPT

## 2024-02-25 PROCEDURE — 83735 ASSAY OF MAGNESIUM: CPT

## 2024-02-25 PROCEDURE — 2500000002 HC RX 250 W HCPCS SELF ADMINISTERED DRUGS (ALT 637 FOR MEDICARE OP, ALT 636 FOR OP/ED)

## 2024-02-25 PROCEDURE — 2500000005 HC RX 250 GENERAL PHARMACY W/O HCPCS

## 2024-02-25 PROCEDURE — C9113 INJ PANTOPRAZOLE SODIUM, VIA: HCPCS

## 2024-02-25 PROCEDURE — 2500000004 HC RX 250 GENERAL PHARMACY W/ HCPCS (ALT 636 FOR OP/ED)

## 2024-02-25 PROCEDURE — 84100 ASSAY OF PHOSPHORUS: CPT

## 2024-02-25 PROCEDURE — 85060 BLOOD SMEAR INTERPRETATION: CPT

## 2024-02-25 PROCEDURE — 80053 COMPREHEN METABOLIC PANEL: CPT

## 2024-02-25 PROCEDURE — 99233 SBSQ HOSP IP/OBS HIGH 50: CPT

## 2024-02-25 PROCEDURE — 85027 COMPLETE CBC AUTOMATED: CPT

## 2024-02-25 RX ORDER — LOSARTAN POTASSIUM 25 MG/1
12.5 TABLET ORAL DAILY
Status: DISCONTINUED | OUTPATIENT
Start: 2024-02-25 | End: 2024-02-27

## 2024-02-25 RX ORDER — OXYCODONE HYDROCHLORIDE 5 MG/1
5 TABLET ORAL EVERY 6 HOURS PRN
Status: DISCONTINUED | OUTPATIENT
Start: 2024-02-25 | End: 2024-02-26

## 2024-02-25 RX ORDER — DEXTROSE MONOHYDRATE AND SODIUM CHLORIDE 5; .45 G/100ML; G/100ML
50 INJECTION, SOLUTION INTRAVENOUS CONTINUOUS
Status: DISCONTINUED | OUTPATIENT
Start: 2024-02-25 | End: 2024-02-27

## 2024-02-25 RX ORDER — ACETAMINOPHEN 160 MG/5ML
975 SOLUTION ORAL EVERY 6 HOURS
Status: DISCONTINUED | OUTPATIENT
Start: 2024-02-25 | End: 2024-02-28

## 2024-02-25 RX ADMIN — METOPROLOL TARTRATE 50 MG: 50 TABLET, FILM COATED ORAL at 20:49

## 2024-02-25 RX ADMIN — BUPROPION HYDROCHLORIDE 150 MG: 150 TABLET, EXTENDED RELEASE ORAL at 10:30

## 2024-02-25 RX ADMIN — DEXAMETHASONE SODIUM PHOSPHATE 4 MG: 4 INJECTION, SOLUTION INTRAMUSCULAR; INTRAVENOUS at 04:29

## 2024-02-25 RX ADMIN — FLUCONAZOLE 400 MG: 2 INJECTION, SOLUTION INTRAVENOUS at 11:51

## 2024-02-25 RX ADMIN — ACETAMINOPHEN 1000 MG: 650 SOLUTION ORAL at 16:21

## 2024-02-25 RX ADMIN — GUAIFENESIN 200 MG: 200 SOLUTION ORAL at 20:52

## 2024-02-25 RX ADMIN — CLOPIDOGREL BISULFATE 75 MG: 75 TABLET ORAL at 08:26

## 2024-02-25 RX ADMIN — DULOXETINE HYDROCHLORIDE 60 MG: 60 CAPSULE, DELAYED RELEASE ORAL at 20:49

## 2024-02-25 RX ADMIN — ACETAMINOPHEN 650 MG: 325 TABLET ORAL at 04:29

## 2024-02-25 RX ADMIN — Medication 10 ML: at 13:34

## 2024-02-25 RX ADMIN — HYDROMORPHONE HYDROCHLORIDE 0.2 MG: 1 INJECTION, SOLUTION INTRAMUSCULAR; INTRAVENOUS; SUBCUTANEOUS at 20:49

## 2024-02-25 RX ADMIN — PANTOPRAZOLE SODIUM 40 MG: 40 INJECTION, POWDER, FOR SOLUTION INTRAVENOUS at 08:26

## 2024-02-25 RX ADMIN — PHENOL 1 SPRAY: 1.4 SPRAY ORAL at 21:01

## 2024-02-25 RX ADMIN — METOPROLOL TARTRATE 50 MG: 50 TABLET, FILM COATED ORAL at 08:25

## 2024-02-25 RX ADMIN — ACETAMINOPHEN 1000 MG: 650 SOLUTION ORAL at 23:49

## 2024-02-25 RX ADMIN — Medication 10 ML: at 10:39

## 2024-02-25 RX ADMIN — DEXTROSE AND SODIUM CHLORIDE 50 ML/HR: 5; 450 INJECTION, SOLUTION INTRAVENOUS at 14:30

## 2024-02-25 RX ADMIN — ACETAMINOPHEN 650 MG: 325 TABLET ORAL at 10:31

## 2024-02-25 RX ADMIN — Medication 1000 UNITS: at 20:49

## 2024-02-25 RX ADMIN — DEXAMETHASONE SODIUM PHOSPHATE 4 MG: 4 INJECTION, SOLUTION INTRAMUSCULAR; INTRAVENOUS at 16:22

## 2024-02-25 RX ADMIN — Medication 400 MG: at 08:26

## 2024-02-25 RX ADMIN — DAPAGLIFLOZIN 10 MG: 10 TABLET, FILM COATED ORAL at 10:30

## 2024-02-25 RX ADMIN — ATORVASTATIN CALCIUM 20 MG: 20 TABLET, FILM COATED ORAL at 08:26

## 2024-02-25 RX ADMIN — HYDROMORPHONE HYDROCHLORIDE 0.2 MG: 1 INJECTION, SOLUTION INTRAMUSCULAR; INTRAVENOUS; SUBCUTANEOUS at 05:53

## 2024-02-25 RX ADMIN — Medication 1000 UNITS: at 08:26

## 2024-02-25 RX ADMIN — Medication 1 TABLET: at 08:25

## 2024-02-25 RX ADMIN — ASCORBIC ACID 500 MG: 500 INJECTION INTRAVENOUS at 08:33

## 2024-02-25 RX ADMIN — PHENOL 1 SPRAY: 1.4 SPRAY ORAL at 23:51

## 2024-02-25 RX ADMIN — LOSARTAN POTASSIUM 12.5 MG: 25 TABLET, FILM COATED ORAL at 08:25

## 2024-02-25 RX ADMIN — HYDROMORPHONE HYDROCHLORIDE 0.2 MG: 1 INJECTION, SOLUTION INTRAMUSCULAR; INTRAVENOUS; SUBCUTANEOUS at 10:31

## 2024-02-25 RX ADMIN — HYDROMORPHONE HYDROCHLORIDE 0.2 MG: 1 INJECTION, SOLUTION INTRAMUSCULAR; INTRAVENOUS; SUBCUTANEOUS at 13:34

## 2024-02-25 ASSESSMENT — PAIN SCALES - GENERAL
PAINLEVEL_OUTOF10: 7
PAINLEVEL_OUTOF10: 0 - NO PAIN
PAINLEVEL_OUTOF10: 9
PAINLEVEL_OUTOF10: 9
PAINLEVEL_OUTOF10: 8
PAINLEVEL_OUTOF10: 7

## 2024-02-25 ASSESSMENT — COGNITIVE AND FUNCTIONAL STATUS - GENERAL
DRESSING REGULAR UPPER BODY CLOTHING: A LITTLE
HELP NEEDED FOR BATHING: A LITTLE
TOILETING: A LITTLE
MOVING TO AND FROM BED TO CHAIR: A LITTLE
CLIMB 3 TO 5 STEPS WITH RAILING: A LOT
STANDING UP FROM CHAIR USING ARMS: A LITTLE
MOVING FROM LYING ON BACK TO SITTING ON SIDE OF FLAT BED WITH BEDRAILS: A LITTLE
DAILY ACTIVITIY SCORE: 19
WALKING IN HOSPITAL ROOM: A LOT
PERSONAL GROOMING: A LITTLE
MOBILITY SCORE: 16
MOBILITY SCORE: 16
CLIMB 3 TO 5 STEPS WITH RAILING: A LOT
DAILY ACTIVITIY SCORE: 19
STANDING UP FROM CHAIR USING ARMS: A LITTLE
DRESSING REGULAR UPPER BODY CLOTHING: A LITTLE
WALKING IN HOSPITAL ROOM: A LOT
MOVING TO AND FROM BED TO CHAIR: A LITTLE
TURNING FROM BACK TO SIDE WHILE IN FLAT BAD: A LITTLE
DRESSING REGULAR LOWER BODY CLOTHING: A LITTLE
TURNING FROM BACK TO SIDE WHILE IN FLAT BAD: A LITTLE
PERSONAL GROOMING: A LITTLE
DRESSING REGULAR LOWER BODY CLOTHING: A LITTLE
TOILETING: A LITTLE
MOVING FROM LYING ON BACK TO SITTING ON SIDE OF FLAT BED WITH BEDRAILS: A LITTLE
HELP NEEDED FOR BATHING: A LITTLE

## 2024-02-25 ASSESSMENT — PAIN - FUNCTIONAL ASSESSMENT
PAIN_FUNCTIONAL_ASSESSMENT: 0-10

## 2024-02-25 NOTE — PROGRESS NOTES
Amita Todd is a 78 y.o. female on day 3 of admission presenting with Thrombocytopenia (CMS/HCC).    Subjective   NAEON. Primary complaint is odynophagia. Otherwise offers no other complaints.       Objective   Constitutional:       Comments: underweight   HENT:      Mouth/Throat:      Mouth: Mucous membranes are dry. improved well circumscribed plaques   Eyes:      Pupils: Pupils are equal, round, and reactive to light.   Cardiovascular:      Rate and Rhythm: Regular rhythm. Tachycardia present.      Pulses: Normal pulses.   Pulmonary:      Comments: Mild inspiratory crackles B/L lung bases.   Abdominal:      General: Abdomen is flat. Bowel sounds are normal.      Palpations: Abdomen is soft.   Musculoskeletal:         General: Normal range of motion.      Cervical back: Normal range of motion.      Comments: 1+ pitting edema B/L LE till ankles   Skin:     General: Skin is warm.      Capillary Refill: Capillary refill takes less than 2 seconds.      Findings: Bruising present.      Comments: Petechia over trunk   Neurological:      General: No focal deficit present.      Mental Status: She is alert and oriented to person, place, and time. Mental status is at baseline.   Psychiatric:         Mood and Affect: Mood normal.     Last Recorded Vitals  Blood pressure 134/74, pulse 79, temperature 36 °C (96.8 °F), temperature source Temporal, resp. rate 16, SpO2 95 %.  Intake/Output last 3 Shifts:  I/O last 3 completed shifts:  In: -   Out: 750 [Urine:750]    Relevant Results                        Malnutrition Diagnosis Status: New  Malnutrition Diagnosis: Moderate malnutrition related to chronic disease or condition  As Evidenced by: <75% estimated energy requirement > 1month, suspected weight loss and mild to moderate muscle atrophy  I agree with the dietitian's malnutrition diagnosis.      Assessment/Plan   Principal Problem:    Thrombocytopenia (CMS/HCC)    77yo F w PMHx squamous cell carcinoma of  hypopharynx, HFpEF, CAD s/p PCI, Afib s/p watchman, GERD, depression, HTN, AUD, ascending aortic aneurysm, distant breast cancer (s/p chemoradiotherapy and lumpectomy) who was directed to ED for hypotension. In light of CHF history, there was some initial concern for decompensated heart failure. On evaluation, pt relates a history of frequent PRN lasix doses in the last week coinciding with hypotension, and her BP has improved rapidly while in the ED. BP spontaneously improving without intervention today to 100s/70s, appears grossly dry to euvolemic on exam without any symptoms of acute heart failure + positive orthostats in ED.  Despite CHF history, suspect pt is overtreated for HTN and CHF and will hold off on diuresis, currently s/p 500cc LR bolus, will reassess orthotasts in evening.      2/25 updates:  -consult GI tomorrow for PEG and EGD to r/o radiation vs infectious esophagitis once ANC > 1000  -D5 1/2 NS @ 50 ml/hour until PEG  -restarted losartan at reduced dose 12.5 mg      #Hypotension, improved   #HFpEF, initial c/f ADHF  ::Patient presents with hypotension over the past month in oncology clinic, has received IVF and discontinued home losartan/amlodipine during this time, BP spontaneously improving without intervention today to 100s/70s, appears grossly dry to euvolemic on exam without any symptoms of acute heart failure + positive orthostats in ED  ::BNP 2/22: 229 - appears to be at baseline  ::TTE 7/2023: EF 55-60%  - Patient likely has less PO intake and with chemo does not need as many antihyptertensives/GDMT medication dose - emailed primary cardiologist  - s/p 500cc bolus. Gentle fluid boluses prn  - TTE ordered to re-evaluate heart function  - Continue to hold home Losartan 50mg daily  - Continue to hold home Amlodipine 2.5mg daily  - Hold home Spironolactone 25mg daily   - c/w home Metoprolol 50mg BID (for rate control)  - c/w home Farxiga 10mg daily      #SCC of Hypopharynx (Dr. Burkitt)  ::SLP  eval 2/12 recommended soft solids with thin liquids, patient would prefer oral feeding to PEG   - nutrition consult   - SLP recs appreciated  - c/w home Guaifenesin  - c/w Zofran prn nausea  - resumed home oxycodone and tylenol, patient reports oxycodone works well for her  - Will continue dexamethasone 4mg BID      #Oropharyngeal candidiasis  #Dysphagia  ::Moderate to severe candidiasis with multiple large lesions  ::given ANC 0.16, consideration for opportunistic infections present. Entended antifungal course  - neupogen usually not given during active tx per primary oncologist, will reasses in a day or 2.   - Fluconazole 200mg IV (2/22 - xx), typically for 7 days but reassess for duration  - Oral care per nursing     #Pancytopenia  ::ANC 0.16 // Plts 53 on admission with no concern for bleeding // HB 10.9 on presentation (BL 12-13)  - likely d/t myelosuppresion vs though patient has had poor po intake vs mixed  - neupogen usually not given during active tx per primary oncologist, will reasses in a day or 2.   - Daily CBC  - Hold acetaminophen, NSAIDs   - Blood cultures STAT and zosyn if fever  - T&S updated, transfuse for active bleeding <50, or for <10  - Will hold on DVT prophylaxis, SCDs only  - Will continue clopidogrel for now     #Normocytic Anemia  ::HB 10.9 on presentation (BL 12-13), no active bleeding, no recent iron studies  - Likely d/t chronic dz vs myelosuppresion, though patient has had poor po intake  - Ordered iron studies, ferritin, B12/Folate     #Atrial Fibrillation  ::Patient previously on AC, now s/p watchman procedure and is rate controlled   ::had 5 ablations per patient  - Continue metoprolol tartrate 50mg BID  - HR 90s to 120s     #Urinary Frequency  - UA with reflex      #Hx of TIA  - c/w home Plavix 75mg daily     #GERD  - c/w home Pantoprazole     #Mood Disorder  #Misc  - c/w home Cymbalta and Wellbutrin  - c/w home supplements, mag-ox     F : as needed  E: replete lytes prn, K>4, Mg  >2  N: soft and minced, thin liquids  A: PIVs     DVT prophylaxis: SCDs  GI prophylaxis: home PPI     Code Status: FULL (confirmed on admission, though patient states she has living will she does not remember the details of, images not in system, likely DNR )               Mary Corbett MD

## 2024-02-25 NOTE — CARE PLAN
The patient's goals for the shift include      The clinical goals for the shift include Patient will remain HDS throughout the shift 2/25 0700      Problem: Skin  Goal: Decreased wound size/increased tissue granulation at next dressing change  Outcome: Progressing  Goal: Participates in plan/prevention/treatment measures  Outcome: Progressing  Goal: Prevent/manage excess moisture  Outcome: Progressing  Goal: Prevent/minimize sheer/friction injuries  Outcome: Progressing  Goal: Promote/optimize nutrition  Outcome: Progressing  Goal: Promote skin healing  Outcome: Progressing     Problem: Pain  Goal: My pain/discomfort is manageable  Outcome: Progressing     Problem: Safety  Goal: Patient will be injury free during hospitalization  Outcome: Progressing  Goal: I will remain free of falls  Outcome: Progressing     Problem: Daily Care  Goal: Daily care needs are met  Outcome: Progressing     Problem: Psychosocial Needs  Goal: Demonstrates ability to cope with hospitalization/illness  Outcome: Progressing  Goal: Collaborate with me, my family, and caregiver to identify my specific goals  Outcome: Progressing     Problem: Discharge Barriers  Goal: My discharge needs are met  Outcome: Progressing     Problem: Pain  Goal: Takes deep breaths with improved pain control throughout the shift  Outcome: Progressing  Goal: Turns in bed with improved pain control throughout the shift  Outcome: Progressing  Goal: Walks with improved pain control throughout the shift  Outcome: Progressing  Goal: Performs ADL's with improved pain control throughout shift  Outcome: Progressing  Goal: Participates in PT with improved pain control throughout the shift  Outcome: Progressing  Goal: Free from opioid side effects throughout the shift  Outcome: Progressing  Goal: Free from acute confusion related to pain meds throughout the shift  Outcome: Progressing

## 2024-02-25 NOTE — CARE PLAN
The patient's goals for the shift include pain management, safe and free from injury, remain HDS and VSS.     The clinical goals for the shift include patient will rate throat pain less than a 8/10 throughout shift 2/25 at 1900      Problem: Skin  Goal: Decreased wound size/increased tissue granulation at next dressing change  Outcome: Progressing     Problem: Skin  Goal: Participates in plan/prevention/treatment measures  Outcome: Progressing     Problem: Pain  Goal: My pain/discomfort is manageable  Outcome: Progressing     Problem: Safety  Goal: Patient will be injury free during hospitalization  Outcome: Progressing     Problem: Safety  Goal: I will remain free of falls  Outcome: Progressing     Problem: Daily Care  Goal: Daily care needs are met  Outcome: Progressing     Problem: Psychosocial Needs  Goal: Collaborate with me, my family, and caregiver to identify my specific goals  Outcome: Progressing     Problem: Pain  Goal: Turns in bed with improved pain control throughout the shift  Outcome: Progressing     Problem: Pain  Goal: Performs ADL's with improved pain control throughout shift  Outcome: Progressing

## 2024-02-26 ENCOUNTER — RADIATION ONCOLOGY OTV (OUTPATIENT)
Dept: RADIATION ONCOLOGY | Facility: HOSPITAL | Age: 79
End: 2024-02-26
Payer: MEDICARE

## 2024-02-26 ENCOUNTER — HOSPITAL ENCOUNTER (OUTPATIENT)
Dept: RADIATION ONCOLOGY | Facility: HOSPITAL | Age: 79
Setting detail: RADIATION/ONCOLOGY SERIES
Discharge: HOME | End: 2024-02-26
Payer: MEDICARE

## 2024-02-26 VITALS
BODY MASS INDEX: 25.58 KG/M2 | WEIGHT: 153.7 LBS | HEART RATE: 77 BPM | TEMPERATURE: 97.3 F | OXYGEN SATURATION: 92 % | DIASTOLIC BLOOD PRESSURE: 82 MMHG | SYSTOLIC BLOOD PRESSURE: 151 MMHG | RESPIRATION RATE: 18 BRPM

## 2024-02-26 DIAGNOSIS — Z51.0 ENCOUNTER FOR ANTINEOPLASTIC RADIATION THERAPY: ICD-10-CM

## 2024-02-26 DIAGNOSIS — C13.8 MALIGNANT NEOPLASM OF OVERLAPPING SITES OF HYPOPHARYNX (MULTI): ICD-10-CM

## 2024-02-26 LAB
ALBUMIN SERPL BCP-MCNC: 2.8 G/DL (ref 3.4–5)
ANION GAP SERPL CALC-SCNC: 12 MMOL/L (ref 10–20)
BASOPHILS # BLD AUTO: 0.01 X10*3/UL (ref 0–0.1)
BASOPHILS NFR BLD AUTO: 0.5 %
BUN SERPL-MCNC: 27 MG/DL (ref 6–23)
BURR CELLS BLD QL SMEAR: NORMAL
CALCIUM SERPL-MCNC: 8.9 MG/DL (ref 8.6–10.6)
CHLORIDE SERPL-SCNC: 100 MMOL/L (ref 98–107)
CO2 SERPL-SCNC: 26 MMOL/L (ref 21–32)
CREAT SERPL-MCNC: 0.75 MG/DL (ref 0.5–1.05)
EGFRCR SERPLBLD CKD-EPI 2021: 82 ML/MIN/1.73M*2
EOSINOPHIL # BLD AUTO: 0 X10*3/UL (ref 0–0.4)
EOSINOPHIL NFR BLD AUTO: 0 %
ERYTHROCYTE [DISTWIDTH] IN BLOOD BY AUTOMATED COUNT: 16.6 % (ref 11.5–14.5)
GLUCOSE SERPL-MCNC: 140 MG/DL (ref 74–99)
HCT VFR BLD AUTO: 31 % (ref 36–46)
HGB BLD-MCNC: 9.9 G/DL (ref 12–16)
IMM GRANULOCYTES # BLD AUTO: 0 X10*3/UL (ref 0–0.5)
IMM GRANULOCYTES NFR BLD AUTO: 0 % (ref 0–0.9)
LYMPHOCYTES # BLD AUTO: 0.03 X10*3/UL (ref 0.8–3)
LYMPHOCYTES NFR BLD AUTO: 1.5 %
MAGNESIUM SERPL-MCNC: 2.18 MG/DL (ref 1.6–2.4)
MCH RBC QN AUTO: 31.2 PG (ref 26–34)
MCHC RBC AUTO-ENTMCNC: 31.9 G/DL (ref 32–36)
MCV RBC AUTO: 98 FL (ref 80–100)
MONOCYTES # BLD AUTO: 0.09 X10*3/UL (ref 0.05–0.8)
MONOCYTES NFR BLD AUTO: 4.4 %
NEUTROPHILS # BLD AUTO: 1.9 X10*3/UL (ref 1.6–5.5)
NEUTROPHILS NFR BLD AUTO: 93.6 %
NRBC BLD-RTO: 2 /100 WBCS (ref 0–0)
OVALOCYTES BLD QL SMEAR: NORMAL
PHOSPHATE SERPL-MCNC: 3.4 MG/DL (ref 2.5–4.9)
PLATELET # BLD AUTO: 81 X10*3/UL (ref 150–450)
POTASSIUM SERPL-SCNC: 4.7 MMOL/L (ref 3.5–5.3)
RAD ONC MSQ ACTUAL FRACTIONS DELIVERED: 24
RAD ONC MSQ ACTUAL SESSION DELIVERED DOSE: 200 CGRAY
RAD ONC MSQ ACTUAL TOTAL DOSE: 4800 CGRAY
RAD ONC MSQ ELAPSED DAYS: 33
RAD ONC MSQ LAST DATE: NORMAL
RAD ONC MSQ PRESCRIBED FRACTIONAL DOSE: 200 CGRAY
RAD ONC MSQ PRESCRIBED NUMBER OF FRACTIONS: 35
RAD ONC MSQ PRESCRIBED TECHNIQUE: NORMAL
RAD ONC MSQ PRESCRIBED TOTAL DOSE: 7000 CGRAY
RAD ONC MSQ PRESCRIPTION PATTERN COMMENT: NORMAL
RAD ONC MSQ START DATE: NORMAL
RAD ONC MSQ TREATMENT COURSE NUMBER: 1
RAD ONC MSQ TREATMENT SITE: NORMAL
RBC # BLD AUTO: 3.17 X10*6/UL (ref 4–5.2)
RBC MORPH BLD: NORMAL
SODIUM SERPL-SCNC: 133 MMOL/L (ref 136–145)
WBC # BLD AUTO: 2 X10*3/UL (ref 4.4–11.3)

## 2024-02-26 PROCEDURE — 99223 1ST HOSP IP/OBS HIGH 75: CPT | Performed by: NURSE PRACTITIONER

## 2024-02-26 PROCEDURE — 77386 HC INTENSITY-MODULATED RADIATION THERAPY (IMRT), COMPLEX: CPT | Performed by: RADIOLOGY

## 2024-02-26 PROCEDURE — 77427 RADIATION TX MANAGEMENT X5: CPT | Performed by: RADIOLOGY

## 2024-02-26 PROCEDURE — 2500000004 HC RX 250 GENERAL PHARMACY W/ HCPCS (ALT 636 FOR OP/ED)

## 2024-02-26 PROCEDURE — 2500000002 HC RX 250 W HCPCS SELF ADMINISTERED DRUGS (ALT 637 FOR MEDICARE OP, ALT 636 FOR OP/ED)

## 2024-02-26 PROCEDURE — 2500000005 HC RX 250 GENERAL PHARMACY W/O HCPCS

## 2024-02-26 PROCEDURE — 2500000001 HC RX 250 WO HCPCS SELF ADMINISTERED DRUGS (ALT 637 FOR MEDICARE OP)

## 2024-02-26 PROCEDURE — 97530 THERAPEUTIC ACTIVITIES: CPT | Mod: GP | Performed by: PHYSICAL THERAPIST

## 2024-02-26 PROCEDURE — 1170000001 HC PRIVATE ONCOLOGY ROOM DAILY

## 2024-02-26 PROCEDURE — 77014 CHG CT GUIDANCE RADIATION THERAPY FLDS PLACEMENT: CPT | Performed by: RADIOLOGY

## 2024-02-26 PROCEDURE — 99222 1ST HOSP IP/OBS MODERATE 55: CPT | Performed by: STUDENT IN AN ORGANIZED HEALTH CARE EDUCATION/TRAINING PROGRAM

## 2024-02-26 PROCEDURE — 83735 ASSAY OF MAGNESIUM: CPT

## 2024-02-26 PROCEDURE — 97166 OT EVAL MOD COMPLEX 45 MIN: CPT | Mod: GO | Performed by: OCCUPATIONAL THERAPIST

## 2024-02-26 PROCEDURE — 80069 RENAL FUNCTION PANEL: CPT

## 2024-02-26 PROCEDURE — 85025 COMPLETE CBC W/AUTO DIFF WBC: CPT

## 2024-02-26 PROCEDURE — 97162 PT EVAL MOD COMPLEX 30 MIN: CPT | Mod: GP | Performed by: PHYSICAL THERAPIST

## 2024-02-26 PROCEDURE — C9113 INJ PANTOPRAZOLE SODIUM, VIA: HCPCS

## 2024-02-26 PROCEDURE — 99233 SBSQ HOSP IP/OBS HIGH 50: CPT

## 2024-02-26 RX ORDER — OXYCODONE HYDROCHLORIDE 5 MG/1
2.5 TABLET ORAL EVERY 6 HOURS PRN
Status: DISCONTINUED | OUTPATIENT
Start: 2024-02-26 | End: 2024-02-27

## 2024-02-26 RX ORDER — ENOXAPARIN SODIUM 100 MG/ML
40 INJECTION SUBCUTANEOUS DAILY
Status: COMPLETED | OUTPATIENT
Start: 2024-02-26 | End: 2024-02-29

## 2024-02-26 RX ORDER — SPIRONOLACTONE 25 MG/1
12.5 TABLET ORAL DAILY
Status: DISCONTINUED | OUTPATIENT
Start: 2024-02-26 | End: 2024-02-28

## 2024-02-26 RX ORDER — OXYCODONE HYDROCHLORIDE 5 MG/1
5 TABLET ORAL EVERY 4 HOURS PRN
Status: DISCONTINUED | OUTPATIENT
Start: 2024-02-26 | End: 2024-02-27

## 2024-02-26 RX ORDER — HYDROMORPHONE HYDROCHLORIDE 1 MG/ML
0.4 INJECTION, SOLUTION INTRAMUSCULAR; INTRAVENOUS; SUBCUTANEOUS
Status: DISCONTINUED | OUTPATIENT
Start: 2024-02-26 | End: 2024-02-27

## 2024-02-26 RX ORDER — LIDOCAINE HYDROCHLORIDE 20 MG/ML
1 JELLY TOPICAL ONCE
Status: DISCONTINUED | OUTPATIENT
Start: 2024-02-26 | End: 2024-03-14 | Stop reason: ALTCHOICE

## 2024-02-26 RX ADMIN — DULOXETINE HYDROCHLORIDE 60 MG: 60 CAPSULE, DELAYED RELEASE ORAL at 20:43

## 2024-02-26 RX ADMIN — BUPROPION HYDROCHLORIDE 150 MG: 150 TABLET, EXTENDED RELEASE ORAL at 20:43

## 2024-02-26 RX ADMIN — GUAIFENESIN 200 MG: 200 SOLUTION ORAL at 20:43

## 2024-02-26 RX ADMIN — PHENOL 1 SPRAY: 1.4 SPRAY ORAL at 23:08

## 2024-02-26 RX ADMIN — Medication 400 MG: at 08:31

## 2024-02-26 RX ADMIN — HYDROMORPHONE HYDROCHLORIDE 0.2 MG: 1 INJECTION, SOLUTION INTRAMUSCULAR; INTRAVENOUS; SUBCUTANEOUS at 04:16

## 2024-02-26 RX ADMIN — PANTOPRAZOLE SODIUM 40 MG: 40 INJECTION, POWDER, FOR SOLUTION INTRAVENOUS at 08:36

## 2024-02-26 RX ADMIN — PHENOL 1 SPRAY: 1.4 SPRAY ORAL at 20:43

## 2024-02-26 RX ADMIN — OXYCODONE HYDROCHLORIDE 5 MG: 5 TABLET ORAL at 13:58

## 2024-02-26 RX ADMIN — PHENOL 1 SPRAY: 1.4 SPRAY ORAL at 13:58

## 2024-02-26 RX ADMIN — METOPROLOL TARTRATE 50 MG: 50 TABLET, FILM COATED ORAL at 20:43

## 2024-02-26 RX ADMIN — BUPROPION HYDROCHLORIDE 150 MG: 150 TABLET, EXTENDED RELEASE ORAL at 08:32

## 2024-02-26 RX ADMIN — DAPAGLIFLOZIN 10 MG: 10 TABLET, FILM COATED ORAL at 08:30

## 2024-02-26 RX ADMIN — PHENOL 1 SPRAY: 1.4 SPRAY ORAL at 07:15

## 2024-02-26 RX ADMIN — ACETAMINOPHEN 1000 MG: 650 SOLUTION ORAL at 16:47

## 2024-02-26 RX ADMIN — ENOXAPARIN SODIUM 40 MG: 100 INJECTION SUBCUTANEOUS at 16:47

## 2024-02-26 RX ADMIN — HYDROMORPHONE HYDROCHLORIDE 0.2 MG: 1 INJECTION, SOLUTION INTRAMUSCULAR; INTRAVENOUS; SUBCUTANEOUS at 08:34

## 2024-02-26 RX ADMIN — FLUCONAZOLE 400 MG: 2 INJECTION, SOLUTION INTRAVENOUS at 13:57

## 2024-02-26 RX ADMIN — METOPROLOL TARTRATE 50 MG: 50 TABLET, FILM COATED ORAL at 08:36

## 2024-02-26 RX ADMIN — Medication 1000 UNITS: at 08:31

## 2024-02-26 RX ADMIN — PHENOL 1 SPRAY: 1.4 SPRAY ORAL at 16:57

## 2024-02-26 RX ADMIN — DEXAMETHASONE SODIUM PHOSPHATE 4 MG: 4 INJECTION, SOLUTION INTRAMUSCULAR; INTRAVENOUS at 04:17

## 2024-02-26 RX ADMIN — ATORVASTATIN CALCIUM 20 MG: 20 TABLET, FILM COATED ORAL at 08:31

## 2024-02-26 RX ADMIN — LOSARTAN POTASSIUM 12.5 MG: 25 TABLET, FILM COATED ORAL at 08:31

## 2024-02-26 RX ADMIN — ACETAMINOPHEN 1000 MG: 650 SOLUTION ORAL at 10:38

## 2024-02-26 RX ADMIN — Medication 1 TABLET: at 08:36

## 2024-02-26 RX ADMIN — PHENOL 1 SPRAY: 1.4 SPRAY ORAL at 04:17

## 2024-02-26 RX ADMIN — ACETAMINOPHEN 1000 MG: 650 SOLUTION ORAL at 05:45

## 2024-02-26 RX ADMIN — SPIRONOLACTONE 12.5 MG: 25 TABLET, FILM COATED ORAL at 08:31

## 2024-02-26 RX ADMIN — ASCORBIC ACID 500 MG: 500 INJECTION INTRAVENOUS at 08:30

## 2024-02-26 RX ADMIN — Medication 1000 UNITS: at 20:43

## 2024-02-26 ASSESSMENT — PAIN - FUNCTIONAL ASSESSMENT
PAIN_FUNCTIONAL_ASSESSMENT: 0-10

## 2024-02-26 ASSESSMENT — PAIN SCALES - GENERAL
PAINLEVEL: 2
PAINLEVEL_OUTOF10: 5 - MODERATE PAIN
PAINLEVEL_OUTOF10: 7
PAINLEVEL_OUTOF10: 0 - NO PAIN
PAINLEVEL_OUTOF10: 9
PAINLEVEL_OUTOF10: 9
PAINLEVEL_OUTOF10: 8
PAINLEVEL_OUTOF10: 0 - NO PAIN

## 2024-02-26 ASSESSMENT — COGNITIVE AND FUNCTIONAL STATUS - GENERAL
MOBILITY SCORE: 17
WALKING IN HOSPITAL ROOM: A LITTLE
STANDING UP FROM CHAIR USING ARMS: A LITTLE
PERSONAL GROOMING: A LITTLE
PERSONAL GROOMING: A LITTLE
STANDING UP FROM CHAIR USING ARMS: A LITTLE
DRESSING REGULAR UPPER BODY CLOTHING: A LITTLE
CLIMB 3 TO 5 STEPS WITH RAILING: TOTAL
DAILY ACTIVITIY SCORE: 19
PERSONAL GROOMING: A LITTLE
DRESSING REGULAR UPPER BODY CLOTHING: A LITTLE
TOILETING: A LITTLE
DRESSING REGULAR LOWER BODY CLOTHING: A LITTLE
DRESSING REGULAR UPPER BODY CLOTHING: A LITTLE
HELP NEEDED FOR BATHING: A LITTLE
PERSONAL GROOMING: A LITTLE
WALKING IN HOSPITAL ROOM: A LITTLE
TURNING FROM BACK TO SIDE WHILE IN FLAT BAD: A LITTLE
DAILY ACTIVITIY SCORE: 19
STANDING UP FROM CHAIR USING ARMS: A LITTLE
MOVING TO AND FROM BED TO CHAIR: A LITTLE
TURNING FROM BACK TO SIDE WHILE IN FLAT BAD: A LITTLE
CLIMB 3 TO 5 STEPS WITH RAILING: TOTAL
WALKING IN HOSPITAL ROOM: A LITTLE
CLIMB 3 TO 5 STEPS WITH RAILING: TOTAL
MOBILITY SCORE: 17
MOVING TO AND FROM BED TO CHAIR: A LITTLE
CLIMB 3 TO 5 STEPS WITH RAILING: TOTAL
DAILY ACTIVITIY SCORE: 19
TURNING FROM BACK TO SIDE WHILE IN FLAT BAD: A LITTLE
TOILETING: A LITTLE
DAILY ACTIVITIY SCORE: 19
HELP NEEDED FOR BATHING: A LITTLE
MOBILITY SCORE: 17
HELP NEEDED FOR BATHING: A LITTLE
DRESSING REGULAR LOWER BODY CLOTHING: A LITTLE
HELP NEEDED FOR BATHING: A LITTLE
DRESSING REGULAR UPPER BODY CLOTHING: A LITTLE
DRESSING REGULAR LOWER BODY CLOTHING: A LITTLE
TURNING FROM BACK TO SIDE WHILE IN FLAT BAD: A LITTLE
MOVING TO AND FROM BED TO CHAIR: A LITTLE
STANDING UP FROM CHAIR USING ARMS: A LITTLE
TOILETING: A LITTLE
TOILETING: A LITTLE
WALKING IN HOSPITAL ROOM: A LITTLE
MOBILITY SCORE: 17
DRESSING REGULAR LOWER BODY CLOTHING: A LITTLE
MOVING TO AND FROM BED TO CHAIR: A LITTLE

## 2024-02-26 ASSESSMENT — ACTIVITIES OF DAILY LIVING (ADL)
BATHING_ASSISTANCE: MINIMAL
ADLS_ADDRESSED: YES

## 2024-02-26 NOTE — CONSULTS
[facility]   [facility]  INITIAL CONSULT NOTE       Reason For Consult  PEG tube assessment    SUBJECTIVE     History Of Present Illness  Amita Todd is a 78 y.o. 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     GI is consulted for PEG tube assessment.    The patient is complaining of odynophagia and oral pain. She denied n/v, abdominal pain, diarrhea, constipation, melena, and hematochezia.    PSH:  Additionally, R-sided partial colectomy, oophorectomy, endometriosis related abdominal surgery     Review of Systems  12-point ROS has been reviewed and is negative, except if mentioned otherwise above.    Past Medical History:    Past Medical History:   Diagnosis Date    Alcohol dependence, in remission (CMS/[facility]) 06/16/2016    History of alcoholism    Anemia     Aneurysm of ascending aorta (CMS/[facility])     Arrhythmia     a-fib. 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    Pain in leg, unspecified 01/25/2022    Leg pain    Pain in right shoulder 07/01/2020    Bilateral shoulder pain    Pain in unspecified knee  03/31/2021    Joint pain, knee    Personal history of diseases of the blood and blood-forming organs and certain disorders involving the immune mechanism 02/09/2019    History of anemia    Personal history of malignant neoplasm of breast 04/29/2014    History of malignant neoplasm of female breast    Personal history of other diseases of the circulatory system 04/03/2014    Personal history of subarachnoid hemorrhage    Personal history of other diseases of the digestive system 03/08/2021    History of upper gastrointestinal hemorrhage    Personal history of other diseases of the digestive system 03/08/2021    History of melena    Personal history of other diseases of the nervous system and sense organs 08/12/2020    History of Bell's palsy    Personal history of other infectious and parasitic diseases 06/14/2019    History of herpes zoster    Personal history of other specified conditions 03/03/2020    History of lump of left breast    Personal history of other specified conditions 02/22/2021    History of shortness of breath    Personal history of transient ischemic attack (TIA), and cerebral infarction without residual deficits 04/24/2022    History of transient ischemic attack    Stenosis of carotid artery     right    Trigger finger, unspecified finger 04/24/2017    Acquired trigger finger       Home Medications  Medications Prior to Admission   Medication Sig Dispense Refill Last Dose    acetaminophen (Tylenol) 325 mg tablet Take 2 tablets (650 mg) by mouth every 6 hours if needed.       albuterol 90 mcg/actuation aerosol powdr breath activated inhaler Inhale 2 puffs every 6 hours if needed for wheezing or shortness of breath.       ascorbic acid (Vitamin C) 500 mg tablet Take 1 tablet (500 mg) by mouth once daily.       atorvastatin (Lipitor) 20 mg tablet TAKE 1 TABLET BY MOUTH DAILY AS  DIRECTED 90 tablet 3     buPROPion SR (Wellbutrin SR) 150 mg 12 hr tablet Take 1 tablet (150 mg) by mouth 2 times a day.  TAKE PER DIRECTED       cholecalciferol (Vitamin D-3) 25 MCG (1000 UT) tablet Take 1 tablet (25 mcg) by mouth 2 times a day.       clopidogrel (Plavix) 75 mg tablet TAKE 1 TABLET BY MOUTH DAILY 90 tablet 3     coenzyme Q-10 (CoQ-10) 100 mg capsule Take 1 capsule (100 mg) by mouth 2 times a day.       dapagliflozin propanediol (Farxiga) 10 mg Take 1 tablet (10 mg) by mouth once daily. 90 tablet 3     dexAMETHasone (Decadron) 4 mg tablet Take 1 tablet (4 mg) by mouth 2 times a day with meals. 60 tablet 0     DULoxetine (Cymbalta) 60 mg DR capsule TAKE 1 CAPSULE BY MOUTH  DAILY (Patient taking differently: Take 1 capsule (60 mg) by mouth once daily at bedtime.) 90 capsule 3     furosemide (Lasix) 20 mg tablet TAKE 1 TABLET BY MOUTH  DAILY (Patient taking differently: Take 1 tablet (20 mg) by mouth once daily as needed (swelling or 3lb weight gain).) 90 tablet 3     guaiFENesin 200 mg/5 mL liquid Take 10 mL by mouth 4 times a day. 118 mL 3     herbal complex no.239 (Whole Body Joint Support) capsule Take 1 capsule by mouth once daily.       ipratropium (Atrovent) 21 mcg (0.03 %) nasal spray ADMINISTER 2 SPRAYS INTO EACH NOSTRIL EVERY 12 HOURS. 90 mL 3     losartan (Cozaar) 50 mg tablet Take 1 tablet (50 mg) by mouth once daily.       magnesium oxide (Mag-Ox) 400 mg (241.3 mg magnesium) tablet Take 1 tablet (400 mg) by mouth once daily.       metoprolol tartrate (Lopressor) 25 mg tablet TAKE 2 TABLETS BY MOUTH TWICE  DAILY 360 tablet 3     multivitamin with minerals tablet Take 1 tablet by mouth once daily.       ondansetron (Zofran) 8 mg tablet Take 1 tablet (8 mg) by mouth every 8 hours if needed for nausea or vomiting. 30 tablet 5     oxyCODONE (Roxicodone) 5 mg immediate release tablet Take 1 tablet (5 mg) by mouth every 6 hours if needed for moderate pain (4 - 6) or severe pain (7 - 10). 120 tablet 0     pantoprazole (ProtoNix) 40 mg EC tablet Take 1 tablet (40 mg) by mouth 2 times a day. 180 tablet 3      prochlorperazine (Compazine) 10 mg tablet Take 1 tablet (10 mg) by mouth every 6 hours if needed for nausea or vomiting. 30 tablet 5     sennosides-docusate sodium (Kalee-Colace) 8.6-50 mg tablet Take 2 tablets by mouth once daily. 60 tablet 11     spironolactone (Aldactone) 25 mg tablet Take 1 tablet (25 mg) by mouth once daily. PER DIRECTED          Surgical History:    Past Surgical History:   Procedure Laterality Date    CT ANGIO NECK  06/06/2019    CT NECK ANGIO W AND WO IV CONTRAST 6/6/2019 [facility] CLINICAL LEGACY    CT ANGIO NECK  01/04/2021    CT NECK ANGIO W AND WO IV CONTRAST 1/4/2021 [facility] ANCILLARY LEGACY    CT HEAD ANGIO W AND WO IV CONTRAST  06/06/2019    CT HEAD ANGIO W AND WO IV CONTRAST 6/6/2019 [facility] CLINICAL LEGACY    CT HEAD ANGIO W AND WO IV CONTRAST  01/04/2021    CT HEAD ANGIO W AND WO IV CONTRAST 1/4/2021 [facility] ANCILLARY LEGACY    MR HEAD ANGIO WO IV CONTRAST  06/06/2019    MR HEAD ANGIO WO IV CONTRAST 6/6/2019 [facility] CLINICAL LEGACY    MR NECK ANGIO WO IV CONTRAST  06/06/2019    MR NECK ANGIO WO IV CONTRAST 6/6/2019 [facility] CLINICAL LEGACY    OTHER SURGICAL HISTORY  03/03/2020    Oophorectomy    OTHER SURGICAL HISTORY  06/17/2019    Miscarriage surgical treatment    OTHER SURGICAL HISTORY  08/29/2019    multiple Catheter Ablation Atrial Flutter    OTHER SURGICAL HISTORY  04/23/2014    Previous Stent Placement    SEPTOPLASTY  09/22/2017    Septoplasty       Allergies:    Allergies   Allergen Reactions    Oxycodone Hcl-Oxycodone-Asa Unknown    Oxycodone-Acetaminophen Unknown    Penicillin G Other     Nausea    Penicillins GI Upset and Other       Social History:    Social History     Socioeconomic History    Marital status:      Spouse name: Not on file    Number of children: Not on file    Years of education: Not on file    Highest education level: Not on file   Occupational History    Not on file   Tobacco Use    Smoking status: Former     Packs/day: 2.00     Years: 30.00     Additional pack years:  0.00     Total pack years: 60.00     Types: Cigarettes     Quit date: 2000     Years since quittin.1    Smokeless tobacco: Never   Vaping Use    Vaping Use: Never used   Substance and Sexual Activity    Alcohol use: Not Currently     Comment: Prior Alcohol use. Stopped in early     Drug use: Never    Sexual activity: Not on file   Other Topics Concern    Not on file   Social History Narrative    Not on file     Social Determinants of Health     Financial Resource Strain: Low Risk  (2024)    Overall Financial Resource Strain (CARDIA)     Difficulty of Paying Living Expenses: Not hard at all   Food Insecurity: Not on file   Transportation Needs: No Transportation Needs (2024)    PRAPARE - Transportation     Lack of Transportation (Medical): No     Lack of Transportation (Non-Medical): No   Physical Activity: Not on file   Stress: Not on file   Social Connections: Not on file   Intimate Partner Violence: Not on file   Housing Stability: Low Risk  (2024)    Housing Stability Vital Sign     Unable to Pay for Housing in the Last Year: No     Number of Places Lived in the Last Year: 1     Unstable Housing in the Last Year: No       Family History:    Family History   Problem Relation Name Age of Onset    Breast cancer Mother      Other (GOODPASTURE'S DISEASE) Father      BRCA2 Positive Sister      Lung cancer Sister      Neuroblastoma Sister      BRCA2 Positive Daughter      Breast cancer Other G/P        EXAM     Vitals:    Vitals:    24 1753 24 2049 24 0211 24 0547   BP: 155/81 165/88 148/90 (!) 156/92   BP Location:  Right arm Left arm Left arm   Patient Position:   Lying Lying   Pulse: 71 83 74 76   Resp: 16 16 16 16   Temp: 36.3 °C (97.3 °F) 36.4 °C (97.5 °F) 36.2 °C (97.2 °F) 36.3 °C (97.3 °F)   TempSrc: Temporal Temporal Temporal Temporal   SpO2: 94% 94% 92% 92%     Failed to redirect to the Timeline version of the REVFS SmartLink.    Intake/Output Summary (Last 24  hours) at 2/26/2024 0825  Last data filed at 2/26/2024 0600  Gross per 24 hour   Intake 1075 ml   Output 250 ml   Net 825 ml       Physical Exam   GENERAL: Chronically ill.  NEUROLOGIC: A and O x 3. Cranial nerves 2 through 12 grossly intact. Intact motor and sensory systems.  HEENT: Pupils are equal, round, and reactive to light and accommodation. Extraocular motion intact.  CARDIAC: S1 + S2, RRR, no M/R/G.    LUNGS: CTA BL. No wheezes or coarse breath sounds. Symmetric respirations. No increased work of breathing.   ABDOMEN: Soft, non-tender to palpation. No rebound or guarding.  PSYCH: Appropriate mood and behavior.      OBJECTIVE                                                                              Medications       Current Facility-Administered Medications:     acetaminophen (Tylenol) oral liquid 1,000 mg, 1,000 mg, oral, q6h, Mary Corbett MD, 1,000 mg at 02/26/24 0545    alteplase (Cathflo Activase) injection 1 mg, 1 mg, intra-catheter, PRN, Isaac Bledsoe MD    ascorbic acid (Vitamin C) 500 mg in dextrose 5 % in water (D5W) 100 mL IV, 500 mg, intravenous, Daily, Mary Corbett MD, Stopped at 02/25/24 0933    atorvastatin (Lipitor) tablet 20 mg, 20 mg, oral, Daily, Justino Cooper MD, 20 mg at 02/25/24 0826    buPROPion SR (Wellbutrin SR) 12 hr tablet 150 mg, 150 mg, oral, BID, Justino Cooper MD, 150 mg at 02/25/24 1030    cholecalciferol (Vitamin D-3) tablet 1,000 Units, 1,000 Units, oral, BID, Justino Cooper MD, 1,000 Units at 02/25/24 2049    dapagliflozin propanediol (Farxiga) tablet 10 mg, 10 mg, oral, Daily, Justino Cooper MD, 10 mg at 02/25/24 1030    dexAMETHasone (Decadron) injection 4 mg, 4 mg, intravenous, q12h, Mary Corbett MD, 4 mg at 02/26/24 0417    dextrose 5%-0.45 % sodium chloride infusion, 50 mL/hr, intravenous, Continuous, Mary Corbett MD, Last Rate: 50 mL/hr at 02/26/24 0600, 50 mL/hr at 02/26/24 0600    DULoxetine (Cymbalta) DR capsule 60 mg, 60 mg, oral, Nightly, Justino Cooper MD, 60 mg  at 02/25/24 2049    fluconazole in NaCl (iso-osm) (Diflucan) IVPB 400 mg, 400 mg, intravenous, q24h, Mary Corbett MD, 400 mg at 02/25/24 1151    guaiFENesin (Robitussin) 100 mg/5 mL syrup 200 mg, 200 mg, oral, q4h PRN, Justino Cooper MD, 200 mg at 02/25/24 2052    HYDROmorphone (Dilaudid) injection 0.2 mg, 0.2 mg, intravenous, q3h PRN, Mary Corbett MD, 0.2 mg at 02/26/24 0416    lido-diphen-Maalox 1:1:1 Magic Mouthwash, 10 mL, Swish & Spit, q4h PRN, Isaac Bledsoe MD, 10 mL at 02/25/24 1334    losartan (Cozaar) tablet 12.5 mg, 12.5 mg, oral, Daily, Mary Corbett MD, 12.5 mg at 02/25/24 0825    magnesium oxide (Mag-Ox) tablet 400 mg, 400 mg, oral, Daily, Justino Cooper MD, 400 mg at 02/25/24 0826    metoprolol tartrate (Lopressor) tablet 50 mg, 50 mg, oral, BID, Justino Cooper MD, 50 mg at 02/25/24 2049    multivitamin with minerals 1 tablet, 1 tablet, oral, Daily, Justino Cooper MD, 1 tablet at 02/25/24 0825    ondansetron (Zofran) injection 4 mg, 4 mg, intravenous, q6h PRN, Mary Corbett MD    ondansetron ODT (Zofran-ODT) disintegrating tablet 4 mg, 4 mg, oral, Once, Bailee Bhakta PA-C    oxyCODONE (Roxicodone) immediate release tablet 5 mg, 5 mg, oral, q6h PRN, Mary Corbett MD    pantoprazole (ProtoNix) injection 40 mg, 40 mg, intravenous, Daily, Mary Corbett MD, 40 mg at 02/25/24 0826    perflutren lipid microspheres (Definity) injection 0.5-10 mL of dilution, 0.5-10 mL of dilution, intravenous, Once in imaging, Justino Cooper MD    perflutren protein A microsphere (Optison) injection 0.5 mL, 0.5 mL, intravenous, Once in imaging, Justino Cooper MD    phenoL (Chloraseptic) 1.4 % mouth/throat spray 1 spray, 1 spray, Mouth/Throat, q2h, Mary Corbett MD, 1 spray at 02/26/24 0417    spironolactone (Aldactone) tablet 12.5 mg, 12.5 mg, oral, Daily, Angelic Sarmiento MD    sulfur hexafluoride microsphr (Lumason) injection 24.28 mg, 2 mL, intravenous, Once in imaging, Justino Cooper MD                                                                             Labs     Results for orders placed or performed during the hospital encounter of 02/22/24 (from the past 24 hour(s))   CBC and Auto Differential   Result Value Ref Range    WBC 1.0 (LL) 4.4 - 11.3 x10*3/uL    nRBC 4.9 (H) 0.0 - 0.0 /100 WBCs    RBC 3.11 (L) 4.00 - 5.20 x10*6/uL    Hemoglobin 9.7 (L) 12.0 - 16.0 g/dL    Hematocrit 30.6 (L) 36.0 - 46.0 %    MCV 98 80 - 100 fL    MCH 31.2 26.0 - 34.0 pg    MCHC 31.7 (L) 32.0 - 36.0 g/dL    RDW 16.5 (H) 11.5 - 14.5 %    Platelets 70 (L) 150 - 450 x10*3/uL    Immature Granulocytes %, Automated 0.0 0.0 - 0.9 %    Immature Granulocytes Absolute, Automated 0.00 0.00 - 0.50 x10*3/uL   Comprehensive Metabolic Panel   Result Value Ref Range    Glucose 155 (H) 74 - 99 mg/dL    Sodium 133 (L) 136 - 145 mmol/L    Potassium 4.7 3.5 - 5.3 mmol/L    Chloride 100 98 - 107 mmol/L    Bicarbonate 23 21 - 32 mmol/L    Anion Gap 15 10 - 20 mmol/L    Urea Nitrogen 28 (H) 6 - 23 mg/dL    Creatinine 0.73 0.50 - 1.05 mg/dL    eGFR 85 >60 mL/min/1.73m*2    Calcium 8.6 8.6 - 10.6 mg/dL    Albumin 2.7 (L) 3.4 - 5.0 g/dL    Alkaline Phosphatase 50 33 - 136 U/L    Total Protein 4.9 (L) 6.4 - 8.2 g/dL    AST 13 9 - 39 U/L    Bilirubin, Total 0.6 0.0 - 1.2 mg/dL    ALT 18 7 - 45 U/L   Magnesium   Result Value Ref Range    Magnesium 2.26 1.60 - 2.40 mg/dL   Phosphorus   Result Value Ref Range    Phosphorus 3.2 2.5 - 4.9 mg/dL   Manual Differential   Result Value Ref Range    Neutrophils %, Manual 91.4 40.0 - 80.0 %    Lymphocytes %, Manual 1.7 13.0 - 44.0 %    Monocytes %, Manual 6.0 2.0 - 10.0 %    Eosinophils %, Manual 0.0 0.0 - 6.0 %    Basophils %, Manual 0.0 0.0 - 2.0 %    Atypical Lymphocytes %, Manual 0.9 0.0 - 2.0 %    Seg Neutrophils Absolute, Manual 0.91 (L) 1.60 - 5.00 x10*3/uL    Lymphocytes Absolute, Manual 0.02 (L) 0.80 - 3.00 x10*3/uL    Monocytes Absolute, Manual 0.06 0.05 - 0.80 x10*3/uL    Eosinophils Absolute, Manual 0.00 0.00 - 0.40 x10*3/uL    Basophils  Absolute, Manual 0.00 0.00 - 0.10 x10*3/uL    Atypical Lymphs Absolute, Manual 0.01 0.00 - 0.30 x10*3/uL    Total Cells Counted 117     RBC Morphology See Below     Polychromasia Mild     Ovalocytes Few     Lashonda Cells Few    CBC and Auto Differential   Result Value Ref Range    WBC 2.0 (L) 4.4 - 11.3 x10*3/uL    nRBC 2.0 (H) 0.0 - 0.0 /100 WBCs    RBC 3.17 (L) 4.00 - 5.20 x10*6/uL    Hemoglobin 9.9 (L) 12.0 - 16.0 g/dL    Hematocrit 31.0 (L) 36.0 - 46.0 %    MCV 98 80 - 100 fL    MCH 31.2 26.0 - 34.0 pg    MCHC 31.9 (L) 32.0 - 36.0 g/dL    RDW 16.6 (H) 11.5 - 14.5 %    Platelets 81 (L) 150 - 450 x10*3/uL    Neutrophils % 93.6 40.0 - 80.0 %    Immature Granulocytes %, Automated 0.0 0.0 - 0.9 %    Lymphocytes % 1.5 13.0 - 44.0 %    Monocytes % 4.4 2.0 - 10.0 %    Eosinophils % 0.0 0.0 - 6.0 %    Basophils % 0.5 0.0 - 2.0 %    Neutrophils Absolute 1.90 1.60 - 5.50 x10*3/uL    Immature Granulocytes Absolute, Automated 0.00 0.00 - 0.50 x10*3/uL    Lymphocytes Absolute 0.03 (L) 0.80 - 3.00 x10*3/uL    Monocytes Absolute 0.09 0.05 - 0.80 x10*3/uL    Eosinophils Absolute 0.00 0.00 - 0.40 x10*3/uL    Basophils Absolute 0.01 0.00 - 0.10 x10*3/uL   Renal function panel   Result Value Ref Range    Glucose 140 (H) 74 - 99 mg/dL    Sodium 133 (L) 136 - 145 mmol/L    Potassium 4.7 3.5 - 5.3 mmol/L    Chloride 100 98 - 107 mmol/L    Bicarbonate 26 21 - 32 mmol/L    Anion Gap 12 10 - 20 mmol/L    Urea Nitrogen 27 (H) 6 - 23 mg/dL    Creatinine 0.75 0.50 - 1.05 mg/dL    eGFR 82 >60 mL/min/1.73m*2    Calcium 8.9 8.6 - 10.6 mg/dL    Phosphorus 3.4 2.5 - 4.9 mg/dL    Albumin 2.8 (L) 3.4 - 5.0 g/dL   Magnesium   Result Value Ref Range    Magnesium 2.18 1.60 - 2.40 mg/dL   Morphology   Result Value Ref Range    RBC Morphology See Below     Ovalocytes Few     Lashonda Cells Few                                                                               Imaging           02/24/24 MBS:  Oral Phase:  WFL     Adequate bolus acceptance,  formation, and transit w/ all consistencies assessed.       -Pt does note significant odynophagia (pain orally and pharyngeally) with all PO intake.     Pharyngeal Phase:  Moderate/severe deficits     Tissue protrusion within hypopharynx impacting bolus flow.  No nasal regurgitation on this assessment, but backflow of boluses observed.  Hyolaryngeal elevation/excursion impaired.  Reduced airway protection during the swallow resulting in deep penetration to the vocal cords w/ all liquid consistencies, and eventual trace aspiration of thin and mildly thick liquids.  Independent cough/throat clear, but this did not clear aspirated material. Pharyngeal stasis at the valleculae and piriforms following the swallow with all liquid consistencies.  Aspiration in min-mod amounts following the swallow with thin and mildly thick liquids as a result of residue.  Again, cough response did not clear.  Chin tuck and head turn R did not improve safety or efficiency of swallow.  Head turn L (toward side of mass) improved swallow partially, but did not entirely resolve deficits.  With most viscous consistency assessed (puree) , there was no penetration/aspiration or significant stasis.       Esophageal Phase:  No overt deficits                                                                          GI Procedures     02/2021 EGD:  Impression:     - Z-line, 39 cm from the incisors.                         - Normal esophagus.                         - 3 parallel streaks of hemorrhagic appearance and                          linear erosions mucosa in the gastric body. Biopsied.                         - Normal examined duodenum.                         - Unclear if the gastric lesions are her sole source                          of bleeding as not seen on two previous endoscopie                          over past 6 months with recurrent UGI bleeding.     ASSESSMENT / PLAN                  ASSESSMENT/PLAN:    Amita Todd is a 78  y.o. 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     GI is consulted for PEG tube assessment.    Last dose of Plavix was on the 2/25/24. We will plan for PEG tube placement on the 03/01/2024 after Plavix washout.     Recommendations:  - Consider Dobhoff placement for initiating nutrition   - Hold Plavix for 5 day prior to PEG tube placement.  - Tentatively plan for PEG placement on the 03/01/2024  - NPO at MN day prior to procedure   - please hold all heparin products (including prophylaxis) at 4AM day prior to procedure  - AM INR and CBC for the 03/01/2024  - surgical prophylaxis of cefazolin 1 g IV 30 minutes prior to surgical incision (for nonobese patients weighing <120 kg)    The above recommendations were communicated to the [facility] team.    Patient was seen and discussed with Dr. Turner.    Gastroenterology will continue to follow.  During weekday hours of 7am-5pm please do not hesitate to contact me on Pre[employer] Chat or page [phone], if there are any further questions between the weekday hours of 7am-5pm.   After hours, on weekends, and on holidays, please [place] [phone]. Thank you.    Angelica Montalvo MD  PGY-4 Gastroenterology and Hepatology Fellow  [facility] [place]

## 2024-02-26 NOTE — PROGRESS NOTES
Physical Therapy    Physical Therapy Evaluation & Treatment    Patient Name: Amita Todd  MRN: [id number]  Today's Date: 2/26/2024   Time Calculation  Start Time: 0835  Stop Time: 0915  Time Calculation (min): 40 min    Assessment/Plan   PT Assessment  PT Assessment Results: Decreased strength, Decreased endurance, Impaired balance, Decreased mobility, Pain  Rehab Prognosis: Good  Evaluation/Treatment Tolerance: Patient limited by fatigue  Medical Staff Made Aware: Yes (RN/MD)  Strengths: Ability to acquire knowledge, Attitude of self, Coping skills, Premorbid level of function, Support of Caregivers  Barriers to Participation:  (fatigue, + asymptomatic orthostatic hypotension)  End of Session Communication: Bedside nurse, Physician  Assessment Comment: 77 yo female with hypopharangeal SCC presents with hypotension, Left femoral DVT, throat pain/dysphagia and weakness/decresaed activity tolerance overall. Recomend home with assist and low intensity home therapy, sleep on 1st floor as needed.  End of Session Patient Position: Up in chair, Alarm off, not on at start of session (informed pt/sister not to get up without assist, need for walker for gait)   IP OR SWING BED PT PLAN  Inpatient or Swing Bed: Inpatient  PT Plan  Treatment/Interventions: Bed mobility, Transfer training, Gait training, Stair training, Balance training, Neuromuscular re-education, Endurance training, Strengthening, Therapeutic exercise, Therapeutic activity, Home exercise program, Postural re-education  PT Plan: Skilled PT  PT Frequency: 4 times per week  PT Discharge Recommendations: Low intensity level of continued care  Equipment Recommended upon Discharge:  (no PT equipment needs)  PT Recommended Transfer Status: Assist x1, Assistive device, Contact guard (WW)  PT - OK to Discharge: Yes (PT evaluation completed and DC recs made)      Subjective     General Visit Information:  General  Reason for Referral: Admitted 2/22 after radiation  due to hypotension; dx: hypotension, subacute vs. Chronic Left common femoral DVT, neutropenia, thrombocytopenia, dysphagia  Past Medical History Relevant to Rehab: ypopharangeal squamous cell carcinoma, anemia, ascending aorta aneurysm, Afib/Afuter. CKD, CAD, dpression, GERD, HTN, MI, hp burisitis, TIA, PVD, heart failure, ETOH dependence (in remission), dysphagia  Missed Visit: No  Family/Caregiver Present: Yes  Caregiver Feedback: Sister present and engaged  Co-Treatment:  (N/A)  Prior to Session Communication: Bedside nurse  Patient Position Received: Bed, 3 rail up, Alarm off, not on at start of session  Preferred Learning Style: verbal  General Comment: Pt supine alert, limited by fatigue; + asymptomatic orthostatic hypotension, balance deficits.  Home Living:  Home Living  Type of Home: House  Lives With: Spouse  Home Adaptive Equipment:  (shower chair/grab bars, rollator, wheeled walker)  Home Layout:  (2 EZIO with 1 rail, toilet/couch/laundry 1st floor; flight with 1 rail to bed ([facility] elevates) and WIS (, [place]))  Home Living Comments: maybe can sleep on 1st floor, doesn't typically  Prior Level of Function:  Prior Function Per Pt/Caregiver Report  Level of [place]:  (ind amb in/outdoors no device, ind stairclimbing, 1 fall in December)  Receives Help From:  (spouse and sisters)  ADL Assistance:  (ind dress/shower/does laundry; sisters help with groceries, cleaning, anything needed)  Vocational: Retired (secretarial)  Precautions:  Precautions  Medical Precautions: Fall precautions, Swallowing precautions (NPO, + orthostatic hypotension, neutropenic, Left LE DVT)  Vital Signs:  Vital Signs  Heart Rate:  (supine (HOB elevated 45 degrees): /82, HR 92 O2 96%; sitting EOB /71  HR 90  O2 96% (not dizzy); standing: /75    O2 94% (not dizzy); sitting post gait: /84  HR 92 O2 98%; RN and MD informed)  BP Location: Left arm  BP Method: Automatic    Objective   Pain:  Pain  "Assessment  Pain Assessment: 0-10  Pain Score:  (9/10 carol throat (RN gave pain meds prior to session))  Pain Interventions: Repositioned, Ambulation/increased activity, Rest  Response to Interventions: same level, has hot packs  Cognition:  Cognition  Overall Cognitive Status: Within Functional Limits  Orientation Level: Oriented X4    General Assessments:                  Activity Tolerance  Endurance: Tolerates 10 - 20 min exercise with multiple rests    Sensation  Light Touch: Not tested (due to need for bathroom)  Sensation Comment: reports no numbness today (\"had some a few days ago\")    Strength  Strength Comments: bilateral grasp 4+, elbow flex/ext grossly 4/5, Left shoulder elevation 4, Right shoulder elevation >3 (not resisted due to port); bilateral ankle DF and knee ext grossly 4/5  Postural Control  Postural Control: Impaired  Trunk Control: see static and dynamic standing balance sections  Posture Comment: mild rounded shoulders, upper thoracic kyphosis    Static Sitting Balance  Static Sitting-Balance Support: Feet supported, Bilateral upper extremity supported  Static Sitting-Level of Assistance: Distant supervision  Dynamic Sitting Balance  Dynamic Sitting-Balance Support: No upper extremity supported, Feet supported  Dynamic Sitting-Balance: Reaching for objects  Dynamic Sitting-Comments: close supervision, no LOB    Static Standing Balance  Static Standing-Balance Support: No upper extremity supported  Static Standing-Level of Assistance: Contact guard  Dynamic Standing Balance  Dynamic Standing-Balance Support:  (unilateral UE support on IV or carol support on WW)  Dynamic Standing-Balance:  (gait/transfers)  Dynamic Standing-Comments: CG/occasional min assist  Functional Assessments:  ADL  ADL's Addressed: Yes  Toileting Assistance with Device: Minimal  Toileting Deficit: Steadying, Verbal cueing  Functional Assistance: Minimal    Bed Mobility  Bed Mobility: Yes  Bed Mobility 1  Bed Mobility 1: " Supine to sitting  Level of Assistance 1: Close supervision  Bed Mobility Comments 1: HOB elevated 50 degrees, use of Left rail    Transfers  Transfer: Yes  Transfer 1  Transfer From 1: Sit to, Stand to  Transfer to 1: Sit, Stand  Technique 1: Sit to stand, Stand to sit  Transfer Device 1:  (no device)  Transfer Level of Assistance 1: Contact guard, Minimal verbal cues, Minimal tactile cues  Transfers 2  Transfer From 2: Stand to, Commode-standard to  Transfer to 2: Commode-standard, Stand  Technique 2: Sit to stand, Stand to sit, Stand pivot  Transfer Device 2:  (WW)  Transfer Level of Assistance 2: Contact guard, Minimal verbal cues, Minimal tactile cues (CG/min A)  Transfers 3  Transfer From 3: Stand to  Transfer to 3: Chair with arms  Technique 3: Sit to stand, Stand to sit, Stand pivot  Transfer Device 3: Walker  Transfer Level of Assistance 3: Contact guard, Minimal verbal cues, Minimal tactile cues  Trials/Comments 3: cues to use walker, hand placement/safety    Ambulation/Gait Training  Ambulation/Gait Training Performed: Yes  Ambulation/Gait Training 1  Surface 1: Level tile  Device 1:  (WW x 1 and IV pole x 1)  Assistance 1: Minimum assistance, Minimal verbal cues, Minimal tactile cues  Quality of Gait 1:  (slow, little unsteady, small steps, cues for walker/hand placement)  Comments/Distance (ft) 1: 10 (x2 (to/from bathroom), then too fatigued to do more)    Stairs  Stairs: No (too fatigued to do today)  Extremity/Trunk Assessments:  RUE   RUE : Exceptions to WFL  RUE AROM (degrees)  RUE AROM Comment: grasp, elbow flex/ext and shoulder elevation grossly WFL  RUE Strength  RUE Overall Strength:  (as noted)  LUE   LUE: Exceptions to WFL  LUE AROM (degrees)  LUE AROM Comment: grasp, elbow flex/ext and shoulder elevation grossly WFL  LUE Strength  LUE Overall Strength:  (as noted)  RLE   RLE : Within Functional Limits  LLE   LLE : Within Functional Limits  Treatments:  Therapeutic Activity  Therapeutic  Activity Performed: Yes  Therapeutic Activity 1: transfer training bed to/from chair and commode as noted    Other Activity  Other Activity Performed: Yes  Other Activity 1: orthostatics as noted  Outcome Measures:  [facility] Basic Mobility  Turning from your back to your side while in a flat bed without using bedrails: None  Moving from lying on your back to sitting on the side of a flat bed without using bedrails: A little  Moving to and from bed to chair (including a wheelchair): A little  Standing up from a chair using your arms (e.g. wheelchair or bedside chair): A little  To walk in hospital room: A little  Climbing 3-5 steps with railing: Total  Basic Mobility - Total Score: 17    Encounter Problems       Encounter Problems (Active)       General Goals       ind with HEP supine, sitting HEP with supervisoin (Not Progressing)       Start:  02/26/24    Expected End:  03/11/24            supine to/from sit, HOB elevated no rail, modified independent (negative orthostatic hypotension, no dizziness) (Progressing)       Start:  02/26/24    Expected End:  03/11/24               Mobility       sit to/from stand, bed to/from chair with LRAD and supervision, negative for orthostatic hypotension, no LOB (Progressing)       Start:  02/26/24    Expected End:  03/11/24            ambulate 250' with LRAD and supervision, stable vitals, no LOB (Progressing)       Start:  02/26/24    Expected End:  03/11/24            up/down 12 steps with 1 rail with SBA (Not Progressing)       Start:  02/26/24    Expected End:  03/11/24                   Education Documentation  Precautions, taught by Iman Arita, PT at 2/26/2024  9:19 AM.  Learner: Family, Patient  Readiness: Acceptance  Method: Explanation, Demonstration  Response: Verbalizes Understanding, Needs Reinforcement  Comment: PT purpose/POC, need to walk with assist/WW at this time, probably sleep on 1st floor at home sometimes    Body Mechanics, taught by Iman Arita, PT  at 2/26/2024  9:19 AM.  Learner: Family, Patient  Readiness: Acceptance  Method: Explanation, Demonstration  Response: Verbalizes Understanding, Needs Reinforcement  Comment: PT purpose/POC, need to walk with assist/WW at this time, probably sleep on 1st floor at home sometimes    Mobility Training, taught by Iman Arita PT at 2/26/2024  9:19 AM.  Learner: Family, Patient  Readiness: Acceptance  Method: Explanation, Demonstration  Response: Verbalizes Understanding, Needs Reinforcement  Comment: PT purpose/POC, need to walk with assist/WW at this time, probably sleep on 1st floor at home sometimes    Education Comments  No comments found.

## 2024-02-26 NOTE — CONSULTS
SUPPORTIVE AND PALLIATIVE ONCOLOGY CONSULT    Inpatient consult to [facility]  Consult performed by: Julia Schmidt, APRN-CNP, DNP  Consult ordered by: Marisel Patrick MD MPH        SERVICE DATE: 2024      PALLIATIVE MEDICINE OUTPATIENT PROVIDER:  None  CURRENT ATTENDING PROVIDER: Marisel Patrick MD MPH     Medical Oncologist: Kyunghee Burkitt, DO   Radiation Oncologist: Tahira Cochran MD  Primary Physician: Caitie Leal  [phone]    REASON FOR CONSULT/CHIEF CONSULT COMPLAINT: pain management    Subjective   HISTORY OF PRESENT ILLNESS: Amita Todd is a 78 y.o. female diagnosed with SCC of hypopharynx currently undergoing concurrent CRT (weekly Taxol, Carbo, 35 fx RT). PMH significant for breast cancer s/p lumpectomy, RT, tamoxifen, oral cavity cancer , GERD, HTN, CAD s/p PCI, afib/flutter, HFpEF, TIA, anxiety/depression, AUD. Admitted 2024 for further evaluation and management of hypotension. Course complicated by radiation mucositis/esophagitis, candidiasis, dysphagia now NPO. Supportive and Palliative Oncology is consulted for pain management and Introduction to Supportive and Palliative Oncology Services.     Pt with persistent hypotension over the past month, orthostatic, increasing fatigue, worsening mouth sores and odynophagia from radiation, admitted from clinic for management.   Pt received 24 fx of planned 35 RT treatments this morning.     Pt reports she originally had left throat pain from her mass but this has improved with cancer treatment and tumor shrinkage. She now has bilateral throat pain and mouth sores that are painful. She has tried to maintain her nutrition.     Barium swallow  showed aspiration, pt is now NPO, plan for PEG tube Friday (delay due to Plavix)    Pain Assessment:  Onset:  Weeks  Location: Bilateral throat  Duration: Constant  Characteristics:   Ratin   Descriptors: sharp and burning   Aggravating:  eating, swallowing     Relieving:  Analgesics hydromorphone   Treatment/Home Regimen: Oxycodone 5 mg prn   Intolerances:Amita Todd is allergic to oxycodone hcl-oxycodone-asa, oxycodone-acetaminophen, penicillin g, and penicillins.   Personal Pain Goal: 3    Interference with Function: Somewhat   Coping Strategies: Relaxation   Emotional Response:  none   Barriers to Pain Management:  none    Opioid Use  Past 24 h prn opioid use: Oxycodone 5 mg PO x 1 doses = 5 mg = 6.25 OME  Hydromorphone 0.2 mg IV x 5 doses = 1 mg = 12.5 OME  Total 24h OME use:  19    Note: OME calculations based on equianalgesic table below. Please note this table is based on best available evidence but conversions are still approximate. These are NOT opioid DOSES for individual patient use; this is equivalency information.  Drug Parenteral Enteral   Morphine 10 25   Oxycodone N/A 20   Hydromorphone 2 5   Fentanyl 0.15 N/A   Tramadol N/A 120   Citation: Rajendra RENDON. Demystifying opioid conversion calculations: A guide for effective dosing, Second edition. [place]: [employer]-System Pharmacists, 2018.    OARRS/PDMP reviewed no aberrant behavior noted.    Symptom Assessment:  Pain:very much   Headache: none  Dizziness:a little  Lack of energy: somewhat  Difficulty sleeping: none  Worrying: a little  Anxiety: a little  Depression: none  Pain in mouth/swallowing: very much  Dry mouth: very much  Taste changes: very much  Shortness of breath: a little  Lack of appetite: a little   Nausea: none  Vomiting: none  Constipation: a little  Diarrhea: none  Sore muscles: none  Numbness or tingling in hands/feet/other: a little fingers and toes  Weight loss: somewhat  Other: none        Information obtained from: chart review, interview of patient, interview of family, and discussion with primary team  ______________________________________________________________________     Oncology History   Malignant neoplasm of hypopharynx (CMS/[facility])   1/3/2024 Initial  Diagnosis    Malignant neoplasm of hypopharynx (CMS/[facility])     1/24/2024 -  Chemotherapy    PACLitaxel / CARBOplatin with Concurrent Radiation, Weekly - Head and Neck         Past Medical History:   Diagnosis Date    Alcohol dependence, in remission (CMS/[facility]) 06/16/2016    History of alcoholism    Anemia     Aneurysm of ascending aorta (CMS/[facility])     Arrhythmia     a-fib. a-flutter    Branch retinal artery occlusion of right eye 08/24/2023    CKD (chronic kidney disease)     Coronary artery disease     Depression     GERD (gastroesophageal reflux disease)     Hypertension     Intermittent claudication (CMS/[facility])     Myocardial infarction (CMS/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    Pain in leg, unspecified 01/25/2022    Leg pain    Pain in right shoulder 07/01/2020    Bilateral shoulder pain    Pain in unspecified knee 03/31/2021    Joint pain, knee    Personal history of diseases of the blood and blood-forming organs and certain disorders involving the immune mechanism 02/09/2019    History of anemia    Personal history of malignant neoplasm of breast 04/29/2014    History of malignant neoplasm of female breast    Personal history of other diseases of the circulatory system 04/03/2014    Personal history of subarachnoid hemorrhage    Personal history of other diseases of the digestive system 03/08/2021    History of upper gastrointestinal hemorrhage    Personal history of other diseases of the digestive system 03/08/2021    History of melena    Personal history of other diseases of the nervous system and sense organs 08/12/2020    History of Bell's palsy    Personal history of other infectious and parasitic diseases 06/14/2019    History of herpes zoster    Personal history of other specified conditions 03/03/2020    History of lump of left breast    Personal  history of other specified conditions 02/22/2021    History of shortness of breath    Personal history of transient ischemic attack (TIA), and cerebral infarction without residual deficits 04/24/2022    History of transient ischemic attack    Stenosis of carotid artery     right    Trigger finger, unspecified finger 04/24/2017    Acquired trigger finger     Past Surgical History:   Procedure Laterality Date    CT ANGIO NECK  06/06/2019    CT NECK ANGIO W AND WO IV CONTRAST 6/6/2019 [facility] CL[facility]ICAL LEGACY    CT ANGIO NECK  01/04/2021    CT NECK ANGIO W AND WO IV CONTRAST 1/4/2021 [facility] ANCILLARY LEGACY    CT HEAD ANGIO W AND WO IV CONTRAST  06/06/2019    CT HEAD ANGIO W AND WO IV CONTRAST 6/6/2019 [facility] CLINICAL LEGACY    CT HEAD ANGIO W AND WO IV CONTRAST  01/04/2021    CT HEAD ANGIO W AND WO IV CONTRAST 1/4/2021 [facility]    MR HEAD ANGIO WO IV CONTRAST  06/06/2019    MR HEAD ANGIO WO IV CONTRAST 6/6/2019 [facility] CLINICAL LEGACY    MR NECK ANGIO WO IV CONTRAST  06/06/2019    MR NECK ANGIO WO IV CONTRAST 6/6/2019 [facility] CLINICAL LEGACY    OTHER SURGICAL HISTORY  03/03/2020    Oophorectomy    OTHER SURGICAL HISTORY  06/17/2019    Miscarriage surgical treatment    OTHER SURGICAL HISTORY  08/29/2019    multiple Catheter Ablation Atrial Flutter    OTHER SURGICAL HISTORY  04/23/2014    Previous Stent Placement    SEPTOPLASTY  09/22/2017    Septoplasty     Family History   Problem Relation Name Age of Onset    Breast cancer Mother      Other (GOODPASTURE'S DISEASE) Father      BRCA2 Positive Sister      Lung cancer Sister      Neuroblastoma Sister      BRCA2 Positive Daughter      Breast cancer Other G/P         SOCIAL HISTORY:  Marital Status , Children yes, and Support system siblings   Social History:  reports that she quit smoking about 24 years ago. Her smoking use included cigarettes. She has a 60.00 pack-year smoking history. She has never used smokeless tobacco. She reports that she does not currently use  alcohol. She reports that she does not use drugs.      REVIEW OF SYSTEMS:  Review of systems negative unless noted in HPI.       Objective       Lab Results   Component Value Date    WBC 2.0 (L) 02/26/2024    HGB 9.9 (L) 02/26/2024    HCT 31.0 (L) 02/26/2024    MCV 98 02/26/2024    PLT 81 (L) 02/26/2024      Lab Results   Component Value Date    GLUCOSE 140 (H) 02/26/2024    CALCIUM 8.9 02/26/2024     (L) 02/26/2024    K 4.7 02/26/2024    CO2 26 02/26/2024     02/26/2024    BUN 27 (H) 02/26/2024    CREATININE 0.75 02/26/2024     Lab Results   Component Value Date    ALT 18 02/25/2024    AST 13 02/25/2024    ALKPHOS 50 02/25/2024    BILITOT 0.6 02/25/2024     Estimated Creatinine Clearance: 61.6 mL/min (by C-G formula based on SCr of 0.75 mg/dL).     Current Outpatient Medications   Medication Instructions    acetaminophen (TYLENOL) 650 mg, oral, Every 6 hours PRN    albuterol 90 mcg/actuation aerosol powdr breath activated inhaler 2 puffs, inhalation, Every 6 hours PRN    ascorbic acid (VITAMIN C) 500 mg, oral, Daily    atorvastatin (LIPITOR) 20 mg, oral, Daily, as directed    buPROPion SR (WELLBUTRIN SR) 150 mg, oral, 2 times daily, TAKE PER DIRECTED    cholecalciferol (Vitamin D-3) 25 MCG (1000 UT) tablet 1 tablet, oral, 2 times daily    clopidogrel (PLAVIX) 75 mg, oral, Daily    coenzyme Q-10 (COQ-10) 100 mg, oral, 2 times daily    dapagliflozin propanediol (FARXIGA) 10 mg, oral, Daily    dexAMETHasone (DECADRON) 4 mg, oral, 2 times daily with meals    DULoxetine (Cymbalta) 60 mg DR capsule TAKE 1 CAPSULE BY MOUTH  DAILY    furosemide (LASIX) 20 mg, oral, Daily    guaiFENesin 200 mg/5 mL liquid 10 mL, oral, 4 times daily    herbal complex no.239 (Whole Body Joint Support) capsule 1 capsule, oral, Daily    ipratropium (Atrovent) 21 mcg (0.03 %) nasal spray 2 sprays, Each Nostril, Every 12 hours    losartan (Cozaar) 50 mg tablet 1 tablet, oral, Daily    magnesium oxide (Mag-Ox) 400 mg (241.3 mg  magnesium) tablet 1 tablet, oral, Daily    metoprolol tartrate (LOPRESSOR) 50 mg, oral, 2 times daily    multivitamin with minerals tablet 1 tablet, oral, Daily    ondansetron (ZOFRAN) 8 mg, oral, Every 8 hours PRN    oxyCODONE (ROXICODONE) 5 mg, oral, Every 6 hours PRN    pantoprazole (PROTONIX) 40 mg, oral, 2 times daily    prochlorperazine (COMPAZINE) 10 mg, oral, Every 6 hours PRN    sennosides-docusate sodium (Kalee-Colace) 8.6-50 mg tablet 2 tablets, oral, Daily    spironolactone (ALDACTONE) 25 mg, oral, Daily, PER DIRECTED     Scheduled medications   acetaminophen, 1,000 mg, oral, q6h  ascorbic acid, 500 mg, intravenous, Daily  atorvastatin, 20 mg, oral, Daily  buPROPion SR, 150 mg, oral, BID  cholecalciferol, 1,000 Units, oral, BID  dapagliflozin propanediol, 10 mg, oral, Daily  DULoxetine, 60 mg, oral, Nightly  fluconazole, 400 mg, intravenous, q24h  lidocaine, 1 Application, Topical, Once  losartan, 12.5 mg, oral, Daily  magnesium oxide, 400 mg, oral, Daily  metoprolol tartrate, 50 mg, oral, BID  multivitamin with minerals, 1 tablet, oral, Daily  pantoprazole, 40 mg, intravenous, Daily  perflutren lipid microspheres, 0.5-10 mL of dilution, intravenous, Once in imaging  perflutren protein A microsphere, 0.5 mL, intravenous, Once in imaging  phenoL, 1 spray, Mouth/Throat, q2h  spironolactone, 12.5 mg, oral, Daily  sulfur hexafluoride microsphr, 2 mL, intravenous, Once in imaging      Continuous medications  dextrose 5%-0.45 % sodium chloride, 50 mL/hr, Last Rate: 50 mL/hr (02/26/24 0600)      PRN medications  PRN medications: alteplase, guaiFENesin, HYDROmorphone, lidocaine-diphenhydraMINE-Maalox 1:1:1, ondansetron, oxyCODONE, oxyCODONE     Allergies:   Allergies   Allergen Reactions    Oxycodone Hcl-Oxycodone-Asa Unknown    Oxycodone-Acetaminophen Unknown    Penicillin G Other     Nausea    Penicillins GI Upset and Other                PHYSICAL EXAMINATION:  Vital Signs:   Vital signs reviewed  Vitals:     02/26/24 0848   BP: 151/82   Pulse: 77   Resp: 16   Temp: 36.3 °C (97.3 °F)   SpO2:      Pain Score: 7     Physical Exam  Vitals reviewed.   Constitutional:       General: She is not in acute distress.  HENT:      Head: Normocephalic and atraumatic.      Mouth/Throat:      Mouth: Mucous membranes are dry.      Pharynx: Posterior oropharyngeal erythema present.   Eyes:      Conjunctiva/sclera: Conjunctivae normal.   Pulmonary:      Effort: Pulmonary effort is normal. No respiratory distress.   Abdominal:      General: There is no distension.   Musculoskeletal:         General: Normal range of motion.   Skin:     Findings: Erythema (anterior neck) present.   Neurological:      General: No focal deficit present.      Mental Status: She is alert and oriented to person, place, and time.   Psychiatric:         Mood and Affect: Mood normal.         Behavior: Behavior normal.         Thought Content: Thought content normal.         Judgment: Judgment normal.         ASSESSMENT/PLAN:  Amita Todd is a 78 y.o. female diagnosed with SCC of hypopharynx currently undergoing concurrent CRT (weekly Taxol, Carbo, 35 fx RT). PMH significant for breast cancer s/p lumpectomy, RT, tamoxifen, oral cavity cancer 2008, GERD, HTN, CAD s/p PCI, afib/flutter, HFpEF, TIA, anxiety/depression, AUD. Admitted 2/22/2024 for further evaluation and management of hypotension. Course complicated by radiation mucositis/esophagitis, candidiasis, dysphagia now NPO. Supportive and Palliative Oncology is consulted for pain management and Introduction to Supportive and Palliative Oncology Services.     Pain:  Mouth and throat pain related to radiation mucositis/candidiasis  Pain is: cancer related pain  Type: somatic  Pain control: well-controlled  Home regimen:  Oxycodone 5 mg prn  Intolerances/previously tried: denies   Personalized pain goal: 3  Total OME usage for the past 24 hours:  19  Continue oxycodone 2.5-5 mg PO q4h prn moderate to severe  pain  Continue Hydromorphone 0.4 mg IV q3h prn breakthrough pain  Continue BMX  Start frequent oral rinses, pt given saline to swish and spit frequently throughout the day  Thrush should improve with treatment and WBC count recovery  Pt has 11 additional fractions of RT to complete, expect radiation mucositis to worsen and need opioids for management  Continue to monitor pain scores and administer PRN medications as appropriate  Continue/initiate nonpharmacologic pain management strategies including ice/heat therapy, distraction techniques, deep breathing/relaxation techniques, calming music, and repositioning  Continue to monitor for signs of opioid efficacy (pain scores, improved functionality) and toxicity (pinpoint pupils, excess sedation/drowsiness/confusion, respiratory depression, etc.)    Nausea:  At risk for nausea with vomiting related to opioids, thickened oral secretions with gagging  Denies  Continue Ondansetron 4 mg IV q6h prn nausea first line     Constipation  At risk for constipation related to opioids, currently not constipated  Usual bowel pattern: every 2-3 days and irregular with frequent constipation  Home regimen: Docusate prn  LBM 2/21 large  Monitor BM frequency, adjust regimen as needed  Goal to have BM without straining q48-72h  Start senna 2 tabs BID  Start miralax 17 g daily once has enteral access     Altered Mood:  Chronic anxiety and depression  controlled with home regimen   Home regimen: Duloxetine 60 mg and Wellbutrin 150 mg BID  Continue home regimen, Wellbutrin XL cannot be crushed      Decreased oral intake  Decreased intake related to taste changes, disease process, and dysphagia  Nutrition consult  Weight loss yes  NPO due to dysphagia  Plan for short term [place] for enteral access, followed by PEG later this week    Medical Decision Making/Goals of Care/Advance Care Planning:  Patient's current clinical condition, including diagnosis, prognosis, and management plan, and goals  of care were discussed.   Life limiting disease:  cancer  Family: Supportive , children, siblings  Performance status: Minor limitations due to disease process  Joys/meaning/strength: Family  Understanding of health: Demonstrates good prognostic understanding of disease process, understands plan for PEG placement  Information:Wants full disclosure  Medical update:Clarified patient's NPO status due to aspiration risk, plan for PEG tube  Goals: symptom control and be able to eat again, she does not want her chewing and swallowing muscles to atrophy  Worries and fears now and future:  inability to eat, loss of swallowing strength      Advance Directives  Existence of Advance Directives:Yes, but NOT documented in medical record  Decision maker: Surrogate decision maker is  Smith  Code Status: Full code    Introduction to Supportive and Palliative Oncology:  Spoke with patient and sister at bedside  Introduced the role and philosophy of Supportive and Palliative oncology in the evaluation and management of symptoms during cancer treatment  Palliative care was introduced as a service for patients with serious illness to help with symptoms, assist with goals of care conversations, navigate complex decision making, improve quality of life for patients, and provide support both patients and families.  Patient seemed to appreciate the extra layer of support.       Disposition:  Please  start the process of having prior authorization with meds to beds deliver medications to patient [facility]. Prescriptions will need to be sent 48-72 hours prior to discharge so that a prior authorization can be completed.     Discharge date: unknown pending acute issues  Will assess if patient needs an appointment with Outpatient Supportive Oncology as appropriate, will likely need given 2 additional weeks of RT left with risk for worsening pain.      Signature and billing:  Thank you for allowing  us to participate in the care of this patient. Recommendations will be communicated back to the consulting service by way of shared electronic medical record or face-to-face.    Medical complexity was high level due to due to complexity of problems, extensive data review, and high risk of management/treatment.    I spent 75 minutes in the care of this patient which included chart review, interviewing patient/family, discussion with primary team, coordination of care, and documentation.      DATA   Diagnostic tests and information reviewed for today's visit:  Conversation with primary team, Most recent labs and imaging results, Medications         Plan of Care discussed with: Provider, Patient, and Family/Significant Other: sister    Thank you for asking Supportive and Palliative Oncology to assist with care of this patient.  We will continue to follow.  Please contact us for additional questions or concerns.      SIGNATURE: KIMBER Herbert-CNP, DNP  PAGER/CONTACT:  Contact information:  Supportive and Palliative Oncology  Monday-Friday 8 AM-5 PM  Epic Secure chat or pager [phone].  After hours and weekends:  pager [phone]

## 2024-02-26 NOTE — CARE PLAN
The clinical goals for the shift include pt will remain safe and free from injury throughout shift 2/26/2024 0700      Problem: Skin  Goal: Decreased wound size/increased tissue granulation at next dressing change  Outcome: Progressing  Goal: Participates in plan/prevention/treatment measures  Outcome: Progressing  Goal: Prevent/manage excess moisture  Outcome: Progressing  Goal: Prevent/minimize sheer/friction injuries  Outcome: Progressing  Goal: Promote/optimize nutrition  Outcome: Progressing  Goal: Promote skin healing  Outcome: Progressing  Flowsheets (Taken 2/26/2024 0600)  Promote skin healing:   Assess skin/pad under line(s)/device(s)   Rotate device position/do not position patient on device     Problem: Pain  Goal: My pain/discomfort is manageable  Outcome: Progressing     Problem: Safety  Goal: Patient will be injury free during hospitalization  Outcome: Progressing  Goal: I will remain free of falls  Outcome: Progressing     Problem: Daily Care  Goal: Daily care needs are met  Outcome: Progressing     Problem: Psychosocial Needs  Goal: Demonstrates ability to cope with hospitalization/illness  Outcome: Progressing  Goal: Collaborate with me, my family, and caregiver to identify my specific goals  Outcome: Progressing     Problem: Discharge Barriers  Goal: My discharge needs are met  Outcome: Progressing     Problem: Pain  Goal: Takes deep breaths with improved pain control throughout the shift  Outcome: Progressing  Goal: Turns in bed with improved pain control throughout the shift  Outcome: Progressing  Goal: Walks with improved pain control throughout the shift  Outcome: Progressing  Goal: Performs ADL's with improved pain control throughout shift  Outcome: Progressing  Goal: Participates in PT with improved pain control throughout the shift  Outcome: Progressing  Goal: Free from opioid side effects throughout the shift  Outcome: Progressing  Goal: Free from acute confusion related to pain meds  throughout the shift  Outcome: Progressing

## 2024-02-26 NOTE — PROGRESS NOTES
Occupational Therapy    Evaluation   Patient Name: Amita Todd  MRN: [id number]  Today's Date: 2/26/2024  Time Calculation  Start Time: 0959  Stop Time: 1034  Time Calculation (min): 35 min    Assessment  IP OT Assessment  OT Assessment: Patient is a 78 year old female who presents with impaired ADL, UE strength, activity tolerance, processing skills, coping skills. Functional transfers and mobility limited by PT evaluation. OT will assess during upcoming sessions. She would benefit from skilled OT services while in hospital and post discharge to address these deficits.  Prognosis: Good  End of Session Communication: Bedside nurse  End of Session Patient Position: Up in chair  Plan:  Treatment Interventions: ADL retraining, Functional transfer training, UE strengthening/ROM, Endurance training, Cognitive reorientation, Patient/family training, Compensatory technique education  OT Frequency: 3 times per week  OT Discharge Recommendations: Low intensity level of continued care  OT - OK to Discharge: Yes    Subjective   Current Problem:  1. Thrombocytopenia (CMS/HCC)        2. Hypotension, unspecified hypotension type  Transthoracic Echo (TTE) Complete    Transthoracic Echo (TTE) Complete      3. HFrEF (heart failure with reduced ejection fraction) (CMS/HCC)  Transthoracic Echo (TTE) Complete    Transthoracic Echo (TTE) Complete      4. Exertional dyspnea  CANCELED: Transthoracic Echo (TTE) Complete    CANCELED: Transthoracic Echo (TTE) Complete        General:  Reason for Referral: Admitted 2/22 after radiation due to hypotension; dx: hypotension, subacute vs. Chronic Left common femoral DVT, neutropenia, thrombocytopenia, dysphagia  Past Medical History Relevant to Rehab: ypopharangeal squamous cell carcinoma, anemia, ascending aorta aneurysm, Afib/Afuter. CKD, CAD, dpression, GERD, HTN, MI, hp burisitis, TIA, PVD, heart failure, ETOH dependence (in remission), dysphagia  Prior to Session Communication:  "Bedside nurse  Patient Position Received: Up in chair  Family/Caregiver Present: Yes  Caregiver Feedback: sister present and very supportive   Precautions:  Medical Precautions: Fall precautions, Swallowing precautions (NPO, + orthostatic hypotension, neutropenic, Left LE DVT)  Vital Signs:     Pain:  Pain Assessment  Pain Assessment: 0-10  Pain Score: 0 - No pain  Lines/Tubes/Drains:  Implantable Port 02/13/24 Right Chest Single lumen port (Active)   Number of days: 12         Objective   Cognition:  Overall Cognitive Status:  (Patient reports that \"chemo or radiation is causing me to be mixed up\". Patient reports difficulty understanding and processing all the medical information thrown at her.)  Orientation Level: Oriented X44AT  Alertness    xNormal (fully alert, but not agitated, throughout assessment) 0          Mild sleepiness for <10 seconds after waking, then normal 0  Clearly abnormal 4  Score: 0    AMT4  Age, date of birth, place (name of the hospital or building), current year  xNo mistakes 0  1 mistake 1  2 or more mistakes/untestable 2  Score:0    Attention  Months of the year backwards Achieves   x7 months or more correctly 0  Starts but scores <7 months / refuses to start 1  Untestable (cannot start because unwell, drowsy, inattentive) 2  Score: 0    Acute change or fluctuating course  Evidence of significant change or fluctuation in: alertness, cognition, other mental function  (eg. paranoia, hallucinations) arising over the last 2 weeks and still evident in last 24hrs  xNo 0  Yes 4  Score: 0    Total score: 0  4 or above: possible delirium +/- cognitive impairment  1-3: possible cognitive impairment  0: delirium or severe cognitive impairment unlikely (but  delirium still possible if [4] information incomplete)       Mood- patient indicating some irritation due to so many medical assessments and reporting some feelings of overwhelm.     Home Living:  Type of Home: House  Lives With: Spouse  Home " Layout: Two level  Home Access:  (1st floor laundry, 2nd floor bedroom, full bathroom on 2nd floor, 1/2 bath on first floor)   Prior Function:  Level of [place]: Independent with ADLs and functional transfers, Independent with homemaking with ambulation  Receives Help From:  (spouse and sisters)  ADL Assistance:  (ind dress/shower/does laundry; sisters help with groceries, cleaning, anything needed)  Leisure: enjoys eating out, movies       ADL:  Grooming Assistance: Stand by  Bathing Assistance: Minimal  UE Dressing Assistance: Minimal  LE Dressing Assistance: Stand by (socks)  Toileting Assistance with Device: Minimal (anticipated)  Activity Tolerance:  Endurance: Tolerates 10 - 20 min exercise with multiple rests  Balance:  Dynamic Sitting Balance  Dynamic Sitting-Balance:  (supervision in bedside chair)  Static Sitting Balance  Static Sitting-Level of Assistance: Independent (in bedside chair)  Bed Mobility/Transfers: Bed Mobility  Bed Mobility: No   and Transfers  Transfer: No (not performed due to fatigue, patient declined mobility and transfers)  Sensation:  Sensation Comment: Patient reports some numbness that comes and goes.       Coordination:  Movements are Fluid and Coordinated: Yes        Extremities: RUE AROM (degrees)  RUE AROM Comment: WFL bilateral UE  RUE Strength  RUE Overall Strength: Greater than or equal to 3/5 as evidenced by functional mobility, LUE AROM (degrees)  LUE AROM Comment: WFL  LUE Strength  LUE Overall Strength: Greater than or equal to 3/5 as evidenced by functional mobility, RLE   RLE :  (right LE AROM WFL), and LLE   LLE :  (Left LE AROM WFL)    Outcome Measures: [facility] Daily Activity  Putting on and taking off regular lower body clothing: A little  Bathing (including washing, rinsing, drying): A little  Putting on and taking off regular upper body clothing: A little  Toileting, which includes using toilet, bedpan or urinal: A little  Taking care of personal grooming such as  brushing teeth: A little  Eating Meals: None  Daily Activity - Total Score: 19         ,     OT Adult Other Outcome Measures  4AT: 0- cognitive deficits/delirium unlikely    Education Documentation  Precautions, taught by Richelle Andres OT at 2/26/2024 11:10 AM.  Learner: Family, Patient  Readiness: Acceptance  Method: Explanation  Response: Verbalizes Understanding  Comment: role of OT, precautions    Education Comments  No comments found.        Goals:   Encounter Problems                COGNITION/SAFETY  OT-Patient will use ICU/hospital diary with independent level of assistance to allow improved recall of hospital events.  Start: 2/26/24  Expected end: 3/11/24  OT- Patient will independently use coping skills to manage stress of hospital stay and feelings of overwhelm.  Start: 2/26/24  Expected end: 3/11/24  ADLs  OT-Patient will perform UB and LB bathing seated in chair with independent level of assistance   Start: 2/26/24  Expected end: 3/11/24    OT-Patient with complete upper body dressing with independent level of assistance donning and doffing all UE clothes with no adaptive equipment while seated.  Start: 2/26/24  Expected end: 3/11/24    OT-Patient with complete lower body dressing with independent of assistance donning and doffing all LE clothes with no adaptive equipment while seated  Start: 2/26/24  Expected end: 3/11/24    OT-Patient will complete daily grooming tasks with independent level of assistance and PRN adaptive equipment while seated.  Start: 2/26/24  Expected end: 3/11/24    OT-Patient will complete toileting including hygiene clothing management/hygiene with independent level of assistance   Start: 2/26/24  Expected end: 3/11/24    EXERCISE/STRENGTHENING  OT-Patient with increase BUE to 4/5 strength.  Start: 2/26/24  Expected end: 3/11/24    TRANSFERS  OT-Patient will perform bed mobility independent level of assistance in order to improve safety and independence with mobility  Start:  2/26/24  Expected end: 3/11/24    OT-Patient will complete functional transfer to all surfaces with least restrictive device with independent level of assistance.  Start: 2/26/24  Expected end: 3/11/24    OT-Patient will perform Functional mobility mod  Household distances with independent level of assistance and least restrictive device in order to improve safety and functional mobility.  Start: 2/26/24  Expected end: 3/11/24          02/26/24 at 11:13 AM   Richelle Andres, OT   Rehab Office: [phone]

## 2024-02-26 NOTE — PROGRESS NOTES
Amita Todd is a 78 y.o. female on day 4 of admission presenting with Thrombocytopenia (CMS/HCC).    Subjective   NAEON. Primary complaint is odynophagia and pain. Reports her pain is tolerable after taking her pain medicine, but in 4 hours is in a lot of pain. Per MAR patient only taking IV dilaudid d/t it being for severe pain, will change it for breakthrough and use oxy for severe and moderate.     Objective   Constitutional:       Comments: underweight   HENT:      Mouth/Throat:      Mouth: Mucous membranes are dry. improved well circumscribed plaques   Eyes:      Pupils: Pupils are equal, round, and reactive to light.   Cardiovascular:      Rate and Rhythm: Regular rhythm. Tachycardia present.      Pulses: Normal pulses.   Pulmonary:      Comments: breath sounds clear B/L  Abdominal:      General: Abdomen is flat. Bowel sounds are normal.      Palpations: Abdomen is soft.   Musculoskeletal:         General: Normal range of motion.      Cervical back: Normal range of motion.      Comments: 1+ pitting edema B/L LE till ankles   Skin:     General: Skin is warm.      Capillary Refill: Capillary refill takes less than 2 seconds.      Findings: Bruising present.      Comments: Petechia over trunk   Neurological:      General: No focal deficit present.      Mental Status: She is alert and oriented to person, place, and time. Mental status is at baseline.   Psychiatric:         Mood and Affect: Mood normal.     Visit Vitals  /82   Pulse 77   Temp 36.3 °C (97.3 °F) (Temporal)   Resp 16     Relevant Results  Results for orders placed or performed during the hospital encounter of 02/22/24 (from the past 24 hour(s))   CBC and Auto Differential   Result Value Ref Range    WBC 1.0 (LL) 4.4 - 11.3 x10*3/uL    nRBC 4.9 (H) 0.0 - 0.0 /100 WBCs    RBC 3.11 (L) 4.00 - 5.20 x10*6/uL    Hemoglobin 9.7 (L) 12.0 - 16.0 g/dL    Hematocrit 30.6 (L) 36.0 - 46.0 %    MCV 98 80 - 100 fL    MCH 31.2 26.0 - 34.0 pg    MCHC  31.7 (L) 32.0 - 36.0 g/dL    RDW 16.5 (H) 11.5 - 14.5 %    Platelets 70 (L) 150 - 450 x10*3/uL    Immature Granulocytes %, Automated 0.0 0.0 - 0.9 %    Immature Granulocytes Absolute, Automated 0.00 0.00 - 0.50 x10*3/uL   Comprehensive Metabolic Panel   Result Value Ref Range    Glucose 155 (H) 74 - 99 mg/dL    Sodium 133 (L) 136 - 145 mmol/L    Potassium 4.7 3.5 - 5.3 mmol/L    Chloride 100 98 - 107 mmol/L    Bicarbonate 23 21 - 32 mmol/L    Anion Gap 15 10 - 20 mmol/L    Urea Nitrogen 28 (H) 6 - 23 mg/dL    Creatinine 0.73 0.50 - 1.05 mg/dL    eGFR 85 >60 mL/min/1.73m*2    Calcium 8.6 8.6 - 10.6 mg/dL    Albumin 2.7 (L) 3.4 - 5.0 g/dL    Alkaline Phosphatase 50 33 - 136 U/L    Total Protein 4.9 (L) 6.4 - 8.2 g/dL    AST 13 9 - 39 U/L    Bilirubin, Total 0.6 0.0 - 1.2 mg/dL    ALT 18 7 - 45 U/L   Magnesium   Result Value Ref Range    Magnesium 2.26 1.60 - 2.40 mg/dL   Phosphorus   Result Value Ref Range    Phosphorus 3.2 2.5 - 4.9 mg/dL   Manual Differential   Result Value Ref Range    Neutrophils %, Manual 91.4 40.0 - 80.0 %    Lymphocytes %, Manual 1.7 13.0 - 44.0 %    Monocytes %, Manual 6.0 2.0 - 10.0 %    Eosinophils %, Manual 0.0 0.0 - 6.0 %    Basophils %, Manual 0.0 0.0 - 2.0 %    Atypical Lymphocytes %, Manual 0.9 0.0 - 2.0 %    Seg Neutrophils Absolute, Manual 0.91 (L) 1.60 - 5.00 x10*3/uL    Lymphocytes Absolute, Manual 0.02 (L) 0.80 - 3.00 x10*3/uL    Monocytes Absolute, Manual 0.06 0.05 - 0.80 x10*3/uL    Eosinophils Absolute, Manual 0.00 0.00 - 0.40 x10*3/uL    Basophils Absolute, Manual 0.00 0.00 - 0.10 x10*3/uL    Atypical Lymphs Absolute, Manual 0.01 0.00 - 0.30 x10*3/uL    Total Cells Counted 117     RBC Morphology See Below     Polychromasia Mild     Ovalocytes Few     Aber[place]rombie Cells Few    CBC and Auto Differential   Result Value Ref Range    WBC 2.0 (L) 4.4 - 11.3 x10*3/uL    nRBC 2.0 (H) 0.0 - 0.0 /100 WBCs    RBC 3.17 (L) 4.00 - 5.20 x10*6/uL    Hemoglobin 9.9 (L) 12.0 - 16.0 g/dL    Hematocrit  31.0 (L) 36.0 - 46.0 %    MCV 98 80 - 100 fL    MCH 31.2 26.0 - 34.0 pg    MCHC 31.9 (L) 32.0 - 36.0 g/dL    RDW 16.6 (H) 11.5 - 14.5 %    Platelets 81 (L) 150 - 450 x10*3/uL    Neutrophils % 93.6 40.0 - 80.0 %    Immature Granulocytes %, Automated 0.0 0.0 - 0.9 %    Lymphocytes % 1.5 13.0 - 44.0 %    Monocytes % 4.4 2.0 - 10.0 %    Eosinophils % 0.0 0.0 - 6.0 %    Basophils % 0.5 0.0 - 2.0 %    Neutrophils Absolute 1.90 1.60 - 5.50 x10*3/uL    Immature Granulocytes Absolute, Automated 0.00 0.00 - 0.50 x10*3/uL    Lymphocytes Absolute 0.03 (L) 0.80 - 3.00 x10*3/uL    Monocytes Absolute 0.09 0.05 - 0.80 x10*3/uL    Eosinophils Absolute 0.00 0.00 - 0.40 x10*3/uL    Basophils Absolute 0.01 0.00 - 0.10 x10*3/uL   Renal function panel   Result Value Ref Range    Glucose 140 (H) 74 - 99 mg/dL    Sodium 133 (L) 136 - 145 mmol/L    Potassium 4.7 3.5 - 5.3 mmol/L    Chloride 100 98 - 107 mmol/L    Bicarbonate 26 21 - 32 mmol/L    Anion Gap 12 10 - 20 mmol/L    Urea Nitrogen 27 (H) 6 - 23 mg/dL    Creatinine 0.75 0.50 - 1.05 mg/dL    eGFR 82 >60 mL/min/1.73m*2    Calcium 8.9 8.6 - 10.6 mg/dL    Phosphorus 3.4 2.5 - 4.9 mg/dL    Albumin 2.8 (L) 3.4 - 5.0 g/dL   Magnesium   Result Value Ref Range    Magnesium 2.18 1.60 - 2.40 mg/dL   Morphology   Result Value Ref Range    RBC Morphology See Below     Ovalocytes Few     Putney Cells Few        Assessment/Plan   Principal Problem:    Thrombocytopenia (CMS/HCC)    77yo F w PMHx squamous cell carcinoma of hypopharynx, HFpEF, CAD s/p PCI, Afib s/p watchman, GERD, depression, HTN, AUD, ascending aortic aneurysm, distant breast cancer (s/p chemoradiotherapy and lumpectomy) who was directed to ED for hypotension. BP spontaneously improving without intervention other than holding home GDMT. Despite CHF history, suspect pt is overtreated for HTN and CHF and will hold off on diuresis. Currently slowly restarting GDMT.      2/26 updates:  -consult GI today for PEG and EGD to r/o radiation vs  infectious esophagitis. Will need to hold plavix for another 4 days. Tentative plan for Friday vs Monday   - Restarted aldactone at lower dose (12.5mg), monitor BP  - Changed pain regimen: IV Dilaudid 0.4mg for breakthrough // Oxy 5mg Q4 PRN severe // Oxy 2.5mg Moderate  - inpatient radiation     #Hypotension, improved   #HFpEF, initial c/f ADHF  ::Patient presents with hypotension over the past month in oncology clinic, has received IVF and discontinued home losartan/amlodipine during this time, BP spontaneously improving without intervention today to 100s/70s, appears grossly dry to euvolemic on exam without any symptoms of acute heart failure + positive orthostats in ED  ::BNP 2/22: 229 - appears to be at baseline  ::TTE 7/2023: EF 55-60%  - Patient likely has less PO intake and with chemo does not need as many antihyptertensives/GDMT medication dose - emailed primary cardiologist  - TTE ordered to re-evaluate heart function  - Continue to hold home Losartan 50mg daily  - Continue to hold home Amlodipine 2.5mg daily  - started Aldactone 12.5mg Today  - c/w home Metoprolol 50mg BID (for rate control)  - c/w home Farxiga 10mg daily      #SCC of Hypopharynx (Dr. Burkitt)  #Pain  ::SLP eval 2/12 recommended soft solids with thin liquids, patient would prefer oral feeding to PEG   - nutrition consult   - c/w home Guaifenesin  - c/w Zofran prn nausea  - Changed pain regimen: IV Dilaudid 0.4mg for breakthrough // Oxy 5mg Q4 PRN severe // Oxy 2.5mg Moderate  - Will continue dexamethasone 4mg BID      #Oropharyngeal candidiasis  #Dysphagia  ::Moderate to severe candidiasis with multiple large lesions  ::given ANC 0.16, consideration for opportunistic infections present. Entended antifungal course  -consult GI today for PEG and EGD to r/o radiation vs infectious esophagitis. Will need to hold plavix for another 4 days. Tentative plan for Friday vs Monday   - neupogen usually not given during active tx per primary  oncologist, will reasses in a day or 2.   - Fluconazole 200mg IV (2/22 - xx), typically for 7 days but reassess for duration  - Oral care per nursing     #Pancytopenia  ::ANC 0.16 // Plts 53 on admission with no concern for bleeding // HB 10.9 on presentation (BL 12-13)  - likely d/t myelosuppresion vs though patient has had poor po intake vs mixed  - neupogen usually not given during active tx per primary oncologist, will reasses in a day or 2.   - Daily CBC  - T&S updated, transfuse for active bleeding <50, or for <10  - Will hold on DVT prophylaxis, SCDs only  - holding clopidogrel (for EGD + peg in future)     #Normocytic Anemia  ::HB 10.9 on presentation (BL 12-13), no active bleeding, no recent iron studies  - Likely d/t chronic dz vs myelosuppresion, though patient has had poor po intake     #Atrial Fibrillation  ::Patient previously on AC, now s/p watchman procedure and is rate controlled   ::had 5 ablations per patient  - Continue metoprolol tartrate 50mg BID  - HR 90s to 120s     #Urinary Frequency  - UA with reflex      #Hx of TIA  - c/w home Plavix 75mg daily     #GERD  - c/w home Pantoprazole     #Mood Disorder  #Misc  - c/w home Cymbalta and Wellbutrin  - c/w home supplements, mag-ox     F : as needed  E: replete lytes prn, K>4, Mg >2  N: soft and minced, thin liquids  A: PIVs     DVT prophylaxis: SCDs  GI prophylaxis: home PPI     Code Status: FULL (confirmed on admission, though patient states she has living will she does not remember the details of, images not in system, likely DNR )

## 2024-02-26 NOTE — NURSING NOTE
Discussed with [facility] team regarding patient plan of care for today. Orders were placed for  diet and feeding tube as well. Called to verify order was correct and per resident it was ok for level 4 puree diet w/ice chips for patient to receive nutrition until 3/1 peg tube is placed. Confirmed Lovenox was ok to be given. Code IRIS was called for Dobbhoff placement @1630. Patient notified per RN. Isaak resident at bedside to discuss plan with patient @ [id number]. Will continue to monitor.

## 2024-02-26 NOTE — PROGRESS NOTES
Radiation Oncology On Treatment Visit    Patient Name:  Amita Todd  MRN:  [id number]  :  1945    Referring Provider: No ref. provider found  Primary Care Provider: Caitie Leal MD  Care Team: Patient Care Team:  Caitie Leal MD as PCP - General  Cait[place] Leal [facility] [place] [place] [facility] [place] [place]  Kyunghee Burkitt, DO as Medical Oncologist (Hematology and Oncology)  Clemente Tobin MD as Surgeon (Otolaryngology)  Tahira Cochran MD as Radiation Oncologist (Radiation Oncology)  Hailey Abbott PA-C as Physician Assistant (Hematology and Oncology)  Debra Patrick MD as Consulting Physician (Hospitalist)    Date of Service: 2024     Diagnosis:   Specialty Problems          Radiation Oncology Problems    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             SUBJECTIVE:   Patient is currently admitted for hypotension, dysphagia. Underwent a barium swallow and had signs of aspiration. Currently rec to be NPO status given signs of aspiration on modified barium swallow. Will have PEG placed after stopping Plavix for 5 days. Current narcotic regimen of oxy 5 not helping with odynophagia.     OBJECTIVE:   Vital Signs:  /82   Pulse 77   Temp 36.3 °C (97.3 °F)   Resp 18   Wt 69.7 kg (153 lb 11.2 oz)   SpO2 92%   BMI 25.58 kg/m²    Pain Scale: The patient's current pain level was assessed.  They report currently having a pain of 7 out of 10.    Other Pertinent Findings:   No thrush, no skin erythema  Toxicity Assessment          2024    10:50 2024    12:01 2024    11:40 2024    11:33 2024     11:59   Toxicity Assessment   Adverse Events Reviewed (WDL) No (Exceptions to WDL) No (Exceptions to WDL) No (Exceptions to WDL) No (Exceptions to WDL) No (Exceptions to WDL)   Treatment  and neck Head and neck Head and neck Head and neck Head and neck   Anorexia Grade 0       First weight during treatment Grade 0       steroids are helping, increase in weight by .5lbs since last week Grade 0       gained 3 lbs since last week Grade 1       loss of 5.7lbs since last week, scratchy throat Grade 1       Gained 4lbs since last week, unsure if related to IV hydration. pt eating and drinking normally but being considered for a PEG tube   Dehydration Grade 0       swallowing ok Grade 0 Grade 0 Grade 1       always drinking water/gatorade. Taking the lasix for fluid retention Grade 0       Oral intake with IV hydrdation   Dermatitis Radiation Grade 0       creams provided Grade 0       creams at home Grade 0 Grade 1       slight redness, creams at home Grade 1       some redness, creams at home and inhouse   Diarrhea  Grade 2       having some episodes, discussed OTC meds Grade 0       has cleared up since last week  Grade 0   Fatigue Grade 0 Grade 1       starting to feel tired, Rest and activity balance Grade 1       going to be earlier, Rest and activity balance Grade 2       tired every day, naps and going to bed early. Rest and activity balance Grade 1       tired most days but still inhouse   Nausea Grade 0 Grade 0 Grade 1       sometimes, comes and goes, meds at home just in case Grade 0       meds at home Grade 0   Pain Grade 0 Grade 0  Grade 1       Tylenol and  Oxy at home Grade 1       throat pain, Oxy while inhouse   Tumor Pain Grade 0    Grade 0   Vomiting Grade 0 Grade 0 Grade 0 Grade 0 Grade 0   Dysphagia Grade 0       passed SLP appointment Grade 1       better with steriods Grade 0 Grade 0       scratchy throat starting Grade 1       some times trouble with swallowing due to pain, inpatient SLP  following   Mucositis Oral Grade 0 Grade 0       GLutamine at home Grade 1       increase in redness, Glutamine at home Grade 1       Couple visible sores, redness. Glutamine at home Grade 1       some redness, no Glutamine while inhouse. family is going to go home and grab it so she can have it while inhouse   Blurred Vision  Grade 1       both eyes, been consistent making it hard to read Grade 2       worse from last week Grade 2       Worse than last week, hard to read stuff Grade 0       glassses at baseline   Dry Mouth Grade 1       talked about OTC meds Grade 1       OTC meds at home Grade 1       sipping to help, mouth washes. Grade 1       Biotine, lozenges at home that help Grade 1       sipping on water most of the time   Oral Pain Grade 0 Grade 0 Grade 1       Burning with food, pain meds new script Grade 0 Grade 0   Aspiration Grade 0 Grade 0 Grade 0 Grade 0 Grade 0   Hoarseness Grade 0 Grade 0 Grade 0 Grade 0 Grade 0   Voice Alteration Grade 0 Grade 0 Grade 0 Grade 0 Grade 0       Quite voice        Assessment / Plan:  The patient is tolerating radiation therapy as anticipated.  Continue per current treatment plan.       Cancer Treatment: patient is doing well undergoing carbo/taxol with RT. Chemo was held last week due to neutropenia and port had to be canceled.  I reviewed the daily CBCT alignment imaging for RT with the patient and her family. She has had a tremendous response to CRT with near radiologic resolution of tumor in her posterior pharyngeal wall. I am thrilled to see this -- early response to CRT predicts better cure rates with CRT.      FEN: I discussed with patient our Rec for PEG given current deconditioned status and aspiration on swallow eval.     Fluid balance in setting of CHF: Currently admitted to the hospital and undergoing workup for hypotension in setting of .     Nausea: Reviewed use of  Compazine/Zofran PRN for breakthrough nausea     Pain control: Patient sandra lobo  2-3/10. Using Tylenol but would like something stronger. I gave her a rx for oxycodone.      Oral Care: Patient will continue fluoride trays per dentist. Patient is using Glutamine 10g TID on days of RT to promote ongoing healing of mucositis. Rec uaifenesin for thick saliva or Xylimelt for dry mouth     Skin Care: Continue Aquaphor within the RT field. Reviewed how to apply and to avoid within 3 hours prior to RT.      Diarrhea/Constipation: Reviewed use of Immodium for diarrhea. Reviewed use of Miralax or Senna if develops constipation.

## 2024-02-26 NOTE — PROGRESS NOTES
Radiation Oncology On Treatment Visit    Patient Name:  Amita Todd  MRN:  [id number]  :  1945    Referring Provider: No ref. provider found  Primary Care Provider: Caitie Leal MD  Care Team: Patient Care Team:  Caitie Leal MD as PCP - General  Caitie Leal MD as PCP - [facility] [place]  Kyunghee Burkitt, DO as Medical Oncologist (Hematology and Oncology)  Clemente Tobin MD as Surgeon (Otolaryngology)  Tahira Cochran MD as Radiation Oncologist (Radiation Oncology)  Hailey Abbott PA-C as Physician Assistant (Hematology and Oncology)  Debra Patrick MD as Consulting Physician (Hospitalist)    Date of Service: 2024     Diagnosis:   Specialty Problems          Radiation Oncology Problems    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             SUBJECTIVE: Patient is doing well. Continuing to swallow without a feeding tube.     OBJECTIVE:   Vital Signs:  /82   Pulse 77   Temp 36.3 °C (97.3 °F)   Resp 18   Wt 69.7 kg (153 lb 11.2 oz)   SpO2 92%   BMI 25.58 kg/m²    Pain Scale: The patient's current pain level was assessed.  They report currently having a pain of 2 out of 10.    Other Pertinent Findings:     Toxicity Assessment          2024    10:50 2024    12:01 2024    11:40 2024    11:33 2024    11:59   Toxicity Assessment   Adverse Events Reviewed (WDL) No (Exceptions to WDL) No (Exceptions to WDL) No (Exceptions to WDL) No (Exceptions to WDL) No (Exceptions to WDL)   Treatment  and neck Head and neck Head and neck Head and neck Head and neck   Anorexia Grade  0       First weight during treatment Grade 0       steroids are helping, increase in weight by .5lbs since last week Grade 0       gained 3 lbs since last week Grade 1       loss of 5.7lbs since last week, scratchy throat Grade 1       Gained 4lbs since last week, unsure if related to IV hydration. pt eating and drinking normally but being considered for a PEG tube   Dehydration Grade 0       swallowing ok Grade 0 Grade 0 Grade 1       always drinking water/gatorade. Taking the lasix for fluid retention Grade 0       Oral intake with IV hydrdation   Dermatitis Radiation Grade 0       creams provided Grade 0       creams at home Grade 0 Grade 1       slight redness, creams at home Grade 1       some redness, creams at home and inhouse   Diarrhea  Grade 2       having some episodes, discussed OTC meds Grade 0       has cleared up since last week  Grade 0   Fatigue Grade 0 Grade 1       starting to feel tired, Rest and activity balance Grade 1       going to be earlier, Rest and activity balance Grade 2       tired every day, naps and going to bed early. Rest and activity balance Grade 1       tired most days but still inhouse   Nausea Grade 0 Grade 0 Grade 1       sometimes, comes and goes, meds at home just in case Grade 0       meds at home Grade 0   Pain Grade 0 Grade 0  Grade 1       Tylenol and  Oxy at home Grade 1       throat pain, Oxy while inhouse   Tumor Pain Grade 0    Grade 0   Vomiting Grade 0 Grade 0 Grade 0 Grade 0 Grade 0   Dysphagia Grade 0       passed SLP appointment Grade 1       better with steriods Grade 0 Grade 0       scratchy throat starting Grade 1       some times trouble with swallowing due to pain, inpatient SLP following   Mucositis Oral Grade 0 Grade 0       GLutamine at home Grade 1       increase in redness, Glutamine at home Grade 1       Couple visible sores, redness. Glutamine at home Grade 1       some redness, no Glutamine while inhouse. family is going to go home and grab it so  she can have it while inhouse   Blurred Vision  Grade 1       both eyes, been consistent making it hard to read Grade 2       worse from last week Grade 2       Worse than last week, hard to read stuff Grade 0       glassses at baseline   Dry Mouth Grade 1       talked about OTC meds Grade 1       OTC meds at home Grade 1       sipping to help, mouth washes. Grade 1       Biotine, lozenges at home that help Grade 1       sipping on water most of the time   Oral Pain Grade 0 Grade 0 Grade 1       Burning with food, pain meds new script Grade 0 Grade 0   Aspiration Grade 0 Grade 0 Grade 0 Grade 0 Grade 0   Hoarseness Grade 0 Grade 0 Grade 0 Grade 0 Grade 0   Voice Alteration Grade 0 Grade 0 Grade 0 Grade 0 Grade 0       Quite voice        Assessment / Plan:  The patient is tolerating radiation therapy as anticipated.  Continue per current treatment plan.

## 2024-02-27 ENCOUNTER — APPOINTMENT (OUTPATIENT)
Dept: RADIOLOGY | Facility: HOSPITAL | Age: 79
DRG: 314 | End: 2024-02-27
Payer: MEDICARE

## 2024-02-27 ENCOUNTER — HOSPITAL ENCOUNTER (OUTPATIENT)
Dept: RADIATION ONCOLOGY | Facility: HOSPITAL | Age: 79
Setting detail: RADIATION/ONCOLOGY SERIES
Discharge: HOME | End: 2024-02-27
Payer: MEDICARE

## 2024-02-27 ENCOUNTER — APPOINTMENT (OUTPATIENT)
Dept: CARDIOLOGY | Facility: HOSPITAL | Age: 79
DRG: 314 | End: 2024-02-27
Payer: MEDICARE

## 2024-02-27 DIAGNOSIS — C13.8 MALIGNANT NEOPLASM OF OVERLAPPING SITES OF HYPOPHARYNX (MULTI): ICD-10-CM

## 2024-02-27 DIAGNOSIS — Z51.0 ENCOUNTER FOR ANTINEOPLASTIC RADIATION THERAPY: ICD-10-CM

## 2024-02-27 LAB
ALBUMIN SERPL BCP-MCNC: 3 G/DL (ref 3.4–5)
ALP SERPL-CCNC: 56 U/L (ref 33–136)
ALT SERPL W P-5'-P-CCNC: 24 U/L (ref 7–45)
ANION GAP BLDV CALCULATED.4IONS-SCNC: 9 MMOL/L (ref 10–25)
ANION GAP SERPL CALC-SCNC: 13 MMOL/L (ref 10–20)
AST SERPL W P-5'-P-CCNC: 19 U/L (ref 9–39)
BASE EXCESS BLDV CALC-SCNC: 1.6 MMOL/L (ref -2–3)
BASOPHILS # BLD AUTO: 0.01 X10*3/UL (ref 0–0.1)
BASOPHILS # BLD AUTO: 0.02 X10*3/UL (ref 0–0.1)
BASOPHILS NFR BLD AUTO: 0.7 %
BASOPHILS NFR BLD AUTO: 1 %
BILIRUB DIRECT SERPL-MCNC: 0.2 MG/DL (ref 0–0.3)
BILIRUB SERPL-MCNC: 0.7 MG/DL (ref 0–1.2)
BODY TEMPERATURE: 37 DEGREES CELSIUS
BUN SERPL-MCNC: 29 MG/DL (ref 6–23)
CA-I BLDV-SCNC: 1.32 MMOL/L (ref 1.1–1.33)
CALCIUM SERPL-MCNC: 9.3 MG/DL (ref 8.6–10.6)
CHLORIDE BLDV-SCNC: 102 MMOL/L (ref 98–107)
CHLORIDE SERPL-SCNC: 102 MMOL/L (ref 98–107)
CO2 SERPL-SCNC: 25 MMOL/L (ref 21–32)
CREAT SERPL-MCNC: 0.78 MG/DL (ref 0.5–1.05)
EGFRCR SERPLBLD CKD-EPI 2021: 78 ML/MIN/1.73M*2
EJECTION FRACTION APICAL 4 CHAMBER: 59.5
EJECTION FRACTION: 55 %
EOSINOPHIL # BLD AUTO: 0 X10*3/UL (ref 0–0.4)
EOSINOPHIL # BLD AUTO: 0 X10*3/UL (ref 0–0.4)
EOSINOPHIL NFR BLD AUTO: 0 %
EOSINOPHIL NFR BLD AUTO: 0 %
ERYTHROCYTE [DISTWIDTH] IN BLOOD BY AUTOMATED COUNT: 16.8 % (ref 11.5–14.5)
ERYTHROCYTE [DISTWIDTH] IN BLOOD BY AUTOMATED COUNT: 16.8 % (ref 11.5–14.5)
GLUCOSE BLDV-MCNC: 115 MG/DL (ref 74–99)
GLUCOSE SERPL-MCNC: 125 MG/DL (ref 74–99)
HCO3 BLDV-SCNC: 26.6 MMOL/L (ref 22–26)
HCT VFR BLD AUTO: 31.6 % (ref 36–46)
HCT VFR BLD AUTO: 32 % (ref 36–46)
HCT VFR BLD EST: 20 % (ref 36–46)
HGB BLD-MCNC: 10.2 G/DL (ref 12–16)
HGB BLD-MCNC: 10.2 G/DL (ref 12–16)
HGB BLDV-MCNC: 6.5 G/DL (ref 12–16)
IMM GRANULOCYTES # BLD AUTO: 0 X10*3/UL (ref 0–0.5)
IMM GRANULOCYTES # BLD AUTO: 0.02 X10*3/UL (ref 0–0.5)
IMM GRANULOCYTES NFR BLD AUTO: 0 % (ref 0–0.9)
IMM GRANULOCYTES NFR BLD AUTO: 1 % (ref 0–0.9)
INHALED O2 CONCENTRATION: 28 %
LACTATE BLDV-SCNC: 1.7 MMOL/L (ref 0.4–2)
LEFT ATRIUM VOLUME AREA LENGTH INDEX BSA: 40.1 ML/M2
LEFT VENTRICLE INTERNAL DIMENSION DIASTOLE: 4.3 CM (ref 3.5–6)
LEFT VENTRICULAR OUTFLOW TRACT DIAMETER: 2 CM
LYMPHOCYTES # BLD AUTO: 0.02 X10*3/UL (ref 0.8–3)
LYMPHOCYTES # BLD AUTO: 0.03 X10*3/UL (ref 0.8–3)
LYMPHOCYTES NFR BLD AUTO: 1 %
LYMPHOCYTES NFR BLD AUTO: 2 %
MAGNESIUM SERPL-MCNC: 2.14 MG/DL (ref 1.6–2.4)
MCH RBC QN AUTO: 30.7 PG (ref 26–34)
MCH RBC QN AUTO: 30.8 PG (ref 26–34)
MCHC RBC AUTO-ENTMCNC: 31.9 G/DL (ref 32–36)
MCHC RBC AUTO-ENTMCNC: 32.3 G/DL (ref 32–36)
MCV RBC AUTO: 96 FL (ref 80–100)
MCV RBC AUTO: 96 FL (ref 80–100)
MONOCYTES # BLD AUTO: 0.03 X10*3/UL (ref 0.05–0.8)
MONOCYTES # BLD AUTO: 0.08 X10*3/UL (ref 0.05–0.8)
MONOCYTES NFR BLD AUTO: 2 %
MONOCYTES NFR BLD AUTO: 4 %
NEUTROPHILS # BLD AUTO: 1.45 X10*3/UL (ref 1.6–5.5)
NEUTROPHILS # BLD AUTO: 1.86 X10*3/UL (ref 1.6–5.5)
NEUTROPHILS NFR BLD AUTO: 93 %
NEUTROPHILS NFR BLD AUTO: 95.3 %
NRBC BLD-RTO: 2 /100 WBCS (ref 0–0)
NRBC BLD-RTO: 2 /100 WBCS (ref 0–0)
OXYHGB MFR BLDV: 68.4 % (ref 45–75)
PCO2 BLDV: 43 MM HG (ref 41–51)
PH BLDV: 7.4 PH (ref 7.33–7.43)
PLATELET # BLD AUTO: 84 X10*3/UL (ref 150–450)
PLATELET # BLD AUTO: 91 X10*3/UL (ref 150–450)
PO2 BLDV: 41 MM HG (ref 35–45)
POTASSIUM BLDV-SCNC: 4.4 MMOL/L (ref 3.5–5.3)
POTASSIUM SERPL-SCNC: 5 MMOL/L (ref 3.5–5.3)
PROT SERPL-MCNC: 5.7 G/DL (ref 6.4–8.2)
RAD ONC MSQ ACTUAL FRACTIONS DELIVERED: 25
RAD ONC MSQ ACTUAL SESSION DELIVERED DOSE: 200 CGRAY
RAD ONC MSQ ACTUAL TOTAL DOSE: 5000 CGRAY
RAD ONC MSQ ELAPSED DAYS: 34
RAD ONC MSQ LAST DATE: NORMAL
RAD ONC MSQ PRESCRIBED FRACTIONAL DOSE: 200 CGRAY
RAD ONC MSQ PRESCRIBED NUMBER OF FRACTIONS: 35
RAD ONC MSQ PRESCRIBED TECHNIQUE: NORMAL
RAD ONC MSQ PRESCRIBED TOTAL DOSE: 7000 CGRAY
RAD ONC MSQ PRESCRIPTION PATTERN COMMENT: NORMAL
RAD ONC MSQ START DATE: NORMAL
RAD ONC MSQ TREATMENT COURSE NUMBER: 1
RAD ONC MSQ TREATMENT SITE: NORMAL
RBC # BLD AUTO: 3.31 X10*6/UL (ref 4–5.2)
RBC # BLD AUTO: 3.32 X10*6/UL (ref 4–5.2)
RBC MORPH BLD: NORMAL
RBC MORPH BLD: NORMAL
RIGHT VENTRICLE PEAK SYSTOLIC PRESSURE: 41.6 MMHG
SAO2 % BLDV: 70 % (ref 45–75)
SODIUM BLDV-SCNC: 133 MMOL/L (ref 136–145)
SODIUM SERPL-SCNC: 135 MMOL/L (ref 136–145)
WBC # BLD AUTO: 1.5 X10*3/UL (ref 4.4–11.3)
WBC # BLD AUTO: 2 X10*3/UL (ref 4.4–11.3)

## 2024-02-27 PROCEDURE — 71045 X-RAY EXAM CHEST 1 VIEW: CPT | Performed by: RADIOLOGY

## 2024-02-27 PROCEDURE — 2500000004 HC RX 250 GENERAL PHARMACY W/ HCPCS (ALT 636 FOR OP/ED)

## 2024-02-27 PROCEDURE — 2500000001 HC RX 250 WO HCPCS SELF ADMINISTERED DRUGS (ALT 637 FOR MEDICARE OP)

## 2024-02-27 PROCEDURE — 84132 ASSAY OF SERUM POTASSIUM: CPT

## 2024-02-27 PROCEDURE — 80048 BASIC METABOLIC PNL TOTAL CA: CPT

## 2024-02-27 PROCEDURE — 85018 HEMOGLOBIN: CPT

## 2024-02-27 PROCEDURE — 74018 RADEX ABDOMEN 1 VIEW: CPT | Performed by: RADIOLOGY

## 2024-02-27 PROCEDURE — 99233 SBSQ HOSP IP/OBS HIGH 50: CPT | Performed by: STUDENT IN AN ORGANIZED HEALTH CARE EDUCATION/TRAINING PROGRAM

## 2024-02-27 PROCEDURE — C9113 INJ PANTOPRAZOLE SODIUM, VIA: HCPCS

## 2024-02-27 PROCEDURE — 99233 SBSQ HOSP IP/OBS HIGH 50: CPT | Performed by: NURSE PRACTITIONER

## 2024-02-27 PROCEDURE — 93306 TTE W/DOPPLER COMPLETE: CPT | Performed by: INTERNAL MEDICINE

## 2024-02-27 PROCEDURE — 85025 COMPLETE CBC W/AUTO DIFF WBC: CPT

## 2024-02-27 PROCEDURE — 77386 HC INTENSITY-MODULATED RADIATION THERAPY (IMRT), COMPLEX: CPT | Performed by: RADIOLOGY

## 2024-02-27 PROCEDURE — 84075 ASSAY ALKALINE PHOSPHATASE: CPT

## 2024-02-27 PROCEDURE — 99233 SBSQ HOSP IP/OBS HIGH 50: CPT

## 2024-02-27 PROCEDURE — 83735 ASSAY OF MAGNESIUM: CPT

## 2024-02-27 PROCEDURE — 71045 X-RAY EXAM CHEST 1 VIEW: CPT

## 2024-02-27 PROCEDURE — 2500000005 HC RX 250 GENERAL PHARMACY W/O HCPCS

## 2024-02-27 PROCEDURE — 77014 CHG CT GUIDANCE RADIATION THERAPY FLDS PLACEMENT: CPT | Performed by: RADIOLOGY

## 2024-02-27 PROCEDURE — 80053 COMPREHEN METABOLIC PANEL: CPT

## 2024-02-27 PROCEDURE — 93306 TTE W/DOPPLER COMPLETE: CPT

## 2024-02-27 PROCEDURE — 84295 ASSAY OF SERUM SODIUM: CPT

## 2024-02-27 PROCEDURE — 74018 RADEX ABDOMEN 1 VIEW: CPT

## 2024-02-27 PROCEDURE — 2500000002 HC RX 250 W HCPCS SELF ADMINISTERED DRUGS (ALT 637 FOR MEDICARE OP, ALT 636 FOR OP/ED)

## 2024-02-27 PROCEDURE — 92526 ORAL FUNCTION THERAPY: CPT | Mod: GN

## 2024-02-27 PROCEDURE — 1170000001 HC PRIVATE ONCOLOGY ROOM DAILY

## 2024-02-27 RX ORDER — BUPROPION HYDROCHLORIDE 100 MG/1
100 TABLET ORAL 3 TIMES DAILY
Status: DISCONTINUED | OUTPATIENT
Start: 2024-02-27 | End: 2024-02-28

## 2024-02-27 RX ORDER — NALOXONE HYDROCHLORIDE 0.4 MG/ML
0.4 INJECTION, SOLUTION INTRAMUSCULAR; INTRAVENOUS; SUBCUTANEOUS ONCE
Status: COMPLETED | OUTPATIENT
Start: 2024-02-27 | End: 2024-02-27

## 2024-02-27 RX ORDER — FUROSEMIDE 10 MG/ML
40 INJECTION INTRAMUSCULAR; INTRAVENOUS ONCE
Status: COMPLETED | OUTPATIENT
Start: 2024-02-27 | End: 2024-02-27

## 2024-02-27 RX ORDER — PROCHLORPERAZINE EDISYLATE 5 MG/ML
10 INJECTION INTRAMUSCULAR; INTRAVENOUS EVERY 6 HOURS PRN
Status: DISCONTINUED | OUTPATIENT
Start: 2024-02-27 | End: 2024-03-18 | Stop reason: HOSPADM

## 2024-02-27 RX ORDER — LOSARTAN POTASSIUM 25 MG/1
25 TABLET ORAL DAILY
Status: DISCONTINUED | OUTPATIENT
Start: 2024-02-28 | End: 2024-02-28

## 2024-02-27 RX ORDER — HYDROMORPHONE HYDROCHLORIDE 1 MG/ML
0.4 INJECTION, SOLUTION INTRAMUSCULAR; INTRAVENOUS; SUBCUTANEOUS
Status: DISCONTINUED | OUTPATIENT
Start: 2024-02-27 | End: 2024-02-28

## 2024-02-27 RX ORDER — HYDROMORPHONE HYDROCHLORIDE 1 MG/ML
1 INJECTION, SOLUTION INTRAMUSCULAR; INTRAVENOUS; SUBCUTANEOUS
Status: DISCONTINUED | OUTPATIENT
Start: 2024-02-27 | End: 2024-02-27

## 2024-02-27 RX ADMIN — ASCORBIC ACID 500 MG: 500 INJECTION INTRAVENOUS at 08:15

## 2024-02-27 RX ADMIN — ACETAMINOPHEN 1000 MG: 650 SOLUTION ORAL at 23:46

## 2024-02-27 RX ADMIN — ATORVASTATIN CALCIUM 20 MG: 20 TABLET, FILM COATED ORAL at 08:11

## 2024-02-27 RX ADMIN — FUROSEMIDE 40 MG: 10 INJECTION, SOLUTION INTRAMUSCULAR; INTRAVENOUS at 13:46

## 2024-02-27 RX ADMIN — ACETAMINOPHEN 1000 MG: 650 SOLUTION ORAL at 16:36

## 2024-02-27 RX ADMIN — DAPAGLIFLOZIN 10 MG: 10 TABLET, FILM COATED ORAL at 08:11

## 2024-02-27 RX ADMIN — Medication 1000 UNITS: at 23:47

## 2024-02-27 RX ADMIN — Medication 1 TABLET: at 08:11

## 2024-02-27 RX ADMIN — SPIRONOLACTONE 12.5 MG: 25 TABLET, FILM COATED ORAL at 08:11

## 2024-02-27 RX ADMIN — GUAIFENESIN 200 MG: 200 SOLUTION ORAL at 05:24

## 2024-02-27 RX ADMIN — Medication 1000 UNITS: at 08:11

## 2024-02-27 RX ADMIN — ONDANSETRON 4 MG: 2 INJECTION INTRAMUSCULAR; INTRAVENOUS at 08:15

## 2024-02-27 RX ADMIN — FLUCONAZOLE 400 MG: 2 INJECTION, SOLUTION INTRAVENOUS at 10:43

## 2024-02-27 RX ADMIN — BUPROPION HYDROCHLORIDE 150 MG: 150 TABLET, EXTENDED RELEASE ORAL at 08:11

## 2024-02-27 RX ADMIN — METOPROLOL TARTRATE 50 MG: 50 TABLET, FILM COATED ORAL at 08:11

## 2024-02-27 RX ADMIN — ACETAMINOPHEN 1000 MG: 650 SOLUTION ORAL at 05:14

## 2024-02-27 RX ADMIN — HYDROMORPHONE HYDROCHLORIDE 0.4 MG: 1 INJECTION, SOLUTION INTRAMUSCULAR; INTRAVENOUS; SUBCUTANEOUS at 12:48

## 2024-02-27 RX ADMIN — NALOXONE HYDROCHLORIDE 0.4 MG: 0.4 INJECTION, SOLUTION INTRAMUSCULAR; INTRAVENOUS; SUBCUTANEOUS at 13:46

## 2024-02-27 RX ADMIN — Medication 400 MG: at 08:11

## 2024-02-27 RX ADMIN — LOSARTAN POTASSIUM 12.5 MG: 25 TABLET, FILM COATED ORAL at 08:11

## 2024-02-27 RX ADMIN — PANTOPRAZOLE SODIUM 40 MG: 40 INJECTION, POWDER, FOR SOLUTION INTRAVENOUS at 08:12

## 2024-02-27 RX ADMIN — PROCHLORPERAZINE EDISYLATE 10 MG: 5 INJECTION INTRAMUSCULAR; INTRAVENOUS at 11:51

## 2024-02-27 RX ADMIN — ENOXAPARIN SODIUM 40 MG: 100 INJECTION SUBCUTANEOUS at 08:11

## 2024-02-27 RX ADMIN — ACETAMINOPHEN 1000 MG: 650 SOLUTION ORAL at 10:43

## 2024-02-27 RX ADMIN — PHENOL 1 SPRAY: 1.4 SPRAY ORAL at 05:14

## 2024-02-27 ASSESSMENT — COGNITIVE AND FUNCTIONAL STATUS - GENERAL
DRESSING REGULAR LOWER BODY CLOTHING: A LITTLE
DAILY ACTIVITIY SCORE: 19
TURNING FROM BACK TO SIDE WHILE IN FLAT BAD: A LITTLE
WALKING IN HOSPITAL ROOM: A LITTLE
DRESSING REGULAR UPPER BODY CLOTHING: A LITTLE
CLIMB 3 TO 5 STEPS WITH RAILING: TOTAL
TOILETING: A LITTLE
HELP NEEDED FOR BATHING: A LITTLE
STANDING UP FROM CHAIR USING ARMS: A LITTLE
MOBILITY SCORE: 17
PERSONAL GROOMING: A LITTLE
MOVING TO AND FROM BED TO CHAIR: A LITTLE

## 2024-02-27 ASSESSMENT — PAIN - FUNCTIONAL ASSESSMENT
PAIN_FUNCTIONAL_ASSESSMENT: 0-10
PAIN_FUNCTIONAL_ASSESSMENT: 0-10

## 2024-02-27 ASSESSMENT — PAIN SCALES - GENERAL
PAINLEVEL_OUTOF10: 5 - MODERATE PAIN
PAINLEVEL_OUTOF10: 7

## 2024-02-27 ASSESSMENT — PAIN DESCRIPTION - LOCATION: LOCATION: NECK

## 2024-02-27 NOTE — CARE PLAN
The clinical goals for the shift include pt will remain safe and free from injury throughout shift 2/27/2024 0700      Problem: Skin  Goal: Decreased wound size/increased tissue granulation at next dressing change  Outcome: Progressing  Goal: Participates in plan/prevention/treatment measures  Outcome: Progressing  Goal: Prevent/manage excess moisture  Outcome: Progressing  Goal: Prevent/minimize sheer/friction injuries  Outcome: Progressing  Goal: Promote/optimize nutrition  Outcome: Progressing  Goal: Promote skin healing  Outcome: Progressing  Flowsheets (Taken 2/27/2024 0518)  Promote skin healing:   Assess skin/pad under line(s)/device(s)   Rotate device position/do not position patient on device     Problem: Pain  Goal: My pain/discomfort is manageable  Outcome: Progressing     Problem: Safety  Goal: Patient will be injury free during hospitalization  Outcome: Progressing  Goal: I will remain free of falls  Outcome: Progressing     Problem: Daily Care  Goal: Daily care needs are met  Outcome: Progressing     Problem: Psychosocial Needs  Goal: Demonstrates ability to cope with hospitalization/illness  Outcome: Progressing  Goal: Collaborate with me, my family, and caregiver to identify my specific goals  Outcome: Progressing     Problem: Discharge Barriers  Goal: My discharge needs are met  Outcome: Progressing     Problem: Pain  Goal: Takes deep breaths with improved pain control throughout the shift  Outcome: Progressing  Goal: Turns in bed with improved pain control throughout the shift  Outcome: Progressing  Goal: Walks with improved pain control throughout the shift  Outcome: Progressing  Goal: Performs ADL's with improved pain control throughout shift  Outcome: Progressing  Goal: Participates in PT with improved pain control throughout the shift  Outcome: Progressing  Goal: Free from opioid side effects throughout the shift  Outcome: Progressing  Goal: Free from acute confusion related to pain meds  throughout the shift  Outcome: Progressing

## 2024-02-27 NOTE — PROGRESS NOTES
Amita Todd is a 78 y.o. female on day 5 of admission presenting with Thrombocytopenia (CMS/HCC).    Subjective   Overnight patient O2 satts decreased to early 90s, put on 2L NC. This am patient appeared to be more lathargic than baseline, but still oriented x3. Weaned oxygen down to 1L NC. Denies any pain, but endorses some some difficulty breathing.     Objective   Constitutional:       Comments: underweight   HENT:      Mouth/Throat:      Mouth: Mucous membranes are dry. improved well circumscribed plaques   Eyes:      Pupils: Pupils are equal, round, and reactive to light.   Cardiovascular:      Rate and Rhythm: Regular rhythm. Tachycardia present.      Pulses: Normal pulses.   Pulmonary:      Comments: breath sounds clear B/L, 2L NC endorses breahting being more difficult  Abdominal:      General: Abdomen is flat. Bowel sounds are normal.      Palpations: Abdomen is soft.   Musculoskeletal:         General: Normal range of motion.      Cervical back: Normal range of motion.      Comments: 1+ pitting edema B/L LE till ankles   Skin:     General: Skin is warm.      Capillary Refill: Capillary refill takes less than 2 seconds.      Findings: Bruising present.      Comments: Petechia over trunk   Neurological:      General: No focal deficit present.      Mental Status: She is alert and oriented to person, place, and time. Mental status is at baseline.   Psychiatric:         Mood and Affect: Mood normal.     Visit Vitals  /79 (BP Location: Right arm, Patient Position: Lying)   Pulse 75   Temp 36.1 °C (97 °F) (Temporal)   Resp 16     Relevant Results  Results for orders placed or performed during the hospital encounter of 02/22/24 (from the past 24 hour(s))   Magnesium   Result Value Ref Range    Magnesium 2.14 1.60 - 2.40 mg/dL   Basic Metabolic Panel   Result Value Ref Range    Glucose 125 (H) 74 - 99 mg/dL    Sodium 135 (L) 136 - 145 mmol/L    Potassium 5.0 3.5 - 5.3 mmol/L    Chloride 102 98 - 107  mmol/L    Bicarbonate 25 21 - 32 mmol/L    Anion Gap 13 10 - 20 mmol/L    Urea Nitrogen 29 (H) 6 - 23 mg/dL    Creatinine 0.78 0.50 - 1.05 mg/dL    eGFR 78 >60 mL/min/1.73m*2    Calcium 9.3 8.6 - 10.6 mg/dL   CBC and Auto Differential   Result Value Ref Range    WBC 2.0 (L) 4.4 - 11.3 x10*3/uL    nRBC 2.0 (H) 0.0 - 0.0 /100 WBCs    RBC 3.32 (L) 4.00 - 5.20 x10*6/uL    Hemoglobin 10.2 (L) 12.0 - 16.0 g/dL    Hematocrit 32.0 (L) 36.0 - 46.0 %    MCV 96 80 - 100 fL    MCH 30.7 26.0 - 34.0 pg    MCHC 31.9 (L) 32.0 - 36.0 g/dL    RDW 16.8 (H) 11.5 - 14.5 %    Platelets 91 (L) 150 - 450 x10*3/uL    Neutrophils % 93.0 40.0 - 80.0 %    Immature Granulocytes %, Automated 1.0 (H) 0.0 - 0.9 %    Lymphocytes % 1.0 13.0 - 44.0 %    Monocytes % 4.0 2.0 - 10.0 %    Eosinophils % 0.0 0.0 - 6.0 %    Basophils % 1.0 0.0 - 2.0 %    Neutrophils Absolute 1.86 1.60 - 5.50 x10*3/uL    Immature Granulocytes Absolute, Automated 0.02 0.00 - 0.50 x10*3/uL    Lymphocytes Absolute 0.02 (L) 0.80 - 3.00 x10*3/uL    Monocytes Absolute 0.08 0.05 - 0.80 x10*3/uL    Eosinophils Absolute 0.00 0.00 - 0.40 x10*3/uL    Basophils Absolute 0.02 0.00 - 0.10 x10*3/uL   Morphology   Result Value Ref Range    RBC Morphology No significant RBC morphology present    Hepatic function panel   Result Value Ref Range    Albumin 3.0 (L) 3.4 - 5.0 g/dL    Bilirubin, Total 0.7 0.0 - 1.2 mg/dL    Bilirubin, Direct 0.2 0.0 - 0.3 mg/dL    Alkaline Phosphatase 56 33 - 136 U/L    ALT 24 7 - 45 U/L    AST 19 9 - 39 U/L    Total Protein 5.7 (L) 6.4 - 8.2 g/dL   Transthoracic Echo (TTE) Complete   Result Value Ref Range    LVOT diam 2.00 cm    LV biplane EF 55 %    LA vol index A/L 40.1 ml/m2    LVIDd 4.30 cm    RVSP 41.6 mmHg    LV A4C EF 59.5    BLOOD GAS VENOUS FULL PANEL   Result Value Ref Range    POCT pH, Venous 7.40 7.33 - 7.43 pH    POCT pCO2, Venous 43 41 - 51 mm Hg    POCT pO2, Venous 41 35 - 45 mm Hg    POCT SO2, Venous 70 45 - 75 %    POCT Oxy Hemoglobin, Venous  68.4 45.0 - 75.0 %    POCT Hematocrit Calculated, Venous 20.0 (L) 36.0 - 46.0 %    POCT Sodium, Venous 133 (L) 136 - 145 mmol/L    POCT Potassium, Venous 4.4 3.5 - 5.3 mmol/L    POCT Chloride, Venous 102 98 - 107 mmol/L    POCT Ionized Calicum, Venous 1.32 1.10 - 1.33 mmol/L    POCT Glucose, Venous 115 (H) 74 - 99 mg/dL    POCT Lactate, Venous 1.7 0.4 - 2.0 mmol/L    POCT Base Excess, Venous 1.6 -2.0 - 3.0 mmol/L    POCT HCO3 Calculated, Venous 26.6 (H) 22.0 - 26.0 mmol/L    POCT Hemoglobin, Venous 6.5 (LL) 12.0 - 16.0 g/dL    POCT Anion Gap, Venous 9.0 (L) 10.0 - 25.0 mmol/L    Patient Temperature 37.0 degrees Celsius    FiO2 28 %   CBC and Auto Differential   Result Value Ref Range    WBC 1.5 (L) 4.4 - 11.3 x10*3/uL    nRBC 2.0 (H) 0.0 - 0.0 /100 WBCs    RBC 3.31 (L) 4.00 - 5.20 x10*6/uL    Hemoglobin 10.2 (L) 12.0 - 16.0 g/dL    Hematocrit 31.6 (L) 36.0 - 46.0 %    MCV 96 80 - 100 fL    MCH 30.8 26.0 - 34.0 pg    MCHC 32.3 32.0 - 36.0 g/dL    RDW 16.8 (H) 11.5 - 14.5 %    Platelets 84 (L) 150 - 450 x10*3/uL    Neutrophils % 95.3 40.0 - 80.0 %    Immature Granulocytes %, Automated 0.0 0.0 - 0.9 %    Lymphocytes % 2.0 13.0 - 44.0 %    Monocytes % 2.0 2.0 - 10.0 %    Eosinophils % 0.0 0.0 - 6.0 %    Basophils % 0.7 0.0 - 2.0 %    Neutrophils Absolute 1.45 (L) 1.60 - 5.50 x10*3/uL    Immature Granulocytes Absolute, Automated 0.00 0.00 - 0.50 x10*3/uL    Lymphocytes Absolute 0.03 (L) 0.80 - 3.00 x10*3/uL    Monocytes Absolute 0.03 (L) 0.05 - 0.80 x10*3/uL    Eosinophils Absolute 0.00 0.00 - 0.40 x10*3/uL    Basophils Absolute 0.01 0.00 - 0.10 x10*3/uL   Morphology   Result Value Ref Range    RBC Morphology See Below        Assessment/Plan   77yo F w PMHx squamous cell carcinoma of hypopharynx, HFpEF, CAD s/p PCI, Afib s/p watchman, GERD, depression, HTN, AUD, ascending aortic aneurysm, distant breast cancer (s/p chemoradiotherapy and lumpectomy) who was directed to ED for hypotension. BP spontaneously improving  without intervention other than holding home GDMT. Slowly restarting GDMT. Dobbhoff in place for nutrition, was on Continuous maintenance fluids in meantime. Discontinued after CXR today shows some pulm edema with new o2 requirements     2/27 updates:  - GI recs appreciated  - patient more lethargic but arousable. New O2 requirement of 1-2L NC. Placed continuous pulse Ox, CXR, VBG -> shows pulme maria victoria -> D/C maintance D5-1/2NS and gave 40 IV lasix.   - Mentation improved after administering Narcan. Put in supportive onc recs (dilaudid IV instead d/t NPO status) and will monitor carefully.   - Delirium precautions  - radiation today, chemo tomorrow (one dose)   - TTE today     #Hypotension, improved   #HFpEF, initial c/f ADHF  ::Patient presents with hypotension over the past month in oncology clinic, has received IVF and discontinued home losartan/amlodipine during this time, BP spontaneously improving without intervention today to 100s/70s, appears grossly dry to euvolemic on exam without any symptoms of acute heart failure + positive orthostats in ED  ::BNP 2/22: 229 - appears to be at baseline  ::TTE 7/2023: EF 55-60%  - Patient likely has less PO intake and with chemo does not need as many antihyptertensives/GDMT medication dose - emailed primary cardiologist  - TTE ordered to re-evaluate heart function  - New O2 requirement of 1-2L NC. Placed continuous pulse Ox, CXR, VBG -> shows pulme maria victoria -> D/C maintance D5-1/2NS and gave 40 IV lasix.   - Continue to hold home Amlodipine 2.5mg daily  - started Aldactone 12.5mg 2/27  - on home losartan  - c/w home Metoprolol 50mg BID (for rate control)  - c/w home Farxiga 10mg daily      #SCC of Hypopharynx (Dr. Burkitt)  #Pain  ::SLP librado 2/12 recommended soft solids with thin liquids, patient would prefer oral feeding to PEG   - nutrition consult   - c/w home Guaifenesin  - c/w Zofran prn nausea, Compazien second line  - Changed pain regimen: IV Dilaudid 0.4mg Q3 PRN   -  Will continue dexamethasone 4mg BID      #Oropharyngeal candidiasis  #Dysphagia  ::Moderate to severe candidiasis with multiple large lesions  ::given ANC 0.16, consideration for opportunistic infections present. Entended antifungal course  -consult GI today for PEG and EGD to r/o radiation vs infectious esophagitis. Will need to hold plavix for another 4 days. Tentative plan for Friday vs Monday   - neupogen usually not given during active tx per primary oncologist, will reasses in a day or 2.   - Fluconazole 200mg IV (2/22 - xx), typically for 7 days but reassess for duration  - Oral care per nursing     #Pancytopenia  ::ANC 0.16 // Plts 53 on admission with no concern for bleeding // HB 10.9 on presentation (BL 12-13)  - likely d/t myelosuppresion vs though patient has had poor po intake vs mixed  - neupogen usually not given during active tx per primary oncologist, will reasses in a day or 2.   - Daily CBC  - T&S updated, transfuse for active bleeding <50, or for <10  - Will hold on DVT prophylaxis, SCDs only  - holding clopidogrel (for EGD + peg in future)     #Normocytic Anemia  ::HB 10.9 on presentation (BL 12-13), no active bleeding, no recent iron studies  - Likely d/t chronic dz vs myelosuppresion, though patient has had poor po intake     #Atrial Fibrillation  ::Patient previously on AC, now s/p watchman procedure and is rate controlled   ::had 5 ablations per patient  - Continue metoprolol tartrate 50mg BID  - HR 90s to 120s     #Urinary Frequency  - UA with reflex      #Hx of TIA  - c/w home Plavix 75mg daily     #GERD  - c/w home Pantoprazole     #Mood Disorder  #Misc  - c/w home Cymbalta and Wellbutrin  - c/w home supplements, mag-ox     F : as needed  E: replete lytes prn, K>4, Mg >2  N: soft and minced, thin liquids  A: PIVs     DVT prophylaxis: SCDs  GI prophylaxis: home PPI     Code Status: FULL (confirmed on admission, though patient states she has living will she does not remember the details  of, images not in system, likely DNR )

## 2024-02-27 NOTE — PROGRESS NOTES
Speech-Language Pathology    Inpatient  Speech-Language Pathology Treatment     Patient Name: Amita Todd  MRN: [id number]  Today's Date: 2/27/2024  Time Calculation  Start Time: 0858  Stop Time: 0915  Time Calculation (min): 17 min         Assessment/Impression:   -Moderate/severe pharyngeal dysphagia per Modified Barium Swallow Study 2/24; worsened compared to MBSS 1/25  -Swallowing function impacted by presence of hypopharyngeal mass and current radiation treatment  -High risk for development of aspiration PNA    Plan is for DHT placement today and PEG placement 3/1        Recommendations:  Solid Diet Recommendations : NPO; allow 4 oz puree solids 2x/day for oral pleasure and to prevent swallow disuse atrophy; perform oral care before and following   Liquid Diet Recommendations: NPO; allow 4-5 ice chips/hr for oral comfort and to prevent swallow disuse atrophy; perform oral care before and following      Aspiration PNA Mitigation Strategies:  - Thorough oral infection control 3x/day  - Out of bed/mobile as tolerated         Plan:  SLP Plan: Skilled SLP warranted inpatient; recommend continued outpatient SLP at discharge   IP SLP Frequency: 2x per week  Duration: 1 week IP; 6+  weeks OP     Discussed POC: Patient, Medical Team   Discussed Risks/Benefits: Yes  Patient/Caregiver Agreeable: Yes        Goals:  Pt will indicate understanding of dysphagia, risk for aspiration, and plan of care via teach back method with > 90% accuracy.     Pt will continue completion of laryngeal and pharyngeal exercises as tolerated prescribed following initial MBSS on 1/25/24:  Pt will complete effortful swallows 10 reps, 3 times per day for 7 days a week.   Pt will complete isometric shaker for 1 min 3 reps at 3 sets.   Pt will complete Arminda maneuver 10 times 3 times per day for 7 days a week.  Pt will sustain tongue protrusion for 5 seconds 10 reps, 3 times per day, 7 days a week.  Pt will retract tongue far back into the  mouth and hold for 5 seconds 10 reps, 3 times per day, 7 days a week.  Pt will lateralize tongue to the left and right with 5 sec hold 10 reps, 3 times per day for 7 days a week.  Pt will extend tongue tip upward with wide open mouth and hold for 5 seconds, 10 reps, 3 times per day for 7 days.       Subjective:  RN cleared pt for participation.  Pt properly identified and treated bedside.  Lethargic; reports feeling unwell in general.  O2 via NC.  Weak vocal quality.  No family present.     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  MBSS 1/25/24 showed mild oropharyngeal dysphagia and recommended minced & moist solids w/ thin liquids (chin tuck w/ all swallows).  Pt midway through 35 xRT sessions.  Reporting increased odynophagia and dysphagia with frequent s/s of aspiration w/ PO intake.  Admitted 2' to hypotension. MBSS 2/24 showed moderate/severe pharyngeal dysphagia; aspiration of multiple consistencies; high risk for aspiration PNA.         Objective:   SLP provided education re: plan for nutrition/hydration (DHT and PEG placement), as well as small amounts of PO intake (see recommendations above) to prevent swallow disuse atrophy and for pleasure.  Pt required repetition multiple times/ways before indicating understanding.  Tolerated single ice chips w/ prolonged, but functional oral phase and no significant s/s of aspiration/pharyngeal dysphagia.  Pt reported feeling too unwell to complete laryngeal/pharyngeal exercises.  Will follow up x1.  No needs voiced at end of session.  Call bell within reach.  Clarified plan for nutrition/PO  w/ RN and physician.          02/27/24 at 9:30 AM - Aarti Alford, SLP

## 2024-02-27 NOTE — NURSING NOTE
Nasoenteral Feeding Tube Insertion    RN paged for nutritional support. RN came to bedside and inserted a nasoenteric feeding tube utilizing [employer] technology. 12fr feeding tube placed in the left nare at 70cms. Guidewire was removed after insertion. Feeding tube secured by tape due to patient receiving PEG tube soon. Patient tolerated procedure without complications. Abdominal x ray ordered by team to verify placement. Primary team to interpret x ray results and to place communication for the okay to use. Bedside nurse aware. If tube needs to be re-positioned please reach out via [employer] Secure Chat or re-page nutritional support.    Resee Alonso RN

## 2024-02-27 NOTE — PROGRESS NOTES
[facility]  [facility]  CONSULT FOLLOW-UP     Reason For Consult  PEG tube assessment     SUBJECTIVE     No events over night.     EXAM     Last Recorded Vitals  Blood pressure (!) 160/93, pulse 76, temperature 35.8 °C (96.4 °F), temperature source Temporal, resp. rate 16, SpO2 99 %.      Intake/Output Summary (Last 24 hours) at 2/27/2024 1036  Last data filed at 2/27/2024 0716  Gross per 24 hour   Intake 1460 ml   Output 600 ml   Net 860 ml       Physical Exam   GENERAL: Chronically ill.  NEUROLOGIC: A and O x 3. Cranial nerves 2 through 12 grossly intact. Intact motor and sensory systems.  HEENT: Pupils are equal, round, and reactive to light and accommodation. Extraocular motion intact. CARDIAC: S1 + S2, RRR, no M/R/G.    LUNGS: CTA BL. No wheezes or coarse breath sounds. Symmetric respirations. No increased work of breathing.   ABDOMEN: Soft, non-tender to palpation. No rebound or guarding.  PSYCH: Appropriate mood and behavior.    OBJECTIVE                                                                              Medications             Current Facility-Administered Medications:     acetaminophen (Tylenol) oral liquid 1,000 mg, 1,000 mg, oral, q6h, Mary Corbett MD, 1,000 mg at 02/27/24 0514    alteplase (Cathflo Activase) injection 1 mg, 1 mg, intra-catheter, PRN, Isaac Bledsoe MD    ascorbic acid (Vitamin C) 500 mg in dextrose 5 % in water (D5W) 100 mL IV, 500 mg, intravenous, Daily, Mary Corbett MD, Stopped at 02/27/24 0915    atorvastatin (Lipitor) tablet 20 mg, 20 mg, oral, Daily, Justino Cooper MD, 20 mg at 02/27/24 0811    buPROPion SR (Wellbutrin SR) 12 hr tablet 150 mg, 150 mg, oral, BID, Justino Cooper MD, 150 mg at 02/27/24 0811    cholecalciferol (Vitamin D-3) tablet 1,000 Units, 1,000 Units, oral, BID, Justino Cooper MD, 1,000 Units at 02/27/24 0811    dapagliflozin propanediol (Farxiga) tablet 10 mg, 10 mg, oral, Daily, Justino Cooper MD, 10 mg  at 02/27/24 0811    dextrose 5%-0.45 % sodium chloride infusion, 50 mL/hr, intravenous, Continuous, Mary Corebtt MD, Last Rate: 50 mL/hr at 02/26/24 0600, 50 mL/hr at 02/26/24 0600    DULoxetine (Cymbalta) DR capsule 60 mg, 60 mg, oral, Nightly, Justino Cooper MD, 60 mg at 02/26/24 2043    enoxaparin (Lovenox) syringe 40 mg, 40 mg, subcutaneous, Daily, Mary Corbett MD, 40 mg at 02/27/24 0811    fluconazole in NaCl (iso-osm) (Diflucan) IVPB 400 mg, 400 mg, intravenous, q24h, Mary Corbett MD, 400 mg at 02/26/24 1357    guaiFENesin (Robitussin) 100 mg/5 mL syrup 200 mg, 200 mg, oral, q4h PRN, Justino Cooper MD, 200 mg at 02/27/24 0524    HYDROmorphone (Dilaudid) injection 0.4 mg, 0.4 mg, intravenous, q3h PRN, Angelic Sarmiento MD    lido-diphen-Maalox 1:1:1 Magic Mouthwash, 10 mL, Swish & Spit, q4h PRN, Isaac Bledsoe MD, 10 mL at 02/25/24 1334    lidocaine (Xylocaine) 2 % jelly 1 Application, 1 Application, Topical, Once, Mary Corbett MD    [START ON 2/28/2024] losartan (Cozaar) tablet 25 mg, 25 mg, oral, Daily, Mary Corbett MD    magnesium oxide (Mag-Ox) tablet 400 mg, 400 mg, oral, Daily, Justino Cooper MD, 400 mg at 02/27/24 0811    metoprolol tartrate (Lopressor) tablet 50 mg, 50 mg, oral, BID, Justino Cooper MD, 50 mg at 02/27/24 0811    multivitamin with minerals 1 tablet, 1 tablet, oral, Daily, Justino Cooper MD, 1 tablet at 02/27/24 0811    ondansetron (Zofran) injection 4 mg, 4 mg, intravenous, q6h PRN, Mary Corbett MD, 4 mg at 02/27/24 0815    oxyCODONE (Roxicodone) immediate release tablet 2.5 mg, 2.5 mg, oral, q6h PRN, Angelic Sarmiento MD    oxyCODONE (Roxicodone) immediate release tablet 5 mg, 5 mg, oral, q4h PRN, Angelic Sarmiento MD, 5 mg at 02/26/24 1358    pantoprazole (ProtoNix) injection 40 mg, 40 mg, intravenous, Daily, Mary Corbett MD, 40 mg at 02/27/24 0812    perflutren lipid microspheres (Definity) injection 0.5-10 mL of dilution, 0.5-10 mL of dilution, intravenous, Once in imaging, Justino Cooper MD     perflutren protein A microsphere (Optison) injection 0.5 mL, 0.5 mL, intravenous, Once in imaging, Justino Cooper MD    phenoL (Chloraseptic) 1.4 % mouth/throat spray 1 spray, 1 spray, Mouth/Throat, q2h, Mary Corbett MD, 1 spray at 02/27/24 0514    spironolactone (Aldactone) tablet 12.5 mg, 12.5 mg, oral, Daily, Angelic Sarmiento MD, 12.5 mg at 02/27/24 0811    sulfur hexafluoride microsphr (Lumason) injection 24.28 mg, 2 mL, intravenous, Once in imaging, Justino Cooper MD                                                                            Labs     Results for orders placed or performed during the hospital encounter of 02/22/24 (from the past 24 hour(s))   Magnesium   Result Value Ref Range    Magnesium 2.14 1.60 - 2.40 mg/dL   Basic Metabolic Panel   Result Value Ref Range    Glucose 125 (H) 74 - 99 mg/dL    Sodium 135 (L) 136 - 145 mmol/L    Potassium 5.0 3.5 - 5.3 mmol/L    Chloride 102 98 - 107 mmol/L    Bicarbonate 25 21 - 32 mmol/L    Anion Gap 13 10 - 20 mmol/L    Urea Nitrogen 29 (H) 6 - 23 mg/dL    Creatinine 0.78 0.50 - 1.05 mg/dL    eGFR 78 >60 mL/min/1.73m*2    Calcium 9.3 8.6 - 10.6 mg/dL   CBC and Auto Differential   Result Value Ref Range    WBC 2.0 (L) 4.4 - 11.3 x10*3/uL    nRBC 2.0 (H) 0.0 - 0.0 /100 WBCs    RBC 3.32 (L) 4.00 - 5.20 x10*6/uL    Hemoglobin 10.2 (L) 12.0 - 16.0 g/dL    Hematocrit 32.0 (L) 36.0 - 46.0 %    MCV 96 80 - 100 fL    MCH 30.7 26.0 - 34.0 pg    MCHC 31.9 (L) 32.0 - 36.0 g/dL    RDW 16.8 (H) 11.5 - 14.5 %    Platelets 91 (L) 150 - 450 x10*3/uL    Neutrophils % 93.0 40.0 - 80.0 %    Immature Granulocytes %, Automated 1.0 (H) 0.0 - 0.9 %    Lymphocytes % 1.0 13.0 - 44.0 %    Monocytes % 4.0 2.0 - 10.0 %    Eosinophils % 0.0 0.0 - 6.0 %    Basophils % 1.0 0.0 - 2.0 %    Neutrophils Absolute 1.86 1.60 - 5.50 x10*3/uL    Immature Granulocytes Absolute, Automated 0.02 0.00 - 0.50 x10*3/uL    Lymphocytes Absolute 0.02 (L) 0.80 - 3.00 x10*3/uL    Monocytes Absolute 0.08 0.05 - 0.80  x10*3/uL    Eosinophils Absolute 0.00 0.00 - 0.40 x10*3/uL    Basophils Absolute 0.02 0.00 - 0.10 x10*3/uL   Morphology   Result Value Ref Range    RBC Morphology No significant RBC morphology present                                                                              Imaging           02/24/24 MBS:  Oral Phase:  WFL     Adequate bolus acceptance, formation, and transit w/ all consistencies assessed.       -Pt does note significant odynophagia (pain orally and pharyngeally) with all PO intake.     Pharyngeal Phase:  Moderate/severe deficits     Tissue protrusion within hypopharynx impacting bolus flow.  No nasal regurgitation on this assessment, but backflow of boluses observed.  Hyolaryngeal elevation/excursion impaired.  Reduced airway protection during the swallow resulting in deep penetration to the vocal cords w/ all liquid consistencies, and eventual trace aspiration of thin and mildly thick liquids.  Independent cough/throat clear, but this did not clear aspirated material. Pharyngeal stasis at the valleculae and piriforms following the swallow with all liquid consistencies.  Aspiration in min-mod amounts following the swallow with thin and mildly thick liquids as a result of residue.  Again, cough response did not clear.  Chin tuck and head turn R did not improve safety or efficiency of swallow.  Head turn L (toward side of mass) improved swallow partially, but did not entirely resolve deficits.  With most viscous consistency assessed (puree) , there was no penetration/aspiration or significant stasis.       Esophageal Phase:  No overt deficits                                                                           GI Procedures      02/2021 EGD:  Impression:     - Z-line, 39 cm from the incisors.                         - Normal esophagus.                         - 3 parallel streaks of hemorrhagic appearance and                          linear erosions mucosa in the gastric body. Biopsied.                          - Normal examined duodenum.                         - Unclear if the gastric lesions are her sole source                          of bleeding as not seen on two previous endoscopie                          over past 6 months with recurrent UGI bleeding.     ASSESSMENT / PLAN                  ASSESSMENT/PLAN:     Amita Todd is a 78 y.o. 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      GI is consulted for PEG tube assessment.    Last dose of Plavix was on the 2/25/24. We will plan for PEG tube placement on the 03/01/2024 after Plavix washout.      Recommendations:  - Hold Plavix for 5 day prior to PEG tube placement.  - Tentatively plan for PEG placement on the 03/01/2024  - NPO at MN day prior to procedure   - please hold all heparin products (including prophylaxis) at 4AM day prior to procedure  - AM INR and CBC for the 03/01/2024  - surgical prophylaxis of cefazolin 1 g IV 30 minutes prior to surgical incision (for nonobese patients weighing <120 kg)     The above recommendations were communicated to the [facility] team.     Patient was seen and discussed with Dr. Turner.     Gastroenterology will continue to follow.  During weekday hours of 7am-5pm please do not hesitate to contact me on [employer] Chat or page [phone], if there are any further questions between the weekday hours of 7am-5pm.   After hours, on weekends, and on holidays, please [place] [phone]. Thank you.     Angelica Montalvo MD  PGY-4 Gastroenterology and Hepatology Fellow  Digestive  [facility]

## 2024-02-27 NOTE — PROGRESS NOTES
"SUPPORTIVE AND PALLIATIVE ONCOLOGY INPATIENT FOLLOW-UP      SERVICE DATE: 24     SUBJECTIVE:  Interval Events:  Pt reports having a \"rough night\" with nausea and dry heaves.  New oxygen requirement, pt reports shortness of breath.  New confusion and drowsiness.     Continues with nausea 2 hours after receiving zofran.     Pain Assessment:  Location: Bilateral throat  Duration: Constant  Characteristics:   Ratin   Descriptors: sharp and burning   Aggravating:  eating, swallowing      Relieving: Analgesics see EMR   Interference with Function: Somewhat    Opioid Use  Past 24 h prn opioid use: Oxycodone 5 mg PO x 1 doses = 5 mg = 6.25 OME  Hydromorphone 0.2 mg IV x 1 doses = 0.2 mg = 2.5 OME  Total 24h OME use:  8.75    Note: OME calculations based on equianalgesic table below. Please note this table is based on best available evidence but conversions are still approximate. These are NOT opioid DOSES for individual patient use; this is equivalency information.  Drug Parenteral Enteral   Morphine 10 25   Oxycodone N/A 20   Hydromorphone 2 5   Fentanyl 0.15 N/A   Tramadol N/A 120   Citation: Rajendra RENDON. Demystifying opioid conversion calculations: A guide for effective dosing, Second edition. [place]: [employer]System Pharmacists, 2018.    Symptom Assessment:  Nausea somewhat  Shortness of breath a little  Lack of energy very much     Information obtained from: chart review, interview of patient, interview of family, discussion with RN, and discussion with primary team  ______________________________________________________________________        OBJECTIVE:    Lab Results   Component Value Date    WBC 2.0 (L) 2024    HGB 10.2 (L) 2024    HCT 32.0 (L) 2024    MCV 96 2024    PLT 91 (L) 2024      Lab Results   Component Value Date    GLUCOSE 125 (H) 2024    CALCIUM 9.3 2024     (L) 2024    K 5.0 2024    CO2 2024     102 " 02/27/2024    BUN 29 (H) 02/27/2024    CREATININE 0.78 02/27/2024     Lab Results   Component Value Date    ALT 18 02/25/2024    AST 13 02/25/2024    ALKPHOS 50 02/25/2024    BILITOT 0.6 02/25/2024     Estimated Creatinine Clearance: 59.2 mL/min (by C-G formula based on SCr of 0.78 mg/dL).     Scheduled medications  acetaminophen, 1,000 mg, oral, q6h  ascorbic acid, 500 mg, intravenous, Daily  atorvastatin, 20 mg, oral, Daily  buPROPion SR, 150 mg, oral, BID  cholecalciferol, 1,000 Units, oral, BID  dapagliflozin propanediol, 10 mg, oral, Daily  DULoxetine, 60 mg, oral, Nightly  enoxaparin, 40 mg, subcutaneous, Daily  fluconazole, 400 mg, intravenous, q24h  lidocaine, 1 Application, Topical, Once  losartan, 12.5 mg, oral, Daily  magnesium oxide, 400 mg, oral, Daily  metoprolol tartrate, 50 mg, oral, BID  multivitamin with minerals, 1 tablet, oral, Daily  pantoprazole, 40 mg, intravenous, Daily  perflutren lipid microspheres, 0.5-10 mL of dilution, intravenous, Once in imaging  perflutren protein A microsphere, 0.5 mL, intravenous, Once in imaging  phenoL, 1 spray, Mouth/Throat, q2h  spironolactone, 12.5 mg, oral, Daily  sulfur hexafluoride microsphr, 2 mL, intravenous, Once in imaging      Continuous medications  dextrose 5%-0.45 % sodium chloride, 50 mL/hr, Last Rate: 50 mL/hr (02/26/24 0600)      PRN medications  PRN medications: alteplase, guaiFENesin, HYDROmorphone, lidocaine-diphenhydraMINE-Maalox 1:1:1, ondansetron, oxyCODONE, oxyCODONE   }     PHYSICAL EXAMINATION:    Vital Signs:   Vital signs reviewed  Visit Vitals  BP (!) 160/93 (BP Location: Right arm, Patient Position: Lying)   Pulse 76   Temp 35.8 °C (96.4 °F) (Temporal)   Resp 16        Pain Score: 0 - No pain       Physical Exam  Vitals reviewed.   Constitutional:       Appearance: She is ill-appearing.   HENT:      Mouth/Throat:      Mouth: Mucous membranes are dry.   Cardiovascular:      Rate and Rhythm: Normal rate.   Pulmonary:      Effort: No  "respiratory distress.      Comments: NC  Skin:     Findings: Erythema present.   Neurological:      Mental Status: She is disoriented.   Psychiatric:         Attention and Perception: She is inattentive.         Speech: Speech is slurred.         Behavior: Behavior is slowed.        ASSESSMENT/PLAN:  Amita Todd is a 78 y.o. female diagnosed with SCC of hypopharynx currently undergoing concurrent CRT (weekly Taxol, Carbo, 35 fx RT). PMH significant for breast cancer s/p lumpectomy, RT, tamoxifen, oral cavity cancer 2008, GERD, HTN, CAD s/p PCI, afib/flutter, HFpEF, TIA, anxiety/depression, AUD. Admitted 2/22/2024 for further evaluation and management of hypotension. Course complicated by radiation mucositis/esophagitis, candidiasis, dysphagia now NPO. Supportive and Palliative Oncology is consulted for pain management and Introduction to Supportive and Palliative Oncology Services.      Pain:  Mouth and throat pain related to radiation mucositis/candidiasis  Pain is: cancer related pain  Type: somatic  Pain control: well-controlled  Home regimen:  Oxycodone 5 mg prn  Intolerances/previously tried: denies   Personalized pain goal: 3  Total OME usage for the past 24 hours:  8.75  Discontinue oxycodone New confusion, nausea possibly related to oxycodone (listed as intolerance with nausea) vs infectious process (new SOB, O2 requirement, aspiration risk)  Start Hydromorphone 1 mg PO/FT q3h prn moderate to severe pain  Continue Hydromorphone 0.4 mg IV q3h prn breakthrough pain, add \"inability to take PO\" as a condition  Continue BMX  Start frequent oral rinses, pt given saline to swish and spit frequently throughout the day  Thrush should improve with treatment and WBC count recovery  Pt has 11 additional fractions of RT to complete, expect radiation mucositis to worsen and need opioids for management  Continue to monitor pain scores and administer PRN medications as appropriate  Continue/initiate " nonpharmacologic pain management strategies including ice/heat therapy, distraction techniques, deep breathing/relaxation techniques, calming music, and repositioning  Continue to monitor for signs of opioid efficacy (pain scores, improved functionality) and toxicity (pinpoint pupils, excess sedation/drowsiness/confusion, respiratory depression, etc.)     Nausea:  At risk for nausea with vomiting related to opioids, thickened oral secretions with gagging  Nausea overnight  Continue Ondansetron 4 mg IV q6h prn nausea first line   Start compazine 10 mg IV q6h prn nausea second line  Stop oxycodone as above     Constipation  At risk for constipation related to opioids, currently not constipated  Usual bowel pattern: every 2-3 days and irregular with frequent constipation  Home regimen: Docusate prn  LBM  large  Monitor BM frequency, adjust regimen as needed  Goal to have BM without straining q48-72h  Start senna 2 tabs BID once has enteral access  Start miralax 17 g daily once has enteral access      Altered Mood:  Chronic anxiety and depression  controlled with home regimen   Home regimen: Duloxetine DR 60 mg and Wellbutrin 150 mg BID  Pt with dysphagia plan for Corpak and PEG, Wellbutrin XL and Duloxetine cannot be crushed will need to be rotated to Buproprion immediae release 150 mg BID and venlafaxine 75 mg FT BID     Decreased oral intake  Decreased intake related to taste changes, disease process, and dysphagia  Nutrition consult  Weight loss yes  NPO due to dysphagia  Plan for short term Corpak for enteral access, followed by PEG later this week     Medical Decision Making/Goals of Care/Advance Care Plannin2024  Patient's current clinical condition, including diagnosis, prognosis, and management plan, and goals of care were discussed.   Life limiting disease:  cancer  Family: Supportive , children, siblings  Performance status: Minor limitations due to disease process  Joys/meaning/strength:  Family  Understanding of health: Demonstrates good prognostic understanding of disease process, understands plan for PEG placement  Information:Wants full disclosure  Medical update:Clarified patient's NPO status due to aspiration risk, plan for PEG tube  Goals: symptom control and be able to eat again, she does not want her chewing and swallowing muscles to atrophy  Worries and fears now and future:  inability to eat, loss of swallowing strength       Advance Directives  Existence of Advance Directives:Yes, but NOT documented in medical record  Decision maker: Surrogate decision maker is  Smith  Code Status: Full code     Supportive and Palliative Oncology encounter:  Spoke with patient at bedside  Emotional support provided  Coordination of care      Disposition:  Please  start the process of having prior authorization with meds to beds deliver medications to patient prior to discharge via [facility]. Prescriptions will need to be sent 48-72 hours prior to discharge so that a prior authorization can be completed.      Discharge date: unknown pending acute issues  Will assess if patient needs an appointment with Outpatient Supportive Oncology as appropriate, will likely need given 2 additional weeks of RT left with risk for worsening pain.      Signature and billing:  Thank you for allowing us to participate in the care of this patient. Recommendations will be communicated back to the consulting service by way of shared electronic medical record or face-to-face.    Medical complexity was high level due to due to complexity of problems, extensive data review, and high risk of management/treatment.    I spent 50 minutes in the care of this patient which included chart review, interviewing patient/family, discussion with primary team, coordination of care, and documentation.    Data:   Diagnostic tests and information reviewed for today's visit:  Conversation with primary team, Conversation with RN,  Most recent labs and imaging results, Medications       Some elements copied from my note on 2/26/2024, the elements have been updated and all reflect current decision making from today, 02/27/24       Plan of Care discussed with: Provider, RN, Patient and Family/Significant Other: sisters    Thank you for asking Supportive and Palliative Oncology to assist with care of this patient.  We will continue to follow  Please contact us for additional questions or concerns.      SIGNATURE: Julia Schmidt, APRN-CNP, DNP   PAGER/CONTACT:  Contact information:  Supportive and Palliative Oncology  Monday-Friday 8 AM-5 PM  Epic Secure chat or pager [phone].  After hours and weekends:  pager [phone]

## 2024-02-28 ENCOUNTER — APPOINTMENT (OUTPATIENT)
Dept: HEMATOLOGY/ONCOLOGY | Facility: HOSPITAL | Age: 79
End: 2024-02-28
Payer: MEDICARE

## 2024-02-28 ENCOUNTER — HOSPITAL ENCOUNTER (OUTPATIENT)
Dept: RADIATION ONCOLOGY | Facility: HOSPITAL | Age: 79
Setting detail: RADIATION/ONCOLOGY SERIES
Discharge: HOME | End: 2024-02-28
Payer: MEDICARE

## 2024-02-28 DIAGNOSIS — C13.8 MALIGNANT NEOPLASM OF OVERLAPPING SITES OF HYPOPHARYNX (MULTI): ICD-10-CM

## 2024-02-28 DIAGNOSIS — Z51.0 ENCOUNTER FOR ANTINEOPLASTIC RADIATION THERAPY: ICD-10-CM

## 2024-02-28 LAB
ALBUMIN SERPL BCP-MCNC: 2.6 G/DL (ref 3.4–5)
ANION GAP SERPL CALC-SCNC: 12 MMOL/L (ref 10–20)
BASOPHILS # BLD MANUAL: 0 X10*3/UL (ref 0–0.1)
BASOPHILS NFR BLD MANUAL: 0 %
BUN SERPL-MCNC: 22 MG/DL (ref 6–23)
BURR CELLS BLD QL SMEAR: ABNORMAL
CALCIUM SERPL-MCNC: 9 MG/DL (ref 8.6–10.6)
CHLORIDE SERPL-SCNC: 101 MMOL/L (ref 98–107)
CO2 SERPL-SCNC: 27 MMOL/L (ref 21–32)
CREAT SERPL-MCNC: 0.64 MG/DL (ref 0.5–1.05)
EGFRCR SERPLBLD CKD-EPI 2021: >90 ML/MIN/1.73M*2
EOSINOPHIL # BLD MANUAL: 0 X10*3/UL (ref 0–0.4)
EOSINOPHIL NFR BLD MANUAL: 0 %
ERYTHROCYTE [DISTWIDTH] IN BLOOD BY AUTOMATED COUNT: 17 % (ref 11.5–14.5)
ERYTHROCYTE [DISTWIDTH] IN BLOOD BY AUTOMATED COUNT: 17 % (ref 11.5–14.5)
GLUCOSE SERPL-MCNC: 86 MG/DL (ref 74–99)
HCT VFR BLD AUTO: 30.4 % (ref 36–46)
HCT VFR BLD AUTO: 30.4 % (ref 36–46)
HGB BLD-MCNC: 9.5 G/DL (ref 12–16)
HGB BLD-MCNC: 9.5 G/DL (ref 12–16)
IMM GRANULOCYTES # BLD AUTO: 0.01 X10*3/UL (ref 0–0.5)
IMM GRANULOCYTES NFR BLD AUTO: 0.5 % (ref 0–0.9)
LYMPHOCYTES # BLD MANUAL: 0.03 X10*3/UL (ref 0.8–3)
LYMPHOCYTES NFR BLD MANUAL: 1.4 %
MAGNESIUM SERPL-MCNC: 1.99 MG/DL (ref 1.6–2.4)
MCH RBC QN AUTO: 30.5 PG (ref 26–34)
MCH RBC QN AUTO: 30.5 PG (ref 26–34)
MCHC RBC AUTO-ENTMCNC: 31.3 G/DL (ref 32–36)
MCHC RBC AUTO-ENTMCNC: 31.3 G/DL (ref 32–36)
MCV RBC AUTO: 98 FL (ref 80–100)
MCV RBC AUTO: 98 FL (ref 80–100)
MONOCYTES # BLD MANUAL: 0 X10*3/UL (ref 0.05–0.8)
MONOCYTES NFR BLD MANUAL: 0 %
MYELOCYTES # BLD MANUAL: 0.01 X10*3/UL
MYELOCYTES NFR BLD MANUAL: 0.7 %
NEUTROPHILS # BLD MANUAL: 1.96 X10*3/UL (ref 1.6–5.5)
NEUTS BAND # BLD MANUAL: 0.31 X10*3/UL (ref 0–0.5)
NEUTS BAND NFR BLD MANUAL: 15.6 %
NEUTS SEG # BLD MANUAL: 1.65 X10*3/UL (ref 1.6–5)
NEUTS SEG NFR BLD MANUAL: 82.3 %
NRBC BLD-RTO: 1.5 /100 WBCS (ref 0–0)
NRBC BLD-RTO: 1.5 /100 WBCS (ref 0–0)
OVALOCYTES BLD QL SMEAR: ABNORMAL
PHOSPHATE SERPL-MCNC: 2.9 MG/DL (ref 2.5–4.9)
PLATELET # BLD AUTO: 78 X10*3/UL (ref 150–450)
PLATELET # BLD AUTO: 78 X10*3/UL (ref 150–450)
POTASSIUM SERPL-SCNC: 4.3 MMOL/L (ref 3.5–5.3)
RAD ONC MSQ ACTUAL FRACTIONS DELIVERED: 26
RAD ONC MSQ ACTUAL SESSION DELIVERED DOSE: 200 CGRAY
RAD ONC MSQ ACTUAL TOTAL DOSE: 5200 CGRAY
RAD ONC MSQ ELAPSED DAYS: 35
RAD ONC MSQ LAST DATE: NORMAL
RAD ONC MSQ PRESCRIBED FRACTIONAL DOSE: 200 CGRAY
RAD ONC MSQ PRESCRIBED NUMBER OF FRACTIONS: 35
RAD ONC MSQ PRESCRIBED TECHNIQUE: NORMAL
RAD ONC MSQ PRESCRIBED TOTAL DOSE: 7000 CGRAY
RAD ONC MSQ PRESCRIPTION PATTERN COMMENT: NORMAL
RAD ONC MSQ START DATE: NORMAL
RAD ONC MSQ TREATMENT COURSE NUMBER: 1
RAD ONC MSQ TREATMENT SITE: NORMAL
RBC # BLD AUTO: 3.11 X10*6/UL (ref 4–5.2)
RBC # BLD AUTO: 3.11 X10*6/UL (ref 4–5.2)
RBC MORPH BLD: ABNORMAL
SODIUM SERPL-SCNC: 136 MMOL/L (ref 136–145)
TOTAL CELLS COUNTED BLD: 147
WBC # BLD AUTO: 2 X10*3/UL (ref 4.4–11.3)
WBC # BLD AUTO: 2 X10*3/UL (ref 4.4–11.3)

## 2024-02-28 PROCEDURE — 77386 HC INTENSITY-MODULATED RADIATION THERAPY (IMRT), COMPLEX: CPT | Performed by: RADIOLOGY

## 2024-02-28 PROCEDURE — 2500000001 HC RX 250 WO HCPCS SELF ADMINISTERED DRUGS (ALT 637 FOR MEDICARE OP): Performed by: STUDENT IN AN ORGANIZED HEALTH CARE EDUCATION/TRAINING PROGRAM

## 2024-02-28 PROCEDURE — 83735 ASSAY OF MAGNESIUM: CPT

## 2024-02-28 PROCEDURE — 80069 RENAL FUNCTION PANEL: CPT

## 2024-02-28 PROCEDURE — 2500000004 HC RX 250 GENERAL PHARMACY W/ HCPCS (ALT 636 FOR OP/ED)

## 2024-02-28 PROCEDURE — 77336 RADIATION PHYSICS CONSULT: CPT | Performed by: RADIOLOGY

## 2024-02-28 PROCEDURE — 2500000001 HC RX 250 WO HCPCS SELF ADMINISTERED DRUGS (ALT 637 FOR MEDICARE OP)

## 2024-02-28 PROCEDURE — 2500000004 HC RX 250 GENERAL PHARMACY W/ HCPCS (ALT 636 FOR OP/ED): Mod: JW

## 2024-02-28 PROCEDURE — 99233 SBSQ HOSP IP/OBS HIGH 50: CPT

## 2024-02-28 PROCEDURE — 85007 BL SMEAR W/DIFF WBC COUNT: CPT

## 2024-02-28 PROCEDURE — 3E0G76Z INTRODUCTION OF NUTRITIONAL SUBSTANCE INTO UPPER GI, VIA NATURAL OR ARTIFICIAL OPENING: ICD-10-PCS | Performed by: EMERGENCY MEDICINE

## 2024-02-28 PROCEDURE — 99232 SBSQ HOSP IP/OBS MODERATE 35: CPT | Performed by: STUDENT IN AN ORGANIZED HEALTH CARE EDUCATION/TRAINING PROGRAM

## 2024-02-28 PROCEDURE — 2500000002 HC RX 250 W HCPCS SELF ADMINISTERED DRUGS (ALT 637 FOR MEDICARE OP, ALT 636 FOR OP/ED)

## 2024-02-28 PROCEDURE — 85027 COMPLETE CBC AUTOMATED: CPT

## 2024-02-28 PROCEDURE — 2500000004 HC RX 250 GENERAL PHARMACY W/ HCPCS (ALT 636 FOR OP/ED): Performed by: STUDENT IN AN ORGANIZED HEALTH CARE EDUCATION/TRAINING PROGRAM

## 2024-02-28 PROCEDURE — 97116 GAIT TRAINING THERAPY: CPT | Mod: GP | Performed by: PHYSICAL THERAPIST

## 2024-02-28 PROCEDURE — 84100 ASSAY OF PHOSPHORUS: CPT

## 2024-02-28 PROCEDURE — 77014 CHG CT GUIDANCE RADIATION THERAPY FLDS PLACEMENT: CPT | Performed by: RADIOLOGY

## 2024-02-28 PROCEDURE — 1170000001 HC PRIVATE ONCOLOGY ROOM DAILY

## 2024-02-28 PROCEDURE — 97530 THERAPEUTIC ACTIVITIES: CPT | Mod: GP | Performed by: PHYSICAL THERAPIST

## 2024-02-28 RX ORDER — SPIRONOLACTONE 25 MG/1
12.5 TABLET ORAL DAILY
Status: DISCONTINUED | OUTPATIENT
Start: 2024-02-29 | End: 2024-03-14

## 2024-02-28 RX ORDER — FUROSEMIDE 10 MG/ML
40 INJECTION INTRAMUSCULAR; INTRAVENOUS ONCE
Status: COMPLETED | OUTPATIENT
Start: 2024-02-28 | End: 2024-02-28

## 2024-02-28 RX ORDER — VENLAFAXINE 75 MG/1
75 TABLET ORAL
Status: DISCONTINUED | OUTPATIENT
Start: 2024-02-28 | End: 2024-03-14

## 2024-02-28 RX ORDER — ASCORBIC ACID 500 MG
500 TABLET ORAL DAILY
Status: DISCONTINUED | OUTPATIENT
Start: 2024-02-28 | End: 2024-03-12

## 2024-02-28 RX ORDER — ALBUTEROL SULFATE 0.83 MG/ML
3 SOLUTION RESPIRATORY (INHALATION) AS NEEDED
Status: COMPLETED | OUTPATIENT
Start: 2024-02-28 | End: 2024-03-04

## 2024-02-28 RX ORDER — BUPROPION HYDROCHLORIDE 75 MG/1
150 TABLET ORAL 2 TIMES DAILY
Status: DISCONTINUED | OUTPATIENT
Start: 2024-02-28 | End: 2024-03-14

## 2024-02-28 RX ORDER — HYDROMORPHONE HYDROCHLORIDE 2 MG/1
1 TABLET ORAL
Status: DISCONTINUED | OUTPATIENT
Start: 2024-02-28 | End: 2024-03-07 | Stop reason: SINTOL

## 2024-02-28 RX ORDER — PROCHLORPERAZINE EDISYLATE 5 MG/ML
10 INJECTION INTRAMUSCULAR; INTRAVENOUS EVERY 6 HOURS PRN
Status: DISCONTINUED | OUTPATIENT
Start: 2024-02-28 | End: 2024-03-07 | Stop reason: ALTCHOICE

## 2024-02-28 RX ORDER — DIPHENHYDRAMINE HCL 50 MG
50 CAPSULE ORAL ONCE
Status: COMPLETED | OUTPATIENT
Start: 2024-02-28 | End: 2024-02-28

## 2024-02-28 RX ORDER — HYDROMORPHONE HYDROCHLORIDE 1 MG/ML
0.4 INJECTION, SOLUTION INTRAMUSCULAR; INTRAVENOUS; SUBCUTANEOUS
Status: DISCONTINUED | OUTPATIENT
Start: 2024-02-28 | End: 2024-03-07 | Stop reason: SINTOL

## 2024-02-28 RX ORDER — FAMOTIDINE 10 MG/ML
20 INJECTION INTRAVENOUS ONCE AS NEEDED
Status: DISCONTINUED | OUTPATIENT
Start: 2024-02-28 | End: 2024-03-07 | Stop reason: ALTCHOICE

## 2024-02-28 RX ORDER — DEXAMETHASONE SODIUM PHOSPHATE 4 MG/ML
10 INJECTION, SOLUTION INTRA-ARTICULAR; INTRALESIONAL; INTRAMUSCULAR; INTRAVENOUS; SOFT TISSUE ONCE
Status: COMPLETED | OUTPATIENT
Start: 2024-02-28 | End: 2024-02-28

## 2024-02-28 RX ORDER — METOPROLOL TARTRATE 50 MG/1
50 TABLET ORAL 2 TIMES DAILY
Status: DISCONTINUED | OUTPATIENT
Start: 2024-02-28 | End: 2024-03-14

## 2024-02-28 RX ORDER — DIPHENHYDRAMINE HYDROCHLORIDE 50 MG/ML
50 INJECTION INTRAMUSCULAR; INTRAVENOUS AS NEEDED
Status: DISCONTINUED | OUTPATIENT
Start: 2024-02-28 | End: 2024-03-07 | Stop reason: ALTCHOICE

## 2024-02-28 RX ORDER — THIAMINE HYDROCHLORIDE 100 MG/ML
100 INJECTION, SOLUTION INTRAMUSCULAR; INTRAVENOUS DAILY
Status: DISCONTINUED | OUTPATIENT
Start: 2024-02-28 | End: 2024-02-29

## 2024-02-28 RX ORDER — GUAIFENESIN 100 MG/5ML
200 SOLUTION ORAL EVERY 4 HOURS PRN
Status: DISCONTINUED | OUTPATIENT
Start: 2024-02-28 | End: 2024-03-14

## 2024-02-28 RX ORDER — PROCHLORPERAZINE MALEATE 10 MG
10 TABLET ORAL EVERY 6 HOURS PRN
Status: DISCONTINUED | OUTPATIENT
Start: 2024-02-28 | End: 2024-03-07 | Stop reason: ALTCHOICE

## 2024-02-28 RX ORDER — ATORVASTATIN CALCIUM 20 MG/1
20 TABLET, FILM COATED ORAL DAILY
Status: DISCONTINUED | OUTPATIENT
Start: 2024-02-29 | End: 2024-03-14

## 2024-02-28 RX ORDER — PALONOSETRON 0.05 MG/ML
0.25 INJECTION, SOLUTION INTRAVENOUS ONCE
Status: COMPLETED | OUTPATIENT
Start: 2024-02-28 | End: 2024-02-28

## 2024-02-28 RX ORDER — SENNOSIDES 8.8 MG/5ML
10 LIQUID ORAL 2 TIMES DAILY
Status: DISCONTINUED | OUTPATIENT
Start: 2024-02-28 | End: 2024-03-14

## 2024-02-28 RX ORDER — EPINEPHRINE 1 MG/ML
0.3 INJECTION, SOLUTION, CONCENTRATE INTRAVENOUS EVERY 5 MIN PRN
Status: DISCONTINUED | OUTPATIENT
Start: 2024-02-28 | End: 2024-03-07 | Stop reason: ALTCHOICE

## 2024-02-28 RX ORDER — ESOMEPRAZOLE MAGNESIUM 40 MG/1
40 GRANULE, DELAYED RELEASE ORAL
Status: DISCONTINUED | OUTPATIENT
Start: 2024-02-28 | End: 2024-03-14

## 2024-02-28 RX ORDER — CHOLECALCIFEROL (VITAMIN D3) 25 MCG
1000 TABLET ORAL 2 TIMES DAILY
Status: DISCONTINUED | OUTPATIENT
Start: 2024-02-28 | End: 2024-03-14

## 2024-02-28 RX ORDER — LANOLIN ALCOHOL/MO/W.PET/CERES
400 CREAM (GRAM) TOPICAL DAILY
Status: DISCONTINUED | OUTPATIENT
Start: 2024-02-29 | End: 2024-03-14 | Stop reason: ALTCHOICE

## 2024-02-28 RX ORDER — FAMOTIDINE 10 MG/ML
20 INJECTION INTRAVENOUS ONCE
Status: COMPLETED | OUTPATIENT
Start: 2024-02-28 | End: 2024-02-28

## 2024-02-28 RX ORDER — ACETAMINOPHEN 160 MG/5ML
975 SOLUTION ORAL EVERY 6 HOURS
Status: DISCONTINUED | OUTPATIENT
Start: 2024-02-28 | End: 2024-03-14

## 2024-02-28 RX ORDER — LOSARTAN POTASSIUM 25 MG/1
25 TABLET ORAL DAILY
Status: DISCONTINUED | OUTPATIENT
Start: 2024-02-29 | End: 2024-03-14

## 2024-02-28 RX ORDER — MULTIVIT-MIN/FERROUS GLUCONATE 9 MG/15 ML
15 LIQUID (ML) ORAL DAILY
Status: DISCONTINUED | OUTPATIENT
Start: 2024-02-29 | End: 2024-03-12

## 2024-02-28 RX ADMIN — PACLITAXEL 81 MG: 6 INJECTION, SOLUTION INTRAVENOUS at 22:13

## 2024-02-28 RX ADMIN — ESOMEPRAZOLE MAGNESIUM 40 MG: 40 FOR SUSPENSION ORAL at 08:48

## 2024-02-28 RX ADMIN — METOPROLOL TARTRATE 50 MG: 50 TABLET, FILM COATED ORAL at 20:38

## 2024-02-28 RX ADMIN — LOSARTAN POTASSIUM 25 MG: 25 TABLET, FILM COATED ORAL at 08:46

## 2024-02-28 RX ADMIN — ACETAMINOPHEN 1000 MG: 650 SOLUTION ORAL at 11:02

## 2024-02-28 RX ADMIN — HYDROMORPHONE HYDROCHLORIDE 1 MG: 2 TABLET ORAL at 20:49

## 2024-02-28 RX ADMIN — DAPAGLIFLOZIN 10 MG: 10 TABLET, FILM COATED ORAL at 08:45

## 2024-02-28 RX ADMIN — ALTEPLASE 1 MG: 2.2 INJECTION, POWDER, LYOPHILIZED, FOR SOLUTION INTRAVENOUS at 22:25

## 2024-02-28 RX ADMIN — Medication 400 MG: at 08:46

## 2024-02-28 RX ADMIN — Medication 1 TABLET: at 08:46

## 2024-02-28 RX ADMIN — PALONOSETRON HYDROCHLORIDE 250 MCG: 0.25 INJECTION INTRAVENOUS at 21:48

## 2024-02-28 RX ADMIN — METOPROLOL TARTRATE 50 MG: 50 TABLET, FILM COATED ORAL at 08:46

## 2024-02-28 RX ADMIN — DIPHENHYDRAMINE HYDROCHLORIDE 50 MG: 50 CAPSULE ORAL at 21:49

## 2024-02-28 RX ADMIN — CARBOPLATIN 181 MG: 10 INJECTION, SOLUTION INTRAVENOUS at 23:23

## 2024-02-28 RX ADMIN — VENLAFAXINE 75 MG: 75 TABLET ORAL at 11:02

## 2024-02-28 RX ADMIN — ATORVASTATIN CALCIUM 20 MG: 20 TABLET, FILM COATED ORAL at 08:46

## 2024-02-28 RX ADMIN — PHENOL 1 SPRAY: 1.4 SPRAY ORAL at 09:09

## 2024-02-28 RX ADMIN — Medication 1000 UNITS: at 20:38

## 2024-02-28 RX ADMIN — PHENOL 1 SPRAY: 1.4 SPRAY ORAL at 11:33

## 2024-02-28 RX ADMIN — OXYCODONE HYDROCHLORIDE AND ACETAMINOPHEN 500 MG: 500 TABLET ORAL at 08:46

## 2024-02-28 RX ADMIN — BUPROPION HYDROCHLORIDE 100 MG: 100 TABLET, FILM COATED ORAL at 08:45

## 2024-02-28 RX ADMIN — Medication 1000 UNITS: at 08:46

## 2024-02-28 RX ADMIN — VENLAFAXINE 75 MG: 75 TABLET ORAL at 17:55

## 2024-02-28 RX ADMIN — ENOXAPARIN SODIUM 40 MG: 100 INJECTION SUBCUTANEOUS at 08:45

## 2024-02-28 RX ADMIN — DEXAMETHASONE SODIUM PHOSPHATE 10 MG: 4 INJECTION INTRA-ARTICULAR; INTRALESIONAL; INTRAMUSCULAR; INTRAVENOUS; SOFT TISSUE at 21:48

## 2024-02-28 RX ADMIN — BUPROPION HYDROCHLORIDE 150 MG: 100 TABLET, FILM COATED ORAL at 20:40

## 2024-02-28 RX ADMIN — SPIRONOLACTONE 12.5 MG: 25 TABLET, FILM COATED ORAL at 08:46

## 2024-02-28 RX ADMIN — FUROSEMIDE 40 MG: 10 INJECTION, SOLUTION INTRAMUSCULAR; INTRAVENOUS at 11:02

## 2024-02-28 RX ADMIN — HYDROMORPHONE HYDROCHLORIDE 1 MG: 2 TABLET ORAL at 08:45

## 2024-02-28 RX ADMIN — ACETAMINOPHEN 1000 MG: 650 SOLUTION ORAL at 17:54

## 2024-02-28 RX ADMIN — FAMOTIDINE 20 MG: 10 INJECTION INTRAVENOUS at 21:48

## 2024-02-28 RX ADMIN — FLUCONAZOLE 400 MG: 2 INJECTION, SOLUTION INTRAVENOUS at 12:40

## 2024-02-28 RX ADMIN — PHENOL 1 SPRAY: 1.4 SPRAY ORAL at 12:49

## 2024-02-28 RX ADMIN — THIAMINE HYDROCHLORIDE 100 MG: 100 INJECTION, SOLUTION INTRAMUSCULAR; INTRAVENOUS at 20:38

## 2024-02-28 ASSESSMENT — COGNITIVE AND FUNCTIONAL STATUS - GENERAL
MOVING TO AND FROM BED TO CHAIR: A LITTLE
TURNING FROM BACK TO SIDE WHILE IN FLAT BAD: A LITTLE
CLIMB 3 TO 5 STEPS WITH RAILING: A LITTLE
TURNING FROM BACK TO SIDE WHILE IN FLAT BAD: A LOT
TOILETING: A LITTLE
MOBILITY SCORE: 14
STANDING UP FROM CHAIR USING ARMS: A LITTLE
HELP NEEDED FOR BATHING: A LITTLE
EATING MEALS: A LITTLE
MOVING TO AND FROM BED TO CHAIR: A LITTLE
PERSONAL GROOMING: A LITTLE
MOVING FROM LYING ON BACK TO SITTING ON SIDE OF FLAT BED WITH BEDRAILS: A LITTLE
DRESSING REGULAR LOWER BODY CLOTHING: A LITTLE
STANDING UP FROM CHAIR USING ARMS: A LOT
DAILY ACTIVITIY SCORE: 18
DRESSING REGULAR UPPER BODY CLOTHING: A LITTLE
MOBILITY SCORE: 18
WALKING IN HOSPITAL ROOM: A LITTLE
MOVING FROM LYING ON BACK TO SITTING ON SIDE OF FLAT BED WITH BEDRAILS: A LITTLE
WALKING IN HOSPITAL ROOM: A LITTLE
CLIMB 3 TO 5 STEPS WITH RAILING: TOTAL

## 2024-02-28 ASSESSMENT — PAIN SCALES - GENERAL
PAINLEVEL_OUTOF10: 0 - NO PAIN
PAINLEVEL_OUTOF10: 8
PAINLEVEL_OUTOF10: 5 - MODERATE PAIN
PAINLEVEL_OUTOF10: 5 - MODERATE PAIN

## 2024-02-28 ASSESSMENT — PAIN - FUNCTIONAL ASSESSMENT: PAIN_FUNCTIONAL_ASSESSMENT: 0-10

## 2024-02-28 NOTE — PROGRESS NOTES
Spiritual Care Visit      introduced self and spiritual care services to patient and patient's daughter, explaining services available and checking in to see how patient is doing today. Patient's daughter shared that the patient had been confused at times throughout the day. We spoke about this hospital stay, their hopes for chemo to work, and how tiring/overwhelming the last month has been on them.      offered a prayer for them per their request, offering prayers of healing and support.      placed a referred for the [facility]  to visit with the patient.      will continue to follow and provide support. Please reach out with any needs/concerns.     Rev. Maik Pham, Supportive Oncology

## 2024-02-28 NOTE — PROGRESS NOTES
02/28/24 1507   Discharge Planning   Living Arrangements Spouse/significant other   Support Systems Spouse/significant other;Children;Family members;Friends/neighbors   Type of Residence Private residence   Who is requesting discharge planning? Provider   Home or Post Acute Services In home services   Type of Home Care Services Home OT;Home PT;Home nursing visits;Home health aide   Patient expects to be discharged to: home   Does the patient need discharge transport arranged? No     2/28/24 @ 1505  Met with patient and her daughter Tati at bedside. Patient lives with her  in [place]. They have not used home care before and appreciate recommendations. They are fine with using [facility]. Asked team to place referral for [facility] for RN, PT/OT, HHA.  Patient is to get PEG placed on Friday. Explained to patient and daughter that we will need to get a tube feed supplier. Once the team places orders and the patient gets her PEG tube, we can get that set up for them.  Explained that they will get teaching at bedside by nurses and TF supplier before going home. Daughter Tati and her  will be learners. No other needs at this time. Will continue to follow patient during her hospital stay.  Elvi Castillo RN [facility]

## 2024-02-28 NOTE — PROGRESS NOTES
Nutrition Follow Up Assessment:   Nutrition Assessment    The patient is a 78 y.o. female who is hospital day #6.  GDMT held to help with hypotension. Pt on continuous maintenance fluids while waiting on NG placement. Tube placed on 02/27 PM. Pt with new O2 requirements - CXR showing pulm edema. Receiving chemotherapy and radiation inpatient.     PMHx: squamous cell carcinoma of hypopharynx, HFpEF, CAD s/p PCI, Afib s/p watchman, GERD, depression, HTN, AUD, ascending aortic aneurysm, distant breast cancer (s/p chemoradiotherapy and lumpectomy)     Reason for Assessment: Tube feeding recommendations (re-consult)      Nutrition History:  Energy Intake: Poor < 50 % (For at least 4 days - possibly more)  Food and Nutrient History: Pt awaiting for NGT placement from 02/24 until 02/27 PM. Minimal to no nutrition then given NPO status. Receiving maintenance fluids those days. Small bore feeding tube placed into the stomach. Placement confirmed by X-ray. No bridle in place. At time of visit TF has not yet been started - RN called kitchen to order formula. Plan for PEG placement on 03/01.  Oral Problems: Swallowing difficulty         Anthropometrics:  Start of admission anthropometrics:       IBW/kg (Dietitian Calculated): 56.8 kg  Percent of IBW: 123 %       Wt Readings from Last 10 Encounters:   02/26/24 69.7 kg (153 lb 11.2 oz)   02/22/24 69.7 kg (153 lb 11.2 oz)   02/21/24 67.2 kg (148 lb 1.6 oz)   02/20/24 67.7 kg (149 lb 4 oz)   02/16/24 69.4 kg (153 lb 1.6 oz)   02/15/24 69.8 kg (153 lb 14.4 oz)   02/14/24 70.6 kg (155 lb 10.3 oz)   02/12/24 70.3 kg (154 lb 14.4 oz)   02/08/24 69.8 kg (153 lb 12.8 oz)   02/06/24 68 kg (150 lb)       Weight Change %:  Weight History / % Weight Change: No weights obtained during this admission. Possible pt with wt loss in the past days given NPO status.    Nutrition Focused Physical Exam Findings:  defer: Refer to initial assessment 02/24     Edema:  Edema: none  Physical  Findings:  Skin:  (bruising)    Objective Data:    Last BM Date:  (pt doesn't know last BM, prior to admission)    Nutrition Significant Labs:    Results from last 7 days   Lab Units 02/28/24  0946 02/27/24  1649 02/27/24  0529   HEMOGLOBIN g/dL 9.5* 10.2* 10.2*   MCV fL 98 96 96   GLUCOSE mg/dL 86  --  125*   POTASSIUM mmol/L 4.3  --  5.0   SODIUM mmol/L 136  --  135*   PHOSPHORUS mg/dL 2.9  --   --    MAGNESIUM mg/dL 1.99  --  2.14   CREATININE mg/dL 0.64  --  0.78   BUN mg/dL 22  --  29*   ALT U/L  --   --  24   AST U/L  --   --  19   ALK PHOS U/L  --   --  56       Nutrition Specific Medications:  ascorbic acid, 500 mg, nasogastric tube, Daily  atorvastatin, 20 mg, oral, Daily  cholecalciferol, 1,000 Units, oral, BID  esomeprazole, 40 mg, nasoduodenal tube, Daily before breakfast  magnesium oxide, 400 mg, oral, Daily  multivitamin with minerals, 1 tablet, oral, Daily  senna, 10 mL, nasogastric tube, BID  spironolactone, 12.5 mg, oral, Daily    I/O:   I/O last 2 completed shifts:  In: 1460 [I.V.:1260; IV Piggyback:200]  Out: 850 [Urine:850]    Dietary Orders (From admission, onward)       Start     Ordered    02/28/24 1110  Enteral feeding with NPO Isosource 1.5; NG (nasogastric tube); 50 (Start at 20 ml/hr and increase by 10 ml/hr q12h); 200; Water; Every 6 hours  Diet effective now        Question Answer Comment   Tube feeding formula: Isosource 1.5    Feeding route: NG (nasogastric tube)    Tube feeding continuous rate (mL/hr): 50 Start at 20 ml/hr and increase by 10 ml/hr q12h   Tube feeding flush (mL): 200    Flush type: Water    Flush frequency: Every 6 hours        02/28/24 1110                     Estimated Needs:   Total Energy Estimated Needs (kCal): 1900 kCal  Total Estimated Energy Need per Day (kCal/kg): 25 kCal/kg  Method for Estimating Needs: 67.2 kg * 28 kcal/kg    Total Protein Estimated Needs (g): 90 g  Total Protein Estimated Needs (g/kg): 1.2 g/kg  Method for Estimating Needs: 67.2 kg * 1.3  g/kg               Nutrition Diagnosis   Malnutrition Diagnosis  Patient has Malnutrition Diagnosis: Yes  Diagnosis Status: Ongoing  Malnutrition Diagnosis: Moderate malnutrition related to chronic disease or condition  As Evidenced by: <75% estimated energy requirement > 1month, suspected weight loss and mild to moderate muscle atrophy            Nutrition Interventions/Recommendations   Individualized Nutrition Prescription Provided for : 1800 kcal and 85g protein via TF    - Pt is at risk for refeeding -> start 100mg IV thiamine x7 days   - For initiation of tube feeding recommend the following:   Start Isosource 1.5 at 20 ml/hr and increase by 10 ml/hr q12h until goal rate of 50 ml/hr  Check RFP +Mg ; if lytes are low then replete prior to advancing TF rate   FWF: 300 ml TID or  per team   This regimen provides: 1200 ml, 1800 kcal, 82 g protein, 917 ml (+900 ml from [facility]) free water   - Once PEG is placed and cleared for use recommend keeping pt on a continuous regimen for 24 hrs. Can restart TF at previous rate (and continue titrating up to goal rate if needed).   - After pt tolerating continuous TF at goal rate for at least 24 hrs with stable electrolytes can transition pt to a bolus regimen.   Isosource 1.5 bolus @ 310 ml x4/d (5 cartons /d)  FWF: 60 ml pre/post feeds w/ additional 200 ml BID  This regimen provides: 1240 ml, 1860 kcal, 84 g protein, and 1827 ml free water     - From a nutrition standpoint can discontinue ascorbic acid and vitamin D             Nutrition Interventions:   Interventions: Enteral intake, Vitamin supplement therapy  Enteral Intake: Other (Comment) (Start TF)  Goal: Isosource 1.5 @50 ml/hr within the next 48 hrs  Vitamin Supplement Therapy: Thiamin (100mg IV)  Goal: Prevent refeeding syndrome      Nutrition Education:   Discussed with pt and family basics of enteral nutrition and recommended TF regimen.      Nutrition Monitoring and Evaluation   Monitoring and Evaluation Plan:  Enteral and parenteral nutrition intake  Enteral and Parenteral Nutrition Intake: Enteral nutrition intake  Criteria: TF tolerance    Monitoring and Evaluation Plan: Weight  Weight: Measured weight  Criteria: Stable                 Time Spent/Follow-up Reminder:   Time Spent (min): 60 minutes  Last Date of Nutrition Visit: 02/28/24  Nutrition Follow-Up Needed?: Dietitian to reassess per policy  Follow up Comment: TF via NG; plan to place PEG 03/01

## 2024-02-28 NOTE — PROGRESS NOTES
Amita Todd is a 78 y.o. female on day 6 of admission presenting with Thrombocytopenia (CMS/HCC).    Subjective   Dobhoff placed last night. NAEO, patient still endorses some mild sob, no chest pain, abdominal pain. Discuss tx plan for her    Objective   Constitutional:       Comments: underweight   HENT:      Mouth/Throat:      Mouth: Mucous membranes are dry. improved well circumscribed plaques   Eyes:      Pupils: Pupils are equal, round, and reactive to light.   Cardiovascular:      Rate and Rhythm: Regular rhythm. Tachycardia present.      Pulses: Normal pulses.   Pulmonary:      Comments: breath sounds clear B/L, 2L NC  Abdominal:      General: Abdomen is flat. Bowel sounds are normal.      Palpations: Abdomen is soft.   Musculoskeletal:         General: Normal range of motion.      Cervical back: Normal range of motion.      Comments: 1+ pitting edema B/L LE till ankles   Skin:     General: Skin is warm.      Capillary Refill: Capillary refill takes less than 2 seconds.      Findings: Bruising present.      Comments: Petechia over trunk   Neurological:      General: No focal deficit present.      Mental Status: She is alert and oriented to person, place, and time. Mental status is at baseline.   Psychiatric:         Mood and Affect: Mood normal.     Visit Vitals  /88 (BP Location: Right arm, Patient Position: Lying)   Pulse 88   Temp 36.2 °C (97.2 °F) (Temporal)   Resp 16     Relevant Results  Lab Results   Component Value Date    CREATININE 0.64 02/28/2024    BUN 22 02/28/2024     02/28/2024    K 4.3 02/28/2024     02/28/2024    CO2 27 02/28/2024     Lab Results   Component Value Date    WBC 2.0 (L) 02/28/2024    HGB 9.5 (L) 02/28/2024    HCT 30.4 (L) 02/28/2024    MCV 98 02/28/2024    PLT 78 (L) 02/28/2024       IMPRESSION:  1. Findings suggestive of mild interstitial pulmonary edema.  2. New right infrahilar/basilar airspace opacity may represent  atelectasis, edema, or  developing infectious infiltrate.    TTE 2/27:  1. Left ventricular systolic function is mildly decreased with a 50% estimated ejection fraction.  2. Poorly visualized anatomical structures due to suboptimal image quality.  3. There is reduced right ventricular systolic function.  4. The left atrium is moderately dilated.  5. Mildly elevated RVSP.  6. Mild to moderate aortic valve regurgitation.  7. The ascending aorta is 4.4 cm (2.67 cm/m).  8. The patient is in atrial fibrillation which may influence the estimate of left ventricular function and transvalvular flows.  9. There is global hypokinesis of the left ventricle with minor regional variations.  10. There is moderate dilatation of the ascending aorta.    Assessment/Plan   77yo F w PMHx squamous cell carcinoma of hypopharynx, HFpEF, CAD s/p PCI, Afib s/p watchman, GERD, depression, HTN, AUD, ascending aortic aneurysm, distant breast cancer (s/p chemoradiotherapy and lumpectomy) who was directed to ED for hypotension. BP spontaneously improving without intervention other than holding home GDMT. Slowly restarting GDMT. Dobbhoff in place for nutrition, was on Continuous maintenance fluids in meantime. Discontinued after CXR today shows some pulm edema with new o2 requirements, 40 IV lasix, narcan for mentation (improved).      2/28 updates:  - chemo today (one day)  - TTE similar to last echo - EF about 50%  - dobhoff placed; changed buproprion 150mg BID to 100 TID; duloxetine cannot be crushed. Started trickle feeds with isosource 1.5 10ml/hr with FWF [id number]  - nutrition consult  - GI recs appreciated     #Hypotension, improved   #HFpEF, initial c/f ADHF  ::Patient presents with hypotension over the past month in oncology clinic, has received IVF and discontinued home losartan/amlodipine during this time, BP spontaneously improving without intervention today to 100s/70s, appears grossly dry to euvolemic on exam without any symptoms of acute heart failure +  positive orthostats in ED  ::BNP 2/22: 229 - appears to be at baseline  ::TTE 7/2023: EF 55-60%  - Patient likely has less PO intake and with chemo does not need as many antihyptertensives/GDMT medication dose - emailed primary cardiologist  - TTE ordered to re-evaluate heart function  - New O2 requirement of 1-2L NC. Placed continuous pulse Ox, CXR, VBG -> shows pulme maria victoria -> D/C maintance D5-1/2NS and gave 40 IV lasix.   - Continue to hold home Amlodipine 2.5mg daily  - started Aldactone 12.5mg 2/27  - on home losartan  - c/w home Metoprolol 50mg BID (for rate control)  - c/w home Farxiga 10mg daily      #SCC of Hypopharynx (Dr. Burkitt)  #Pain  ::SLP eval 2/12 recommended soft solids with thin liquids, patient would prefer oral feeding to PEG   - nutrition consult   - c/w home Guaifenesin  - c/w Zofran prn nausea, Compazien second line  - Changed pain regimen: IV Dilaudid 0.4mg Q3 PRN   - Will continue dexamethasone 4mg BID      #Oropharyngeal candidiasis  #Dysphagia  ::Moderate to severe candidiasis with multiple large lesions  ::given ANC 0.16, consideration for opportunistic infections present. Entended antifungal course  -consult GI today for PEG and EGD to r/o radiation vs infectious esophagitis. Will need to hold plavix for another 4 days. Tentative plan for Friday vs Monday   - neupogen usually not given during active tx per primary oncologist, will reasses in a day or 2.   - Fluconazole 200mg IV (2/22 - xx), typically for 7 days but reassess for duration  - Oral care per nursing     #Pancytopenia  ::ANC 0.16 // Plts 53 on admission with no concern for bleeding // HB 10.9 on presentation (BL 12-13)  - likely d/t myelosuppresion vs though patient has had poor po intake vs mixed  - neupogen usually not given during active tx per primary oncologist, will reasses in a day or 2.   - Daily CBC  - T&S updated, transfuse for active bleeding <50, or for <10  - Will hold on DVT prophylaxis, SCDs only  - holding  clopidogrel (for EGD + peg in future)     #Normocytic Anemia  ::HB 10.9 on presentation (BL 12-13), no active bleeding, no recent iron studies  - Likely d/t chronic dz vs myelosuppresion, though patient has had poor po intake     #Atrial Fibrillation  ::Patient previously on AC, now s/p watchman procedure and is rate controlled   ::had 5 ablations per patient  - Continue metoprolol tartrate 50mg BID  - HR 90s to 120s     #Urinary Frequency  - UA with reflex      #Hx of TIA  - c/w home Plavix 75mg daily     #GERD  - c/w home Pantoprazole     #Mood Disorder  #Misc  - c/w home Cymbalta and Wellbutrin  - c/w home supplements, mag-ox     F : goal net -1L, caution  E: replete lytes prn, K>4, Mg >2  N: isosource 1.5 via dobhoff  A: PIVs     DVT prophylaxis: SCDs  GI prophylaxis: home PPI     Code Status: FULL (confirmed on admission, though patient states she has living will she does not remember the details of, images not in system, likely DNR )

## 2024-02-28 NOTE — PROGRESS NOTES
Physical Therapy    Physical Therapy Treatment    Patient Name: Amita Todd  MRN: [id number]  Today's Date: 2/28/2024  Time Calculation  Start Time: 1630  Stop Time: 1702  Time Calculation (min): 32 min       Assessment/Plan   PT Assessment  PT Assessment Results: Decreased strength, Decreased endurance, Impaired balance, Decreased mobility, Pain, Decreased cognition, Impaired judgement, Decreased safety awareness  Rehab Prognosis: Good  Evaluation/Treatment Tolerance: Patient limited by fatigue  Medical Staff Made Aware: Yes  Strengths: Support of Caregivers, Premorbid level of function  Barriers to Participation:  (fatigue, delirium)  End of Session Communication: Bedside nurse  Assessment Comment: 79 yo female with hypopharangeal SCC presents with hypotension, Left femoral DVT, throat pain/dysphagia and weakness/decreased activity tolerance overall. Pt able to ambulate today but limited by delirium and overall fatigue. Recomend home with 24/7 assist and low intensity home therapy, sleep on 1st floor as needed (family to buy commode, move bed down to first floor, have access to shower chair and have WW).  End of Session Patient Position: Bed, 3 rail up, Alarm on  PT Plan  Inpatient/Swing Bed or Outpatient: Inpatient  PT Plan  Treatment/Interventions: Bed mobility, Transfer training, Gait training, Stair training, Balance training, Neuromuscular re-education, Strengthening, Endurance training, Therapeutic exercise, Therapeutic activity, Home exercise program, Postural re-education, Positioning  PT Plan: Skilled PT  PT Frequency: 4 times per week  PT Discharge Recommendations: Low intensity level of continued care  Equipment Recommended upon Discharge:  (family to buy commode and move bed to first floor, buying bed rails)  PT Recommended Transfer Status: Assist x1, Assistive device (min/mod A with WW)  PT - OK to Discharge: Yes (PT evaluation complete and DC recs made)      General Visit Information:   PT   Visit  PT Received On: 02/28/24  General  Reason for Referral: Admitted 2/22 after radiation due to hypotension; dx: hypotension, subacute vs. Chronic Left common femoral DVT, neutropenia, thrombocytopenia, dysphagia  Past Medical History Relevant to Rehab: PMHx: hypopharangeal squamous cell carcinoma, anemia, ascending aorta aneurysm, Afib/Afuter. CKD, CAD, dpression, GERD, HTN, MI, hp burisitis, TIA, PVD, heart failure, ETOH dependence (in remission), dysphagia  Missed Visit: No  Family/Caregiver Present: Yes  Caregiver Feedback: Daughter and brother present and engaged  Co-Treatment:  (N/A)  Prior to Session Communication: Bedside nurse  Patient Position Received: Bed, 2 rail up, Alarm off, not on at start of session  Preferred Learning Style: kinesthetic, verbal  General Comment: Pt with nursing getting up to get weighed/ht (PT assisted) at EOB, pt groggy, per RN hallucinating today/delirious, pending chemo tonight. Able to ambulate short distance to chair, then PT assisted back to bed due to extreme fatigue. Per pts dtr/brother,  can't help much but they will pull together family support as needed; they already own walker, have access to shower chair, will move bed to first floor and get commode and bed rails. They may have access to a WC.    Subjective   Precautions:  Precautions  Medical Precautions: Fall precautions (Delirium, Corpak (NPO), + orthostatic hypotension, neutropenic, Left LE DVT)  Vital Signs:  Vital Signs  Heart Rate:  (sitting post gait: /58  HR 85, unable to get O2 sat)    Objective   Pain:  Pain Assessment  Pain Assessment: 0-10  Pain Score: 0 - No pain  Pain Interventions: Ambulation/increased activity, Repositioned, Rest  Response to Interventions: comfortable sleeping at end of session  Cognition:  Cognition  Overall Cognitive Status: Impaired  Arousal/Alertness: Delayed responses to stimuli (groggy, falling asleep when sitting)  Orientation Level:  (oriented to self, place,  general situation, month (too fatigued to answer the rest/delirious today))  Following Commands:  (followed 100% of simple 1 step commands with repetition, cues)  Safety Judgment: Decreased awareness of need for assistance  Problem Solving: Assistance required to identify errors made  Cognition Comments: groggy, confused (per RN hallucinating earlier today, dysarthric  Attention: Exceptions to WFL  Memory: Exceptions to WFL  Problem Solving: Exceptions to WFL  Simple Functional Tasks: Impaired  Safety/Judgement: Exceptions to WFL  Routine Tasks: Moderate  Insight: Mild  Impulsive: Mildly  Processing Speed: Delayed  Postural Control:  Postural Control  Postural Control: Impaired  Trunk Control: in standing- see balance section  Posture Comment: mild rounded shoulders, upper thoracic kyphosis  Static Sitting Balance  Static Sitting-Balance Support: Feet supported, Bilateral upper extremity supported  Static Sitting-Level of Assistance: Contact guard  Static Standing Balance  Static Standing-Balance Support: Bilateral upper extremity supported (on WW)  Static Standing-Level of Assistance: Contact guard (CG/min (pt mildly confused, fatigued))  Dynamic Standing Balance  Dynamic Standing-Balance Support: Bilateral upper extremity supported (on WW)  Dynamic Standing-Balance:  (short bout of gait including turns)  Dynamic Standing-Comments: constant min A and cues (very fatigued    Activity Tolerance:  Activity Tolerance  Endurance: Tolerates 10 - 20 min exercise with multiple rests (fatigued, confused, closing eyes when sitting)  Treatments:  Therapeutic Activity  Therapeutic Activity Performed: Yes  Therapeutic Activity 1: bed mobility training as noted  Therapeutic Activity 2: transfer training as noted    Bed Mobility  Bed Mobility: Yes  Bed Mobility 1  Bed Mobility 1: Supine to sitting, Sitting to supine  Level of Assistance 1: Moderate assistance, Moderate verbal cues, Minimal tactile cues  Bed Mobility Comments 1:  HOB elevated  45 degrees, in/out on Left side, using rail    Ambulation/Gait Training  Ambulation/Gait Training Performed: Yes  Ambulation/Gait Training 1  Surface 1: Level tile  Device 1: Rolling walker  Assistance 1: Minimum assistance, Moderate verbal cues, Moderate tactile cues  Quality of Gait 1:  (walker too far forward, small steps, slow, unsteady)  Comments/Distance (ft) 1: 18 in room  Transfers  Transfer: Yes  Transfer 1  Transfer From 1: Sit to, Stand to  Transfer to 1: Sit, Stand  Technique 1: Sit to stand, Stand to sit  Transfer Device 1: Walker (WW)  Transfer Level of Assistance 1: Minimum assistance, Moderate verbal cues, Minimal tactile cues  Transfers 2  Transfer From 2: Stand to  Transfer to 2: Chair with arms, Bed  Technique 2: Stand pivot, Sit to stand, Stand to sit  Transfer Device 2: Walker  Transfer Level of Assistance 2: Moderate assistance, Moderate verbal cues, Moderate tactile cues  Trials/Comments 2: transferred to chair with min/mod A and cues for safety (sat quickly/unsafely). then too fatigued to PT assisted pt up (mod A) and back to bed, as noted    Stairs  Stairs: No    Other Activity  Other Activity Performed: Yes  Other Activity 1: sit to/from stand onto scale and off, then sit to/from stand to for  RN to measure height for chemo (min/mod A)    Outcome Measures:  [facility] Basic Mobility  Turning from your back to your side while in a flat bed without using bedrails: A little  Moving from lying on your back to sitting on the side of a flat bed without using bedrails: A lot  Moving to and from bed to chair (including a wheelchair): A little  Standing up from a chair using your arms (e.g. wheelchair or bedside chair): A lot  To walk in hospital room: A little  Climbing 3-5 steps with railing: Total  Basic Mobility - Total Score: 14    Education Documentation  Precautions, taught by Iman Arita PT at 2/28/2024  5:09 PM.  Learner: Family, Patient  Readiness: Acceptance  Method:  Explanation, Demonstration, Teach-back  Response: Needs Reinforcement  Comment: PT POC, reoriented, safe gait/transfers, potential equipment needs upon DC, progress    Body Mechanics, taught by Iman Arita PT at 2/28/2024  5:09 PM.  Learner: Family, Patient  Readiness: Acceptance  Method: Explanation, Demonstration, Teach-back  Response: Needs Reinforcement  Comment: PT POC, reoriented, safe gait/transfers, potential equipment needs upon DC, progress    Mobility Training, taught by Iman Arita PT at 2/28/2024  5:09 PM.  Learner: Family, Patient  Readiness: Acceptance  Method: Explanation, Demonstration, Teach-back  Response: Needs Reinforcement  Comment: PT POC, reoriented, safe gait/transfers, potential equipment needs upon DC, progress    Education Comments  No comments found.             Encounter Problems       Encounter Problems (Active)       General Goals       ind with HEP supine, sitting HEP with supervisoin (Not Progressing)       Start:  02/26/24    Expected End:  03/11/24            supine to/from sit, HOB elevated no rail, modified independent (negative orthostatic hypotension, no dizziness) (Progressing)       Start:  02/26/24    Expected End:  03/11/24               Mobility       sit to/from stand, bed to/from chair with LRAD and supervision, negative for orthostatic hypotension, no LOB (Progressing)       Start:  02/26/24    Expected End:  03/11/24            ambulate 250' with LRAD and supervision, stable vitals, no LOB (Progressing)       Start:  02/26/24    Expected End:  03/11/24            up/down 12 steps with 1 rail with SBA (Not Progressing)       Start:  02/26/24    Expected End:  03/11/24               Pain - Adult

## 2024-02-28 NOTE — SIGNIFICANT EVENT
02/28/24 1711   Prechemo Checklist   Has the patient been in the hospital, ED, or urgent care since last date of service Yes   Chemo/Immuno Consent Signed Yes   Protocol/Indications Verified Yes   Confirmed to previous date/time of medication Yes   Compared to previous dose Yes   All medications are dated accurately Yes   Pregnancy Test Negative Not applicable   Parameters Met Yes   Provider Name burkitt   BSA/Weight-Height Verified Yes   Dose Calculations Verified Yes

## 2024-02-28 NOTE — CARE PLAN
The patient's goals for the shift include      The clinical goals for the shift include pt will remain injury free      Problem: Skin  Goal: Decreased wound size/increased tissue granulation at next dressing change  Outcome: Progressing  Flowsheets (Taken 2/28/2024 0735)  Decreased wound size/increased tissue granulation at next dressing change: Promote sleep for wound healing  Goal: Participates in plan/prevention/treatment measures  Outcome: Progressing  Flowsheets (Taken 2/28/2024 0735)  Participates in plan/prevention/treatment measures: Elevate heels  Goal: Prevent/manage excess moisture  Outcome: Progressing  Flowsheets (Taken 2/28/2024 0735)  Prevent/manage excess moisture: Moisturize dry skin  Goal: Prevent/minimize sheer/friction injuries  Outcome: Progressing  Flowsheets (Taken 2/28/2024 0735)  Prevent/minimize sheer/friction injuries: Increase activity/out of bed for meals  Goal: Promote/optimize nutrition  Outcome: Progressing  Flowsheets (Taken 2/28/2024 0735)  Promote/optimize nutrition: Offer water/supplements/favorite foods  Goal: Promote skin healing  Outcome: Progressing  Flowsheets (Taken 2/28/2024 0735)  Promote skin healing: Turn/reposition every 2 hours/use positioning/transfer devices     Problem: Pain  Goal: My pain/discomfort is manageable  Outcome: Progressing     Problem: Safety  Goal: Patient will be injury free during hospitalization  Outcome: Progressing  Goal: I will remain free of falls  Outcome: Progressing     Problem: Daily Care  Goal: Daily care needs are met  Outcome: Progressing     Problem: Psychosocial Needs  Goal: Demonstrates ability to cope with hospitalization/illness  Outcome: Progressing  Goal: Collaborate with me, my family, and caregiver to identify my specific goals  Outcome: Progressing  Flowsheets (Taken 2/28/2024 0700)  Cultural Requests During Hospitalization: n/a  Spiritual Requests During Hospitalization: n/a     Problem: Discharge Barriers  Goal: My discharge  needs are met  Outcome: Progressing     Problem: Pain  Goal: Takes deep breaths with improved pain control throughout the shift  Outcome: Progressing  Goal: Turns in bed with improved pain control throughout the shift  Outcome: Progressing  Goal: Walks with improved pain control throughout the shift  Outcome: Progressing  Goal: Performs ADL's with improved pain control throughout shift  Outcome: Progressing  Goal: Participates in PT with improved pain control throughout the shift  Outcome: Progressing  Goal: Free from opioid side effects throughout the shift  Outcome: Progressing  Goal: Free from acute confusion related to pain meds throughout the shift  Outcome: Progressing     Problem: Pain - Adult  Goal: Verbalizes/displays adequate comfort level or baseline comfort level  Outcome: Progressing     Problem: Safety - Adult  Goal: Free from fall injury  Outcome: Progressing     Problem: Discharge Planning  Goal: Discharge to home or other facility with appropriate resources  Outcome: Progressing     Problem: Chronic Conditions and Co-morbidities  Goal: Patient's chronic conditions and co-morbidity symptoms are monitored and maintained or improved  Outcome: Progressing

## 2024-02-28 NOTE — PROGRESS NOTES
[facility]  [facility] [place] [facility]  CONSULT FOLLOW-UP     Reason For Consult  PEG tube assessment     SUBJECTIVE     This morning she is complaining of feeling delirious and discouraged. Says her breathing has been difficult.     EXAM     Last Recorded Vitals  Blood pressure 153/88, pulse 88, temperature 36.2 °C (97.2 °F), temperature source Temporal, resp. rate 16, SpO2 96 %.      Intake/Output Summary (Last 24 hours) at 2/28/2024 0955  Last data filed at 2/28/2024 0908  Gross per 24 hour   Intake 200 ml   Output 1250 ml   Net -1050 ml       Physical Exam   GENERAL: Chronically ill.  NEUROLOGIC: A and O x 3. Cranial nerves 2 through 12 grossly intact. Intact motor and sensory systems.  HEENT: Pupils are equal, round, and reactive to light and accommodation. Extraocular motion intact. CARDIAC: S1 + S2, RRR, no M/R/G.    LUNGS: CTA BL. No wheezes or coarse breath sounds. Symmetric respirations. No increased work of breathing.   ABDOMEN: Soft, non-tender to palpation. No rebound or guarding.  PSYCH: Low mood.    OBJECTIVE                                                                              Medications             Current Facility-Administered Medications:     acetaminophen (Tylenol) oral liquid 1,000 mg, 1,000 mg, oral, q6h, Mary Corbett MD, 1,000 mg at 02/27/24 2346    alteplase (Cathflo Activase) injection 1 mg, 1 mg, intra-catheter, PRN, Isaac Bledsoe MD    ascorbic acid (Vitamin C) tablet 500 mg, 500 mg, nasogastric tube, Daily, Mary Corbett MD, 500 mg at 02/28/24 0846    atorvastatin (Lipitor) tablet 20 mg, 20 mg, oral, Daily, Justino Cooper MD, 20 mg at 02/28/24 0846    buPROPion (Wellbutrin) tablet 100 mg, 100 mg, oral, TID, Isaac Bledsoe MD, 100 mg at 02/28/24 0845    cholecalciferol (Vitamin D-3) tablet 1,000 Units, 1,000 Units, oral, BID, Justino Cooper MD, 1,000 Units at 02/28/24 0846    dapagliflozin propanediol (Farxiga) tablet 10 mg, 10  mg, oral, Daily, Justino Cooper MD, 10 mg at 02/28/24 0845    enoxaparin (Lovenox) syringe 40 mg, 40 mg, subcutaneous, Daily, Mary Corbett MD, 40 mg at 02/28/24 0845    esomeprazole (NexIUM) suspension 40 mg, 40 mg, nasoduodenal tube, Daily before breakfast, Mary Corbett MD, 40 mg at 02/28/24 0848    fluconazole in NaCl (iso-osm) (Diflucan) IVPB 400 mg, 400 mg, intravenous, q24h, Mary Corbett MD, 400 mg at 02/27/24 1043    furosemide (Lasix) injection 40 mg, 40 mg, intravenous, Once, Mary Corbett MD    guaiFENesin (Robitussin) 100 mg/5 mL syrup 200 mg, 200 mg, oral, q4h PRN, Justino Cooper MD, 200 mg at 02/27/24 0524    HYDROmorphone (Dilaudid) injection 0.4 mg, 0.4 mg, intravenous, q3h PRN, Isaac Bledsoe MD    HYDROmorphone (Dilaudid) tablet 1 mg, 1 mg, oral, q3h PRN, Isaac Bledsoe MD, 1 mg at 02/28/24 0845    lido-diphen-Maalox 1:1:1 Magic Mouthwash, 10 mL, Swish & Spit, q4h PRN, Isaac Bledsoe MD, 10 mL at 02/25/24 1334    lidocaine (Xylocaine) 2 % jelly 1 Application, 1 Application, Topical, Once, Mary Corbett MD    losartan (Cozaar) tablet 25 mg, 25 mg, oral, Daily, Mary Corbett MD, 25 mg at 02/28/24 0846    magnesium oxide (Mag-Ox) tablet 400 mg, 400 mg, oral, Daily, Justino Cooper MD, 400 mg at 02/28/24 0846    metoprolol tartrate (Lopressor) tablet 50 mg, 50 mg, oral, BID, Justino Cooper MD, 50 mg at 02/28/24 0846    multivitamin with minerals 1 tablet, 1 tablet, oral, Daily, Justino Cooper MD, 1 tablet at 02/28/24 0846    ondansetron (Zofran) injection 4 mg, 4 mg, intravenous, q6h PRN, Mary Corbett MD, 4 mg at 02/27/24 0815    perflutren lipid microspheres (Definity) injection 0.5-10 mL of dilution, 0.5-10 mL of dilution, intravenous, Once in imaging, Justino Cooper MD    perflutren protein A microsphere (Optison) injection 0.5 mL, 0.5 mL, intravenous, Once in imaging, Justino Cooper MD    phenoL (Chloraseptic) 1.4 % mouth/throat spray 1 spray, 1 spray, Mouth/Throat, q2h, Mary Corbett MD,  1 spray at 02/28/24 0909    prochlorperazine (Compazine) injection 10 mg, 10 mg, intravenous, q6h PRN, Angelic Sarmiento MD, 10 mg at 02/27/24 1151    senna (Senokot) 8.8 mg/5 mL syrup 10 mL, 10 mL, nasogastric tube, BID, Angelic Sarmiento MD    spironolactone (Aldactone) tablet 12.5 mg, 12.5 mg, oral, Daily, Angelic Sarmiento MD, 12.5 mg at 02/28/24 0846    sulfur hexafluoride microsphr (Lumason) injection 24.28 mg, 2 mL, intravenous, Once in imaging, Justino Cooper MD    venlafaxine (Effexor) tablet 75 mg, 75 mg, nasogastric tube, BID with meals, Angelic Sarmiento MD                                                                            Labs     Results for orders placed or performed during the hospital encounter of 02/22/24 (from the past 24 hour(s))   Transthoracic Echo (TTE) Complete   Result Value Ref Range    LVOT diam 2.00 cm    LV biplane EF 55 %    LA vol index A/L 40.1 ml/m2    LVIDd 4.30 cm    RVSP 41.6 mmHg    LV A4C EF 59.5    BLOOD GAS VENOUS FULL PANEL   Result Value Ref Range    POCT pH, Venous 7.40 7.33 - 7.43 pH    POCT pCO2, Venous 43 41 - 51 mm Hg    POCT pO2, Venous 41 35 - 45 mm Hg    POCT SO2, Venous 70 45 - 75 %    POCT Oxy Hemoglobin, Venous 68.4 45.0 - 75.0 %    POCT Hematocrit Calculated, Venous 20.0 (L) 36.0 - 46.0 %    POCT Sodium, Venous 133 (L) 136 - 145 mmol/L    POCT Potassium, Venous 4.4 3.5 - 5.3 mmol/L    POCT Chloride, Venous 102 98 - 107 mmol/L    POCT Ionized Calicum, Venous 1.32 1.10 - 1.33 mmol/L    POCT Glucose, Venous 115 (H) 74 - 99 mg/dL    POCT Lactate, Venous 1.7 0.4 - 2.0 mmol/L    POCT Base Excess, Venous 1.6 -2.0 - 3.0 mmol/L    POCT HCO3 Calculated, Venous 26.6 (H) 22.0 - 26.0 mmol/L    POCT Hemoglobin, Venous 6.5 (LL) 12.0 - 16.0 g/dL    POCT Anion Gap, Venous 9.0 (L) 10.0 - 25.0 mmol/L    Patient Temperature 37.0 degrees Celsius    FiO2 28 %   CBC and Auto Differential   Result Value Ref Range    WBC 1.5 (L) 4.4 - 11.3 x10*3/uL    nRBC 2.0 (H) 0.0 - 0.0 /100 WBCs    RBC 3.31 (L)  4.00 - 5.20 x10*6/uL    Hemoglobin 10.2 (L) 12.0 - 16.0 g/dL    Hematocrit 31.6 (L) 36.0 - 46.0 %    MCV 96 80 - 100 fL    MCH 30.8 26.0 - 34.0 pg    MCHC 32.3 32.0 - 36.0 g/dL    RDW 16.8 (H) 11.5 - 14.5 %    Platelets 84 (L) 150 - 450 x10*3/uL    Neutrophils % 95.3 40.0 - 80.0 %    Immature Granulocytes %, Automated 0.0 0.0 - 0.9 %    Lymphocytes % 2.0 13.0 - 44.0 %    Monocytes % 2.0 2.0 - 10.0 %    Eosinophils % 0.0 0.0 - 6.0 %    Basophils % 0.7 0.0 - 2.0 %    Neutrophils Absolute 1.45 (L) 1.60 - 5.50 x10*3/uL    Immature Granulocytes Absolute, Automated 0.00 0.00 - 0.50 x10*3/uL    Lymphocytes Absolute 0.03 (L) 0.80 - 3.00 x10*3/uL    Monocytes Absolute 0.03 (L) 0.05 - 0.80 x10*3/uL    Eosinophils Absolute 0.00 0.00 - 0.40 x10*3/uL    Basophils Absolute 0.01 0.00 - 0.10 x10*3/uL   Morphology   Result Value Ref Range    RBC Morphology See Below                                                                              Imaging           02/24/24 MBS:  Oral Phase:  WFL     Adequate bolus acceptance, formation, and transit w/ all consistencies assessed.       -Pt does note significant odynophagia (pain orally and pharyngeally) with all PO intake.     Pharyngeal Phase:  Moderate/severe deficits     Tissue protrusion within hypopharynx impacting bolus flow.  No nasal regurgitation on this assessment, but backflow of boluses observed.  Hyolaryngeal elevation/excursion impaired.  Reduced airway protection during the swallow resulting in deep penetration to the vocal cords w/ all liquid consistencies, and eventual trace aspiration of thin and mildly thick liquids.  Independent cough/throat clear, but this did not clear aspirated material. Pharyngeal stasis at the valleculae and piriforms following the swallow with all liquid consistencies.  Aspiration in min-mod amounts following the swallow with thin and mildly thick liquids as a result of residue.  Again, cough response did not clear.  Chin tuck and head turn R did  not improve safety or efficiency of swallow.  Head turn L (toward side of mass) improved swallow partially, but did not entirely resolve deficits.  With most viscous consistency assessed (puree) , there was no penetration/aspiration or significant stasis.       Esophageal Phase:  No overt deficits                                                                           GI Procedures      02/2021 EGD:  Impression:     - Z-line, 39 cm from the incisors.                         - Normal esophagus.                         - 3 parallel streaks of hemorrhagic appearance and                          linear erosions mucosa in the gastric body. Biopsied.                         - Normal examined duodenum.                         - Unclear if the gastric lesions are her sole source                          of bleeding as not seen on two previous endoscopie                          over past 6 months with recurrent UGI bleeding.     ASSESSMENT / PLAN                  ASSESSMENT/PLAN:     Amita Todd is a 78 y.o. 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      GI is consulted for PEG tube assessment.    Last dose of Plavix was on the 2/25/24. We will plan for PEG tube placement on the 03/01/2024 after Plavix washout.      Recommendations:  - Hold Plavix for 5 day prior to PEG tube placement.  - Tentatively plan for PEG placement on the 03/01/2024  - NPO at MN day prior to procedure   - please hold all heparin products (including prophylaxis) at 4AM day prior to procedure  - AM INR and CBC for  the 03/01/2024  - surgical prophylaxis of cefazolin 1 g IV 30 minutes prior to surgical incision (for nonobese patients weighing <120 kg)     The above recommendations were communicated to the [facility] team.     Patient was seen and discussed with Dr. Turner.     Gastroenterology will continue to follow.  During weekday hours of 7am-5pm please do not hesitate to contact me on [employer] Chat or page [phone], if there are any further questions between the weekday hours of 7am-5pm.   After hours, on weekends, and on holidays, please [place] [phone]. Thank you.     Angelica Montalvo MD  PGY-4 Gastroenterology and Hepatology Fellow  [facility]

## 2024-02-29 ENCOUNTER — HOSPITAL ENCOUNTER (OUTPATIENT)
Dept: RADIATION ONCOLOGY | Facility: HOSPITAL | Age: 79
Setting detail: RADIATION/ONCOLOGY SERIES
Discharge: HOME | End: 2024-02-29
Payer: MEDICARE

## 2024-02-29 ENCOUNTER — APPOINTMENT (OUTPATIENT)
Dept: RADIOLOGY | Facility: HOSPITAL | Age: 79
DRG: 314 | End: 2024-02-29
Payer: MEDICARE

## 2024-02-29 ENCOUNTER — APPOINTMENT (OUTPATIENT)
Dept: CARDIOLOGY | Facility: HOSPITAL | Age: 79
End: 2024-02-29
Payer: MEDICARE

## 2024-02-29 DIAGNOSIS — C13.8 MALIGNANT NEOPLASM OF OVERLAPPING SITES OF HYPOPHARYNX (MULTI): ICD-10-CM

## 2024-02-29 DIAGNOSIS — Z51.0 ENCOUNTER FOR ANTINEOPLASTIC RADIATION THERAPY: ICD-10-CM

## 2024-02-29 LAB
ALBUMIN SERPL BCP-MCNC: 2.5 G/DL (ref 3.4–5)
ALBUMIN SERPL BCP-MCNC: 2.5 G/DL (ref 3.4–5)
ALBUMIN SERPL BCP-MCNC: 2.7 G/DL (ref 3.4–5)
ALP SERPL-CCNC: 57 U/L (ref 33–136)
ALP SERPL-CCNC: 58 U/L (ref 33–136)
ALP SERPL-CCNC: 67 U/L (ref 33–136)
ALT SERPL W P-5'-P-CCNC: 23 U/L (ref 7–45)
ALT SERPL W P-5'-P-CCNC: 23 U/L (ref 7–45)
ALT SERPL W P-5'-P-CCNC: 24 U/L (ref 7–45)
ANION GAP SERPL CALC-SCNC: 13 MMOL/L (ref 10–20)
ANION GAP SERPL CALC-SCNC: 14 MMOL/L (ref 10–20)
APPEARANCE UR: ABNORMAL
AST SERPL W P-5'-P-CCNC: 15 U/L (ref 9–39)
AST SERPL W P-5'-P-CCNC: 16 U/L (ref 9–39)
AST SERPL W P-5'-P-CCNC: 19 U/L (ref 9–39)
BACTERIA #/AREA URNS AUTO: ABNORMAL /HPF
BASOPHILS # BLD MANUAL: 0 X10*3/UL (ref 0–0.1)
BASOPHILS NFR BLD MANUAL: 0 %
BILIRUB DIRECT SERPL-MCNC: 0.4 MG/DL (ref 0–0.3)
BILIRUB SERPL-MCNC: 0.8 MG/DL (ref 0–1.2)
BILIRUB SERPL-MCNC: 0.9 MG/DL (ref 0–1.2)
BILIRUB SERPL-MCNC: 0.9 MG/DL (ref 0–1.2)
BILIRUB UR STRIP.AUTO-MCNC: NEGATIVE MG/DL
BUN SERPL-MCNC: 22 MG/DL (ref 6–23)
BUN SERPL-MCNC: 27 MG/DL (ref 6–23)
BURR CELLS BLD QL SMEAR: ABNORMAL
CALCIUM SERPL-MCNC: 8.6 MG/DL (ref 8.6–10.6)
CALCIUM SERPL-MCNC: 8.6 MG/DL (ref 8.6–10.6)
CHLORIDE SERPL-SCNC: 95 MMOL/L (ref 98–107)
CHLORIDE SERPL-SCNC: 99 MMOL/L (ref 98–107)
CO2 SERPL-SCNC: 27 MMOL/L (ref 21–32)
CO2 SERPL-SCNC: 31 MMOL/L (ref 21–32)
COLOR UR: YELLOW
CREAT SERPL-MCNC: 0.6 MG/DL (ref 0.5–1.05)
CREAT SERPL-MCNC: 0.75 MG/DL (ref 0.5–1.05)
EGFRCR SERPLBLD CKD-EPI 2021: 82 ML/MIN/1.73M*2
EGFRCR SERPLBLD CKD-EPI 2021: >90 ML/MIN/1.73M*2
EOSINOPHIL # BLD MANUAL: 0 X10*3/UL (ref 0–0.4)
EOSINOPHIL NFR BLD MANUAL: 0 %
ERYTHROCYTE [DISTWIDTH] IN BLOOD BY AUTOMATED COUNT: 17.2 % (ref 11.5–14.5)
GLUCOSE BLD MANUAL STRIP-MCNC: 128 MG/DL (ref 74–99)
GLUCOSE SERPL-MCNC: 106 MG/DL (ref 74–99)
GLUCOSE SERPL-MCNC: 142 MG/DL (ref 74–99)
GLUCOSE UR STRIP.AUTO-MCNC: ABNORMAL MG/DL
HCT VFR BLD AUTO: 29.7 % (ref 36–46)
HGB BLD-MCNC: 9.6 G/DL (ref 12–16)
HOLD SPECIMEN: NORMAL
IMM GRANULOCYTES # BLD AUTO: 0.04 X10*3/UL (ref 0–0.5)
IMM GRANULOCYTES NFR BLD AUTO: 2.2 % (ref 0–0.9)
KETONES UR STRIP.AUTO-MCNC: NEGATIVE MG/DL
LACTATE SERPL-SCNC: 1.8 MMOL/L (ref 0.4–2)
LEUKOCYTE ESTERASE UR QL STRIP.AUTO: NEGATIVE
LYMPHOCYTES # BLD MANUAL: 0.01 X10*3/UL (ref 0.8–3)
LYMPHOCYTES NFR BLD MANUAL: 0.7 %
MAGNESIUM SERPL-MCNC: 1.93 MG/DL (ref 1.6–2.4)
MCH RBC QN AUTO: 31.1 PG (ref 26–34)
MCHC RBC AUTO-ENTMCNC: 32.3 G/DL (ref 32–36)
MCV RBC AUTO: 96 FL (ref 80–100)
METAMYELOCYTES # BLD MANUAL: 0.01 X10*3/UL
METAMYELOCYTES NFR BLD MANUAL: 0.7 %
MONOCYTES # BLD MANUAL: 0.03 X10*3/UL (ref 0.05–0.8)
MONOCYTES NFR BLD MANUAL: 1.5 %
MUCOUS THREADS #/AREA URNS AUTO: ABNORMAL /LPF
MYELOCYTES # BLD MANUAL: 0.09 X10*3/UL
MYELOCYTES NFR BLD MANUAL: 5.2 %
NEUTROPHILS # BLD MANUAL: 1.66 X10*3/UL (ref 1.6–5.5)
NEUTS BAND # BLD MANUAL: 0.15 X10*3/UL (ref 0–0.5)
NEUTS BAND NFR BLD MANUAL: 8.1 %
NEUTS SEG # BLD MANUAL: 1.51 X10*3/UL (ref 1.6–5)
NEUTS SEG NFR BLD MANUAL: 83.8 %
NITRITE UR QL STRIP.AUTO: NEGATIVE
NRBC BLD-RTO: 5 /100 WBCS (ref 0–0)
OVALOCYTES BLD QL SMEAR: ABNORMAL
PH UR STRIP.AUTO: 6 [PH]
PHOSPHATE SERPL-MCNC: 2.6 MG/DL (ref 2.5–4.9)
PLATELET # BLD AUTO: 113 X10*3/UL (ref 150–450)
POTASSIUM SERPL-SCNC: 4 MMOL/L (ref 3.5–5.3)
POTASSIUM SERPL-SCNC: 4.5 MMOL/L (ref 3.5–5.3)
PROT SERPL-MCNC: 4.9 G/DL (ref 6.4–8.2)
PROT SERPL-MCNC: 5 G/DL (ref 6.4–8.2)
PROT SERPL-MCNC: 5 G/DL (ref 6.4–8.2)
PROT UR STRIP.AUTO-MCNC: ABNORMAL MG/DL
RAD ONC MSQ ACTUAL FRACTIONS DELIVERED: 27
RAD ONC MSQ ACTUAL SESSION DELIVERED DOSE: 200 CGRAY
RAD ONC MSQ ACTUAL TOTAL DOSE: 5400 CGRAY
RAD ONC MSQ ELAPSED DAYS: 36
RAD ONC MSQ LAST DATE: NORMAL
RAD ONC MSQ PRESCRIBED FRACTIONAL DOSE: 200 CGRAY
RAD ONC MSQ PRESCRIBED NUMBER OF FRACTIONS: 35
RAD ONC MSQ PRESCRIBED TECHNIQUE: NORMAL
RAD ONC MSQ PRESCRIBED TOTAL DOSE: 7000 CGRAY
RAD ONC MSQ PRESCRIPTION PATTERN COMMENT: NORMAL
RAD ONC MSQ START DATE: NORMAL
RAD ONC MSQ TREATMENT COURSE NUMBER: 1
RAD ONC MSQ TREATMENT SITE: NORMAL
RBC # BLD AUTO: 3.09 X10*6/UL (ref 4–5.2)
RBC # UR STRIP.AUTO: ABNORMAL /UL
RBC #/AREA URNS AUTO: ABNORMAL /HPF
RBC MORPH BLD: ABNORMAL
SODIUM SERPL-SCNC: 135 MMOL/L (ref 136–145)
SODIUM SERPL-SCNC: 135 MMOL/L (ref 136–145)
SP GR UR STRIP.AUTO: 1.03
TOTAL CELLS COUNTED BLD: 136
UROBILINOGEN UR STRIP.AUTO-MCNC: ABNORMAL MG/DL
WBC # BLD AUTO: 1.8 X10*3/UL (ref 4.4–11.3)
WBC #/AREA URNS AUTO: ABNORMAL /HPF

## 2024-02-29 PROCEDURE — 2500000002 HC RX 250 W HCPCS SELF ADMINISTERED DRUGS (ALT 637 FOR MEDICARE OP, ALT 636 FOR OP/ED)

## 2024-02-29 PROCEDURE — 2500000004 HC RX 250 GENERAL PHARMACY W/ HCPCS (ALT 636 FOR OP/ED)

## 2024-02-29 PROCEDURE — 84075 ASSAY ALKALINE PHOSPHATASE: CPT

## 2024-02-29 PROCEDURE — 93005 ELECTROCARDIOGRAM TRACING: CPT

## 2024-02-29 PROCEDURE — 1170000001 HC PRIVATE ONCOLOGY ROOM DAILY

## 2024-02-29 PROCEDURE — 84100 ASSAY OF PHOSPHORUS: CPT

## 2024-02-29 PROCEDURE — 0DH63UZ INSERTION OF FEEDING DEVICE INTO STOMACH, PERCUTANEOUS APPROACH: ICD-10-PCS | Performed by: STUDENT IN AN ORGANIZED HEALTH CARE EDUCATION/TRAINING PROGRAM

## 2024-02-29 PROCEDURE — 81001 URINALYSIS AUTO W/SCOPE: CPT

## 2024-02-29 PROCEDURE — 99233 SBSQ HOSP IP/OBS HIGH 50: CPT

## 2024-02-29 PROCEDURE — 83735 ASSAY OF MAGNESIUM: CPT

## 2024-02-29 PROCEDURE — 2500000001 HC RX 250 WO HCPCS SELF ADMINISTERED DRUGS (ALT 637 FOR MEDICARE OP)

## 2024-02-29 PROCEDURE — 71045 X-RAY EXAM CHEST 1 VIEW: CPT

## 2024-02-29 PROCEDURE — 80053 COMPREHEN METABOLIC PANEL: CPT

## 2024-02-29 PROCEDURE — 85027 COMPLETE CBC AUTOMATED: CPT

## 2024-02-29 PROCEDURE — 36415 COLL VENOUS BLD VENIPUNCTURE: CPT

## 2024-02-29 PROCEDURE — 77386 HC INTENSITY-MODULATED RADIATION THERAPY (IMRT), COMPLEX: CPT | Performed by: RADIOLOGY

## 2024-02-29 PROCEDURE — 85007 BL SMEAR W/DIFF WBC COUNT: CPT

## 2024-02-29 PROCEDURE — 83605 ASSAY OF LACTIC ACID: CPT

## 2024-02-29 PROCEDURE — 82947 ASSAY GLUCOSE BLOOD QUANT: CPT

## 2024-02-29 PROCEDURE — 80076 HEPATIC FUNCTION PANEL: CPT | Mod: CCI

## 2024-02-29 PROCEDURE — 77014 CHG CT GUIDANCE RADIATION THERAPY FLDS PLACEMENT: CPT | Performed by: RADIOLOGY

## 2024-02-29 PROCEDURE — 93010 ELECTROCARDIOGRAM REPORT: CPT | Performed by: INTERNAL MEDICINE

## 2024-02-29 PROCEDURE — 87040 BLOOD CULTURE FOR BACTERIA: CPT

## 2024-02-29 PROCEDURE — 71045 X-RAY EXAM CHEST 1 VIEW: CPT | Performed by: RADIOLOGY

## 2024-02-29 RX ORDER — MAGNESIUM SULFATE HEPTAHYDRATE 40 MG/ML
2 INJECTION, SOLUTION INTRAVENOUS ONCE
Status: COMPLETED | OUTPATIENT
Start: 2024-02-29 | End: 2024-02-29

## 2024-02-29 RX ORDER — ACETAMINOPHEN 500 MG
10 TABLET ORAL NIGHTLY
Status: DISCONTINUED | OUTPATIENT
Start: 2024-02-29 | End: 2024-03-01

## 2024-02-29 RX ORDER — CEFAZOLIN SODIUM 2 G/100ML
2 INJECTION, SOLUTION INTRAVENOUS ONCE
Status: COMPLETED | OUTPATIENT
Start: 2024-03-01 | End: 2024-03-01

## 2024-02-29 RX ORDER — FUROSEMIDE 10 MG/ML
40 INJECTION INTRAMUSCULAR; INTRAVENOUS ONCE
Status: COMPLETED | OUTPATIENT
Start: 2024-02-29 | End: 2024-02-29

## 2024-02-29 RX ORDER — CEFAZOLIN SODIUM 2 G/100ML
2 INJECTION, SOLUTION INTRAVENOUS ONCE
Status: CANCELLED | OUTPATIENT
Start: 2024-03-01

## 2024-02-29 RX ORDER — QUETIAPINE FUMARATE 25 MG/1
25 TABLET, FILM COATED ORAL NIGHTLY PRN
Status: DISCONTINUED | OUTPATIENT
Start: 2024-02-29 | End: 2024-02-29

## 2024-02-29 RX ORDER — CEFAZOLIN SODIUM 1 G/50ML
1 SOLUTION INTRAVENOUS ONCE
Status: DISCONTINUED | OUTPATIENT
Start: 2024-03-01 | End: 2024-02-29 | Stop reason: DRUGHIGH

## 2024-02-29 RX ORDER — LANOLIN ALCOHOL/MO/W.PET/CERES
100 CREAM (GRAM) TOPICAL DAILY
Status: DISPENSED | OUTPATIENT
Start: 2024-02-29 | End: 2024-03-07

## 2024-02-29 RX ADMIN — PIPERACILLIN SODIUM AND TAZOBACTAM SODIUM 4.5 G: 4; .5 INJECTION, SOLUTION INTRAVENOUS at 17:27

## 2024-02-29 RX ADMIN — SPIRONOLACTONE 12.5 MG: 25 TABLET, FILM COATED ORAL at 08:54

## 2024-02-29 RX ADMIN — FUROSEMIDE 40 MG: 10 INJECTION INTRAMUSCULAR; INTRAVENOUS at 12:22

## 2024-02-29 RX ADMIN — ACETAMINOPHEN 1000 MG: 650 SOLUTION ORAL at 17:27

## 2024-02-29 RX ADMIN — VENLAFAXINE 75 MG: 75 TABLET ORAL at 08:54

## 2024-02-29 RX ADMIN — Medication 1000 UNITS: at 20:11

## 2024-02-29 RX ADMIN — METOPROLOL TARTRATE 50 MG: 50 TABLET, FILM COATED ORAL at 08:54

## 2024-02-29 RX ADMIN — QUETIAPINE FUMARATE 25 MG: 25 TABLET ORAL at 03:01

## 2024-02-29 RX ADMIN — Medication 400 MG: at 08:54

## 2024-02-29 RX ADMIN — Medication 1000 UNITS: at 08:54

## 2024-02-29 RX ADMIN — THIAMINE HYDROCHLORIDE 100 MG: 100 INJECTION, SOLUTION INTRAMUSCULAR; INTRAVENOUS at 08:54

## 2024-02-29 RX ADMIN — ACETAMINOPHEN 1000 MG: 650 SOLUTION ORAL at 12:21

## 2024-02-29 RX ADMIN — ENOXAPARIN SODIUM 40 MG: 100 INJECTION SUBCUTANEOUS at 08:53

## 2024-02-29 RX ADMIN — BUPROPION HYDROCHLORIDE 150 MG: 100 TABLET, FILM COATED ORAL at 08:54

## 2024-02-29 RX ADMIN — MAGNESIUM SULFATE HEPTAHYDRATE 2 G: 40 INJECTION, SOLUTION INTRAVENOUS at 12:22

## 2024-02-29 RX ADMIN — SENNOSIDES 10 ML: 8.8 LIQUID ORAL at 08:54

## 2024-02-29 RX ADMIN — FUROSEMIDE 40 MG: 10 INJECTION, SOLUTION INTRAMUSCULAR; INTRAVENOUS at 03:01

## 2024-02-29 RX ADMIN — FLUCONAZOLE 400 MG: 2 INJECTION, SOLUTION INTRAVENOUS at 12:22

## 2024-02-29 RX ADMIN — DAPAGLIFLOZIN 10 MG: 10 TABLET, FILM COATED ORAL at 08:53

## 2024-02-29 RX ADMIN — Medication 10 MG: at 20:11

## 2024-02-29 RX ADMIN — METOPROLOL TARTRATE 50 MG: 50 TABLET, FILM COATED ORAL at 20:11

## 2024-02-29 RX ADMIN — MULTIVIT AND MINERALS-FERROUS GLUCONATE 9 MG IRON/15 ML ORAL LIQUID 15 ML: at 08:54

## 2024-02-29 RX ADMIN — ATORVASTATIN CALCIUM 20 MG: 20 TABLET, FILM COATED ORAL at 08:56

## 2024-02-29 RX ADMIN — BUPROPION HYDROCHLORIDE 150 MG: 100 TABLET, FILM COATED ORAL at 20:11

## 2024-02-29 RX ADMIN — VENLAFAXINE 75 MG: 75 TABLET ORAL at 17:27

## 2024-02-29 RX ADMIN — OXYCODONE HYDROCHLORIDE AND ACETAMINOPHEN 500 MG: 500 TABLET ORAL at 08:54

## 2024-02-29 RX ADMIN — ESOMEPRAZOLE MAGNESIUM 40 MG: 40 FOR SUSPENSION ORAL at 08:56

## 2024-02-29 RX ADMIN — LOSARTAN POTASSIUM 25 MG: 25 TABLET, FILM COATED ORAL at 08:54

## 2024-02-29 ASSESSMENT — COGNITIVE AND FUNCTIONAL STATUS - GENERAL
MOBILITY SCORE: 14
PERSONAL GROOMING: A LOT
HELP NEEDED FOR BATHING: A LITTLE
TOILETING: TOTAL
STANDING UP FROM CHAIR USING ARMS: A LOT
DRESSING REGULAR LOWER BODY CLOTHING: A LITTLE
WALKING IN HOSPITAL ROOM: A LOT
DAILY ACTIVITIY SCORE: 13
WALKING IN HOSPITAL ROOM: A LOT
MOBILITY SCORE: 14
TURNING FROM BACK TO SIDE WHILE IN FLAT BAD: A LITTLE
EATING MEALS: TOTAL
MOVING FROM LYING ON BACK TO SITTING ON SIDE OF FLAT BED WITH BEDRAILS: A LITTLE
TOILETING: TOTAL
EATING MEALS: TOTAL
CLIMB 3 TO 5 STEPS WITH RAILING: TOTAL
DRESSING REGULAR UPPER BODY CLOTHING: A LITTLE
DAILY ACTIVITIY SCORE: 13
STANDING UP FROM CHAIR USING ARMS: A LOT
MOVING TO AND FROM BED TO CHAIR: A LITTLE
HELP NEEDED FOR BATHING: A LITTLE
DRESSING REGULAR UPPER BODY CLOTHING: A LITTLE
MOVING TO AND FROM BED TO CHAIR: A LITTLE
CLIMB 3 TO 5 STEPS WITH RAILING: TOTAL
MOVING FROM LYING ON BACK TO SITTING ON SIDE OF FLAT BED WITH BEDRAILS: A LITTLE
DRESSING REGULAR LOWER BODY CLOTHING: A LITTLE
PERSONAL GROOMING: A LOT
TURNING FROM BACK TO SIDE WHILE IN FLAT BAD: A LITTLE

## 2024-02-29 ASSESSMENT — PAIN SCALES - PAIN ASSESSMENT IN ADVANCED DEMENTIA (PAINAD)
BREATHING: NORMAL
TOTALSCORE: 1
FACIALEXPRESSION: SMILING OR INEXPRESSIVE
CONSOLABILITY: NO NEED TO CONSOLE
BODYLANGUAGE: TENSE, DISTRESSED PACING, FIDGETING

## 2024-02-29 ASSESSMENT — PAIN SCALES - GENERAL
PAINLEVEL_OUTOF10: 0 - NO PAIN
PAINLEVEL_OUTOF10: 0 - NO PAIN

## 2024-02-29 ASSESSMENT — PAIN SCALES - WONG BAKER: WONGBAKER_NUMERICALRESPONSE: NO HURT

## 2024-02-29 NOTE — PROGRESS NOTES
Amita Todd is a 78 y.o. female on day 7 of admission presenting with Thrombocytopenia (CMS/HCC).    Subjective   Given seroquel to aid in sleep, Aox3, episodes of hallucination likely 2/2 hospital delirium.  Patient denies SOB, chest pain, abdominal pain. Endorses dryness in throat. Able to have conversation and Aox3, episodes of drowsiness and possible hallucination but easily reorientable.     Objective   Constitutional:       Comments: underweight   HENT:      Mouth/Throat:      Mouth: Mucous membranes are dry. improved well circumscribed plaques   Eyes:      Pupils: Pupils are equal, round, and reactive to light.   Cardiovascular:      Rate and Rhythm: Irregular rhythm. Tachycardia present.      Pulses: Normal pulses.   Pulmonary:      Comments: mild crackles B/L, RUL>AGATHA. On RA  Abdominal:      General: Abdomen is flat. Bowel sounds are normal.      Palpations: Abdomen is soft.   Musculoskeletal:         General: Normal range of motion.      Cervical back: Normal range of motion.      Comments: 1+ pitting edema B/L LE till ankles   Skin:     General: Skin is warm.      Capillary Refill: Capillary refill takes less than 2 seconds.      Findings: Bruising present.      Comments: Petechia over trunk   Neurological:      General: No focal deficit present.      Mental Status: She is alert and oriented to person, place, and time. Mental status is at baseline.   Psychiatric:         Mood and Affect: Mood normal.     Visit Vitals  /83 (BP Location: Right arm, Patient Position: Lying)   Pulse 89   Temp 36.8 °C (98.2 °F) (Temporal)   Resp 18     Relevant Results  Lab Results   Component Value Date    CREATININE 0.60 02/29/2024    BUN 22 02/29/2024     (L) 02/29/2024    K 4.5 02/29/2024    CL 99 02/29/2024    CO2 27 02/29/2024     Lab Results   Component Value Date    WBC 1.8 (L) 02/29/2024    HGB 9.6 (L) 02/29/2024    HCT 29.7 (L) 02/29/2024    MCV 96 02/29/2024     (L) 02/29/2024        IMPRESSION:  1. Findings suggestive of mild interstitial pulmonary edema.  2. New right infrahilar/basilar airspace opacity may represent  atelectasis, edema, or developing infectious infiltrate.    TTE 2/27:  1. Left ventricular systolic function is mildly decreased with a 50% estimated ejection fraction.  2. Poorly visualized anatomical structures due to suboptimal image quality.  3. There is reduced right ventricular systolic function.  4. The left atrium is moderately dilated.  5. Mildly elevated RVSP.  6. Mild to moderate aortic valve regurgitation.  7. The ascending aorta is 4.4 cm (2.67 cm/m).  8. The patient is in atrial fibrillation which may influence the estimate of left ventricular function and transvalvular flows.  9. There is global hypokinesis of the left ventricle with minor regional variations.  10. There is moderate dilatation of the ascending aorta.    Assessment/Plan   77yo F w PMHx squamous cell carcinoma of hypopharynx, HFpEF, CAD s/p PCI, Afib s/p watchman, GERD, depression, HTN, AUD, ascending aortic aneurysm, distant breast cancer (s/p chemoradiotherapy and lumpectomy) who was directed to ED for hypotension. BP spontaneously improving without intervention other than holding home GDMT. Slowly restarting GDMT. Dobbhoff in place for nutrition, was on Continuous maintenance fluids in meantime. Discontinued fluids after CXR today shows some pulm edema with new o2 requirements, 40 IV lasix x3, narcan for mentation (improved).  On RA    2/29 Updates:  - Discontinued Seroquel, added 10mg Melatonin nightly  - given 40 IV lasix overnight, CXR still shows some mild Pulm edema -> another 40 IV lasix   - On RA, sating >92%  - NPO midnight, holding tomorrow's lovenox, and ceftriaxone 1g 30 min before PEG + EGD tomorrow     #Hypotension, improved   #HFpEF, initial c/f ADHF  ::Patient presents with hypotension over the past month in oncology clinic, has received IVF and discontinued home  losartan/amlodipine during this time, BP spontaneously improving without intervention today to 100s/70s, appears grossly dry to euvolemic on exam without any symptoms of acute heart failure + positive orthostats in ED  ::BNP 2/22: 229 - appears to be at baseline  ::TTE 7/2023: EF 55-60% // TTE 2/2024 EF 50%  - Patient likely has less PO intake and with chemo does not need as many antihyptertensives/GDMT medication dose - emailed primary cardiologist  - Medications given via Dobhoff  - Aldactone 12.5mg // Metoprolol Tartrate 40mg BID // Losartan 25mg // Dapagliflozin 10mg   - Atorvostatin 20mg      #SCC of Hypopharynx (Dr. Burkitt)  #Pain  ::SLP eval 2/12 recommended soft solids with thin liquids, patient would prefer oral feeding to PEG   - nutrition consult   - c/w home Guaifenesin  - c/w Zofran prn nausea, Compazien second line  - Changed pain regimen: IV Dilaudid 0.4mg Q3 PRN   - Will continue dexamethasone 4mg BID   - caution, given narcan once      #Oropharyngeal candidiasis  #Dysphagia  ::Moderate to severe candidiasis with multiple large lesions  ::given ANC 0.16, consideration for opportunistic infections present. Entended antifungal course  -consult GI today for PEG and EGD to r/o radiation vs infectious esophagitis. Will need to hold plavix for another 4 days. Tentative plan for Friday vs Monday   - neupogen usually not given during active tx per primary oncologist, will reasses in a day or 2.   - Fluconazole 200mg IV (2/22 - xx), typically for 7 days but reassess for duration  - Oral care per nursing     #Pancytopenia  ::ANC 0.16 // Plts 53 on admission with no concern for bleeding // HB 10.9 on presentation (BL 12-13)  - likely d/t myelosuppresion vs though patient has had poor po intake vs mixed  - neupogen usually not given during active tx per primary oncologist, will reasses in a day or 2.   - Daily CBC  - T&S updated, transfuse for active bleeding <50, or for <10  - Will hold on DVT prophylaxis,  SCDs only  - holding clopidogrel (for EGD + peg in future)     #Normocytic Anemia  ::HB 10.9 on presentation (BL 12-13), no active bleeding, no recent iron studies  - Likely d/t chronic dz vs myelosuppresion, though patient has had poor po intake     #Atrial Fibrillation  ::Patient previously on AC, now s/p watchman procedure and is rate controlled   ::had 5 ablations per patient  - Continue metoprolol tartrate 50mg BID  - HR 90s to 120s     #Urinary Frequency  - UA with reflex      #Hx of TIA  - c/w home Plavix 75mg daily     #GERD  - c/w home Pantoprazole     #Mood Disorder  #Misc  - c/w home Cymbalta and Wellbutrin  - c/w home supplements, mag-ox     F : goal net -1L, caution  E: replete lytes prn, K>4, Mg >2  N: isosource 1.5 via dobhoff, hold at midnight  A: PIVs     DVT prophylaxis: SCDs  GI prophylaxis: home PPI     Code Status: FULL

## 2024-02-29 NOTE — CARE PLAN
The patient's goals for the shift include      The clinical goals for the shift include pt will remain free of injury      Problem: Skin  Goal: Decreased wound size/increased tissue granulation at next dressing change  Outcome: Progressing  Goal: Participates in plan/prevention/treatment measures  Outcome: Progressing  Goal: Prevent/manage excess moisture  Outcome: Progressing  Goal: Prevent/minimize sheer/friction injuries  Outcome: Progressing  Goal: Promote/optimize nutrition  Outcome: Progressing  Goal: Promote skin healing  Outcome: Progressing     Problem: Pain  Goal: My pain/discomfort is manageable  Outcome: Progressing     Problem: Safety  Goal: Patient will be injury free during hospitalization  Outcome: Progressing  Goal: I will remain free of falls  Outcome: Progressing     Problem: Daily Care  Goal: Daily care needs are met  Outcome: Progressing     Problem: Psychosocial Needs  Goal: Demonstrates ability to cope with hospitalization/illness  Outcome: Progressing  Goal: Collaborate with me, my family, and caregiver to identify my specific goals  Outcome: Progressing     Problem: Discharge Barriers  Goal: My discharge needs are met  Outcome: Progressing     Problem: Pain  Goal: Takes deep breaths with improved pain control throughout the shift  Outcome: Progressing  Goal: Turns in bed with improved pain control throughout the shift  Outcome: Progressing  Goal: Walks with improved pain control throughout the shift  Outcome: Progressing  Goal: Performs ADL's with improved pain control throughout shift  Outcome: Progressing  Goal: Participates in PT with improved pain control throughout the shift  Outcome: Progressing  Goal: Free from opioid side effects throughout the shift  Outcome: Progressing  Goal: Free from acute confusion related to pain meds throughout the shift  Outcome: Progressing     Problem: Pain - Adult  Goal: Verbalizes/displays adequate comfort level or baseline comfort level  Outcome:  Progressing     Problem: Safety - Adult  Goal: Free from fall injury  Outcome: Progressing     Problem: Discharge Planning  Goal: Discharge to home or other facility with appropriate resources  Outcome: Progressing     Problem: Chronic Conditions and Co-morbidities  Goal: Patient's chronic conditions and co-morbidity symptoms are monitored and maintained or improved  Outcome: Progressing     Problem: Fall/Injury  Goal: Not fall by end of shift  Outcome: Progressing  Goal: Be free from injury by end of the shift  Outcome: Progressing  Goal: Verbalize understanding of personal risk factors for fall in the hospital  Outcome: Progressing  Goal: Verbalize understanding of risk factor reduction measures to prevent injury from fall in the home  Outcome: Progressing  Goal: Use assistive devices by end of the shift  Outcome: Progressing  Goal: Pace activities to prevent fatigue by end of the shift  Outcome: Progressing

## 2024-02-29 NOTE — CARE PLAN
Problem: Skin  Goal: Decreased wound size/increased tissue granulation at next dressing change  2/29/2024 0742 by Karissa Mata RN  Outcome: Progressing  2/29/2024 0741 by Karissa Mata RN  Outcome: Progressing  Goal: Participates in plan/prevention/treatment measures  2/29/2024 0742 by Karisas Mata RN  Outcome: Progressing  2/29/2024 0741 by Karissa Mata RN  Outcome: Progressing  Goal: Prevent/manage excess moisture  2/29/2024 0742 by Karissa Mata RN  Outcome: Progressing  2/29/2024 0741 by Karissa Mata RN  Outcome: Progressing  Goal: Prevent/minimize sheer/friction injuries  2/29/2024 0742 by Karissa Mata RN  Outcome: Progressing  2/29/2024 0741 by Karissa Mata RN  Outcome: Progressing  Goal: Promote/optimize nutrition  2/29/2024 0742 by Karissa Mata RN  Outcome: Progressing  2/29/2024 0741 by Karissa Mata RN  Outcome: Progressing  Goal: Promote skin healing  2/29/2024 0742 by Karissa Mata RN  Outcome: Progressing  2/29/2024 0741 by Karissa Mata RN  Outcome: Progressing     Problem: Pain  Goal: My pain/discomfort is manageable  2/29/2024 0742 by Karissa Mata RN  Outcome: Progressing  2/29/2024 0741 by Karissa Mata RN  Outcome: Progressing     Problem: Safety  Goal: Patient will be injury free during hospitalization  2/29/2024 0742 by Karissa Mata RN  Outcome: Progressing  2/29/2024 0741 by Karissa Mata RN  Outcome: Progressing  Goal: I will remain free of falls  2/29/2024 0742 by Karissa Mata RN  Outcome: Progressing  2/29/2024 0741 by Karissa Mata RN  Outcome: Progressing     Problem: Daily Care  Goal: Daily care needs are met  2/29/2024 0742 by Karissa Mata RN  Outcome: Progressing  2/29/2024 0741 by Karissa Mata RN  Outcome: Progressing     Problem: Psychosocial Needs  Goal: Demonstrates ability to cope with hospitalization/illness  2/29/2024 0742 by Karissa Mata RN  Outcome: Progressing  2/29/2024 0741 by Karissa Mata, RN  Outcome: Progressing  Goal: Collaborate with  me, my family, and caregiver to identify my specific goals  2/29/2024 0742 by Karissa Mata RN  Outcome: Progressing  2/29/2024 0741 by Karissa Mata RN  Outcome: Progressing     Problem: Discharge Barriers  Goal: My discharge needs are met  2/29/2024 0742 by Karissa Mata RN  Outcome: Progressing  2/29/2024 0741 by Karissa Mata RN  Outcome: Progressing     Problem: Pain  Goal: Takes deep breaths with improved pain control throughout the shift  2/29/2024 0742 by Karissa Mata RN  Outcome: Progressing  2/29/2024 0741 by Karissa Mata RN  Outcome: Progressing  Goal: Turns in bed with improved pain control throughout the shift  2/29/2024 0742 by Karissa Mata RN  Outcome: Progressing  2/29/2024 0741 by Karissa Mata RN  Outcome: Progressing  Goal: Walks with improved pain control throughout the shift  2/29/2024 0742 by Karissa Mata RN  Outcome: Progressing  2/29/2024 0741 by Karissa Mata RN  Outcome: Progressing  Goal: Performs ADL's with improved pain control throughout shift  2/29/2024 0742 by Karissa Mata RN  Outcome: Progressing  2/29/2024 0741 by Karissa Mata RN  Outcome: Progressing  Goal: Participates in PT with improved pain control throughout the shift  2/29/2024 0742 by Karissa Mata RN  Outcome: Progressing  2/29/2024 0741 by Karissa Mata RN  Outcome: Progressing  Goal: Free from opioid side effects throughout the shift  2/29/2024 0742 by Karissa Mata RN  Outcome: Progressing  2/29/2024 0741 by Karissa Mata RN  Outcome: Progressing  Goal: Free from acute confusion related to pain meds throughout the shift  2/29/2024 0742 by Karissa Mata RN  Outcome: Progressing  2/29/2024 0741 by Karissa Mata RN  Outcome: Progressing     Problem: Pain - Adult  Goal: Verbalizes/displays adequate comfort level or baseline comfort level  2/29/2024 0742 by Karissa Mata RN  Outcome: Progressing  2/29/2024 0741 by Karissa Mata RN  Outcome: Progressing     Problem: Safety - Adult  Goal: Free from  fall injury  2/29/2024 0742 by Karissa Mata RN  Outcome: Progressing  2/29/2024 0741 by Karissa Mata RN  Outcome: Progressing     Problem: Discharge Planning  Goal: Discharge to home or other facility with appropriate resources  2/29/2024 0742 by Karissa Mata RN  Outcome: Progressing  2/29/2024 0741 by Karissa Mata RN  Outcome: Progressing     Problem: Chronic Conditions and Co-morbidities  Goal: Patient's chronic conditions and co-morbidity symptoms are monitored and maintained or improved  2/29/2024 0742 by Karissa Mata RN  Outcome: Progressing  2/29/2024 0741 by Karissa Mata RN  Outcome: Progressing     Problem: Fall/Injury  Goal: Not fall by end of shift  2/29/2024 0742 by Karissa Mata RN  Outcome: Progressing  2/29/2024 0741 by Karissa Mtaa RN  Outcome: Progressing  Goal: Be free from injury by end of the shift  2/29/2024 0742 by Karissa Mata RN  Outcome: Progressing  2/29/2024 0741 by Karissa Mata RN  Outcome: Progressing  Goal: Verbalize understanding of personal risk factors for fall in the hospital  2/29/2024 0742 by Karissa Mata RN  Outcome: Progressing  2/29/2024 0741 by Karissa Mata RN  Outcome: Progressing  Goal: Verbalize understanding of risk factor reduction measures to prevent injury from fall in the home  2/29/2024 0742 by Karissa Mata RN  Outcome: Progressing  2/29/2024 0741 by Karissa Mata RN  Outcome: Progressing  Goal: Use assistive devices by end of the shift  2/29/2024 0742 by Karissa Mata RN  Outcome: Progressing  2/29/2024 0741 by Karissa Mata RN  Outcome: Progressing  Goal: Pace activities to prevent fatigue by end of the shift  2/29/2024 0742 by Karissa Mata RN  Outcome: Progressing  2/29/2024 0741 by Karissa Mata RN  Outcome: Progressing   The patient's goals for the shift include      The clinical goals for the shift include pt will remain free of injury

## 2024-02-29 NOTE — CARE PLAN
Problem: Skin  Goal: Decreased wound size/increased tissue granulation at next dressing change  2/29/2024 0743 by Karissa Mata RN  Flowsheets (Taken 2/29/2024 0743)  Decreased wound size/increased tissue granulation at next dressing change: Protective dressings over bony prominences  2/29/2024 0742 by Karissa Mata RN  Outcome: Progressing  2/29/2024 0741 by Karissa Mata RN  Outcome: Progressing  Goal: Participates in plan/prevention/treatment measures  2/29/2024 0743 by Karissa Mata RN  Flowsheets (Taken 2/29/2024 0743)  Participates in plan/prevention/treatment measures: Increase activity/out of bed for meals  2/29/2024 0742 by Karissa Mata RN  Outcome: Progressing  2/29/2024 0741 by Karissa Mata RN  Outcome: Progressing  Goal: Prevent/manage excess moisture  2/29/2024 0743 by Karissa Mata RN  Flowsheets (Taken 2/29/2024 0743)  Prevent/manage excess moisture: Moisturize dry skin  2/29/2024 0742 by Karissa Mata RN  Outcome: Progressing  2/29/2024 0741 by Karissa Mata RN  Outcome: Progressing  Goal: Prevent/minimize sheer/friction injuries  2/29/2024 0743 by Karissa Mata RN  Flowsheets (Taken 2/29/2024 0743)  Prevent/minimize sheer/friction injuries: Increase activity/out of bed for meals  2/29/2024 0742 by Karissa Mata RN  Outcome: Progressing  2/29/2024 0741 by Karissa Mata RN  Outcome: Progressing  Goal: Promote/optimize nutrition  2/29/2024 0743 by Karissa Mata RN  Flowsheets (Taken 2/29/2024 0743)  Promote/optimize nutrition: Offer water/supplements/favorite foods  2/29/2024 0742 by Karissa Mata RN  Outcome: Progressing  2/29/2024 0741 by Karissa Mata RN  Outcome: Progressing  Goal: Promote skin healing  2/29/2024 0743 by Karissa Mata RN  Flowsheets (Taken 2/29/2024 0743)  Promote skin healing: Protective dressings over bony prominences  2/29/2024 0742 by Karissa Mata RN  Outcome: Progressing  2/29/2024 0741 by Karissa Mata RN  Outcome: Progressing     Problem: Pain  Goal:  My pain/discomfort is manageable  2/29/2024 0742 by Karissa Mata RN  Outcome: Progressing  2/29/2024 0741 by Karissa Mata RN  Outcome: Progressing     Problem: Safety  Goal: Patient will be injury free during hospitalization  2/29/2024 0742 by Karissa Mata RN  Outcome: Progressing  2/29/2024 0741 by Karissa Mata RN  Outcome: Progressing  Goal: I will remain free of falls  2/29/2024 0742 by Karissa Mata RN  Outcome: Progressing  2/29/2024 0741 by Karissa Mata RN  Outcome: Progressing     Problem: Daily Care  Goal: Daily care needs are met  2/29/2024 0742 by Karissa Mata RN  Outcome: Progressing  2/29/2024 0741 by Karissa Mata RN  Outcome: Progressing     Problem: Psychosocial Needs  Goal: Demonstrates ability to cope with hospitalization/illness  2/29/2024 0742 by Karissa Mata RN  Outcome: Progressing  2/29/2024 0741 by Karissa Mata RN  Outcome: Progressing  Goal: Collaborate with me, my family, and caregiver to identify my specific goals  2/29/2024 0742 by Karissa Mata RN  Outcome: Progressing  2/29/2024 0741 by Karissa Mata RN  Outcome: Progressing     Problem: Discharge Barriers  Goal: My discharge needs are met  2/29/2024 0742 by Karissa Mata RN  Outcome: Progressing  2/29/2024 0741 by Karissa Mata RN  Outcome: Progressing     Problem: Pain  Goal: Takes deep breaths with improved pain control throughout the shift  2/29/2024 0742 by Karissa Mata RN  Outcome: Progressing  2/29/2024 0741 by Karissa Mata RN  Outcome: Progressing  Goal: Turns in bed with improved pain control throughout the shift  2/29/2024 0742 by Karissa Mata RN  Outcome: Progressing  2/29/2024 0741 by Karissa Mata RN  Outcome: Progressing  Goal: Walks with improved pain control throughout the shift  2/29/2024 0742 by Karissa Mata RN  Outcome: Progressing  2/29/2024 0741 by Karissa Mata RN  Outcome: Progressing  Goal: Performs ADL's with improved pain control throughout shift  2/29/2024 0742 by Karissa Mata  RN  Outcome: Progressing  2/29/2024 0741 by Karissa Mata RN  Outcome: Progressing  Goal: Participates in PT with improved pain control throughout the shift  2/29/2024 0742 by Karissa Mata RN  Outcome: Progressing  2/29/2024 0741 by Karissa Mata RN  Outcome: Progressing  Goal: Free from opioid side effects throughout the shift  2/29/2024 0742 by Karissa Mata RN  Outcome: Progressing  2/29/2024 0741 by Karissa Mata RN  Outcome: Progressing  Goal: Free from acute confusion related to pain meds throughout the shift  2/29/2024 0742 by Karissa Mata RN  Outcome: Progressing  2/29/2024 0741 by Karissa Mata RN  Outcome: Progressing     Problem: Pain - Adult  Goal: Verbalizes/displays adequate comfort level or baseline comfort level  2/29/2024 0742 by Karissa Mata RN  Outcome: Progressing  2/29/2024 0741 by Karissa Mata RN  Outcome: Progressing     Problem: Safety - Adult  Goal: Free from fall injury  2/29/2024 0742 by Karissa Mata RN  Outcome: Progressing  2/29/2024 0741 by Karissa Mata RN  Outcome: Progressing     Problem: Discharge Planning  Goal: Discharge to home or other facility with appropriate resources  2/29/2024 0742 by Karissa Mata RN  Outcome: Progressing  2/29/2024 0741 by Karissa Mata RN  Outcome: Progressing     Problem: Chronic Conditions and Co-morbidities  Goal: Patient's chronic conditions and co-morbidity symptoms are monitored and maintained or improved  2/29/2024 0742 by Karissa Mata RN  Outcome: Progressing  2/29/2024 0741 by Karissa Mata RN  Outcome: Progressing     Problem: Fall/Injury  Goal: Not fall by end of shift  2/29/2024 0742 by Karissa Mata RN  Outcome: Progressing  2/29/2024 0741 by Karissa Mata RN  Outcome: Progressing  Goal: Be free from injury by end of the shift  2/29/2024 0742 by Karissa Mata RN  Outcome: Progressing  2/29/2024 0741 by Karissa Mata RN  Outcome: Progressing  Goal: Verbalize understanding of personal risk factors for fall in the  [facility]  2/29/2024 0742 by Karissa Mata RN  Outcome: Progressing  2/29/2024 0741 by Karissa Mata RN  Outcome: Progressing  Goal: Verbalize understanding of risk factor reduction measures to prevent injury from fall in the home  2/29/2024 0742 by Karissa Mata RN  Outcome: Progressing  2/29/2024 0741 by Karissa Mata RN  Outcome: Progressing  Goal: Use assistive devices by end of the shift  2/29/2024 0742 by Karissa Mata RN  Outcome: Progressing  2/29/2024 0741 by Karissa Mata RN  Outcome: Progressing  Goal: Pace activities to prevent fatigue by end of the shift  2/29/2024 0742 by Karissa Mata RN  Outcome: Progressing  2/29/2024 0741 by Karissa Mata RN  Outcome: Progressing   The patient's goals for the shift include      The clinical goals for the shift include pt will remain free of injury

## 2024-02-29 NOTE — PROGRESS NOTES
"[facility]  [facility] [place]  CONSULT FOLLOW-UP     Reason For Consult  PEG tube assessment     SUBJECTIVE     Over night she had hallucinations. Thought to be hospital acquired delirium.     EXAM     Last Recorded Vitals  Blood pressure 129/87, pulse 99, temperature 36.8 °C (98.2 °F), temperature source Temporal, resp. rate 18, height 1.635 m (5' 4.37\"), weight 68 kg (149 lb 14.6 oz), SpO2 93 %.      Intake/Output Summary (Last 24 hours) at 2/29/2024 1035  Last data filed at 2/29/2024 1015  Gross per 24 hour   Intake 645.6 ml   Output 1450 ml   Net -804.4 ml       Physical Exam   GENERAL: Chronically ill.  NEUROLOGIC: A and O x 3. Cranial nerves 2 through 12 grossly intact. Intact motor and sensory systems.  HEENT: Pupils are equal, round, and reactive to light and accommodation. Extraocular motion intact. CARDIAC: S1 + S2, RRR, no M/R/G.    LUNGS: CTA BL. No wheezes or coarse breath sounds. Symmetric respirations. No increased work of breathing.   ABDOMEN: Soft, non-tender to palpation. No rebound or guarding.  PSYCH: Low mood.    OBJECTIVE                                                                              Medications             Current Facility-Administered Medications:     acetaminophen (Tylenol) oral liquid 1,000 mg, 1,000 mg, nasogastric tube, q6h, Angelic Sarmiento MD, 1,000 mg at 02/28/24 1754    albuterol 2.5 mg /3 mL (0.083 %) nebulizer solution 3 mL, 3 mL, nebulization, PRN, Marisel Patrick MD [facility]    alteplase (Cathflo Activase) injection 1 mg, 1 mg, intra-catheter, PRN, Isaac Bledsoe MD    alteplase (Cathflo Activase) injection 1 mg, 1 mg, intra-catheter, PRN, Isaac Bledsoe MD, 1 mg at 02/28/24 2225    ascorbic acid (Vitamin C) tablet 500 mg, 500 mg, nasogastric tube, Daily, Mary Corbett MD, 500 mg at 02/29/24 0854    atorvastatin (Lipitor) tablet 20 mg, 20 mg, nasogastric tube, Daily, Angelic Sarmiento MD, 20 mg at 02/29/24 0856    " buPROPion (Wellbutrin) tablet 150 mg, 150 mg, nasogastric tube, BID, Angelic Sarmiento MD, 150 mg at 02/29/24 0854    [START ON 3/1/2024] ceFAZolin in dextrose (iso-os) (Ancef) IVPB 1 g, 1 g, intravenous, Once, Angelic Sarmiento MD    cholecalciferol (Vitamin D-3) tablet 1,000 Units, 1,000 Units, nasogastric tube, BID, Angelic Sarmiento MD, 1,000 Units at 02/29/24 0854    dapagliflozin propanediol (Farxiga) tablet 10 mg, 10 mg, oral, Daily, Justino Cooper MD, 10 mg at 02/29/24 0853    dextrose 5 % in water (D5W) bolus, 500 mL, intravenous, PRN, Marisel Patrick MD MPH    diphenhydrAMINE (BENADryl) injection 50 mg, 50 mg, intravenous, PRN, Marisel Patrick MD MPH    EPINEPHrine HCl (PF) (Adrenalin) injection 0.3 mg, 0.3 mg, intramuscular, q5 min PRN, Marisel Patrick MD MPH    esomeprazole (NexIUM) suspension 40 mg, 40 mg, nasoduodenal tube, Daily before breakfast, Mary Corbett MD, 40 mg at 02/29/24 0856    famotidine PF (Pepcid) injection 20 mg, 20 mg, intravenous, Once PRN, Marisel Patrick MD MPH    fluconazole in NaCl (iso-osm) (Diflucan) IVPB 400 mg, 400 mg, intravenous, q24h, Mary Corbett MD, 400 mg at 02/28/24 1240    guaiFENesin (Robitussin) 100 mg/5 mL syrup 200 mg, 200 mg, nasogastric tube, q4h PRN, Angelic Sarmiento MD    HYDROmorphone (Dilaudid) injection 0.4 mg, 0.4 mg, intravenous, q3h PRN, Isaac Bledsoe MD    HYDROmorphone (Dilaudid) tablet 1 mg, 1 mg, oral, q3h PRN, Isaac Bledsoe MD, 1 mg at 02/28/24 2049    lido-diphen-Maalox 1:1:1 Magic Mouthwash, 10 mL, Swish & Spit, q4h PRN, Isaac Bledsoe MD, 10 mL at 02/25/24 1334    lidocaine (Xylocaine) 2 % jelly 1 Application, 1 Application, Topical, Once, Mary Corbett MD    losartan (Cozaar) tablet 25 mg, 25 mg, nasogastric tube, Daily, Angelic Sarmiento MD, 25 mg at 02/29/24 0854    magnesium oxide (Mag-Ox) tablet 400 mg, 400 mg, nasogastric tube, Daily, Angelic Sarmiento MD, 400 mg at 02/29/24 0854    magnesium sulfate IV 2 g, 2 g, intravenous, Once, Mary Corbett MD     methylPREDNISolone sod succinate (PF) (SOLU-Medrol) 40 mg/mL injection 40 mg, 40 mg, intravenous, PRN, Marisel Patrick MD MPH    metoprolol tartrate (Lopressor) tablet 50 mg, 50 mg, nasogastric tube, BID, Angelic Sarmiento MD, 50 mg at 02/29/24 0854    multivitamin with iron-minerals 9 mg iron/15 mL liquid 15 mL, 15 mL, nasogastric tube, Daily, Angelic Sarmiento MD, 15 mL at 02/29/24 0854    ondansetron (Zofran) injection 4 mg, 4 mg, intravenous, q6h PRN, Mary Corbett MD, 4 mg at 02/27/24 0815    perflutren lipid microspheres (Definity) injection 0.5-10 mL of dilution, 0.5-10 mL of dilution, intravenous, Once in imaging, Justino Cooper MD    perflutren protein A microsphere (Optison) injection 0.5 mL, 0.5 mL, intravenous, Once in imaging, Justino Cooper MD    phenoL (Chloraseptic) 1.4 % mouth/throat spray 1 spray, 1 spray, Mouth/Throat, q2h PRN, Angelic Sarmiento MD    prochlorperazine (Compazine) injection 10 mg, 10 mg, intravenous, q6h PRN, Angelic Sarmiento MD, 10 mg at 02/27/24 1151    prochlorperazine (Compazine) injection 10 mg, 10 mg, intravenous, q6h PRN, Marisel Patrick MD MPH    prochlorperazine (Compazine) tablet 10 mg, 10 mg, oral, q6h PRN, Marisel Patrick MD MPH    QUEtiapine (SEROquel) tablet 25 mg, 25 mg, oral, Nightly PRN, Isaac Bledsoe MD, 25 mg at 02/29/24 0301    senna (Senokot) 8.8 mg/5 mL syrup 10 mL, 10 mL, nasogastric tube, BID, Angelic Sarmiento MD, 10 mL at 02/29/24 0854    sodium chloride 0.9 % bolus 500 mL, 500 mL, intravenous, PRN, Marisel Patrick MD MPH    spironolactone (Aldactone) tablet 12.5 mg, 12.5 mg, nasogastric tube, Daily, Angelic Sarmiento MD, 12.5 mg at 02/29/24 0854    sulfur hexafluoride microsphr (Lumason) injection 24.28 mg, 2 mL, intravenous, Once in imaging, Justino Cooper MD    thiamine (Vitamin B-1) tablet 100 mg, 100 mg, oral, Daily, Mary Corbett MD    venlafaxine (Effexor) tablet 75 mg, 75 mg, nasogastric tube, BID with meals, Angelic Sarmiento MD, 75 mg at 02/29/24 0854                                                                             Labs     Results for orders placed or performed during the hospital encounter of 02/22/24 (from the past 24 hour(s))   CBC and Auto Differential   Result Value Ref Range    WBC 1.8 (L) 4.4 - 11.3 x10*3/uL    nRBC 5.0 (H) 0.0 - 0.0 /100 WBCs    RBC 3.09 (L) 4.00 - 5.20 x10*6/uL    Hemoglobin 9.6 (L) 12.0 - 16.0 g/dL    Hematocrit 29.7 (L) 36.0 - 46.0 %    MCV 96 80 - 100 fL    MCH 31.1 26.0 - 34.0 pg    MCHC 32.3 32.0 - 36.0 g/dL    RDW 17.2 (H) 11.5 - 14.5 %    Platelets 113 (L) 150 - 450 x10*3/uL    Immature Granulocytes %, Automated 2.2 (H) 0.0 - 0.9 %    Immature Granulocytes Absolute, Automated 0.04 0.00 - 0.50 x10*3/uL   Magnesium   Result Value Ref Range    Magnesium 1.93 1.60 - 2.40 mg/dL   Lactate   Result Value Ref Range    Lactate 1.8 0.4 - 2.0 mmol/L   Blood Culture    Specimen: Mediport; Blood culture   Result Value Ref Range    Blood Culture Loaded on Instrument - Culture in progress    Blood Culture    Specimen: Peripheral Venipuncture; Blood culture   Result Value Ref Range    Blood Culture Loaded on Instrument - Culture in progress    Comprehensive metabolic panel   Result Value Ref Range    Glucose 106 (H) 74 - 99 mg/dL    Sodium 135 (L) 136 - 145 mmol/L    Potassium 4.5 3.5 - 5.3 mmol/L    Chloride 99 98 - 107 mmol/L    Bicarbonate 27 21 - 32 mmol/L    Anion Gap 14 10 - 20 mmol/L    Urea Nitrogen 22 6 - 23 mg/dL    Creatinine 0.60 0.50 - 1.05 mg/dL    eGFR >90 >60 mL/min/1.73m*2    Calcium 8.6 8.6 - 10.6 mg/dL    Albumin 2.5 (L) 3.4 - 5.0 g/dL    Alkaline Phosphatase 58 33 - 136 U/L    Total Protein 4.9 (L) 6.4 - 8.2 g/dL    AST 15 9 - 39 U/L    Bilirubin, Total 0.9 0.0 - 1.2 mg/dL    ALT 23 7 - 45 U/L   Phosphorus   Result Value Ref Range    Phosphorus 2.6 2.5 - 4.9 mg/dL   Manual Differential   Result Value Ref Range    Neutrophils %, Manual 83.8 40.0 - 80.0 %    Bands %, Manual 8.1 0.0 - 5.0 %    Lymphocytes %, Manual 0.7 13.0 - 44.0 %    Monocytes %, Manual 1.5 2.0 -  10.0 %    Eosinophils %, Manual 0.0 0.0 - 6.0 %    Basophils %, Manual 0.0 0.0 - 2.0 %    Metamyelocytes %, Manual 0.7 0.0 - 0.0 %    Myelocytes %, Manual 5.2 0.0 - 0.0 %    Seg Neutrophils Absolute, Manual 1.51 (L) 1.60 - 5.00 x10*3/uL    Bands Absolute, Manual 0.15 0.00 - 0.50 x10*3/uL    Lymphocytes Absolute, Manual 0.01 (L) 0.80 - 3.00 x10*3/uL    Monocytes Absolute, Manual 0.03 (L) 0.05 - 0.80 x10*3/uL    Eosinophils Absolute, Manual 0.00 0.00 - 0.40 x10*3/uL    Basophils Absolute, Manual 0.00 0.00 - 0.10 x10*3/uL    Metamyelocytes Absolute, Manual 0.01 0.00 - 0.00 x10*3/uL    Myelocytes Absolute, Manual 0.09 0.00 - 0.00 x10*3/uL    Total Cells Counted 136     Neutrophils Absolute, Manual 1.66 1.60 - 5.50 x10*3/uL    RBC Morphology See Below     Ovalocytes Few     [place] Cells Few    Hepatic function panel   Result Value Ref Range    Albumin 2.5 (L) 3.4 - 5.0 g/dL    Bilirubin, Total 0.9 0.0 - 1.2 mg/dL    Bilirubin, Direct 0.4 (H) 0.0 - 0.3 mg/dL    Alkaline Phosphatase 57 33 - 136 U/L    ALT 23 7 - 45 U/L    AST 16 9 - 39 U/L    Total Protein 5.0 (L) 6.4 - 8.2 g/dL   Urinalysis with Reflex Culture and Microscopic   Result Value Ref Range    Color, Urine Yellow Light-Yellow, Yellow, Dark-Yellow    Appearance, Urine Turbid (N) Clear    Specific Gravity, Urine 1.031 1.005 - 1.035    pH, Urine 6.0 5.0, 5.5, 6.0, 6.5, 7.0, 7.5, 8.0    Protein, Urine 20 (TRACE) NEGATIVE, 10 (TRACE), 20 (TRACE) mg/dL    Glucose, Urine OVER (4+) (A) Normal mg/dL    Blood, Urine 0.1 (1+) (A) NEGATIVE    Ketones, Urine NEGATIVE NEGATIVE mg/dL    Bilirubin, Urine NEGATIVE NEGATIVE    Urobilinogen, Urine 3 (1+) (A) Normal mg/dL    Nitrite, Urine NEGATIVE NEGATIVE    Leukocyte Esterase, Urine NEGATIVE NEGATIVE   Urinalysis Microscopic   Result Value Ref Range    WBC, Urine 1-5 1-5, NONE /HPF    RBC, Urine 11-20 (A) NONE, 1-2, 3-5 /HPF    Bacteria, Urine 1+ (A) NONE SEEN /HPF    Mucus, Urine FEW Reference range not established. /LPF                                                                              Imaging           02/24/24 MBS:  Oral Phase:  WFL     Adequate bolus acceptance, formation, and transit w/ all consistencies assessed.       -Pt does note significant odynophagia (pain orally and pharyngeally) with all PO intake.     Pharyngeal Phase:  Moderate/severe deficits     Tissue protrusion within hypopharynx impacting bolus flow.  No nasal regurgitation on this assessment, but backflow of boluses observed.  Hyolaryngeal elevation/excursion impaired.  Reduced airway protection during the swallow resulting in deep penetration to the vocal cords w/ all liquid consistencies, and eventual trace aspiration of thin and mildly thick liquids.  Independent cough/throat clear, but this did not clear aspirated material. Pharyngeal stasis at the valleculae and piriforms following the swallow with all liquid consistencies.  Aspiration in min-mod amounts following the swallow with thin and mildly thick liquids as a result of residue.  Again, cough response did not clear.  Chin tuck and head turn R did not improve safety or efficiency of swallow.  Head turn L (toward side of mass) improved swallow partially, but did not entirely resolve deficits.  With most viscous consistency assessed (puree) , there was no penetration/aspiration or significant stasis.       Esophageal Phase:  No overt deficits                                                                           GI Procedures      02/2021 EGD:  Impression:     - Z-line, 39 cm from the incisors.                         - Normal esophagus.                         - 3 parallel streaks of hemorrhagic appearance and                          linear erosions mucosa in the gastric body. Biopsied.                         - Normal examined duodenum.                         - Unclear if the gastric lesions are her sole source                          of bleeding as not seen on two previous endoscopie                           over past 6 months with recurrent UGI bleeding.     ASSESSMENT / PLAN                  ASSESSMENT/PLAN:     Amita Todd is a 78 y.o. 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      GI is consulted for PEG tube assessment.    Last dose of Plavix was on the 2/25/24. We will plan for PEG tube placement on the 03/01/2024 after Plavix washout.      Recommendations:  - Hold Plavix for 5 day prior to PEG tube placement.  - Tentatively plan for PEG placement on the 03/01/2024  - NPO at MN day prior to procedure   - please hold all heparin products (including prophylaxis) at 4AM day prior to procedure  - AM INR and CBC for the 03/01/2024  - surgical prophylaxis of cefazolin 2 g IV 30 minutes prior to surgical incision (for nonobese patients weighing <120 kg)     The above recommendations were communicated to the [facility] team.     Patient was seen and discussed with Dr. Turner.     Gastroenterology will continue to follow.  During weekday hours of 7am-5pm please do not hesitate to contact me on [employer] Chat or page [phone], if there are any further questions between the weekday hours of 7am-5pm.   After hours, on weekends, and on holidays, please [place] [phone]. Thank you.     Angelica Montalvo MD  PGY-4 Gastroenterology and Hepatology Fellow  [facility] [place]

## 2024-03-01 ENCOUNTER — HOSPITAL ENCOUNTER (OUTPATIENT)
Dept: RADIATION ONCOLOGY | Facility: HOSPITAL | Age: 79
Setting detail: RADIATION/ONCOLOGY SERIES
Discharge: HOME | End: 2024-03-01
Payer: MEDICARE

## 2024-03-01 DIAGNOSIS — Z51.0 ENCOUNTER FOR ANTINEOPLASTIC RADIATION THERAPY: ICD-10-CM

## 2024-03-01 DIAGNOSIS — C13.8 MALIGNANT NEOPLASM OF OVERLAPPING SITES OF HYPOPHARYNX (MULTI): ICD-10-CM

## 2024-03-01 LAB
ALBUMIN SERPL BCP-MCNC: 2.6 G/DL (ref 3.4–5)
ANION GAP SERPL CALC-SCNC: 12 MMOL/L (ref 10–20)
APTT PPP: 30 SECONDS (ref 27–38)
BASOPHILS # BLD MANUAL: 0 X10*3/UL (ref 0–0.1)
BASOPHILS NFR BLD MANUAL: 0 %
BUN SERPL-MCNC: 30 MG/DL (ref 6–23)
CALCIUM SERPL-MCNC: 8.5 MG/DL (ref 8.6–10.6)
CHLORIDE SERPL-SCNC: 96 MMOL/L (ref 98–107)
CO2 SERPL-SCNC: 32 MMOL/L (ref 21–32)
CREAT SERPL-MCNC: 0.73 MG/DL (ref 0.5–1.05)
EGFRCR SERPLBLD CKD-EPI 2021: 85 ML/MIN/1.73M*2
EOSINOPHIL # BLD MANUAL: 0 X10*3/UL (ref 0–0.4)
EOSINOPHIL NFR BLD MANUAL: 0 %
ERYTHROCYTE [DISTWIDTH] IN BLOOD BY AUTOMATED COUNT: 17.5 % (ref 11.5–14.5)
GLUCOSE SERPL-MCNC: 111 MG/DL (ref 74–99)
HCT VFR BLD AUTO: 28.7 % (ref 36–46)
HGB BLD-MCNC: 9.1 G/DL (ref 12–16)
IMM GRANULOCYTES # BLD AUTO: 0.01 X10*3/UL (ref 0–0.5)
IMM GRANULOCYTES NFR BLD AUTO: 0.5 % (ref 0–0.9)
INR PPP: 1.3 (ref 0.9–1.1)
LYMPHOCYTES # BLD MANUAL: 0 X10*3/UL (ref 0.8–3)
LYMPHOCYTES NFR BLD MANUAL: 0 %
MAGNESIUM SERPL-MCNC: 2.3 MG/DL (ref 1.6–2.4)
MCH RBC QN AUTO: 30.6 PG (ref 26–34)
MCHC RBC AUTO-ENTMCNC: 31.7 G/DL (ref 32–36)
MCV RBC AUTO: 97 FL (ref 80–100)
MONOCYTES # BLD MANUAL: 0.32 X10*3/UL (ref 0.05–0.8)
MONOCYTES NFR BLD MANUAL: 15.8 %
MYELOCYTES # BLD MANUAL: 0.05 X10*3/UL
MYELOCYTES NFR BLD MANUAL: 2.6 %
NEUTROPHILS # BLD MANUAL: 1.63 X10*3/UL (ref 1.6–5.5)
NEUTS BAND # BLD MANUAL: 0.07 X10*3/UL (ref 0–0.5)
NEUTS BAND NFR BLD MANUAL: 3.5 %
NEUTS SEG # BLD MANUAL: 1.56 X10*3/UL (ref 1.6–5)
NEUTS SEG NFR BLD MANUAL: 78.1 %
NRBC BLD-RTO: 4 /100 WBCS (ref 0–0)
OVALOCYTES BLD QL SMEAR: ABNORMAL
PHOSPHATE SERPL-MCNC: 2.9 MG/DL (ref 2.5–4.9)
PLATELET # BLD AUTO: 101 X10*3/UL (ref 150–450)
POTASSIUM SERPL-SCNC: 3.8 MMOL/L (ref 3.5–5.3)
PROTHROMBIN TIME: 15.2 SECONDS (ref 9.8–12.8)
RAD ONC MSQ ACTUAL FRACTIONS DELIVERED: 28
RAD ONC MSQ ACTUAL SESSION DELIVERED DOSE: 200 CGRAY
RAD ONC MSQ ACTUAL TOTAL DOSE: 5600 CGRAY
RAD ONC MSQ ELAPSED DAYS: 37
RAD ONC MSQ LAST DATE: NORMAL
RAD ONC MSQ PRESCRIBED FRACTIONAL DOSE: 200 CGRAY
RAD ONC MSQ PRESCRIBED NUMBER OF FRACTIONS: 35
RAD ONC MSQ PRESCRIBED TECHNIQUE: NORMAL
RAD ONC MSQ PRESCRIBED TOTAL DOSE: 7000 CGRAY
RAD ONC MSQ PRESCRIPTION PATTERN COMMENT: NORMAL
RAD ONC MSQ START DATE: NORMAL
RAD ONC MSQ TREATMENT COURSE NUMBER: 1
RAD ONC MSQ TREATMENT SITE: NORMAL
RBC # BLD AUTO: 2.97 X10*6/UL (ref 4–5.2)
RBC MORPH BLD: ABNORMAL
SODIUM SERPL-SCNC: 136 MMOL/L (ref 136–145)
TOTAL CELLS COUNTED BLD: 114
WBC # BLD AUTO: 2 X10*3/UL (ref 4.4–11.3)

## 2024-03-01 PROCEDURE — 77014 CHG CT GUIDANCE RADIATION THERAPY FLDS PLACEMENT: CPT | Performed by: RADIOLOGY

## 2024-03-01 PROCEDURE — 2500000004 HC RX 250 GENERAL PHARMACY W/ HCPCS (ALT 636 FOR OP/ED)

## 2024-03-01 PROCEDURE — 85610 PROTHROMBIN TIME: CPT

## 2024-03-01 PROCEDURE — 2500000002 HC RX 250 W HCPCS SELF ADMINISTERED DRUGS (ALT 637 FOR MEDICARE OP, ALT 636 FOR OP/ED)

## 2024-03-01 PROCEDURE — 2500000001 HC RX 250 WO HCPCS SELF ADMINISTERED DRUGS (ALT 637 FOR MEDICARE OP)

## 2024-03-01 PROCEDURE — 85007 BL SMEAR W/DIFF WBC COUNT: CPT

## 2024-03-01 PROCEDURE — 99221 1ST HOSP IP/OBS SF/LOW 40: CPT | Performed by: STUDENT IN AN ORGANIZED HEALTH CARE EDUCATION/TRAINING PROGRAM

## 2024-03-01 PROCEDURE — 85027 COMPLETE CBC AUTOMATED: CPT

## 2024-03-01 PROCEDURE — 83735 ASSAY OF MAGNESIUM: CPT

## 2024-03-01 PROCEDURE — 1170000001 HC PRIVATE ONCOLOGY ROOM DAILY

## 2024-03-01 PROCEDURE — 77386 HC INTENSITY-MODULATED RADIATION THERAPY (IMRT), COMPLEX: CPT | Performed by: RADIOLOGY

## 2024-03-01 PROCEDURE — 80069 RENAL FUNCTION PANEL: CPT

## 2024-03-01 PROCEDURE — 99233 SBSQ HOSP IP/OBS HIGH 50: CPT

## 2024-03-01 RX ORDER — ACETAMINOPHEN 500 MG
10 TABLET ORAL NIGHTLY
Status: DISCONTINUED | OUTPATIENT
Start: 2024-03-01 | End: 2024-03-02

## 2024-03-01 RX ORDER — INFANT FORMULA WITH IRON
POWDER (GRAM) ORAL
Status: COMPLETED
Start: 2024-03-01 | End: 2024-03-01

## 2024-03-01 RX ORDER — CEFTRIAXONE 2 G/50ML
2 INJECTION, SOLUTION INTRAVENOUS EVERY 24 HOURS
Status: DISCONTINUED | OUTPATIENT
Start: 2024-03-01 | End: 2024-03-04

## 2024-03-01 RX ADMIN — METOPROLOL TARTRATE 50 MG: 50 TABLET, FILM COATED ORAL at 08:59

## 2024-03-01 RX ADMIN — DAPAGLIFLOZIN 10 MG: 10 TABLET, FILM COATED ORAL at 09:00

## 2024-03-01 RX ADMIN — ACETAMINOPHEN 1000 MG: 650 SOLUTION ORAL at 16:49

## 2024-03-01 RX ADMIN — THIAMINE HCL TAB 100 MG 100 MG: 100 TAB at 08:59

## 2024-03-01 RX ADMIN — PIPERACILLIN SODIUM AND TAZOBACTAM SODIUM 4.5 G: 4; .5 INJECTION, SOLUTION INTRAVENOUS at 04:55

## 2024-03-01 RX ADMIN — ACETAMINOPHEN 1000 MG: 650 SOLUTION ORAL at 00:06

## 2024-03-01 RX ADMIN — BUPROPION HYDROCHLORIDE 150 MG: 100 TABLET, FILM COATED ORAL at 09:00

## 2024-03-01 RX ADMIN — ACETAMINOPHEN 1000 MG: 650 SOLUTION ORAL at 23:39

## 2024-03-01 RX ADMIN — VENLAFAXINE 75 MG: 75 TABLET ORAL at 08:59

## 2024-03-01 RX ADMIN — VENLAFAXINE 75 MG: 75 TABLET ORAL at 16:49

## 2024-03-01 RX ADMIN — Medication 1000 UNITS: at 08:59

## 2024-03-01 RX ADMIN — FLUCONAZOLE 400 MG: 2 INJECTION, SOLUTION INTRAVENOUS at 12:40

## 2024-03-01 RX ADMIN — BUPROPION HYDROCHLORIDE 150 MG: 100 TABLET, FILM COATED ORAL at 22:04

## 2024-03-01 RX ADMIN — Medication: at 12:40

## 2024-03-01 RX ADMIN — ATORVASTATIN CALCIUM 20 MG: 20 TABLET, FILM COATED ORAL at 08:59

## 2024-03-01 RX ADMIN — CEFTRIAXONE SODIUM 2 G: 2 INJECTION, SOLUTION INTRAVENOUS at 16:49

## 2024-03-01 RX ADMIN — LOSARTAN POTASSIUM 25 MG: 25 TABLET, FILM COATED ORAL at 08:59

## 2024-03-01 RX ADMIN — PIPERACILLIN SODIUM AND TAZOBACTAM SODIUM 4.5 G: 4; .5 INJECTION, SOLUTION INTRAVENOUS at 12:40

## 2024-03-01 RX ADMIN — PIPERACILLIN SODIUM AND TAZOBACTAM SODIUM 4.5 G: 4; .5 INJECTION, SOLUTION INTRAVENOUS at 00:06

## 2024-03-01 RX ADMIN — MULTIVIT AND MINERALS-FERROUS GLUCONATE 9 MG IRON/15 ML ORAL LIQUID 15 ML: at 09:00

## 2024-03-01 RX ADMIN — METOPROLOL TARTRATE 50 MG: 50 TABLET, FILM COATED ORAL at 22:04

## 2024-03-01 RX ADMIN — Medication 1000 UNITS: at 22:04

## 2024-03-01 RX ADMIN — SPIRONOLACTONE 12.5 MG: 25 TABLET, FILM COATED ORAL at 08:59

## 2024-03-01 RX ADMIN — CEFAZOLIN SODIUM 2 G: 2 INJECTION, SOLUTION INTRAVENOUS at 09:01

## 2024-03-01 RX ADMIN — OXYCODONE HYDROCHLORIDE AND ACETAMINOPHEN 500 MG: 500 TABLET ORAL at 08:59

## 2024-03-01 RX ADMIN — Medication 10 MG: at 17:59

## 2024-03-01 RX ADMIN — Medication 400 MG: at 08:59

## 2024-03-01 ASSESSMENT — COGNITIVE AND FUNCTIONAL STATUS - GENERAL
DAILY ACTIVITIY SCORE: 11
HELP NEEDED FOR BATHING: TOTAL
DRESSING REGULAR UPPER BODY CLOTHING: A LOT
PERSONAL GROOMING: TOTAL
DRESSING REGULAR LOWER BODY CLOTHING: A LOT
MOVING FROM LYING ON BACK TO SITTING ON SIDE OF FLAT BED WITH BEDRAILS: A LITTLE
MOBILITY SCORE: 10
STANDING UP FROM CHAIR USING ARMS: TOTAL
TURNING FROM BACK TO SIDE WHILE IN FLAT BAD: A LITTLE
TOILETING: TOTAL
MOVING TO AND FROM BED TO CHAIR: TOTAL
WALKING IN HOSPITAL ROOM: TOTAL
CLIMB 3 TO 5 STEPS WITH RAILING: TOTAL

## 2024-03-01 ASSESSMENT — PAIN SCALES - GENERAL
PAINLEVEL_OUTOF10: 0 - NO PAIN
PAINLEVEL_OUTOF10: 7

## 2024-03-01 ASSESSMENT — PAIN DESCRIPTION - LOCATION: LOCATION: ABDOMEN

## 2024-03-01 ASSESSMENT — PAIN - FUNCTIONAL ASSESSMENT: PAIN_FUNCTIONAL_ASSESSMENT: 0-10

## 2024-03-01 NOTE — CARE PLAN
Problem: Skin  Goal: Decreased wound size/increased tissue granulation at next dressing change  Outcome: Progressing  Goal: Participates in plan/prevention/treatment measures  Outcome: Progressing  Goal: Prevent/manage excess moisture  Outcome: Progressing  Goal: Prevent/minimize sheer/friction injuries  Outcome: Progressing  Goal: Promote/optimize nutrition  Outcome: Progressing  Goal: Promote skin healing  Outcome: Progressing     Problem: Pain  Goal: My pain/discomfort is manageable  Outcome: Progressing     Problem: Safety  Goal: Patient will be injury free during hospitalization  Outcome: Progressing  Goal: I will remain free of falls  Outcome: Progressing     Problem: Daily Care  Goal: Daily care needs are met  Outcome: Progressing     Problem: Psychosocial Needs  Goal: Demonstrates ability to cope with hospitalization/illness  Outcome: Progressing  Goal: Collaborate with me, my family, and caregiver to identify my specific goals  Outcome: Progressing     Problem: Discharge Barriers  Goal: My discharge needs are met  Outcome: Progressing     Problem: Pain  Goal: Takes deep breaths with improved pain control throughout the shift  Outcome: Progressing  Goal: Turns in bed with improved pain control throughout the shift  Outcome: Progressing  Goal: Walks with improved pain control throughout the shift  Outcome: Progressing  Goal: Performs ADL's with improved pain control throughout shift  Outcome: Progressing  Goal: Participates in PT with improved pain control throughout the shift  Outcome: Progressing  Goal: Free from opioid side effects throughout the shift  Outcome: Progressing  Goal: Free from acute confusion related to pain meds throughout the shift  Outcome: Progressing     Problem: Safety - Adult  Goal: Free from fall injury  Outcome: Progressing     Problem: Discharge Planning  Goal: Discharge to home or other facility with appropriate resources  Outcome: Progressing     Problem: Chronic Conditions and  Co-morbidities  Goal: Patient's chronic conditions and co-morbidity symptoms are monitored and maintained or improved  Outcome: Progressing     Problem: Fall/Injury  Goal: Not fall by end of shift  Outcome: Progressing  Goal: Be free from injury by end of the shift  Outcome: Progressing  Goal: Verbalize understanding of personal risk factors for fall in the hospital  Outcome: Progressing  Goal: Verbalize understanding of risk factor reduction measures to prevent injury from fall in the home  Outcome: Progressing  Goal: Use assistive devices by end of the shift  Outcome: Progressing  Goal: Pace activities to prevent fatigue by end of the shift  Outcome: Progressing     The patient's goals for the shift include  Rest    The clinical goals for the shift include pt will remain free of injury    Over the shift, the patient did make progress toward the following goals.

## 2024-03-01 NOTE — PROGRESS NOTES
"[facility]  [facility]  CONSULT FOLLOW-UP     Reason For Consult  PEG tube assessment     SUBJECTIVE     - NAOE, pt afebrile and HDS  - Port culture growing E coli, pending peripheral blood Cx   - ID consulted     EXAM     Last Recorded Vitals  Blood pressure 148/81, pulse 92, temperature 36 °C (96.8 °F), resp. rate 16, height 1.635 m (5' 4.37\"), weight 68 kg (149 lb 14.6 oz), SpO2 92 %.      Intake/Output Summary (Last 24 hours) at 3/1/2024 1312  Last data filed at 3/1/2024 0601  Gross per 24 hour   Intake 750 ml   Output 2050 ml   Net -1300 ml       Physical Exam   GENERAL: Chronically ill.  NEUROLOGIC: A and O x 3. Cranial nerves 2 through 12 grossly intact. Intact motor and sensory systems.  HEENT: Pupils are equal, round, and reactive to light and accommodation. Extraocular motion intact. CARDIAC: S1 + S2, RRR, no M/R/G.    LUNGS: CTA BL. No wheezes or coarse breath sounds. Symmetric respirations. No increased work of breathing.   ABDOMEN: Soft, non-tender to palpation. No rebound or guarding.  PSYCH: Low mood.    OBJECTIVE                                                                              Medications             Current Facility-Administered Medications:     acetaminophen (Tylenol) oral liquid 1,000 mg, 1,000 mg, nasogastric tube, q6h, Angelic Sarmiento MD, 1,000 mg at 03/01/24 0006    albuterol 2.5 mg /3 mL (0.083 %) nebulizer solution 3 mL, 3 mL, nebulization, PRN, Marisel Patrick MD [facility]    alteplase (Cathflo Activase) injection 1 mg, 1 mg, intra-catheter, PRN, Isaac Bledsoe MD    alteplase (Cathflo Activase) injection 1 mg, 1 mg, intra-catheter, PRN, Isaac Bledsoe MD, 1 mg at 02/28/24 2225    ascorbic acid (Vitamin C) tablet 500 mg, 500 mg, nasogastric tube, Daily, Mary Corbett MD, 500 mg at 03/01/24 0859    atorvastatin (Lipitor) tablet 20 mg, 20 mg, nasogastric tube, Daily, Angelic Sarmiento MD, 20 mg at 03/01/24 0859    buPROPion " (Wellbutrin) tablet 150 mg, 150 mg, nasogastric tube, BID, Angelic Sarmiento MD, 150 mg at 03/01/24 0900    cholecalciferol (Vitamin D-3) tablet 1,000 Units, 1,000 Units, nasogastric tube, BID, Angelic Sarmiento MD, 1,000 Units at 03/01/24 0859    dapagliflozin propanediol (Farxiga) tablet 10 mg, 10 mg, oral, Daily, Justino Cooper MD, 10 mg at 03/01/24 0900    dextrose 5 % in water (D5W) bolus, 500 mL, intravenous, PRN, Marisel Patrick MD MPH    diphenhydrAMINE (BENADryl) injection 50 mg, 50 mg, intravenous, PRN, Marisel Patrick MD MPH    EPINEPHrine HCl (PF) (Adrenalin) injection 0.3 mg, 0.3 mg, intramuscular, q5 min PRN, Marisel Patrick MD MPH    esomeprazole (NexIUM) suspension 40 mg, 40 mg, nasoduodenal tube, Daily before breakfast, Mary Corbett MD, 40 mg at 02/29/24 0856    famotidine PF (Pepcid) injection 20 mg, 20 mg, intravenous, Once PRN, Marisel Patrick MD MPH    fluconazole in NaCl (iso-osm) (Diflucan) IVPB 400 mg, 400 mg, intravenous, q24h, Mary Corbett MD, 400 mg at 03/01/24 1240    guaiFENesin (Robitussin) 100 mg/5 mL syrup 200 mg, 200 mg, nasogastric tube, q4h PRN, Angelic Sarmiento MD    HYDROmorphone (Dilaudid) injection 0.4 mg, 0.4 mg, intravenous, q3h PRN, Isaac Bledsoe MD    HYDROmorphone (Dilaudid) tablet 1 mg, 1 mg, oral, q3h PRN, Isaac Bledsoe MD, 1 mg at 02/28/24 2049    lido-diphen-Maalox 1:1:1 Magic Mouthwash, 10 mL, Swish & Spit, q4h PRN, Isaac Bledsoe MD, 10 mL at 02/25/24 1334    lidocaine (Xylocaine) 2 % jelly 1 Application, 1 Application, Topical, Once, Mary Corbett MD    losartan (Cozaar) tablet 25 mg, 25 mg, nasogastric tube, Daily, Angelic Sarmiento MD, 25 mg at 03/01/24 0859    magnesium oxide (Mag-Ox) tablet 400 mg, 400 mg, nasogastric tube, Daily, Angelic Sarmiento MD, 400 mg at 03/01/24 0859    melatonin tablet 10 mg, 10 mg, oral, Nightly, Mary Corbett MD, 10 mg at 02/29/24 2011    methylPREDNISolone sod succinate (PF) (SOLU-Medrol) 40 mg/mL injection 40 mg, 40 mg, intravenous, PRN,  Marisel Patrick MD MPH    metoprolol tartrate (Lopressor) tablet 50 mg, 50 mg, nasogastric tube, BID, Angelic Sarmiento MD, 50 mg at 03/01/24 0859    multivitamin with iron-minerals 9 mg iron/15 mL liquid 15 mL, 15 mL, nasogastric tube, Daily, Angelic Sarmiento MD, 15 mL at 03/01/24 0900    ondansetron (Zofran) injection 4 mg, 4 mg, intravenous, q6h PRN, Mary Corbett MD, 4 mg at 02/27/24 0815    perflutren lipid microspheres (Definity) injection 0.5-10 mL of dilution, 0.5-10 mL of dilution, intravenous, Once in imaging, Justino Cooper MD    perflutren protein A microsphere (Optison) injection 0.5 mL, 0.5 mL, intravenous, Once in imaging, Justino Cooper MD    phenoL (Chloraseptic) 1.4 % mouth/throat spray 1 spray, 1 spray, Mouth/Throat, q2h PRN, Angelic Sarmiento MD    piperacillin-tazobactam-dextrose (Zosyn) IV 4.5 g, 4.5 g, intravenous, q6h, Kris Skelton MD, Last Rate: 200 mL/hr at 03/01/24 1240, 4.5 g at 03/01/24 1240    prochlorperazine (Compazine) injection 10 mg, 10 mg, intravenous, q6h PRN, Angelic Sarmiento MD, 10 mg at 02/27/24 1151    prochlorperazine (Compazine) injection 10 mg, 10 mg, intravenous, q6h PRN, Marisel Patrick MD MPH    prochlorperazine (Compazine) tablet 10 mg, 10 mg, oral, q6h PRN, Marisel Patrick MD MPH    senna (Senokot) 8.8 mg/5 mL syrup 10 mL, 10 mL, nasogastric tube, BID, Angelic Sarmiento MD, 10 mL at 02/29/24 0854    sodium chloride 0.9 % bolus 500 mL, 500 mL, intravenous, PRN, Marisel Patrick MD MPH    spironolactone (Aldactone) tablet 12.5 mg, 12.5 mg, nasogastric tube, Daily, Angelic Sarmiento MD, 12.5 mg at 03/01/24 0859    sulfur hexafluoride microsphr (Lumason) injection 24.28 mg, 2 mL, intravenous, Once in imaging, Justino Cooper MD    thiamine (Vitamin B-1) tablet 100 mg, 100 mg, oral, Daily, Mary Corbett MD, 100 mg at 03/01/24 0859    venlafaxine (Effexor) tablet 75 mg, 75 mg, nasogastric tube, BID with meals, Angelic Sarmiento MD, 75 mg at 03/01/24 0859                                                                            Labs      Results for orders placed or performed during the hospital encounter of 02/22/24 (from the past 24 hour(s))   Comprehensive metabolic panel   Result Value Ref Range    Glucose 142 (H) 74 - 99 mg/dL    Sodium 135 (L) 136 - 145 mmol/L    Potassium 4.0 3.5 - 5.3 mmol/L    Chloride 95 (L) 98 - 107 mmol/L    Bicarbonate 31 21 - 32 mmol/L    Anion Gap 13 10 - 20 mmol/L    Urea Nitrogen 27 (H) 6 - 23 mg/dL    Creatinine 0.75 0.50 - 1.05 mg/dL    eGFR 82 >60 mL/min/1.73m*2    Calcium 8.6 8.6 - 10.6 mg/dL    Albumin 2.7 (L) 3.4 - 5.0 g/dL    Alkaline Phosphatase 67 33 - 136 U/L    Total Protein 5.0 (L) 6.4 - 8.2 g/dL    AST 19 9 - 39 U/L    Bilirubin, Total 0.8 0.0 - 1.2 mg/dL    ALT 24 7 - 45 U/L   POCT GLUCOSE   Result Value Ref Range    POCT Glucose 128 (H) 74 - 99 mg/dL   CBC and Auto Differential   Result Value Ref Range    WBC 2.0 (L) 4.4 - 11.3 x10*3/uL    nRBC 4.0 (H) 0.0 - 0.0 /100 WBCs    RBC 2.97 (L) 4.00 - 5.20 x10*6/uL    Hemoglobin 9.1 (L) 12.0 - 16.0 g/dL    Hematocrit 28.7 (L) 36.0 - 46.0 %    MCV 97 80 - 100 fL    MCH 30.6 26.0 - 34.0 pg    MCHC 31.7 (L) 32.0 - 36.0 g/dL    RDW 17.5 (H) 11.5 - 14.5 %    Platelets 101 (L) 150 - 450 x10*3/uL    Immature Granulocytes %, Automated 0.5 0.0 - 0.9 %    Immature Granulocytes Absolute, Automated 0.01 0.00 - 0.50 x10*3/uL   Renal function panel   Result Value Ref Range    Glucose 111 (H) 74 - 99 mg/dL    Sodium 136 136 - 145 mmol/L    Potassium 3.8 3.5 - 5.3 mmol/L    Chloride 96 (L) 98 - 107 mmol/L    Bicarbonate 32 21 - 32 mmol/L    Anion Gap 12 10 - 20 mmol/L    Urea Nitrogen 30 (H) 6 - 23 mg/dL    Creatinine 0.73 0.50 - 1.05 mg/dL    eGFR 85 >60 mL/min/1.73m*2    Calcium 8.5 (L) 8.6 - 10.6 mg/dL    Phosphorus 2.9 2.5 - 4.9 mg/dL    Albumin 2.6 (L) 3.4 - 5.0 g/dL   Magnesium   Result Value Ref Range    Magnesium 2.30 1.60 - 2.40 mg/dL   Coagulation Screen   Result Value Ref Range    Protime 15.2 (H) 9.8 - 12.8 seconds    INR 1.3 (H) 0.9 - 1.1    aPTT 30 27 - 38  seconds   Manual Differential   Result Value Ref Range    Neutrophils %, Manual 78.1 40.0 - 80.0 %    Bands %, Manual 3.5 0.0 - 5.0 %    Lymphocytes %, Manual 0.0 13.0 - 44.0 %    Monocytes %, Manual 15.8 2.0 - 10.0 %    Eosinophils %, Manual 0.0 0.0 - 6.0 %    Basophils %, Manual 0.0 0.0 - 2.0 %    Myelocytes %, Manual 2.6 0.0 - 0.0 %    Seg Neutrophils Absolute, Manual 1.56 (L) 1.60 - 5.00 x10*3/uL    Bands Absolute, Manual 0.07 0.00 - 0.50 x10*3/uL    Lymphocytes Absolute, Manual 0.00 (L) 0.80 - 3.00 x10*3/uL    Monocytes Absolute, Manual 0.32 0.05 - 0.80 x10*3/uL    Eosinophils Absolute, Manual 0.00 0.00 - 0.40 x10*3/uL    Basophils Absolute, Manual 0.00 0.00 - 0.10 x10*3/uL    Myelocytes Absolute, Manual 0.05 0.00 - 0.00 x10*3/uL    Total Cells Counted 114     Neutrophils Absolute, Manual 1.63 1.60 - 5.50 x10*3/uL    RBC Morphology See Below     Ovalocytes Few                                                                              Imaging           02/24/24 MBS:  Oral Phase:  WFL     Adequate bolus acceptance, formation, and transit w/ all consistencies assessed.       -Pt does note significant odynophagia (pain orally and pharyngeally) with all PO intake.     Pharyngeal Phase:  Moderate/severe deficits     Tissue protrusion within hypopharynx impacting bolus flow.  No nasal regurgitation on this assessment, but backflow of boluses observed.  Hyolaryngeal elevation/excursion impaired.  Reduced airway protection during the swallow resulting in deep penetration to the vocal cords w/ all liquid consistencies, and eventual trace aspiration of thin and mildly thick liquids.  Independent cough/throat clear, but this did not clear aspirated material. Pharyngeal stasis at the valleculae and piriforms following the swallow with all liquid consistencies.  Aspiration in min-mod amounts following the swallow with thin and mildly thick liquids as a result of residue.  Again, cough response did not clear.  Chin tuck and  head turn R did not improve safety or efficiency of swallow.  Head turn L (toward side of mass) improved swallow partially, but did not entirely resolve deficits.  With most viscous consistency assessed (puree) , there was no penetration/aspiration or significant stasis.       Esophageal Phase:  No overt deficits                                                                           GI Procedures      02/2021 EGD:  Impression:     - Z-line, 39 cm from the incisors.                         - Normal esophagus.                         - 3 parallel streaks of hemorrhagic appearance and                          linear erosions mucosa in the gastric body. Biopsied.                         - Normal examined duodenum.                         - Unclear if the gastric lesions are her sole source                          of bleeding as not seen on two previous endoscopie                          over past 6 months with recurrent UGI bleeding.     ASSESSMENT / PLAN                  ASSESSMENT/PLAN:     Amita Todd is a 78 y.o. 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      GI is consulted for PEG tube assessment.    Last dose of Plavix was on the 2/25/24. We will plan for PEG tube placement on the 03/01/2024 after Plavix washout.      Recommendations:  - Tentative plan for PEG tube placement on 3/4 (will need 48 hrs of negative blood Cultures)  - Hold Plavix for 5 day prior to PEG tube placement.  - NPO at MN day prior to procedure   - please hold all heparin products (including prophylaxis)  at 4AM day prior to procedure  - surgical prophylaxis of cefazolin 2 g IV 30 minutes prior to surgical incision (for nonobese patients weighing <120 kg)     Patient was seen and discussed with Dr. Aparicio     Gastroenterology will continue to follow.  During weekday hours of 7am-5pm please do not hesitate to contact me on [employer] Chat or page [phone], if there are any further questions between the weekday hours of 7am-5pm.   After hours, on weekends, and on holidays, please page [place] [phone]. Thank you.     Danish Manjarrez MD  PGY-4 Gastroenterology and Hepatology Fellow  [facility]

## 2024-03-01 NOTE — PROGRESS NOTES
SUPPORTIVE AND PALLIATIVE ONCOLOGY INPATIENT FOLLOW-UP      SERVICE DATE: 03/01/24     SUBJECTIVE:  Interval Events:  +bacteremia, delirium  Plan for PEG on hold due to infection  Sister reports patient hasn't slept since Monday, melatonin started last night      Pain Assessment:  Location: Bilateral throat  Duration: Constant  Characteristics:   Rating: Moderate   Descriptors: sharp and burning   Aggravating:  eating, swallowing      Relieving: Analgesics see EMR   Interference with Function: Somewhat    Opioid Use  Past 24 h prn opioid use: 0  Total 24h OME use:  0    Note: OME calculations based on equianalgesic table below. Please note this table is based on best available evidence but conversions are still approximate. These are NOT opioid DOSES for individual patient use; this is equivalency information.  Drug Parenteral Enteral   Morphine 10 25   Oxycodone N/A 20   Hydromorphone 2 5   Fentanyl 0.15 N/A   Tramadol N/A 120   Citation: Rajendra RENDON. Demystifying opioid conversion calculations: A guide for effective dosing, Second edition. [place]: American Society of Health-System Pharmacists, 2018.    Symptom Assessment:  Nausea none  Shortness of breath none  Lack of energy very much   Sleeping difficulty:  very much    Information obtained from: chart review, interview of patient, interview of family, discussion with RN, and discussion with primary team  ______________________________________________________________________        OBJECTIVE:    Lab Results   Component Value Date    WBC 2.0 (L) 03/01/2024    HGB 9.1 (L) 03/01/2024    HCT 28.7 (L) 03/01/2024    MCV 97 03/01/2024     (L) 03/01/2024      Lab Results   Component Value Date    GLUCOSE 111 (H) 03/01/2024    CALCIUM 8.5 (L) 03/01/2024     03/01/2024    K 3.8 03/01/2024    CO2 32 03/01/2024    CL 96 (L) 03/01/2024    BUN 30 (H) 03/01/2024    CREATININE 0.73 03/01/2024     Lab Results   Component Value Date    ALT 24 02/29/2024    AST  19 02/29/2024    ALKPHOS 67 02/29/2024    BILITOT 0.8 02/29/2024     Estimated Creatinine Clearance: 61.7 mL/min (by C-G formula based on SCr of 0.73 mg/dL).     CXR 2/29/2024  IMPRESSION:  1. Improved pulmonary aeration within the lung bases in particular  within right lower lobe in comparison to the previous study. Residual  interstitial opacities and subsegmental atelectasis persist within  right lower lobe.    Scheduled medications  acetaminophen, 1,000 mg, nasogastric tube, q6h  ascorbic acid, 500 mg, nasogastric tube, Daily  atorvastatin, 20 mg, nasogastric tube, Daily  buPROPion, 150 mg, nasogastric tube, BID  cholecalciferol, 1,000 Units, nasogastric tube, BID  dapagliflozin propanediol, 10 mg, oral, Daily  esomeprazole, 40 mg, nasoduodenal tube, Daily before breakfast  fluconazole, 400 mg, intravenous, q24h  lidocaine, 1 Application, Topical, Once  losartan, 25 mg, nasogastric tube, Daily  magnesium oxide, 400 mg, nasogastric tube, Daily  melatonin, 10 mg, oral, Nightly  metoprolol tartrate, 50 mg, nasogastric tube, BID  multivitamin with iron-minerals, 15 mL, nasogastric tube, Daily  perflutren lipid microspheres, 0.5-10 mL of dilution, intravenous, Once in imaging  perflutren protein A microsphere, 0.5 mL, intravenous, Once in imaging  piperacillin-tazobactam, 4.5 g, intravenous, q6h  senna, 10 mL, nasogastric tube, BID  spironolactone, 12.5 mg, nasogastric tube, Daily  sulfur hexafluoride microsphr, 2 mL, intravenous, Once in imaging  thiamine, 100 mg, oral, Daily  venlafaxine, 75 mg, nasogastric tube, BID with meals      Continuous medications       PRN medications  PRN medications: albuterol, alteplase, alteplase, dextrose, diphenhydrAMINE, EPINEPHrine HCl, famotidine, guaiFENesin, [Held by provider] HYDROmorphone, [Held by provider] HYDROmorphone, lidocaine-diphenhydraMINE-Maalox 1:1:1, methylPREDNISolone sodium succinate (PF), ondansetron, phenoL, prochlorperazine, prochlorperazine, prochlorperazine,  sodium chloride   }     PHYSICAL EXAMINATION:    Vital Signs:   Vital signs reviewed  Visit Vitals  /81   Pulse 92   Temp 36 °C (96.8 °F)   Resp 16        Pain Score: 0 - No pain  [place] FACES Pain Rating: No hurt  Score: FLACC (Rest):  [2]   Score: FLACC (Activity):  [2]   NIPS Score:  [1]   PAINAD Score:  [1]        Physical Exam  Vitals reviewed.   Constitutional:       Appearance: She is ill-appearing.   HENT:      Mouth/Throat:      Mouth: Mucous membranes are dry.   Cardiovascular:      Rate and Rhythm: Normal rate.   Pulmonary:      Effort: No respiratory distress.      Comments: NC  Skin:     Findings: Erythema present.   Neurological:      Mental Status: She is disoriented.   Psychiatric:         Attention and Perception: She is inattentive.         Speech: Speech is slurred.         Behavior: Behavior is slowed.        ASSESSMENT/PLAN:  Amita Todd is a 78 y.o. female diagnosed with SCC of hypopharynx currently undergoing concurrent CRT (weekly Taxol, Carbo, 35 fx RT). PMH significant for breast cancer s/p lumpectomy, RT, tamoxifen, oral cavity cancer 2008, GERD, HTN, CAD s/p PCI, afib/flutter, HFpEF, TIA, anxiety/depression, AUD. Admitted 2/22/2024 for further evaluation and management of hypotension. Course complicated by radiation mucositis/esophagitis, candidiasis, dysphagia now NPO. Supportive and Palliative Oncology is consulted for pain management and Introduction to Supportive and Palliative Oncology Services.      Pain:  Mouth and throat pain related to radiation mucositis/candidiasis  Pain is: cancer related pain  Type: somatic  Pain control: well-controlled  Home regimen:  Oxycodone 5 mg prn  Intolerances/previously tried: oxycodone (nausea, confusion)  Personalized pain goal: 3  Total OME usage for the past 24 hours:  8.75  Continue Acetaminophen 1000 mg PO/FT q6h   Continue Hydromorphone 1 mg PO/FT q3h prn moderate to severe pain  Continue Hydromorphone 0.4 mg IV q3h prn  breakthrough pain  Continue BMX prn  Frequent oral rinses, pt given saline to swish and spit frequently throughout the day  Thrush should improve with treatment and WBC count recovery  Expect radiation mucositis to worsen until complete and need opioids for management  Continue to monitor pain scores and administer PRN medications as appropriate  Continue/initiate nonpharmacologic pain management strategies including ice/heat therapy, distraction techniques, deep breathing/relaxation techniques, calming music, and repositioning  Continue to monitor for signs of opioid efficacy (pain scores, improved functionality) and toxicity (pinpoint pupils, excess sedation/drowsiness/confusion, respiratory depression, etc.)     Nausea:  At risk for nausea with vomiting related to opioids, thickened oral secretions with gagging  Continue Ondansetron 4 mg IV q6h prn nausea first line   Continue compazine 10 mg IV q6h prn nausea second line  Avoid oxycodone as above     Constipation  At risk for constipation related to opioids, currently not constipated, several BMs  Usual bowel pattern: every 2-3 days and irregular with frequent constipation  Home regimen: Docusate prn  LBM 3/1 several  Monitor BM frequency, adjust regimen as needed  Goal to have BM without straining q48-72h  Senna 10 ml BID, HOLD for diarrhea  Miralax 17 g daily prn     Altered Mood:  Chronic anxiety and depression  controlled with home regimen   Home regimen: Duloxetine DR 60 mg and Wellbutrin 150 mg BID  Pt with dysphagia plan for Corpak and PEG, Wellbutrin XL and Duloxetine cannot be crushed will need to be rotated to Buproprion immediate release 150 mg BID and venlafaxine 75 mg FT BID     Decreased oral intake  Decreased intake related to taste changes, disease process, and dysphagia  Nutrition consult  Weight loss yes  NPO due to dysphagia  Plan for short term Corpak for enteral access, followed by PEG    Delirium:  AMS multifactorial including hospitalized  elder, infection, lack of sleep, medications  Sister reports pt hasn't slept for 3 nights  Delirium precautions  Promote sleep, recommend clustering care overnight  Change Melatonin to be given at 1800     Medical Decision Making/Goals of Care/Advance Care [place]in2024  Patient's current clinical condition, including diagnosis, prognosis, and management plan, and goals of care were discussed.   Life limiting disease:  cancer  Family: Supportive , children, siblings  Performance status: Minor limitations due to disease process  Joys/meaning/strength: Family  Understanding of health: Demonstrates good prognostic understanding of disease process, understands plan for PEG placement  Information:Wants full disclosure  Medical update:Clarified patient's NPO status due to aspiration risk, plan for PEG tube  Goals: symptom control and be able to eat again, she does not want her chewing and swallowing muscles to atrophy  Worries and fears now and future:  inability to eat, loss of swallowing strength       Advance Directives  Existence of Advance Directives:Yes, but NOT documented in medical record  Decision maker: Surrogate decision maker is  Smith  Code Status: Full code     Supportive and Palliative Oncology encounter:  Spoke with patient and sister at bedside  Emotional support provided  Coordination of care      Disposition:  Please  start the process of having prior authorization with meds to beds deliver medications to patient prior to discharge via [facility] pharmacy. Prescriptions will need to be sent 48-72 hours prior to discharge so that a prior authorization can be completed.      Discharge date: unknown pending acute issues  Will assess if patient needs an appointment with Outpatient Supportive Oncology as appropriate, will likely need given 2 additional weeks of RT left with risk for worsening pain.    Signature and billing:  Thank you for allowing us to participate in the care of this  patient. Recommendations will be communicated back to the consulting service by way of shared electronic medical record or face-to-face.    Medical complexity was high level due to due to complexity of problems, extensive data review, and high risk of management/treatment.    I spent 50 minutes in the care of this patient which included chart review, interviewing patient/family, discussion with primary team, coordination of care, and documentation.    Data:   Diagnostic tests and information reviewed for today's visit:  Conversation with primary team, Most recent labs and imaging results, Medications       Some elements copied from my note on 2/27/2024, the elements have been updated and all reflect current decision making from today, 03/01/24       Plan of Care discussed with: Provider, RN, Patient and Family/Significant Other: sisters    Thank you for asking Supportive and Palliative Oncology to assist with care of this patient.  We will continue to follow  Please contact us for additional questions or concerns.      SIGNATURE: Julia Schmidt, APRN-CNP, DNP   PAGER/CONTACT:  Contact information:  Supportive and Palliative Oncology  Monday-Friday 8 AM-5 PM  [employer] chat or pager [phone].  After hours and weekends:  pager [phone]

## 2024-03-01 NOTE — PROGRESS NOTES
Physical Therapy                 Therapy Communication Note    Patient Name: Amita Todd  MRN: [id number]  Today's Date: 3/1/2024     Discipline: Physical Therapy    Missed Visit Reason: Missed Visit Reason:  (pt off floor at radiation. will re-attempt as schedule permits.)    Missed Time: Attempt

## 2024-03-01 NOTE — CONSULTS
Inpatient consult to Infectious Diseases  Consult performed by: Monico Rodas MD  Consult ordered by: Marisel Patrick MD MPH        Referred by Dr. Patrick    Primary MD: Caitie Leal MD    Reason For Consult  ecoli bacteremia from port day after initiation of chemo     History Of Present Illness  Amita Todd is a 78 y.o. female PMH PMHx of Recently diagnosed SCC of the hypopharynx in 12/2023 (concurrent chemoRT with weekly Carboplatin + Paclitaxel 1/24/24 w/ Dr. 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 ID consulted for ecoli bacteremia from port day after initiation of chemo.    Patient was given fluids on arrival. Was also given narcan. Blood pressure medications were also discontinued. Patient was afebrile. WBC was 0.8 on admission and 640 ANC. Patient was more hypotensive and hypoxic so blood cultures were obtained on 2/28. Cultures were obtained prior to abx. They are showing 1/2 e.coli in the blood. Patient started on ancef. UA on admission with 1-5 WBC, negative nitrites and negative leuks. Xray abdomen was negative.     Oncologic History (Per Dr. Burkitt's Last Note)  - 3/2001: S/p lumpectomy for left Breast Cancer followed by adjuvant RT and adjuvant tamoxifen  - 5/2008: History of Stage 1, T1N0M0 Oral Cavity cancer. Lesion of right lateral tongue removed, no adjuvant therapy.  - 12/6/23: Presented to Dr. Tobin with increased dysphagia over several months. Fiberoptic laryngoscopy reviewed lesion involving left piriform sinus, >1cm.  - 12/12/23 CT neck showed 2.5 x 2.5 x 2 cm mass within the left aryepiglottic fold; 1.5 x 1 cm LN at level 2A/2B on the left  - 12/21/23: S/p hypopharyngeal mass biopsy -  invasive moderately differentiated keratinizing squamous cell carcinoma  - 1/5/24 [facility] rec: Chemoradiation vs surgical resection  - 1/12/24: PET/CT showed FDG avid large soft tissue mass in left hypopharynx w/ extension to level of laryngeal cartilage. FDG- avid b/l cervical leverl II nodes. NO distant mets.     Allergies  Oxycodone hcl-oxycodone-asa, Oxycodone-acetaminophen, Penicillin g, and Penicillins  - 1/24/24: Started concurrent chemoRT with weekly Carboplatin + Paclitaxel     Past Medical History  She has a past medical history of Alcohol dependence, in remission (CMS/[facility]) (06/16/2016), Anemia, Aneurysm of ascending aorta (CMS/[facility]), Arrhythmia, Branch retinal artery occlusion of right eye (08/24/2023), CKD (chronic kidney disease), Coronary artery disease, Depression, GERD (gastroesophageal reflux disease), Hypertension, Intermittent claudication (CMS/[facility]), Myocardial infarction (CMS/[facility]) (10/06/2023), Other bursitis of hip, unspecified hip (11/21/2017), Other conditions influencing health status (01/05/2014), Pain in leg, unspecified (01/25/2022), Pain in right shoulder (07/01/2020), Pain in unspecified knee (03/31/2021), Personal history of diseases of the blood and blood-forming organs and certain disorders involving the immune mechanism (02/09/2019), Personal history of malignant neoplasm of breast (04/29/2014), Personal history of other diseases of the circulatory system (04/03/2014), Personal history of other diseases of the digestive system (03/08/2021), Personal history of other diseases of the digestive system (03/08/2021), Personal history of other diseases of the nervous system and sense organs (08/12/2020), Personal history of other infectious and parasitic diseases (06/14/2019), Personal history of other specified conditions (03/03/2020), Personal history of other specified conditions (02/22/2021), Personal history of transient ischemic attack (TIA), and cerebral infarction without residual  deficits (2022), Stenosis of carotid artery, and Trigger finger, unspecified finger (2017).    Surgical History  She has a past surgical history that includes Other surgical history (2020); Other surgical history (2019); Septoplasty (2017); Other surgical history (2019); Other surgical history (2014); CT angio head w and wo IV contrast (2019); CT angio neck (2019); MR angio head wo IV contrast (2019); MR angio neck wo IV contrast (2019); CT angio head w and wo IV contrast (2021); and CT angio neck (2021).     Social History     Occupational History    Not on file   Tobacco Use    Smoking status: Former     Packs/day: 2.00     Years: 30.00     Additional pack years: 0.00     Total pack years: 60.00     Types: Cigarettes     Quit date:      Years since quittin.    Smokeless tobacco: Never   Vaping Use    Vaping Use: Never used   Substance and Sexual Activity    Alcohol use: Not Currently     Comment: Prior Alcohol use. Stopped in early     Drug use: Never    Sexual activity: Not on file     Travel History   Travel since 25    No documented travel since 25              Family History  Family History   Problem Relation Name Age of Onset    Breast cancer Mother      Other (GOODPASTURE'S DISEASE) Father      BRCA2 Positive Sister      Lung cancer Sister      Neuroblastoma Sister      BRCA2 Positive Daughter      Breast cancer Other G/P      Allergies  Oxycodone hcl-oxycodone-asa, Oxycodone-acetaminophen, Penicillin g, and Penicillins     Immunization History   Administered Date(s) Administered    Flu vaccine, quadrivalent, high-dose, preservative free, age 65y+ (FLUZONE) 09/10/2020    Influenza, High Dose Seasonal, Preservative Free 2017, 10/03/2018, 2019, 2021, 2022    Moderna SARS-CoV-2 Vaccination 2021, 2021, 10/25/2021    Pneumococcal conjugate vaccine, 13-valent (PREVNAR 13)  10/03/2018, 06/09/2019    Pneumococcal polysaccharide vaccine, 23-valent, age 2 years and older (PNEUMOVAX 23) 06/11/2020, 09/15/2020    Tdap vaccine, age 7 year and older (BOOSTRIX, ADACEL) 04/05/2021    Zoster vaccine, recombinant, adult (SHINGRIX) 09/15/2020, 04/05/2021     Medications  Home medications:  Medications Prior to Admission   Medication Sig Dispense Refill Last Dose    acetaminophen (Tylenol) 325 mg tablet Take 2 tablets (650 mg) by mouth every 6 hours if needed.       albuterol 90 mcg/actuation aerosol powdr breath activated inhaler Inhale 2 puffs every 6 hours if needed for wheezing or shortness of breath.       ascorbic acid (Vitamin C) 500 mg tablet Take 1 tablet (500 mg) by mouth once daily.       atorvastatin (Lipitor) 20 mg tablet TAKE 1 TABLET BY MOUTH DAILY AS  DIRECTED 90 tablet 3     buPROPion SR (Wellbutrin SR) 150 mg 12 hr tablet Take 1 tablet (150 mg) by mouth 2 times a day. TAKE PER DIRECTED       cholecalciferol (Vitamin D-3) 25 MCG (1000 UT) tablet Take 1 tablet (25 mcg) by mouth 2 times a day.       clopidogrel (Plavix) 75 mg tablet TAKE 1 TABLET BY MOUTH DAILY 90 tablet 3     coenzyme Q-10 (CoQ-10) 100 mg capsule Take 1 capsule (100 mg) by mouth 2 times a day.       dapagliflozin propanediol (Farxiga) 10 mg Take 1 tablet (10 mg) by mouth once daily. 90 tablet 3     dexAMETHasone (Decadron) 4 mg tablet Take 1 tablet (4 mg) by mouth 2 times a day with meals. 60 tablet 0     DULoxetine (Cymbalta) 60 mg DR capsule TAKE 1 CAPSULE BY MOUTH  DAILY (Patient taking differently: Take 1 capsule (60 mg) by mouth once daily at bedtime.) 90 capsule 3     furosemide (Lasix) 20 mg tablet TAKE 1 TABLET BY MOUTH  DAILY (Patient taking differently: Take 1 tablet (20 mg) by mouth once daily as needed (swelling or 3lb weight gain).) 90 tablet 3     guaiFENesin 200 mg/5 mL liquid Take 10 mL by mouth 4 times a day. 118 mL 3     herbal complex no.239 (Whole Body Joint Support) capsule Take 1 capsule by  mouth once daily.       ipratropium (Atrovent) 21 mcg (0.03 %) nasal spray ADMINISTER 2 SPRAYS INTO EACH NOSTRIL EVERY 12 HOURS. 90 mL 3     losartan (Cozaar) 50 mg tablet Take 1 tablet (50 mg) by mouth once daily.       magnesium oxide (Mag-Ox) 400 mg (241.3 mg magnesium) tablet Take 1 tablet (400 mg) by mouth once daily.       metoprolol tartrate (Lopressor) 25 mg tablet TAKE 2 TABLETS BY MOUTH TWICE  DAILY 360 tablet 3     multivitamin with minerals tablet Take 1 tablet by mouth once daily.       ondansetron (Zofran) 8 mg tablet Take 1 tablet (8 mg) by mouth every 8 hours if needed for nausea or vomiting. 30 tablet 5     oxyCODONE (Roxicodone) 5 mg immediate release tablet Take 1 tablet (5 mg) by mouth every 6 hours if needed for moderate pain (4 - 6) or severe pain (7 - 10). 120 tablet 0     pantoprazole (ProtoNix) 40 mg EC tablet Take 1 tablet (40 mg) by mouth 2 times a day. 180 tablet 3     prochlorperazine (Compazine) 10 mg tablet Take 1 tablet (10 mg) by mouth every 6 hours if needed for nausea or vomiting. 30 tablet 5     sennosides-docusate sodium (Kalee-Colace) 8.6-50 mg tablet Take 2 tablets by mouth once daily. 60 tablet 11     spironolactone (Aldactone) 25 mg tablet Take 1 tablet (25 mg) by mouth once daily. PER DIRECTED        Current medications:  Scheduled medications  acetaminophen, 1,000 mg, nasogastric tube, q6h  ascorbic acid, 500 mg, nasogastric tube, Daily  atorvastatin, 20 mg, nasogastric tube, Daily  buPROPion, 150 mg, nasogastric tube, BID  cholecalciferol, 1,000 Units, nasogastric tube, BID  dapagliflozin propanediol, 10 mg, oral, Daily  esomeprazole, 40 mg, nasoduodenal tube, Daily before breakfast  fluconazole, 400 mg, intravenous, q24h  lidocaine, 1 Application, Topical, Once  losartan, 25 mg, nasogastric tube, Daily  magnesium oxide, 400 mg, nasogastric tube, Daily  melatonin, 10 mg, oral, Nightly  metoprolol tartrate, 50 mg, nasogastric tube, BID  multivitamin with iron-minerals, 15  mL, nasogastric tube, Daily  perflutren lipid microspheres, 0.5-10 mL of dilution, intravenous, Once in imaging  perflutren protein A microsphere, 0.5 mL, intravenous, Once in imaging  piperacillin-tazobactam, 4.5 g, intravenous, q6h  senna, 10 mL, nasogastric tube, BID  spironolactone, 12.5 mg, nasogastric tube, Daily  sulfur hexafluoride microsphr, 2 mL, intravenous, Once in imaging  thiamine, 100 mg, oral, Daily  venlafaxine, 75 mg, nasogastric tube, BID with meals      Continuous medications     PRN medications  PRN medications: albuterol, alteplase, alteplase, dextrose, diphenhydrAMINE, EPINEPHrine HCl, famotidine, guaiFENesin, HYDROmorphone, HYDROmorphone, lidocaine-diphenhydraMINE-Maalox 1:1:1, methylPREDNISolone sodium succinate (PF), ondansetron, phenoL, prochlorperazine, prochlorperazine, prochlorperazine, sodium chloride    Review of Systems     Review of systems otherwise negative    Objective  Range of Vitals (last 24 hours)  Heart Rate:  [80-93]   Temp:  [36 °C (96.8 °F)-36.7 °C (98 °F)]   Resp:  [16-18]   BP: (120-168)/(81-98)   SpO2:  [93 %-97 %]   Daily Weight  02/28/24 : 68 kg (149 lb 14.6 oz)    Body mass index is 25.44 kg/m².     Physical Exam   General: in no acute distress, eyes awake, some twitching movement  HEENT: no conjunctival injection. anicteric.  CVS: RRR. Normal S1 and S2. No m/r/g.   RESP: ctab no w/r/r, no increased wob  Abd: Soft and lax. ND.   Ext: No swelling of the LE b/l.   Neuro: Answers questions appropriately.  Integumentary: no obvious lesions   Rheumatologic: No joint swelling or edema    Relevant Results    Labs  Results from last 72 hours   Lab Units 03/01/24  0521 02/29/24  0230 02/28/24  0946 02/27/24  1649   WBC AUTO x10*3/uL 2.0* 1.8* 2.0*  2.0* 1.5*   HEMOGLOBIN g/dL 9.1* 9.6* 9.5*  9.5* 10.2*   HEMATOCRIT % 28.7* 29.7* 30.4*  30.4* 31.6*   PLATELETS AUTO x10*3/uL 101* 113* 78*  78* 84*   NEUTROS PCT AUTO %  --   --   --  95.3   LYMPHO PCT MAN % 0.0 0.7 1.4  --   "  LYMPHS PCT AUTO %  --   --   --  2.0   MONO PCT MAN % 15.8 1.5 0.0  --    MONOS PCT AUTO %  --   --   --  2.0   EOSINO PCT MAN % 0.0 0.0 0.0  --    EOS PCT AUTO %  --   --   --  0.0     Results from last 72 hours   Lab Units 03/01/24  0521 02/29/24  1726 02/29/24  0230 02/28/24  0946   SODIUM mmol/L 136 135* 135* 136   POTASSIUM mmol/L 3.8 4.0 4.5 4.3   CHLORIDE mmol/L 96* 95* 99 101   CO2 mmol/L 32 31 27 27   BUN mg/dL 30* 27* 22 22   CREATININE mg/dL 0.73 0.75 0.60 0.64   GLUCOSE mg/dL 111* 142* 106* 86   CALCIUM mg/dL 8.5* 8.6 8.6 9.0   ANION GAP mmol/L 12 13 14 12   EGFR mL/min/1.73m*2 85 82 >90 >90   PHOSPHORUS mg/dL 2.9  --  2.6 2.9     Results from last 72 hours   Lab Units 03/01/24  0521 02/29/24  1726 02/29/24  0230   ALK PHOS U/L  --  67 57  58   BILIRUBIN TOTAL mg/dL  --  0.8 0.9  0.9   BILIRUBIN DIRECT mg/dL  --   --  0.4*   PROTEIN TOTAL g/dL  --  5.0* 5.0*  4.9*   ALT U/L  --  24 23  23   AST U/L  --  19 16  15   ALBUMIN g/dL 2.6* 2.7* 2.5*  2.5*     Estimated Creatinine Clearance: 61.7 mL/min (by C-G formula based on SCr of 0.73 mg/dL).  CRP   Date Value Ref Range Status   10/03/2018 1.02 (A) mg/dL Final     Comment:     REF VALUE  < 1.00     10/19/2017 0.31 mg/dL Final     Comment:     REF VALUE  < 1.00       Sedimentation Rate   Date Value Ref Range Status   10/03/2018 59 (H) 0 - 30 mm/h Final   10/19/2017 23 0 - 30 mm/h Final     No results found for: \"HIV1X2\", \"HIVCONF\", \"CSACAH3CW\"  Hepatitis C Ab   Date Value Ref Range Status   07/23/2020 NONREACTIVE NONREACTIVE Final     Comment:      Results from patients taking biotin supplements or receiving   high-dose biotin therapy should be interpreted with caution   due to possible interference with this test. Providers may    contact their local laboratory for further information.       Microbiology  Susceptibility data from last 90 days.  Collected Specimen Info Organism   02/29/24 Blood culture from [facility] Escherichia coli "       Imaging    CXR  IMPRESSION:  1. Improved pulmonary aeration within the lung bases in particular  within right lower lobe in comparison to the previous study. Residual  interstitial opacities and subsegmental atelectasis persist within  right lower lobe.    KUB  IMPRESSION:  1. Nonobstructive bowel gas pattern with numerous loops of contrast  filled large bowel, likely from prior oral contrast administration  for swallow study.  2. Dobbhoff tube with its tip overlying the expected location of the  gastric body.     Assessment/Plan     Amita Todd is a 78 y.o. female PMH PMHx of Recently diagnosed SCC of the hypopharynx in 12/2023 (concurrent chemoRT with weekly Carboplatin + Paclitaxel 1/24/24 w/ Dr. 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 ID consulted for ecoli bacteremia from port day after initiation of chemo.    #E.coli bacteremia  Patient with e.coli bacteria in the port culture. On admission patient had hypotension and low ANC count which could lead to translocation of gut bacteria leading to sepsis. Lack of fever could be explained by her immunosuppressed status. Given that e.coli was found in the line will recommend lock therapy as well    -Stop ancef  -Start ceftriaxone 2 g q 24 hours for 10 days of therapy (EOT 3/9/24)  -Please do lock therapy with cephalosporin (likely ancef) in the port while not using the port after discharge    Infectious disease will sign off at this time. Please feel free to reach out with any questions or concerns. If any questions about this patient please call team pager. Discussed with Dr. Tasia Rodas  Infectious disease, PGY V  Team B pager  [phone]  General ID pager [phone]

## 2024-03-01 NOTE — PROGRESS NOTES
Amita Todd is a 78 y.o. female on day 8 of admission presenting with Thrombocytopenia (CMS/HCC).    Subjective   Oriented x2, still delirious. Breathing RA, denies SOB and any pain. Intermittent sleep at night despite melatonin    Objective   Constitutional:       Comments: underweight   HENT:      Mouth/Throat:      Mouth: Mucous membranes are dry. improved well circumscribed plaques   Eyes:      Pupils: Pupils are equal, round, and reactive to light.   Cardiovascular:      Rate and Rhythm: Irregular rhythm. Tachycardia present.      Pulses: Normal pulses.   Pulmonary:      Comments: mild crackles B/L, RUL>AGATHA. On RA  Abdominal:      General: Abdomen is flat. Bowel sounds are normal.      Palpations: Abdomen is soft.   Musculoskeletal:         General: Normal range of motion.      Cervical back: Normal range of motion.      Comments: 1+ pitting edema B/L LE till ankles   Skin:     General: Skin is warm.      Capillary Refill: Capillary refill takes less than 2 seconds.      Findings: Bruising present.      Comments: Petechia over trunk   Neurological:      General: No focal deficit present.      Mental Status: She is alert and oriented to person, place, and time. Mental status is at baseline.   Psychiatric:         Mood and Affect: Mood normal.     Visit Vitals  /85   Pulse 92   Temp 36 °C (96.8 °F)   Resp 16     Relevant Results  Lab Results   Component Value Date    CREATININE 0.73 03/01/2024    BUN 30 (H) 03/01/2024     03/01/2024    K 3.8 03/01/2024    CL 96 (L) 03/01/2024    CO2 32 03/01/2024     Lab Results   Component Value Date    WBC 1.8 (L) 02/29/2024    HGB 9.6 (L) 02/29/2024    HCT 29.7 (L) 02/29/2024    MCV 96 02/29/2024     (L) 03/01/2024       IMPRESSION:  1. Findings suggestive of mild interstitial pulmonary edema.  2. New right infrahilar/basilar airspace opacity may represent  atelectasis, edema, or developing infectious infiltrate.    TTE 2/27:  1. Left ventricular  systolic function is mildly decreased with a 50% estimated ejection fraction.  2. Poorly visualized anatomical structures due to suboptimal image quality.  3. There is reduced right ventricular systolic function.  4. The left atrium is moderately dilated.  5. Mildly elevated RVSP.  6. Mild to moderate aortic valve regurgitation.  7. The ascending aorta is 4.4 cm (2.67 cm/m).  8. The patient is in atrial fibrillation which may influence the estimate of left ventricular function and transvalvular flows.  9. There is global hypokinesis of the left ventricle with minor regional variations.  10. There is moderate dilatation of the ascending aorta.    Assessment/Plan   79yo F w PMHx squamous cell carcinoma of hypopharynx, HFpEF, CAD s/p PCI, Afib s/p watchman, GERD, depression, HTN, AUD, ascending aortic aneurysm, distant breast cancer (s/p chemoradiotherapy and lumpectomy) who was directed to ED for hypotension. BP spontaneously improving without intervention other than holding home GDMT. Slowly restarting GDMT. Dobbhoff in place for nutrition, was on Continuous maintenance fluids in meantime. Discontinued fluids after CXR today shows some pulm edema with new o2 requirements, 40 IV lasix x3, narcan for mentation (improved).  On RA    3/1 updates  - Metiport gram negative bacilli culture, Peripheral BC negative to date. ID Consulted  - GI recs appreciated, likely PEG placement later date I/s infection  - held pain medications i/s delirium and no complaints of pain within last 12 hours  - Trazodone nightly for sleep  - wound care consult in     #Hypotension, improved   #HFpEF, initial c/f ADHF  ::Patient presents with hypotension over the past month in oncology clinic, has received IVF and discontinued home losartan/amlodipine during this time, BP spontaneously improving without intervention today to 100s/70s, appears grossly dry to euvolemic on exam without any symptoms of acute heart failure + positive orthostats in  ED  ::BNP 2/22: 229 - appears to be at baseline  ::TTE 7/2023: EF 55-60% // TTE 2/2024 EF 50%  - Patient likely has less PO intake and with chemo does not need as many antihyptertensives/GDMT medication dose - emailed primary cardiologist  - Medications given via Dobhoff  - Aldactone 12.5mg // Metoprolol Tartrate 40mg BID // Losartan 25mg // Dapagliflozin 10mg   - Atorvostatin 20mg      #SCC of Hypopharynx (Dr. Burkitt)  #Pain  #Delerum  ::SLP eval 2/12 recommended soft solids with thin liquids, patient would prefer oral feeding to PEG   - nutrition recs appreciated: 100mg IV thiamine x7 days,  Isosource 1.5 at goal [50cc/hr]  - c/w home Guaifenesin  - c/w Zofran prn nausea, Compazien second line  - Changed pain regimen: IV Dilaudid 0.4mg Q3 PRN - held I/s of delirium  - Will continue dexamethasone 4mg BID   - delirium precautions     #Oropharyngeal candidiasis  #Dysphagia  ::Moderate to severe candidiasis with multiple large lesions  ::given ANC 0.16, consideration for opportunistic infections present. Entended antifungal course  -consult GI today for PEG and EGD to r/o radiation vs infectious esophagitis. Will need to hold plavix for another 4 days. Tentative plan for Friday vs Monday   - neupogen usually not given during active tx per primary oncologist, will reasses in a day or 2.   - Fluconazole 200mg IV (2/22 - xx), typically for 7 days but reassess for duration  - Oral care per nursing     #Pancytopenia  ::ANC 0.16 // Plts 53 on admission with no concern for bleeding // HB 10.9 on presentation (BL 12-13)  - likely d/t myelosuppresion vs though patient has had poor po intake vs mixed  - neupogen usually not given during active tx per primary oncologist, will reasses in a day or 2.   - Daily CBC  - T&S updated, transfuse for active bleeding <50, or for <10  - Will hold on DVT prophylaxis, SCDs only  - holding clopidogrel (for EGD + peg in future)     #Normocytic Anemia  ::HB 10.9 on presentation (BL 12-13), no  active bleeding, no recent iron studies  - Likely d/t chronic dz vs myelosuppresion, though patient has had poor po intake     #Atrial Fibrillation  ::Patient previously on AC, now s/p watchman procedure and is rate controlled   ::had 5 ablations per patient  - Continue metoprolol tartrate 50mg BID  - HR 90s to 120s     #Urinary Frequency  - UA with reflex      #Hx of TIA  - c/w home Plavix 75mg daily     #GERD  - c/w home Pantoprazole     #Mood Disorder  #Misc  - c/w home Cymbalta and Wellbutrin  - c/w home supplements, mag-ox     F : caution, was diuresing. HfpEF  E: replete lytes prn, K>4, Mg >2  N: isosource 1.5 via dobhoff  A: PIVs     DVT prophylaxis: SCDs  GI prophylaxis: home PPI     Code Status: FULL

## 2024-03-01 NOTE — PROGRESS NOTES
Wound Care Progress Note     Visit Date: 3/1/2024      Patient Name: Amita Todd         MRN: [id number]                Reason for Visit: wound sacrum and GLENDA inner buttocks       Wound Assessment:  Wound 03/01/24 Moisture Associated Skin Damage Buttocks (Active)   Date First Assessed/Time First Assessed: 03/01/24 1300   Present on Original Admission: No  Primary Wound Type: Moisture Associated Skin Damage  Location: Buttocks      Assessments 3/1/2024  3:24 PM   State of Healing Closed wound edges       No associated orders.     Wound 03/01/24 Moisture Associated Skin Damage Buttocks (Active)   Date First Assessed/Time First Assessed: 03/01/24 1300   Present on Original Admission: No  Primary Wound Type: Moisture Associated Skin Damage  Location: Buttocks   Number of days: 0     Wound 03/01/24 Moisture Associated Skin Damage Buttocks (Active)   State of Healing Closed wound edges 03/01/24 1524     Wound location: coccyx, gluteal cleft, and GLENDA inner buttocks       Undermining: no  Tracking: no  Wound type: MASD c/b ITD  Wound bed: blanchable erythema with intermittent <0.5cm intact serous blisters  Draining: none  Periwound skin: Clean, dry, and intact   Therapeutic surface: [employer]      ** EHOB mattress overlay from central supply (# [id number]) ordered to bedside for patient support surface.      Recommendations: TWICE DAILY and as needed with clean-ups      Keep clean and dry.       Apply Triad wound dressing cream to damaged tissue       TO APPLY TRIAD: Triad wound dressing cream can be applied directly from the tube or by using a gloved finger. Gently spread Triad evenly over the area of application to the thickness of a dime.     Reapply Triad wound dressing cream with each clean up and as needed. In the perineal area, reapply Triad after each episode of incontinence. With higher exudate levels requiring more frequent re-applications. ( order number [id number]).      When soiled, wash top layer of  soiled Triad wound dressing cream off with warm wipes as needed pat dry, then reapply Triad.        **EVERY THREE DAYS WASH DOWN TO SKIN.             TO REMOVE TRIAD: Use pH-balanced wound cleanser to soften Triad. Gently wipe to remove without scrubbing.             Then reapply if needed.     Apply a sacral Mepilex border foam to sacrum (not over buttocks) for protection.  Check under every shift and as needed.  Change as needed and as soiled.       While in bed patient should only be on one EHOB air mattress overlay, a fitted sheet, and one EHOB repositioning sheet with appropriate white chux. Please do not use brief while patient is resting in bed. TURN PT Q2 HOURS as far onto his side as tolerated. Place one wedge high from shoulders to waist, leave a gap for sacral/coccyx, and place second wedge below level of the wound.  Elevate heels off the bed surface at all times.      Wound Team Plan: Primary provider, please review recommendation. If you agree with recommendation please enter as wound orders in EMR. Thank you.      While inpatient, Secure chat with questions or if condition changes. For urgent communications [place] [phone].       Alma Stafford RN, CWON  3/1/2024  3:25 PM

## 2024-03-02 LAB
ALBUMIN SERPL BCP-MCNC: 2.5 G/DL (ref 3.4–5)
ALBUMIN SERPL BCP-MCNC: 2.6 G/DL (ref 3.4–5)
ALP SERPL-CCNC: 66 U/L (ref 33–136)
ALT SERPL W P-5'-P-CCNC: 19 U/L (ref 7–45)
ANION GAP BLDV CALCULATED.4IONS-SCNC: 7 MMOL/L (ref 10–25)
ANION GAP SERPL CALC-SCNC: 11 MMOL/L (ref 10–20)
ANION GAP SERPL CALC-SCNC: 13 MMOL/L (ref 10–20)
APPEARANCE UR: CLEAR
APTT PPP: 28 SECONDS (ref 27–38)
AST SERPL W P-5'-P-CCNC: 23 U/L (ref 9–39)
BASE EXCESS BLDV CALC-SCNC: 4.2 MMOL/L (ref -2–3)
BASOPHILS # BLD MANUAL: 0 X10*3/UL (ref 0–0.1)
BASOPHILS # BLD MANUAL: 0 X10*3/UL (ref 0–0.1)
BASOPHILS NFR BLD MANUAL: 0 %
BASOPHILS NFR BLD MANUAL: 0 %
BILIRUB SERPL-MCNC: 0.4 MG/DL (ref 0–1.2)
BILIRUB UR STRIP.AUTO-MCNC: NEGATIVE MG/DL
BODY TEMPERATURE: 37 DEGREES CELSIUS
BUN SERPL-MCNC: 32 MG/DL (ref 6–23)
BUN SERPL-MCNC: 32 MG/DL (ref 6–23)
BURR CELLS BLD QL SMEAR: ABNORMAL
BURR CELLS BLD QL SMEAR: ABNORMAL
CA-I BLDV-SCNC: 1.15 MMOL/L (ref 1.1–1.33)
CALCIUM SERPL-MCNC: 8.5 MG/DL (ref 8.6–10.6)
CALCIUM SERPL-MCNC: 8.7 MG/DL (ref 8.6–10.6)
CHLORIDE BLDV-SCNC: 102 MMOL/L (ref 98–107)
CHLORIDE SERPL-SCNC: 100 MMOL/L (ref 98–107)
CHLORIDE SERPL-SCNC: 101 MMOL/L (ref 98–107)
CO2 SERPL-SCNC: 30 MMOL/L (ref 21–32)
CO2 SERPL-SCNC: 31 MMOL/L (ref 21–32)
COLOR UR: YELLOW
CREAT SERPL-MCNC: 0.58 MG/DL (ref 0.5–1.05)
CREAT SERPL-MCNC: 0.6 MG/DL (ref 0.5–1.05)
EGFRCR SERPLBLD CKD-EPI 2021: >90 ML/MIN/1.73M*2
EGFRCR SERPLBLD CKD-EPI 2021: >90 ML/MIN/1.73M*2
EOSINOPHIL # BLD MANUAL: 0 X10*3/UL (ref 0–0.4)
EOSINOPHIL # BLD MANUAL: 0 X10*3/UL (ref 0–0.4)
EOSINOPHIL NFR BLD MANUAL: 0 %
EOSINOPHIL NFR BLD MANUAL: 0 %
ERYTHROCYTE [DISTWIDTH] IN BLOOD BY AUTOMATED COUNT: 17.9 % (ref 11.5–14.5)
ERYTHROCYTE [DISTWIDTH] IN BLOOD BY AUTOMATED COUNT: 18.2 % (ref 11.5–14.5)
GLUCOSE BLDV-MCNC: 140 MG/DL (ref 74–99)
GLUCOSE SERPL-MCNC: 135 MG/DL (ref 74–99)
GLUCOSE SERPL-MCNC: 167 MG/DL (ref 74–99)
GLUCOSE UR STRIP.AUTO-MCNC: ABNORMAL MG/DL
HCO3 BLDV-SCNC: 29.2 MMOL/L (ref 22–26)
HCT VFR BLD AUTO: 30.1 % (ref 36–46)
HCT VFR BLD AUTO: 30.1 % (ref 36–46)
HCT VFR BLD EST: 39 % (ref 36–46)
HGB BLD-MCNC: 9.4 G/DL (ref 12–16)
HGB BLD-MCNC: 9.7 G/DL (ref 12–16)
HGB BLDV-MCNC: 12.9 G/DL (ref 12–16)
HYALINE CASTS #/AREA URNS AUTO: ABNORMAL /LPF
IMM GRANULOCYTES # BLD AUTO: 0.07 X10*3/UL (ref 0–0.5)
IMM GRANULOCYTES # BLD AUTO: 0.11 X10*3/UL (ref 0–0.5)
IMM GRANULOCYTES NFR BLD AUTO: 10.9 % (ref 0–0.9)
IMM GRANULOCYTES NFR BLD AUTO: 11.8 % (ref 0–0.9)
INHALED O2 CONCENTRATION: 0 %
INR PPP: 1.3 (ref 0.9–1.1)
KETONES UR STRIP.AUTO-MCNC: NEGATIVE MG/DL
LACTATE BLDV-SCNC: 1.6 MMOL/L (ref 0.4–2)
LACTATE BLDV-SCNC: 2.3 MMOL/L (ref 0.4–2)
LACTATE SERPL-SCNC: 1.6 MMOL/L (ref 0.4–2)
LEUKOCYTE ESTERASE UR QL STRIP.AUTO: NEGATIVE
LYMPHOCYTES # BLD MANUAL: 0.01 X10*3/UL (ref 0.8–3)
LYMPHOCYTES # BLD MANUAL: 0.02 X10*3/UL (ref 0.8–3)
LYMPHOCYTES NFR BLD MANUAL: 2 %
LYMPHOCYTES NFR BLD MANUAL: 2.4 %
MAGNESIUM SERPL-MCNC: 2.11 MG/DL (ref 1.6–2.4)
MAGNESIUM SERPL-MCNC: 2.22 MG/DL (ref 1.6–2.4)
MCH RBC QN AUTO: 30.8 PG (ref 26–34)
MCH RBC QN AUTO: 31.5 PG (ref 26–34)
MCHC RBC AUTO-ENTMCNC: 31.2 G/DL (ref 32–36)
MCHC RBC AUTO-ENTMCNC: 32.2 G/DL (ref 32–36)
MCV RBC AUTO: 98 FL (ref 80–100)
MCV RBC AUTO: 99 FL (ref 80–100)
METAMYELOCYTES # BLD MANUAL: 0.01 X10*3/UL
METAMYELOCYTES # BLD MANUAL: 0.01 X10*3/UL
METAMYELOCYTES NFR BLD MANUAL: 1 %
METAMYELOCYTES NFR BLD MANUAL: 1.6 %
MONOCYTES # BLD MANUAL: 0 X10*3/UL (ref 0.05–0.8)
MONOCYTES # BLD MANUAL: 0.01 X10*3/UL (ref 0.05–0.8)
MONOCYTES NFR BLD MANUAL: 0 %
MONOCYTES NFR BLD MANUAL: 1 %
NEUTS SEG # BLD MANUAL: 0.58 X10*3/UL (ref 1.6–5)
NEUTS SEG # BLD MANUAL: 0.86 X10*3/UL (ref 1.6–5)
NEUTS SEG NFR BLD MANUAL: 96 %
NEUTS SEG NFR BLD MANUAL: 96 %
NITRITE UR QL STRIP.AUTO: NEGATIVE
NRBC BLD-RTO: 4.7 /100 WBCS (ref 0–0)
NRBC BLD-RTO: 5.4 /100 WBCS (ref 0–0)
OVALOCYTES BLD QL SMEAR: ABNORMAL
OXYHGB MFR BLDV: 51.3 % (ref 45–75)
PCO2 BLDV: 44 MM HG (ref 41–51)
PH BLDV: 7.43 PH (ref 7.33–7.43)
PH UR STRIP.AUTO: 6.5 [PH]
PHOSPHATE SERPL-MCNC: 2.5 MG/DL (ref 2.5–4.9)
PLATELET # BLD AUTO: 102 X10*3/UL (ref 150–450)
PLATELET # BLD AUTO: 108 X10*3/UL (ref 150–450)
PO2 BLDV: 37 MM HG (ref 35–45)
POTASSIUM BLDV-SCNC: 3.8 MMOL/L (ref 3.5–5.3)
POTASSIUM SERPL-SCNC: 4 MMOL/L (ref 3.5–5.3)
POTASSIUM SERPL-SCNC: 4 MMOL/L (ref 3.5–5.3)
PROT SERPL-MCNC: 4.7 G/DL (ref 6.4–8.2)
PROT UR STRIP.AUTO-MCNC: ABNORMAL MG/DL
PROTHROMBIN TIME: 14.7 SECONDS (ref 9.8–12.8)
RBC # BLD AUTO: 3.05 X10*6/UL (ref 4–5.2)
RBC # BLD AUTO: 3.08 X10*6/UL (ref 4–5.2)
RBC # UR STRIP.AUTO: ABNORMAL /UL
RBC #/AREA URNS AUTO: >20 /HPF
RBC MORPH BLD: ABNORMAL
RBC MORPH BLD: ABNORMAL
SAO2 % BLDV: 52 % (ref 45–75)
SODIUM BLDV-SCNC: 134 MMOL/L (ref 136–145)
SODIUM SERPL-SCNC: 139 MMOL/L (ref 136–145)
SODIUM SERPL-SCNC: 139 MMOL/L (ref 136–145)
SP GR UR STRIP.AUTO: 1.03
TOTAL CELLS COUNTED BLD: 100
TOTAL CELLS COUNTED BLD: 124
UROBILINOGEN UR STRIP.AUTO-MCNC: ABNORMAL MG/DL
WBC # BLD AUTO: 0.6 X10*3/UL (ref 4.4–11.3)
WBC # BLD AUTO: 0.9 X10*3/UL (ref 4.4–11.3)
WBC #/AREA URNS AUTO: ABNORMAL /HPF

## 2024-03-02 PROCEDURE — 85027 COMPLETE CBC AUTOMATED: CPT

## 2024-03-02 PROCEDURE — 83605 ASSAY OF LACTIC ACID: CPT

## 2024-03-02 PROCEDURE — 2500000001 HC RX 250 WO HCPCS SELF ADMINISTERED DRUGS (ALT 637 FOR MEDICARE OP)

## 2024-03-02 PROCEDURE — 85007 BL SMEAR W/DIFF WBC COUNT: CPT

## 2024-03-02 PROCEDURE — 1170000001 HC PRIVATE ONCOLOGY ROOM DAILY

## 2024-03-02 PROCEDURE — 99233 SBSQ HOSP IP/OBS HIGH 50: CPT

## 2024-03-02 PROCEDURE — 82947 ASSAY GLUCOSE BLOOD QUANT: CPT

## 2024-03-02 PROCEDURE — 2500000004 HC RX 250 GENERAL PHARMACY W/ HCPCS (ALT 636 FOR OP/ED)

## 2024-03-02 PROCEDURE — 80069 RENAL FUNCTION PANEL: CPT

## 2024-03-02 PROCEDURE — 80053 COMPREHEN METABOLIC PANEL: CPT

## 2024-03-02 PROCEDURE — 87040 BLOOD CULTURE FOR BACTERIA: CPT

## 2024-03-02 PROCEDURE — 81001 URINALYSIS AUTO W/SCOPE: CPT

## 2024-03-02 PROCEDURE — 36415 COLL VENOUS BLD VENIPUNCTURE: CPT

## 2024-03-02 PROCEDURE — 99232 SBSQ HOSP IP/OBS MODERATE 35: CPT | Performed by: INTERNAL MEDICINE

## 2024-03-02 PROCEDURE — 83735 ASSAY OF MAGNESIUM: CPT

## 2024-03-02 PROCEDURE — 82435 ASSAY OF BLOOD CHLORIDE: CPT

## 2024-03-02 PROCEDURE — 85610 PROTHROMBIN TIME: CPT

## 2024-03-02 PROCEDURE — 2500000002 HC RX 250 W HCPCS SELF ADMINISTERED DRUGS (ALT 637 FOR MEDICARE OP, ALT 636 FOR OP/ED)

## 2024-03-02 PROCEDURE — 84132 ASSAY OF SERUM POTASSIUM: CPT

## 2024-03-02 RX ORDER — ACETAMINOPHEN 500 MG
10 TABLET ORAL NIGHTLY
Status: DISCONTINUED | OUTPATIENT
Start: 2024-03-02 | End: 2024-03-14

## 2024-03-02 RX ORDER — TRAZODONE HYDROCHLORIDE 50 MG/1
50 TABLET ORAL ONCE
Status: COMPLETED | OUTPATIENT
Start: 2024-03-02 | End: 2024-03-02

## 2024-03-02 RX ADMIN — Medication 1000 UNITS: at 21:33

## 2024-03-02 RX ADMIN — MULTIVIT AND MINERALS-FERROUS GLUCONATE 9 MG IRON/15 ML ORAL LIQUID 15 ML: at 09:50

## 2024-03-02 RX ADMIN — ACETAMINOPHEN 1000 MG: 650 SOLUTION ORAL at 18:21

## 2024-03-02 RX ADMIN — ESOMEPRAZOLE MAGNESIUM 40 MG: 40 FOR SUSPENSION ORAL at 09:56

## 2024-03-02 RX ADMIN — BUPROPION HYDROCHLORIDE 150 MG: 100 TABLET, FILM COATED ORAL at 09:50

## 2024-03-02 RX ADMIN — LOSARTAN POTASSIUM 25 MG: 25 TABLET, FILM COATED ORAL at 09:49

## 2024-03-02 RX ADMIN — ACETAMINOPHEN 1000 MG: 650 SOLUTION ORAL at 05:24

## 2024-03-02 RX ADMIN — OXYCODONE HYDROCHLORIDE AND ACETAMINOPHEN 500 MG: 500 TABLET ORAL at 09:49

## 2024-03-02 RX ADMIN — Medication 400 MG: at 09:49

## 2024-03-02 RX ADMIN — ACETAMINOPHEN 1000 MG: 650 SOLUTION ORAL at 13:10

## 2024-03-02 RX ADMIN — THIAMINE HCL TAB 100 MG 100 MG: 100 TAB at 09:49

## 2024-03-02 RX ADMIN — METOPROLOL TARTRATE 50 MG: 50 TABLET, FILM COATED ORAL at 21:33

## 2024-03-02 RX ADMIN — VENLAFAXINE 75 MG: 75 TABLET ORAL at 18:22

## 2024-03-02 RX ADMIN — Medication 10 MG: at 18:21

## 2024-03-02 RX ADMIN — DAPAGLIFLOZIN 10 MG: 10 TABLET, FILM COATED ORAL at 09:50

## 2024-03-02 RX ADMIN — SODIUM CHLORIDE, POTASSIUM CHLORIDE, SODIUM LACTATE AND CALCIUM CHLORIDE 1000 ML: 600; 310; 30; 20 INJECTION, SOLUTION INTRAVENOUS at 14:47

## 2024-03-02 RX ADMIN — METOPROLOL TARTRATE 50 MG: 50 TABLET, FILM COATED ORAL at 09:49

## 2024-03-02 RX ADMIN — FILGRASTIM-SNDZ 300 MCG: 300 INJECTION, SOLUTION INTRAVENOUS; SUBCUTANEOUS at 16:22

## 2024-03-02 RX ADMIN — Medication 1000 UNITS: at 09:49

## 2024-03-02 RX ADMIN — CEFTRIAXONE SODIUM 2 G: 2 INJECTION, SOLUTION INTRAVENOUS at 16:22

## 2024-03-02 RX ADMIN — FLUCONAZOLE 400 MG: 2 INJECTION, SOLUTION INTRAVENOUS at 13:11

## 2024-03-02 RX ADMIN — SPIRONOLACTONE 12.5 MG: 25 TABLET, FILM COATED ORAL at 09:49

## 2024-03-02 RX ADMIN — ATORVASTATIN CALCIUM 20 MG: 20 TABLET, FILM COATED ORAL at 09:49

## 2024-03-02 RX ADMIN — VENLAFAXINE 75 MG: 75 TABLET ORAL at 09:50

## 2024-03-02 RX ADMIN — BUPROPION HYDROCHLORIDE 150 MG: 100 TABLET, FILM COATED ORAL at 21:33

## 2024-03-02 RX ADMIN — TRAZODONE HYDROCHLORIDE 50 MG: 50 TABLET ORAL at 21:33

## 2024-03-02 ASSESSMENT — COGNITIVE AND FUNCTIONAL STATUS - GENERAL
MOVING TO AND FROM BED TO CHAIR: TOTAL
DAILY ACTIVITIY SCORE: 10
STANDING UP FROM CHAIR USING ARMS: TOTAL
DRESSING REGULAR UPPER BODY CLOTHING: A LOT
DAILY ACTIVITIY SCORE: 10
STANDING UP FROM CHAIR USING ARMS: TOTAL
TOILETING: TOTAL
WALKING IN HOSPITAL ROOM: TOTAL
MOVING TO AND FROM BED TO CHAIR: TOTAL
EATING MEALS: A LITTLE
DRESSING REGULAR UPPER BODY CLOTHING: A LOT
PERSONAL GROOMING: TOTAL
PERSONAL GROOMING: TOTAL
MOBILITY SCORE: 10
TURNING FROM BACK TO SIDE WHILE IN FLAT BAD: A LITTLE
WALKING IN HOSPITAL ROOM: TOTAL
MOBILITY SCORE: 10
PERSONAL GROOMING: TOTAL
MOVING TO AND FROM BED TO CHAIR: TOTAL
STANDING UP FROM CHAIR USING ARMS: TOTAL
MOVING FROM LYING ON BACK TO SITTING ON SIDE OF FLAT BED WITH BEDRAILS: A LITTLE
TURNING FROM BACK TO SIDE WHILE IN FLAT BAD: A LITTLE
HELP NEEDED FOR BATHING: TOTAL
TOILETING: TOTAL
MOBILITY SCORE: 10
TOILETING: TOTAL
CLIMB 3 TO 5 STEPS WITH RAILING: TOTAL
EATING MEALS: A LITTLE
DRESSING REGULAR UPPER BODY CLOTHING: A LOT
MOVING FROM LYING ON BACK TO SITTING ON SIDE OF FLAT BED WITH BEDRAILS: A LITTLE
DRESSING REGULAR LOWER BODY CLOTHING: A LOT
HELP NEEDED FOR BATHING: A LITTLE
EATING MEALS: A LITTLE
WALKING IN HOSPITAL ROOM: TOTAL
CLIMB 3 TO 5 STEPS WITH RAILING: TOTAL
DRESSING REGULAR LOWER BODY CLOTHING: A LOT
CLIMB 3 TO 5 STEPS WITH RAILING: TOTAL
TURNING FROM BACK TO SIDE WHILE IN FLAT BAD: A LITTLE
MOVING FROM LYING ON BACK TO SITTING ON SIDE OF FLAT BED WITH BEDRAILS: A LITTLE
DAILY ACTIVITIY SCORE: 12
DRESSING REGULAR LOWER BODY CLOTHING: A LOT
HELP NEEDED FOR BATHING: TOTAL

## 2024-03-02 ASSESSMENT — PAIN SCALES - GENERAL
PAINLEVEL_OUTOF10: 3
PAINLEVEL_OUTOF10: 0 - NO PAIN

## 2024-03-02 ASSESSMENT — PAIN - FUNCTIONAL ASSESSMENT: PAIN_FUNCTIONAL_ASSESSMENT: 0-10

## 2024-03-02 ASSESSMENT — PAIN DESCRIPTION - LOCATION: LOCATION: ABDOMEN

## 2024-03-02 NOTE — CARE PLAN
The patient's goals for the shift include      The clinical goals for the shift include pt will remain injury free      Problem: Skin  Goal: Decreased wound size/increased tissue granulation at next dressing change  Outcome: Progressing  Flowsheets (Taken 3/2/2024 0758)  Decreased wound size/increased tissue granulation at next dressing change: Protective dressings over bony prominences  Goal: Participates in plan/prevention/treatment measures  Outcome: Progressing  Flowsheets (Taken 3/2/2024 0758)  Participates in plan/prevention/treatment measures: Elevate heels  Goal: Prevent/manage excess moisture  Outcome: Progressing  Flowsheets (Taken 3/2/2024 0758)  Prevent/manage excess moisture: Moisturize dry skin  Goal: Prevent/minimize sheer/friction injuries  Outcome: Progressing  Flowsheets (Taken 3/2/2024 0758)  Prevent/minimize sheer/friction injuries: Increase activity/out of bed for meals  Goal: Promote/optimize nutrition  Outcome: Progressing  Flowsheets (Taken 3/2/2024 0758)  Promote/optimize nutrition: Offer water/supplements/favorite foods  Goal: Promote skin healing  Outcome: Progressing  Flowsheets (Taken 3/2/2024 0758)  Promote skin healing: Protective dressings over bony prominences     Problem: Pain  Goal: My pain/discomfort is manageable  Outcome: Progressing     Problem: Safety  Goal: Patient will be injury free during hospitalization  Outcome: Progressing  Goal: I will remain free of falls  Outcome: Progressing     Problem: Daily Care  Goal: Daily care needs are met  Outcome: Progressing     Problem: Psychosocial Needs  Goal: Demonstrates ability to cope with hospitalization/illness  Outcome: Progressing  Goal: Collaborate with me, my family, and caregiver to identify my specific goals  Outcome: Progressing     Problem: Discharge Barriers  Goal: My discharge needs are met  Outcome: Progressing     Problem: Pain  Goal: Takes deep breaths with improved pain control throughout the shift  Outcome:  Progressing  Goal: Turns in bed with improved pain control throughout the shift  Outcome: Progressing  Goal: Walks with improved pain control throughout the shift  Outcome: Progressing  Goal: Performs ADL's with improved pain control throughout shift  Outcome: Progressing  Goal: Participates in PT with improved pain control throughout the shift  Outcome: Progressing  Goal: Free from opioid side effects throughout the shift  Outcome: Progressing  Goal: Free from acute confusion related to pain meds throughout the shift  Outcome: Progressing     Problem: Pain - Adult  Goal: Verbalizes/displays adequate comfort level or baseline comfort level  Outcome: Progressing     Problem: Safety - Adult  Goal: Free from fall injury  Outcome: Progressing     Problem: Discharge Planning  Goal: Discharge to home or other facility with appropriate resources  Outcome: Progressing     Problem: Chronic Conditions and Co-morbidities  Goal: Patient's chronic conditions and co-morbidity symptoms are monitored and maintained or improved  Outcome: Progressing     Problem: Fall/Injury  Goal: Not fall by end of shift  Outcome: Progressing  Goal: Be free from injury by end of the shift  Outcome: Progressing  Goal: Verbalize understanding of personal risk factors for fall in the hospital  Outcome: Progressing  Goal: Verbalize understanding of risk factor reduction measures to prevent injury from fall in the home  Outcome: Progressing  Goal: Use assistive devices by end of the shift  Outcome: Progressing  Goal: Pace activities to prevent fatigue by end of the shift  Outcome: Progressing

## 2024-03-02 NOTE — PROGRESS NOTES
Amita Todd is a 78 y.o. female on day 9 of admission presenting with Thrombocytopenia (CMS/HCC).    Subjective   Patient more delirious, orientable but mumbles. Denies pain or SOB.    Objective   Constitutional:       Comments: underweight   HENT:      Mouth/Throat:      Mouth: Mucous membranes are dry. improved well circumscribed plaques   Eyes:      Pupils: Pupils are equal, round, and reactive to light.   Cardiovascular:      Rate and Rhythm: Irregular rhythm. Tachycardia present.      Pulses: Normal pulses.   Pulmonary:      Comments: mild crackles B/L, RUL>AGATHA. On RA  Abdominal:      General: Abdomen is flat. Bowel sounds are normal.      Palpations: Abdomen is soft.   Musculoskeletal:         General: Normal range of motion.      Cervical back: Normal range of motion.      Comments: 1+ pitting edema B/L LE till ankles   Skin:     General: Skin is warm.      Capillary Refill: Capillary refill takes less than 2 seconds.      Findings: Bruising present.      Comments: Petechia over trunk   Neurological:      General: No focal deficit present.      Mental Status: She is orietable, but delirious   Psychiatric:         Mood and Affect: Mood normal.     Visit Vitals  /88 (BP Location: Right arm, Patient Position: Lying)   Pulse 82   Temp 36.1 °C (97 °F) (Temporal)   Resp 16     Relevant Results  Lab Results   Component Value Date    CREATININE 0.73 03/01/2024    BUN 30 (H) 03/01/2024     03/01/2024    K 3.8 03/01/2024    CL 96 (L) 03/01/2024    CO2 32 03/01/2024     Lab Results   Component Value Date    WBC 2.0 (L) 03/01/2024    HGB 9.1 (L) 03/01/2024    HCT 28.7 (L) 03/01/2024    MCV 97 03/01/2024     (L) 03/01/2024       IMPRESSION:  1. Findings suggestive of mild interstitial pulmonary edema.  2. New right infrahilar/basilar airspace opacity may represent  atelectasis, edema, or developing infectious infiltrate.    TTE 2/27:  1. Left ventricular systolic function is mildly decreased  with a 50% estimated ejection fraction.  2. Poorly visualized anatomical structures due to suboptimal image quality.  3. There is reduced right ventricular systolic function.  4. The left atrium is moderately dilated.  5. Mildly elevated RVSP.  6. Mild to moderate aortic valve regurgitation.  7. The ascending aorta is 4.4 cm (2.67 cm/m).  8. The patient is in atrial fibrillation which may influence the estimate of left ventricular function and transvalvular flows.  9. There is global hypokinesis of the left ventricle with minor regional variations.  10. There is moderate dilatation of the ascending aorta.    Assessment/Plan   79yo F w PMHx squamous cell carcinoma of hypopharynx, HFpEF, CAD s/p PCI, Afib s/p watchman, GERD, depression, HTN, AUD, ascending aortic aneurysm, distant breast cancer (s/p chemoradiotherapy and lumpectomy) who was directed to ED for hypotension which resolved. Currently treating E.Coli bacteremia with ceftriaxone (ID following), and plan to have EGD (assess esophagitis infectious vs radiation) + PEG (for nutrition); however ANC less than goal. Started neupogen (3/2) d/t low ANC, infection and procedure goal     3/2 updates  - starting neupogen     #Hypotension, improved   #HFpEF, initial c/f ADHF  ::Patient presents with hypotension over the past month in oncology clinic, has received IVF and discontinued home losartan/amlodipine during this time, BP spontaneously improving without intervention today to 100s/70s, appears grossly dry to euvolemic on exam without any symptoms of acute heart failure + positive orthostats in ED  ::BNP 2/22: 229 - appears to be at baseline  ::TTE 7/2023: EF 55-60% // TTE 2/2024 EF 50%  - Patient likely has less PO intake and with chemo does not need as many antihyptertensives/GDMT medication dose - emailed primary cardiologist  - Medications given via Dobhoff  - Aldactone 12.5mg // Metoprolol Tartrate 40mg BID // Losartan 25mg // Dapagliflozin 10mg   -  Atorvostatin 20mg      #SCC of Hypopharynx (Dr. Burkitt)  #Pain  #Delerum  ::SLP librado 2/12 recommended soft solids with thin liquids, patient would prefer oral feeding to PEG   - nutrition recs appreciated: 100mg IV thiamine x7 days,  Isosource 1.5 at goal [50cc/hr]  - c/w home Guaifenesin  - c/w Zofran prn nausea, Compazien second line  - Changed pain regimen: IV Dilaudid 0.4mg Q3 PRN - held I/s of delirium  - Will continue dexamethasone 4mg BID   - delirium precautions     #Oropharyngeal candidiasis  #Dysphagia  ::Moderate to severe candidiasis with multiple large lesions  ::given ANC 0.16, consideration for opportunistic infections present. Entended antifungal course  -consult GI today for PEG and EGD to r/o radiation vs infectious esophagitis. Will need to hold plavix for another 4 days. Tentative plan for Friday vs Monday   - neupogen usually not given during active tx per primary oncologist, will reasses in a day or 2.   - Fluconazole 200mg IV (2/22 - xx), typically for 7 days but reassess for duration  - Oral care per nursing     #Pancytopenia  ::ANC 0.16 // Plts 53 on admission with no concern for bleeding // HB 10.9 on presentation (BL 12-13)  - likely d/t myelosuppresion vs though patient has had poor po intake vs mixed  - neupogen usually not given during active tx per primary oncologist, will reasses in a day or 2.   - Daily CBC  - T&S updated, transfuse for active bleeding <50, or for <10  - Will hold on DVT prophylaxis, SCDs only  - holding clopidogrel (for EGD + peg in future)     #Normocytic Anemia  ::HB 10.9 on presentation (BL 12-13), no active bleeding, no recent iron studies  - Likely d/t chronic dz vs myelosuppresion, though patient has had poor po intake     #Atrial Fibrillation  ::Patient previously on AC, now s/p watchman procedure and is rate controlled   ::had 5 ablations per patient  - Continue metoprolol tartrate 50mg BID  - HR 90s to 120s     #Urinary Frequency  - UA with reflex      #Hx  of TIA  - c/w home Plavix 75mg daily     #GERD  - c/w home Pantoprazole     #Mood Disorder  #Misc  - c/w home Cymbalta and Wellbutrin  - c/w home supplements, mag-ox     F : caution, was diuresing. HfpEF  E: replete lytes prn, K>4, Mg >2  N: isosource 1.5 via dobhoff  A: PIVs     DVT prophylaxis: SCDs  GI prophylaxis: home PPI     Code Status: FULL

## 2024-03-02 NOTE — PROGRESS NOTES
Spiritual Care Visit    Clinical Encounter Type  Visited With: Patient and family together  Routine Visit: Introduction  Continue Visiting: Yes  Crisis Visit: Critical care  Referral From: Nurse,   Referral To:     Protestant Encounters  Protestant Needs: Prayer         Sacramental Encounters  Sacrament of Sick-Anointing: Anointed    Patient received the Sacrament of the Anointing of the Sick by Fr. Lalo Pride, [facility] .    Family members were preset.      Within the Religious [employer] the Sacrament of the Sick, also known as the Anointing of the Sick, is a prayer in which we invoke the healing power of God.  Along with Eucharist and Reconciliation it is a repeatable sacrament and should be received by anyone about to undergo surgery, those in recovery and the elderly.  This IS NOT 'Last Rites' nor does the Faith have such a ritual.  Those who are actively dying should receive the Anointing of the Sick for physical and spiritual healing.

## 2024-03-02 NOTE — HOSPITAL COURSE
Amita Todd is a 78 y.o. female with PMHx of Recently diagnosed SCC of the hypopharynx in 12/2023 (concurrent chemoRT with weekly Carboplatin + Paclitaxel 1/24/24 w/ Dr. 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 Slowly restarted GDMT medications till BP normal (Aldactone 12.5mg // Metoprolol Tartrate 40mg BID // Losartan 25mg // Dapagliflozin 10mg), +Ve orthostats in the ED, gave 2x 500cc boluses despite CHF hx as patient found to be having decreased PO intake for a while. Inpatient radiation performed. ANC 0.16 on admission, and found to have oral thrush. Started on IV fluconazole for empiric treatment of esophageal candidiasis as well i/s of low ANC and dysphagia. SLP assessed patient as dysphagia present + o/p wanted to assess swallowing, MBS done and failed, strict NPO. WBC count was steadily increasing with plans for EGD to assess dysphagia radiation esophagitis vs infective (ANC >1000) + Peg Placement. Started inpatient chemotherapy on Wednesday d/t ANC count uptrending and missed last week's chemo session (done one time on Wednesday). Supportive onc following, New O2 requirements and worsening delirium on Wednesday evening, infectious workup sent and CXR found to have pulm edema, diuresed over 2 days (80- IV Lasix total) and breathing back to RA, Dobhoff placed and started trickle feeds with isosource 1.5 10ml/hr per nutrition and titrated till goal of 50cc/hr. Cultures grew back (from wed 2/28) showing E.Coli from mediport, ID consulted and started on ceftriaxone and will D/C with lock+abx for mediport. Repeat blood cultures from 3/2/2024 showing no growth.  Patient's delirium worsening since Wednesday 2/28, likely d/t opiods vs hosp delirium vs infection, decreased opioids frequency, delirium precautions and continued abx. Given infection, GI rescheduled EGD+PEG (3/4). However ANC decreasing (0.58 on 3/2) and given infection + possible PEG +Endoscopy on Wednesday, agreed to start neupogen (3/2). Patient remained neutropenic through 3/5 despite filgrastim, tentative plans for SNF placement until ANC recovers and patient could have PEG placed.  Patient's ANC finally recovered on 3/8.  Filgrastim was discontinued at that time, and after 2 days of ANC above 1000 all filgrastim patient did have PEG placed with GI.  This was placed on 3/13.  Patient finished her IV antibiotics for E. coli bacteremia and these were discontinued after 10 days total treatment.  Plan was for patient to continue radiation while awaiting placement at skilled nursing facility.  Patient had repeated rapid response was called for thick mucus causing airway obstruction, likely in the setting of radiation to the head and neck.  Rapid was called overnight 3/15-3/16 and ENT was able to suction the patient and clear her airway, but the decision was made that despite multiple suctionings the patient could no longer tolerate these and she continued to have respiratory compromise.  Given the fact that the patient was DNR/DNI, and that she had previously stated she would not want to be intubated, her family opted for comfort measures only as the patient would not have wanted escalation to the ICU for possible intubation and respiratory support.  Patient was transitioned to comfort care on 3/16, and referral to hospice was placed on 3/17.    [  ] referral to hospice placed on 3/17

## 2024-03-02 NOTE — CARE PLAN
The clinical goals for the shift include pt will remain safe and free of injury during my shift    Overall the pt had a good night. She did get a little bit of sleep tonight. She continues to have confusion, and hallucinations along with frequent loose bowel movements. When given one ice chip she had difficulty swallowing, will continue to monitor LOC, and vital signs.     Problem: Skin  Goal: Prevent/manage excess moisture  Outcome: Progressing  Flowsheets (Taken 3/2/2024 0553)  Prevent/manage excess moisture:   Cleanse incontinence/protect with barrier cream   Monitor for/manage infection if present     Problem: Skin  Goal: Prevent/minimize sheer/friction injuries  Outcome: Progressing  Flowsheets (Taken 3/2/2024 0553)  Prevent/minimize sheer/friction injuries:   Complete micro-shifts as needed if patient unable. Adjust patient position to relieve pressure points, not a full turn   HOB 30 degrees or less   Turn/reposition every 2 hours/use positioning/transfer devices     Problem: Safety  Goal: I will remain free of falls  Outcome: Progressing     Problem: Safety  Goal: Patient will be injury free during hospitalization  Outcome: Progressing

## 2024-03-03 LAB
ALBUMIN SERPL BCP-MCNC: 2.5 G/DL (ref 3.4–5)
ANION GAP SERPL CALC-SCNC: 9 MMOL/L (ref 10–20)
BACTERIA BLD AEROBE CULT: ABNORMAL
BACTERIA BLD CULT: ABNORMAL
BASOPHILS # BLD AUTO: 0.02 X10*3/UL (ref 0–0.1)
BASOPHILS NFR BLD AUTO: 5.3 %
BUN SERPL-MCNC: 27 MG/DL (ref 6–23)
CALCIUM SERPL-MCNC: 8.5 MG/DL (ref 8.6–10.6)
CHLORIDE SERPL-SCNC: 104 MMOL/L (ref 98–107)
CO2 SERPL-SCNC: 31 MMOL/L (ref 21–32)
CREAT SERPL-MCNC: 0.54 MG/DL (ref 0.5–1.05)
EGFRCR SERPLBLD CKD-EPI 2021: >90 ML/MIN/1.73M*2
EOSINOPHIL # BLD AUTO: 0.02 X10*3/UL (ref 0–0.4)
EOSINOPHIL NFR BLD AUTO: 5.3 %
ERYTHROCYTE [DISTWIDTH] IN BLOOD BY AUTOMATED COUNT: 18 % (ref 11.5–14.5)
GLUCOSE SERPL-MCNC: 123 MG/DL (ref 74–99)
GRAM STN SPEC: ABNORMAL
HCT VFR BLD AUTO: 29.8 % (ref 36–46)
HGB BLD-MCNC: 9.3 G/DL (ref 12–16)
IMM GRANULOCYTES # BLD AUTO: 0.1 X10*3/UL (ref 0–0.5)
IMM GRANULOCYTES NFR BLD AUTO: 26.3 % (ref 0–0.9)
LYMPHOCYTES # BLD AUTO: 0.03 X10*3/UL (ref 0.8–3)
LYMPHOCYTES NFR BLD AUTO: 7.9 %
MAGNESIUM SERPL-MCNC: 2.07 MG/DL (ref 1.6–2.4)
MCH RBC QN AUTO: 31.1 PG (ref 26–34)
MCHC RBC AUTO-ENTMCNC: 31.2 G/DL (ref 32–36)
MCV RBC AUTO: 100 FL (ref 80–100)
MONOCYTES # BLD AUTO: 0 X10*3/UL (ref 0.05–0.8)
MONOCYTES NFR BLD AUTO: 0 %
NEUTROPHILS # BLD AUTO: 0.21 X10*3/UL (ref 1.6–5.5)
NEUTROPHILS NFR BLD AUTO: 55.2 %
NRBC BLD-RTO: 0 /100 WBCS (ref 0–0)
PHOSPHATE SERPL-MCNC: 2.1 MG/DL (ref 2.5–4.9)
PLATELET # BLD AUTO: 99 X10*3/UL (ref 150–450)
POTASSIUM SERPL-SCNC: 4.1 MMOL/L (ref 3.5–5.3)
RBC # BLD AUTO: 2.99 X10*6/UL (ref 4–5.2)
RBC MORPH BLD: NORMAL
SODIUM SERPL-SCNC: 140 MMOL/L (ref 136–145)
WBC # BLD AUTO: 0.4 X10*3/UL (ref 4.4–11.3)

## 2024-03-03 PROCEDURE — 2500000004 HC RX 250 GENERAL PHARMACY W/ HCPCS (ALT 636 FOR OP/ED)

## 2024-03-03 PROCEDURE — 83735 ASSAY OF MAGNESIUM: CPT

## 2024-03-03 PROCEDURE — 1170000001 HC PRIVATE ONCOLOGY ROOM DAILY

## 2024-03-03 PROCEDURE — 84100 ASSAY OF PHOSPHORUS: CPT

## 2024-03-03 PROCEDURE — 2500000004 HC RX 250 GENERAL PHARMACY W/ HCPCS (ALT 636 FOR OP/ED): Mod: JZ

## 2024-03-03 PROCEDURE — 85025 COMPLETE CBC W/AUTO DIFF WBC: CPT

## 2024-03-03 PROCEDURE — 2500000001 HC RX 250 WO HCPCS SELF ADMINISTERED DRUGS (ALT 637 FOR MEDICARE OP)

## 2024-03-03 PROCEDURE — 2500000002 HC RX 250 W HCPCS SELF ADMINISTERED DRUGS (ALT 637 FOR MEDICARE OP, ALT 636 FOR OP/ED)

## 2024-03-03 PROCEDURE — 99233 SBSQ HOSP IP/OBS HIGH 50: CPT

## 2024-03-03 PROCEDURE — 2500000005 HC RX 250 GENERAL PHARMACY W/O HCPCS

## 2024-03-03 RX ORDER — LOPERAMIDE HYDROCHLORIDE 2 MG/1
2 CAPSULE ORAL 4 TIMES DAILY PRN
Status: DISCONTINUED | OUTPATIENT
Start: 2024-03-03 | End: 2024-03-14 | Stop reason: ALTCHOICE

## 2024-03-03 RX ORDER — GUAIFENESIN 600 MG/1
600 TABLET, EXTENDED RELEASE ORAL 2 TIMES DAILY PRN
Status: DISCONTINUED | OUTPATIENT
Start: 2024-03-03 | End: 2024-03-05

## 2024-03-03 RX ADMIN — DAPAGLIFLOZIN 10 MG: 10 TABLET, FILM COATED ORAL at 09:45

## 2024-03-03 RX ADMIN — Medication 1000 UNITS: at 09:46

## 2024-03-03 RX ADMIN — VENLAFAXINE 75 MG: 75 TABLET ORAL at 09:46

## 2024-03-03 RX ADMIN — Medication 1000 UNITS: at 20:04

## 2024-03-03 RX ADMIN — CEFTRIAXONE SODIUM 2 G: 2 INJECTION, SOLUTION INTRAVENOUS at 17:20

## 2024-03-03 RX ADMIN — VENLAFAXINE 75 MG: 75 TABLET ORAL at 18:36

## 2024-03-03 RX ADMIN — BUPROPION HYDROCHLORIDE 150 MG: 100 TABLET, FILM COATED ORAL at 20:04

## 2024-03-03 RX ADMIN — LOSARTAN POTASSIUM 25 MG: 25 TABLET, FILM COATED ORAL at 09:46

## 2024-03-03 RX ADMIN — SPIRONOLACTONE 12.5 MG: 25 TABLET, FILM COATED ORAL at 09:45

## 2024-03-03 RX ADMIN — SODIUM PHOSPHATE, MONOBASIC, MONOHYDRATE AND SODIUM PHOSPHATE, DIBASIC, ANHYDROUS 15 MMOL: 142; 276 INJECTION, SOLUTION INTRAVENOUS at 15:45

## 2024-03-03 RX ADMIN — Medication 400 MG: at 09:46

## 2024-03-03 RX ADMIN — ESOMEPRAZOLE MAGNESIUM 40 MG: 40 FOR SUSPENSION ORAL at 11:59

## 2024-03-03 RX ADMIN — BUPROPION HYDROCHLORIDE 150 MG: 100 TABLET, FILM COATED ORAL at 09:45

## 2024-03-03 RX ADMIN — FLUCONAZOLE 400 MG: 2 INJECTION, SOLUTION INTRAVENOUS at 11:59

## 2024-03-03 RX ADMIN — GUAIFENESIN 600 MG: 600 TABLET, EXTENDED RELEASE ORAL at 12:09

## 2024-03-03 RX ADMIN — ACETAMINOPHEN 1000 MG: 650 SOLUTION ORAL at 02:41

## 2024-03-03 RX ADMIN — ACETAMINOPHEN 1000 MG: 650 SOLUTION ORAL at 23:58

## 2024-03-03 RX ADMIN — MULTIVIT AND MINERALS-FERROUS GLUCONATE 9 MG IRON/15 ML ORAL LIQUID 15 ML: at 09:45

## 2024-03-03 RX ADMIN — OXYCODONE HYDROCHLORIDE AND ACETAMINOPHEN 500 MG: 500 TABLET ORAL at 09:45

## 2024-03-03 RX ADMIN — ATORVASTATIN CALCIUM 20 MG: 20 TABLET, FILM COATED ORAL at 09:45

## 2024-03-03 RX ADMIN — THIAMINE HCL TAB 100 MG 100 MG: 100 TAB at 09:45

## 2024-03-03 RX ADMIN — ACETAMINOPHEN 1000 MG: 650 SOLUTION ORAL at 18:36

## 2024-03-03 RX ADMIN — ACETAMINOPHEN 1000 MG: 650 SOLUTION ORAL at 11:59

## 2024-03-03 RX ADMIN — METOPROLOL TARTRATE 50 MG: 50 TABLET, FILM COATED ORAL at 09:45

## 2024-03-03 RX ADMIN — FILGRASTIM-SNDZ 300 MCG: 300 INJECTION, SOLUTION INTRAVENOUS; SUBCUTANEOUS at 14:09

## 2024-03-03 RX ADMIN — Medication 10 MG: at 18:36

## 2024-03-03 RX ADMIN — METOPROLOL TARTRATE 50 MG: 50 TABLET, FILM COATED ORAL at 20:04

## 2024-03-03 RX ADMIN — LOPERAMIDE HYDROCHLORIDE 2 MG: 2 CAPSULE ORAL at 12:09

## 2024-03-03 ASSESSMENT — COGNITIVE AND FUNCTIONAL STATUS - GENERAL
STANDING UP FROM CHAIR USING ARMS: TOTAL
MOVING TO AND FROM BED TO CHAIR: TOTAL
TURNING FROM BACK TO SIDE WHILE IN FLAT BAD: A LITTLE
MOBILITY SCORE: 10
PERSONAL GROOMING: TOTAL
EATING MEALS: A LITTLE
EATING MEALS: A LITTLE
HELP NEEDED FOR BATHING: A LITTLE
HELP NEEDED FOR BATHING: A LITTLE
CLIMB 3 TO 5 STEPS WITH RAILING: TOTAL
TOILETING: TOTAL
MOBILITY SCORE: 10
PERSONAL GROOMING: TOTAL
DAILY ACTIVITIY SCORE: 12
TOILETING: TOTAL
CLIMB 3 TO 5 STEPS WITH RAILING: TOTAL
MOVING FROM LYING ON BACK TO SITTING ON SIDE OF FLAT BED WITH BEDRAILS: A LITTLE
MOVING FROM LYING ON BACK TO SITTING ON SIDE OF FLAT BED WITH BEDRAILS: A LITTLE
MOVING TO AND FROM BED TO CHAIR: TOTAL
TURNING FROM BACK TO SIDE WHILE IN FLAT BAD: A LITTLE
DRESSING REGULAR LOWER BODY CLOTHING: A LOT
STANDING UP FROM CHAIR USING ARMS: TOTAL
DRESSING REGULAR LOWER BODY CLOTHING: A LOT
DRESSING REGULAR UPPER BODY CLOTHING: A LOT
WALKING IN HOSPITAL ROOM: TOTAL
DRESSING REGULAR UPPER BODY CLOTHING: A LOT
WALKING IN HOSPITAL ROOM: TOTAL
DAILY ACTIVITIY SCORE: 12

## 2024-03-03 ASSESSMENT — PAIN DESCRIPTION - LOCATION: LOCATION: GENERALIZED

## 2024-03-03 ASSESSMENT — PAIN SCALES - GENERAL
PAINLEVEL_OUTOF10: 4
PAINLEVEL_OUTOF10: 4
PAINLEVEL_OUTOF10: 0 - NO PAIN

## 2024-03-03 ASSESSMENT — PAIN - FUNCTIONAL ASSESSMENT: PAIN_FUNCTIONAL_ASSESSMENT: 0-10

## 2024-03-03 NOTE — SIGNIFICANT EVENT
ASSESSMENT/PLAN:     Amita Todd is a 78 y.o. 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      GI is consulted for PEG tube assessment.    Last dose of Plavix was on the 2/25/24. We will plan for PEG tube placement on the 03/01/2024 after Plavix washout.      Recommendations:  - Tentative plan for PEG tube placement on 3/4   - Hold Plavix for 5 day prior to PEG tube placement.  - Please stop enteral feeds at MN prior to procedure  - please hold all heparin products (including prophylaxis) at 4AM day prior to procedure  - surgical prophylaxis of cefazolin 2 g IV 30 minutes prior to surgical incision (for nonobese patients weighing <120 kg)     Danish Manjarrez MD   Gastroenterology Fellow

## 2024-03-03 NOTE — CARE PLAN
The patient's goals for the shift include      The clinical goals for the shift include pt will remain injury free      Problem: Skin  Goal: Decreased wound size/increased tissue granulation at next dressing change  Outcome: Progressing  Flowsheets (Taken 3/3/2024 0731)  Decreased wound size/increased tissue granulation at next dressing change: Promote sleep for wound healing  Goal: Participates in plan/prevention/treatment measures  Outcome: Progressing  Flowsheets (Taken 3/3/2024 0731)  Participates in plan/prevention/treatment measures: Elevate heels  Goal: Prevent/manage excess moisture  Outcome: Progressing  Flowsheets (Taken 3/3/2024 0731)  Prevent/manage excess moisture: Moisturize dry skin  Goal: Prevent/minimize sheer/friction injuries  Outcome: Progressing  Flowsheets (Taken 3/3/2024 0731)  Prevent/minimize sheer/friction injuries: Turn/reposition every 2 hours/use positioning/transfer devices  Goal: Promote/optimize nutrition  Outcome: Progressing  Flowsheets (Taken 3/3/2024 0731)  Promote/optimize nutrition: Offer water/supplements/favorite foods  Goal: Promote skin healing  Outcome: Progressing  Flowsheets (Taken 3/3/2024 0731)  Promote skin healing: Protective dressings over bony prominences     Problem: Pain  Goal: My pain/discomfort is manageable  Outcome: Progressing     Problem: Safety  Goal: Patient will be injury free during hospitalization  Outcome: Progressing  Goal: I will remain free of falls  Outcome: Progressing     Problem: Daily Care  Goal: Daily care needs are met  Outcome: Progressing     Problem: Psychosocial Needs  Goal: Demonstrates ability to cope with hospitalization/illness  Outcome: Progressing  Goal: Collaborate with me, my family, and caregiver to identify my specific goals  Outcome: Progressing     Problem: Discharge Barriers  Goal: My discharge needs are met  Outcome: Progressing     Problem: Pain  Goal: Takes deep breaths with improved pain control throughout the  shift  Outcome: Progressing  Goal: Turns in bed with improved pain control throughout the shift  Outcome: Progressing  Goal: Walks with improved pain control throughout the shift  Outcome: Progressing  Goal: Performs ADL's with improved pain control throughout shift  Outcome: Progressing  Goal: Participates in PT with improved pain control throughout the shift  Outcome: Progressing  Goal: Free from opioid side effects throughout the shift  Outcome: Progressing  Goal: Free from acute confusion related to pain meds throughout the shift  Outcome: Progressing     Problem: Pain - Adult  Goal: Verbalizes/displays adequate comfort level or baseline comfort level  Outcome: Progressing     Problem: Safety - Adult  Goal: Free from fall injury  Outcome: Progressing     Problem: Discharge Planning  Goal: Discharge to home or other facility with appropriate resources  Outcome: Progressing     Problem: Chronic Conditions and Co-morbidities  Goal: Patient's chronic conditions and co-morbidity symptoms are monitored and maintained or improved  Outcome: Progressing     Problem: Fall/Injury  Goal: Not fall by end of shift  Outcome: Progressing  Goal: Be free from injury by end of the shift  Outcome: Progressing  Goal: Verbalize understanding of personal risk factors for fall in the hospital  Outcome: Progressing  Goal: Verbalize understanding of risk factor reduction measures to prevent injury from fall in the home  Outcome: Progressing  Goal: Use assistive devices by end of the shift  Outcome: Progressing  Goal: Pace activities to prevent fatigue by end of the shift  Outcome: Progressing

## 2024-03-03 NOTE — PROGRESS NOTES
"Amita Todd is a 78 y.o. female on day 10 of admission presenting with Thrombocytopenia (CMS/HCC).    Subjective   NAEON. Patient feeling well this AM, endorsing some odynophagia. Understands plan for PEG tomorrow. Asking for some imodium due to loose stools and mucinex. No other complaints.       Objective   Constitutional:       Comments: underweight   HENT:      Mouth/Throat:      Mouth: Mucous membranes are dry. improved well circumscribed plaques.  +DH with TF  Eyes:      Pupils: Pupils are equal, round, and reactive to light.   Cardiovascular:      Rate and Rhythm: Irregular rhythm. Tachycardia present.      Pulses: Normal pulses.   Pulmonary:      Comments: mild crackles B/L, RUL>AGATHA. On RA  Abdominal:      General: Abdomen is flat. Bowel sounds are normal.      Palpations: Abdomen is soft.   Musculoskeletal:         General: Normal range of motion.      Cervical back: Normal range of motion.      Comments: 1+ pitting edema B/L LE till ankles   Skin:     General: Skin is warm.      Capillary Refill: Capillary refill takes less than 2 seconds.      Findings: Bruising present.      Comments: Petechia over trunk   Neurological:      General: No focal deficit present.      Mental Status: She is orietable, but delirious   Psychiatric:         Mood and Affect: Mood normal.       Last Recorded Vitals  Blood pressure 133/77, pulse 98, temperature 36.2 °C (97.2 °F), temperature source Temporal, resp. rate 16, height 1.635 m (5' 4.37\"), weight 68 kg (149 lb 14.6 oz), SpO2 94 %.  Intake/Output last 3 Shifts:  I/O last 3 completed shifts:  In: 788.8 (11.6 mL/kg) [IV Piggyback:788.8]  Out: 470 (6.9 mL/kg) [Urine:470 (0.2 mL/kg/hr)]  Weight: 68 kg     Relevant Results                This patient has a urinary catheter   Reason for the urinary catheter remaining today? critically ill patient who need accurate urinary output measurements               Assessment/Plan   Principal Problem:    Thrombocytopenia " (CMS/HCC)  77yo F w PMHx squamous cell carcinoma of hypopharynx, HFpEF, CAD s/p PCI, Afib s/p watchman, GERD, depression, HTN, AUD, ascending aortic aneurysm, distant breast cancer (s/p chemoradiotherapy and lumpectomy) who was directed to ED for hypotension which resolved. Currently treating E.Coli bacteremia with ceftriaxone (ID following), and plan to have EGD (assess esophagitis infectious vs radiation) + PEG (for nutrition); however ANC less than goal. Started neupogen (3/2) d/t low ANC, infection and procedure goal for ANC > 1000.     3/3 updates:  -another dose neupogen given for , goal > 1000 for PEG placement  -emailed Dr. Burkitt about holding off on next round of chemo given bacteremia and to not reduce ANC  -plan for PEG in AM, NPO @ MN  -prn imodium and mucinex ordered     #Hypotension, improved   #HFpEF, initial c/f ADHF  ::Patient presents with hypotension over the past month in oncology clinic, has received IVF and discontinued home losartan/amlodipine during this time, BP spontaneously improving without intervention today to 100s/70s, appears grossly dry to euvolemic on exam without any symptoms of acute heart failure + positive orthostats in ED  ::BNP 2/22: 229 - appears to be at baseline  ::TTE 7/2023: EF 55-60% // TTE 2/2024 EF 50%  - Patient likely has less PO intake and with chemo does not need as many antihyptertensives/GDMT medication dose - emailed primary cardiologist  - Medications given via Dobhoff  - Aldactone 12.5mg // Metoprolol Tartrate 40mg BID // Losartan 25mg // Dapagliflozin 10mg   - Atorvostatin 20mg      #SCC of Hypopharynx (Dr. Burkitt)  #Pain  #Delerum  ::SLP eval 2/12 recommended soft solids with thin liquids, patient would prefer oral feeding to PEG   - nutrition recs appreciated: 100mg IV thiamine x7 days,  Isosource 1.5 at goal [50cc/hr]  - c/w home Guaifenesin  - c/w Zofran prn nausea, Compazien second line  - Changed pain regimen: IV Dilaudid 0.4mg Q3 PRN - held  I/s of delirium  - Will continue dexamethasone 4mg BID   - delirium precautions     #Oropharyngeal candidiasis  #Dysphagia  ::Moderate to severe candidiasis with multiple large lesions  ::given ANC 0.16, consideration for opportunistic infections present. Entended antifungal course  -consult GI today for PEG and EGD to r/o radiation vs infectious esophagitis. Will need to hold plavix for another 4 days. Tentative plan for Friday vs Monday   - neupogen usually not given during active tx per primary oncologist, will reasses in a day or 2.   - Fluconazole 200mg IV (2/22 - xx), typically for 7 days but reassess for duration  - Oral care per nursing     #Pancytopenia  ::ANC 0.16 // Plts 53 on admission with no concern for bleeding // HB 10.9 on presentation (BL 12-13)  - likely d/t myelosuppresion vs though patient has had poor po intake vs mixed  - neupogen usually not given during active tx per primary oncologist, will reasses in a day or 2.   - Daily CBC  - T&S updated, transfuse for active bleeding <50, or for <10  - Will hold on DVT prophylaxis, SCDs only  - holding clopidogrel (for EGD + peg in future)     #Normocytic Anemia  ::HB 10.9 on presentation (BL 12-13), no active bleeding, no recent iron studies  - Likely d/t chronic dz vs myelosuppresion, though patient has had poor po intake     #Atrial Fibrillation  ::Patient previously on AC, now s/p watchman procedure and is rate controlled   ::had 5 ablations per patient  - Continue metoprolol tartrate 50mg BID  - HR 90s to 120s     #Urinary Frequency  - UA with reflex      #Hx of TIA  - c/w home Plavix 75mg daily     #GERD  - c/w home Pantoprazole     #Mood Disorder  #Misc  - c/w home Cymbalta and Wellbutrin  - c/w home supplements, mag-ox     F : caution, was diuresing. HfpEF  E: replete lytes prn, K>4, Mg >2  N: isosource 1.5 via dobhoff  A: PIVs     DVT prophylaxis: SCDs  GI prophylaxis: home PPI     Code Status: FULL                 Mary Corbett MD

## 2024-03-04 ENCOUNTER — RADIATION ONCOLOGY OTV (OUTPATIENT)
Dept: RADIATION ONCOLOGY | Facility: HOSPITAL | Age: 79
End: 2024-03-04
Payer: MEDICARE

## 2024-03-04 ENCOUNTER — HOSPITAL ENCOUNTER (OUTPATIENT)
Dept: RADIATION ONCOLOGY | Facility: HOSPITAL | Age: 79
Setting detail: RADIATION/ONCOLOGY SERIES
Discharge: HOME | End: 2024-03-04
Payer: MEDICARE

## 2024-03-04 VITALS
BODY MASS INDEX: 25.35 KG/M2 | WEIGHT: 149.4 LBS | HEART RATE: 102 BPM | OXYGEN SATURATION: 95 % | DIASTOLIC BLOOD PRESSURE: 92 MMHG | RESPIRATION RATE: 20 BRPM | TEMPERATURE: 98.4 F | SYSTOLIC BLOOD PRESSURE: 151 MMHG

## 2024-03-04 DIAGNOSIS — C13.8 MALIGNANT NEOPLASM OF OVERLAPPING SITES OF HYPOPHARYNX (MULTI): ICD-10-CM

## 2024-03-04 DIAGNOSIS — Z51.0 ENCOUNTER FOR ANTINEOPLASTIC RADIATION THERAPY: ICD-10-CM

## 2024-03-04 LAB
ALBUMIN SERPL BCP-MCNC: 2.6 G/DL (ref 3.4–5)
ALP SERPL-CCNC: 58 U/L (ref 33–136)
ALT SERPL W P-5'-P-CCNC: 18 U/L (ref 7–45)
ANION GAP SERPL CALC-SCNC: 13 MMOL/L (ref 10–20)
AST SERPL W P-5'-P-CCNC: 24 U/L (ref 9–39)
BACTERIA BLD CULT: NORMAL
BASOPHILS # BLD MANUAL: 0 X10*3/UL (ref 0–0.1)
BASOPHILS NFR BLD MANUAL: 0 %
BILIRUB SERPL-MCNC: 0.5 MG/DL (ref 0–1.2)
BUN SERPL-MCNC: 24 MG/DL (ref 6–23)
CALCIUM SERPL-MCNC: 8.1 MG/DL (ref 8.6–10.6)
CHLORIDE SERPL-SCNC: 105 MMOL/L (ref 98–107)
CO2 SERPL-SCNC: 26 MMOL/L (ref 21–32)
CREAT SERPL-MCNC: 0.54 MG/DL (ref 0.5–1.05)
EGFRCR SERPLBLD CKD-EPI 2021: >90 ML/MIN/1.73M*2
EOSINOPHIL # BLD MANUAL: 0.01 X10*3/UL (ref 0–0.4)
EOSINOPHIL NFR BLD MANUAL: 5.2 %
ERYTHROCYTE [DISTWIDTH] IN BLOOD BY AUTOMATED COUNT: 18.1 % (ref 11.5–14.5)
GLUCOSE BLD MANUAL STRIP-MCNC: 118 MG/DL (ref 74–99)
GLUCOSE SERPL-MCNC: 98 MG/DL (ref 74–99)
HCT VFR BLD AUTO: 30.8 % (ref 36–46)
HGB BLD-MCNC: 9.6 G/DL (ref 12–16)
HGB RETIC QN: 33 PG (ref 28–38)
IMM GRANULOCYTES # BLD AUTO: 0.04 X10*3/UL (ref 0–0.5)
IMM GRANULOCYTES NFR BLD AUTO: 25 % (ref 0–0.9)
IMMATURE RETIC FRACTION: 1.8 %
LYMPHOCYTES # BLD MANUAL: 0.03 X10*3/UL (ref 0.8–3)
LYMPHOCYTES NFR BLD MANUAL: 15.8 %
MAGNESIUM SERPL-MCNC: 2.15 MG/DL (ref 1.6–2.4)
MCH RBC QN AUTO: 31.2 PG (ref 26–34)
MCHC RBC AUTO-ENTMCNC: 31.2 G/DL (ref 32–36)
MCV RBC AUTO: 100 FL (ref 80–100)
MONOCYTES # BLD MANUAL: 0 X10*3/UL (ref 0.05–0.8)
MONOCYTES NFR BLD MANUAL: 0 %
NEUTS SEG # BLD MANUAL: 0.15 X10*3/UL (ref 1.6–5)
NEUTS SEG NFR BLD MANUAL: 73.7 %
NRBC BLD-RTO: 0 /100 WBCS (ref 0–0)
PHOSPHATE SERPL-MCNC: 3.2 MG/DL (ref 2.5–4.9)
PLATELET # BLD AUTO: 103 X10*3/UL (ref 150–450)
POTASSIUM SERPL-SCNC: 5 MMOL/L (ref 3.5–5.3)
PROT SERPL-MCNC: 4.8 G/DL (ref 6.4–8.2)
RAD ONC MSQ ACTUAL FRACTIONS DELIVERED: 29
RAD ONC MSQ ACTUAL SESSION DELIVERED DOSE: 200 CGRAY
RAD ONC MSQ ACTUAL TOTAL DOSE: 5800 CGRAY
RAD ONC MSQ ELAPSED DAYS: 40
RAD ONC MSQ LAST DATE: NORMAL
RAD ONC MSQ PRESCRIBED FRACTIONAL DOSE: 200 CGRAY
RAD ONC MSQ PRESCRIBED NUMBER OF FRACTIONS: 35
RAD ONC MSQ PRESCRIBED TECHNIQUE: NORMAL
RAD ONC MSQ PRESCRIBED TOTAL DOSE: 7000 CGRAY
RAD ONC MSQ PRESCRIPTION PATTERN COMMENT: NORMAL
RAD ONC MSQ START DATE: NORMAL
RAD ONC MSQ TREATMENT COURSE NUMBER: 1
RAD ONC MSQ TREATMENT SITE: NORMAL
RBC # BLD AUTO: 3.08 X10*6/UL (ref 4–5.2)
RBC MORPH BLD: ABNORMAL
RETICS #: 0.02 X10*6/UL (ref 0.02–0.11)
RETICS/RBC NFR AUTO: 0.6 % (ref 0.5–2)
SODIUM SERPL-SCNC: 139 MMOL/L (ref 136–145)
TOTAL CELLS COUNTED BLD: 19
VARIANT LYMPHS # BLD MANUAL: 0.01 X10*3/UL (ref 0–0.3)
VARIANT LYMPHS NFR BLD: 5.3 %
WBC # BLD AUTO: 0.2 X10*3/UL (ref 4.4–11.3)

## 2024-03-04 PROCEDURE — 83735 ASSAY OF MAGNESIUM: CPT

## 2024-03-04 PROCEDURE — 1170000001 HC PRIVATE ONCOLOGY ROOM DAILY

## 2024-03-04 PROCEDURE — 2500000004 HC RX 250 GENERAL PHARMACY W/ HCPCS (ALT 636 FOR OP/ED)

## 2024-03-04 PROCEDURE — 84100 ASSAY OF PHOSPHORUS: CPT

## 2024-03-04 PROCEDURE — 2500000001 HC RX 250 WO HCPCS SELF ADMINISTERED DRUGS (ALT 637 FOR MEDICARE OP)

## 2024-03-04 PROCEDURE — 80053 COMPREHEN METABOLIC PANEL: CPT

## 2024-03-04 PROCEDURE — 82947 ASSAY GLUCOSE BLOOD QUANT: CPT

## 2024-03-04 PROCEDURE — 2500000002 HC RX 250 W HCPCS SELF ADMINISTERED DRUGS (ALT 637 FOR MEDICARE OP, ALT 636 FOR OP/ED)

## 2024-03-04 PROCEDURE — 85027 COMPLETE CBC AUTOMATED: CPT

## 2024-03-04 PROCEDURE — 94640 AIRWAY INHALATION TREATMENT: CPT

## 2024-03-04 PROCEDURE — 2500000004 HC RX 250 GENERAL PHARMACY W/ HCPCS (ALT 636 FOR OP/ED): Mod: JZ | Performed by: HOSPITALIST

## 2024-03-04 PROCEDURE — 85007 BL SMEAR W/DIFF WBC COUNT: CPT

## 2024-03-04 PROCEDURE — 2500000002 HC RX 250 W HCPCS SELF ADMINISTERED DRUGS (ALT 637 FOR MEDICARE OP, ALT 636 FOR OP/ED): Performed by: STUDENT IN AN ORGANIZED HEALTH CARE EDUCATION/TRAINING PROGRAM

## 2024-03-04 PROCEDURE — 99233 SBSQ HOSP IP/OBS HIGH 50: CPT

## 2024-03-04 PROCEDURE — 77014 CHG CT GUIDANCE RADIATION THERAPY FLDS PLACEMENT: CPT | Performed by: RADIOLOGY

## 2024-03-04 PROCEDURE — 77427 RADIATION TX MANAGEMENT X5: CPT | Performed by: RADIOLOGY

## 2024-03-04 PROCEDURE — 77386 HC INTENSITY-MODULATED RADIATION THERAPY (IMRT), COMPLEX: CPT | Performed by: RADIOLOGY

## 2024-03-04 PROCEDURE — 85045 AUTOMATED RETICULOCYTE COUNT: CPT

## 2024-03-04 RX ORDER — CEFTRIAXONE 2 G/50ML
2 INJECTION, SOLUTION INTRAVENOUS EVERY 24 HOURS
Status: DISCONTINUED | OUTPATIENT
Start: 2024-03-04 | End: 2024-03-07 | Stop reason: ALTCHOICE

## 2024-03-04 RX ORDER — ALBUTEROL SULFATE 0.83 MG/ML
3 SOLUTION RESPIRATORY (INHALATION)
Status: DISCONTINUED | OUTPATIENT
Start: 2024-03-04 | End: 2024-03-05

## 2024-03-04 RX ORDER — QUETIAPINE FUMARATE 25 MG/1
12.5 TABLET, FILM COATED ORAL ONCE
Status: COMPLETED | OUTPATIENT
Start: 2024-03-04 | End: 2024-03-04

## 2024-03-04 RX ADMIN — CEFTRIAXONE SODIUM 2 G: 2 INJECTION, SOLUTION INTRAVENOUS at 15:49

## 2024-03-04 RX ADMIN — Medication 10 MG: at 17:41

## 2024-03-04 RX ADMIN — Medication 1000 UNITS: at 20:21

## 2024-03-04 RX ADMIN — ALBUTEROL SULFATE 3 ML: 2.5 SOLUTION RESPIRATORY (INHALATION) at 15:23

## 2024-03-04 RX ADMIN — THIAMINE HCL TAB 100 MG 100 MG: 100 TAB at 08:56

## 2024-03-04 RX ADMIN — ATORVASTATIN CALCIUM 20 MG: 20 TABLET, FILM COATED ORAL at 08:56

## 2024-03-04 RX ADMIN — SENNOSIDES 10 ML: 8.8 LIQUID ORAL at 08:56

## 2024-03-04 RX ADMIN — OXYCODONE HYDROCHLORIDE AND ACETAMINOPHEN 500 MG: 500 TABLET ORAL at 09:00

## 2024-03-04 RX ADMIN — ACETAMINOPHEN 1000 MG: 650 SOLUTION ORAL at 22:48

## 2024-03-04 RX ADMIN — METOPROLOL TARTRATE 50 MG: 50 TABLET, FILM COATED ORAL at 20:21

## 2024-03-04 RX ADMIN — ACETAMINOPHEN 1000 MG: 650 SOLUTION ORAL at 10:26

## 2024-03-04 RX ADMIN — QUETIAPINE FUMARATE 12.5 MG: 25 TABLET ORAL at 23:05

## 2024-03-04 RX ADMIN — BUPROPION HYDROCHLORIDE 150 MG: 100 TABLET, FILM COATED ORAL at 08:57

## 2024-03-04 RX ADMIN — LOSARTAN POTASSIUM 25 MG: 25 TABLET, FILM COATED ORAL at 08:57

## 2024-03-04 RX ADMIN — VENLAFAXINE 75 MG: 75 TABLET ORAL at 17:00

## 2024-03-04 RX ADMIN — METOPROLOL TARTRATE 50 MG: 50 TABLET, FILM COATED ORAL at 08:56

## 2024-03-04 RX ADMIN — DAPAGLIFLOZIN 10 MG: 10 TABLET, FILM COATED ORAL at 09:00

## 2024-03-04 RX ADMIN — Medication 1000 UNITS: at 08:57

## 2024-03-04 RX ADMIN — FLUCONAZOLE 400 MG: 2 INJECTION, SOLUTION INTRAVENOUS at 12:37

## 2024-03-04 RX ADMIN — ALBUTEROL SULFATE 3 ML: 2.5 SOLUTION RESPIRATORY (INHALATION) at 23:02

## 2024-03-04 RX ADMIN — ACETAMINOPHEN 1000 MG: 650 SOLUTION ORAL at 17:15

## 2024-03-04 RX ADMIN — VENLAFAXINE 75 MG: 75 TABLET ORAL at 08:58

## 2024-03-04 RX ADMIN — MULTIVIT AND MINERALS-FERROUS GLUCONATE 9 MG IRON/15 ML ORAL LIQUID 15 ML: at 09:00

## 2024-03-04 RX ADMIN — SPIRONOLACTONE 12.5 MG: 25 TABLET, FILM COATED ORAL at 08:56

## 2024-03-04 RX ADMIN — ALBUTEROL SULFATE 3 ML: 2.5 SOLUTION RESPIRATORY (INHALATION) at 12:09

## 2024-03-04 RX ADMIN — FILGRASTIM-SNDZ 300 MCG: 300 INJECTION, SOLUTION INTRAVENOUS; SUBCUTANEOUS at 12:09

## 2024-03-04 RX ADMIN — GUAIFENESIN 600 MG: 600 TABLET, EXTENDED RELEASE ORAL at 10:25

## 2024-03-04 RX ADMIN — BUPROPION HYDROCHLORIDE 150 MG: 100 TABLET, FILM COATED ORAL at 20:21

## 2024-03-04 ASSESSMENT — COGNITIVE AND FUNCTIONAL STATUS - GENERAL
CLIMB 3 TO 5 STEPS WITH RAILING: TOTAL
TURNING FROM BACK TO SIDE WHILE IN FLAT BAD: A LOT
HELP NEEDED FOR BATHING: A LOT
PERSONAL GROOMING: A LOT
WALKING IN HOSPITAL ROOM: TOTAL
WALKING IN HOSPITAL ROOM: TOTAL
MOVING FROM LYING ON BACK TO SITTING ON SIDE OF FLAT BED WITH BEDRAILS: A LOT
TOILETING: A LOT
PERSONAL GROOMING: A LOT
MOVING FROM LYING ON BACK TO SITTING ON SIDE OF FLAT BED WITH BEDRAILS: A LOT
MOBILITY SCORE: 10
MOVING TO AND FROM BED TO CHAIR: A LOT
MOVING TO AND FROM BED TO CHAIR: A LOT
DRESSING REGULAR UPPER BODY CLOTHING: A LOT
TOILETING: A LOT
DAILY ACTIVITIY SCORE: 11
DRESSING REGULAR UPPER BODY CLOTHING: A LOT
TURNING FROM BACK TO SIDE WHILE IN FLAT BAD: A LOT
DRESSING REGULAR LOWER BODY CLOTHING: A LOT
EATING MEALS: TOTAL
STANDING UP FROM CHAIR USING ARMS: A LOT
EATING MEALS: TOTAL
DRESSING REGULAR LOWER BODY CLOTHING: A LOT
HELP NEEDED FOR BATHING: A LOT
STANDING UP FROM CHAIR USING ARMS: A LOT
DAILY ACTIVITIY SCORE: 11
MOBILITY SCORE: 10
CLIMB 3 TO 5 STEPS WITH RAILING: TOTAL

## 2024-03-04 ASSESSMENT — PAIN SCALES - WONG BAKER: WONGBAKER_NUMERICALRESPONSE: HURTS LITTLE BIT

## 2024-03-04 ASSESSMENT — PAIN SCALES - GENERAL
PAINLEVEL: 5
PAINLEVEL_OUTOF10: 3
PAINLEVEL_OUTOF10: 0 - NO PAIN

## 2024-03-04 ASSESSMENT — PAIN - FUNCTIONAL ASSESSMENT: PAIN_FUNCTIONAL_ASSESSMENT: 0-10

## 2024-03-04 NOTE — CARE PLAN
The patient's goals for the shift include      The clinical goals for the shift include pt will remain safe and free from falls 3/4 @1900      Problem: Skin  Goal: Decreased wound size/increased tissue granulation at next dressing change  Outcome: Progressing  Flowsheets (Taken 3/4/2024 1433)  Decreased wound size/increased tissue granulation at next dressing change: Protective dressings over bony prominences  Goal: Participates in plan/prevention/treatment measures  Outcome: Progressing  Flowsheets (Taken 3/4/2024 1433)  Participates in plan/prevention/treatment measures: Increase activity/out of bed for meals  Goal: Prevent/manage excess moisture  Outcome: Progressing  Flowsheets (Taken 3/4/2024 1433)  Prevent/manage excess moisture:   Moisturize dry skin   Cleanse incontinence/protect with barrier cream  Goal: Prevent/minimize sheer/friction injuries  Outcome: Progressing  Flowsheets (Taken 3/4/2024 1433)  Prevent/minimize sheer/friction injuries:   Use pull sheet   Turn/reposition every 2 hours/use positioning/transfer devices   HOB 30 degrees or less  Goal: Promote/optimize nutrition  Outcome: Progressing  Flowsheets (Taken 3/4/2024 1433)  Promote/optimize nutrition:   Consume > 50% meals/supplements   Offer water/supplements/favorite foods  Goal: Promote skin healing  Outcome: Progressing  Flowsheets (Taken 3/4/2024 1433)  Promote skin healing:   Rotate device position/do not position patient on device   Turn/reposition every 2 hours/use positioning/transfer devices   Protective dressings over bony prominences     Problem: Pain  Goal: My pain/discomfort is manageable  Outcome: Progressing     Problem: Safety  Goal: Patient will be injury free during hospitalization  Outcome: Progressing  Goal: I will remain free of falls  Outcome: Progressing     Problem: Daily Care  Goal: Daily care needs are met  Outcome: Progressing     Problem: Psychosocial Needs  Goal: Demonstrates ability to cope with  hospitalization/illness  Outcome: Progressing  Goal: Collaborate with me, my family, and caregiver to identify my specific goals  Outcome: Progressing     Problem: Discharge Barriers  Goal: My discharge needs are met  Outcome: Progressing     Problem: Pain  Goal: Takes deep breaths with improved pain control throughout the shift  Outcome: Progressing  Goal: Turns in bed with improved pain control throughout the shift  Outcome: Progressing  Goal: Walks with improved pain control throughout the shift  Outcome: Progressing  Goal: Performs ADL's with improved pain control throughout shift  Outcome: Progressing  Goal: Participates in PT with improved pain control throughout the shift  Outcome: Progressing  Goal: Free from opioid side effects throughout the shift  Outcome: Progressing  Goal: Free from acute confusion related to pain meds throughout the shift  Outcome: Progressing     Problem: Pain - Adult  Goal: Verbalizes/displays adequate comfort level or baseline comfort level  Outcome: Progressing     Problem: Safety - Adult  Goal: Free from fall injury  Outcome: Progressing     Problem: Discharge Planning  Goal: Discharge to home or other facility with appropriate resources  Outcome: Progressing     Problem: Chronic Conditions and Co-morbidities  Goal: Patient's chronic conditions and co-morbidity symptoms are monitored and maintained or improved  Outcome: Progressing     Problem: Fall/Injury  Goal: Not fall by end of shift  Outcome: Progressing  Goal: Be free from injury by end of the shift  Outcome: Progressing  Goal: Verbalize understanding of personal risk factors for fall in the hospital  Outcome: Progressing  Goal: Verbalize understanding of risk factor reduction measures to prevent injury from fall in the home  Outcome: Progressing  Goal: Use assistive devices by end of the shift  Outcome: Progressing  Goal: Pace activities to prevent fatigue by end of the shift  Outcome: Progressing

## 2024-03-04 NOTE — PROGRESS NOTES
"[facility]  [facility] [place]  CONSULT FOLLOW-UP     Reason For Consult  PEG tube assessment     SUBJECTIVE     - NAOE, pt afebrile and HDS  - WBC 0.2     EXAM     Last Recorded Vitals  Blood pressure 126/74, pulse 94, temperature 36.5 °C (97.7 °F), temperature source Temporal, resp. rate 16, height 1.635 m (5' 4.37\"), weight 68 kg (149 lb 14.6 oz), SpO2 100 %.      Intake/Output Summary (Last 24 hours) at 3/4/2024 0854  Last data filed at 3/4/2024 0851  Gross per 24 hour   Intake 1050 ml   Output 1180 ml   Net -130 ml       Physical Exam   GENERAL: Chronically ill.  NEUROLOGIC: A and O x 3. Cranial nerves 2 through 12 grossly intact. Intact motor and sensory systems.  HEENT: Pupils are equal, round, and reactive to light and accommodation. Extraocular motion intact. CARDIAC: S1 + S2, RRR, no M/R/G.    LUNGS: CTA BL. No wheezes or coarse breath sounds. Symmetric respirations. No increased work of breathing.   ABDOMEN: Soft, non-tender to palpation. No rebound or guarding.  PSYCH: Low mood.    OBJECTIVE                                                                              Medications             Current Facility-Administered Medications:     acetaminophen (Tylenol) oral liquid 1,000 mg, 1,000 mg, nasogastric tube, q6h, Angelic Sarmiento MD, 1,000 mg at 03/03/24 5670    albuterol 2.5 mg /3 mL (0.083 %) nebulizer solution 3 mL, 3 mL, nebulization, PRN, Marisel Patrick MD [facility]    alteplase (Cathflo Activase) injection 1 mg, 1 mg, intra-catheter, PRN, Isaac Bledsoe MD    alteplase (Cathflo Activase) injection 1 mg, 1 mg, intra-catheter, PRN, Isaac Bledsoe MD, 1 mg at 02/28/24 2225    ascorbic acid (Vitamin C) tablet 500 mg, 500 mg, nasogastric tube, Daily, Mary Corbett MD, 500 mg at 03/03/24 0945    atorvastatin (Lipitor) tablet 20 mg, 20 mg, nasogastric tube, Daily, Angelic Sarmiento MD, 20 mg at 03/03/24 0945    buPROPion (Wellbutrin) tablet 150 mg, 150 mg, " nasogastric tube, BID, Angelic Sarmiento MD, 150 mg at 03/03/24 2004    cefTRIAXone (Rocephin) 2 g IV in dextrose 5% 50 mL, 2 g, intravenous, q24h, Mary Corbett MD, Stopped at 03/03/24 1750    cholecalciferol (Vitamin D-3) tablet 1,000 Units, 1,000 Units, nasogastric tube, BID, Angelic Sarmiento MD, 1,000 Units at 03/03/24 2004    dapagliflozin propanediol (Farxiga) tablet 10 mg, 10 mg, oral, Daily, Justino Cooper MD, 10 mg at 03/03/24 0945    dextrose 5 % in water (D5W) bolus, 500 mL, intravenous, PRN, Marisel Patrick MD MPH    diphenhydrAMINE (BENADryl) injection 50 mg, 50 mg, intravenous, PRN, Marisel Patrick MD MPH    EPINEPHrine HCl (PF) (Adrenalin) injection 0.3 mg, 0.3 mg, intramuscular, q5 min PRN, Marisel Patrick MD MPH    esomeprazole (NexIUM) suspension 40 mg, 40 mg, nasoduodenal tube, Daily before breakfast, Mary Corbett MD, 40 mg at 03/03/24 1159    famotidine PF (Pepcid) injection 20 mg, 20 mg, intravenous, Once PRN, Marisel Patrick MD MPH    fluconazole in NaCl (iso-osm) (Diflucan) IVPB 400 mg, 400 mg, intravenous, q24h, Mary Corbett MD, 400 mg at 03/03/24 1159    guaiFENesin (Mucinex) 12 hr tablet 600 mg, 600 mg, oral, BID PRN, Mary Corbett MD, 600 mg at 03/03/24 1209    guaiFENesin (Robitussin) 100 mg/5 mL syrup 200 mg, 200 mg, nasogastric tube, q4h PRN, Angelic Sarmiento MD    [Held by provider] HYDROmorphone (Dilaudid) injection 0.4 mg, 0.4 mg, intravenous, q3h PRN, Isaac Bledsoe MD    [Held by provider] HYDROmorphone (Dilaudid) tablet 1 mg, 1 mg, oral, q3h PRN, Isaac Bledsoe MD, 1 mg at 02/28/24 2049    lido-diphen-Maalox 1:1:1 Magic Mouthwash, 10 mL, Swish & Spit, q4h PRN, Isaac Bledsoe MD, 10 mL at 02/25/24 1334    lidocaine (Xylocaine) 2 % jelly 1 Application, 1 Application, Topical, Once, Mary Corbett MD    loperamide (Imodium) capsule 2 mg, 2 mg, nasogastric tube, 4x daily PRN, Mary Corbett MD, 2 mg at 03/03/24 1209    losartan (Cozaar) tablet 25 mg, 25 mg, nasogastric tube, Daily,  Angelic Sarmiento MD, 25 mg at 03/03/24 0946    [Held by provider] magnesium oxide (Mag-Ox) tablet 400 mg, 400 mg, nasogastric tube, Daily, Angelic Sarmiento MD, 400 mg at 03/03/24 0946    melatonin tablet 10 mg, 10 mg, oral, Nightly, Angelic Sarmiento MD, 10 mg at 03/03/24 1836    methylPREDNISolone sod succinate (PF) (SOLU-Medrol) 40 mg/mL injection 40 mg, 40 mg, intravenous, PRN, Marisel Patrick MD MPH    metoprolol tartrate (Lopressor) tablet 50 mg, 50 mg, nasogastric tube, BID, Angelic Sarmiento MD, 50 mg at 03/03/24 2004    multivitamin with iron-minerals 9 mg iron/15 mL liquid 15 mL, 15 mL, nasogastric tube, Daily, Angelic Sarmiento MD, 15 mL at 03/03/24 0945    ondansetron (Zofran) injection 4 mg, 4 mg, intravenous, q6h PRN, Mary Corbett MD, 4 mg at 02/27/24 0815    perflutren lipid microspheres (Definity) injection 0.5-10 mL of dilution, 0.5-10 mL of dilution, intravenous, Once in imaging, Justino Cooper MD    perflutren protein A microsphere (Optison) injection 0.5 mL, 0.5 mL, intravenous, Once in imaging, Justino Cooper MD    phenoL (Chloraseptic) 1.4 % mouth/throat spray 1 spray, 1 spray, Mouth/Throat, q2h PRN, Angelic Sarmiento MD    prochlorperazine (Compazine) injection 10 mg, 10 mg, intravenous, q6h PRN, Angelic Sarmiento MD, 10 mg at 02/27/24 1151    prochlorperazine (Compazine) injection 10 mg, 10 mg, intravenous, q6h PRN, Marisel Patrick MD MPH    prochlorperazine (Compazine) tablet 10 mg, 10 mg, oral, q6h PRN, Marisel Patrick MD MPH    senna (Senokot) 8.8 mg/5 mL syrup 10 mL, 10 mL, nasogastric tube, BID, Angelic Sarmiento MD, 10 mL at 02/29/24 0854    sodium chloride 0.9 % bolus 500 mL, 500 mL, intravenous, PRN, Marisel Patrick MD MPH    spironolactone (Aldactone) tablet 12.5 mg, 12.5 mg, nasogastric tube, Daily, Angelic Sarmiento MD, 12.5 mg at 03/03/24 0945    sulfur hexafluoride microsphr (Lumason) injection 24.28 mg, 2 mL, intravenous, Once in imaging, Justino Cooper MD    thiamine (Vitamin B-1) tablet 100 mg, 100 mg, oral, Daily, Mary Corbett MD, 100 mg at  03/03/24 0945    venlafaxine (Effexor) tablet 75 mg, 75 mg, nasogastric tube, BID with meals, Angelic Sarmiento MD, 75 mg at 03/03/24 1836                                                                            Labs     Results for orders placed or performed during the hospital encounter of 02/22/24 (from the past 24 hour(s))   Magnesium   Result Value Ref Range    Magnesium 2.15 1.60 - 2.40 mg/dL   CBC and Auto Differential   Result Value Ref Range    WBC 0.2 (LL) 4.4 - 11.3 x10*3/uL    nRBC 0.0 0.0 - 0.0 /100 WBCs    RBC 3.08 (L) 4.00 - 5.20 x10*6/uL    Hemoglobin 9.6 (L) 12.0 - 16.0 g/dL    Hematocrit 30.8 (L) 36.0 - 46.0 %     80 - 100 fL    MCH 31.2 26.0 - 34.0 pg    MCHC 31.2 (L) 32.0 - 36.0 g/dL    RDW 18.1 (H) 11.5 - 14.5 %    Platelets 103 (L) 150 - 450 x10*3/uL    Immature Granulocytes %, Automated 25.0 (H) 0.0 - 0.9 %    Immature Granulocytes Absolute, Automated 0.04 0.00 - 0.50 x10*3/uL   Comprehensive Metabolic Panel   Result Value Ref Range    Glucose 98 74 - 99 mg/dL    Sodium 139 136 - 145 mmol/L    Potassium 5.0 3.5 - 5.3 mmol/L    Chloride 105 98 - 107 mmol/L    Bicarbonate 26 21 - 32 mmol/L    Anion Gap 13 10 - 20 mmol/L    Urea Nitrogen 24 (H) 6 - 23 mg/dL    Creatinine 0.54 0.50 - 1.05 mg/dL    eGFR >90 >60 mL/min/1.73m*2    Calcium 8.1 (L) 8.6 - 10.6 mg/dL    Albumin 2.6 (L) 3.4 - 5.0 g/dL    Alkaline Phosphatase 58 33 - 136 U/L    Total Protein 4.8 (L) 6.4 - 8.2 g/dL    AST 24 9 - 39 U/L    Bilirubin, Total 0.5 0.0 - 1.2 mg/dL    ALT 18 7 - 45 U/L   Phosphorus   Result Value Ref Range    Phosphorus 3.2 2.5 - 4.9 mg/dL   Manual Differential   Result Value Ref Range    Neutrophils %, Manual 73.7 40.0 - 80.0 %    Lymphocytes %, Manual 15.8 13.0 - 44.0 %    Monocytes %, Manual 0.0 2.0 - 10.0 %    Eosinophils %, Manual 5.2 0.0 - 6.0 %    Basophils %, Manual 0.0 0.0 - 2.0 %    Atypical Lymphocytes %, Manual 5.3 0.0 - 2.0 %    Seg Neutrophils Absolute, Manual 0.15 (L) 1.60 - 5.00 x10*3/uL     Lymphocytes Absolute, Manual 0.03 (L) 0.80 - 3.00 x10*3/uL    Monocytes Absolute, Manual 0.00 (L) 0.05 - 0.80 x10*3/uL    Eosinophils Absolute, Manual 0.01 0.00 - 0.40 x10*3/uL    Basophils Absolute, Manual 0.00 0.00 - 0.10 x10*3/uL    Atypical Lymphs Absolute, Manual 0.01 0.00 - 0.30 x10*3/uL    Total Cells Counted 19     RBC Morphology No significant RBC morphology present                                                                              Imaging           02/24/24 MBS:  Oral Phase:  WFL     Adequate bolus acceptance, formation, and transit w/ all consistencies assessed.       -Pt does note significant odynophagia (pain orally and pharyngeally) with all PO intake.     Pharyngeal Phase:  Moderate/severe deficits     Tissue protrusion within hypopharynx impacting bolus flow.  No nasal regurgitation on this assessment, but backflow of boluses observed.  Hyolaryngeal elevation/excursion impaired.  Reduced airway protection during the swallow resulting in deep penetration to the vocal cords w/ all liquid consistencies, and eventual trace aspiration of thin and mildly thick liquids.  Independent cough/throat clear, but this did not clear aspirated material. Pharyngeal stasis at the valleculae and piriforms following the swallow with all liquid consistencies.  Aspiration in min-mod amounts following the swallow with thin and mildly thick liquids as a result of residue.  Again, cough response did not clear.  Chin tuck and head turn R did not improve safety or efficiency of swallow.  Head turn L (toward side of mass) improved swallow partially, but did not entirely resolve deficits.  With most viscous consistency assessed (puree) , there was no penetration/aspiration or significant stasis.       Esophageal Phase:  No overt deficits                                                                           GI Procedures      02/2021 EGD:  Impression:     - Z-line, 39 cm from the incisors.                         -  Normal esophagus.                         - 3 parallel streaks of hemorrhagic appearance and                          linear erosions mucosa in the gastric body. Biopsied.                         - Normal examined duodenum.                         - Unclear if the gastric lesions are her sole source                          of bleeding as not seen on two previous endoscopie                          over past 6 months with recurrent UGI bleeding.     ASSESSMENT / PLAN                  ASSESSMENT/PLAN:     Amita Todd is a 78 y.o. 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      GI is consulted for PEG tube assessment.    Last dose of Plavix was on the 2/25/24.  Initially planned ot place PEG tube placement on 03/01/2024 after Plavix washout. However patient developed bacteremia followed by neutropenia.      Recommendations:  - Unable to place PEG tube in setting of persistent neutropenia. Please re-engage GI for PEG tube placement when patient is no longer neutropenic for >48 hrs and  not requiring Zarxio    Patient was seen and discussed with Dr. Aparicio     Gastroenterology will continue to follow.  During weekday hours of 7am-5pm please do not hesitate to contact me on [employer] Chat or page [phone], if there are any further questions between the weekday hours of 7am-5pm.   After hours, on weekends, and on holidays, please [place] [phone]. Thank you.     Danish Manjarrez MD  PGY-4 Gastroenterology and Hepatology Fellow  [facility]

## 2024-03-04 NOTE — PROGRESS NOTES
Radiation Oncology On Treatment Visit    Patient Name:  Amita Todd  MRN:  [id number]  :  1945    Referring Provider: No ref. provider found  Primary Care Provider: Caitie Leal MD  Care Team: Patient Care Team:  Caitie Leal MD as PCP - General  Caitie Leal MD as PCP - [facility]ributed [facility]  Kyunghee Burkitt, DO as Medical Oncologist (Hematology and Oncology)  Clemente Tobin MD as Surgeon (Otolaryngology)  Tahira Cochran MD as Radiation Oncologist (Radiation Oncology)  Hailey Abbott PA-C as Physician Assistant (Hematology and Oncology)  Debra Patrick MD as Consulting Physician (Hospitalist)  Marisel Patrick MD MPH as Consulting Physician (Hematology and Oncology)    Date of Service: 3/4/2024     Diagnosis:   Specialty Problems          Radiation Oncology Problems    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             SUBJECTIVE: Patient is currently admitted for hypotension, dysphagia. Underwent a barium swallow and had signs of aspiration. Currently rec to be NPO status given signs of aspiration on modified barium swallow. Will have PEG placed after stopping Plavix for 5 days and currently has a Dobhoff feeding tube.    Received chemo last week. Now with neutropenia and e. Coli bacteremia.     OBJECTIVE:   Vital Signs:  BP (!) 151/92   Pulse 102   Temp 36.9 °C (98.4 °F)   Resp 20   Wt 67.8 kg (149 lb 6.4 oz)   SpO2 95%   BMI 25.35 kg/m²    Pain Scale: The patient's current pain level was assessed.  They report currently having a pain of 5 out of 10.    Other Pertinent Findings:   Mucositis at back of  throat, but no thrush  Erythema in the neck but no desqumation  Toxicity Assessment          1/26/2024    10:50 2/5/2024    12:01 2/12/2024    11:40 2/20/2024    11:33 2/26/2024    11:59 3/4/2024    11:53   Toxicity Assessment   Adverse Events Reviewed (WDL) No (Exceptions to WDL) No (Exceptions to WDL) No (Exceptions to WDL) No (Exceptions to WDL) No (Exceptions to WDL) No (Exceptions to WDL)   Treatment  and neck Head and neck Head and neck Head and neck Head and neck Head and neck   Anorexia Grade 0       First weight during treatment Grade 0       steroids are helping, increase in weight by .5lbs since last week Grade 0       gained 3 lbs since last week Grade 1       loss of 5.7lbs since last week, scratchy throat Grade 1       Gained 4lbs since last week, unsure if related to IV hydration. pt eating and drinking normally but being considered for a PEG tube Grade 2       apsirated while inhouse, pt has Dobhoff for nutrition awaiting PEG placement. Pt lost .5lbs since last week.   Dehydration Grade 0       swallowing ok Grade 0 Grade 0 Grade 1       always drinking water/gatorade. Taking the lasix for fluid retention Grade 0       Oral intake with IV hydrdation Grade 1       pt is inhouse, IV hydration and Dobhoff for hydration   Dermatitis Radiation Grade 0       creams provided Grade 0       creams at home Grade 0 Grade 1       slight redness, creams at home Grade 1       some redness, creams at home and inhouse Grade 1       skin dry and flaky, more lotion provided to family   Diarrhea  Grade 2       having some episodes, discussed OTC meds Grade 0       has cleared up since last week  Grade 0    Fatigue Grade 0 Grade 1       starting to feel tired, Rest and activity balance Grade 1       going to be earlier, Rest and activity balance Grade 2       tired every day, naps and going to bed early. Rest and activity balance Grade 1       tired most days but still inhouse Grade 1       pt is in and out of  orientation, pt is still inhouse   Nausea Grade 0 Grade 0 Grade 1       sometimes, comes and goes, meds at home just in case Grade 0       meds at home Grade 0 Grade 0   Pain Grade 0 Grade 0  Grade 1       Tylenol and  Oxy at home Grade 1       throat pain, Oxy while inhouse Grade 1       UTD pain number due to pts disorientation at times   Tumor Pain Grade 0    Grade 0    Vomiting Grade 0 Grade 0 Grade 0 Grade 0 Grade 0 Grade 0   Dysphagia Grade 0       passed SLP appointment Grade 1       better with steriods Grade 0 Grade 0       scratchy throat starting Grade 1       some times trouble with swallowing due to pain, inpatient SLP following Grade 2       pt requires feeding tube   Mucositis Oral Grade 0 Grade 0       GLutamine at home Grade 1       increase in redness, Glutamine at home Grade 1       Couple visible sores, redness. Glutamine at home Grade 1       some redness, no Glutamine while inhouse. family is going to go home and grab it so she can have it while inhouse Grade 1       pink but dry   Blurred Vision  Grade 1       both eyes, been consistent making it hard to read Grade 2       worse from last week Grade 2       Worse than last week, hard to read stuff Grade 0       glassses at baseline    Dry Mouth Grade 1       talked about OTC meds Grade 1       OTC meds at home Grade 1       sipping to help, mouth washes. Grade 1       Biotine, lozenges at home that help Grade 1       sipping on water most of the time Grade 1       dry mouth due to being NPO   Oral Pain Grade 0 Grade 0 Grade 1       Burning with food, pain meds new script Grade 0 Grade 0 Grade 0   Aspiration Grade 0 Grade 0 Grade 0 Grade 0 Grade 0 Grade 1       Dobhoff placed   Hoarseness Grade 0 Grade 0 Grade 0 Grade 0 Grade 0 Grade 0   Voice Alteration Grade 0 Grade 0 Grade 0 Grade 0 Grade 0       Quite voice Grade 0        Assessment / Plan:  The patient is tolerating radiation therapy as anticipated.  Continue per current treatment plan.         Cancer Treatment:   I reviewed the daily CBCT alignment imaging for RT with the patient and her family. She has had a tremendous response to CRT with near radiologic resolution of tumor in her posterior pharyngeal wall. I am thrilled to see this -- early response to CRT predicts better cure rates with CRT.   She underwent dose of carboplatin/paclitaxel last week.    E.Coli Bacteremia in setting of chemo-related neutropenia: Infection currently being treated with ceftriaxone. Neutropenia being treated with neupogen. Not much data to guide use of Neupogen with RT for HNSCC, but there is safety data to use with CRT for lung cancer. Will continue to follow. Unlikely that RT is contributing significantly to neutropenia, but can hold RT if counts don't start to improve.      FEN: I discussed with patient our Rec for PEG given current deconditioned status and aspiration on swallow eval.     Fluid balance in setting of CHF: Currently admitted to the hospital and undergoing workup for hypotension in setting of .     Nausea: Reviewed use of  Compazine/Zofran PRN for breakthrough nausea

## 2024-03-04 NOTE — PROGRESS NOTES
Spiritual Care Visit    Clinical Encounter Type  Visited With: Patient and family together  Routine Visit: Follow-up  Continue Visiting: Yes    Jain Encounters  Jain Needs: Prayer         Sacramental Encounters  Other Sacrament: P&B    Patient received a prayer and blessing by Fr. Lalo Pride,  Hoahaoism .  Patient had received the Sacrament of the Anointing of the Sick on 3/2/24.  Family member was present.

## 2024-03-04 NOTE — PROGRESS NOTES
"Amita Todd is a 78 y.o. female on day 11 of admission presenting with Thrombocytopenia (CMS/HCC).    Subjective   NAEO. Patient doesn't have much to say this morning, stating \"I don't know what to say.\" Family is at bedside, state that she is more confused today, that she didn't sleep well last night. Aside from that, no acute events since her opioids were decreased. Denies fevers, chills, nausea, vomiting, chest pain, shortness of breath.        Objective   Constitutional:       Comments: underweight   HENT:      Mouth/Throat:      Mouth: Mucous membranes are dry. improved well circumscribed plaques.  +DH with TF  Eyes:      Pupils: Pupils are equal, round, and reactive to light.   Cardiovascular:      Rate and Rhythm: Irregular rhythm. Tachycardia present.      Pulses: Normal pulses.   Pulmonary:      Comments: mild crackles B/L, RUL>AGATHA. On RA  Abdominal:      General: Abdomen is flat. Bowel sounds are normal.      Palpations: Abdomen is soft.   Musculoskeletal:         General: Normal range of motion.      Cervical back: Normal range of motion.      Comments: 1+ pitting edema B/L LE till ankles   Skin:     General: Skin is warm.      Capillary Refill: Capillary refill takes less than 2 seconds.      Findings: Bruising present.      Comments: Petechia over trunk   Neurological:      General: No focal deficit present.      Mental Status: She is orietable, but delirious   Psychiatric:         Mood and Affect: Mood normal.       Last Recorded Vitals  Blood pressure (!) 151/92, pulse 102, temperature 36.9 °C (98.5 °F), temperature source Temporal, resp. rate 20, height 1.635 m (5' 4.37\"), weight 68 kg (149 lb 14.6 oz), SpO2 95 %.  Intake/Output last 3 Shifts:  I/O last 3 completed shifts:  In: 1050 (15.4 mL/kg) [IV Piggyback:1050]  Out: 1475 (21.7 mL/kg) [Urine:1475 (0.6 mL/kg/hr)]  Weight: 68 kg     Relevant Results  Scheduled medications  acetaminophen, 1,000 mg, nasogastric tube, q6h  ascorbic acid, 500 " mg, nasogastric tube, Daily  atorvastatin, 20 mg, nasogastric tube, Daily  buPROPion, 150 mg, nasogastric tube, BID  cefTRIAXone, 2 g, intravenous, q24h  cholecalciferol, 1,000 Units, nasogastric tube, BID  dapagliflozin propanediol, 10 mg, oral, Daily  esomeprazole, 40 mg, nasoduodenal tube, Daily before breakfast  filgrastim or biosimilar, 5 mcg/kg, subcutaneous, q24h  fluconazole, 400 mg, intravenous, q24h  lidocaine, 1 Application, Topical, Once  losartan, 25 mg, nasogastric tube, Daily  [Held by provider] magnesium oxide, 400 mg, nasogastric tube, Daily  melatonin, 10 mg, oral, Nightly  metoprolol tartrate, 50 mg, nasogastric tube, BID  multivitamin with iron-minerals, 15 mL, nasogastric tube, Daily  perflutren lipid microspheres, 0.5-10 mL of dilution, intravenous, Once in imaging  perflutren protein A microsphere, 0.5 mL, intravenous, Once in imaging  senna, 10 mL, nasogastric tube, BID  spironolactone, 12.5 mg, nasogastric tube, Daily  sulfur hexafluoride microsphr, 2 mL, intravenous, Once in imaging  thiamine, 100 mg, oral, Daily  venlafaxine, 75 mg, nasogastric tube, BID with meals      Continuous medications     PRN medications  PRN medications: alteplase, alteplase, dextrose, diphenhydrAMINE, EPINEPHrine HCl, famotidine, guaiFENesin, guaiFENesin, [Held by provider] HYDROmorphone, [Held by provider] HYDROmorphone, lidocaine-diphenhydraMINE-Maalox 1:1:1, loperamide, methylPREDNISolone sodium succinate (PF), ondansetron, phenoL, prochlorperazine, prochlorperazine, prochlorperazine, sodium chloride  Results for orders placed or performed during the hospital encounter of 02/22/24 (from the past 24 hour(s))   Magnesium   Result Value Ref Range    Magnesium 2.15 1.60 - 2.40 mg/dL   CBC and Auto Differential   Result Value Ref Range    WBC 0.2 (LL) 4.4 - 11.3 x10*3/uL    nRBC 0.0 0.0 - 0.0 /100 WBCs    RBC 3.08 (L) 4.00 - 5.20 x10*6/uL    Hemoglobin 9.6 (L) 12.0 - 16.0 g/dL    Hematocrit 30.8 (L) 36.0 - 46.0 %      80 - 100 fL    MCH 31.2 26.0 - 34.0 pg    MCHC 31.2 (L) 32.0 - 36.0 g/dL    RDW 18.1 (H) 11.5 - 14.5 %    Platelets 103 (L) 150 - 450 x10*3/uL    Immature Granulocytes %, Automated 25.0 (H) 0.0 - 0.9 %    Immature Granulocytes Absolute, Automated 0.04 0.00 - 0.50 x10*3/uL   Comprehensive Metabolic Panel   Result Value Ref Range    Glucose 98 74 - 99 mg/dL    Sodium 139 136 - 145 mmol/L    Potassium 5.0 3.5 - 5.3 mmol/L    Chloride 105 98 - 107 mmol/L    Bicarbonate 26 21 - 32 mmol/L    Anion Gap 13 10 - 20 mmol/L    Urea Nitrogen 24 (H) 6 - 23 mg/dL    Creatinine 0.54 0.50 - 1.05 mg/dL    eGFR >90 >60 mL/min/1.73m*2    Calcium 8.1 (L) 8.6 - 10.6 mg/dL    Albumin 2.6 (L) 3.4 - 5.0 g/dL    Alkaline Phosphatase 58 33 - 136 U/L    Total Protein 4.8 (L) 6.4 - 8.2 g/dL    AST 24 9 - 39 U/L    Bilirubin, Total 0.5 0.0 - 1.2 mg/dL    ALT 18 7 - 45 U/L   Phosphorus   Result Value Ref Range    Phosphorus 3.2 2.5 - 4.9 mg/dL   Manual Differential   Result Value Ref Range    Neutrophils %, Manual 73.7 40.0 - 80.0 %    Lymphocytes %, Manual 15.8 13.0 - 44.0 %    Monocytes %, Manual 0.0 2.0 - 10.0 %    Eosinophils %, Manual 5.2 0.0 - 6.0 %    Basophils %, Manual 0.0 0.0 - 2.0 %    Atypical Lymphocytes %, Manual 5.3 0.0 - 2.0 %    Seg Neutrophils Absolute, Manual 0.15 (L) 1.60 - 5.00 x10*3/uL    Lymphocytes Absolute, Manual 0.03 (L) 0.80 - 3.00 x10*3/uL    Monocytes Absolute, Manual 0.00 (L) 0.05 - 0.80 x10*3/uL    Eosinophils Absolute, Manual 0.01 0.00 - 0.40 x10*3/uL    Basophils Absolute, Manual 0.00 0.00 - 0.10 x10*3/uL    Atypical Lymphs Absolute, Manual 0.01 0.00 - 0.30 x10*3/uL    Total Cells Counted 19     RBC Morphology No significant RBC morphology present    Reticulocytes   Result Value Ref Range    Retic % 0.6 0.5 - 2.0 %    Retic Absolute 0.020 0.017 - 0.110 x10*6/uL    Reticulocyte Hemoglobin 33 28 - 38 pg    Immature Retic fraction 1.8 <=16.0 %   POCT GLUCOSE   Result Value Ref Range    POCT Glucose 118  (H) 74 - 99 mg/dL         This patient has a urinary catheter   Reason for the urinary catheter remaining today? critically ill patient who need accurate urinary output measurements               Assessment/Plan   Principal Problem:    Thrombocytopenia (CMS/HCC)  79yo F w PMHx squamous cell carcinoma of hypopharynx, HFpEF, CAD s/p PCI, Afib s/p watchman, GERD, depression, HTN, AUD, ascending aortic aneurysm, distant breast cancer (s/p chemoradiotherapy and lumpectomy) who was directed to ED for hypotension which resolved. Currently treating E.Coli bacteremia with ceftriaxone (ID following), and plan to have EGD (assess esophagitis infectious vs radiation) + PEG (for nutrition); however ANC less than goal. Started neupogen (3/2) d/t low ANC, infection and procedure goal for ANC > 1000.     3/4 updates:   - on 3/4, schedule filgrastim daily until ANC >1000  - resume tube feeds  - reticulocyte count 0.6% indicative (fraction 1.8%) indicative of poor marrow response  - Continue fluconazole until EGD for esophageal candidiasis      #Hypotension, improved   #HFpEF, initial c/f ADHF  ::Patient presents with hypotension over the past month in oncology clinic, has received IVF and discontinued home losartan/amlodipine during this time, BP spontaneously improving without intervention today to 100s/70s, appears grossly dry to euvolemic on exam without any symptoms of acute heart failure + positive orthostats in ED  ::BNP 2/22: 229 - appears to be at baseline  ::TTE 7/2023: EF 55-60% // TTE 2/2024 EF 50%  - Patient likely has less PO intake and with chemo does not need as many antihyptertensives/GDMT medication dose - emailed primary cardiologist  - Medications given via Dobhoff  - Continue Aldactone 12.5mg // Metoprolol Tartrate 50mg BID // Losartan 25mg // Dapagliflozin 10mg   - Atorvostatin 20mg      #SCC of Hypopharynx (Dr. Burkitt)  #Pain  #Delerium  ::SLP eval 2/12 recommended soft solids with thin liquids, patient  would prefer oral feeding to PEG   - nutrition recs appreciated: 100mg IV thiamine x7 days,  Isosource 1.5 at goal [50cc/hr]  - c/w home Guaifenesin  - c/w Zofran prn nausea, Compazien second line  - Changed pain regimen: IV Dilaudid 0.4mg Q3 PRN - held I/s of delirium  - delirium precautions     #Oropharyngeal candidiasis  #Dysphagia  ::Moderate to severe candidiasis with multiple large lesions  ::given ANC 0.16, consideration for opportunistic infections present. Entended antifungal course  -consult GI for PEG and EGD to r/o radiation vs infectious esophagitis. Holding plavix, unable to have EGD/PEG until ANC>1000  - filgrastim daily until ANC>1000  - Fluconazole 200mg IV (2/22 - xx), typically for 7 days but continue given neutropenia and candidiasis; re-evaluate after EGD   - Oral care per nursing     #Pancytopenia  ::ANC 0.16 // Plts 53 on admission with no concern for bleeding // HB 10.9 on presentation (BL 12-13)  - likely d/t myelosuppresion vs though patient has had poor po intake vs mixed  - Retic count 0.6% (fraction 1.8%) indicative of poor marrow response   - filgrastim daily until ANC >1000  - Daily CBC  - T&S updated, transfuse for active bleeding <50, or for <10  - Will hold on DVT prophylaxis, SCDs only  - holding clopidogrel (for EGD + peg in future)     #Normocytic Anemia  ::HB 10.9 on presentation (BL 12-13), no active bleeding, no recent iron studies  - Likely d/t chronic dz vs myelosuppresion, though patient has had poor po intake     #Atrial Fibrillation  ::Patient previously on AC, now s/p watchman procedure and is rate controlled   ::had 5 ablations per patient  - Continue metoprolol tartrate 50mg BID  - HR 90s to 120s     #Urinary Frequency  - UA with reflex      #Hx of TIA  - c/w home Plavix 75mg daily     #GERD  - c/w home Pantoprazole     #Mood Disorder  #Misc  - c/w home Cymbalta and Wellbutrin  - c/w home supplements, mag-ox     F : caution, was diuresing. HfpEF  E: replete lytes prn,  K>4, Mg >2  N: isosource 1.5 via dobhoff  A: PIVs     DVT prophylaxis: SCDs  GI prophylaxis: home PPI     Code Status: FULL                 Baudilio Turcios MD

## 2024-03-04 NOTE — CARE PLAN
The patient's goals for the shift include      The clinical goals for the shift include pt will remain safe and free from injury through shift    Problem: Safety  Goal: Patient will be injury free during hospitalization  Outcome: Progressing     Problem: Psychosocial Needs  Goal: Demonstrates ability to cope with hospitalization/illness  Outcome: Progressing     Problem: Daily Care  Goal: Daily care needs are met  Outcome: Progressing      (0) understands/communicates without difficulty

## 2024-03-05 ENCOUNTER — HOSPITAL ENCOUNTER (OUTPATIENT)
Dept: RADIATION ONCOLOGY | Facility: HOSPITAL | Age: 79
Setting detail: RADIATION/ONCOLOGY SERIES
End: 2024-03-05
Payer: MEDICARE

## 2024-03-05 ENCOUNTER — HOME HEALTH ADMISSION (OUTPATIENT)
Dept: HOME HEALTH SERVICES | Facility: HOME HEALTH | Age: 79
End: 2024-03-05
Payer: MEDICARE

## 2024-03-05 LAB
ABO GROUP (TYPE) IN BLOOD: NORMAL
ALBUMIN SERPL BCP-MCNC: 2.6 G/DL (ref 3.4–5)
ALP SERPL-CCNC: 61 U/L (ref 33–136)
ALT SERPL W P-5'-P-CCNC: 21 U/L (ref 7–45)
ANION GAP SERPL CALC-SCNC: 11 MMOL/L (ref 10–20)
ANTIBODY SCREEN: NORMAL
AST SERPL W P-5'-P-CCNC: 23 U/L (ref 9–39)
BASOPHILS # BLD AUTO: 0 X10*3/UL (ref 0–0.1)
BASOPHILS NFR BLD AUTO: 0 %
BILIRUB DIRECT SERPL-MCNC: 0.2 MG/DL (ref 0–0.3)
BILIRUB SERPL-MCNC: 0.4 MG/DL (ref 0–1.2)
BUN SERPL-MCNC: 23 MG/DL (ref 6–23)
BURR CELLS BLD QL SMEAR: NORMAL
CALCIUM SERPL-MCNC: 8.2 MG/DL (ref 8.6–10.6)
CHLORIDE SERPL-SCNC: 104 MMOL/L (ref 98–107)
CO2 SERPL-SCNC: 28 MMOL/L (ref 21–32)
CREAT SERPL-MCNC: 0.54 MG/DL (ref 0.5–1.05)
EGFRCR SERPLBLD CKD-EPI 2021: >90 ML/MIN/1.73M*2
EOSINOPHIL # BLD AUTO: 0 X10*3/UL (ref 0–0.4)
EOSINOPHIL NFR BLD AUTO: 0 %
ERYTHROCYTE [DISTWIDTH] IN BLOOD BY AUTOMATED COUNT: 17.9 % (ref 11.5–14.5)
GLUCOSE BLD MANUAL STRIP-MCNC: 123 MG/DL (ref 74–99)
GLUCOSE BLD MANUAL STRIP-MCNC: 125 MG/DL (ref 74–99)
GLUCOSE BLD MANUAL STRIP-MCNC: 125 MG/DL (ref 74–99)
GLUCOSE BLD MANUAL STRIP-MCNC: 127 MG/DL (ref 74–99)
GLUCOSE SERPL-MCNC: 138 MG/DL (ref 74–99)
HCT VFR BLD AUTO: 28.9 % (ref 36–46)
HGB BLD-MCNC: 9.1 G/DL (ref 12–16)
IMM GRANULOCYTES # BLD AUTO: 0.02 X10*3/UL (ref 0–0.5)
IMM GRANULOCYTES NFR BLD AUTO: 15.4 % (ref 0–0.9)
LYMPHOCYTES # BLD AUTO: 0.07 X10*3/UL (ref 0.8–3)
LYMPHOCYTES NFR BLD AUTO: 53.8 %
MAGNESIUM SERPL-MCNC: 2.13 MG/DL (ref 1.6–2.4)
MCH RBC QN AUTO: 31.5 PG (ref 26–34)
MCHC RBC AUTO-ENTMCNC: 31.5 G/DL (ref 32–36)
MCV RBC AUTO: 100 FL (ref 80–100)
MONOCYTES # BLD AUTO: 0.01 X10*3/UL (ref 0.05–0.8)
MONOCYTES NFR BLD AUTO: 7.7 %
NEUTROPHILS # BLD AUTO: 0.03 X10*3/UL (ref 1.6–5.5)
NEUTROPHILS NFR BLD AUTO: 23.1 %
NRBC BLD-RTO: 50 /100 WBCS (ref 0–0)
PHOSPHATE SERPL-MCNC: 2.1 MG/DL (ref 2.5–4.9)
PLATELET # BLD AUTO: 103 X10*3/UL (ref 150–450)
POTASSIUM SERPL-SCNC: 4.9 MMOL/L (ref 3.5–5.3)
PROT SERPL-MCNC: 5.2 G/DL (ref 6.4–8.2)
RBC # BLD AUTO: 2.89 X10*6/UL (ref 4–5.2)
RBC MORPH BLD: NORMAL
RH FACTOR (ANTIGEN D): NORMAL
SODIUM SERPL-SCNC: 138 MMOL/L (ref 136–145)
WBC # BLD AUTO: 0.1 X10*3/UL (ref 4.4–11.3)

## 2024-03-05 PROCEDURE — 84100 ASSAY OF PHOSPHORUS: CPT

## 2024-03-05 PROCEDURE — 2500000001 HC RX 250 WO HCPCS SELF ADMINISTERED DRUGS (ALT 637 FOR MEDICARE OP)

## 2024-03-05 PROCEDURE — 2500000002 HC RX 250 W HCPCS SELF ADMINISTERED DRUGS (ALT 637 FOR MEDICARE OP, ALT 636 FOR OP/ED)

## 2024-03-05 PROCEDURE — 83735 ASSAY OF MAGNESIUM: CPT

## 2024-03-05 PROCEDURE — 2500000005 HC RX 250 GENERAL PHARMACY W/O HCPCS

## 2024-03-05 PROCEDURE — 94640 AIRWAY INHALATION TREATMENT: CPT

## 2024-03-05 PROCEDURE — 2500000004 HC RX 250 GENERAL PHARMACY W/ HCPCS (ALT 636 FOR OP/ED): Mod: JZ | Performed by: HOSPITALIST

## 2024-03-05 PROCEDURE — 2500000004 HC RX 250 GENERAL PHARMACY W/ HCPCS (ALT 636 FOR OP/ED)

## 2024-03-05 PROCEDURE — 82248 BILIRUBIN DIRECT: CPT

## 2024-03-05 PROCEDURE — 86901 BLOOD TYPING SEROLOGIC RH(D): CPT

## 2024-03-05 PROCEDURE — 85025 COMPLETE CBC W/AUTO DIFF WBC: CPT

## 2024-03-05 PROCEDURE — 99233 SBSQ HOSP IP/OBS HIGH 50: CPT

## 2024-03-05 PROCEDURE — 1170000001 HC PRIVATE ONCOLOGY ROOM DAILY

## 2024-03-05 PROCEDURE — 82947 ASSAY GLUCOSE BLOOD QUANT: CPT

## 2024-03-05 RX ORDER — FLUCONAZOLE 2 MG/ML
400 INJECTION, SOLUTION INTRAVENOUS EVERY 24 HOURS
Status: COMPLETED | OUTPATIENT
Start: 2024-03-06 | End: 2024-03-06

## 2024-03-05 RX ORDER — QUETIAPINE FUMARATE 25 MG/1
12.5 TABLET, FILM COATED ORAL ONCE
Status: COMPLETED | OUTPATIENT
Start: 2024-03-06 | End: 2024-03-06

## 2024-03-05 RX ORDER — ENOXAPARIN SODIUM 100 MG/ML
40 INJECTION SUBCUTANEOUS EVERY 24 HOURS
Status: DISCONTINUED | OUTPATIENT
Start: 2024-03-05 | End: 2024-03-15 | Stop reason: SDUPTHER

## 2024-03-05 RX ORDER — ALBUTEROL SULFATE 0.83 MG/ML
3 SOLUTION RESPIRATORY (INHALATION) 3 TIMES DAILY
Status: DISCONTINUED | OUTPATIENT
Start: 2024-03-06 | End: 2024-03-07 | Stop reason: ALTCHOICE

## 2024-03-05 RX ORDER — ALBUTEROL SULFATE 0.83 MG/ML
3 SOLUTION RESPIRATORY (INHALATION) 3 TIMES DAILY
Status: DISCONTINUED | OUTPATIENT
Start: 2024-03-06 | End: 2024-03-05

## 2024-03-05 RX ORDER — CEFTRIAXONE 2 G/50ML
2 INJECTION, SOLUTION INTRAVENOUS EVERY 24 HOURS
Qty: 200 ML | Refills: 0 | Status: SHIPPED
Start: 2024-03-05 | End: 2024-03-09

## 2024-03-05 RX ADMIN — BUPROPION HYDROCHLORIDE 150 MG: 100 TABLET, FILM COATED ORAL at 08:20

## 2024-03-05 RX ADMIN — Medication 1000 UNITS: at 08:19

## 2024-03-05 RX ADMIN — FLUCONAZOLE 400 MG: 2 INJECTION, SOLUTION INTRAVENOUS at 11:30

## 2024-03-05 RX ADMIN — VENLAFAXINE 75 MG: 75 TABLET ORAL at 08:20

## 2024-03-05 RX ADMIN — OXYCODONE HYDROCHLORIDE AND ACETAMINOPHEN 500 MG: 500 TABLET ORAL at 08:19

## 2024-03-05 RX ADMIN — DAPAGLIFLOZIN 10 MG: 10 TABLET, FILM COATED ORAL at 08:19

## 2024-03-05 RX ADMIN — LOPERAMIDE HYDROCHLORIDE 2 MG: 2 CAPSULE ORAL at 08:20

## 2024-03-05 RX ADMIN — METOPROLOL TARTRATE 50 MG: 50 TABLET, FILM COATED ORAL at 21:24

## 2024-03-05 RX ADMIN — CEFTRIAXONE SODIUM 2 G: 2 INJECTION, SOLUTION INTRAVENOUS at 16:11

## 2024-03-05 RX ADMIN — ALBUTEROL SULFATE 3 ML: 2.5 SOLUTION RESPIRATORY (INHALATION) at 02:43

## 2024-03-05 RX ADMIN — Medication 10 MG: at 17:49

## 2024-03-05 RX ADMIN — SODIUM PHOSPHATE, MONOBASIC, MONOHYDRATE AND SODIUM PHOSPHATE, DIBASIC, ANHYDROUS 15 MMOL: 142; 276 INJECTION, SOLUTION INTRAVENOUS at 10:18

## 2024-03-05 RX ADMIN — MULTIVIT AND MINERALS-FERROUS GLUCONATE 9 MG IRON/15 ML ORAL LIQUID 15 ML: at 08:19

## 2024-03-05 RX ADMIN — METOPROLOL TARTRATE 50 MG: 50 TABLET, FILM COATED ORAL at 08:19

## 2024-03-05 RX ADMIN — Medication 1000 UNITS: at 21:24

## 2024-03-05 RX ADMIN — SPIRONOLACTONE 12.5 MG: 25 TABLET, FILM COATED ORAL at 08:19

## 2024-03-05 RX ADMIN — ALBUTEROL SULFATE 3 ML: 2.5 SOLUTION RESPIRATORY (INHALATION) at 11:07

## 2024-03-05 RX ADMIN — GUAIFENESIN 600 MG: 600 TABLET, EXTENDED RELEASE ORAL at 08:28

## 2024-03-05 RX ADMIN — ALBUTEROL SULFATE 3 ML: 2.5 SOLUTION RESPIRATORY (INHALATION) at 06:26

## 2024-03-05 RX ADMIN — ESOMEPRAZOLE MAGNESIUM 40 MG: 40 FOR SUSPENSION ORAL at 05:53

## 2024-03-05 RX ADMIN — FILGRASTIM-SNDZ 300 MCG: 300 INJECTION, SOLUTION INTRAVENOUS; SUBCUTANEOUS at 10:07

## 2024-03-05 RX ADMIN — ACETAMINOPHEN 1000 MG: 650 SOLUTION ORAL at 05:53

## 2024-03-05 RX ADMIN — VENLAFAXINE 75 MG: 75 TABLET ORAL at 16:11

## 2024-03-05 RX ADMIN — THIAMINE HCL TAB 100 MG 100 MG: 100 TAB at 08:19

## 2024-03-05 RX ADMIN — ACETAMINOPHEN 1000 MG: 650 SOLUTION ORAL at 11:15

## 2024-03-05 RX ADMIN — BUPROPION HYDROCHLORIDE 150 MG: 100 TABLET, FILM COATED ORAL at 21:24

## 2024-03-05 RX ADMIN — ATORVASTATIN CALCIUM 20 MG: 20 TABLET, FILM COATED ORAL at 08:19

## 2024-03-05 RX ADMIN — ALBUTEROL SULFATE 3 ML: 2.5 SOLUTION RESPIRATORY (INHALATION) at 23:56

## 2024-03-05 RX ADMIN — ACETAMINOPHEN 1000 MG: 650 SOLUTION ORAL at 17:15

## 2024-03-05 RX ADMIN — ALBUTEROL SULFATE 3 ML: 2.5 SOLUTION RESPIRATORY (INHALATION) at 19:33

## 2024-03-05 RX ADMIN — LOSARTAN POTASSIUM 25 MG: 25 TABLET, FILM COATED ORAL at 08:19

## 2024-03-05 ASSESSMENT — COGNITIVE AND FUNCTIONAL STATUS - GENERAL
DAILY ACTIVITIY SCORE: 8
WALKING IN HOSPITAL ROOM: TOTAL
STANDING UP FROM CHAIR USING ARMS: A LOT
TOILETING: TOTAL
DRESSING REGULAR UPPER BODY CLOTHING: A LOT
HELP NEEDED FOR BATHING: A LOT
CLIMB 3 TO 5 STEPS WITH RAILING: TOTAL
PERSONAL GROOMING: A LOT
TURNING FROM BACK TO SIDE WHILE IN FLAT BAD: A LOT
PERSONAL GROOMING: TOTAL
EATING MEALS: TOTAL
MOVING TO AND FROM BED TO CHAIR: A LOT
EATING MEALS: TOTAL
MOBILITY SCORE: 9
DRESSING REGULAR UPPER BODY CLOTHING: A LOT
DRESSING REGULAR LOWER BODY CLOTHING: A LOT
MOBILITY SCORE: 10
STANDING UP FROM CHAIR USING ARMS: TOTAL
MOVING TO AND FROM BED TO CHAIR: TOTAL
MOVING FROM LYING ON BACK TO SITTING ON SIDE OF FLAT BED WITH BEDRAILS: A LITTLE
TURNING FROM BACK TO SIDE WHILE IN FLAT BAD: A LOT
WALKING IN HOSPITAL ROOM: TOTAL
DRESSING REGULAR LOWER BODY CLOTHING: A LOT
TOILETING: A LOT
DAILY ACTIVITIY SCORE: 11
HELP NEEDED FOR BATHING: TOTAL
MOVING FROM LYING ON BACK TO SITTING ON SIDE OF FLAT BED WITH BEDRAILS: A LOT
CLIMB 3 TO 5 STEPS WITH RAILING: TOTAL

## 2024-03-05 ASSESSMENT — PAIN SCALES - PAIN ASSESSMENT IN ADVANCED DEMENTIA (PAINAD)
CONSOLABILITY: DISTRACTED OR REASSURED BY VOICE/TOUCH
TOTALSCORE: 3
FACIALEXPRESSION: SMILING OR INEXPRESSIVE
BODYLANGUAGE: TENSE, DISTRESSED PACING, FIDGETING
BREATHING: OCCASIONAL LABORED BREATHING, SHORT PERIOD OF HYPERVENTILATION

## 2024-03-05 ASSESSMENT — PAIN - FUNCTIONAL ASSESSMENT: PAIN_FUNCTIONAL_ASSESSMENT: 0-10

## 2024-03-05 ASSESSMENT — PAIN SCALES - GENERAL: PAINLEVEL_OUTOF10: 0 - NO PAIN

## 2024-03-05 NOTE — PROGRESS NOTES
"Speech-Language Pathology                 Therapy Communication Note    Patient Name: Amita Todd  MRN: [id number]  Today's Date: 3/5/2024     Discipline: Speech Language Pathology    Speech Therapy session deferred per pt's sister at this time.  Pt resting soundly. Sister reports pt had a \"rough night\".  Will continue to follow.      03/05/24 at 5:46 PM - CRISTOBAL Alvarenga            "

## 2024-03-05 NOTE — NURSING NOTE
RN took vitals and they are charted in the flowsheets. RN messaged MD and reported vitals. Pt did not report s/s.

## 2024-03-05 NOTE — DISCHARGE INSTRUCTIONS
Ms. Todd,     You were admitted to [facility] after your blood pressure was found to be low in clinic. While you were here, we held some of your heart medicines, and your blood pressure got better. We restarted your medicines, and your blood pressure was good after we restarted them.     Also while you were here, it was found that you had trouble swallowing, and that you would be best served by a feeding tube. We placed a feeding tube through your nose while you were here, and had the GI doctors see about putting a PEG tube (a tube through your skin into the stomach). However because your white blood cells and neutrophil counts were so low, they were unable to do this while you were in the hospital. We are sending you home on tube feeds, and with an appointment to see the GI doctors on 3/12/2024. BEFORE you see them in clinic, you will need to get labs to look at your white blood cells on 3/10/2024, and home health should be able to draw this for you.     You missed your appointment that was scheduled on 3/6/2024 with Dr. Burkitt, so we sent you for an appointment within 2-3 weeks from when you were discharged.     Also while you were in the hospital, we found that you had a blood stream infection with E. Coli. We treated you with antibiotics (ceftriaxone), and your last day for this will be 3/9/2024; the home health nurses should help you with this.

## 2024-03-05 NOTE — PROGRESS NOTES
"Music Therapy Note      Therapy Session  Referral Type: New referral this admission  Visit Type: New visit  Session Start Time: 1357  Session End Time: 1423  Intervention Delivery: In-person  Number of family members present: 1  Family Present for Session: Sibling(s)     Pre-assessment  Unable to Assess Reason: Physical limitation, Cognitive limitation  Mood/Affect: Participative, Cooperative, Tired/lethargic, Confused         Treatment/Interventions  Areas of Focus: Family bonding, Relaxation, Agitation reduction  Music Therapy Interventions: Live music listening, Empathic listening/validating emotions  Co-Treatment: Music therapy (MT Intern)  Interruption: No  Patient Fell Asleep at End of Session: No    Post-assessment  Unable to Assess Reason: Cognitive limitation, Physical limitation  Mood/Affect: Cooperative, Confused  Verbalized Emotional State: Enjoyment, Gratitude  Continue Visiting: Yes  Total Session Time (min): 26 minutes    Narrative  Assessment Detail: Pt was found laying in bed with sister at bedside. Pt's was minimally responsive, but pt's sister invited MTs in. MTs introduced services and pt's sister said, \"I was just about to play some music for her, so it's perfect that you came when you did.\"  Plan: MTs will introduce services and engage pt in live music listening to assist with relaxation, agitation reduction, and family bonding with sister.  Evaluation: Pt's sister shared pt's music preferences as well as some background on their family. MTs played a series of songs, and pt became more alert. Pt was mouthing some lyrics and engaged in short conversation with her sister. Towards the end of the session, pt turned her head towards MT and connected that the singing was coming from MT. Pt said, \"You voice is so clear.\" Pt and pt's sister expressed gratitude for the visit and said to come back anytime.  Follow-up: Will continue to follow.    Education Documentation  No documentation " found.

## 2024-03-05 NOTE — PROGRESS NOTES
02/28/24 1507   Discharge Planning   Living Arrangements Spouse/significant other   Support Systems Spouse/significant other;Children;Family members;Friends/neighbors   Type of Residence Private residence   Who is requesting discharge planning? Provider   Home or Post Acute Services In home services   Type of Home Care Services Home OT;Home PT;Home nursing visits;Home health aide   Patient expects to be discharged to: home   Does the patient need discharge transport arranged? No     2/28/24 @ 1505  Met with patient and her daughter Tati at bedside. Patient lives with her  in [place]. They have not used home care before and appreciate recommendations. They are fine with using [facility]. Asked team to place referral for [facility] for RN, PT/OT, HHA.  Patient is to get PEG placed on Friday. Explained to patient and daughter that we will need to get a tube feed supplier. Once the team places orders and the patient gets her PEG tube, we can get that set up for them.  Explained that they will get teaching at bedside by nurses and TF supplier before going home. Daughter Tati and her  will be learners. No other needs at this time. Will continue to follow patient during her hospital stay.  Elvi Castillo RN [facility]    3/5/24 @ 1400  [facility] referral placed for RN, PT/OT. [facility] unable to provide home health aide in her area. Patient will need RN for tube feeds, IV abx, and wound care. Tube feed orders sent to [facility] via [facility]. Patient will discharge home with [facility]. Unable to place PEG at this time during her hospital stay. Will continue to follow patient and update [facility] on discharge needs. JEAN-PAUL Corbett MD updated.  Elvi Castillo RN [facility]

## 2024-03-05 NOTE — PROGRESS NOTES
"Music Therapy Note      Therapy Session  Referral Type: New referral this admission  Visit Type: New visit  Session Start Time: 1357  Session End Time: 1423  Intervention Delivery: In-person  Number of family members present: 1  Family Present for Session: Sibling(s)     Pre-assessment  Unable to Assess Reason: Physical limitation, Cognitive limitation  Mood/Affect: Participative, Cooperative, Tired/lethargic, Confused         Treatment/Interventions  Areas of Focus: End of life support, Family bonding, Relaxation, Agitation reduction  Music Therapy Interventions: Live music listening, Empathic listening/validating emotions  Co-Treatment: Music therapy (MT Intern)  Interruption: No  Patient Fell Asleep at End of Session: No    Post-assessment  Unable to Assess Reason: Cognitive limitation, Physical limitation  Mood/Affect: Cooperative, Confused  Verbalized Emotional State: Enjoyment, Gratitude  Continue Visiting: Yes  Total Session Time (min): 26 minutes    Narrative  Assessment Detail: Pt was found laying in bed with sister at bedside. Pt's was minimally responsive, but pt's sister invited MTs in. MTs introduced services and pt's sister said, \"I was just about to play some music for her, so it's perfect that you came when you did.\"  Plan: MTs will introduce services and engage pt in live music listening to assist with end of life support, relaxation, and family bonding with sister.  Evaluation: Pt's sister shared pt's music preferences as well as some background on their family. MTs played a series of songs, and pt became more alert. Pt was mouthing some lyrics and engaged in short conversation with her sister. Towards the end of the session, pt turned her head towards MT and connected that the singing was coming from MT. Pt said, \"You voice is so clear.\" Pt and pt's sister expressed gratitude for the visit and said to come back anytime.  Follow-up: Will continue to follow.    Education Documentation  No " documentation found.

## 2024-03-05 NOTE — PROGRESS NOTES
"Amita Todd is a 78 y.o. female on day 12 of admission presenting with Thrombocytopenia (CMS/HCC).    Subjective   NAEO. Per family, patient finally got some sleep after being given seroquel 12.5mg around 11PM. Patient denies any acute issues/complaints, does say that she would like to go home if able. She denies any dysphagia. Denies fevers, chills, pain.        Objective   Constitutional:       Comments: underweight   HENT:      Mouth/Throat:      Mouth: Mucous membranes are dry. improved well circumscribed plaques.  +DH with TF  Eyes:      Pupils: Pupils are equal, round, and reactive to light.   Cardiovascular:      Rate and Rhythm: Irregular rhythm. Tachycardia present.      Pulses: Normal pulses.   Pulmonary:      Comments: mild crackles B/L, RUL>AGATHA. On RA  Abdominal:      General: Abdomen is flat. Bowel sounds are normal.      Palpations: Abdomen is soft.   Musculoskeletal:         General: Normal range of motion.      Cervical back: Normal range of motion.      Comments: 1+ pitting edema B/L LE till ankles   Skin:     General: Skin is warm.      Capillary Refill: Capillary refill takes less than 2 seconds.      Findings: Bruising present.      Comments: Petechia over trunk   Neurological:      General: No focal deficit present.      Mental Status: She is orietable, but delirious   Psychiatric:         Mood and Affect: Mood normal.       Last Recorded Vitals  Blood pressure 124/86, pulse 90, temperature 37.2 °C (99 °F), temperature source Temporal, resp. rate 13, height 1.635 m (5' 4.37\"), weight 68 kg (149 lb 14.6 oz), SpO2 90 %.  Intake/Output last 3 Shifts:  I/O last 3 completed shifts:  In: 2845 (41.8 mL/kg) [NG/GT:2345; IV Piggyback:500]  Out: 855 (12.6 mL/kg) [Urine:855 (0.3 mL/kg/hr)]  Weight: 68 kg     Relevant Results  Scheduled medications  acetaminophen, 1,000 mg, nasogastric tube, q6h  albuterol, 3 mL, nebulization, q4h  ascorbic acid, 500 mg, nasogastric tube, Daily  atorvastatin, 20 " mg, nasogastric tube, Daily  buPROPion, 150 mg, nasogastric tube, BID  cefTRIAXone, 2 g, intravenous, q24h  cholecalciferol, 1,000 Units, nasogastric tube, BID  dapagliflozin propanediol, 10 mg, oral, Daily  enoxaparin, 40 mg, subcutaneous, q24h  esomeprazole, 40 mg, nasoduodenal tube, Daily before breakfast  filgrastim or biosimilar, 5 mcg/kg, subcutaneous, q24h  fluconazole, 400 mg, intravenous, q24h  lidocaine, 1 Application, Topical, Once  losartan, 25 mg, nasogastric tube, Daily  [Held by provider] magnesium oxide, 400 mg, nasogastric tube, Daily  melatonin, 10 mg, oral, Nightly  metoprolol tartrate, 50 mg, nasogastric tube, BID  multivitamin with iron-minerals, 15 mL, nasogastric tube, Daily  perflutren lipid microspheres, 0.5-10 mL of dilution, intravenous, Once in imaging  perflutren protein A microsphere, 0.5 mL, intravenous, Once in imaging  senna, 10 mL, nasogastric tube, BID  sodium phosphate, 15 mmol, intravenous, Once  spironolactone, 12.5 mg, nasogastric tube, Daily  sulfur hexafluoride microsphr, 2 mL, intravenous, Once in imaging  thiamine, 100 mg, oral, Daily  venlafaxine, 75 mg, nasogastric tube, BID with meals      Continuous medications     PRN medications  PRN medications: alteplase, alteplase, dextrose, diphenhydrAMINE, EPINEPHrine HCl, famotidine, guaiFENesin, guaiFENesin, [Held by provider] HYDROmorphone, [Held by provider] HYDROmorphone, lidocaine-diphenhydraMINE-Maalox 1:1:1, loperamide, methylPREDNISolone sodium succinate (PF), ondansetron, phenoL, prochlorperazine, prochlorperazine, prochlorperazine, sodium chloride  Results for orders placed or performed during the hospital encounter of 02/22/24 (from the past 24 hour(s))   POCT GLUCOSE   Result Value Ref Range    POCT Glucose 125 (H) 74 - 99 mg/dL   POCT GLUCOSE   Result Value Ref Range    POCT Glucose 123 (H) 74 - 99 mg/dL   Magnesium   Result Value Ref Range    Magnesium 2.13 1.60 - 2.40 mg/dL   CBC and Auto Differential   Result  Value Ref Range    WBC 0.1 (LL) 4.4 - 11.3 x10*3/uL    nRBC 50.0 (H) 0.0 - 0.0 /100 WBCs    RBC 2.89 (L) 4.00 - 5.20 x10*6/uL    Hemoglobin 9.1 (L) 12.0 - 16.0 g/dL    Hematocrit 28.9 (L) 36.0 - 46.0 %     80 - 100 fL    MCH 31.5 26.0 - 34.0 pg    MCHC 31.5 (L) 32.0 - 36.0 g/dL    RDW 17.9 (H) 11.5 - 14.5 %    Platelets 103 (L) 150 - 450 x10*3/uL    Neutrophils %      Immature Granulocytes %, Automated      Lymphocytes %      Monocytes %      Eosinophils %      Basophils %      Neutrophils Absolute      Lymphocytes Absolute      Monocytes Absolute      Eosinophils Absolute      Basophils Absolute     Hepatic Function Panel   Result Value Ref Range    Albumin 2.6 (L) 3.4 - 5.0 g/dL    Bilirubin, Total 0.4 0.0 - 1.2 mg/dL    Bilirubin, Direct 0.2 0.0 - 0.3 mg/dL    Alkaline Phosphatase 61 33 - 136 U/L    ALT 21 7 - 45 U/L    AST 23 9 - 39 U/L    Total Protein 5.2 (L) 6.4 - 8.2 g/dL   Type and screen   Result Value Ref Range    ABO TYPE O     Rh TYPE POS     ANTIBODY SCREEN NEG    Phosphorus   Result Value Ref Range    Phosphorus 2.1 (L) 2.5 - 4.9 mg/dL   Basic Metabolic Panel   Result Value Ref Range    Glucose 138 (H) 74 - 99 mg/dL    Sodium 138 136 - 145 mmol/L    Potassium 4.9 3.5 - 5.3 mmol/L    Chloride 104 98 - 107 mmol/L    Bicarbonate 28 21 - 32 mmol/L    Anion Gap 11 10 - 20 mmol/L    Urea Nitrogen 23 6 - 23 mg/dL    Creatinine 0.54 0.50 - 1.05 mg/dL    eGFR >90 >60 mL/min/1.73m*2    Calcium 8.2 (L) 8.6 - 10.6 mg/dL         This patient has a urinary catheter   Reason for the urinary catheter remaining today? critically ill patient who need accurate urinary output measurements               Assessment/Plan   Principal Problem:    Thrombocytopenia (CMS/HCC)  79yo F w PMHx squamous cell carcinoma of hypopharynx, HFpEF, CAD s/p PCI, Afib s/p watchman, GERD, depression, HTN, AUD, ascending aortic aneurysm, distant breast cancer (s/p chemoradiotherapy and lumpectomy) who was directed to ED for hypotension  which resolved. Currently treating E.Coli bacteremia with ceftriaxone (ID following), and plan to have EGD (assess esophagitis infectious vs radiation) + PEG (for nutrition); however ANC less than goal. Started neupogen (3/2) d/t low ANC, infection and procedure goal for ANC > 1000.     3/5 updates:   -WBC count 0.1, diff pending, regardless ANC will not be >1000  - continue tube feeds  - Continue fluconazole until EGD for esophageal candidiasis 14 days (end date 3/6)  - Will work toward discharging patient to rehab facility given unlikely ANC will recover to >1000 in next few days given in luz period of chemotherapy on 2/28  - Likely to be discharged w/ dobhoff for feeding, will plan for PEG as an outpatient once ANC >1000  - restart lovenox for DVT ppx given no PEG in near future      #Hypotension, improved   #HFpEF, initial c/f ADHF  ::Patient presents with hypotension over the past month in oncology clinic, has received IVF and discontinued home losartan/amlodipine during this time, BP spontaneously improving without intervention today to 100s/70s, appears grossly dry to euvolemic on exam without any symptoms of acute heart failure + positive orthostats in ED  ::BNP 2/22: 229 - appears to be at baseline  ::TTE 7/2023: EF 55-60% // TTE 2/2024 EF 50%  - Patient likely has less PO intake and with chemo does not need as many antihyptertensives/GDMT medication dose - emailed primary cardiologist  - Medications given via Dobhoff  - Continue Aldactone 12.5mg // Metoprolol Tartrate 50mg BID // Losartan 25mg // Dapagliflozin 10mg   - Atorvostatin 20mg     #E coli bacteremia  :: Bcx from 2/29 growing E coli sensitive to ceftriaxone; Bcx from 3/2 NGTD  - ID consulted, appreciate recs:  - Given collection through mediport but no symptoms of sepsis, would treat w/ 10 days of ceftriaxone 2g q24hr infused through mediport  - Lock solution into catheter after infusion is complete   - CTX 2g q24 hr course 10 days; 3/1-3/10      #SCC of Hypopharynx (Dr. Burkitt)  #Pain  #Delerium  ::SLP librado 2/12 recommended soft solids with thin liquids, patient would prefer oral feeding to PEG   - nutrition recs appreciated: 100mg IV thiamine x7 days,  Isosource 1.5 at goal [50cc/hr]  - c/w home Guaifenesin  - c/w Zofran prn nausea, Compazien second line  - Changed pain regimen: IV Dilaudid 0.4mg Q3 PRN - held I/s of delirium  - delirium precautions     #Oropharyngeal candidiasis  #Dysphagia  ::Moderate to severe candidiasis with multiple large lesions  ::given ANC 0.16, consideration for opportunistic infections present. Entended antifungal course  -likely plan for outpatient PEG given unlikely for ANC to recover while inpatient, in luz period from chemotherapy 2/28  - filgrastim daily until ANC>1000  - Fluconazole 200mg IV (2/22 - xx), typically for 7 days but continue given neutropenia and candidiasis; plan for 14 day course (end date 3/6) and can re-evaluate on EGD; patient currently asymptomatic   - Oral care per nursing     #Pancytopenia, improved   ::ANC 0.16 // Plts 53 on admission with no concern for bleeding // HB 10.9 on presentation (BL 12-13)  - likely d/t myelosuppresion vs though patient has had poor po intake vs mixed  - Retic count 0.6% (fraction 1.8%) indicative of poor marrow response   - filgrastim daily until ANC >1000  - Daily CBC  - T&S updated, transfuse for active bleeding <50, or for <10  - Restart lovenox for DVT ppx   - holding clopidogrel (for EGD + peg in future); restart tentatively now that no plan      #Normocytic Anemia  ::HB 10.9 on presentation (BL 12-13), no active bleeding, no recent iron studies  - Likely d/t chronic dz vs myelosuppresion, though patient has had poor po intake     #Atrial Fibrillation  ::Patient previously on AC, now s/p watchman procedure and is rate controlled   ::had 5 ablations per patient  - Continue metoprolol tartrate 50mg BID  - HR 90s to 120s     #Urinary Frequency  - UA with reflex       #Hx of TIA  - holding home Plavix 75mg daily     #GERD  - c/w home Pantoprazole     #Mood Disorder  #Misc  - c/w home Cymbalta and Wellbutrin  - c/w home supplements, mag-ox     F : caution, was diuresing. HfpEF  E: replete lytes prn, K>4, Mg >2  N: isosource 1.5 via dobhoff  A: PIVs     DVT prophylaxis: SCDs  GI prophylaxis: home PPI     Code Status: FULL                 Baudilio Turcios MD

## 2024-03-06 ENCOUNTER — APPOINTMENT (OUTPATIENT)
Dept: HEMATOLOGY/ONCOLOGY | Facility: HOSPITAL | Age: 79
End: 2024-03-06
Payer: MEDICARE

## 2024-03-06 ENCOUNTER — APPOINTMENT (OUTPATIENT)
Dept: RADIATION ONCOLOGY | Facility: HOSPITAL | Age: 79
End: 2024-03-06
Payer: MEDICARE

## 2024-03-06 LAB
ALBUMIN SERPL BCP-MCNC: 2.5 G/DL (ref 3.4–5)
ALP SERPL-CCNC: 70 U/L (ref 33–136)
ALT SERPL W P-5'-P-CCNC: 23 U/L (ref 7–45)
ANION GAP SERPL CALC-SCNC: 11 MMOL/L (ref 10–20)
AST SERPL W P-5'-P-CCNC: 24 U/L (ref 9–39)
BACTERIA BLD CULT: NORMAL
BACTERIA BLD CULT: NORMAL
BASOPHILS # BLD MANUAL: 0 X10*3/UL (ref 0–0.1)
BASOPHILS NFR BLD MANUAL: 0 %
BILIRUB DIRECT SERPL-MCNC: 0.1 MG/DL (ref 0–0.3)
BILIRUB SERPL-MCNC: 0.4 MG/DL (ref 0–1.2)
BUN SERPL-MCNC: 21 MG/DL (ref 6–23)
CALCIUM SERPL-MCNC: 8 MG/DL (ref 8.6–10.6)
CHLORIDE SERPL-SCNC: 105 MMOL/L (ref 98–107)
CO2 SERPL-SCNC: 26 MMOL/L (ref 21–32)
CREAT SERPL-MCNC: 0.52 MG/DL (ref 0.5–1.05)
DACRYOCYTES BLD QL SMEAR: ABNORMAL
EGFRCR SERPLBLD CKD-EPI 2021: >90 ML/MIN/1.73M*2
EOSINOPHIL # BLD MANUAL: 0.04 X10*3/UL (ref 0–0.4)
EOSINOPHIL NFR BLD MANUAL: 6.1 %
ERYTHROCYTE [DISTWIDTH] IN BLOOD BY AUTOMATED COUNT: 18 % (ref 11.5–14.5)
GLUCOSE BLD MANUAL STRIP-MCNC: 122 MG/DL (ref 74–99)
GLUCOSE BLD MANUAL STRIP-MCNC: 139 MG/DL (ref 74–99)
GLUCOSE SERPL-MCNC: 123 MG/DL (ref 74–99)
HCT VFR BLD AUTO: 28.7 % (ref 36–46)
HGB BLD-MCNC: 8.8 G/DL (ref 12–16)
IMM GRANULOCYTES # BLD AUTO: 0.04 X10*3/UL (ref 0–0.5)
IMM GRANULOCYTES NFR BLD AUTO: 6.3 % (ref 0–0.9)
LYMPHOCYTES # BLD MANUAL: 0.31 X10*3/UL (ref 0.8–3)
LYMPHOCYTES NFR BLD MANUAL: 51.5 %
MAGNESIUM SERPL-MCNC: 2.12 MG/DL (ref 1.6–2.4)
MCH RBC QN AUTO: 30.9 PG (ref 26–34)
MCHC RBC AUTO-ENTMCNC: 30.7 G/DL (ref 32–36)
MCV RBC AUTO: 101 FL (ref 80–100)
MONOCYTES # BLD MANUAL: 0.18 X10*3/UL (ref 0.05–0.8)
MONOCYTES NFR BLD MANUAL: 30.3 %
NEUTS SEG # BLD MANUAL: 0.07 X10*3/UL (ref 1.6–5)
NEUTS SEG NFR BLD MANUAL: 12.1 %
NRBC BLD-RTO: 0 /100 WBCS (ref 0–0)
OVALOCYTES BLD QL SMEAR: ABNORMAL
PHOSPHATE SERPL-MCNC: 2.3 MG/DL (ref 2.5–4.9)
PLATELET # BLD AUTO: 106 X10*3/UL (ref 150–450)
POTASSIUM SERPL-SCNC: 5.2 MMOL/L (ref 3.5–5.3)
PROT SERPL-MCNC: 5.2 G/DL (ref 6.4–8.2)
RBC # BLD AUTO: 2.85 X10*6/UL (ref 4–5.2)
RBC MORPH BLD: ABNORMAL
SODIUM SERPL-SCNC: 137 MMOL/L (ref 136–145)
TOTAL CELLS COUNTED BLD: 33
WBC # BLD AUTO: 0.6 X10*3/UL (ref 4.4–11.3)

## 2024-03-06 PROCEDURE — 80053 COMPREHEN METABOLIC PANEL: CPT

## 2024-03-06 PROCEDURE — 1170000001 HC PRIVATE ONCOLOGY ROOM DAILY

## 2024-03-06 PROCEDURE — 85007 BL SMEAR W/DIFF WBC COUNT: CPT

## 2024-03-06 PROCEDURE — 82947 ASSAY GLUCOSE BLOOD QUANT: CPT

## 2024-03-06 PROCEDURE — 84100 ASSAY OF PHOSPHORUS: CPT

## 2024-03-06 PROCEDURE — 97535 SELF CARE MNGMENT TRAINING: CPT | Mod: GO

## 2024-03-06 PROCEDURE — 2500000004 HC RX 250 GENERAL PHARMACY W/ HCPCS (ALT 636 FOR OP/ED): Mod: JZ | Performed by: HOSPITALIST

## 2024-03-06 PROCEDURE — 2500000002 HC RX 250 W HCPCS SELF ADMINISTERED DRUGS (ALT 637 FOR MEDICARE OP, ALT 636 FOR OP/ED)

## 2024-03-06 PROCEDURE — 2500000001 HC RX 250 WO HCPCS SELF ADMINISTERED DRUGS (ALT 637 FOR MEDICARE OP)

## 2024-03-06 PROCEDURE — 82248 BILIRUBIN DIRECT: CPT

## 2024-03-06 PROCEDURE — 83735 ASSAY OF MAGNESIUM: CPT

## 2024-03-06 PROCEDURE — 94640 AIRWAY INHALATION TREATMENT: CPT

## 2024-03-06 PROCEDURE — 97530 THERAPEUTIC ACTIVITIES: CPT | Mod: GP

## 2024-03-06 PROCEDURE — 85027 COMPLETE CBC AUTOMATED: CPT

## 2024-03-06 PROCEDURE — 99233 SBSQ HOSP IP/OBS HIGH 50: CPT

## 2024-03-06 PROCEDURE — 2500000004 HC RX 250 GENERAL PHARMACY W/ HCPCS (ALT 636 FOR OP/ED)

## 2024-03-06 RX ORDER — QUETIAPINE FUMARATE 25 MG/1
12.5 TABLET, FILM COATED ORAL NIGHTLY
Status: DISCONTINUED | OUTPATIENT
Start: 2024-03-06 | End: 2024-03-14

## 2024-03-06 RX ORDER — FLUCONAZOLE 200 MG/1
400 TABLET ORAL DAILY
Status: COMPLETED | OUTPATIENT
Start: 2024-03-07 | End: 2024-03-13

## 2024-03-06 RX ADMIN — Medication 1000 UNITS: at 09:26

## 2024-03-06 RX ADMIN — ACETAMINOPHEN 1000 MG: 650 SOLUTION ORAL at 17:33

## 2024-03-06 RX ADMIN — THIAMINE HCL TAB 100 MG 100 MG: 100 TAB at 09:26

## 2024-03-06 RX ADMIN — OXYCODONE HYDROCHLORIDE AND ACETAMINOPHEN 500 MG: 500 TABLET ORAL at 09:26

## 2024-03-06 RX ADMIN — ESOMEPRAZOLE MAGNESIUM 40 MG: 40 FOR SUSPENSION ORAL at 05:26

## 2024-03-06 RX ADMIN — ATORVASTATIN CALCIUM 20 MG: 20 TABLET, FILM COATED ORAL at 09:26

## 2024-03-06 RX ADMIN — SPIRONOLACTONE 12.5 MG: 25 TABLET, FILM COATED ORAL at 09:25

## 2024-03-06 RX ADMIN — BUPROPION HYDROCHLORIDE 150 MG: 100 TABLET, FILM COATED ORAL at 09:24

## 2024-03-06 RX ADMIN — ALBUTEROL SULFATE 3 ML: 2.5 SOLUTION RESPIRATORY (INHALATION) at 21:02

## 2024-03-06 RX ADMIN — Medication 1000 UNITS: at 21:02

## 2024-03-06 RX ADMIN — BUPROPION HYDROCHLORIDE 150 MG: 100 TABLET, FILM COATED ORAL at 21:02

## 2024-03-06 RX ADMIN — FLUCONAZOLE 400 MG: 400 INJECTION, SOLUTION INTRAVENOUS at 12:14

## 2024-03-06 RX ADMIN — DAPAGLIFLOZIN 10 MG: 10 TABLET, FILM COATED ORAL at 09:24

## 2024-03-06 RX ADMIN — QUETIAPINE FUMARATE 12.5 MG: 25 TABLET ORAL at 21:02

## 2024-03-06 RX ADMIN — METOPROLOL TARTRATE 50 MG: 50 TABLET, FILM COATED ORAL at 09:25

## 2024-03-06 RX ADMIN — METOPROLOL TARTRATE 50 MG: 50 TABLET, FILM COATED ORAL at 21:02

## 2024-03-06 RX ADMIN — LOSARTAN POTASSIUM 25 MG: 25 TABLET, FILM COATED ORAL at 09:26

## 2024-03-06 RX ADMIN — Medication 10 MG: at 17:33

## 2024-03-06 RX ADMIN — QUETIAPINE FUMARATE 12.5 MG: 25 TABLET ORAL at 00:09

## 2024-03-06 RX ADMIN — FILGRASTIM-SNDZ 300 MCG: 300 INJECTION, SOLUTION INTRAVENOUS; SUBCUTANEOUS at 12:13

## 2024-03-06 RX ADMIN — MULTIVIT AND MINERALS-FERROUS GLUCONATE 9 MG IRON/15 ML ORAL LIQUID 15 ML: at 09:24

## 2024-03-06 RX ADMIN — CEFTRIAXONE SODIUM 2 G: 2 INJECTION, SOLUTION INTRAVENOUS at 15:53

## 2024-03-06 RX ADMIN — ACETAMINOPHEN 1000 MG: 650 SOLUTION ORAL at 12:13

## 2024-03-06 RX ADMIN — ENOXAPARIN SODIUM 40 MG: 100 INJECTION SUBCUTANEOUS at 09:41

## 2024-03-06 RX ADMIN — VENLAFAXINE 75 MG: 75 TABLET ORAL at 17:33

## 2024-03-06 RX ADMIN — ACETAMINOPHEN 1000 MG: 650 SOLUTION ORAL at 22:21

## 2024-03-06 RX ADMIN — ALBUTEROL SULFATE 3 ML: 2.5 SOLUTION RESPIRATORY (INHALATION) at 15:36

## 2024-03-06 RX ADMIN — GUAIFENESIN 200 MG: 200 SOLUTION ORAL at 22:21

## 2024-03-06 RX ADMIN — VENLAFAXINE 75 MG: 75 TABLET ORAL at 09:25

## 2024-03-06 RX ADMIN — ACETAMINOPHEN 1000 MG: 650 SOLUTION ORAL at 00:09

## 2024-03-06 RX ADMIN — ALBUTEROL SULFATE 3 ML: 2.5 SOLUTION RESPIRATORY (INHALATION) at 09:25

## 2024-03-06 RX ADMIN — GUAIFENESIN 200 MG: 200 SOLUTION ORAL at 17:35

## 2024-03-06 ASSESSMENT — COGNITIVE AND FUNCTIONAL STATUS - GENERAL
EATING MEALS: TOTAL
DAILY ACTIVITIY SCORE: 7
MOBILITY SCORE: 6
MOBILITY SCORE: 8
TOILETING: TOTAL
STANDING UP FROM CHAIR USING ARMS: TOTAL
MOBILITY SCORE: 10
WALKING IN HOSPITAL ROOM: TOTAL
DRESSING REGULAR UPPER BODY CLOTHING: A LOT
HELP NEEDED FOR BATHING: A LOT
EATING MEALS: TOTAL
DRESSING REGULAR LOWER BODY CLOTHING: TOTAL
DRESSING REGULAR UPPER BODY CLOTHING: A LOT
WALKING IN HOSPITAL ROOM: TOTAL
PERSONAL GROOMING: A LOT
MOVING FROM LYING ON BACK TO SITTING ON SIDE OF FLAT BED WITH BEDRAILS: TOTAL
DRESSING REGULAR UPPER BODY CLOTHING: TOTAL
EATING MEALS: A LOT
MOVING TO AND FROM BED TO CHAIR: A LOT
STANDING UP FROM CHAIR USING ARMS: A LOT
HELP NEEDED FOR BATHING: A LOT
MOVING FROM LYING ON BACK TO SITTING ON SIDE OF FLAT BED WITH BEDRAILS: A LOT
TOILETING: TOTAL
PERSONAL GROOMING: TOTAL
HELP NEEDED FOR BATHING: TOTAL
TURNING FROM BACK TO SIDE WHILE IN FLAT BAD: A LOT
PERSONAL GROOMING: A LOT
CLIMB 3 TO 5 STEPS WITH RAILING: TOTAL
DRESSING REGULAR LOWER BODY CLOTHING: A LOT
STANDING UP FROM CHAIR USING ARMS: TOTAL
DAILY ACTIVITIY SCORE: 11
DRESSING REGULAR LOWER BODY CLOTHING: TOTAL
TURNING FROM BACK TO SIDE WHILE IN FLAT BAD: TOTAL
MOVING TO AND FROM BED TO CHAIR: TOTAL
WALKING IN HOSPITAL ROOM: TOTAL
CLIMB 3 TO 5 STEPS WITH RAILING: TOTAL
MOVING FROM LYING ON BACK TO SITTING ON SIDE OF FLAT BED WITH BEDRAILS: A LOT
TOILETING: A LOT
MOVING TO AND FROM BED TO CHAIR: TOTAL
TURNING FROM BACK TO SIDE WHILE IN FLAT BAD: A LOT
DAILY ACTIVITIY SCORE: 9
CLIMB 3 TO 5 STEPS WITH RAILING: TOTAL

## 2024-03-06 ASSESSMENT — PAIN SCALES - GENERAL
PAINLEVEL_OUTOF10: 0 - NO PAIN

## 2024-03-06 ASSESSMENT — ACTIVITIES OF DAILY LIVING (ADL): HOME_MANAGEMENT_TIME_ENTRY: 11

## 2024-03-06 ASSESSMENT — PAIN - FUNCTIONAL ASSESSMENT
PAIN_FUNCTIONAL_ASSESSMENT: 0-10

## 2024-03-06 NOTE — PROGRESS NOTES
"Amita Todd is a 78 y.o. female on day 13 of admission presenting with Thrombocytopenia (CMS/HCC).    Subjective   NAEO. Patient did well overnight, sleep well. More alert this morning, able to participate in interview. Still reports odynophagia. Denies fevers, chills, abdominal pain, shortness of breath, chest pain.         Objective   Constitutional:       Comments: underweight   HENT:      Mouth/Throat:      Mouth: Mucous membranes are dry. improved well circumscribed plaques.  +DH with TF  Eyes:      Pupils: Pupils are equal, round, and reactive to light.   Cardiovascular:      Rate and Rhythm: Irregular rhythm. Tachycardia present.      Pulses: Normal pulses.   Pulmonary:      Comments: mild crackles B/L, RUL>AGATHA. On RA  Abdominal:      General: Abdomen is flat. Bowel sounds are normal.      Palpations: Abdomen is soft.   Musculoskeletal:         General: Normal range of motion.      Cervical back: Normal range of motion.      Comments: 1+ pitting edema B/L LE till ankles   Skin:     General: Skin is warm.      Capillary Refill: Capillary refill takes less than 2 seconds.      Findings: Bruising present.      Comments: Petechia over trunk   Neurological:      General: No focal deficit present.      Mental Status: She is orietable, but delirious   Psychiatric:         Mood and Affect: Mood normal.       Last Recorded Vitals  Blood pressure 132/79, pulse 96, temperature 37.2 °C (99 °F), temperature source Temporal, resp. rate 18, height 1.635 m (5' 4.37\"), weight 68 kg (149 lb 14.6 oz), SpO2 95 %.  Intake/Output last 3 Shifts:  I/O last 3 completed shifts:  In: 3662 (53.9 mL/kg) [NG/GT:3362; IV Piggyback:300]  Out: 1725 (25.4 mL/kg) [Urine:1725 (0.7 mL/kg/hr)]  Weight: 68 kg     Relevant Results  Scheduled medications  acetaminophen, 1,000 mg, nasogastric tube, q6h  albuterol, 3 mL, nebulization, TID  ascorbic acid, 500 mg, nasogastric tube, Daily  atorvastatin, 20 mg, nasogastric tube, Daily  buPROPion, " 150 mg, nasogastric tube, BID  cefTRIAXone, 2 g, intravenous, q24h  cholecalciferol, 1,000 Units, nasogastric tube, BID  dapagliflozin propanediol, 10 mg, oral, Daily  enoxaparin, 40 mg, subcutaneous, q24h  esomeprazole, 40 mg, nasoduodenal tube, Daily before breakfast  filgrastim or biosimilar, 5 mcg/kg, subcutaneous, q24h  [START ON 3/7/2024] fluconazole, 400 mg, oral, Daily  lidocaine, 1 Application, Topical, Once  losartan, 25 mg, nasogastric tube, Daily  [Held by provider] magnesium oxide, 400 mg, nasogastric tube, Daily  melatonin, 10 mg, oral, Nightly  metoprolol tartrate, 50 mg, nasogastric tube, BID  multivitamin with iron-minerals, 15 mL, nasogastric tube, Daily  perflutren lipid microspheres, 0.5-10 mL of dilution, intravenous, Once in imaging  perflutren protein A microsphere, 0.5 mL, intravenous, Once in imaging  QUEtiapine, 12.5 mg, oral, Nightly  senna, 10 mL, nasogastric tube, BID  spironolactone, 12.5 mg, nasogastric tube, Daily  sulfur hexafluoride microsphr, 2 mL, intravenous, Once in imaging  thiamine, 100 mg, oral, Daily  venlafaxine, 75 mg, nasogastric tube, BID with meals      Continuous medications     PRN medications  PRN medications: alteplase, alteplase, dextrose, diphenhydrAMINE, EPINEPHrine HCl, famotidine, guaiFENesin, [Held by provider] HYDROmorphone, [Held by provider] HYDROmorphone, lidocaine-diphenhydraMINE-Maalox 1:1:1, loperamide, methylPREDNISolone sodium succinate (PF), ondansetron, phenoL, prochlorperazine, prochlorperazine, prochlorperazine, sodium chloride  Results for orders placed or performed during the hospital encounter of 02/22/24 (from the past 24 hour(s))   POCT GLUCOSE   Result Value Ref Range    POCT Glucose 127 (H) 74 - 99 mg/dL   Magnesium   Result Value Ref Range    Magnesium 2.12 1.60 - 2.40 mg/dL   CBC and Auto Differential   Result Value Ref Range    WBC 0.6 (LL) 4.4 - 11.3 x10*3/uL    nRBC 0.0 0.0 - 0.0 /100 WBCs    RBC 2.85 (L) 4.00 - 5.20 x10*6/uL     Hemoglobin 8.8 (L) 12.0 - 16.0 g/dL    Hematocrit 28.7 (L) 36.0 - 46.0 %     (H) 80 - 100 fL    MCH 30.9 26.0 - 34.0 pg    MCHC 30.7 (L) 32.0 - 36.0 g/dL    RDW 18.0 (H) 11.5 - 14.5 %    Platelets 106 (L) 150 - 450 x10*3/uL    Immature Granulocytes %, Automated 6.3 (H) 0.0 - 0.9 %    Immature Granulocytes Absolute, Automated 0.04 0.00 - 0.50 x10*3/uL   Hepatic Function Panel   Result Value Ref Range    Albumin 2.5 (L) 3.4 - 5.0 g/dL    Bilirubin, Total 0.4 0.0 - 1.2 mg/dL    Bilirubin, Direct 0.1 0.0 - 0.3 mg/dL    Alkaline Phosphatase 70 33 - 136 U/L    ALT 23 7 - 45 U/L    AST 24 9 - 39 U/L    Total Protein 5.2 (L) 6.4 - 8.2 g/dL   Phosphorus   Result Value Ref Range    Phosphorus 2.3 (L) 2.5 - 4.9 mg/dL   Basic Metabolic Panel   Result Value Ref Range    Glucose 123 (H) 74 - 99 mg/dL    Sodium 137 136 - 145 mmol/L    Potassium 5.2 3.5 - 5.3 mmol/L    Chloride 105 98 - 107 mmol/L    Bicarbonate 26 21 - 32 mmol/L    Anion Gap 11 10 - 20 mmol/L    Urea Nitrogen 21 6 - 23 mg/dL    Creatinine 0.52 0.50 - 1.05 mg/dL    eGFR >90 >60 mL/min/1.73m*2    Calcium 8.0 (L) 8.6 - 10.6 mg/dL   Manual Differential   Result Value Ref Range    Neutrophils %, Manual 12.1 40.0 - 80.0 %    Lymphocytes %, Manual 51.5 13.0 - 44.0 %    Monocytes %, Manual 30.3 2.0 - 10.0 %    Eosinophils %, Manual 6.1 0.0 - 6.0 %    Basophils %, Manual 0.0 0.0 - 2.0 %    Seg Neutrophils Absolute, Manual 0.07 (L) 1.60 - 5.00 x10*3/uL    Lymphocytes Absolute, Manual 0.31 (L) 0.80 - 3.00 x10*3/uL    Monocytes Absolute, Manual 0.18 0.05 - 0.80 x10*3/uL    Eosinophils Absolute, Manual 0.04 0.00 - 0.40 x10*3/uL    Basophils Absolute, Manual 0.00 0.00 - 0.10 x10*3/uL    Total Cells Counted 33     RBC Morphology See Below     Ovalocytes Few     Teardrop Cells Few    POCT GLUCOSE   Result Value Ref Range    POCT Glucose 139 (H) 74 - 99 mg/dL   POCT GLUCOSE   Result Value Ref Range    POCT Glucose 122 (H) 74 - 99 mg/dL         This patient has a urinary  catheter   Reason for the urinary catheter remaining today? critically ill patient who need accurate urinary output measurements               Assessment/Plan   Principal Problem:    Thrombocytopenia (CMS/HCC)  77yo F w PMHx squamous cell carcinoma of hypopharynx, HFpEF, CAD s/p PCI, Afib s/p watchman, GERD, depression, HTN, AUD, ascending aortic aneurysm, distant breast cancer (s/p chemoradiotherapy and lumpectomy) who was directed to ED for hypotension which resolved. Currently treating E.Coli bacteremia with ceftriaxone (ID following), and plan to have EGD (assess esophagitis infectious vs radiation) + PEG (for nutrition); however ANC less than goal. Started neupogen (3/2) d/t low ANC, infection and procedure goal for ANC > 1000.     3/6 updates:   - pending SNF placement  - GI follow up on 3/12 for eval of EGD and PEG  - Repeat labs scheduled for 3/10 to look at ANC prior to GI appt  - PO fluconazole 3/7-3/13 to complete 21 day course given continued odynophagia      #Hypotension, improved   #HFpEF, initial c/f ADHF  ::Patient presents with hypotension over the past month in oncology clinic, has received IVF and discontinued home losartan/amlodipine during this time, BP spontaneously improving without intervention today to 100s/70s, appears grossly dry to euvolemic on exam without any symptoms of acute heart failure + positive orthostats in ED  ::BNP 2/22: 229 - appears to be at baseline  ::TTE 7/2023: EF 55-60% // TTE 2/2024 EF 50%  - Patient likely has less PO intake and with chemo does not need as many antihyptertensives/GDMT medication dose - emailed primary cardiologist  - Medications given via Dobhoff  - Continue Aldactone 12.5mg // Metoprolol Tartrate 50mg BID // Losartan 25mg // Dapagliflozin 10mg   - Atorvostatin 20mg     #E coli bacteremia  :: Bcx from 2/29 growing E coli sensitive to ceftriaxone; Bcx from 3/2 NGTD  - ID consulted, appreciate recs:  - Given collection through mediport but no symptoms  of sepsis, would treat w/ 10 days of ceftriaxone 2g q24hr infused through mediport  - Lock solution into catheter after infusion is complete   - CTX 2g q24 hr course 10 days; 3/1-3/10     #SCC of Hypopharynx (Dr. Burkitt)  #Pain  #Delerium  ::SLP librado 2/12 recommended soft solids with thin liquids, patient would prefer oral feeding to PEG   - nutrition recs appreciated: 100mg IV thiamine x7 days,  Isosource 1.5 at goal [50cc/hr]  - c/w home Guaifenesin  - c/w Zofran prn nausea, Compazien second line  - Changed pain regimen: IV Dilaudid 0.4mg Q3 PRN - held I/s of delirium  - delirium precautions  - patient tolerating seroquel 12.5mg at bedtime for sleep and agitation, delirium improving      #Oropharyngeal candidiasis  #Dysphagia  ::Moderate to severe candidiasis with multiple large lesions  ::given ANC 0.16, consideration for opportunistic infections present. Entended antifungal course  -likely plan for outpatient PEG given unlikely for ANC to recover while inpatient, in ulz period from chemotherapy 2/28  - filgrastim daily until ANC>1000  - Fluconazole 200mg IV (2/22 - xx), typically for 7 days but continue given neutropenia and candidiasis; plan for 21 day course total; IV 2/22-3/6, plan for 400mg PO 3/7-3/13  - Oral care per nursing     #Pancytopenia, improved   ::ANC 0.16 // Plts 53 on admission with no concern for bleeding // HB 10.9 on presentation (BL 12-13)  - likely d/t myelosuppresion vs though patient has had poor po intake vs mixed  - Retic count 0.6% (fraction 1.8%) indicative of poor marrow response   - filgrastim daily until ANC >1000  - Daily CBC  - T&S updated, transfuse for active bleeding <50, or for <10  - continue lovenox for DVT ppx   - holding clopidogrel (for EGD + peg in future)     #Normocytic Anemia  ::HB 10.9 on presentation (BL 12-13), no active bleeding, no recent iron studies  - Likely d/t chronic dz vs myelosuppresion, though patient has had poor po intake     #Atrial  Fibrillation  ::Patient previously on AC, now s/p watchman procedure and is rate controlled   ::had 5 ablations per patient  - Continue metoprolol tartrate 50mg BID  - HR 90s to 120s     #Urinary Frequency  - UA with reflex      #Hx of TIA  - holding home Plavix 75mg daily     #GERD  - c/w home Pantoprazole     #Mood Disorder  #Misc  - c/w home Cymbalta and Wellbutrin  - c/w home supplements, mag-ox     F : caution, was diuresing. HfpEF  E: replete lytes prn, K>4, Mg >2  N: isosource 1.5 via dobhoff  A: PIVs     DVT prophylaxis: SCDs  GI prophylaxis: home PPI     Code Status: FULL                 Baudilio Turcios MD

## 2024-03-06 NOTE — PROGRESS NOTES
Occupational Therapy    OT Treatment    Patient Name: Amita Todd  MRN: [id number]  Today's Date: 3/6/2024  Time Calculation  Start Time: 1144  Stop Time: 1155  Time Calculation (min): 11 min         Assessment:  OT Assessment: Pt would benefit from moderate intensity OT to address ADLs/cognition, mobility, and endurance.  End of Session Communication: Bedside nurse  End of Session Patient Position: Bed, 3 rail up, Alarm on     Plan:  Treatment Interventions: ADL retraining, Functional transfer training, UE strengthening/ROM, Endurance training, Cognitive reorientation, Patient/family training, Compensatory technique education  OT Frequency: 3 times per week  OT Discharge Recommendations: Moderate intensity level of continued care  OT - OK to Discharge: Yes  Treatment Interventions: ADL retraining, Functional transfer training, UE strengthening/ROM, Endurance training, Cognitive reorientation, Patient/family training, Compensatory technique education    Subjective   Previous Visit Info:  OT Last Visit  OT Received On: 03/06/24  General:  General  Family/Caregiver Present: Yes  Caregiver Feedback:  (sisters at bedside)  Co-Treatment: PT  Co-Treatment Reason:  (to increase pt safety/function)  Prior to Session Communication: Bedside nurse  Patient Position Received: Bed, 3 rail up, Alarm on  Preferred Learning Style: verbal, visual  General Comment:  (dysarthric, requiring min verbal encouragement to participate with therapy)  Precautions:  Medical Precautions: Fall precautions (neutropenic)     Pain:  Pain Assessment  Pain Assessment: 0-10  Pain Score: 0 - No pain    Objective    Cognition:  Cognition  Overall Cognitive Status: Impaired  Orientation Level:  (oriented to person)  Processing Speed: Delayed     Activities of Daily Living: Grooming  Grooming Level of Assistance: Maximum assistance  Grooming Where Assessed: Edge of bed  Grooming Comments:  (face hygiene with bath wipes)       Bed  Mobility/Transfers: Bed Mobility 1  Bed Mobility 1: Supine to sitting, Sitting to supine  Level of Assistance 1: Maximum assistance (x2)  Bed Mobility Comments 1:  (HOB elevated)  Bed Mobility 2  Bed Mobility  2: Scooting  Level of Assistance 2: Dependent (x2)  Bed Mobility Comments 2:  (draw sheet)    Sitting Balance:  Static Sitting Balance  Static Sitting-Level of Assistance: Moderate assistance  Static Sitting-Comment/Number of Minutes: 5 (lateral lean to the (R) noted)      Outcome Measures:[facility] Daily Activity  Putting on and taking off regular lower body clothing: Total  Bathing (including washing, rinsing, drying): A lot  Putting on and taking off regular upper body clothing: A lot  Toileting, which includes using toilet, bedpan or urinal: Total  Taking care of personal grooming such as brushing teeth: A lot  Eating Meals: Total  Daily Activity - Total Score: 9   and Brief Confusion Assessment Method (bCAM)  CAM Result: CAM +    Education Documentation  Precautions, taught by Jossy Penaloza OT at 3/6/2024 12:20 PM.  Learner: Patient  Readiness: Acceptance  Method: Explanation  Response: Needs Reinforcement, No Evidence of Learning    Education Comments  No comments found.           Goals:  Encounter Problems       Encounter Problems (Active)       ADLs       OT-Patient will perform UB and LB bathing seated in chair with independent level of assistance        Start:  02/26/24    Expected End:  03/08/24            OT-Patient with complete upper body dressing with independent level of assistance donning and doffing all UE clothes with no adaptive equipment while seated.       Start:  02/26/24    Expected End:  03/08/24            OT-Patient with complete lower body dressing with independent of assistance donning and doffing all LE clothes  with no adaptive equipment while seated       Start:  02/26/24    Expected End:  03/08/24            OT-Patient will complete daily grooming tasks  with independent level of  assistance and PRN adaptive equipment while seated.       Start:  02/26/24    Expected End:  03/08/24            OT-Patient will complete toileting including hygiene clothing management/hygiene with independent level of assistance        Start:  02/26/24    Expected End:  03/08/24               COGNITION/SAFETY       OT-Patient will use ICU/hospital diary with independent level of assistance  to allow improved recall of hospital events.       Start:  02/26/24    Expected End:  03/08/24            OT- Patient will independently use coping skills to manage stress of hospital stay and feelings of overwhelm.       Start:  02/26/24    Expected End:  03/08/24               EXERCISE/STRENGTHENING       OT-Patient with increase BUE to 4/5 strength.       Start:  02/26/24    Expected End:  03/08/24               General Goals       ind with HEP supine, sitting HEP with supervisoin (Not Progressing)       Start:  02/26/24    Expected End:  03/08/24            supine to/from sit, HOB elevated no rail, modified independent (negative orthostatic hypotension, no dizziness) (Progressing)       Start:  02/26/24    Expected End:  03/08/24               MOBILITY       OT-Patient will perform Functional mobility mod  Household distances with independent level of assistance and least restrictive device in order to improve safety and functional mobility.       Start:  02/26/24    Expected End:  03/08/24               Mobility       sit to/from stand, bed to/from chair with LRAD and supervision, negative for orthostatic hypotension, no LOB (Progressing)       Start:  02/26/24    Expected End:  03/08/24            ambulate 250' with LRAD and supervision, stable vitals, no LOB (Progressing)       Start:  02/26/24    Expected End:  03/08/24            up/down 12 steps with 1 rail with SBA (Not Progressing)       Start:  02/26/24    Expected End:  03/08/24               TRANSFERS       OT-Patient will perform bed mobility independent level  of assistance  in order to improve safety and independence with mobility       Start:  02/26/24    Expected End:  03/08/24            OT-Patient will complete functional transfer to all surfaces with least restrictive device with independent level of assistance.       Start:  02/26/24    Expected End:  03/08/24

## 2024-03-06 NOTE — CARE PLAN
The patient's goals for the shift include      The clinical goals for the shift include pt will remain injury free      Problem: Skin  Goal: Decreased wound size/increased tissue granulation at next dressing change  Outcome: Progressing  Flowsheets (Taken 3/6/2024 0717)  Decreased wound size/increased tissue granulation at next dressing change:   Promote sleep for wound healing   Protective dressings over bony prominences  Goal: Participates in plan/prevention/treatment measures  Outcome: Progressing  Flowsheets (Taken 3/6/2024 0717)  Participates in plan/prevention/treatment measures: Elevate heels  Goal: Prevent/manage excess moisture  Outcome: Progressing  Flowsheets (Taken 3/6/2024 0717)  Prevent/manage excess moisture: Moisturize dry skin  Goal: Prevent/minimize sheer/friction injuries  Outcome: Progressing  Flowsheets (Taken 3/6/2024 0717)  Prevent/minimize sheer/friction injuries: Turn/reposition every 2 hours/use positioning/transfer devices  Goal: Promote/optimize nutrition  Outcome: Progressing  Flowsheets (Taken 3/6/2024 0717)  Promote/optimize nutrition: Monitor/record intake including meals  Goal: Promote skin healing  Outcome: Progressing  Flowsheets (Taken 3/6/2024 0717)  Promote skin healing: Protective dressings over bony prominences     Problem: Pain  Goal: My pain/discomfort is manageable  Outcome: Progressing     Problem: Safety  Goal: Patient will be injury free during hospitalization  Outcome: Progressing  Goal: I will remain free of falls  Outcome: Progressing     Problem: Daily Care  Goal: Daily care needs are met  Outcome: Progressing     Problem: Psychosocial Needs  Goal: Demonstrates ability to cope with hospitalization/illness  Outcome: Progressing  Goal: Collaborate with me, my family, and caregiver to identify my specific goals  Outcome: Progressing  Flowsheets (Taken 3/6/2024 0700)  Cultural Requests During Hospitalization: n/a  Spiritual Requests During Hospitalization: n/a      Problem: Discharge Barriers  Goal: My discharge needs are met  Outcome: Progressing     Problem: Pain  Goal: Takes deep breaths with improved pain control throughout the shift  Outcome: Progressing  Goal: Turns in bed with improved pain control throughout the shift  Outcome: Progressing  Goal: Walks with improved pain control throughout the shift  Outcome: Progressing  Goal: Performs ADL's with improved pain control throughout shift  Outcome: Progressing  Goal: Participates in PT with improved pain control throughout the shift  Outcome: Progressing  Goal: Free from opioid side effects throughout the shift  Outcome: Progressing  Goal: Free from acute confusion related to pain meds throughout the shift  Outcome: Progressing     Problem: Pain - Adult  Goal: Verbalizes/displays adequate comfort level or baseline comfort level  Outcome: Progressing     Problem: Safety - Adult  Goal: Free from fall injury  Outcome: Progressing     Problem: Discharge Planning  Goal: Discharge to home or other facility with appropriate resources  Outcome: Progressing     Problem: Chronic Conditions and Co-morbidities  Goal: Patient's chronic conditions and co-morbidity symptoms are monitored and maintained or improved  Outcome: Progressing     Problem: Fall/Injury  Goal: Not fall by end of shift  Outcome: Progressing  Goal: Be free from injury by end of the shift  Outcome: Progressing  Goal: Verbalize understanding of personal risk factors for fall in the hospital  Outcome: Progressing  Goal: Verbalize understanding of risk factor reduction measures to prevent injury from fall in the home  Outcome: Progressing  Goal: Use assistive devices by end of the shift  Outcome: Progressing  Goal: Pace activities to prevent fatigue by end of the shift  Outcome: Progressing

## 2024-03-06 NOTE — PROGRESS NOTES
03/06/24 1400   Discharge Planning   Home or Post Acute Services Post acute facilities (Rehab/SNF/etc)   Patient expects to be discharged to: SNF  (SNF)   Does the patient need discharge transport arranged? Yes     SW met with the patient and her sister at the bedside to discuss planning for the patient;s care at discharge. Patient lives with her  in a single home and had been managing her care at home. Initially the patient was going to return home with home care but is now weaker and will benefit from SNF. SW requested PT/ OT see the patient again and they are recommending SNF.   The patient was provided with SNF list and the pt. and her [facility] the [facility]s  receives his out pt. Dialysis at [facility] and they are familiar with the facility. Pt. And sister understand we may need additional choices if [facility] does not have beds. The patient is currently receiving radiation at the [facility] and her last treatment is 3/13/24. SW shared that the patient may be responsible for the cost of transport to the treatments.  [facility] and SW shared they are [facility] and that patient is ready for discharge. Will continue to follow for support and planning for pt.'s care at discharge.   SABRINA Chapa  3/6/24@14:20    3/8/24@15;35pm   SW attempted to meet with the patient but she was sleeping. Patient is currently not ready for discharge and the earliest would be at the end of next week. Patient is very deconditioned and will need rehab but the patient's sister shared they no longer want [facility]. Patient 's sister shared the  and daughter have the list and will review this week-end. DAVID shared that the team will follow up with patient and family next week.  Teresita Matias.SABRINA

## 2024-03-06 NOTE — PROGRESS NOTES
Physical Therapy    Physical Therapy Treatment    Patient Name: Amita Todd  MRN: [id number]  Today's Date: 3/6/2024  Time Calculation  Start Time: 1144  Stop Time: 1155  Time Calculation (min): 11 min       Assessment/Plan   PT Assessment  PT Assessment Results: Decreased strength, Decreased endurance, Impaired balance, Decreased mobility, Pain, Decreased cognition, Impaired judgement, Decreased safety awareness  End of Session Communication: Bedside nurse  Assessment Comment: Pt currently demonstrating decline in function since initial evaluation and will currently require continued PT in house and after discharge at MOD intensity 5 days/wk.  End of Session Patient Position: Bed, 3 rail up, Alarm on (Family present)  PT Plan  Inpatient/Swing Bed or Outpatient: Inpatient  PT Plan  Treatment/Interventions: Bed mobility, Transfer training, Gait training, Stair training, Balance training, Neuromuscular re-education, Strengthening, Endurance training, Therapeutic exercise, Therapeutic activity, Home exercise program, Postural re-education, Positioning  PT Plan: Skilled PT  PT Frequency: 4 times per week  PT Discharge Recommendations: Moderate intensity level of continued care  Equipment Recommended upon Discharge:  (family to buy commode and move bed to first floor, buying bed rails)  PT Recommended Transfer Status: Assist x1, Assistive device (min/mod A with WW)  PT - OK to Discharge: Yes      General Visit Information:   PT  Visit  PT Received On: 03/06/24  General  Family/Caregiver Present: Yes (Pt's two sisters present during visit and offering encouragement to patient.)  Co-Treatment: OT  Co-Treatment Reason: To maximize therapies and patient w ampac score of <10.  Prior to Session Communication: Bedside nurse  Patient Position Received: Bed, 3 rail up, Alarm on  Preferred Learning Style: verbal  General Comment: Pt resting in bed upon entry. Sister's present who commented patient was trying to get rest,  though also agreeable to work with therapies and attempt mobility.    Subjective   Precautions:  Precautions  Medical Precautions: Fall precautions    Objective   Pain:  Pain Assessment  Pain Assessment: 0-10  Pain Score: 0 - No pain  Cognition:  Cognition  Overall Cognitive Status:  (Pt is awake and alert, receptive to mobility though difficult to understand due to dysarthria.)  Arousal/Alertness: Appropriate responses to stimuli  Postural Control:  Postural Control  Postural Control: Impaired  Static Sitting Balance  Static Sitting-Balance Support: Bilateral upper extremity supported, Feet supported  Static Sitting-Level of Assistance: Moderate assistance  Static Sitting-Comment/Number of Minutes: Approximately 5 minutes total prior to return back to supine due to progression of fatigue.  Static Standing Balance  Static Standing-Comment/Number of Minutes: Pt not able to tolerate attempts to stand on this date though anticipate would require max assist.    Activity Tolerance:   Decreased activity tolerance    Treatments:    Therapeutic Activity  Therapeutic Activity Performed: Yes    Bed Mobility  Bed Mobility: Yes  Bed Mobility 1  Bed Mobility 1: Supine to sitting, Sitting to supine  Level of Assistance 1: Maximum assistance  Bed Mobility Comments 1: x2    Ambulation/Gait Training  Ambulation/Gait Training Performed: No  Transfers  Transfer: No (Pt unable to tolerate attempts to stand though anticipate would require max assist.)    Outcome Measures:  [facility] Basic Mobility  Turning from your back to your side while in a flat bed without using bedrails: A lot  Moving from lying on your back to sitting on the side of a flat bed without using bedrails: A lot  Moving to and from bed to chair (including a wheelchair): Total  Standing up from a chair using your arms (e.g. wheelchair or bedside chair): Total  To walk in hospital room: Total  Climbing 3-5 steps with railing: Total  Basic Mobility - Total Score:  8    Education Documentation  Mobility Training, taught by Dario Santizo, PT at 3/6/2024 12:22 PM.  Learner: Patient  Readiness: Acceptance  Method: Explanation  Response: Verbalizes Understanding    Education Comments  No comments found.      Encounter Problems       Encounter Problems (Active)       General Goals       ind with HEP supine, sitting HEP with supervisoin (Not Progressing)       Start:  02/26/24    Expected End:  03/08/24            supine to/from sit, HOB elevated no rail, modified independent (negative orthostatic hypotension, no dizziness) (Progressing)       Start:  02/26/24    Expected End:  03/08/24               Mobility       sit to/from stand, bed to/from chair with LRAD and supervision, negative for orthostatic hypotension, no LOB (Progressing)       Start:  02/26/24    Expected End:  03/08/24            ambulate 250' with LRAD and supervision, stable vitals, no LOB (Progressing)       Start:  02/26/24    Expected End:  03/08/24            up/down 12 steps with 1 rail with SBA (Not Progressing)       Start:  02/26/24    Expected End:  03/08/24               Pain - Adult

## 2024-03-07 ENCOUNTER — APPOINTMENT (OUTPATIENT)
Dept: RADIATION ONCOLOGY | Facility: HOSPITAL | Age: 79
End: 2024-03-07
Payer: MEDICARE

## 2024-03-07 ENCOUNTER — APPOINTMENT (OUTPATIENT)
Dept: RADIOLOGY | Facility: HOSPITAL | Age: 79
DRG: 314 | End: 2024-03-07
Payer: MEDICARE

## 2024-03-07 LAB
ALBUMIN SERPL BCP-MCNC: 2.2 G/DL (ref 3.4–5)
ANION GAP BLDA CALCULATED.4IONS-SCNC: 6 MMO/L (ref 10–25)
ANION GAP BLDA CALCULATED.4IONS-SCNC: 9 MMO/L (ref 10–25)
ANION GAP SERPL CALC-SCNC: 11 MMOL/L (ref 10–20)
ATRIAL RATE: 136 BPM
BASE EXCESS BLDA CALC-SCNC: 0.1 MMOL/L (ref -2–3)
BASE EXCESS BLDA CALC-SCNC: 1.2 MMOL/L (ref -2–3)
BASOPHILS # BLD MANUAL: 0 X10*3/UL (ref 0–0.1)
BASOPHILS NFR BLD MANUAL: 0 %
BODY TEMPERATURE: 37 DEGREES CELSIUS
BODY TEMPERATURE: 37 DEGREES CELSIUS
BUN SERPL-MCNC: 24 MG/DL (ref 6–23)
BURR CELLS BLD QL SMEAR: ABNORMAL
CA-I BLDA-SCNC: 1.2 MMOL/L (ref 1.1–1.33)
CA-I BLDA-SCNC: 1.22 MMOL/L (ref 1.1–1.33)
CALCIUM SERPL-MCNC: 7.7 MG/DL (ref 8.6–10.6)
CHLORIDE BLDA-SCNC: 105 MMOL/L (ref 98–107)
CHLORIDE BLDA-SCNC: 107 MMOL/L (ref 98–107)
CHLORIDE SERPL-SCNC: 106 MMOL/L (ref 98–107)
CO2 SERPL-SCNC: 25 MMOL/L (ref 21–32)
CREAT SERPL-MCNC: 0.56 MG/DL (ref 0.5–1.05)
DACRYOCYTES BLD QL SMEAR: ABNORMAL
EGFRCR SERPLBLD CKD-EPI 2021: >90 ML/MIN/1.73M*2
EOSINOPHIL # BLD MANUAL: 0.01 X10*3/UL (ref 0–0.4)
EOSINOPHIL NFR BLD MANUAL: 0.9 %
ERYTHROCYTE [DISTWIDTH] IN BLOOD BY AUTOMATED COUNT: 18.3 % (ref 11.5–14.5)
GLUCOSE BLD MANUAL STRIP-MCNC: 102 MG/DL (ref 74–99)
GLUCOSE BLD MANUAL STRIP-MCNC: 117 MG/DL (ref 74–99)
GLUCOSE BLD MANUAL STRIP-MCNC: 129 MG/DL (ref 74–99)
GLUCOSE BLD MANUAL STRIP-MCNC: 92 MG/DL (ref 74–99)
GLUCOSE BLDA-MCNC: 128 MG/DL (ref 74–99)
GLUCOSE BLDA-MCNC: 134 MG/DL (ref 74–99)
GLUCOSE SERPL-MCNC: 115 MG/DL (ref 74–99)
HCO3 BLDA-SCNC: 23.5 MMOL/L (ref 22–26)
HCO3 BLDA-SCNC: 24.6 MMOL/L (ref 22–26)
HCT VFR BLD AUTO: 26.7 % (ref 36–46)
HCT VFR BLD EST: 26 % (ref 36–46)
HCT VFR BLD EST: 33 % (ref 36–46)
HGB BLD-MCNC: 8.3 G/DL (ref 12–16)
HGB BLDA-MCNC: 11 G/DL (ref 12–16)
HGB BLDA-MCNC: 8.8 G/DL (ref 12–16)
IMM GRANULOCYTES # BLD AUTO: 0.04 X10*3/UL (ref 0–0.5)
IMM GRANULOCYTES NFR BLD AUTO: 4 % (ref 0–0.9)
INHALED O2 CONCENTRATION: 0 %
INHALED O2 CONCENTRATION: 24 %
LACTATE BLDA-SCNC: 1.4 MMOL/L (ref 0.4–2)
LACTATE BLDA-SCNC: 1.4 MMOL/L (ref 0.4–2)
LACTATE SERPL-SCNC: 1.5 MMOL/L (ref 0.4–2)
LEGIONELLA AG UR QL: NEGATIVE
LYMPHOCYTES # BLD MANUAL: 0.2 X10*3/UL (ref 0.8–3)
LYMPHOCYTES NFR BLD MANUAL: 19.8 %
MAGNESIUM SERPL-MCNC: 2.23 MG/DL (ref 1.6–2.4)
MCH RBC QN AUTO: 31.3 PG (ref 26–34)
MCHC RBC AUTO-ENTMCNC: 31.1 G/DL (ref 32–36)
MCV RBC AUTO: 101 FL (ref 80–100)
METAMYELOCYTES # BLD MANUAL: 0.1 X10*3/UL
METAMYELOCYTES NFR BLD MANUAL: 9.9 %
MONOCYTES # BLD MANUAL: 0.05 X10*3/UL (ref 0.05–0.8)
MONOCYTES NFR BLD MANUAL: 5.4 %
MYELOCYTES # BLD MANUAL: 0.01 X10*3/UL
MYELOCYTES NFR BLD MANUAL: 0.9 %
NEUTROPHILS # BLD MANUAL: 0.62 X10*3/UL (ref 1.6–5.5)
NEUTS BAND # BLD MANUAL: 0.08 X10*3/UL (ref 0–0.5)
NEUTS BAND NFR BLD MANUAL: 8.1 %
NEUTS SEG # BLD MANUAL: 0.54 X10*3/UL (ref 1.6–5)
NEUTS SEG NFR BLD MANUAL: 54.1 %
NRBC BLD-RTO: 69.7 /100 WBCS (ref 0–0)
OVALOCYTES BLD QL SMEAR: ABNORMAL
OXYHGB MFR BLDA: 93.8 % (ref 94–98)
OXYHGB MFR BLDA: 96.1 % (ref 94–98)
PCO2 BLDA: 33 MM HG (ref 38–42)
PCO2 BLDA: 33 MM HG (ref 38–42)
PH BLDA: 7.46 PH (ref 7.38–7.42)
PH BLDA: 7.48 PH (ref 7.38–7.42)
PHOSPHATE SERPL-MCNC: 2.4 MG/DL (ref 2.5–4.9)
PLASMA CELLS # BLD MANUAL: 0.01 X10*3/UL
PLASMA CELLS NFR BLD MANUAL: 0.9 %
PLATELET # BLD AUTO: 110 X10*3/UL (ref 150–450)
PO2 BLDA: 69 MM HG (ref 85–95)
PO2 BLDA: 85 MM HG (ref 85–95)
POLYCHROMASIA BLD QL SMEAR: ABNORMAL
POTASSIUM BLDA-SCNC: 5.2 MMOL/L (ref 3.5–5.3)
POTASSIUM BLDA-SCNC: 5.3 MMOL/L (ref 3.5–5.3)
POTASSIUM SERPL-SCNC: 5.3 MMOL/L (ref 3.5–5.3)
PROCALCITONIN SERPL-MCNC: 0.11 NG/ML
Q ONSET: 219 MS
QRS COUNT: 14 BEATS
QRS DURATION: 88 MS
QT INTERVAL: 368 MS
QTC CALCULATION(BAZETT): 447 MS
QTC FREDERICIA: 419 MS
R AXIS: 18 DEGREES
RBC # BLD AUTO: 2.65 X10*6/UL (ref 4–5.2)
RBC MORPH BLD: ABNORMAL
S PNEUM AG UR QL: POSITIVE
SAO2 % BLDA: 96 % (ref 94–100)
SAO2 % BLDA: 99 % (ref 94–100)
SODIUM BLDA-SCNC: 132 MMOL/L (ref 136–145)
SODIUM BLDA-SCNC: 132 MMOL/L (ref 136–145)
SODIUM SERPL-SCNC: 137 MMOL/L (ref 136–145)
T AXIS: 81 DEGREES
T OFFSET: 403 MS
TOTAL CELLS COUNTED BLD: 111
VENTRICULAR RATE: 89 BPM
WBC # BLD AUTO: 1 X10*3/UL (ref 4.4–11.3)

## 2024-03-07 PROCEDURE — 2500000002 HC RX 250 W HCPCS SELF ADMINISTERED DRUGS (ALT 637 FOR MEDICARE OP, ALT 636 FOR OP/ED)

## 2024-03-07 PROCEDURE — 36415 COLL VENOUS BLD VENIPUNCTURE: CPT

## 2024-03-07 PROCEDURE — 84145 PROCALCITONIN (PCT): CPT

## 2024-03-07 PROCEDURE — 71045 X-RAY EXAM CHEST 1 VIEW: CPT

## 2024-03-07 PROCEDURE — 2500000004 HC RX 250 GENERAL PHARMACY W/ HCPCS (ALT 636 FOR OP/ED)

## 2024-03-07 PROCEDURE — 94640 AIRWAY INHALATION TREATMENT: CPT

## 2024-03-07 PROCEDURE — 84132 ASSAY OF SERUM POTASSIUM: CPT

## 2024-03-07 PROCEDURE — 80069 RENAL FUNCTION PANEL: CPT

## 2024-03-07 PROCEDURE — 83605 ASSAY OF LACTIC ACID: CPT

## 2024-03-07 PROCEDURE — 87040 BLOOD CULTURE FOR BACTERIA: CPT

## 2024-03-07 PROCEDURE — 82947 ASSAY GLUCOSE BLOOD QUANT: CPT

## 2024-03-07 PROCEDURE — 85007 BL SMEAR W/DIFF WBC COUNT: CPT

## 2024-03-07 PROCEDURE — 71045 X-RAY EXAM CHEST 1 VIEW: CPT | Performed by: RADIOLOGY

## 2024-03-07 PROCEDURE — 2500000001 HC RX 250 WO HCPCS SELF ADMINISTERED DRUGS (ALT 637 FOR MEDICARE OP)

## 2024-03-07 PROCEDURE — 87449 NOS EACH ORGANISM AG IA: CPT

## 2024-03-07 PROCEDURE — 87899 AGENT NOS ASSAY W/OPTIC: CPT

## 2024-03-07 PROCEDURE — 1170000001 HC PRIVATE ONCOLOGY ROOM DAILY

## 2024-03-07 PROCEDURE — 82435 ASSAY OF BLOOD CHLORIDE: CPT

## 2024-03-07 PROCEDURE — 99233 SBSQ HOSP IP/OBS HIGH 50: CPT

## 2024-03-07 PROCEDURE — 83735 ASSAY OF MAGNESIUM: CPT

## 2024-03-07 PROCEDURE — 36600 WITHDRAWAL OF ARTERIAL BLOOD: CPT

## 2024-03-07 PROCEDURE — 87081 CULTURE SCREEN ONLY: CPT

## 2024-03-07 PROCEDURE — 85027 COMPLETE CBC AUTOMATED: CPT

## 2024-03-07 PROCEDURE — 2500000004 HC RX 250 GENERAL PHARMACY W/ HCPCS (ALT 636 FOR OP/ED): Mod: JZ | Performed by: HOSPITALIST

## 2024-03-07 RX ORDER — OLANZAPINE 5 MG/1
2.5 TABLET ORAL ONCE
Status: COMPLETED | OUTPATIENT
Start: 2024-03-07 | End: 2024-03-07

## 2024-03-07 RX ORDER — OLANZAPINE 5 MG/1
2.5 TABLET ORAL EVERY 8 HOURS PRN
Status: DISCONTINUED | OUTPATIENT
Start: 2024-03-07 | End: 2024-03-14

## 2024-03-07 RX ORDER — VANCOMYCIN HYDROCHLORIDE 1 G/200ML
1000 INJECTION, SOLUTION INTRAVENOUS EVERY 12 HOURS
Status: DISCONTINUED | OUTPATIENT
Start: 2024-03-07 | End: 2024-03-08

## 2024-03-07 RX ORDER — VANCOMYCIN HYDROCHLORIDE 1 G/20ML
INJECTION, POWDER, LYOPHILIZED, FOR SOLUTION INTRAVENOUS DAILY PRN
Status: DISCONTINUED | OUTPATIENT
Start: 2024-03-07 | End: 2024-03-08

## 2024-03-07 RX ADMIN — BUPROPION HYDROCHLORIDE 150 MG: 100 TABLET, FILM COATED ORAL at 10:43

## 2024-03-07 RX ADMIN — ACETAMINOPHEN 1000 MG: 650 SOLUTION ORAL at 04:25

## 2024-03-07 RX ADMIN — ENOXAPARIN SODIUM 40 MG: 100 INJECTION SUBCUTANEOUS at 10:44

## 2024-03-07 RX ADMIN — METOPROLOL TARTRATE 50 MG: 50 TABLET, FILM COATED ORAL at 10:43

## 2024-03-07 RX ADMIN — ESOMEPRAZOLE MAGNESIUM 40 MG: 40 FOR SUSPENSION ORAL at 10:43

## 2024-03-07 RX ADMIN — VENLAFAXINE 75 MG: 75 TABLET ORAL at 10:44

## 2024-03-07 RX ADMIN — GUAIFENESIN 200 MG: 200 SOLUTION ORAL at 10:42

## 2024-03-07 RX ADMIN — DAPAGLIFLOZIN 10 MG: 10 TABLET, FILM COATED ORAL at 10:43

## 2024-03-07 RX ADMIN — FILGRASTIM-SNDZ 300 MCG: 300 INJECTION, SOLUTION INTRAVENOUS; SUBCUTANEOUS at 10:49

## 2024-03-07 RX ADMIN — ACETAMINOPHEN 1000 MG: 650 SOLUTION ORAL at 17:27

## 2024-03-07 RX ADMIN — MULTIVIT AND MINERALS-FERROUS GLUCONATE 9 MG IRON/15 ML ORAL LIQUID 15 ML: at 10:44

## 2024-03-07 RX ADMIN — OXYCODONE HYDROCHLORIDE AND ACETAMINOPHEN 500 MG: 500 TABLET ORAL at 10:43

## 2024-03-07 RX ADMIN — ALBUTEROL SULFATE 3 ML: 2.5 SOLUTION RESPIRATORY (INHALATION) at 10:10

## 2024-03-07 RX ADMIN — ATORVASTATIN CALCIUM 20 MG: 20 TABLET, FILM COATED ORAL at 10:43

## 2024-03-07 RX ADMIN — Medication 1000 UNITS: at 21:19

## 2024-03-07 RX ADMIN — FLUCONAZOLE 400 MG: 200 TABLET ORAL at 10:43

## 2024-03-07 RX ADMIN — BUPROPION HYDROCHLORIDE 150 MG: 100 TABLET, FILM COATED ORAL at 21:19

## 2024-03-07 RX ADMIN — Medication 10 MG: at 17:27

## 2024-03-07 RX ADMIN — OLANZAPINE 2.5 MG: 5 TABLET, FILM COATED ORAL at 23:10

## 2024-03-07 RX ADMIN — SPIRONOLACTONE 12.5 MG: 25 TABLET, FILM COATED ORAL at 10:57

## 2024-03-07 RX ADMIN — VENLAFAXINE 75 MG: 75 TABLET ORAL at 17:27

## 2024-03-07 RX ADMIN — Medication 1000 UNITS: at 10:43

## 2024-03-07 RX ADMIN — PIPERACILLIN SODIUM AND TAZOBACTAM SODIUM 4.5 G: 4; .5 INJECTION, SOLUTION INTRAVENOUS at 09:37

## 2024-03-07 RX ADMIN — LOSARTAN POTASSIUM 25 MG: 25 TABLET, FILM COATED ORAL at 10:43

## 2024-03-07 RX ADMIN — QUETIAPINE FUMARATE 12.5 MG: 25 TABLET ORAL at 21:19

## 2024-03-07 RX ADMIN — VANCOMYCIN HYDROCHLORIDE 1000 MG: 1 INJECTION, SOLUTION INTRAVENOUS at 10:42

## 2024-03-07 RX ADMIN — LOPERAMIDE HYDROCHLORIDE 2 MG: 2 CAPSULE ORAL at 10:43

## 2024-03-07 RX ADMIN — PIPERACILLIN SODIUM AND TAZOBACTAM SODIUM 4.5 G: 4; .5 INJECTION, SOLUTION INTRAVENOUS at 16:17

## 2024-03-07 RX ADMIN — METOPROLOL TARTRATE 50 MG: 50 TABLET, FILM COATED ORAL at 21:19

## 2024-03-07 RX ADMIN — OLANZAPINE 2.5 MG: 5 TABLET, FILM COATED ORAL at 13:55

## 2024-03-07 RX ADMIN — VANCOMYCIN HYDROCHLORIDE 1000 MG: 1 INJECTION, SOLUTION INTRAVENOUS at 22:54

## 2024-03-07 RX ADMIN — PIPERACILLIN SODIUM AND TAZOBACTAM SODIUM 4.5 G: 4; .5 INJECTION, SOLUTION INTRAVENOUS at 21:19

## 2024-03-07 RX ADMIN — ACETAMINOPHEN 1000 MG: 650 SOLUTION ORAL at 22:55

## 2024-03-07 ASSESSMENT — PAIN SCALES - GENERAL
PAINLEVEL_OUTOF10: 0 - NO PAIN
PAINLEVEL_OUTOF10: 0 - NO PAIN

## 2024-03-07 ASSESSMENT — COGNITIVE AND FUNCTIONAL STATUS - GENERAL
PERSONAL GROOMING: A LOT
MOBILITY SCORE: 8
DAILY ACTIVITIY SCORE: 9
TURNING FROM BACK TO SIDE WHILE IN FLAT BAD: A LOT
TOILETING: A LOT
TURNING FROM BACK TO SIDE WHILE IN FLAT BAD: A LOT
DAILY ACTIVITIY SCORE: 12
DRESSING REGULAR LOWER BODY CLOTHING: TOTAL
PERSONAL GROOMING: A LOT
HELP NEEDED FOR BATHING: TOTAL
MOVING FROM LYING ON BACK TO SITTING ON SIDE OF FLAT BED WITH BEDRAILS: A LOT
DRESSING REGULAR UPPER BODY CLOTHING: A LOT
EATING MEALS: A LOT
STANDING UP FROM CHAIR USING ARMS: TOTAL
CLIMB 3 TO 5 STEPS WITH RAILING: TOTAL
WALKING IN HOSPITAL ROOM: TOTAL
STANDING UP FROM CHAIR USING ARMS: TOTAL
MOVING TO AND FROM BED TO CHAIR: TOTAL
MOVING FROM LYING ON BACK TO SITTING ON SIDE OF FLAT BED WITH BEDRAILS: A LOT
CLIMB 3 TO 5 STEPS WITH RAILING: TOTAL
MOBILITY SCORE: 8
HELP NEEDED FOR BATHING: A LOT
DRESSING REGULAR UPPER BODY CLOTHING: A LOT
DRESSING REGULAR LOWER BODY CLOTHING: A LOT
MOVING TO AND FROM BED TO CHAIR: TOTAL
TOILETING: TOTAL
WALKING IN HOSPITAL ROOM: TOTAL
EATING MEALS: A LOT

## 2024-03-07 NOTE — PROGRESS NOTES
Spiritual Care Visit      had a spiritual support visit with the patient and patient's brother. Together we spoke about their family, how the patient is feeling today, and her own fe support and belief. Patient was grateful for the visit and welcomed ongoing spiritual support.  will continue to follow and provide support. Please reach out with any needs/concerns.     Rev. Maik Pham, Supportive Oncology

## 2024-03-07 NOTE — NURSING NOTE
Pt de-satting into the high 70's (76 SPO2). Pt now on 5 Liters nasal cannula satting at 95%. Team notified.

## 2024-03-07 NOTE — CARE PLAN
The patient's goals for the shift include      The clinical goals for the shift include pt will remain injury free      Problem: Skin  Goal: Decreased wound size/increased tissue granulation at next dressing change  3/7/2024 0725 by Karissa Mata RN  Outcome: Progressing  3/7/2024 0724 by Karissa Mata RN  Flowsheets (Taken 3/7/2024 0724)  Decreased wound size/increased tissue granulation at next dressing change: Protective dressings over bony prominences  Goal: Participates in plan/prevention/treatment measures  3/7/2024 0725 by Karissa Mata RN  Outcome: Progressing  3/7/2024 0724 by Karissa Mata RN  Flowsheets (Taken 3/7/2024 0724)  Participates in plan/prevention/treatment measures: Elevate heels  Goal: Prevent/manage excess moisture  3/7/2024 0725 by Karissa Mata RN  Outcome: Progressing  3/7/2024 0724 by Karissa Mata RN  Flowsheets (Taken 3/7/2024 0724)  Prevent/manage excess moisture: Moisturize dry skin  Goal: Prevent/minimize sheer/friction injuries  3/7/2024 0725 by Karissa Mata RN  Outcome: Progressing  3/7/2024 0724 by Karissa Mata RN  Flowsheets (Taken 3/7/2024 0724)  Prevent/minimize sheer/friction injuries: Turn/reposition every 2 hours/use positioning/transfer devices  Goal: Promote/optimize nutrition  3/7/2024 0725 by Karissa Mata RN  Outcome: Progressing  3/7/2024 0724 by Karissa Mata RN  Flowsheets (Taken 3/7/2024 0724)  Promote/optimize nutrition: Monitor/record intake including meals  Goal: Promote skin healing  3/7/2024 0725 by Karissa Mata RN  Outcome: Progressing  3/7/2024 0724 by Karissa Mata RN  Flowsheets (Taken 3/7/2024 0724)  Promote skin healing: Assess skin/pad under line(s)/device(s)     Problem: Pain  Goal: My pain/discomfort is manageable  Outcome: Progressing     Problem: Safety  Goal: Patient will be injury free during hospitalization  Outcome: Progressing  Goal: I will remain free of falls  Outcome: Progressing     Problem: Daily Care  Goal: Daily care needs  are met  Outcome: Progressing     Problem: Psychosocial Needs  Goal: Demonstrates ability to cope with hospitalization/illness  Outcome: Progressing  Goal: Collaborate with me, my family, and caregiver to identify my specific goals  Outcome: Progressing     Problem: Discharge Barriers  Goal: My discharge needs are met  Outcome: Progressing     Problem: Pain  Goal: Takes deep breaths with improved pain control throughout the shift  Outcome: Progressing  Goal: Turns in bed with improved pain control throughout the shift  Outcome: Progressing  Goal: Walks with improved pain control throughout the shift  Outcome: Progressing  Goal: Performs ADL's with improved pain control throughout shift  Outcome: Progressing  Goal: Participates in PT with improved pain control throughout the shift  Outcome: Progressing  Goal: Free from opioid side effects throughout the shift  Outcome: Progressing  Goal: Free from acute confusion related to pain meds throughout the shift  Outcome: Progressing     Problem: Pain - Adult  Goal: Verbalizes/displays adequate comfort level or baseline comfort level  Outcome: Progressing     Problem: Safety - Adult  Goal: Free from fall injury  Outcome: Progressing     Problem: Discharge Planning  Goal: Discharge to home or other facility with appropriate resources  Outcome: Progressing     Problem: Chronic Conditions and Co-morbidities  Goal: Patient's chronic conditions and co-morbidity symptoms are monitored and maintained or improved  Outcome: Progressing     Problem: Fall/Injury  Goal: Not fall by end of shift  Outcome: Progressing  Goal: Be free from injury by end of the shift  Outcome: Progressing  Goal: Verbalize understanding of personal risk factors for fall in the hospital  Outcome: Progressing  Goal: Verbalize understanding of risk factor reduction measures to prevent injury from fall in the home  Outcome: Progressing  Goal: Use assistive devices by end of the shift  Outcome: Progressing  Goal:  Pace activities to prevent fatigue by end of the shift  Outcome: Progressing

## 2024-03-07 NOTE — PROGRESS NOTES
"Amita Todd is a 78 y.o. female on day 14 of admission presenting with Thrombocytopenia (CMS/HCC).    Subjective   Patient w/ hypoxia overnight, but poor waveform per night team. ABG w/ pO2 69 at that time. Feeling better this AM, swallowing is improved. Denies chest pain, palpitations, fever, chills.        Objective   Constitutional:       Comments: underweight   HENT:      Mouth/Throat:      Mouth: Mucous membranes are dry. improved well circumscribed plaques.  +DH with TF  Eyes:      Pupils: Pupils are equal, round, and reactive to light.   Cardiovascular:      Rate and Rhythm: Irregular rhythm. Tachycardia present.      Pulses: Normal pulses.   Pulmonary:      Comments: mild crackles B/L, RUL>AGATHA. On RA  Abdominal:      General: Abdomen is flat. Bowel sounds are normal.      Palpations: Abdomen is soft.   Musculoskeletal:         General: Normal range of motion.      Cervical back: Normal range of motion.      Comments: 1+ pitting edema B/L LE till ankles   Skin:     General: Skin is warm.      Capillary Refill: Capillary refill takes less than 2 seconds.      Findings: Bruising present.      Comments: Petechia over trunk   Neurological:      General: No focal deficit present.      Mental Status: She is orietable, but delirious   Psychiatric:         Mood and Affect: Mood normal.       Last Recorded Vitals  Blood pressure 142/72, pulse 88, temperature 36.8 °C (98.2 °F), temperature source Temporal, resp. rate 22, height 1.635 m (5' 4.37\"), weight 68 kg (149 lb 14.6 oz), SpO2 96 %.  Intake/Output last 3 Shifts:  I/O last 3 completed shifts:  In: 2107 (31 mL/kg) [NG/GT:2057; IV Piggyback:50]  Out: 1975 (29 mL/kg) [Urine:1975 (0.8 mL/kg/hr)]  Weight: 68 kg     Relevant Results  Scheduled medications  acetaminophen, 1,000 mg, nasogastric tube, q6h  albuterol, 3 mL, nebulization, TID  ascorbic acid, 500 mg, nasogastric tube, Daily  atorvastatin, 20 mg, nasogastric tube, Daily  buPROPion, 150 mg, nasogastric " tube, BID  cholecalciferol, 1,000 Units, nasogastric tube, BID  dapagliflozin propanediol, 10 mg, oral, Daily  enoxaparin, 40 mg, subcutaneous, q24h  esomeprazole, 40 mg, nasoduodenal tube, Daily before breakfast  filgrastim or biosimilar, 5 mcg/kg, subcutaneous, q24h  fluconazole, 400 mg, oral, Daily  lidocaine, 1 Application, Topical, Once  losartan, 25 mg, nasogastric tube, Daily  [Held by provider] magnesium oxide, 400 mg, nasogastric tube, Daily  melatonin, 10 mg, oral, Nightly  metoprolol tartrate, 50 mg, nasogastric tube, BID  multivitamin with iron-minerals, 15 mL, nasogastric tube, Daily  perflutren lipid microspheres, 0.5-10 mL of dilution, intravenous, Once in imaging  perflutren protein A microsphere, 0.5 mL, intravenous, Once in imaging  piperacillin-tazobactam, 4.5 g, intravenous, q6h  QUEtiapine, 12.5 mg, oral, Nightly  senna, 10 mL, nasogastric tube, BID  spironolactone, 12.5 mg, nasogastric tube, Daily  sulfur hexafluoride microsphr, 2 mL, intravenous, Once in imaging  vancomycin, 1,000 mg, intravenous, q12h  venlafaxine, 75 mg, nasogastric tube, BID with meals      Continuous medications     PRN medications  PRN medications: alteplase, alteplase, dextrose, diphenhydrAMINE, EPINEPHrine HCl, famotidine, guaiFENesin, [Held by provider] HYDROmorphone, [Held by provider] HYDROmorphone, lidocaine-diphenhydraMINE-Maalox 1:1:1, loperamide, methylPREDNISolone sodium succinate (PF), ondansetron, phenoL, prochlorperazine, prochlorperazine, prochlorperazine, sodium chloride, vancomycin  Results for orders placed or performed during the hospital encounter of 02/22/24 (from the past 24 hour(s))   POCT GLUCOSE   Result Value Ref Range    POCT Glucose 122 (H) 74 - 99 mg/dL   POCT GLUCOSE   Result Value Ref Range    POCT Glucose 129 (H) 74 - 99 mg/dL   POCT GLUCOSE   Result Value Ref Range    POCT Glucose 117 (H) 74 - 99 mg/dL   Magnesium   Result Value Ref Range    Magnesium 2.23 1.60 - 2.40 mg/dL   Renal Function  Panel   Result Value Ref Range    Glucose 115 (H) 74 - 99 mg/dL    Sodium 137 136 - 145 mmol/L    Potassium 5.3 3.5 - 5.3 mmol/L    Chloride 106 98 - 107 mmol/L    Bicarbonate 25 21 - 32 mmol/L    Anion Gap 11 10 - 20 mmol/L    Urea Nitrogen 24 (H) 6 - 23 mg/dL    Creatinine 0.56 0.50 - 1.05 mg/dL    eGFR >90 >60 mL/min/1.73m*2    Calcium 7.7 (L) 8.6 - 10.6 mg/dL    Phosphorus 2.4 (L) 2.5 - 4.9 mg/dL    Albumin 2.2 (L) 3.4 - 5.0 g/dL   CBC and Auto Differential   Result Value Ref Range    WBC 1.0 (LL) 4.4 - 11.3 x10*3/uL    nRBC 69.7 (H) 0.0 - 0.0 /100 WBCs    RBC 2.65 (L) 4.00 - 5.20 x10*6/uL    Hemoglobin 8.3 (L) 12.0 - 16.0 g/dL    Hematocrit 26.7 (L) 36.0 - 46.0 %     (H) 80 - 100 fL    MCH 31.3 26.0 - 34.0 pg    MCHC 31.1 (L) 32.0 - 36.0 g/dL    RDW 18.3 (H) 11.5 - 14.5 %    Platelets 110 (L) 150 - 450 x10*3/uL    Neutrophils %      Immature Granulocytes %, Automated      Lymphocytes %      Monocytes %      Eosinophils %      Basophils %      Neutrophils Absolute      Lymphocytes Absolute      Monocytes Absolute      Eosinophils Absolute      Basophils Absolute     BLOOD GAS ARTERIAL FULL PANEL   Result Value Ref Range    POCT pH, Arterial 7.46 (H) 7.38 - 7.42 pH    POCT pCO2, Arterial 33 (L) 38 - 42 mm Hg    POCT pO2, Arterial 85 85 - 95 mm Hg    POCT SO2, Arterial 99 94 - 100 %    POCT Oxy Hemoglobin, Arterial 96.1 94.0 - 98.0 %    POCT Hematocrit Calculated, Arterial 33.0 (L) 36.0 - 46.0 %    POCT Sodium, Arterial 132 (L) 136 - 145 mmol/L    POCT Potassium, Arterial 5.2 3.5 - 5.3 mmol/L    POCT Chloride, Arterial 105 98 - 107 mmol/L    POCT Ionized Calcium, Arterial 1.20 1.10 - 1.33 mmol/L    POCT Glucose, Arterial 128 (H) 74 - 99 mg/dL    POCT Lactate, Arterial 1.4 0.4 - 2.0 mmol/L    POCT Base Excess, Arterial 0.1 -2.0 - 3.0 mmol/L    POCT HCO3 Calculated, Arterial 23.5 22.0 - 26.0 mmol/L    POCT Hemoglobin, Arterial 11.0 (L) 12.0 - 16.0 g/dL    POCT Anion Gap, Arterial 9 (L) 10 - 25 mmo/L     Patient Temperature 37.0 degrees Celsius    FiO2 24 %         This patient has a urinary catheter   Reason for the urinary catheter remaining today? critically ill patient who need accurate urinary output measurements               Assessment/Plan   Principal Problem:    Thrombocytopenia (CMS/HCC)  77yo F w PMHx squamous cell carcinoma of hypopharynx, HFpEF, CAD s/p PCI, Afib s/p watchman, GERD, depression, HTN, AUD, ascending aortic aneurysm, distant breast cancer (s/p chemoradiotherapy and lumpectomy) who was directed to ED for hypotension which resolved. Currently treating E.Coli bacteremia with ceftriaxone (ID following), and plan to have EGD (assess esophagitis infectious vs radiation) + PEG (for nutrition); however ANC less than goal. Started neupogen (3/2) d/t low ANC, infection and procedure goal for ANC > 1000.     3/7 updates:   - Rapid overnight for hypoxia, again this AM  - Strep antigen positive, legionella antigen pending  - Broaden from CTX to Vanc + Zosyn (3/7-xx)  - ABG this AM pH 7.48 pO2 69, bicarb and CO2 WNL    #Acute hypoxic respiratory failure  :: Rapid called early AM 3/7 for hypoxia requiring 4L O2, CXR showing possible RLL infiltrate concerning for aspiration pneumonia vs. Pneumonitis  Plan:   - Rapid overnight for hypoxia, again this AM  - Strep antigen positive, legionella antigen pending  - Broaden from CTX to Vanc + Zosyn (3/7-xx)  - ABG this AM pH 7.48 pO2 69, bicarb and CO2 WNL  -MRSA nares   - Lactate normal  - Blood cx x2 collected  - Sputum cx ordered      #Hypotension, improved   #HFpEF, initial c/f ADHF  ::Patient presents with hypotension over the past month in oncology clinic, has received IVF and discontinued home losartan/amlodipine during this time, BP spontaneously improving without intervention today to 100s/70s, appears grossly dry to euvolemic on exam without any symptoms of acute heart failure + positive orthostats in ED  ::BNP 2/22: 229 - appears to be at  baseline  ::TTE 7/2023: EF 55-60% // TTE 2/2024 EF 50%  - Patient likely has less PO intake and with chemo does not need as many antihyptertensives/GDMT medication dose - emailed primary cardiologist  - Medications given via Dobhoff  - Continue Aldactone 12.5mg // Metoprolol Tartrate 50mg BID // Losartan 25mg // Dapagliflozin 10mg   - Atorvostatin 20mg     #E coli bacteremia  :: Bcx from 2/29 growing E coli sensitive to ceftriaxone; Bcx from 3/2 NGTD  - ID consulted, appreciate recs:  - Given collection through mediport but no symptoms of sepsis, would treat w/ 10 days of ceftriaxone 2g q24hr infused through mediport  - Lock solution into catheter after infusion is complete   - CTX 2g q24 hr course 10 days; 3/1-3/10 planned; changed on 3/7 to vanc + zosyn as above     #SCC of Hypopharynx (Dr. Burkitt)  #Pain  #Delerium  ::SLP librado 2/12 recommended soft solids with thin liquids, patient would prefer oral feeding to PEG   - nutrition recs appreciated: 100mg IV thiamine x7 days,  Isosource 1.5 at goal [50cc/hr]  - c/w home Guaifenesin  - c/w Zofran prn nausea, Compazien second line  - Changed pain regimen: IV Dilaudid 0.4mg Q3 PRN - held I/s of delirium  - delirium precautions  - patient tolerating seroquel 12.5mg at bedtime for sleep and agitation, delirium improving   - Zyprexa 2.5mg PO q8hrs for agitation      #Oropharyngeal candidiasis  #Dysphagia  ::Moderate to severe candidiasis with multiple large lesions  ::given ANC 0.16, consideration for opportunistic infections present. Entended antifungal course  -likely plan for outpatient PEG given unlikely for ANC to recover while inpatient, in luz period from chemotherapy 2/28  - filgrastim daily until ANC>1000  - Fluconazole 200mg IV (2/22 - xx), typically for 7 days but continue given neutropenia and candidiasis; plan for 21 day course total; IV 2/22-3/6, plan for 400mg PO 3/7-3/13  - Oral care per nursing     #Pancytopenia, improved   ::ANC 0.16 // Plts 53 on  admission with no concern for bleeding // HB 10.9 on presentation (BL 12-13)  - likely d/t myelosuppresion vs though patient has had poor po intake vs mixed  - Retic count 0.6% (fraction 1.8%) indicative of poor marrow response   - filgrastim daily until ANC >1000  - Daily CBC  - T&S updated, transfuse for active bleeding <50, or for <10  - continue lovenox for DVT ppx   - holding clopidogrel (for EGD + peg in future)     #Normocytic Anemia  ::HB 10.9 on presentation (BL 12-13), no active bleeding, no recent iron studies  - Likely d/t chronic dz vs myelosuppresion, though patient has had poor po intake     #Atrial Fibrillation  ::Patient previously on AC, now s/p watchman procedure and is rate controlled   ::had 5 ablations per patient  - Continue metoprolol tartrate 50mg BID  - HR 90s to 120s     #Urinary Frequency  - UA with reflex      #Hx of TIA  - holding home Plavix 75mg daily     #GERD  - c/w home Pantoprazole     #Mood Disorder  #Misc  - c/w home Cymbalta and Wellbutrin  - c/w home supplements, mag-ox     F : caution, was diuresing. HfpEF  E: replete lytes prn, K>4, Mg >2  N: isosource 1.5 via dobhoff  A: PIVs     DVT prophylaxis: SCDs  GI prophylaxis: home PPI     Code Status: FULL                 Baudilio Turcios MD

## 2024-03-07 NOTE — SIGNIFICANT EVENT
Rapid Response Note    Received a page around 0015 regarding Ms. Todd. Nurse stating pt was noted to have shallow breathing for the past few hours. Also noted she was satting in the mid 80s, however with poor waveform. She called a rapid response thereafter. Upon entering room, patient was stating she did not feel short of breath, but generally uncomfortable. Visitor at bedside stating she has had shallow breathing for the past few days that has been concerning. Denies fever/chills/chest pain/palpitations/nausea/vomiting/abdominal pain.    Vitals: 36.9, 117/52, 96bpm, 22 resps, 91-99% on RA    Exam:  Gen: Pt in moderate distress, appears uncomfortable, intermittently yelling out and pulling on lines  Pulm: CTA bilaterally, no wheezes, rales, rhonchi, no accessory muscle use  GI: Abdomen soft, NTTP  CV: HRRR  Skin: Good cap refill <2 seconds, skin warm and dry, no peripheral edema    A/P  -Vitals stable, pt perfusing well, saturations improved to 90-92% with good waveforms while at bedside. ABG and CXR obtained. CXR with evidence of small left pleural effusion and evidence of worsening atelectasis, mild interstitial edema. Given the patient is satting well with minimal supplementary O2, vitals stable, will hold off on consideration of diuresis at this time.   -ABG pH 7.48, pCO2 33, pO2 69, HCO3 24.6, SaO2 96%, no evidence of decreased perfusion or hypoxemia  -Will keep on supplemental O2 NC, wean to target O2 >92%, if pt remains hypoxic, will consider broadening abx coverage to empirically cover for HAP    Eugene Quevedo DO  PGY-1 Neurology

## 2024-03-07 NOTE — SIGNIFICANT EVENT
Rapid Response paged at 2355 hours as a result of the patient developing desaturation  Reportedly pulse oximetry readings had been noted to be mid 80 % range.  Dr. Tahira LAGOS came to the room to assess, as well as Dr. Eugene Quevedo resident covering [facility] service.  Portable CXR  obtained.   RRT april an arterial blood gas.  ABG results returned as: pH 7.48, pCO2 33, pO2 69, HCO3 24.6, SaO2 96%  Supplemental O2 at 4L per NC placed on by the respiratory therapist.  Oxygen saturations subsequently found to be 97 - 99%

## 2024-03-07 NOTE — PROGRESS NOTES
Art Therapy Note    Amita Todd     Therapy Session  Referral Type: New referral this admission  Visit Type: New visit  Session Start Time: 1426  Session End Time: 1436  Intervention Delivery: In-person  Number of family members present: 0  Number of staff members present: 0              Treatment/Interventions  Art Therapy Interventions: Assessment  Interruption: No  Patient Fell Asleep at End of Session: No    Post-assessment  Total Session Time (min): 10 minutes    Narrative  Assessment Detail: ATR made a visit with psych onc med student who was shadowing to Pt.'s room to follow up on areferral made, introduce, educate, and assess AT needs and wants. Pt lying in bed visiting with her sister as the bedside.  Pt appeared comfortable yet pale and tired.  Pt.'s sister welcomed the visit after asking Pt if it was ok.  Evaluation: Pt. had difficulty speaking and communicated with a thumbs up/down reaction to ATR assessing services.  She declined a session today due to feeling tired but was open to exploring the AT cart offerings and interventions.- After some exploration Pt expressed interest in painting with watercolors and creating either a landscape or abstarct image. Both parties expressed appreciation for the time and offerings.  Follow-up: ATR willfollow up with Pt later this week to offer a session and help her begin painting.    Education Documentation  No documentation found.

## 2024-03-07 NOTE — ACP (ADVANCE CARE PLANNING)
"Met with pt and her sister this AM at request of primary team.  Pt has been having episodes of aspiration and coughing up slough.  This AM desatted into 70s- improved with positioning and albuterol.      Pt is difficult to understand due to severe mucositis.  She shared with RN Asst Dixon yesterday that she feels like no one is including her in conversations.      Today, intentionally spoke with pt directly.  \"things are happening that are not good- but no one talks to me because I am difficult to understand.  This is frustrating.\"      I spent time with active listening and validation of feelings.  She spoke of her cancer history and course to date.  She was accurate in her rendition.  Discussed she received her first round of chemo last week, has been getting radiation daily up until the last couple days when it was held due to expected low blood counts.    Today she is having more difficulty breathing, coughing up secretions/ possible slough per nursing.  Respiratory is following.      CXR showed aspiration- possible PNA and ABX will be started.    I asked pt what she would want if her heart stopped or if her breathing worsened.  Would she want chest compressions or intubation?  Pt stated \"No- I am DNR.\"  Her sister confirmed that pt has a Living Will that clearly states all that.  She would not want to be intubated either.  Pt again confirmed this now.    Changed code status to DNR/ DNI.  Notified primary team and RN.      "

## 2024-03-07 NOTE — SIGNIFICANT EVENT
"Rapid pg - desaturation to mid 80s     Per RN pt desaturated to mid 80s on RA. Without intervention pt SpO2 increased to 91%     Family member indicated that she noted a change in pt breathing pattern Monday when she began breathing \"more shallow\"     ABG drawn from left radial artery on RA with good return. SpO2 69 - placed on 4L NC that was in room    "

## 2024-03-07 NOTE — CARE PLAN
Problem: Skin  Goal: Decreased wound size/increased tissue granulation at next dressing change  Outcome: Progressing  Flowsheets (Taken 3/7/2024 0112)  Decreased wound size/increased tissue granulation at next dressing change: Promote sleep for wound healing  Goal: Participates in plan/prevention/treatment measures  Outcome: Progressing  Flowsheets (Taken 3/7/2024 0112)  Participates in plan/prevention/treatment measures: Discuss with provider PT/OT consult  Goal: Prevent/manage excess moisture  Outcome: Progressing  Flowsheets (Taken 3/7/2024 0112)  Prevent/manage excess moisture: Cleanse incontinence/protect with barrier cream  Goal: Prevent/minimize sheer/friction injuries  Outcome: Progressing  Flowsheets (Taken 3/7/2024 0112)  Prevent/minimize sheer/friction injuries: Use pull sheet  Goal: Promote/optimize nutrition  Outcome: Progressing  Goal: Promote skin healing  Outcome: Progressing     Problem: Pain  Goal: My pain/discomfort is manageable  Outcome: Progressing     Problem: Pain  Goal: Takes deep breaths with improved pain control throughout the shift  Outcome: Progressing  Goal: Turns in bed with improved pain control throughout the shift  Outcome: Progressing  Goal: Walks with improved pain control throughout the shift  Outcome: Progressing  Goal: Performs ADL's with improved pain control throughout shift  Outcome: Progressing  Goal: Participates in PT with improved pain control throughout the shift  Outcome: Progressing  Goal: Free from opioid side effects throughout the shift  Outcome: Progressing  Goal: Free from acute confusion related to pain meds throughout the shift  Outcome: Progressing     Problem: Safety - Adult  Goal: Free from fall injury  Outcome: Progressing   The patient's goals for the shift include      The clinical goals for the shift include pt will remain safe and free of injury through 3/7 at 0700    Pt remained safe and free of injury throughout shift. No verbalization of pain  however pt restless throughout the night, team aware. Now on 2L NC. Isosorce @ 50 mL's/hr. No IV fluids.

## 2024-03-07 NOTE — CONSULTS
"Vancomycin Dosing by Pharmacy- INITIAL    Amita Todd is a 78 y.o. year old female who Pharmacy has been consulted for vancomycin dosing for pneumonia. Based on the patient's indication and renal status this patient will be dosed based on a goal AUC of 400-600.     Renal function is currently stable.    Visit Vitals  /72 (BP Location: Left arm, Patient Position: Lying)   Pulse 88   Temp 36.8 °C (98.2 °F) (Temporal)   Resp 22        Lab Results   Component Value Date    CREATININE 0.56 03/07/2024    CREATININE 0.52 03/06/2024    CREATININE 0.54 03/05/2024    CREATININE 0.54 03/04/2024        Patient weight is No results found for: \"PTWEIGHT\"    No results found for: \"CULTURE\"     I/O last 3 completed shifts:  In: 2107 (31 mL/kg) [NG/GT:2057; IV Piggyback:50]  Out: 1975 (29 mL/kg) [Urine:1975 (0.8 mL/kg/hr)]  Weight: 68 kg   [unfilled]    Lab Results   Component Value Date    PATIENTTEMP 37.0 03/02/2024    PATIENTTEMP 37.0 02/27/2024    PATIENTTEMP 37.0 01/19/2024          Assessment/Plan     Patient will not be given a loading dose.  Will initiate vancomycin maintenance,  1000 mg every 12 hours.    This dosing regimen is predicted by [employer] to result in the following pharmacokinetic parameters:  AUC24,ss: 545 mg/L.hr  Probability of AUC24 > 400: 81 %  Ctrough,ss: 17 mg/L  Probability of Ctrough,ss > 20: 36 %    Follow-up level will be ordered on 3/8 with am labs unless clinically indicated sooner.  Will continue to monitor renal function daily while on vancomycin and order serum creatinine at least every 48 hours if not already ordered.  Follow for continued vancomycin needs, clinical response, and signs/symptoms of toxicity.       Jailene Dixon, PharmD       "

## 2024-03-07 NOTE — PROGRESS NOTES
"Music Therapy Note    Therapy Session  Referral Type: New referral this admission  Visit Type: New visit  Session Start Time: 1101  Session End Time: 1102  Intervention Delivery: In-person  Conflict of Service: Declined treatment  Number of family members present: 1  Family Participation: Supportive               Treatment/Interventions       Post-assessment  Total Session Time (min): 1 minutes    Narrative  Assessment Detail: Pt's sister greeted MTs outside pt's room. She stated that her room had been a \"revolving door\" that morning, so pt was trying to settle down. Pt's sister recalled that pt had really enjoyed music therapy, and asked MTs to come back in the afternoon to check on pt.  Follow-up: Will continue to follow.    Education Documentation  No documentation found.          "

## 2024-03-07 NOTE — SIGNIFICANT EVENT
Rapid Response Note    At the bedside for Rapid Response called for acute desaturation to 76%.  At the bedside patient with dry nonproductive cough and mild dyspnea.  Primary team placed orders prior to my arrival.  ABG obtained, blood cultures ordered, rainbow labs including lactate ordered.    AB.46, 33, 85, SaO2 99%, Lactate 1.4, HCO3 23.5.    No additional interventions are indicated by Rapid Response at this time.  RN to contact Rapid Response with any future concerns or signs of clinical decompensation.

## 2024-03-08 ENCOUNTER — APPOINTMENT (OUTPATIENT)
Dept: RADIATION ONCOLOGY | Facility: HOSPITAL | Age: 79
End: 2024-03-08
Payer: MEDICARE

## 2024-03-08 LAB
ABO GROUP (TYPE) IN BLOOD: NORMAL
ALBUMIN SERPL BCP-MCNC: 2.4 G/DL (ref 3.4–5)
ANION GAP SERPL CALC-SCNC: 13 MMOL/L (ref 10–20)
ANTIBODY SCREEN: NORMAL
BASOPHILS # BLD MANUAL: 0 X10*3/UL (ref 0–0.1)
BASOPHILS NFR BLD MANUAL: 0 %
BUN SERPL-MCNC: 23 MG/DL (ref 6–23)
CALCIUM SERPL-MCNC: 8 MG/DL (ref 8.6–10.6)
CHLORIDE SERPL-SCNC: 104 MMOL/L (ref 98–107)
CO2 SERPL-SCNC: 24 MMOL/L (ref 21–32)
CREAT SERPL-MCNC: 0.65 MG/DL (ref 0.5–1.05)
EGFRCR SERPLBLD CKD-EPI 2021: >90 ML/MIN/1.73M*2
EOSINOPHIL # BLD MANUAL: 0 X10*3/UL (ref 0–0.4)
EOSINOPHIL NFR BLD MANUAL: 0 %
ERYTHROCYTE [DISTWIDTH] IN BLOOD BY AUTOMATED COUNT: 18.5 % (ref 11.5–14.5)
GLUCOSE BLD MANUAL STRIP-MCNC: 106 MG/DL (ref 74–99)
GLUCOSE BLD MANUAL STRIP-MCNC: 107 MG/DL (ref 74–99)
GLUCOSE BLD MANUAL STRIP-MCNC: 113 MG/DL (ref 74–99)
GLUCOSE BLD MANUAL STRIP-MCNC: 96 MG/DL (ref 74–99)
GLUCOSE BLD MANUAL STRIP-MCNC: 97 MG/DL (ref 74–99)
GLUCOSE SERPL-MCNC: 96 MG/DL (ref 74–99)
HCT VFR BLD AUTO: 25.4 % (ref 36–46)
HGB BLD-MCNC: 8 G/DL (ref 12–16)
IMM GRANULOCYTES # BLD AUTO: 0.12 X10*3/UL (ref 0–0.5)
IMM GRANULOCYTES NFR BLD AUTO: 3 % (ref 0–0.9)
LYMPHOCYTES # BLD MANUAL: 0 X10*3/UL (ref 0.8–3)
LYMPHOCYTES NFR BLD MANUAL: 0 %
MAGNESIUM SERPL-MCNC: 2.22 MG/DL (ref 1.6–2.4)
MCH RBC QN AUTO: 31 PG (ref 26–34)
MCHC RBC AUTO-ENTMCNC: 31.5 G/DL (ref 32–36)
MCV RBC AUTO: 98 FL (ref 80–100)
METAMYELOCYTES # BLD MANUAL: 0.03 X10*3/UL
METAMYELOCYTES NFR BLD MANUAL: 0.8 %
MONOCYTES # BLD MANUAL: 0.16 X10*3/UL (ref 0.05–0.8)
MONOCYTES NFR BLD MANUAL: 3.9 %
MYELOCYTES # BLD MANUAL: 0.06 X10*3/UL
MYELOCYTES NFR BLD MANUAL: 1.6 %
NEUTS SEG # BLD MANUAL: 3.62 X10*3/UL (ref 1.6–5)
NEUTS SEG NFR BLD MANUAL: 90.6 %
NRBC BLD-RTO: 22.3 /100 WBCS (ref 0–0)
PHOSPHATE SERPL-MCNC: 2.8 MG/DL (ref 2.5–4.9)
PLATELET # BLD AUTO: 103 X10*3/UL (ref 150–450)
POTASSIUM SERPL-SCNC: 5 MMOL/L (ref 3.5–5.3)
PROMYELOCYTES # BLD MANUAL: 0.12 X10*3/UL
PROMYELOCYTES NFR BLD MANUAL: 3.1 %
RBC # BLD AUTO: 2.58 X10*6/UL (ref 4–5.2)
RBC MORPH BLD: ABNORMAL
RH FACTOR (ANTIGEN D): NORMAL
SODIUM SERPL-SCNC: 136 MMOL/L (ref 136–145)
TOTAL CELLS COUNTED BLD: 128
VANCOMYCIN SERPL-MCNC: 16.3 UG/ML (ref 5–20)
WBC # BLD AUTO: 4 X10*3/UL (ref 4.4–11.3)

## 2024-03-08 PROCEDURE — 2500000001 HC RX 250 WO HCPCS SELF ADMINISTERED DRUGS (ALT 637 FOR MEDICARE OP)

## 2024-03-08 PROCEDURE — 85027 COMPLETE CBC AUTOMATED: CPT

## 2024-03-08 PROCEDURE — 99233 SBSQ HOSP IP/OBS HIGH 50: CPT

## 2024-03-08 PROCEDURE — 2500000005 HC RX 250 GENERAL PHARMACY W/O HCPCS

## 2024-03-08 PROCEDURE — 80202 ASSAY OF VANCOMYCIN: CPT

## 2024-03-08 PROCEDURE — 97530 THERAPEUTIC ACTIVITIES: CPT | Mod: GO

## 2024-03-08 PROCEDURE — 2500000002 HC RX 250 W HCPCS SELF ADMINISTERED DRUGS (ALT 637 FOR MEDICARE OP, ALT 636 FOR OP/ED)

## 2024-03-08 PROCEDURE — 99222 1ST HOSP IP/OBS MODERATE 55: CPT | Performed by: PATHOLOGY

## 2024-03-08 PROCEDURE — 97530 THERAPEUTIC ACTIVITIES: CPT | Mod: GP

## 2024-03-08 PROCEDURE — 2500000004 HC RX 250 GENERAL PHARMACY W/ HCPCS (ALT 636 FOR OP/ED)

## 2024-03-08 PROCEDURE — 80069 RENAL FUNCTION PANEL: CPT

## 2024-03-08 PROCEDURE — 82947 ASSAY GLUCOSE BLOOD QUANT: CPT

## 2024-03-08 PROCEDURE — 85007 BL SMEAR W/DIFF WBC COUNT: CPT

## 2024-03-08 PROCEDURE — 86901 BLOOD TYPING SEROLOGIC RH(D): CPT

## 2024-03-08 PROCEDURE — 1170000001 HC PRIVATE ONCOLOGY ROOM DAILY

## 2024-03-08 PROCEDURE — 83735 ASSAY OF MAGNESIUM: CPT

## 2024-03-08 RX ORDER — VANCOMYCIN HYDROCHLORIDE 750 MG/150ML
750 INJECTION, SOLUTION INTRAVENOUS EVERY 12 HOURS
Status: DISCONTINUED | OUTPATIENT
Start: 2024-03-08 | End: 2024-03-08

## 2024-03-08 RX ADMIN — PIPERACILLIN SODIUM AND TAZOBACTAM SODIUM 4.5 G: 4; .5 INJECTION, SOLUTION INTRAVENOUS at 04:38

## 2024-03-08 RX ADMIN — ACETAMINOPHEN 1000 MG: 650 SOLUTION ORAL at 14:00

## 2024-03-08 RX ADMIN — ACETAMINOPHEN 1000 MG: 650 SOLUTION ORAL at 21:21

## 2024-03-08 RX ADMIN — Medication 1000 UNITS: at 21:20

## 2024-03-08 RX ADMIN — SPIRONOLACTONE 12.5 MG: 25 TABLET, FILM COATED ORAL at 10:20

## 2024-03-08 RX ADMIN — Medication 10 ML: at 11:06

## 2024-03-08 RX ADMIN — PIPERACILLIN SODIUM AND TAZOBACTAM SODIUM 4.5 G: 4; .5 INJECTION, SOLUTION INTRAVENOUS at 10:20

## 2024-03-08 RX ADMIN — OXYCODONE HYDROCHLORIDE AND ACETAMINOPHEN 500 MG: 500 TABLET ORAL at 10:19

## 2024-03-08 RX ADMIN — ATORVASTATIN CALCIUM 20 MG: 20 TABLET, FILM COATED ORAL at 10:19

## 2024-03-08 RX ADMIN — Medication 10 MG: at 21:20

## 2024-03-08 RX ADMIN — FLUCONAZOLE 400 MG: 200 TABLET ORAL at 10:19

## 2024-03-08 RX ADMIN — METOPROLOL TARTRATE 50 MG: 50 TABLET, FILM COATED ORAL at 10:19

## 2024-03-08 RX ADMIN — ENOXAPARIN SODIUM 40 MG: 100 INJECTION SUBCUTANEOUS at 10:20

## 2024-03-08 RX ADMIN — Medication 1000 UNITS: at 10:19

## 2024-03-08 RX ADMIN — METOPROLOL TARTRATE 50 MG: 50 TABLET, FILM COATED ORAL at 21:20

## 2024-03-08 RX ADMIN — VENLAFAXINE 75 MG: 75 TABLET ORAL at 10:19

## 2024-03-08 RX ADMIN — DAPAGLIFLOZIN 10 MG: 10 TABLET, FILM COATED ORAL at 10:19

## 2024-03-08 RX ADMIN — ESOMEPRAZOLE MAGNESIUM 40 MG: 40 FOR SUSPENSION ORAL at 06:19

## 2024-03-08 RX ADMIN — QUETIAPINE FUMARATE 12.5 MG: 25 TABLET ORAL at 21:20

## 2024-03-08 RX ADMIN — MULTIVIT AND MINERALS-FERROUS GLUCONATE 9 MG IRON/15 ML ORAL LIQUID 15 ML: at 10:20

## 2024-03-08 RX ADMIN — BUPROPION HYDROCHLORIDE 150 MG: 100 TABLET, FILM COATED ORAL at 10:19

## 2024-03-08 RX ADMIN — VENLAFAXINE 75 MG: 75 TABLET ORAL at 17:00

## 2024-03-08 RX ADMIN — SENNOSIDES 10 ML: 8.8 LIQUID ORAL at 10:20

## 2024-03-08 RX ADMIN — PIPERACILLIN SODIUM AND TAZOBACTAM SODIUM 4.5 G: 4; .5 INJECTION, SOLUTION INTRAVENOUS at 18:32

## 2024-03-08 RX ADMIN — BUPROPION HYDROCHLORIDE 150 MG: 100 TABLET, FILM COATED ORAL at 21:20

## 2024-03-08 RX ADMIN — ACETAMINOPHEN 1000 MG: 650 SOLUTION ORAL at 04:38

## 2024-03-08 RX ADMIN — LOSARTAN POTASSIUM 25 MG: 25 TABLET, FILM COATED ORAL at 10:20

## 2024-03-08 ASSESSMENT — PAIN SCALES - GENERAL
PAINLEVEL_OUTOF10: 0 - NO PAIN

## 2024-03-08 ASSESSMENT — COGNITIVE AND FUNCTIONAL STATUS - GENERAL
WALKING IN HOSPITAL ROOM: TOTAL
MOVING TO AND FROM BED TO CHAIR: TOTAL
MOVING FROM LYING ON BACK TO SITTING ON SIDE OF FLAT BED WITH BEDRAILS: A LOT
DAILY ACTIVITIY SCORE: 6
EATING MEALS: TOTAL
TOILETING: TOTAL
HELP NEEDED FOR BATHING: TOTAL
MOVING TO AND FROM BED TO CHAIR: TOTAL
TURNING FROM BACK TO SIDE WHILE IN FLAT BAD: A LOT
MOBILITY SCORE: 8
STANDING UP FROM CHAIR USING ARMS: TOTAL
TURNING FROM BACK TO SIDE WHILE IN FLAT BAD: A LOT
TOILETING: TOTAL
CLIMB 3 TO 5 STEPS WITH RAILING: TOTAL
EATING MEALS: A LOT
DRESSING REGULAR LOWER BODY CLOTHING: TOTAL
WALKING IN HOSPITAL ROOM: TOTAL
PERSONAL GROOMING: TOTAL
DRESSING REGULAR UPPER BODY CLOTHING: TOTAL
STANDING UP FROM CHAIR USING ARMS: TOTAL
MOBILITY SCORE: 8
HELP NEEDED FOR BATHING: TOTAL
DRESSING REGULAR LOWER BODY CLOTHING: TOTAL
DRESSING REGULAR UPPER BODY CLOTHING: A LOT
DAILY ACTIVITIY SCORE: 9
PERSONAL GROOMING: A LOT
CLIMB 3 TO 5 STEPS WITH RAILING: TOTAL
MOVING FROM LYING ON BACK TO SITTING ON SIDE OF FLAT BED WITH BEDRAILS: A LOT

## 2024-03-08 ASSESSMENT — PAIN SCALES - WONG BAKER: WONGBAKER_NUMERICALRESPONSE: NO HURT

## 2024-03-08 ASSESSMENT — PAIN - FUNCTIONAL ASSESSMENT: PAIN_FUNCTIONAL_ASSESSMENT: 0-10

## 2024-03-08 NOTE — PROGRESS NOTES
Physical Therapy                 Therapy Communication Note    Patient Name: Amita Todd  MRN: [id number]  Today's Date: 3/8/2024     Discipline: Physical Therapy    Missed Visit Reason: Missed Visit Reason:  (pt being cleaned up by nursing students. will re-attempt as schedule permits.)    Missed Time: Attempt    Comment:

## 2024-03-08 NOTE — PROGRESS NOTES
"Vancomycin Dosing by Pharmacy- FOLLOW UP    Amita Todd is a 78 y.o. year old female who Pharmacy has been consulted for vancomycin dosing for pneumonia. Based on the patient's indication and renal status this patient is being dosed based on a goal AUC of 400-600.     Renal function is currently stable.    Current vancomycin dose: 1000 mg given every 12 hours    Estimated vancomycin AUC on current dose: 667 mg/L.hr     Visit Vitals  /87 (BP Location: Left arm, Patient Position: Lying)   Pulse 82   Temp 36.7 °C (98.1 °F) (Temporal)   Resp 20        Lab Results   Component Value Date    CREATININE 0.65 03/08/2024    CREATININE 0.56 03/07/2024    CREATININE 0.52 03/06/2024    CREATININE 0.54 03/05/2024        Patient weight is No results found for: \"PTWEIGHT\"    No results found for: \"CULTURE\"     I/O last 3 completed shifts:  In: 2840 (41.8 mL/kg) [NG/GT:2040; IV Piggyback:800]  Out: 1325 (19.5 mL/kg) [Urine:1325 (0.5 mL/kg/hr)]  Weight: 68 kg   [unfilled]    Lab Results   Component Value Date    PATIENTTEMP 37.0 03/07/2024    PATIENTTEMP 37.0 03/07/2024    PATIENTTEMP 37.0 03/02/2024        Assessment/Plan    Above goal AUC. Orders placed for new vancomcyin regimen of 750mg every 12 hours to begin at 1000.    This dosing regimen is predicted by [employer] to result in the following pharmacokinetic parameters:  AUC24,ss: 504 mg/L.hr  Probability of AUC24 > 400: 87 %  Ctrough,ss: 16 mg/L  Probability of Ctrough,ss > 20: 22 %    The next level will be obtained on 3/9 with am labs. May be obtained sooner if clinically indicated.   Will continue to monitor renal function daily while on vancomycin and order serum creatinine at least every 48 hours if not already ordered.  Follow for continued vancomycin needs, clinical response, and signs/symptoms of toxicity.       Jailene Dixon, PharmD           "

## 2024-03-08 NOTE — PROGRESS NOTES
"Music Therapy Note      Therapy Session  Referral Type: New referral this admission  Visit Type: Follow-up visit  Session Start Time: 1528  Session End Time: 1548  Intervention Delivery: In-person  Conflict of Service: None  Number of family members present: 2  Family Present for Session: Sibling(s)  Family Participation: Supportive  Number of staff members present: 1 (mt intern)     Pre-assessment  Unable to Assess Reason: Physical limitation  Mood/Affect: Bizarre, Cooperative  Verbalized Emotional State: Anxiety (appeared restless)         Treatment/Interventions  Areas of Focus: Coping, Self-expression, Family bonding, Family/caregiver support, Normalization  Music Therapy Interventions: Live music listening, Ashlyn analysis, Empathic listening/validating emotions, Music-facilitated relaxation, Music sharing/discussion  Co-Treatment: Music therapy  Interruption: No  Patient Fell Asleep at End of Session: No    Post-assessment  Mood/Affect: Calm, Participative  Verbalized Emotional State: Happiness, Gratitude  Continue Visiting: Yes  Total Session Time (min): 20 minutes    Narrative  Assessment Detail: Found pt in bed with sister and daughter in law at bedside. All invited us in.  Plan: Engage pt and family through music and supportive presence.  Intervention: Play pt preferred music in a soothing manner. Pt enjoys musical theater and \"oldies.\"  Evaluation: Pt continues to seem in and out of awareness, difficult to understand, but responds very well to music. Again, sister pointed out that music calms her body and mind- pt is still and quiet during songs and can be restless and mumble more in between. Family members both became tearful during music, dtr in law had to step out because of emotional reaction to lyrics about \"not knowing when we may meet again, but because I knew you I've been changed for good.\" Pt appears to be looking around room at things that aren't there and also reaching or moving her arms " without particular purpose. Pt commented on how beautiful the music was, and talked a bit about some of her favorite shows as session closed. All grateful for visits.  Follow-up: Will continue to follow.    Education Documentation  No documentation found.

## 2024-03-08 NOTE — CARE PLAN
The clinical goals for the shift include pt will remin safe and free from injury throughout shift 3/8/2024 0700      Problem: Skin  Goal: Decreased wound size/increased tissue granulation at next dressing change  Outcome: Progressing  Goal: Participates in plan/prevention/treatment measures  Outcome: Progressing  Goal: Prevent/manage excess moisture  Outcome: Progressing  Goal: Prevent/minimize sheer/friction injuries  Outcome: Progressing  Flowsheets (Taken 3/8/2024 0541)  Prevent/minimize sheer/friction injuries:   Use pull sheet   HOB 30 degrees or less  Goal: Promote/optimize nutrition  Outcome: Progressing  Goal: Promote skin healing  Outcome: Progressing  Flowsheets (Taken 3/8/2024 0541)  Promote skin healing:   Rotate device position/do not position patient on device   Assess skin/pad under line(s)/device(s)     Problem: Pain  Goal: My pain/discomfort is manageable  Outcome: Progressing     Problem: Safety  Goal: Patient will be injury free during hospitalization  Outcome: Progressing  Goal: I will remain free of falls  Outcome: Progressing     Problem: Daily Care  Goal: Daily care needs are met  Outcome: Progressing     Problem: Psychosocial Needs  Goal: Demonstrates ability to cope with hospitalization/illness  Outcome: Progressing  Goal: Collaborate with me, my family, and caregiver to identify my specific goals  Outcome: Progressing     Problem: Discharge Barriers  Goal: My discharge needs are met  Outcome: Progressing     Problem: Pain  Goal: Takes deep breaths with improved pain control throughout the shift  Outcome: Progressing  Goal: Turns in bed with improved pain control throughout the shift  Outcome: Progressing  Goal: Walks with improved pain control throughout the shift  Outcome: Progressing  Goal: Performs ADL's with improved pain control throughout shift  Outcome: Progressing  Goal: Participates in PT with improved pain control throughout the shift  Outcome: Progressing  Goal: Free from opioid  side effects throughout the shift  Outcome: Progressing  Goal: Free from acute confusion related to pain meds throughout the shift  Outcome: Progressing     Problem: Pain - Adult  Goal: Verbalizes/displays adequate comfort level or baseline comfort level  Outcome: Progressing     Problem: Safety - Adult  Goal: Free from fall injury  Outcome: Progressing     Problem: Discharge Planning  Goal: Discharge to home or other facility with appropriate resources  Outcome: Progressing     Problem: Chronic Conditions and Co-morbidities  Goal: Patient's chronic conditions and co-morbidity symptoms are monitored and maintained or improved  Outcome: Progressing     Problem: Fall/Injury  Goal: Not fall by end of shift  Outcome: Progressing  Goal: Be free from injury by end of the shift  Outcome: Progressing  Goal: Verbalize understanding of personal risk factors for fall in the hospital  Outcome: Progressing  Goal: Verbalize understanding of risk factor reduction measures to prevent injury from fall in the home  Outcome: Progressing  Goal: Use assistive devices by end of the shift  Outcome: Progressing  Goal: Pace activities to prevent fatigue by end of the shift  Outcome: Progressing

## 2024-03-08 NOTE — PROGRESS NOTES
"[facility]  [facility]  CONSULT FOLLOW-UP     Reason For Consult  PEG tube assessment     SUBJECTIVE     - NAOE, pt afebrile and HDS    EXAM     Last Recorded Vitals  Blood pressure (!) 149/95, pulse 88, temperature 36.5 °C (97.7 °F), temperature source Temporal, resp. rate 20, height 1.635 m (5' 4.37\"), weight 68 kg (149 lb 14.6 oz), SpO2 98 %.      Intake/Output Summary (Last 24 hours) at 3/8/2024 1224  Last data filed at 3/8/2024 0639  Gross per 24 hour   Intake 1950 ml   Output 725 ml   Net 1225 ml       Physical Exam   GENERAL: Chronically ill.  NEUROLOGIC: A and O x 3. Cranial nerves 2 through 12 grossly intact. Intact motor and sensory systems.  HEENT: Pupils are equal, round, and reactive to light and accommodation. Extraocular motion intact. CARDIAC: S1 + S2, RRR, no M/R/G.    LUNGS: CTA BL. No wheezes or coarse breath sounds. Symmetric respirations. No increased work of breathing.   ABDOMEN: Soft, non-tender to palpation. No rebound or guarding.  PSYCH: Low mood.    OBJECTIVE                                                                              Medications             Current Facility-Administered Medications:     acetaminophen (Tylenol) oral liquid 1,000 mg, 1,000 mg, nasogastric tube, q6h, Angelic Sarmiento MD, 1,000 mg at 03/08/24 0438    alteplase (Cathflo Activase) injection 1 mg, 1 mg, intra-catheter, PRN, Isaac Bledsoe MD    alteplase (Cathflo Activase) injection 1 mg, 1 mg, intra-catheter, PRN, Isaac Bledsoe MD, 1 mg at 02/28/24 2225    ascorbic acid (Vitamin C) tablet 500 mg, 500 mg, nasogastric tube, Daily, Mary Corbett MD, 500 mg at 03/08/24 1019    atorvastatin (Lipitor) tablet 20 mg, 20 mg, nasogastric tube, Daily, Angelic Sarmiento MD, 20 mg at 03/08/24 1019    buPROPion (Wellbutrin) tablet 150 mg, 150 mg, nasogastric tube, BID, Angelic Sarmiento MD, 150 mg at 03/08/24 1019    cholecalciferol (Vitamin D-3) tablet 1,000 " Units, 1,000 Units, nasogastric tube, BID, Angelic Sarmiento MD, 1,000 Units at 03/08/24 1019    dapagliflozin propanediol (Farxiga) tablet 10 mg, 10 mg, oral, Daily, Justino Cooper MD, 10 mg at 03/08/24 1019    enoxaparin (Lovenox) syringe 40 mg, 40 mg, subcutaneous, q24h, Baudilio Turcios MD, 40 mg at 03/08/24 1020    esomeprazole (NexIUM) suspension 40 mg, 40 mg, nasoduodenal tube, Daily before breakfast, Mary Corbett MD, 40 mg at 03/08/24 0619    fluconazole (Diflucan) tablet 400 mg, 400 mg, oral, Daily, Baudilio Turcios MD, 400 mg at 03/08/24 1019    guaiFENesin (Robitussin) 100 mg/5 mL syrup 200 mg, 200 mg, nasogastric tube, q4h PRN, Angelic Sarmiento MD, 200 mg at 03/07/24 1042    lido-diphen-Maalox 1:1:1 Magic Mouthwash, 10 mL, Swish & Spit, q4h PRN, Isaac Bledsoe MD, 10 mL at 03/08/24 1106    lidocaine (Xylocaine) 2 % jelly 1 Application, 1 Application, Topical, Once, Mary Corbett MD    loperamide (Imodium) capsule 2 mg, 2 mg, nasogastric tube, 4x daily PRN, Mary Corbett MD, 2 mg at 03/07/24 1043    losartan (Cozaar) tablet 25 mg, 25 mg, nasogastric tube, Daily, Angelic Sarmiento MD, 25 mg at 03/08/24 1020    [Held by provider] magnesium oxide (Mag-Ox) tablet 400 mg, 400 mg, nasogastric tube, Daily, Angelic Sarmiento MD, 400 mg at 03/03/24 0946    melatonin tablet 10 mg, 10 mg, oral, Nightly, Angelic Sarmiento MD, 10 mg at 03/07/24 1727    metoprolol tartrate (Lopressor) tablet 50 mg, 50 mg, nasogastric tube, BID, Angelic Sarmiento MD, 50 mg at 03/08/24 1019    multivitamin with iron-minerals 9 mg iron/15 mL liquid 15 mL, 15 mL, nasogastric tube, Daily, Angelic Sarmiento MD, 15 mL at 03/08/24 1020    OLANZapine (ZyPREXA) tablet 2.5 mg, 2.5 mg, oral, q8h PRN, Baudilio Turcios MD, 2.5 mg at 03/07/24 2310    ondansetron (Zofran) injection 4 mg, 4 mg, intravenous, q6h PRN, Mary Corbett MD, 4 mg at 02/27/24 0815    oxygen (O2) therapy, , inhalation, Continuous PRN - O2/gases, Debra Patrick MD, 1 L/min at 03/07/24 1021    perflutren lipid  microspheres (Definity) injection 0.5-10 mL of dilution, 0.5-10 mL of dilution, intravenous, Once in imaging, Justino Cooper MD    perflutren protein A microsphere (Optison) injection 0.5 mL, 0.5 mL, intravenous, Once in imaging, Justino Cooper MD    phenoL (Chloraseptic) 1.4 % mouth/throat spray 1 spray, 1 spray, Mouth/Throat, q2h PRN, Angelic Sarmiento MD    piperacillin-tazobactam-dextrose (Zosyn) IV 4.5 g, 4.5 g, intravenous, q6h, Baudilio Turcios MD, Stopped at 03/08/24 1050    prochlorperazine (Compazine) injection 10 mg, 10 mg, intravenous, q6h PRN, Angelic Sarmiento MD, 10 mg at 02/27/24 1151    QUEtiapine (SEROquel) tablet 12.5 mg, 12.5 mg, oral, Nightly, Baudilio Turcios MD, 12.5 mg at 03/07/24 2119    senna (Senokot) 8.8 mg/5 mL syrup 10 mL, 10 mL, nasogastric tube, BID, Angelic Sarmiento MD, 10 mL at 03/08/24 1020    spironolactone (Aldactone) tablet 12.5 mg, 12.5 mg, nasogastric tube, Daily, Angelic Sarmiento MD, 12.5 mg at 03/08/24 1020    sulfur hexafluoride microsphr (Lumason) injection 24.28 mg, 2 mL, intravenous, Once in imaging, Justino Cooper MD    venlafaxine (Effexor) tablet 75 mg, 75 mg, nasogastric tube, BID with meals, Angelic Sarmiento MD, 75 mg at 03/08/24 1019                                                                            Labs     Results for orders placed or performed during the hospital encounter of 02/22/24 (from the past 24 hour(s))   POCT GLUCOSE   Result Value Ref Range    POCT Glucose 92 74 - 99 mg/dL   POCT GLUCOSE   Result Value Ref Range    POCT Glucose 107 (H) 74 - 99 mg/dL   Vancomycin   Result Value Ref Range    Vancomycin 16.3 5.0 - 20.0 ug/mL   Magnesium   Result Value Ref Range    Magnesium 2.22 1.60 - 2.40 mg/dL   Renal Function Panel   Result Value Ref Range    Glucose 96 74 - 99 mg/dL    Sodium 136 136 - 145 mmol/L    Potassium 5.0 3.5 - 5.3 mmol/L    Chloride 104 98 - 107 mmol/L    Bicarbonate 24 21 - 32 mmol/L    Anion Gap 13 10 - 20 mmol/L    Urea Nitrogen 23 6 - 23 mg/dL    Creatinine 0.65 0.50 -  1.05 mg/dL    eGFR >90 >60 mL/min/1.73m*2    Calcium 8.0 (L) 8.6 - 10.6 mg/dL    Phosphorus 2.8 2.5 - 4.9 mg/dL    Albumin 2.4 (L) 3.4 - 5.0 g/dL   CBC and Auto Differential   Result Value Ref Range    WBC 4.0 (L) 4.4 - 11.3 x10*3/uL    nRBC 22.3 (H) 0.0 - 0.0 /100 WBCs    RBC 2.58 (L) 4.00 - 5.20 x10*6/uL    Hemoglobin 8.0 (L) 12.0 - 16.0 g/dL    Hematocrit 25.4 (L) 36.0 - 46.0 %    MCV 98 80 - 100 fL    MCH 31.0 26.0 - 34.0 pg    MCHC 31.5 (L) 32.0 - 36.0 g/dL    RDW 18.5 (H) 11.5 - 14.5 %    Platelets 103 (L) 150 - 450 x10*3/uL    Immature Granulocytes %, Automated 3.0 (H) 0.0 - 0.9 %    Immature Granulocytes Absolute, Automated 0.12 0.00 - 0.50 x10*3/uL   Type and Screen   Result Value Ref Range    ABO TYPE O     Rh TYPE POS     ANTIBODY SCREEN NEG    Manual Differential   Result Value Ref Range    Neutrophils %, Manual 90.6 40.0 - 80.0 %    Lymphocytes %, Manual 0.0 13.0 - 44.0 %    Monocytes %, Manual 3.9 2.0 - 10.0 %    Eosinophils %, Manual 0.0 0.0 - 6.0 %    Basophils %, Manual 0.0 0.0 - 2.0 %    Metamyelocytes %, Manual 0.8 0.0 - 0.0 %    Myelocytes %, Manual 1.6 0.0 - 0.0 %    Promyelocytes %, Manual 3.1 0.0 - 0.0 %    Seg Neutrophils Absolute, Manual 3.62 1.60 - 5.00 x10*3/uL    Lymphocytes Absolute, Manual 0.00 (L) 0.80 - 3.00 x10*3/uL    Monocytes Absolute, Manual 0.16 0.05 - 0.80 x10*3/uL    Eosinophils Absolute, Manual 0.00 0.00 - 0.40 x10*3/uL    Basophils Absolute, Manual 0.00 0.00 - 0.10 x10*3/uL    Metamyelocytes Absolute, Manual 0.03 0.00 - 0.00 x10*3/uL    Myelocytes Absolute, Manual 0.06 0.00 - 0.00 x10*3/uL    Promyelocytes Absolute, Manual 0.12 0.00 - 0.00 x10*3/uL    Total Cells Counted 128     RBC Morphology No significant RBC morphology present    POCT GLUCOSE   Result Value Ref Range    POCT Glucose 96 74 - 99 mg/dL   POCT GLUCOSE   Result Value Ref Range    POCT Glucose 97 74 - 99 mg/dL                                                                             Imaging            02/24/24 MBS:  Oral Phase:  WFL     Adequate bolus acceptance, formation, and transit w/ all consistencies assessed.       -Pt does note significant odynophagia (pain orally and pharyngeally) with all PO intake.     Pharyngeal Phase:  Moderate/severe deficits     Tissue protrusion within hypopharynx impacting bolus flow.  No nasal regurgitation on this assessment, but backflow of boluses observed.  Hyolaryngeal elevation/excursion impaired.  Reduced airway protection during the swallow resulting in deep penetration to the vocal cords w/ all liquid consistencies, and eventual trace aspiration of thin and mildly thick liquids.  Independent cough/throat clear, but this did not clear aspirated material. Pharyngeal stasis at the valleculae and piriforms following the swallow with all liquid consistencies.  Aspiration in min-mod amounts following the swallow with thin and mildly thick liquids as a result of residue.  Again, cough response did not clear.  Chin tuck and head turn R did not improve safety or efficiency of swallow.  Head turn L (toward side of mass) improved swallow partially, but did not entirely resolve deficits.  With most viscous consistency assessed (puree) , there was no penetration/aspiration or significant stasis.       Esophageal Phase:  No overt deficits                                                                           GI Procedures      02/2021 EGD:  Impression:     - Z-line, 39 cm from the incisors.                         - Normal esophagus.                         - 3 parallel streaks of hemorrhagic appearance and                          linear erosions mucosa in the gastric body. Biopsied.                         - Normal examined duodenum.                         - Unclear if the gastric lesions are her sole source                          of bleeding as not seen on two previous endoscopie                          over past 6 months with recurrent UGI bleeding.     ASSESSMENT / PLAN                   ASSESSMENT/PLAN:     Amita Todd is a 78 y.o. 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      GI is consulted for PEG tube assessment.    Last dose of Plavix was on the 2/25/24.  Initially planned ot place PEG tube placement on 03/01/2024 after Plavix washout. However patient developed bacteremia followed by neutropenia. Gastroenterology team re-engaged on 3/8 for PEG tube placement with improved WBC counts and no longer on Filgrastim     Recommendations:  - Plan for PEG tube placement on 3/12   - Hold Plavix for 5 day prior to PEG tube placement.  - Hold tube feeds   - please hold all heparin products (including prophylaxis) at 4AM day prior to procedure  - surgical prophylaxis of cefazolin 2 g IV 30 minutes prior to surgical incision (for nonobese patients weighing <120 kg)      Patient was seen and discussed with Dr. Brannon     Gastroenterology will continue to follow.  During weekday hours of 7am-5pm please do not hesitate to contact me on [employer] Chat or page [phone], if there are any further questions between the weekday hours of 7am-5pm.   After hours, on weekends, and on holidays, please [place] [phone]. Thank you.     Danish Manjarrez MD  PGY-4 Gastroenterology and Hepatology Fellow  [facility]

## 2024-03-08 NOTE — CONSULTS
Vancomycin Dosing by Pharmacy- Cessation of Therapy    Consult to pharmacy for vancomycin dosing has been discontinued by the prescriber, pharmacy will sign off at this time.    Please call pharmacy if there are further questions or re-enter a consult if vancomycin is resumed.     Se HillD

## 2024-03-08 NOTE — PROGRESS NOTES
"Amita Todd is a 78 y.o. female on day 15 of admission presenting with Thrombocytopenia (CMS/HCC).    Subjective   NAEO. Patient says that her breathing is about the same today as It was yesterday. Still coughing some stuff up that is brown/yellow and occasionally tinged w/ red. Still having some pain with swallowing, but says this is better today because she didn't have radiation yesterday.        Objective   Constitutional:       Comments: underweight   HENT:      Mouth/Throat:      Mouth: Mucous membranes are dry. improved well circumscribed plaques.  +DH with TF  Eyes:      Pupils: Pupils are equal, round, and reactive to light.   Cardiovascular:      Rate and Rhythm: Irregular rhythm. Tachycardia present.      Pulses: Normal pulses.   Pulmonary:      Comments: mild crackles B/L, RUL>AGATHA. On RA  Abdominal:      General: Abdomen is flat. Bowel sounds are normal.      Palpations: Abdomen is soft.   Musculoskeletal:         General: Normal range of motion.      Cervical back: Normal range of motion.      Comments: 1+ pitting edema B/L LE till ankles   Skin:     General: Skin is warm.      Capillary Refill: Capillary refill takes less than 2 seconds.      Findings: Bruising present.      Comments: Petechia over trunk; erythematous and scaly skin noted over neck and upper chest   Neurological:      General: No focal deficit present.      Mental Status: She is orietable, but delirious   Psychiatric:         Mood and Affect: Mood normal.       Last Recorded Vitals  Blood pressure 137/87, pulse 82, temperature 36.7 °C (98.1 °F), temperature source Temporal, resp. rate 20, height 1.635 m (5' 4.37\"), weight 68 kg (149 lb 14.6 oz), SpO2 100 %.  Intake/Output last 3 Shifts:  I/O last 3 completed shifts:  In: 1090 (16 mL/kg) [NG/GT:690; IV Piggyback:400]  Out: 1550 (22.8 mL/kg) [Urine:1550 (0.6 mL/kg/hr)]  Weight: 68 kg     Relevant Results  Scheduled medications  acetaminophen, 1,000 mg, nasogastric tube, " q6h  ascorbic acid, 500 mg, nasogastric tube, Daily  atorvastatin, 20 mg, nasogastric tube, Daily  buPROPion, 150 mg, nasogastric tube, BID  cholecalciferol, 1,000 Units, nasogastric tube, BID  dapagliflozin propanediol, 10 mg, oral, Daily  enoxaparin, 40 mg, subcutaneous, q24h  esomeprazole, 40 mg, nasoduodenal tube, Daily before breakfast  fluconazole, 400 mg, oral, Daily  lidocaine, 1 Application, Topical, Once  losartan, 25 mg, nasogastric tube, Daily  [Held by provider] magnesium oxide, 400 mg, nasogastric tube, Daily  melatonin, 10 mg, oral, Nightly  metoprolol tartrate, 50 mg, nasogastric tube, BID  multivitamin with iron-minerals, 15 mL, nasogastric tube, Daily  perflutren lipid microspheres, 0.5-10 mL of dilution, intravenous, Once in imaging  perflutren protein A microsphere, 0.5 mL, intravenous, Once in imaging  piperacillin-tazobactam, 4.5 g, intravenous, q6h  QUEtiapine, 12.5 mg, oral, Nightly  senna, 10 mL, nasogastric tube, BID  spironolactone, 12.5 mg, nasogastric tube, Daily  sulfur hexafluoride microsphr, 2 mL, intravenous, Once in imaging  vancomycin, 1,000 mg, intravenous, q12h  venlafaxine, 75 mg, nasogastric tube, BID with meals      Continuous medications     PRN medications  PRN medications: alteplase, alteplase, guaiFENesin, lidocaine-diphenhydraMINE-Maalox 1:1:1, loperamide, OLANZapine, ondansetron, oxygen, phenoL, prochlorperazine, vancomycin  Results for orders placed or performed during the hospital encounter of 02/22/24 (from the past 24 hour(s))   BLOOD GAS ARTERIAL FULL PANEL   Result Value Ref Range    POCT pH, Arterial 7.46 (H) 7.38 - 7.42 pH    POCT pCO2, Arterial 33 (L) 38 - 42 mm Hg    POCT pO2, Arterial 85 85 - 95 mm Hg    POCT SO2, Arterial 99 94 - 100 %    POCT Oxy Hemoglobin, Arterial 96.1 94.0 - 98.0 %    POCT Hematocrit Calculated, Arterial 33.0 (L) 36.0 - 46.0 %    POCT Sodium, Arterial 132 (L) 136 - 145 mmol/L    POCT Potassium, Arterial 5.2 3.5 - 5.3 mmol/L    POCT  Chloride, Arterial 105 98 - 107 mmol/L    POCT Ionized Calcium, Arterial 1.20 1.10 - 1.33 mmol/L    POCT Glucose, Arterial 128 (H) 74 - 99 mg/dL    POCT Lactate, Arterial 1.4 0.4 - 2.0 mmol/L    POCT Base Excess, Arterial 0.1 -2.0 - 3.0 mmol/L    POCT HCO3 Calculated, Arterial 23.5 22.0 - 26.0 mmol/L    POCT Hemoglobin, Arterial 11.0 (L) 12.0 - 16.0 g/dL    POCT Anion Gap, Arterial 9 (L) 10 - 25 mmo/L    Patient Temperature 37.0 degrees Celsius    FiO2 24 %   Lactate   Result Value Ref Range    Lactate 1.5 0.4 - 2.0 mmol/L   Procalcitonin   Result Value Ref Range    Procalcitonin 0.11 (H) <=0.07 ng/mL   Blood Culture    Specimen: Peripheral Venipuncture; Blood culture   Result Value Ref Range    Blood Culture Loaded on Instrument - Culture in progress    Blood Culture    Specimen: Peripheral Venipuncture; Blood culture   Result Value Ref Range    Blood Culture Loaded on Instrument - Culture in progress    Streptococcus pneumoniae Antigen, Urine    Specimen: Urine   Result Value Ref Range    Streptococcus pneumoniae Ag, Urine Positive (A) Negative   Legionella Antigen, Urine    Specimen: Urine   Result Value Ref Range    L. pneumophila Urine Ag Negative Negative   POCT GLUCOSE   Result Value Ref Range    POCT Glucose 102 (H) 74 - 99 mg/dL   POCT GLUCOSE   Result Value Ref Range    POCT Glucose 92 74 - 99 mg/dL   POCT GLUCOSE   Result Value Ref Range    POCT Glucose 107 (H) 74 - 99 mg/dL   Vancomycin   Result Value Ref Range    Vancomycin 16.3 5.0 - 20.0 ug/mL   Magnesium   Result Value Ref Range    Magnesium 2.22 1.60 - 2.40 mg/dL   Renal Function Panel   Result Value Ref Range    Glucose 96 74 - 99 mg/dL    Sodium 136 136 - 145 mmol/L    Potassium 5.0 3.5 - 5.3 mmol/L    Chloride 104 98 - 107 mmol/L    Bicarbonate 24 21 - 32 mmol/L    Anion Gap 13 10 - 20 mmol/L    Urea Nitrogen 23 6 - 23 mg/dL    Creatinine 0.65 0.50 - 1.05 mg/dL    eGFR >90 >60 mL/min/1.73m*2    Calcium 8.0 (L) 8.6 - 10.6 mg/dL    Phosphorus 2.8  2.5 - 4.9 mg/dL    Albumin 2.4 (L) 3.4 - 5.0 g/dL   CBC and Auto Differential   Result Value Ref Range    WBC 4.0 (L) 4.4 - 11.3 x10*3/uL    nRBC 22.3 (H) 0.0 - 0.0 /100 WBCs    RBC 2.58 (L) 4.00 - 5.20 x10*6/uL    Hemoglobin 8.0 (L) 12.0 - 16.0 g/dL    Hematocrit 25.4 (L) 36.0 - 46.0 %    MCV 98 80 - 100 fL    MCH 31.0 26.0 - 34.0 pg    MCHC 31.5 (L) 32.0 - 36.0 g/dL    RDW 18.5 (H) 11.5 - 14.5 %    Platelets 103 (L) 150 - 450 x10*3/uL    Immature Granulocytes %, Automated 3.0 (H) 0.0 - 0.9 %    Immature Granulocytes Absolute, Automated 0.12 0.00 - 0.50 x10*3/uL   Manual Differential   Result Value Ref Range    Neutrophils %, Manual 90.6 40.0 - 80.0 %    Lymphocytes %, Manual 0.0 13.0 - 44.0 %    Monocytes %, Manual 3.9 2.0 - 10.0 %    Eosinophils %, Manual 0.0 0.0 - 6.0 %    Basophils %, Manual 0.0 0.0 - 2.0 %    Metamyelocytes %, Manual 0.8 0.0 - 0.0 %    Myelocytes %, Manual 1.6 0.0 - 0.0 %    Promyelocytes %, Manual 3.1 0.0 - 0.0 %    Seg Neutrophils Absolute, Manual 3.62 1.60 - 5.00 x10*3/uL    Lymphocytes Absolute, Manual 0.00 (L) 0.80 - 3.00 x10*3/uL    Monocytes Absolute, Manual 0.16 0.05 - 0.80 x10*3/uL    Eosinophils Absolute, Manual 0.00 0.00 - 0.40 x10*3/uL    Basophils Absolute, Manual 0.00 0.00 - 0.10 x10*3/uL    Metamyelocytes Absolute, Manual 0.03 0.00 - 0.00 x10*3/uL    Myelocytes Absolute, Manual 0.06 0.00 - 0.00 x10*3/uL    Promyelocytes Absolute, Manual 0.12 0.00 - 0.00 x10*3/uL    Total Cells Counted 128     RBC Morphology No significant RBC morphology present    POCT GLUCOSE   Result Value Ref Range    POCT Glucose 96 74 - 99 mg/dL         This patient has a urinary catheter   Reason for the urinary catheter remaining today? critically ill patient who need accurate urinary output measurements               Assessment/Plan   Principal Problem:    Thrombocytopenia (CMS/HCC)  79yo F w PMHx squamous cell carcinoma of hypopharynx, HFpEF, CAD s/p PCI, Afib s/p watchman, GERD, depression, HTN, AUD,  ascending aortic aneurysm, distant breast cancer (s/p chemoradiotherapy and lumpectomy) who was directed to ED for hypotension which resolved. Currently treating E.Coli bacteremia with ceftriaxone (ID following), and plan to have EGD (assess esophagitis infectious vs radiation) + PEG (for nutrition); however ANC less than goal. Started neupogen (3/2) d/t low ANC, infection and procedure goal for ANC > 1000.     3/8 updates:   - discontinue vancomycin; low concern for true aspiration pneumonia, more likely aspiration pneumonitis  - Continue zosyn until 3/9, coverage for E coli bacteremia, as well as aspiration event  - Wean O2 as able  - Discuss with GI for possible PEG on Monday 3/11 now that ANC improved  - WBC 4.0 today, 90% PMN, will stop filgrastim     #Acute hypoxic respiratory failure  :: Rapid called early AM 3/7 for hypoxia requiring 4L O2, CXR showing possible RLL infiltrate concerning for aspiration pneumonia vs. Pneumonitis  Plan:   - Rapid overnight for hypoxia, again this AM  - Strep antigen positive, legionella antigen negative  - Vanc discontinued (3/7-3/8)  - Continue zosyn (3/7-3/9)  -MRSA nares pending  - Lactate normal  - Blood cx x2 collected; NGTD  - Sputum cx ordered   - Wean O2 as able      #Hypotension, improved   #HFpEF, initial c/f ADHF; now resolved  ::Patient presents with hypotension over the past month in oncology clinic, has received IVF and discontinued home losartan/amlodipine during this time, BP spontaneously improving without intervention today to 100s/70s, appears grossly dry to euvolemic on exam without any symptoms of acute heart failure + positive orthostats in ED  ::BNP 2/22: 229 - appears to be at baseline  ::TTE 7/2023: EF 55-60% // TTE 2/2024 EF 50%  - Patient likely has less PO intake and with chemo does not need as many antihyptertensives/GDMT medication dose - emailed primary cardiologist  - Medications given via Dobhoff  - Continue Aldactone 12.5mg // Metoprolol  Tartrate 50mg BID // Losartan 25mg // Dapagliflozin 10mg   - Atorvostatin 20mg     #E coli bacteremia  :: Bcx from 2/29 growing E coli sensitive to ceftriaxone; Bcx from 3/2 NGTD  - ID consulted, appreciate recs:  - Given collection through mediport but no symptoms of sepsis, would treat w/ 10 days of ceftriaxone 2g q24hr infused through mediport  - Lock solution into catheter after infusion is complete   - CTX 2g q24 hr course 10 days; 3/1-3/9 planned; changed on 3/7 to vanc + zosyn as above  - Now just on zosyn, will finish course 3/9     #SCC of Hypopharynx (Dr. Burkitt)  #Pain  #Delerium, improved   ::SLP librado 2/12 recommended soft solids with thin liquids, patient would prefer oral feeding to PEG   - nutrition recs appreciated: 100mg IV thiamine x7 days,  Isosource 1.5 at goal [50cc/hr]  - c/w home Guaifenesin  - c/w Zofran prn nausea, Compazine second line  - Holding opioid 2/2 previous delirium  - delirium precautions  - patient tolerating seroquel 12.5mg at bedtime for sleep and agitation, delirium improving   - Zyprexa 2.5mg PO q8hrs for agitation      #Oropharyngeal candidiasis  #Dysphagia  ::Moderate to severe candidiasis with multiple large lesions  ::given ANC 0.16, consideration for opportunistic infections present. Entended antifungal course  -discuss with GI possibility of PEG on 3/11 now that ANC improved  - filgrastim daily until ANC>1000; discontinued 3/8  - Fluconazole 200mg IV (2/22 - xx), typically for 7 days but continue given neutropenia and candidiasis; plan for 21 day course total; IV 2/22-3/6, plan for 400mg PO 3/7-3/13  - Oral care per nursing     #Pancytopenia, improved   ::ANC 0.16 // Plts 53 on admission with no concern for bleeding // HB 10.9 on presentation (BL 12-13)  - likely d/t myelosuppresion vs though patient has had poor po intake vs mixed  - Retic count 0.6% (fraction 1.8%) indicative of poor marrow response   - filgrastim daily until ANC >1000  - Daily CBC  - T&S updated,  transfuse for active bleeding <50, or for <10  - continue lovenox for DVT ppx   - holding clopidogrel (for EGD + peg in future)     #Normocytic Anemia  ::HB 10.9 on presentation (BL 12-13), no active bleeding, no recent iron studies  - Likely d/t chronic dz vs myelosuppresion, though patient has had poor po intake     #Atrial Fibrillation  ::Patient previously on AC, now s/p watchman procedure and is rate controlled   ::had 5 ablations per patient  - Continue metoprolol tartrate 50mg BID  - HR 90s to 120s     #Hx of TIA  - holding home Plavix 75mg daily     #GERD  - c/w home Pantoprazole     #Mood Disorder  #Misc  - c/w home Cymbalta and Wellbutrin  - c/w home supplements, mag-ox     F : caution, was diuresing. HfpEF  E: replete lytes prn, K>4, Mg >2  N: isosource 1.5 via dobhoff  A: PIVs     DVT prophylaxis: SCDs  GI prophylaxis: home PPI     Code Status: DNR/DNI       Baudilio Turcios MD

## 2024-03-08 NOTE — PROGRESS NOTES
Spiritual Care Visit      checked in with the patient's family members outside of the room and they expressed no needs at this time.  encouraged them to reach out with any needs.     Rev. Maik Pham, Supportive Oncology

## 2024-03-08 NOTE — PROGRESS NOTES
Occupational Therapy    OT Treatment    Patient Name: Amita Todd  MRN: [id number]  Today's Date: 3/8/2024  Time Calculation  Start Time: 1311  Stop Time: 1326  Time Calculation (min): 15 min         Assessment:  Prognosis: Fair  Barriers to Discharge:  (medical barriers)  Evaluation/Treatment Tolerance: Patient limited by fatigue  Medical Staff Made Aware: Yes  End of Session Communication: Bedside nurse  End of Session Patient Position: Bed, 3 rail up, Alarm on  OT Assessment Results: Decreased ADL status, Decreased upper extremity strength, Decreased cognition, Decreased endurance, Decreased functional mobility  Prognosis: Fair  Barriers to Discharge:  (medical barriers)  Evaluation/Treatment Tolerance: Patient limited by fatigue  Medical Staff Made Aware: Yes  Strengths: Support and attitude of living partners  Barriers to Participation: Comorbidities  Plan:  Treatment Interventions: ADL retraining, Functional transfer training, UE strengthening/ROM, Endurance training, Patient/family training, Equipment evaluation/education, Compensatory technique education  OT Frequency: 2 times per week  OT Discharge Recommendations: Moderate intensity level of continued care  Equipment Recommended upon Discharge:  (will continue to assess)  OT Recommended Transfer Status:  (mechanical lift)  OT - OK to Discharge: Yes  Treatment Interventions: ADL retraining, Functional transfer training, UE strengthening/ROM, Endurance training, Patient/family training, Equipment evaluation/education, Compensatory technique education    Subjective   Previous Visit Info:  OT Last Visit  OT Received On: 03/08/24  General:  General  Reason for Referral: Admitted 2/22 after radiation due to hypotension; dx: hypotension, subacute vs. Chronic Left common femoral DVT, neutropenia, thrombocytopenia, dysphagia  Past Medical History Relevant to Rehab: PMHx: hypopharangeal squamous cell carcinoma, anemia, ascending aorta aneurysm, Afib/Afuter.  CKD, CAD, dpression, GERD, HTN, MI, hp burisitis, TIA, PVD, heart failure, ETOH dependence (in remission), dysphagia  Family/Caregiver Present: Yes  Caregiver Feedback: dtr and sister present during session, receptive and supportive of therapy  Co-Treatment: PT  Co-Treatment Reason: Co-treatment with PT for pt safety and to optimize pt's therapeutic potential  Prior to Session Communication: Bedside nurse  Patient Position Received: Bed, 3 rail up, Alarm on  Preferred Learning Style: visual, verbal  General Comment: Pt agreeable to therapy, however highly lethargic and speech overall incomprehensible at times. Highly limited activity tolerance this date.  Precautions:  Medical Precautions: Fall precautions (protective precautions)  Precautions Comment: alf feliz, IV, tele, 3L NC  Vital Signs:  Vital Signs  SpO2:  (pulse ox on toe reading 93% on 3L)  Pain:  Pain Assessment  Pain Assessment: 0-10  Pain Score: 0 - No pain    Objective    Cognition:  Cognition  Overall Cognitive Status: Impaired  Arousal/Alertness: Delayed responses to stimuli  Orientation Level: Disoriented to time, Disoriented to situation (limited assessment, highly lethargic and speech dysathric at times. Able to state her name and that she was in the hospital)  Following Commands:  (follows 100% of 1 step commands with increased time and repetition)  Safety Judgment: Decreased awareness of need for safety precautions  Problem Solving:  (asssitance required to identify, generate and implement solutions)  Processing Speed: Delayed    Activities of Daily Living: Feeding  Feeding Adaptive Equipment:  (dobhoff)  UE Dressing  UE Dressing Level of Assistance: Maximum assistance  UE Dressing Where Assessed: Bed level  UE Dressing Comments: gown management  LE Dressing  LE Dressing: Yes  Sock Level of Assistance: Dependent  LE Dressing Where Assessed: Bed level  Toileting  Toileting Level of Assistance: Dependent  Where Assessed: Bed level  Toileting  Comments: Pt incontinent this session, requires total A for bed level suzanne care    Bed Mobility/Transfers: Bed Mobility  Bed Mobility: Yes  Bed Mobility 1  Bed Mobility 1: Rolling right, Rolling left  Level of Assistance 1: Maximum assistance, Maximum verbal cues (x2)  Bed Mobility Comments 1: Facilitated multiple rolling attempts at bed level for suzanne care, verbal/tactile cueing for mechanics and use of bed rails to assist. Pt highly faitgued/groggy following rolling and politely declined attempting to sit EOB this date    Transfers  Transfer: No (Pt declined 2/2 fatigue, lethargic)    Outcome Measures:[facility] Daily Activity  Putting on and taking off regular lower body clothing: Total  Bathing (including washing, rinsing, drying): Total  Putting on and taking off regular upper body clothing: A lot  Toileting, which includes using toilet, bedpan or urinal: Total  Taking care of personal grooming such as brushing teeth: A lot  Eating Meals: A lot  Daily Activity - Total Score: 9     and Brief Confusion Assessment Method (bCAM)  CAM Result: CAM +    Education Documentation  Body Mechanics, taught by Jackelyn Grier OT at 3/8/2024  2:36 PM.  Learner: Family, Patient  Readiness: Acceptance  Method: Explanation, Demonstration  Response: Needs Reinforcement    ADL Training, taught by Jackelyn Grier OT at 3/8/2024  2:36 PM.  Learner: Family, Patient  Readiness: Acceptance  Method: Explanation, Demonstration  Response: Needs Reinforcement    EDUCATION:  Education  Individual(s) Educated: Patient (family)  Education Provided: Diagnosis & Precautions, Fall precautons, Risk and benefits of OT discussed with patient or other, POC discussed and agreed upon  Patient Response to Education:  (family verbalized understanding)    Goals:  Encounter Problems       Encounter Problems (Active)       ADLs       Patient with complete upper body dressing with minimal assist  level of assistance donning and doffing all UE clothes with  PRN adaptive equipment (Progressing)       Start:  03/08/24    Expected End:  03/22/24            Patient will complete daily grooming tasks with minimal assist  level of assistance and PRN adaptive equipment while edge of bed . (Progressing)       Start:  03/08/24    Expected End:  03/22/24               BALANCE       Patientt will maintain static sitting balance during ADL task with contact guard assist level of assistance drop down in order to demonstrate decreased risk of falling and improved postural control. (Not Progressing)       Start:  03/08/24    Expected End:  03/22/24               COGNITION/SAFETY       OT-Patient will use ICU/hospital diary with independent level of assistance  to allow improved recall of hospital events. (Not Progressing)       Start:  02/26/24    Expected End:  03/22/24               EXERCISE/STRENGTHENING       OT-Patient with increase BUE to 4/5 strength. (Not Progressing)       Start:  02/26/24    Expected End:  03/22/24               TRANSFERS       Patient will perform bed mobility moderate assist level of assistance and bed rails in order to improve safety and independence with mobility (Progressing)       Start:  03/08/24    Expected End:  03/22/24

## 2024-03-08 NOTE — PROGRESS NOTES
"Physical Therapy    Physical Therapy Treatment    Patient Name: Amita Todd  MRN: [id number]  Today's Date: 3/8/2024  Time Calculation  Start Time: 1310  Stop Time: 1326  Time Calculation (min): 16 min       Assessment/Plan   PT Assessment  End of Session Communication: Bedside nurse  End of Session Patient Position: Bed, 3 rail up, Alarm off, caregiver present  PT Plan  Inpatient/Swing Bed or Outpatient: Inpatient  PT Plan  Treatment/Interventions: Bed mobility, Transfer training, Gait training, Stair training, Balance training, Neuromuscular re-education, Strengthening, Endurance training, Therapeutic exercise, Therapeutic activity, Home exercise program, Postural re-education, Positioning  PT Plan: Skilled PT  PT Frequency: 3 times per week  PT Discharge Recommendations: Moderate intensity level of continued care  Equipment Recommended upon Discharge:  (family to buy commode and move bed to first floor, buying bed rails)  PT Recommended Transfer Status: Assist x2  PT - OK to Discharge: Yes      General Visit Information:   PT  Visit  PT Received On: 03/08/24  Co-Treatment: OT  Co-Treatment Reason: to maximize safe pt mobility, participation. low AMPAC < 10  Prior to Session Communication: Bedside nurse  Patient Position Received: Bed, 3 rail up, Alarm off, caregiver present  Family/Caregiver Present: Yes  Caregiver Feedback: dtr and sister present  General Comment: pt supine upon arrival. tired appearing. soft spoken. pt intermittently closing eyes during session. when removing covers pt appeared to have BM. time taken to complete rolling and performing pericare.     Subjective   Precautions:  Precautions  Medical Precautions: Fall precautions (protective prec)  Vital Signs:  Vital Signs  SpO2:  (from pulse ox on toe 93%.)    Objective   Pain:  Pain Assessment  Pain Score: 0 - No pain  Cognition:  Cognition  Orientation Level:  (difficult to assess; pt stated \"hospital\" for " location)  Lines/Tubes/Drains:  Implantable Port 02/13/24 Right Chest Single lumen port (Active)   Number of days: 24       Urethral Catheter Double-lumen 18 Fr. (Active)   Number of days: 6       NG/OG/Feeding Tube Gastric Left nostril 12 Fr. (Active)   Number of days: 9       PT Treatments:  Therapeutic Exercise  Therapeutic Exercise Performed: Yes  Therapeutic Exercise Activity 1: 1x10 PROM APs  Therapeutic Activity  Therapeutic Activity Performed: Yes  Therapeutic Activity 1: educated sister on PROM to complete in supine with proper body mechancis: heel slides, APs, and SLR  Therapeutic Activity 2: extended time rolling R/L performing pericare     Bed Mobility  Bed Mobility: Yes  Bed Mobility 1  Bed Mobility 1: Rolling right, Rolling left  Level of Assistance 1: Maximum assistance, Maximum verbal cues (x2)  Bed Mobility 2  Bed Mobility  2: Scooting  Level of Assistance 2: Dependent (x2)  Bed Mobility Comments 2: boost up in bed  Ambulation/Gait Training  Ambulation/Gait Training Performed: No  Transfers  Transfer: No             Outcome Measures:  [facility] Basic Mobility  Turning from your back to your side while in a flat bed without using bedrails: A lot  Moving from lying on your back to sitting on the side of a flat bed without using bedrails: A lot  Moving to and from bed to chair (including a wheelchair): Total  Standing up from a chair using your arms (e.g. wheelchair or bedside chair): Total  To walk in hospital room: Total  Climbing 3-5 steps with railing: Total  Basic Mobility - Total Score: 8                            Education Documentation  Precautions, taught by Angelina Sifuentes, PT at 3/8/2024  2:18 PM.  Learner: Family, Patient  Readiness: Acceptance  Method: Explanation  Response: Needs Reinforcement    Body Mechanics, taught by Angelina Sifuentes, PT at 3/8/2024  2:18 PM.  Learner: Family, Patient  Readiness: Acceptance  Method: Explanation  Response: Needs Reinforcement    Mobility  Training, taught by Angelina Sifuentes, PT at 3/8/2024  2:18 PM.  Learner: Family, Patient  Readiness: Acceptance  Method: Explanation  Response: Needs Reinforcement    Education Comments  No comments found.          OP EDUCATION:       Encounter Problems       Encounter Problems (Active)       General Goals       ind with HEP supine, sitting HEP with supervisoin (Not Progressing)       Start:  02/26/24    Expected End:  03/15/24            supine to/from sit, HOB elevated no rail, modified independent (negative orthostatic hypotension, no dizziness) (Progressing)       Start:  02/26/24    Expected End:  03/15/24               Mobility       sit to/from stand, bed to/from chair with LRAD and supervision, negative for orthostatic hypotension, no LOB (Not Progressing)       Start:  02/26/24    Expected End:  03/15/24            ambulate 250' with LRAD and supervision, stable vitals, no LOB (Not Progressing)       Start:  02/26/24    Expected End:  03/15/24            up/down 12 steps with 1 rail with SBA (Not Progressing)       Start:  02/26/24    Expected End:  03/15/24               Pain - Adult                03/08/24 at 2:19 PM   Angelina Sifuentes, PT   Rehab Office: [phone]

## 2024-03-09 ENCOUNTER — APPOINTMENT (OUTPATIENT)
Dept: RADIOLOGY | Facility: HOSPITAL | Age: 79
DRG: 314 | End: 2024-03-09
Payer: MEDICARE

## 2024-03-09 LAB
ALBUMIN SERPL BCP-MCNC: 2.2 G/DL (ref 3.4–5)
ANION GAP SERPL CALC-SCNC: 13 MMOL/L (ref 10–20)
BASOPHILS # BLD MANUAL: 0 X10*3/UL (ref 0–0.1)
BASOPHILS NFR BLD MANUAL: 0 %
BUN SERPL-MCNC: 21 MG/DL (ref 6–23)
CALCIUM SERPL-MCNC: 7.8 MG/DL (ref 8.6–10.6)
CHLORIDE SERPL-SCNC: 105 MMOL/L (ref 98–107)
CO2 SERPL-SCNC: 24 MMOL/L (ref 21–32)
CREAT SERPL-MCNC: 0.58 MG/DL (ref 0.5–1.05)
EGFRCR SERPLBLD CKD-EPI 2021: >90 ML/MIN/1.73M*2
EOSINOPHIL # BLD MANUAL: 0 X10*3/UL (ref 0–0.4)
EOSINOPHIL NFR BLD MANUAL: 0 %
ERYTHROCYTE [DISTWIDTH] IN BLOOD BY AUTOMATED COUNT: 18.6 % (ref 11.5–14.5)
GLUCOSE BLD MANUAL STRIP-MCNC: 113 MG/DL (ref 74–99)
GLUCOSE BLD MANUAL STRIP-MCNC: 122 MG/DL (ref 74–99)
GLUCOSE BLD MANUAL STRIP-MCNC: 122 MG/DL (ref 74–99)
GLUCOSE BLD MANUAL STRIP-MCNC: 133 MG/DL (ref 74–99)
GLUCOSE BLD MANUAL STRIP-MCNC: 98 MG/DL (ref 74–99)
GLUCOSE SERPL-MCNC: 95 MG/DL (ref 74–99)
HCT VFR BLD AUTO: 24.9 % (ref 36–46)
HGB BLD-MCNC: 7.8 G/DL (ref 12–16)
IMM GRANULOCYTES # BLD AUTO: 0.48 X10*3/UL (ref 0–0.5)
IMM GRANULOCYTES NFR BLD AUTO: 9.2 % (ref 0–0.9)
LYMPHOCYTES # BLD MANUAL: 0.08 X10*3/UL (ref 0.8–3)
LYMPHOCYTES NFR BLD MANUAL: 1.6 %
MAGNESIUM SERPL-MCNC: 2.16 MG/DL (ref 1.6–2.4)
MCH RBC QN AUTO: 31.1 PG (ref 26–34)
MCHC RBC AUTO-ENTMCNC: 31.3 G/DL (ref 32–36)
MCV RBC AUTO: 99 FL (ref 80–100)
METAMYELOCYTES # BLD MANUAL: 0.08 X10*3/UL
METAMYELOCYTES NFR BLD MANUAL: 1.6 %
MONOCYTES # BLD MANUAL: 0.17 X10*3/UL (ref 0.05–0.8)
MONOCYTES NFR BLD MANUAL: 3.3 %
MYELOCYTES # BLD MANUAL: 0.17 X10*3/UL
MYELOCYTES NFR BLD MANUAL: 3.3 %
NEUTS SEG # BLD MANUAL: 4.65 X10*3/UL (ref 1.6–5)
NEUTS SEG NFR BLD MANUAL: 89.4 %
NRBC BLD-RTO: 14.5 /100 WBCS (ref 0–0)
PHOSPHATE SERPL-MCNC: 3 MG/DL (ref 2.5–4.9)
PLATELET # BLD AUTO: 93 X10*3/UL (ref 150–450)
POTASSIUM SERPL-SCNC: 4.8 MMOL/L (ref 3.5–5.3)
PROMYELOCYTES # BLD MANUAL: 0.04 X10*3/UL
PROMYELOCYTES NFR BLD MANUAL: 0.8 %
RBC # BLD AUTO: 2.51 X10*6/UL (ref 4–5.2)
RBC MORPH BLD: ABNORMAL
SODIUM SERPL-SCNC: 137 MMOL/L (ref 136–145)
STAPHYLOCOCCUS SPEC CULT: NORMAL
TOTAL CELLS COUNTED BLD: 123
WBC # BLD AUTO: 5.2 X10*3/UL (ref 4.4–11.3)

## 2024-03-09 PROCEDURE — 2500000001 HC RX 250 WO HCPCS SELF ADMINISTERED DRUGS (ALT 637 FOR MEDICARE OP)

## 2024-03-09 PROCEDURE — 82947 ASSAY GLUCOSE BLOOD QUANT: CPT

## 2024-03-09 PROCEDURE — 2500000002 HC RX 250 W HCPCS SELF ADMINISTERED DRUGS (ALT 637 FOR MEDICARE OP, ALT 636 FOR OP/ED)

## 2024-03-09 PROCEDURE — 84132 ASSAY OF SERUM POTASSIUM: CPT

## 2024-03-09 PROCEDURE — 83735 ASSAY OF MAGNESIUM: CPT

## 2024-03-09 PROCEDURE — 2500000004 HC RX 250 GENERAL PHARMACY W/ HCPCS (ALT 636 FOR OP/ED)

## 2024-03-09 PROCEDURE — 85027 COMPLETE CBC AUTOMATED: CPT

## 2024-03-09 PROCEDURE — 80069 RENAL FUNCTION PANEL: CPT

## 2024-03-09 PROCEDURE — 99233 SBSQ HOSP IP/OBS HIGH 50: CPT

## 2024-03-09 PROCEDURE — 1170000001 HC PRIVATE ONCOLOGY ROOM DAILY

## 2024-03-09 PROCEDURE — 85007 BL SMEAR W/DIFF WBC COUNT: CPT

## 2024-03-09 PROCEDURE — 71045 X-RAY EXAM CHEST 1 VIEW: CPT | Performed by: RADIOLOGY

## 2024-03-09 PROCEDURE — 71045 X-RAY EXAM CHEST 1 VIEW: CPT

## 2024-03-09 RX ORDER — FUROSEMIDE 10 MG/ML
40 INJECTION INTRAMUSCULAR; INTRAVENOUS ONCE
Status: COMPLETED | OUTPATIENT
Start: 2024-03-09 | End: 2024-03-09

## 2024-03-09 RX ADMIN — ENOXAPARIN SODIUM 40 MG: 100 INJECTION SUBCUTANEOUS at 09:30

## 2024-03-09 RX ADMIN — BUPROPION HYDROCHLORIDE 150 MG: 100 TABLET, FILM COATED ORAL at 21:30

## 2024-03-09 RX ADMIN — LOSARTAN POTASSIUM 25 MG: 25 TABLET, FILM COATED ORAL at 09:12

## 2024-03-09 RX ADMIN — METOPROLOL TARTRATE 50 MG: 50 TABLET, FILM COATED ORAL at 21:30

## 2024-03-09 RX ADMIN — FUROSEMIDE 40 MG: 10 INJECTION, SOLUTION INTRAMUSCULAR; INTRAVENOUS at 13:14

## 2024-03-09 RX ADMIN — VENLAFAXINE 75 MG: 75 TABLET ORAL at 09:09

## 2024-03-09 RX ADMIN — ESOMEPRAZOLE MAGNESIUM 40 MG: 40 FOR SUSPENSION ORAL at 06:37

## 2024-03-09 RX ADMIN — QUETIAPINE FUMARATE 12.5 MG: 25 TABLET ORAL at 21:30

## 2024-03-09 RX ADMIN — GUAIFENESIN 200 MG: 200 SOLUTION ORAL at 06:37

## 2024-03-09 RX ADMIN — ACETAMINOPHEN 1000 MG: 650 SOLUTION ORAL at 21:29

## 2024-03-09 RX ADMIN — Medication 1000 UNITS: at 21:30

## 2024-03-09 RX ADMIN — SPIRONOLACTONE 12.5 MG: 25 TABLET, FILM COATED ORAL at 09:10

## 2024-03-09 RX ADMIN — ACETAMINOPHEN 1000 MG: 650 SOLUTION ORAL at 04:32

## 2024-03-09 RX ADMIN — ACETAMINOPHEN 1000 MG: 650 SOLUTION ORAL at 09:18

## 2024-03-09 RX ADMIN — PIPERACILLIN SODIUM AND TAZOBACTAM SODIUM 4.5 G: 4; .5 INJECTION, SOLUTION INTRAVENOUS at 06:37

## 2024-03-09 RX ADMIN — PIPERACILLIN SODIUM AND TAZOBACTAM SODIUM 4.5 G: 4; .5 INJECTION, SOLUTION INTRAVENOUS at 00:28

## 2024-03-09 RX ADMIN — ATORVASTATIN CALCIUM 20 MG: 20 TABLET, FILM COATED ORAL at 09:11

## 2024-03-09 RX ADMIN — PIPERACILLIN SODIUM AND TAZOBACTAM SODIUM 4.5 G: 4; .5 INJECTION, SOLUTION INTRAVENOUS at 13:05

## 2024-03-09 RX ADMIN — Medication 10 MG: at 18:18

## 2024-03-09 RX ADMIN — FLUCONAZOLE 400 MG: 200 TABLET ORAL at 09:10

## 2024-03-09 RX ADMIN — METOPROLOL TARTRATE 50 MG: 50 TABLET, FILM COATED ORAL at 09:10

## 2024-03-09 RX ADMIN — BUPROPION HYDROCHLORIDE 150 MG: 100 TABLET, FILM COATED ORAL at 09:07

## 2024-03-09 RX ADMIN — OXYCODONE HYDROCHLORIDE AND ACETAMINOPHEN 500 MG: 500 TABLET ORAL at 09:08

## 2024-03-09 RX ADMIN — MULTIVIT AND MINERALS-FERROUS GLUCONATE 9 MG IRON/15 ML ORAL LIQUID 15 ML: at 09:07

## 2024-03-09 RX ADMIN — OLANZAPINE 2.5 MG: 5 TABLET, FILM COATED ORAL at 13:14

## 2024-03-09 RX ADMIN — VENLAFAXINE 75 MG: 75 TABLET ORAL at 18:18

## 2024-03-09 RX ADMIN — PIPERACILLIN SODIUM AND TAZOBACTAM SODIUM 4.5 G: 4; .5 INJECTION, SOLUTION INTRAVENOUS at 18:18

## 2024-03-09 RX ADMIN — DAPAGLIFLOZIN 10 MG: 10 TABLET, FILM COATED ORAL at 09:07

## 2024-03-09 RX ADMIN — Medication 1000 UNITS: at 09:12

## 2024-03-09 ASSESSMENT — COGNITIVE AND FUNCTIONAL STATUS - GENERAL
EATING MEALS: TOTAL
CLIMB 3 TO 5 STEPS WITH RAILING: TOTAL
TOILETING: TOTAL
DRESSING REGULAR UPPER BODY CLOTHING: TOTAL
DRESSING REGULAR LOWER BODY CLOTHING: TOTAL
MOBILITY SCORE: 8
DAILY ACTIVITIY SCORE: 8
STANDING UP FROM CHAIR USING ARMS: TOTAL
DRESSING REGULAR UPPER BODY CLOTHING: TOTAL
EATING MEALS: A LOT
CLIMB 3 TO 5 STEPS WITH RAILING: TOTAL
HELP NEEDED FOR BATHING: TOTAL
WALKING IN HOSPITAL ROOM: TOTAL
STANDING UP FROM CHAIR USING ARMS: TOTAL
DAILY ACTIVITIY SCORE: 6
MOVING TO AND FROM BED TO CHAIR: TOTAL
TURNING FROM BACK TO SIDE WHILE IN FLAT BAD: A LOT
HELP NEEDED FOR BATHING: TOTAL
PERSONAL GROOMING: TOTAL
PERSONAL GROOMING: A LOT
WALKING IN HOSPITAL ROOM: TOTAL
TURNING FROM BACK TO SIDE WHILE IN FLAT BAD: TOTAL
MOVING FROM LYING ON BACK TO SITTING ON SIDE OF FLAT BED WITH BEDRAILS: A LOT
MOVING FROM LYING ON BACK TO SITTING ON SIDE OF FLAT BED WITH BEDRAILS: A LOT
MOVING TO AND FROM BED TO CHAIR: TOTAL
TOILETING: TOTAL
DRESSING REGULAR LOWER BODY CLOTHING: TOTAL
MOBILITY SCORE: 7

## 2024-03-09 ASSESSMENT — PAIN SCALES - WONG BAKER: WONGBAKER_NUMERICALRESPONSE: NO HURT

## 2024-03-09 ASSESSMENT — PAIN SCALES - GENERAL
PAINLEVEL_OUTOF10: 0 - NO PAIN
PAINLEVEL_OUTOF10: 0 - NO PAIN

## 2024-03-09 NOTE — PROGRESS NOTES
"Amita Todd is a 78 y.o. female on day 16 of admission presenting with Thrombocytopenia (CMS/HCC).    Subjective   NAEO. Patient states her breathing may be a little bit more labored today than yesterday. Overall, she feels well and seems more alert, according to her family. Did have 3 loose bowel movements overnight, but not tequila diarrhea. Denies fevers, chills, nausea, vomiting, abdominal pain.        Objective   Constitutional:       Comments: underweight   HENT:      Mouth/Throat:      Mouth: Mucous membranes are dry. improved well circumscribed plaques.  +DH with TF  Eyes:      Pupils: Pupils are equal, round, and reactive to light.   Cardiovascular:      Rate and Rhythm: Irregular rhythm. Tachycardia present.      Pulses: Normal pulses.   Pulmonary:      Comments: mild crackles B/L, RUL>AGATHA. On RA  Abdominal:      General: Abdomen is flat. Bowel sounds are normal.      Palpations: Abdomen is soft.   Musculoskeletal:         General: Normal range of motion.      Cervical back: Normal range of motion.      Comments: No edema  Skin:     General: Skin is warm.      Capillary Refill: Capillary refill takes less than 2 seconds.      Findings: Bruising present.      Comments: Petechia over trunk; erythematous and scaly skin noted over neck and upper chest   Neurological:      General: No focal deficit present.      Mental Status: She is orietable, but delirious   Psychiatric:         Mood and Affect: Mood normal.       Last Recorded Vitals  Blood pressure 106/66, pulse 66, temperature 36.8 °C (98.2 °F), temperature source Temporal, resp. rate 20, height 1.635 m (5' 4.37\"), weight 68 kg (149 lb 14.6 oz), SpO2 100 %.  Intake/Output last 3 Shifts:  I/O last 3 completed shifts:  In: 1750 (25.7 mL/kg) [NG/GT:1350; IV Piggyback:400]  Out: 1275 (18.8 mL/kg) [Urine:1275 (0.5 mL/kg/hr)]  Weight: 68 kg     Relevant Results  Scheduled medications  acetaminophen, 1,000 mg, nasogastric tube, q6h  ascorbic acid, 500 mg, " nasogastric tube, Daily  atorvastatin, 20 mg, nasogastric tube, Daily  buPROPion, 150 mg, nasogastric tube, BID  cholecalciferol, 1,000 Units, nasogastric tube, BID  dapagliflozin propanediol, 10 mg, oral, Daily  enoxaparin, 40 mg, subcutaneous, q24h  esomeprazole, 40 mg, nasoduodenal tube, Daily before breakfast  fluconazole, 400 mg, oral, Daily  lidocaine, 1 Application, Topical, Once  losartan, 25 mg, nasogastric tube, Daily  [Held by provider] magnesium oxide, 400 mg, nasogastric tube, Daily  melatonin, 10 mg, oral, Nightly  metoprolol tartrate, 50 mg, nasogastric tube, BID  multivitamin with iron-minerals, 15 mL, nasogastric tube, Daily  perflutren lipid microspheres, 0.5-10 mL of dilution, intravenous, Once in imaging  perflutren protein A microsphere, 0.5 mL, intravenous, Once in imaging  piperacillin-tazobactam, 4.5 g, intravenous, q6h  QUEtiapine, 12.5 mg, oral, Nightly  senna, 10 mL, nasogastric tube, BID  spironolactone, 12.5 mg, nasogastric tube, Daily  sulfur hexafluoride microsphr, 2 mL, intravenous, Once in imaging  venlafaxine, 75 mg, nasogastric tube, BID with meals      Continuous medications     PRN medications  PRN medications: alteplase, alteplase, guaiFENesin, lidocaine-diphenhydraMINE-Maalox 1:1:1, loperamide, OLANZapine, ondansetron, oxygen, phenoL, prochlorperazine  Results for orders placed or performed during the hospital encounter of 02/22/24 (from the past 24 hour(s))   POCT GLUCOSE   Result Value Ref Range    POCT Glucose 97 74 - 99 mg/dL   POCT GLUCOSE   Result Value Ref Range    POCT Glucose 106 (H) 74 - 99 mg/dL   POCT GLUCOSE   Result Value Ref Range    POCT Glucose 98 74 - 99 mg/dL   Magnesium   Result Value Ref Range    Magnesium 2.16 1.60 - 2.40 mg/dL   Renal Function Panel   Result Value Ref Range    Glucose 95 74 - 99 mg/dL    Sodium 137 136 - 145 mmol/L    Potassium 4.8 3.5 - 5.3 mmol/L    Chloride 105 98 - 107 mmol/L    Bicarbonate 24 21 - 32 mmol/L    Anion Gap 13 10 - 20  mmol/L    Urea Nitrogen 21 6 - 23 mg/dL    Creatinine 0.58 0.50 - 1.05 mg/dL    eGFR >90 >60 mL/min/1.73m*2    Calcium 7.8 (L) 8.6 - 10.6 mg/dL    Phosphorus 3.0 2.5 - 4.9 mg/dL    Albumin 2.2 (L) 3.4 - 5.0 g/dL   CBC and Auto Differential   Result Value Ref Range    WBC 5.2 4.4 - 11.3 x10*3/uL    nRBC 14.5 (H) 0.0 - 0.0 /100 WBCs    RBC 2.51 (L) 4.00 - 5.20 x10*6/uL    Hemoglobin 7.8 (L) 12.0 - 16.0 g/dL    Hematocrit 24.9 (L) 36.0 - 46.0 %    MCV 99 80 - 100 fL    MCH 31.1 26.0 - 34.0 pg    MCHC 31.3 (L) 32.0 - 36.0 g/dL    RDW 18.6 (H) 11.5 - 14.5 %    Platelets 93 (L) 150 - 450 x10*3/uL    Immature Granulocytes %, Automated 9.2 (H) 0.0 - 0.9 %    Immature Granulocytes Absolute, Automated 0.48 0.00 - 0.50 x10*3/uL   Manual Differential   Result Value Ref Range    Neutrophils %, Manual 89.4 40.0 - 80.0 %    Lymphocytes %, Manual 1.6 13.0 - 44.0 %    Monocytes %, Manual 3.3 2.0 - 10.0 %    Eosinophils %, Manual 0.0 0.0 - 6.0 %    Basophils %, Manual 0.0 0.0 - 2.0 %    Metamyelocytes %, Manual 1.6 0.0 - 0.0 %    Myelocytes %, Manual 3.3 0.0 - 0.0 %    Promyelocytes %, Manual 0.8 0.0 - 0.0 %    Seg Neutrophils Absolute, Manual 4.65 1.60 - 5.00 x10*3/uL    Lymphocytes Absolute, Manual 0.08 (L) 0.80 - 3.00 x10*3/uL    Monocytes Absolute, Manual 0.17 0.05 - 0.80 x10*3/uL    Eosinophils Absolute, Manual 0.00 0.00 - 0.40 x10*3/uL    Basophils Absolute, Manual 0.00 0.00 - 0.10 x10*3/uL    Metamyelocytes Absolute, Manual 0.08 0.00 - 0.00 x10*3/uL    Myelocytes Absolute, Manual 0.17 0.00 - 0.00 x10*3/uL    Promyelocytes Absolute, Manual 0.04 0.00 - 0.00 x10*3/uL    Total Cells Counted 123     RBC Morphology No significant RBC morphology present    POCT GLUCOSE   Result Value Ref Range    POCT Glucose 122 (H) 74 - 99 mg/dL   POCT GLUCOSE   Result Value Ref Range    POCT Glucose 133 (H) 74 - 99 mg/dL         This patient has a urinary catheter   Reason for the urinary catheter remaining today? critically ill patient who need  accurate urinary output measurements               Assessment/Plan   Principal Problem:    Thrombocytopenia (CMS/HCC)  77yo F w PMHx squamous cell carcinoma of hypopharynx, HFpEF, CAD s/p PCI, Afib s/p watchman, GERD, depression, HTN, AUD, ascending aortic aneurysm, distant breast cancer (s/p chemoradiotherapy and lumpectomy) who was directed to ED for hypotension which resolved. Currently treating E.Coli bacteremia with ceftriaxone (ID following), and plan to have EGD (assess esophagitis infectious vs radiation) + PEG (for nutrition); however ANC less than goal. Started neupogen (3/2) d/t low ANC, infection and procedure goal for ANC > 1000.     3/9 updates:   -GI consulted, appreciate recs, planning for PEG 3/12  - Hold TF, DVT ppx the night before, plavix at least 5 days   - Stopping Zosyn after 3/9  - CXR today     #Acute hypoxic respiratory failure, improved  :: Rapid called early AM 3/7 for hypoxia requiring 4L O2, CXR showing possible RLL infiltrate concerning for aspiration pneumonia vs. Pneumonitis  Plan:   - Strep antigen positive, legionella antigen negative  - Vanc discontinued (3/7-3/8)  - Continue zosyn (3/7-3/9)  -MRSA nares negative  - Lactate normal  - Blood cx x2 collected; NGTD  - Wean O2 as able   - Encourage incentive spirometer, RT consult for airway clearance techniques  - CXR today      #Hypotension, improved   #HFpEF, initial c/f ADHF; now resolved  ::Patient presents with hypotension over the past month in oncology clinic, has received IVF and discontinued home losartan/amlodipine during this time, BP spontaneously improving without intervention today to 100s/70s, appears grossly dry to euvolemic on exam without any symptoms of acute heart failure + positive orthostats in ED  ::BNP 2/22: 229 - appears to be at baseline  ::TTE 7/2023: EF 55-60% // TTE 2/2024 EF 50%  - Patient likely has less PO intake and with chemo does not need as many antihyptertensives/GDMT medication dose - emailed  primary cardiologist  - Medications given via Dobhoff  - Continue Aldactone 12.5mg // Metoprolol Tartrate 50mg BID // Losartan 25mg // Dapagliflozin 10mg   - Atorvostatin 20mg     #E coli bacteremia  :: Bcx from 2/29 growing E coli sensitive to ceftriaxone; Bcx from 3/2 NGTD  - ID consulted, appreciate recs:  - Given collection through mediport but no symptoms of sepsis, would treat w/ 10 days of ceftriaxone 2g q24hr infused through mediport  - Lock solution into catheter after infusion is complete   - CTX 2g q24 hr course 10 days; 3/1-3/9 planned; changed on 3/7 to vanc + zosyn as above  - Now just on zosyn, will finish course 3/9     #SCC of Hypopharynx (Dr. Burkitt)  #Pain  #Delerium, improved   ::SLP eval 2/12 recommended soft solids with thin liquids, patient would prefer oral feeding to PEG   - nutrition recs appreciated: 100mg IV thiamine x7 days,  Isosource 1.5 at goal [50cc/hr]  - c/w home Guaifenesin  - c/w Zofran prn nausea, Compazine second line  - Holding opioid 2/2 previous delirium  - delirium precautions  - patient tolerating seroquel 12.5mg at bedtime for sleep and agitation, delirium improving   - Zyprexa 2.5mg PO q8hrs for agitation      #Oropharyngeal candidiasis  #Dysphagia  ::Moderate to severe candidiasis with multiple large lesions, improved   ::given ANC 0.16, consideration for opportunistic infections present. Entended antifungal course  - Fluconazole 200mg IV (2/22 - xx), typically for 7 days but continue given neutropenia and candidiasis; plan for 21 day course total; IV 2/22-3/6, plan for 400mg PO 3/7-3/13  - filgrastim discontinued 3/8  -Plan for PEG 3/12 per GI   - Oral care per nursing     #Pancytopenia, improved   ::ANC 0.16 // Plts 53 on admission with no concern for bleeding // HB 10.9 on presentation (BL 12-13)  - likely d/t myelosuppresion vs though patient has had poor po intake vs mixed  - Retic count 0.6% (fraction 1.8%) indicative of poor marrow response   - filgrastim daily  until ANC >1000  - Daily CBC  - T&S updated, transfuse for active bleeding <50, or for <10  - continue lovenox for DVT ppx   - holding clopidogrel (for EGD + peg in future)     #Normocytic Anemia  ::HB 10.9 on presentation (BL 12-13), no active bleeding, no recent iron studies  - Likely d/t chronic dz vs myelosuppresion, though patient has had poor po intake     #Atrial Fibrillation  ::Patient previously on AC, now s/p watchman procedure and is rate controlled   ::had 5 ablations per patient  - Continue metoprolol tartrate 50mg BID  - HR 90s to 120s     #Hx of TIA  - holding home Plavix 75mg daily     #GERD  - c/w home Pantoprazole     #Mood Disorder  #Misc  - c/w home Cymbalta and Wellbutrin  - c/w home supplements, mag-ox     F : caution, was diuresing. HfpEF  E: replete lytes prn, K>4, Mg >2  N: isosource 1.5 via dobhoff  A: PIVs     DVT prophylaxis: SCDs  GI prophylaxis: home PPI     Code Status: DNR/DNI       Baudilio Turcios MD

## 2024-03-09 NOTE — CARE PLAN
Problem: Skin  Goal: Decreased wound size/increased tissue granulation at next dressing change  Outcome: Progressing  Goal: Participates in plan/prevention/treatment measures  Outcome: Progressing  Goal: Prevent/manage excess moisture  Outcome: Progressing  Goal: Prevent/minimize sheer/friction injuries  Outcome: Progressing  Goal: Promote/optimize nutrition  Outcome: Progressing  Goal: Promote skin healing  Outcome: Progressing     Problem: Pain  Goal: My pain/discomfort is manageable  Outcome: Progressing     Problem: Safety  Goal: Patient will be injury free during hospitalization  Outcome: Progressing  Goal: I will remain free of falls  Outcome: Progressing

## 2024-03-09 NOTE — CARE PLAN
The patient's goals for the shift include      The clinical goals for the shift include Patient oxygen saturation will remain  >95% throughout the shift 3/9/24 @ 0700      Problem: Skin  Goal: Decreased wound size/increased tissue granulation at next dressing change  Outcome: Progressing  Flowsheets (Taken 3/9/2024 0237)  Decreased wound size/increased tissue granulation at next dressing change: Promote sleep for wound healing  Goal: Participates in plan/prevention/treatment measures  Outcome: Progressing  Flowsheets (Taken 3/9/2024 0237)  Participates in plan/prevention/treatment measures: Elevate heels  Goal: Prevent/manage excess moisture  Outcome: Progressing  Flowsheets (Taken 3/9/2024 0237)  Prevent/manage excess moisture:   Moisturize dry skin   Cleanse incontinence/protect with barrier cream  Goal: Prevent/minimize sheer/friction injuries  Outcome: Progressing  Flowsheets (Taken 3/9/2024 0237)  Prevent/minimize sheer/friction injuries:   Turn/reposition every 2 hours/use positioning/transfer devices   Use pull sheet  Goal: Promote/optimize nutrition  Outcome: Progressing  Flowsheets (Taken 3/9/2024 0237)  Promote/optimize nutrition: Offer water/supplements/favorite foods  Goal: Promote skin healing  Outcome: Progressing     Problem: Pain  Goal: My pain/discomfort is manageable  Outcome: Progressing     Problem: Safety  Goal: Patient will be injury free during hospitalization  Outcome: Progressing  Goal: I will remain free of falls  Outcome: Progressing     Problem: Daily Care  Goal: Daily care needs are met  Outcome: Progressing     Problem: Psychosocial Needs  Goal: Demonstrates ability to cope with hospitalization/illness  Outcome: Progressing  Goal: Collaborate with me, my family, and caregiver to identify my specific goals  Outcome: Progressing     Problem: Discharge Barriers  Goal: My discharge needs are met  Outcome: Progressing     Problem: Pain  Goal: Takes deep breaths with improved pain control  throughout the shift  Outcome: Progressing  Goal: Turns in bed with improved pain control throughout the shift  Outcome: Progressing  Goal: Walks with improved pain control throughout the shift  Outcome: Progressing  Goal: Performs ADL's with improved pain control throughout shift  Outcome: Progressing  Goal: Participates in PT with improved pain control throughout the shift  Outcome: Progressing  Goal: Free from opioid side effects throughout the shift  Outcome: Progressing  Goal: Free from acute confusion related to pain meds throughout the shift  Outcome: Progressing     Problem: Pain - Adult  Goal: Verbalizes/displays adequate comfort level or baseline comfort level  Outcome: Progressing     Problem: Safety - Adult  Goal: Free from fall injury  Outcome: Progressing     Problem: Discharge Planning  Goal: Discharge to home or other facility with appropriate resources  Outcome: Progressing     Problem: Chronic Conditions and Co-morbidities  Goal: Patient's chronic conditions and co-morbidity symptoms are monitored and maintained or improved  Outcome: Progressing     Problem: Fall/Injury  Goal: Not fall by end of shift  Outcome: Progressing  Goal: Be free from injury by end of the shift  Outcome: Progressing  Goal: Verbalize understanding of personal risk factors for fall in the hospital  Outcome: Progressing  Goal: Verbalize understanding of risk factor reduction measures to prevent injury from fall in the home  Outcome: Progressing  Goal: Use assistive devices by end of the shift  Outcome: Progressing  Goal: Pace activities to prevent fatigue by end of the shift  Outcome: Progressing

## 2024-03-10 ENCOUNTER — APPOINTMENT (OUTPATIENT)
Dept: RADIOLOGY | Facility: HOSPITAL | Age: 79
DRG: 314 | End: 2024-03-10
Payer: MEDICARE

## 2024-03-10 LAB
ALBUMIN SERPL BCP-MCNC: 2.2 G/DL (ref 3.4–5)
ANION GAP BLDA CALCULATED.4IONS-SCNC: 7 MMO/L (ref 10–25)
ANION GAP SERPL CALC-SCNC: 10 MMOL/L (ref 10–20)
APTT PPP: 33 SECONDS (ref 27–38)
BASE EXCESS BLDA CALC-SCNC: 3.3 MMOL/L (ref -2–3)
BASOPHILS # BLD MANUAL: 0 X10*3/UL (ref 0–0.1)
BASOPHILS NFR BLD MANUAL: 0 %
BODY TEMPERATURE: 37 DEGREES CELSIUS
BUN SERPL-MCNC: 19 MG/DL (ref 6–23)
CA-I BLDA-SCNC: 1.24 MMOL/L (ref 1.1–1.33)
CALCIUM SERPL-MCNC: 7.6 MG/DL (ref 8.6–10.6)
CHLORIDE BLDA-SCNC: 102 MMOL/L (ref 98–107)
CHLORIDE SERPL-SCNC: 102 MMOL/L (ref 98–107)
CO2 SERPL-SCNC: 27 MMOL/L (ref 21–32)
CREAT SERPL-MCNC: 0.65 MG/DL (ref 0.5–1.05)
D DIMER PPP FEU-MCNC: 5173 NG/ML FEU
DACRYOCYTES BLD QL SMEAR: ABNORMAL
EGFRCR SERPLBLD CKD-EPI 2021: >90 ML/MIN/1.73M*2
EOSINOPHIL # BLD MANUAL: 0 X10*3/UL (ref 0–0.4)
EOSINOPHIL NFR BLD MANUAL: 0 %
ERYTHROCYTE [DISTWIDTH] IN BLOOD BY AUTOMATED COUNT: 18.7 % (ref 11.5–14.5)
FIBRINOGEN PPP-MCNC: 664 MG/DL (ref 200–400)
GLUCOSE BLD MANUAL STRIP-MCNC: 101 MG/DL (ref 74–99)
GLUCOSE BLD MANUAL STRIP-MCNC: 124 MG/DL (ref 74–99)
GLUCOSE BLD MANUAL STRIP-MCNC: 93 MG/DL (ref 74–99)
GLUCOSE BLDA-MCNC: 84 MG/DL (ref 74–99)
GLUCOSE SERPL-MCNC: 113 MG/DL (ref 74–99)
HCO3 BLDA-SCNC: 26.7 MMOL/L (ref 22–26)
HCT VFR BLD AUTO: 25.6 % (ref 36–46)
HCT VFR BLD EST: 26 % (ref 36–46)
HGB BLD-MCNC: 8 G/DL (ref 12–16)
HGB BLDA-MCNC: 8.8 G/DL (ref 12–16)
IMM GRANULOCYTES # BLD AUTO: 0.37 X10*3/UL (ref 0–0.5)
IMM GRANULOCYTES NFR BLD AUTO: 7.2 % (ref 0–0.9)
INHALED O2 CONCENTRATION: 100 %
INR PPP: 1.2 (ref 0.9–1.1)
LACTATE BLDA-SCNC: 1.5 MMOL/L (ref 0.4–2)
LYMPHOCYTES # BLD MANUAL: 0.09 X10*3/UL (ref 0.8–3)
LYMPHOCYTES NFR BLD MANUAL: 1.7 %
MAGNESIUM SERPL-MCNC: 2.02 MG/DL (ref 1.6–2.4)
MCH RBC QN AUTO: 30.8 PG (ref 26–34)
MCHC RBC AUTO-ENTMCNC: 31.3 G/DL (ref 32–36)
MCV RBC AUTO: 99 FL (ref 80–100)
MONOCYTES # BLD MANUAL: 0.05 X10*3/UL (ref 0.05–0.8)
MONOCYTES NFR BLD MANUAL: 0.9 %
MYELOCYTES # BLD MANUAL: 0.04 X10*3/UL
MYELOCYTES NFR BLD MANUAL: 0.8 %
NEUTROPHILS # BLD MANUAL: 4.93 X10*3/UL (ref 1.6–5.5)
NEUTS BAND # BLD MANUAL: 0.04 X10*3/UL (ref 0–0.5)
NEUTS BAND NFR BLD MANUAL: 0.8 %
NEUTS SEG # BLD MANUAL: 4.89 X10*3/UL (ref 1.6–5)
NEUTS SEG NFR BLD MANUAL: 95.8 %
NRBC BLD-RTO: 9.8 /100 WBCS (ref 0–0)
OVALOCYTES BLD QL SMEAR: ABNORMAL
OXYHGB MFR BLDA: 97.6 % (ref 94–98)
PCO2 BLDA: 35 MM HG (ref 38–42)
PH BLDA: 7.49 PH (ref 7.38–7.42)
PHOSPHATE SERPL-MCNC: 2.9 MG/DL (ref 2.5–4.9)
PLATELET # BLD AUTO: 88 X10*3/UL (ref 150–450)
PO2 BLDA: 290 MM HG (ref 85–95)
POTASSIUM BLDA-SCNC: 4.8 MMOL/L (ref 3.5–5.3)
POTASSIUM SERPL-SCNC: 4.1 MMOL/L (ref 3.5–5.3)
PROTHROMBIN TIME: 13.9 SECONDS (ref 9.8–12.8)
RBC # BLD AUTO: 2.6 X10*6/UL (ref 4–5.2)
RBC MORPH BLD: ABNORMAL
SAO2 % BLDA: 100 % (ref 94–100)
SODIUM BLDA-SCNC: 131 MMOL/L (ref 136–145)
SODIUM SERPL-SCNC: 135 MMOL/L (ref 136–145)
TOTAL CELLS COUNTED BLD: 119
WBC # BLD AUTO: 5.1 X10*3/UL (ref 4.4–11.3)

## 2024-03-10 PROCEDURE — 2500000001 HC RX 250 WO HCPCS SELF ADMINISTERED DRUGS (ALT 637 FOR MEDICARE OP)

## 2024-03-10 PROCEDURE — 1200000003 HC ONCOLOGY  ROOM WITH TELEMETRY DAILY

## 2024-03-10 PROCEDURE — 80069 RENAL FUNCTION PANEL: CPT

## 2024-03-10 PROCEDURE — 85007 BL SMEAR W/DIFF WBC COUNT: CPT

## 2024-03-10 PROCEDURE — 85384 FIBRINOGEN ACTIVITY: CPT

## 2024-03-10 PROCEDURE — 82947 ASSAY GLUCOSE BLOOD QUANT: CPT

## 2024-03-10 PROCEDURE — 85027 COMPLETE CBC AUTOMATED: CPT

## 2024-03-10 PROCEDURE — 2500000004 HC RX 250 GENERAL PHARMACY W/ HCPCS (ALT 636 FOR OP/ED)

## 2024-03-10 PROCEDURE — 1200000002 HC GENERAL ROOM WITH TELEMETRY DAILY

## 2024-03-10 PROCEDURE — 85610 PROTHROMBIN TIME: CPT

## 2024-03-10 PROCEDURE — 82435 ASSAY OF BLOOD CHLORIDE: CPT

## 2024-03-10 PROCEDURE — 2500000002 HC RX 250 W HCPCS SELF ADMINISTERED DRUGS (ALT 637 FOR MEDICARE OP, ALT 636 FOR OP/ED)

## 2024-03-10 PROCEDURE — 94640 AIRWAY INHALATION TREATMENT: CPT

## 2024-03-10 PROCEDURE — 99233 SBSQ HOSP IP/OBS HIGH 50: CPT

## 2024-03-10 PROCEDURE — 85379 FIBRIN DEGRADATION QUANT: CPT

## 2024-03-10 PROCEDURE — 83735 ASSAY OF MAGNESIUM: CPT

## 2024-03-10 PROCEDURE — 71045 X-RAY EXAM CHEST 1 VIEW: CPT

## 2024-03-10 PROCEDURE — 2500000004 HC RX 250 GENERAL PHARMACY W/ HCPCS (ALT 636 FOR OP/ED): Mod: JW

## 2024-03-10 PROCEDURE — 93010 ELECTROCARDIOGRAM REPORT: CPT | Performed by: INTERNAL MEDICINE

## 2024-03-10 PROCEDURE — 71045 X-RAY EXAM CHEST 1 VIEW: CPT | Performed by: RADIOLOGY

## 2024-03-10 PROCEDURE — 84132 ASSAY OF SERUM POTASSIUM: CPT

## 2024-03-10 RX ORDER — ALBUTEROL SULFATE 0.83 MG/ML
2.5 SOLUTION RESPIRATORY (INHALATION) EVERY 6 HOURS PRN
Status: DISCONTINUED | OUTPATIENT
Start: 2024-03-10 | End: 2024-03-11

## 2024-03-10 RX ORDER — OLANZAPINE 10 MG/2ML
5 INJECTION, POWDER, FOR SOLUTION INTRAMUSCULAR ONCE AS NEEDED
Status: COMPLETED | OUTPATIENT
Start: 2024-03-10 | End: 2024-03-10

## 2024-03-10 RX ORDER — FUROSEMIDE 10 MG/ML
20 INJECTION INTRAMUSCULAR; INTRAVENOUS ONCE
Status: COMPLETED | OUTPATIENT
Start: 2024-03-10 | End: 2024-03-10

## 2024-03-10 RX ORDER — IPRATROPIUM BROMIDE AND ALBUTEROL SULFATE 2.5; .5 MG/3ML; MG/3ML
3 SOLUTION RESPIRATORY (INHALATION)
Status: DISCONTINUED | OUTPATIENT
Start: 2024-03-10 | End: 2024-03-11

## 2024-03-10 RX ADMIN — Medication 1000 UNITS: at 21:34

## 2024-03-10 RX ADMIN — IPRATROPIUM BROMIDE AND ALBUTEROL SULFATE 3 ML: .5; 3 SOLUTION RESPIRATORY (INHALATION) at 18:17

## 2024-03-10 RX ADMIN — BUPROPION HYDROCHLORIDE 150 MG: 100 TABLET, FILM COATED ORAL at 21:34

## 2024-03-10 RX ADMIN — OLANZAPINE 2.5 MG: 5 TABLET, FILM COATED ORAL at 18:04

## 2024-03-10 RX ADMIN — VENLAFAXINE 75 MG: 75 TABLET ORAL at 09:17

## 2024-03-10 RX ADMIN — BUPROPION HYDROCHLORIDE 150 MG: 100 TABLET, FILM COATED ORAL at 09:18

## 2024-03-10 RX ADMIN — FLUCONAZOLE 400 MG: 200 TABLET ORAL at 09:16

## 2024-03-10 RX ADMIN — RACEPINEPHRINE HYDROCHLORIDE 0.5 ML: 11.25 SOLUTION RESPIRATORY (INHALATION) at 18:18

## 2024-03-10 RX ADMIN — DAPAGLIFLOZIN 10 MG: 10 TABLET, FILM COATED ORAL at 09:17

## 2024-03-10 RX ADMIN — ACETAMINOPHEN 1000 MG: 650 SOLUTION ORAL at 03:26

## 2024-03-10 RX ADMIN — METOPROLOL TARTRATE 50 MG: 50 TABLET, FILM COATED ORAL at 21:34

## 2024-03-10 RX ADMIN — ACETAMINOPHEN 1000 MG: 650 SOLUTION ORAL at 18:03

## 2024-03-10 RX ADMIN — ATORVASTATIN CALCIUM 20 MG: 20 TABLET, FILM COATED ORAL at 09:17

## 2024-03-10 RX ADMIN — QUETIAPINE FUMARATE 12.5 MG: 25 TABLET ORAL at 21:33

## 2024-03-10 RX ADMIN — OLANZAPINE 2.5 MG: 5 TABLET, FILM COATED ORAL at 09:17

## 2024-03-10 RX ADMIN — ENOXAPARIN SODIUM 40 MG: 100 INJECTION SUBCUTANEOUS at 11:51

## 2024-03-10 RX ADMIN — OLANZAPINE 5 MG: 10 INJECTION, POWDER, FOR SOLUTION INTRAMUSCULAR at 23:28

## 2024-03-10 RX ADMIN — Medication 10 MG: at 18:04

## 2024-03-10 RX ADMIN — Medication 15 ML: at 11:51

## 2024-03-10 RX ADMIN — ACETAMINOPHEN 1000 MG: 650 SOLUTION ORAL at 09:16

## 2024-03-10 RX ADMIN — ESOMEPRAZOLE MAGNESIUM 40 MG: 40 FOR SUSPENSION ORAL at 06:04

## 2024-03-10 RX ADMIN — MULTIVIT AND MINERALS-FERROUS GLUCONATE 9 MG IRON/15 ML ORAL LIQUID 15 ML: at 09:18

## 2024-03-10 RX ADMIN — SPIRONOLACTONE 12.5 MG: 25 TABLET, FILM COATED ORAL at 09:17

## 2024-03-10 RX ADMIN — OXYCODONE HYDROCHLORIDE AND ACETAMINOPHEN 500 MG: 500 TABLET ORAL at 09:16

## 2024-03-10 RX ADMIN — LOSARTAN POTASSIUM 25 MG: 25 TABLET, FILM COATED ORAL at 09:17

## 2024-03-10 RX ADMIN — Medication 1000 UNITS: at 09:17

## 2024-03-10 RX ADMIN — FUROSEMIDE 20 MG: 10 INJECTION, SOLUTION INTRAVENOUS at 13:43

## 2024-03-10 RX ADMIN — VENLAFAXINE 75 MG: 75 TABLET ORAL at 18:04

## 2024-03-10 RX ADMIN — METOPROLOL TARTRATE 50 MG: 50 TABLET, FILM COATED ORAL at 09:17

## 2024-03-10 ASSESSMENT — PAIN SCALES - WONG BAKER: WONGBAKER_NUMERICALRESPONSE: NO HURT

## 2024-03-10 ASSESSMENT — COGNITIVE AND FUNCTIONAL STATUS - GENERAL
TOILETING: TOTAL
DAILY ACTIVITIY SCORE: 7
HELP NEEDED FOR BATHING: A LOT
TURNING FROM BACK TO SIDE WHILE IN FLAT BAD: A LOT
EATING MEALS: TOTAL
DRESSING REGULAR UPPER BODY CLOTHING: TOTAL
MOVING TO AND FROM BED TO CHAIR: A LOT
WALKING IN HOSPITAL ROOM: TOTAL
MOBILITY SCORE: 9
HELP NEEDED FOR BATHING: TOTAL
PERSONAL GROOMING: TOTAL
MOBILITY SCORE: 9
PERSONAL GROOMING: TOTAL
MOVING TO AND FROM BED TO CHAIR: A LOT
TOILETING: TOTAL
STANDING UP FROM CHAIR USING ARMS: TOTAL
CLIMB 3 TO 5 STEPS WITH RAILING: TOTAL
MOVING FROM LYING ON BACK TO SITTING ON SIDE OF FLAT BED WITH BEDRAILS: A LOT
DRESSING REGULAR UPPER BODY CLOTHING: TOTAL
STANDING UP FROM CHAIR USING ARMS: TOTAL
TURNING FROM BACK TO SIDE WHILE IN FLAT BAD: A LOT
MOVING FROM LYING ON BACK TO SITTING ON SIDE OF FLAT BED WITH BEDRAILS: A LOT
DRESSING REGULAR LOWER BODY CLOTHING: TOTAL
EATING MEALS: TOTAL
CLIMB 3 TO 5 STEPS WITH RAILING: TOTAL
DAILY ACTIVITIY SCORE: 6
WALKING IN HOSPITAL ROOM: TOTAL
DRESSING REGULAR LOWER BODY CLOTHING: TOTAL

## 2024-03-10 ASSESSMENT — PAIN SCALES - GENERAL
PAINLEVEL_OUTOF10: 0 - NO PAIN
PAINLEVEL_OUTOF10: 0 - NO PAIN

## 2024-03-10 NOTE — CARE PLAN
The patient's goals for the shift include      The clinical goals for the shift include Patient will remain safe and free from injuries throughout the shift 3/10/24 @ 0700      Problem: Skin  Goal: Decreased wound size/increased tissue granulation at next dressing change  Outcome: Progressing  Goal: Participates in plan/prevention/treatment measures  Outcome: Progressing  Goal: Prevent/manage excess moisture  Outcome: Progressing  Goal: Prevent/minimize sheer/friction injuries  Outcome: Progressing  Goal: Promote/optimize nutrition  Outcome: Progressing  Goal: Promote skin healing  Outcome: Progressing     Problem: Pain  Goal: My pain/discomfort is manageable  Outcome: Progressing     Problem: Safety  Goal: Patient will be injury free during hospitalization  Outcome: Progressing  Goal: I will remain free of falls  Outcome: Progressing     Problem: Daily Care  Goal: Daily care needs are met  Outcome: Progressing     Problem: Psychosocial Needs  Goal: Demonstrates ability to cope with hospitalization/illness  Outcome: Progressing  Goal: Collaborate with me, my family, and caregiver to identify my specific goals  Outcome: Progressing     Problem: Discharge Barriers  Goal: My discharge needs are met  Outcome: Progressing     Problem: Pain  Goal: Takes deep breaths with improved pain control throughout the shift  Outcome: Progressing  Goal: Turns in bed with improved pain control throughout the shift  Outcome: Progressing  Goal: Walks with improved pain control throughout the shift  Outcome: Progressing  Goal: Performs ADL's with improved pain control throughout shift  Outcome: Progressing  Goal: Participates in PT with improved pain control throughout the shift  Outcome: Progressing  Goal: Free from opioid side effects throughout the shift  Outcome: Progressing  Goal: Free from acute confusion related to pain meds throughout the shift  Outcome: Progressing     Problem: Pain - Adult  Goal: Verbalizes/displays adequate  comfort level or baseline comfort level  Outcome: Progressing     Problem: Safety - Adult  Goal: Free from fall injury  Outcome: Progressing     Problem: Discharge Planning  Goal: Discharge to home or other facility with appropriate resources  Outcome: Progressing     Problem: Chronic Conditions and Co-morbidities  Goal: Patient's chronic conditions and co-morbidity symptoms are monitored and maintained or improved  Outcome: Progressing     Problem: Fall/Injury  Goal: Not fall by end of shift  Outcome: Progressing  Goal: Be free from injury by end of the shift  Outcome: Progressing  Goal: Verbalize understanding of personal risk factors for fall in the hospital  Outcome: Progressing  Goal: Verbalize understanding of risk factor reduction measures to prevent injury from fall in the home  Outcome: Progressing  Goal: Use assistive devices by end of the shift  Outcome: Progressing  Goal: Pace activities to prevent fatigue by end of the shift  Outcome: Progressing

## 2024-03-10 NOTE — PROGRESS NOTES
"Amita Todd is a 78 y.o. female on day 17 of admission presenting with Thrombocytopenia (CMS/HCC).    Subjective   NAEO. Patient endorses dyspnea, saturating well on room air. Also has dry mouth, and says that she struggles to cough up her secretions because she feels her mouth and throat are dry. Feels well otherwise. Denies fevers, chills, nausea, vomiting, chest pain, palpitations.        Objective   Constitutional:       Comments: underweight   HENT:      Mouth/Throat:      Mouth: Mucous membranes are dry. improved well circumscribed plaques.  +DH with TF  Eyes:      Pupils: Pupils are equal, round, and reactive to light.   Cardiovascular:      Rate and Rhythm: Irregular rhythm. Regular rate.     Pulses: Normal pulses.   Pulmonary:      Comments: mild crackles B/L, RUL>AGATHA. On RA  Abdominal:      General: Abdomen is flat. Bowel sounds are normal.      Palpations: Abdomen is soft.   Musculoskeletal:         General: Normal range of motion.      Cervical back: Normal range of motion.      Comments: No edema  Skin:     General: Skin is warm.      Capillary Refill: Capillary refill takes less than 2 seconds.      Findings: Bruising present.      Comments: Petechia over trunk; erythematous and scaly skin noted over neck and upper chest   Neurological:      General: No focal deficit present.      Mental Status: She is orietable, but delirious   Psychiatric:         Mood and Affect: Mood normal.       Last Recorded Vitals  Blood pressure 127/79, pulse 100, temperature 36.6 °C (97.9 °F), temperature source Temporal, resp. rate 16, height 1.635 m (5' 4.37\"), weight 68 kg (149 lb 14.6 oz), SpO2 98 %.  Intake/Output last 3 Shifts:  I/O last 3 completed shifts:  In: 795 (11.7 mL/kg) [NG/GT:695; IV Piggyback:100]  Out: 2650 (39 mL/kg) [Urine:2350 (1 mL/kg/hr); Emesis/NG output:300]  Weight: 68 kg     Relevant Results  Scheduled medications  acetaminophen, 1,000 mg, nasogastric tube, q6h  ascorbic acid, 500 mg, " nasogastric tube, Daily  atorvastatin, 20 mg, nasogastric tube, Daily  buPROPion, 150 mg, nasogastric tube, BID  cholecalciferol, 1,000 Units, nasogastric tube, BID  dapagliflozin propanediol, 10 mg, oral, Daily  enoxaparin, 40 mg, subcutaneous, q24h  esomeprazole, 40 mg, nasoduodenal tube, Daily before breakfast  fluconazole, 400 mg, oral, Daily  furosemide, 20 mg, intravenous, Once  lidocaine, 1 Application, Topical, Once  losartan, 25 mg, nasogastric tube, Daily  [Held by provider] magnesium oxide, 400 mg, nasogastric tube, Daily  melatonin, 10 mg, oral, Nightly  metoprolol tartrate, 50 mg, nasogastric tube, BID  multivitamin with iron-minerals, 15 mL, nasogastric tube, Daily  perflutren lipid microspheres, 0.5-10 mL of dilution, intravenous, Once in imaging  perflutren protein A microsphere, 0.5 mL, intravenous, Once in imaging  QUEtiapine, 12.5 mg, oral, Nightly  senna, 10 mL, nasogastric tube, BID  spironolactone, 12.5 mg, nasogastric tube, Daily  sulfur hexafluoride microsphr, 2 mL, intravenous, Once in imaging  venlafaxine, 75 mg, nasogastric tube, BID with meals      Continuous medications     PRN medications  PRN medications: albuterol, alteplase, alteplase, guaiFENesin, lidocaine-diphenhydraMINE-Maalox 1:1:1, loperamide, moisturizing mouth, OLANZapine, ondansetron, oxygen, phenoL, prochlorperazine  Results for orders placed or performed during the hospital encounter of 02/22/24 (from the past 24 hour(s))   POCT GLUCOSE   Result Value Ref Range    POCT Glucose 122 (H) 74 - 99 mg/dL   POCT GLUCOSE   Result Value Ref Range    POCT Glucose 113 (H) 74 - 99 mg/dL   Magnesium   Result Value Ref Range    Magnesium 2.02 1.60 - 2.40 mg/dL   Renal Function Panel   Result Value Ref Range    Glucose 113 (H) 74 - 99 mg/dL    Sodium 135 (L) 136 - 145 mmol/L    Potassium 4.1 3.5 - 5.3 mmol/L    Chloride 102 98 - 107 mmol/L    Bicarbonate 27 21 - 32 mmol/L    Anion Gap 10 10 - 20 mmol/L    Urea Nitrogen 19 6 - 23 mg/dL     Creatinine 0.65 0.50 - 1.05 mg/dL    eGFR >90 >60 mL/min/1.73m*2    Calcium 7.6 (L) 8.6 - 10.6 mg/dL    Phosphorus 2.9 2.5 - 4.9 mg/dL    Albumin 2.2 (L) 3.4 - 5.0 g/dL   CBC and Auto Differential   Result Value Ref Range    WBC 5.1 4.4 - 11.3 x10*3/uL    nRBC 9.8 (H) 0.0 - 0.0 /100 WBCs    RBC 2.60 (L) 4.00 - 5.20 x10*6/uL    Hemoglobin 8.0 (L) 12.0 - 16.0 g/dL    Hematocrit 25.6 (L) 36.0 - 46.0 %    MCV 99 80 - 100 fL    MCH 30.8 26.0 - 34.0 pg    MCHC 31.3 (L) 32.0 - 36.0 g/dL    RDW 18.7 (H) 11.5 - 14.5 %    Platelets 88 (L) 150 - 450 x10*3/uL    Immature Granulocytes %, Automated 7.2 (H) 0.0 - 0.9 %    Immature Granulocytes Absolute, Automated 0.37 0.00 - 0.50 x10*3/uL   Manual Differential   Result Value Ref Range    Neutrophils %, Manual 95.8 40.0 - 80.0 %    Bands %, Manual 0.8 0.0 - 5.0 %    Lymphocytes %, Manual 1.7 13.0 - 44.0 %    Monocytes %, Manual 0.9 2.0 - 10.0 %    Eosinophils %, Manual 0.0 0.0 - 6.0 %    Basophils %, Manual 0.0 0.0 - 2.0 %    Myelocytes %, Manual 0.8 0.0 - 0.0 %    Seg Neutrophils Absolute, Manual 4.89 1.60 - 5.00 x10*3/uL    Bands Absolute, Manual 0.04 0.00 - 0.50 x10*3/uL    Lymphocytes Absolute, Manual 0.09 (L) 0.80 - 3.00 x10*3/uL    Monocytes Absolute, Manual 0.05 0.05 - 0.80 x10*3/uL    Eosinophils Absolute, Manual 0.00 0.00 - 0.40 x10*3/uL    Basophils Absolute, Manual 0.00 0.00 - 0.10 x10*3/uL    Myelocytes Absolute, Manual 0.04 0.00 - 0.00 x10*3/uL    Total Cells Counted 119     Neutrophils Absolute, Manual 4.93 1.60 - 5.50 x10*3/uL    RBC Morphology See Below     Ovalocytes Few     Teardrop Cells Few    POCT GLUCOSE   Result Value Ref Range    POCT Glucose 124 (H) 74 - 99 mg/dL   POCT GLUCOSE   Result Value Ref Range    POCT Glucose 93 74 - 99 mg/dL            Assessment/Plan   Principal Problem:    Thrombocytopenia (CMS/HCC)  79yo F w PMHx squamous cell carcinoma of hypopharynx, HFpEF, CAD s/p PCI, Afib s/p watchman, GERD, depression, HTN, AUD, ascending aortic aneurysm,  distant breast cancer (s/p chemoradiotherapy and lumpectomy) who was directed to ED for hypotension which resolved. Currently treating E.Coli bacteremia with ceftriaxone (ID following), and plan to have EGD (assess esophagitis infectious vs radiation) + PEG (for nutrition); however ANC less than goal. Started neupogen (3/2) d/t low ANC, infection and procedure goal for ANC > 1000. Neupogen discontinued 3/8, plan for PEG 3/12 w/ GI.      3/10 updates:   -diurese w/ lasix 20mg IV given subjective dyspnea  - Oral care w/ biotene rinse  - Albuterol q6hr PRN for SOB/wheezing   - plan for PEG 3/12 w/ GI    #Acute hypoxic respiratory failure, resolved  :: Rapid called early AM 3/7 for hypoxia requiring 4L O2, CXR showing possible RLL infiltrate concerning for aspiration pneumonia vs. Pneumonitis  Plan:   - Strep antigen positive, legionella antigen negative  - Vanc discontinued (3/7-3/8)  - Now off zosyn (3/7-3/9)  -MRSA nares negative  - Lactate normal  - Blood cx x2 collected; NGTD  - Wean O2 as able; on room air as of 3/10  - Encourage incentive spirometer, RT consult for airway clearance techniques  - Albuterol q6hr PRN for wheezing/shortness of breath      #HFpEF  ::Patient presents with hypotension over the past month in oncology clinic, has received IVF and discontinued home losartan/amlodipine during this time, BP spontaneously improving without intervention today to 100s/70s, appears grossly dry to euvolemic on exam without any symptoms of acute heart failure + positive orthostats in ED  ::BNP 2/22: 229 - appears to be at baseline  ::TTE 7/2023: EF 55-60% // TTE 2/2024 EF 50%  - Patient likely has less PO intake and with chemo does not need as many antihyptertensives/GDMT medication dose - emailed primary cardiologist  - Medications given via Dobhoff  - Continue Aldactone 12.5mg // Metoprolol Tartrate 50mg BID // Losartan 25mg // Dapagliflozin 10mg   - Atorvostatin 20mg   - Diuresis w/ lasix 20mg IV 3/10    #E  coli bacteremia  :: Bcx from 2/29 growing E coli sensitive to ceftriaxone; Bcx from 3/2 NGTD  - ID consulted, appreciate recs:  - Given collection through mediport but no symptoms of sepsis, would treat w/ 10 days of ceftriaxone 2g q24hr infused through mediport  - Lock solution into catheter after infusion is complete   - CTX 2g q24 hr course 10 days; 3/1-3/9 planned; changed on 3/7 to vanc + zosyn as above  - Completed zosyn 3/9     #SCC of Hypopharynx (Dr. Burkitt)  #Pain  #Delerium, improved   ::SLP eval 2/12 recommended soft solids with thin liquids, patient would prefer oral feeding to PEG   - nutrition recs appreciated: 100mg IV thiamine x7 days,  Isosource 1.5 at goal [50cc/hr]  - c/w home Guaifenesin  - c/w Zofran prn nausea, Compazine second line  - Holding opioid 2/2 previous delirium  - delirium precautions  - patient tolerating seroquel 12.5mg at bedtime for sleep and agitation, delirium improving   - Zyprexa 2.5mg PO q8hrs for agitation      #Oropharyngeal candidiasis  #Dysphagia  ::Moderate to severe candidiasis with multiple large lesions, improved   ::given ANC 0.16, consideration for opportunistic infections present. Entended antifungal course  - Fluconazole 200mg IV (2/22 - xx), typically for 7 days but continue given neutropenia and candidiasis; plan for 21 day course total; IV 2/22-3/6, plan for 400mg PO 3/7-3/13  - filgrastim discontinued 3/8  -Plan for PEG 3/12 per GI   - Oral care per nursing     #Thrombocytopenia  #Pancytopenia, resolved  ::ANC 0.16 // Plts 53 on admission with no concern for bleeding // HB 10.9 on presentation (BL 12-13)  - likely d/t myelosuppresion vs though patient has had poor po intake vs mixed  - Retic count 0.6% (fraction 1.8%) indicative of poor marrow response   - Patients neutropenia resolved, anemia at baseline; thrombocytopenia somewhat worsened, had been ~100k, now down to 88 k on 3/10; previously thought due to luz from chemo, but white count recovering  -  Will check DIC labs (d-dimer, fibrinogen, coags)  - Other etiologies include drug induced (escalated to zosyn on 3/7), HIT less likely   - Transfuse for platelets <50 prior to 3/12 placement of PEG with GI  - T&S updated, transfuse for active bleeding <50, or for <10  - continue lovenox for DVT ppx   - holding clopidogrel (for EGD + peg in future)     #Normocytic Anemia  ::HB 10.9 on presentation (BL 12-13), no active bleeding, no recent iron studies  - Likely d/t chronic dz vs myelosuppresion, though patient has had poor po intake     #Atrial Fibrillation  ::Patient previously on AC, now s/p watchman procedure and is rate controlled   ::had 5 ablations per patient  - Continue metoprolol tartrate 50mg BID  - HR 90s to 120s     #Hx of TIA  - holding home Plavix 75mg daily     #GERD  - c/w home Pantoprazole     #Mood Disorder  #Misc  - c/w home Cymbalta and Wellbutrin  - c/w home supplements, mag-ox     F : caution, was diuresing. HfpEF  E: replete lytes prn, K>4, Mg >2  N: isosource 1.5 via dobhoff  A: PIVs     DVT prophylaxis: SCDs, Lovenox  GI prophylaxis: home PPI     Code Status: DNR/DNI       Baudilio Turcios MD

## 2024-03-11 ENCOUNTER — APPOINTMENT (OUTPATIENT)
Dept: CARDIOLOGY | Facility: HOSPITAL | Age: 79
End: 2024-03-11
Payer: MEDICARE

## 2024-03-11 ENCOUNTER — APPOINTMENT (OUTPATIENT)
Dept: RADIATION ONCOLOGY | Facility: HOSPITAL | Age: 79
End: 2024-03-11
Payer: MEDICARE

## 2024-03-11 LAB
ALBUMIN SERPL BCP-MCNC: 2.4 G/DL (ref 3.4–5)
ANION GAP SERPL CALC-SCNC: 10 MMOL/L (ref 10–20)
ATRIAL RATE: 120 BPM
BACTERIA BLD CULT: NORMAL
BACTERIA BLD CULT: NORMAL
BASOPHILS # BLD MANUAL: 0.1 X10*3/UL (ref 0–0.1)
BASOPHILS NFR BLD MANUAL: 1.8 %
BUN SERPL-MCNC: 16 MG/DL (ref 6–23)
CALCIUM SERPL-MCNC: 8.2 MG/DL (ref 8.6–10.6)
CHLORIDE SERPL-SCNC: 101 MMOL/L (ref 98–107)
CO2 SERPL-SCNC: 30 MMOL/L (ref 21–32)
CREAT SERPL-MCNC: 0.52 MG/DL (ref 0.5–1.05)
EGFRCR SERPLBLD CKD-EPI 2021: >90 ML/MIN/1.73M*2
EOSINOPHIL # BLD MANUAL: 0 X10*3/UL (ref 0–0.4)
EOSINOPHIL NFR BLD MANUAL: 0 %
ERYTHROCYTE [DISTWIDTH] IN BLOOD BY AUTOMATED COUNT: 18.8 % (ref 11.5–14.5)
GLUCOSE BLD MANUAL STRIP-MCNC: 100 MG/DL (ref 74–99)
GLUCOSE BLD MANUAL STRIP-MCNC: 115 MG/DL (ref 74–99)
GLUCOSE BLD MANUAL STRIP-MCNC: 74 MG/DL (ref 74–99)
GLUCOSE BLD MANUAL STRIP-MCNC: 95 MG/DL (ref 74–99)
GLUCOSE SERPL-MCNC: 90 MG/DL (ref 74–99)
HCT VFR BLD AUTO: 25.9 % (ref 36–46)
HGB BLD-MCNC: 8 G/DL (ref 12–16)
IMM GRANULOCYTES # BLD AUTO: 0.45 X10*3/UL (ref 0–0.5)
IMM GRANULOCYTES NFR BLD AUTO: 8.2 % (ref 0–0.9)
LYMPHOCYTES # BLD MANUAL: 0.24 X10*3/UL (ref 0.8–3)
LYMPHOCYTES NFR BLD MANUAL: 4.4 %
MAGNESIUM SERPL-MCNC: 2.03 MG/DL (ref 1.6–2.4)
MCH RBC QN AUTO: 31.1 PG (ref 26–34)
MCHC RBC AUTO-ENTMCNC: 30.9 G/DL (ref 32–36)
MCV RBC AUTO: 101 FL (ref 80–100)
MONOCYTES # BLD MANUAL: 0.34 X10*3/UL (ref 0.05–0.8)
MONOCYTES NFR BLD MANUAL: 6.1 %
NEUTS SEG # BLD MANUAL: 4.82 X10*3/UL (ref 1.6–5)
NEUTS SEG NFR BLD MANUAL: 87.7 %
NRBC BLD-RTO: 4.5 /100 WBCS (ref 0–0)
OVALOCYTES BLD QL SMEAR: ABNORMAL
PHOSPHATE SERPL-MCNC: 3.7 MG/DL (ref 2.5–4.9)
PLATELET # BLD AUTO: 91 X10*3/UL (ref 150–450)
POTASSIUM SERPL-SCNC: 4.4 MMOL/L (ref 3.5–5.3)
Q ONSET: 222 MS
QRS COUNT: 15 BEATS
QRS DURATION: 90 MS
QT INTERVAL: 382 MS
QTC CALCULATION(BAZETT): 469 MS
QTC FREDERICIA: 439 MS
R AXIS: 33 DEGREES
RBC # BLD AUTO: 2.57 X10*6/UL (ref 4–5.2)
RBC MORPH BLD: ABNORMAL
SODIUM SERPL-SCNC: 137 MMOL/L (ref 136–145)
T AXIS: 92 DEGREES
T OFFSET: 413 MS
TOTAL CELLS COUNTED BLD: 114
VENTRICULAR RATE: 91 BPM
WBC # BLD AUTO: 5.5 X10*3/UL (ref 4.4–11.3)

## 2024-03-11 PROCEDURE — 2500000002 HC RX 250 W HCPCS SELF ADMINISTERED DRUGS (ALT 637 FOR MEDICARE OP, ALT 636 FOR OP/ED)

## 2024-03-11 PROCEDURE — 1200000003 HC ONCOLOGY  ROOM WITH TELEMETRY DAILY

## 2024-03-11 PROCEDURE — 2500000001 HC RX 250 WO HCPCS SELF ADMINISTERED DRUGS (ALT 637 FOR MEDICARE OP)

## 2024-03-11 PROCEDURE — 94640 AIRWAY INHALATION TREATMENT: CPT

## 2024-03-11 PROCEDURE — 85027 COMPLETE CBC AUTOMATED: CPT

## 2024-03-11 PROCEDURE — 92526 ORAL FUNCTION THERAPY: CPT | Mod: GN

## 2024-03-11 PROCEDURE — 85007 BL SMEAR W/DIFF WBC COUNT: CPT

## 2024-03-11 PROCEDURE — 1200000002 HC GENERAL ROOM WITH TELEMETRY DAILY

## 2024-03-11 PROCEDURE — 2500000002 HC RX 250 W HCPCS SELF ADMINISTERED DRUGS (ALT 637 FOR MEDICARE OP, ALT 636 FOR OP/ED): Performed by: HOSPITALIST

## 2024-03-11 PROCEDURE — 0CJS8ZZ INSPECTION OF LARYNX, VIA NATURAL OR ARTIFICIAL OPENING ENDOSCOPIC: ICD-10-PCS

## 2024-03-11 PROCEDURE — 31720 CLEARANCE OF AIRWAYS: CPT

## 2024-03-11 PROCEDURE — 51702 INSERT TEMP BLADDER CATH: CPT

## 2024-03-11 PROCEDURE — 2500000004 HC RX 250 GENERAL PHARMACY W/ HCPCS (ALT 636 FOR OP/ED)

## 2024-03-11 PROCEDURE — 99232 SBSQ HOSP IP/OBS MODERATE 35: CPT

## 2024-03-11 PROCEDURE — 99221 1ST HOSP IP/OBS SF/LOW 40: CPT | Performed by: OTOLARYNGOLOGY

## 2024-03-11 PROCEDURE — 82947 ASSAY GLUCOSE BLOOD QUANT: CPT

## 2024-03-11 PROCEDURE — 93005 ELECTROCARDIOGRAM TRACING: CPT

## 2024-03-11 PROCEDURE — 83735 ASSAY OF MAGNESIUM: CPT

## 2024-03-11 PROCEDURE — 2500000005 HC RX 250 GENERAL PHARMACY W/O HCPCS: Performed by: HOSPITALIST

## 2024-03-11 PROCEDURE — 99231 SBSQ HOSP IP/OBS SF/LOW 25: CPT | Performed by: PATHOLOGY

## 2024-03-11 PROCEDURE — 80069 RENAL FUNCTION PANEL: CPT

## 2024-03-11 RX ORDER — SODIUM CHLORIDE FOR INHALATION 3 %
3 VIAL, NEBULIZER (ML) INHALATION EVERY 6 HOURS SCHEDULED
Status: DISCONTINUED | OUTPATIENT
Start: 2024-03-11 | End: 2024-03-11

## 2024-03-11 RX ORDER — IPRATROPIUM BROMIDE AND ALBUTEROL SULFATE 2.5; .5 MG/3ML; MG/3ML
3 SOLUTION RESPIRATORY (INHALATION) 4 TIMES DAILY
Status: DISCONTINUED | OUTPATIENT
Start: 2024-03-11 | End: 2024-03-16

## 2024-03-11 RX ORDER — IPRATROPIUM BROMIDE AND ALBUTEROL SULFATE 2.5; .5 MG/3ML; MG/3ML
3 SOLUTION RESPIRATORY (INHALATION)
Status: DISCONTINUED | OUTPATIENT
Start: 2024-03-11 | End: 2024-03-11

## 2024-03-11 RX ORDER — CEFAZOLIN SODIUM 2 G/100ML
2 INJECTION, SOLUTION INTRAVENOUS EVERY 8 HOURS
Status: COMPLETED | OUTPATIENT
Start: 2024-03-12 | End: 2024-03-12

## 2024-03-11 RX ORDER — SODIUM CHLORIDE FOR INHALATION 3 %
3 VIAL, NEBULIZER (ML) INHALATION 4 TIMES DAILY
Status: DISCONTINUED | OUTPATIENT
Start: 2024-03-11 | End: 2024-03-15

## 2024-03-11 RX ADMIN — Medication 15 ML: at 06:00

## 2024-03-11 RX ADMIN — QUETIAPINE FUMARATE 12.5 MG: 25 TABLET ORAL at 21:01

## 2024-03-11 RX ADMIN — ACETAMINOPHEN 1000 MG: 650 SOLUTION ORAL at 17:09

## 2024-03-11 RX ADMIN — OXYCODONE HYDROCHLORIDE AND ACETAMINOPHEN 500 MG: 500 TABLET ORAL at 09:53

## 2024-03-11 RX ADMIN — ATORVASTATIN CALCIUM 20 MG: 20 TABLET, FILM COATED ORAL at 09:53

## 2024-03-11 RX ADMIN — BUPROPION HYDROCHLORIDE 150 MG: 100 TABLET, FILM COATED ORAL at 09:53

## 2024-03-11 RX ADMIN — ESOMEPRAZOLE MAGNESIUM 40 MG: 40 FOR SUSPENSION ORAL at 06:00

## 2024-03-11 RX ADMIN — MULTIVIT AND MINERALS-FERROUS GLUCONATE 9 MG IRON/15 ML ORAL LIQUID 15 ML: at 09:44

## 2024-03-11 RX ADMIN — Medication 1000 UNITS: at 21:01

## 2024-03-11 RX ADMIN — METOPROLOL TARTRATE 50 MG: 50 TABLET, FILM COATED ORAL at 21:01

## 2024-03-11 RX ADMIN — SPIRONOLACTONE 12.5 MG: 25 TABLET, FILM COATED ORAL at 09:50

## 2024-03-11 RX ADMIN — DAPAGLIFLOZIN 10 MG: 10 TABLET, FILM COATED ORAL at 09:50

## 2024-03-11 RX ADMIN — IPRATROPIUM BROMIDE AND ALBUTEROL SULFATE 3 ML: .5; 3 SOLUTION RESPIRATORY (INHALATION) at 17:39

## 2024-03-11 RX ADMIN — GUAIFENESIN 200 MG: 200 SOLUTION ORAL at 21:21

## 2024-03-11 RX ADMIN — LOSARTAN POTASSIUM 25 MG: 25 TABLET, FILM COATED ORAL at 09:50

## 2024-03-11 RX ADMIN — IPRATROPIUM BROMIDE AND ALBUTEROL SULFATE 3 ML: .5; 3 SOLUTION RESPIRATORY (INHALATION) at 21:39

## 2024-03-11 RX ADMIN — VENLAFAXINE 75 MG: 75 TABLET ORAL at 09:52

## 2024-03-11 RX ADMIN — VENLAFAXINE 75 MG: 75 TABLET ORAL at 18:37

## 2024-03-11 RX ADMIN — Medication 3 ML: at 17:39

## 2024-03-11 RX ADMIN — BUPROPION HYDROCHLORIDE 150 MG: 100 TABLET, FILM COATED ORAL at 21:01

## 2024-03-11 RX ADMIN — IPRATROPIUM BROMIDE AND ALBUTEROL SULFATE 3 ML: .5; 3 SOLUTION RESPIRATORY (INHALATION) at 13:40

## 2024-03-11 RX ADMIN — Medication 3 ML: at 13:40

## 2024-03-11 RX ADMIN — IPRATROPIUM BROMIDE AND ALBUTEROL SULFATE 3 ML: .5; 3 SOLUTION RESPIRATORY (INHALATION) at 08:42

## 2024-03-11 RX ADMIN — ACETAMINOPHEN 1000 MG: 650 SOLUTION ORAL at 09:44

## 2024-03-11 RX ADMIN — Medication 1000 UNITS: at 09:51

## 2024-03-11 RX ADMIN — Medication 3 ML: at 21:39

## 2024-03-11 RX ADMIN — FLUCONAZOLE 400 MG: 200 TABLET ORAL at 09:50

## 2024-03-11 RX ADMIN — ACETAMINOPHEN 1000 MG: 650 SOLUTION ORAL at 03:54

## 2024-03-11 RX ADMIN — METOPROLOL TARTRATE 50 MG: 50 TABLET, FILM COATED ORAL at 09:50

## 2024-03-11 RX ADMIN — ENOXAPARIN SODIUM 40 MG: 100 INJECTION SUBCUTANEOUS at 09:45

## 2024-03-11 RX ADMIN — Medication 3 ML: at 08:42

## 2024-03-11 RX ADMIN — Medication 10 MG: at 21:01

## 2024-03-11 ASSESSMENT — COGNITIVE AND FUNCTIONAL STATUS - GENERAL
MOBILITY SCORE: 9
DRESSING REGULAR LOWER BODY CLOTHING: TOTAL
PERSONAL GROOMING: TOTAL
MOVING TO AND FROM BED TO CHAIR: A LOT
DAILY ACTIVITIY SCORE: 6
TURNING FROM BACK TO SIDE WHILE IN FLAT BAD: A LOT
MOVING FROM LYING ON BACK TO SITTING ON SIDE OF FLAT BED WITH BEDRAILS: A LOT
STANDING UP FROM CHAIR USING ARMS: TOTAL
CLIMB 3 TO 5 STEPS WITH RAILING: TOTAL
CLIMB 3 TO 5 STEPS WITH RAILING: TOTAL
EATING MEALS: TOTAL
MOBILITY SCORE: 9
DRESSING REGULAR LOWER BODY CLOTHING: TOTAL
WALKING IN HOSPITAL ROOM: TOTAL
WALKING IN HOSPITAL ROOM: TOTAL
STANDING UP FROM CHAIR USING ARMS: TOTAL
TOILETING: TOTAL
HELP NEEDED FOR BATHING: TOTAL
TURNING FROM BACK TO SIDE WHILE IN FLAT BAD: A LOT
PERSONAL GROOMING: TOTAL
HELP NEEDED FOR BATHING: TOTAL
MOVING TO AND FROM BED TO CHAIR: A LOT
MOVING FROM LYING ON BACK TO SITTING ON SIDE OF FLAT BED WITH BEDRAILS: A LOT
DRESSING REGULAR UPPER BODY CLOTHING: TOTAL
TOILETING: TOTAL
DAILY ACTIVITIY SCORE: 6
DRESSING REGULAR UPPER BODY CLOTHING: TOTAL
EATING MEALS: TOTAL

## 2024-03-11 ASSESSMENT — PAIN - FUNCTIONAL ASSESSMENT: PAIN_FUNCTIONAL_ASSESSMENT: 0-10

## 2024-03-11 ASSESSMENT — PAIN SCALES - GENERAL
PAINLEVEL_OUTOF10: 0 - NO PAIN
PAINLEVEL_OUTOF10: 0 - NO PAIN

## 2024-03-11 ASSESSMENT — PAIN SCALES - WONG BAKER: WONGBAKER_NUMERICALRESPONSE: NO HURT

## 2024-03-11 NOTE — SIGNIFICANT EVENT
Please see below for the events overnight.     RADAR alert for the patient's vital signs: T: 37.7 (Tylenol given at 18:00), HR: 114, BP: 123/85 mmHg RR: 22, SpO2: 98% on 2L NC    Assessed the patient at bedside. Patient was being suctioned, noted to have thick secretions from the upper airway, blood tinged. Patient improved after suctioning and said she was doing better.     Focused Exam at the Time:   Vital signs as above.  HEENT: Upper airway without stridor, mouth with blood secretions.   HR: Irregular rate, tachycardic, no murmurs, rubs, or gallops appreciated, minimal peripheral edema appreciated  PULM: CTA bilaterally    CXR and EKG were obtained.     EKG showed the patient was in atrial fibrillation without rapid ventricular response. CXR obtained showed improvement of the edema. Impression below:    CXR (3/10/24)  IMPRESSION:  Similar appearance hazy bibasilar and left retrocardiac opacities which may represent a combination of edema and atelectasis. Continued improvement of perihilar edema.    Patient showed improvement. However, patient had a desaturation event thereafter. Patient placed on a non-rebreather mask 100%. Pulse ox with now oxygen saturations, though poor wave form. ABG obtained which was 7.49/35/290 and lactate of 1.5 on non-rebreather. Patient eventually weaned off the non-rebreather to the nasal cannula. However, patient appeared to be struggling to breath as though she could not move air in the upper airway as though she was gasping. Patient was also becoming agitated. Gave a 5 mg dose of Zyprexa to help with anxiety, as the patient was becoming upset with suctioning, which was needed. Due to limited ability to suction, ENT was paged. ENT scoped the patient at bedside and found thick mucus obstructing the glottis, cleared with saline flushes. No source of bleeding identified despite blood-tinged secretions. Patient on humidified air. Hypertonic saline nebulizers and DuoNebs ordered to help  manage secretions. Mouth swabs to keep her mouth moist. Tube feeds were held earlier during the above transpired events due to concerns for aspiration. Patient eventually weaned down to nasal cannula.     Patient seen at approximately ~2:00 am, noted to be resting comfortably in bed. Telemetry reviewed. Pulse oxygen >92% with good waveform.     A/P:  #Acute hypoxemic respiratory failure likely 2/2 upper airway obstruction/mucus plugging  - Patient with improved pulmonary congestion on CXR and clear lungs. Unlikely to be the source of AHRF and acute change in status  - Scope by ENT showing thick mucus covering the glottis, cleared out with saline flushes  Plan:  - Continue with telemetry and continuous pulse oxygen  - Tube feeds on hold. Day team to follow-up on appropriate time to resume  - Continue with hypertonic saline with DuoNebs, humidified air, and mouth swabs  - Please page ENT if any acute issues or if the patient develops symptoms again. May need another scope.  - Please continue with [facility] discussions/treatment plans    The patient's daughter, Tati ([phone]) was called and informed of the above events. She was at bedside with her partner after being informed of the events.     Day team to follow-up.

## 2024-03-11 NOTE — PROGRESS NOTES
"Nutrition Follow Up Assessment:   Nutrition Assessment    The patient is a 78 y.o. female who is hospital day #18.  Pt receiving inpatient rad - currently on hold (?). Plan for PEG placement 03/12. Pt with rapid response on 03/11 - partial obstructing mucous plug -> s/p saline lavage.     PMHx: squamous cell carcinoma of hypopharynx, HFpEF, CAD s/p PCI, Afib s/p watchman, GERD, depression, HTN, AUD, ascending aortic aneurysm, distant breast cancer (s/p chemoradiotherapy and lumpectomy)      Nutrition History:  Food and Nutrient History: Stopped by to talk with pt 03/07 - pt had a rapid response overnight and TF was paused at time of visit. Before pausing TF rate running at goal w/ appropriate tolerance.Stopped by again today - pt resting. Family present which reports TF are still going well - pt has not complained of any N/V/abdominal pain. Isosource 1.5 running at goal rate of 50 ml/hr. Family shares pt currently is being monitor for the increased mucous/phlegm she is having. Tentative plan for PEG placement tomorrow.  Food Allergies/Intolerances:  None  GI Symptoms: None  Oral Problems: Swallowing difficulty         Anthropometrics:  Start of admission anthropometrics:  Height: 163.5 cm (5' 4.37\")  Weight: 68 kg (149 lb 14.6 oz)  BMI (Calculated): 25.44    IBW/kg (Dietitian Calculated): 56.8 kg  Percent of IBW: 123 %       Weight Change %:  Weight History / % Weight Change: No new wt obtained in the past 7+ days - unable to assess wt changes.    Nutrition Focused Physical Exam Findings:  defer: Mild-mod losses noted on 02/24    Edema:  Edema Location: generalized  Physical Findings:  Skin:  (MASD@buttocks)    Objective Data:    Last BM Date: 03/10/24    Nutrition Significant Labs:    Results from last 7 days   Lab Units 03/11/24  1008 03/10/24  0539 03/09/24  0439 03/07/24  0430 03/06/24  0525   HEMOGLOBIN g/dL 8.0* 8.0* 7.8*   < > 8.8*   MCV fL 101* 99 99   < > 101*   GLUCOSE mg/dL 90 113* 95   < > 123* "   POTASSIUM mmol/L 4.4 4.1 4.8   < > 5.2   SODIUM mmol/L 137 135* 137   < > 137   PHOSPHORUS mg/dL 3.7 2.9 3.0   < > 2.3*   MAGNESIUM mg/dL 2.03 2.02 2.16   < > 2.12   CREATININE mg/dL 0.52 0.65 0.58   < > 0.52   BUN mg/dL 16 19 21   < > 21   ALT U/L  --   --   --   --  23   AST U/L  --   --   --   --  24   ALK PHOS U/L  --   --   --   --  70    < > = values in this interval not displayed.     Lab Results   Component Value Date    VITD25 83 03/29/2021         Nutrition Specific Medications:  ascorbic acid, 500 mg, nasogastric tube, Daily  atorvastatin, 20 mg, nasogastric tube, Daily  cholecalciferol, 1,000 Units, nasogastric tube, BID  esomeprazole, 40 mg, nasoduodenal tube, Daily before breakfast  multivitamin with iron-minerals, 15 mL, nasogastric tube, Daily  senna, 10 mL, nasogastric tube, BID  sodium chloride, 3 mL, nebulization, 4x daily  spironolactone, 12.5 mg, nasogastric tube, Daily    I/O:   I/O last 2 completed shifts:  In: 220 (3.2 mL/kg) [NG/GT:220]  Out: 1200 (17.6 mL/kg) [Urine:1200 (0.7 mL/kg/hr)]  Weight: 68 kg     Dietary Orders (From admission, onward)       Start     Ordered    03/12/24 0001  NPO Diet Except: Sips with meds; Effective midnight  Diet effective midnight        Question:  Except:  Answer:  Sips with meds    03/11/24 0819    03/04/24 1029  Enteral feeding with NPO Isosource 1.5; NG (nasogastric tube); 50  Diet effective now        Question Answer Comment   Tube feeding formula: Isosource 1.5    Feeding route: NG (nasogastric tube)    Tube feeding continuous rate (mL/hr): 50        03/04/24 1028                     Estimated Needs:   Total Energy Estimated Needs (kCal): 1900 kCal  Method for Estimating Needs: 67.2 kg * 28 kcal/kg    Total Protein Estimated Needs (g): 90 g  Method for Estimating Needs: 67.2 kg * 1.3 g/kg            Nutrition Diagnosis   Malnutrition Diagnosis  Patient has Malnutrition Diagnosis: Yes  Diagnosis Status: Ongoing  Malnutrition Diagnosis: Moderate  malnutrition related to chronic disease or condition  As Evidenced by: <75% estimated energy requirement > 1month, suspected weight loss and mild to moderate muscle atrophy  Additional Assessment Information: TF meeting >75% of nutrient needs for 7+ days.            Nutrition Interventions/Recommendations   Individualized Nutrition Prescription Provided for : 1900 kcal and 90g protein via TF    1) Once PEG is placed and cleared for use transition pt to bolus regimen:  Isosource 1.5 bolus @ 310 ml x4/d (5 cartons /d)  FWF: 60 ml pre/post feeds w/ additional 200 ml BID  This regimen provides: 1240 ml, 1860 kcal, 84 g protein, and 1827 ml free water      2) Ok to discontinue MVI and ascorbic acid from nutrition standpoint   TF regimen provides 100% of micronutrient needs    3) Check Vitamin D levels to determine adequate supplementation dose  4) Obtain repeat wt if able     Nutrition Education:   N/A- pt resting. Plan to stop by after PEG placement to talk about transition to bolus regimen.      Nutrition Monitoring and Evaluation   Monitoring and Evaluation Plan: Enteral and parenteral nutrition intake  Enteral and Parenteral Nutrition Intake: Enteral nutrition intake  Criteria: TF tolerance    Monitoring and Evaluation Plan: Weight  Weight: Measured weight  Criteria: Stable                 Time Spent/Follow-up Reminder:   Time Spent (min): 60 minutes  Last Date of Nutrition Visit: 03/11/24  Nutrition Follow-Up Needed?: Dietitian to reassess per policy  Follow up Comment: TF via NG; plan to place PEG 03/12

## 2024-03-11 NOTE — PROGRESS NOTES
"[facility]  [facility] [place]  CONSULT FOLLOW-UP     Reason For Consult  PEG tube assessment     SUBJECTIVE     - NAOE, pt afebrile and HDS    EXAM     Last Recorded Vitals  Blood pressure 104/66, pulse 86, temperature 36.8 °C (98.2 °F), temperature source Temporal, resp. rate 20, height 1.635 m (5' 4.37\"), weight 68 kg (149 lb 14.6 oz), SpO2 97 %.      Intake/Output Summary (Last 24 hours) at 3/11/2024 0853  Last data filed at 3/11/2024 0632  Gross per 24 hour   Intake 220 ml   Output 1200 ml   Net -980 ml       Physical Exam   GENERAL: Chronically ill.  NEUROLOGIC: A and O x 3. Cranial nerves 2 through 12 grossly intact. Intact motor and sensory systems.  HEENT: Pupils are equal, round, and reactive to light and accommodation. Extraocular motion intact. CARDIAC: S1 + S2, RRR, no M/R/G.    LUNGS: CTA BL. No wheezes or coarse breath sounds. Symmetric respirations. No increased work of breathing.   ABDOMEN: Soft, non-tender to palpation. No rebound or guarding.  PSYCH: Low mood.    OBJECTIVE                                                                              Medications             Current Facility-Administered Medications:     acetaminophen (Tylenol) oral liquid 1,000 mg, 1,000 mg, nasogastric tube, q6h, Angelic Sarmiento MD, 1,000 mg at 03/11/24 0354    alteplase (Cathflo Activase) injection 1 mg, 1 mg, intra-catheter, PRN, Isaac Bledsoe MD    alteplase (Cathflo Activase) injection 1 mg, 1 mg, intra-catheter, PRN, Isaac Bledsoe MD, 1 mg at 02/28/24 2225    ascorbic acid (Vitamin C) tablet 500 mg, 500 mg, nasogastric tube, Daily, Mary Corbett MD, 500 mg at 03/10/24 0916    atorvastatin (Lipitor) tablet 20 mg, 20 mg, nasogastric tube, Daily, Angelic Sarmiento MD, 20 mg at 03/10/24 0917    buPROPion (Wellbutrin) tablet 150 mg, 150 mg, nasogastric tube, BID, Angelic Sarmiento MD, 150 mg at 03/10/24 2134    cholecalciferol (Vitamin D-3) tablet 1,000 Units, " 1,000 Units, nasogastric tube, BID, Angelic Sarmiento MD, 1,000 Units at 03/10/24 2134    dapagliflozin propanediol (Farxiga) tablet 10 mg, 10 mg, oral, Daily, Justino Cooper MD, 10 mg at 03/10/24 0917    enoxaparin (Lovenox) syringe 40 mg, 40 mg, subcutaneous, q24h, Baudilio Turcios MD, 40 mg at 03/10/24 1151    esomeprazole (NexIUM) suspension 40 mg, 40 mg, nasoduodenal tube, Daily before breakfast, Mary Corbett MD, 40 mg at 03/11/24 0600    fluconazole (Diflucan) tablet 400 mg, 400 mg, oral, Daily, Baudilio Turcios MD, 400 mg at 03/10/24 0916    guaiFENesin (Robitussin) 100 mg/5 mL syrup 200 mg, 200 mg, nasogastric tube, q4h PRN, Angelic Sarmiento MD, 200 mg at 03/09/24 0637    ipratropium-albuteroL (Duo-Neb) 0.5-2.5 mg/3 mL nebulizer solution 3 mL, 3 mL, nebulization, 4x daily, Debra Patrick MD, 3 mL at 03/11/24 0842    lido-diphen-Maalox 1:1:1 Magic Mouthwash, 10 mL, Swish & Spit, q4h PRN, Isaac Bledsoe MD, 10 mL at 03/08/24 1106    lidocaine (Xylocaine) 2 % jelly 1 Application, 1 Application, Topical, Once, Mary Corbett MD    loperamide (Imodium) capsule 2 mg, 2 mg, nasogastric tube, 4x daily PRN, Mary Corbett MD, 2 mg at 03/07/24 1043    losartan (Cozaar) tablet 25 mg, 25 mg, nasogastric tube, Daily, Angelic Sarmiento MD, 25 mg at 03/10/24 0917    [Held by provider] magnesium oxide (Mag-Ox) tablet 400 mg, 400 mg, nasogastric tube, Daily, Angelic Sarmiento MD, 400 mg at 03/03/24 0946    melatonin tablet 10 mg, 10 mg, oral, Nightly, Angelic Sarmiento MD, 10 mg at 03/10/24 1804    metoprolol tartrate (Lopressor) tablet 50 mg, 50 mg, nasogastric tube, BID, Angelic Sarmiento MD, 50 mg at 03/10/24 2134    moisturizing mouth (Biotene Dry Mouth) solution 15 mL, 15 mL, Swish & Spit, TID PRN, Baudilio Turcios MD, 15 mL at 03/11/24 0600    multivitamin with iron-minerals 9 mg iron/15 mL liquid 15 mL, 15 mL, nasogastric tube, Daily, Angelic Sarmiento MD, 15 mL at 03/10/24 0918    OLANZapine (ZyPREXA) tablet 2.5 mg, 2.5 mg, oral, q8h PRN, Baudilio  MD Tam, 2.5 mg at 03/10/24 1804    ondansetron (Zofran) injection 4 mg, 4 mg, intravenous, q6h PRN, Mary Corbett MD, 4 mg at 02/27/24 0815    oxygen (O2) therapy, , inhalation, Continuous PRN - O2/gases, Debra Patrick MD, 1 L/min at 03/07/24 1021    perflutren lipid microspheres (Definity) injection 0.5-10 mL of dilution, 0.5-10 mL of dilution, intravenous, Once in imaging, Justino Cooper MD    perflutren protein A microsphere (Optison) injection 0.5 mL, 0.5 mL, intravenous, Once in imaging, Justino Cooper MD    phenoL (Chloraseptic) 1.4 % mouth/throat spray 1 spray, 1 spray, Mouth/Throat, q2h PRN, Angelic Sarmiento MD    prochlorperazine (Compazine) injection 10 mg, 10 mg, intravenous, q6h PRN, Angelic Sarmiento MD, 10 mg at 02/27/24 1151    QUEtiapine (SEROquel) tablet 12.5 mg, 12.5 mg, oral, Nightly, Baudilio Turcios MD, 12.5 mg at 03/10/24 2133    racepinephrine (Asthmanefrin) 2.25 % nebulizer solution 0.5 mL, 0.5 mL, nebulization, q3h PRN, Baudilio Turcios MD, 0.5 mL at 03/10/24 1818    senna (Senokot) 8.8 mg/5 mL syrup 10 mL, 10 mL, nasogastric tube, BID, Angelic Sarmiento MD, 10 mL at 03/08/24 1020    sodium chloride 3 % nebulizer solution 3 mL, 3 mL, nebulization, 4x daily, Debra Patrick MD, 3 mL at 03/11/24 0842    spironolactone (Aldactone) tablet 12.5 mg, 12.5 mg, nasogastric tube, Daily, Angelic Sarmiento MD, 12.5 mg at 03/10/24 0917    sulfur hexafluoride microsphr (Lumason) injection 24.28 mg, 2 mL, intravenous, Once in imaging, Justino Cooper MD    venlafaxine (Effexor) tablet 75 mg, 75 mg, nasogastric tube, BID with meals, Angelic Sarmiento MD, 75 mg at 03/10/24 1804                                                                            Labs     Results for orders placed or performed during the hospital encounter of 02/22/24 (from the past 24 hour(s))   Fibrinogen   Result Value Ref Range    Fibrinogen 664 (H) 200 - 400 mg/dL   D-dimer, Non VTE   Result Value Ref Range    D-Dimer Non VTE, Quant (ng/mL FEU) 5,173 (H) <=500  ng/mL FEU   Coagulation Screen   Result Value Ref Range    Protime 13.9 (H) 9.8 - 12.8 seconds    INR 1.2 (H) 0.9 - 1.1    aPTT 33 27 - 38 seconds   POCT GLUCOSE   Result Value Ref Range    POCT Glucose 93 74 - 99 mg/dL   POCT GLUCOSE   Result Value Ref Range    POCT Glucose 101 (H) 74 - 99 mg/dL   Blood Gas Arterial Full Panel   Result Value Ref Range    POCT pH, Arterial 7.49 (H) 7.38 - 7.42 pH    POCT pCO2, Arterial 35 (L) 38 - 42 mm Hg    POCT pO2, Arterial 290 (H) 85 - 95 mm Hg    POCT SO2, Arterial 100 94 - 100 %    POCT Oxy Hemoglobin, Arterial 97.6 94.0 - 98.0 %    POCT Hematocrit Calculated, Arterial 26.0 (L) 36.0 - 46.0 %    POCT Sodium, Arterial 131 (L) 136 - 145 mmol/L    POCT Potassium, Arterial 4.8 3.5 - 5.3 mmol/L    POCT Chloride, Arterial 102 98 - 107 mmol/L    POCT Ionized Calcium, Arterial 1.24 1.10 - 1.33 mmol/L    POCT Glucose, Arterial 84 74 - 99 mg/dL    POCT Lactate, Arterial 1.5 0.4 - 2.0 mmol/L    POCT Base Excess, Arterial 3.3 (H) -2.0 - 3.0 mmol/L    POCT HCO3 Calculated, Arterial 26.7 (H) 22.0 - 26.0 mmol/L    POCT Hemoglobin, Arterial 8.8 (L) 12.0 - 16.0 g/dL    POCT Anion Gap, Arterial 7 (L) 10 - 25 mmo/L    Patient Temperature 37.0 degrees Celsius    FiO2 100 %   POCT GLUCOSE   Result Value Ref Range    POCT Glucose 74 74 - 99 mg/dL   POCT GLUCOSE   Result Value Ref Range    POCT Glucose 100 (H) 74 - 99 mg/dL                                                                             Imaging           02/24/24 MBS:  Oral Phase:  WFL     Adequate bolus acceptance, formation, and transit w/ all consistencies assessed.       -Pt does note significant odynophagia (pain orally and pharyngeally) with all PO intake.     Pharyngeal Phase:  Moderate/severe deficits     Tissue protrusion within hypopharynx impacting bolus flow.  No nasal regurgitation on this assessment, but backflow of boluses observed.  Hyolaryngeal elevation/excursion impaired.  Reduced airway protection during the swallow  resulting in deep penetration to the vocal cords w/ all liquid consistencies, and eventual trace aspiration of thin and mildly thick liquids.  Independent cough/throat clear, but this did not clear aspirated material. Pharyngeal stasis at the valleculae and piriforms following the swallow with all liquid consistencies.  Aspiration in min-mod amounts following the swallow with thin and mildly thick liquids as a result of residue.  Again, cough response did not clear.  Chin tuck and head turn R did not improve safety or efficiency of swallow.  Head turn L (toward side of mass) improved swallow partially, but did not entirely resolve deficits.  With most viscous consistency assessed (puree) , there was no penetration/aspiration or significant stasis.       Esophageal Phase:  No overt deficits                                                                           GI Procedures      02/2021 EGD:  Impression:     - Z-line, 39 cm from the incisors.                         - Normal esophagus.                         - 3 parallel streaks of hemorrhagic appearance and                          linear erosions mucosa in the gastric body. Biopsied.                         - Normal examined duodenum.                         - Unclear if the gastric lesions are her sole source                          of bleeding as not seen on two previous endoscopie                          over past 6 months with recurrent UGI bleeding.     ASSESSMENT / PLAN                  ASSESSMENT/PLAN:     Amita Todd is a 78 y.o. 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      GI is consulted for PEG tube assessment.    Last dose of Plavix was on the 2/25/24.  Initially planned ot place PEG tube placement on 03/01/2024 after Plavix washout. However patient developed bacteremia followed by neutropenia. Gastroenterology team re-engaged on 3/8 for PEG tube placement with improved WBC counts and no longer on Filgrastim     Recommendations:  - Plan for PEG tube placement on 3/12   - Hold Plavix for 5 day prior to PEG tube placement.  - Hold tube feeds @ MN   - please hold all heparin products (including prophylaxis) at 4AM day prior to procedure  - surgical prophylaxis of cefazolin 2 g IV 30 minutes prior to surgical incision (for nonobese patients weighing <120 kg)    Patient was seen and discussed with Dr. Brannon     Gastroenterology will continue to follow.  During weekday hours of 7am-5pm please do not hesitate to contact me on [employer] Chat or page [phone], if there are any further questions between the weekday hours of 7am-5pm.   After hours, on weekends, and on holidays, please page the [place] [phone]. Thank you.     Danish Manjarrez MD  PGY-4 Gastroenterology and Hepatology Fellow  [facility]

## 2024-03-11 NOTE — SIGNIFICANT EVENT
Rapid response called by bedside nurse for Spo2 desaturation and inability to suction patient adequately.   On arrival patient is on a NRB and ABG was drawn (ph7.49, po2 290, 1.5 Lactate, pco2 35).  O2 decreased to 5 lpm NC.  Patient is still anxious and tachypneic so NT suctioning was attempted with no secretions removed.  ENT was consulted and performed bedside scope and secretion removal.  Patient appears more comfortable.  Bedside RN will call rapid response with any further concerns.

## 2024-03-11 NOTE — CARE PLAN
Problem: Skin  Goal: Decreased wound size/increased tissue granulation at next dressing change  Outcome: Progressing  Flowsheets (Taken 3/11/2024 1219)  Decreased wound size/increased tissue granulation at next dressing change:   Promote sleep for wound healing   Protective dressings over bony prominences  Goal: Participates in plan/prevention/treatment measures  Outcome: Progressing  Flowsheets (Taken 3/11/2024 1219)  Participates in plan/prevention/treatment measures:   Discuss with provider PT/OT consult   Elevate heels  Goal: Prevent/manage excess moisture  Outcome: Progressing  Flowsheets (Taken 3/11/2024 1219)  Prevent/manage excess moisture:   Cleanse incontinence/protect with barrier cream   Moisturize dry skin   Follow provider orders for dressing changes  Goal: Prevent/minimize sheer/friction injuries  Outcome: Progressing  Flowsheets (Taken 3/11/2024 1219)  Prevent/minimize sheer/friction injuries:   HOB 30 degrees or less   Use pull sheet  Goal: Promote/optimize nutrition  Outcome: Progressing  Flowsheets (Taken 3/11/2024 1219)  Promote/optimize nutrition: Monitor/record intake including meals  Goal: Promote skin healing  Outcome: Progressing  Flowsheets (Taken 3/11/2024 1219)  Promote skin healing:   Assess skin/pad under line(s)/device(s)   Protective dressings over bony prominences     Problem: Pain  Goal: My pain/discomfort is manageable  Outcome: Progressing     Problem: Safety  Goal: Patient will be injury free during hospitalization  Outcome: Progressing  Goal: I will remain free of falls  Outcome: Progressing     Problem: Daily Care  Goal: Daily care needs are met  Outcome: Progressing     Problem: Psychosocial Needs  Goal: Demonstrates ability to cope with hospitalization/illness  Outcome: Progressing  Goal: Collaborate with me, my family, and caregiver to identify my specific goals  Outcome: Progressing     Problem: Discharge Barriers  Goal: My discharge needs are met  Outcome: Progressing      Problem: Pain  Goal: Takes deep breaths with improved pain control throughout the shift  Outcome: Progressing  Goal: Turns in bed with improved pain control throughout the shift  Outcome: Progressing  Goal: Walks with improved pain control throughout the shift  Outcome: Progressing  Goal: Performs ADL's with improved pain control throughout shift  Outcome: Progressing  Goal: Participates in PT with improved pain control throughout the shift  Outcome: Progressing  Goal: Free from opioid side effects throughout the shift  Outcome: Progressing  Goal: Free from acute confusion related to pain meds throughout the shift  Outcome: Progressing     Problem: Pain - Adult  Goal: Verbalizes/displays adequate comfort level or baseline comfort level  Outcome: Progressing     Problem: Safety - Adult  Goal: Free from fall injury  Outcome: Progressing     Problem: Discharge Planning  Goal: Discharge to home or other facility with appropriate resources  Outcome: Progressing     Problem: Chronic Conditions and Co-morbidities  Goal: Patient's chronic conditions and co-morbidity symptoms are monitored and maintained or improved  Outcome: Progressing     Problem: Fall/Injury  Goal: Not fall by end of shift  Outcome: Progressing  Goal: Be free from injury by end of the shift  Outcome: Progressing  Goal: Verbalize understanding of personal risk factors for fall in the hospital  Outcome: Progressing  Goal: Verbalize understanding of risk factor reduction measures to prevent injury from fall in the home  Outcome: Progressing  Goal: Use assistive devices by end of the shift  Outcome: Progressing  Goal: Pace activities to prevent fatigue by end of the shift  Outcome: Progressing   The patient's goals for the shift include show no signs of respiratory distress or hypoxia     The clinical goals for the shift include Patient will remain HDS throughout the shift 3/11/24 @ 0700    Over the shift, the patient did make progress toward the following  goals. Barriers to progression include thick mucousy secretions. Recommendations to address these barriers include consistent and regular RT treatments and suctioning.

## 2024-03-11 NOTE — SIGNIFICANT EVENT
Rapid response called by bedside nurse for desaturations in mid 80's. On arrival Dr. Mathews and rapid response team at bedside. patient already on NRB, with O2 sat 99%, other vital signs; 's (Afib), 's/80's, ABG already drawn (pH7.49, pO2 290, PCO2 35, 1.5 Lactate). Tube feeds paused and patient repositioned. CXR ordered and showed somewhat improved interstitial edema. With some oral suctioning, Oxygenation (transitioned to 2L), patient started feeling better and her respiratory rate improved. Called again for tachypnea, Oxygen sat 99% on 4L and coarse breath sounds, multiple attempts with oral and nasal tubes suctioning with only minimal output and no improvement in respiratory distress. Patient also given 5mg I, eventually ENT called and performed bedside scope with findings of mucous plug to glottis which was broken up with saline. Plan for hypertonic saline nebulizer and Dounebs.  Family informed and at bedside.

## 2024-03-11 NOTE — CARE PLAN
The patient's goals for the shift include      The clinical goals for the shift include Patient will remain HDS throughout the shift 3/11/24 @ 0700      Problem: Skin  Goal: Decreased wound size/increased tissue granulation at next dressing change  Outcome: Progressing  Goal: Participates in plan/prevention/treatment measures  Outcome: Progressing  Goal: Prevent/manage excess moisture  Outcome: Progressing  Goal: Prevent/minimize sheer/friction injuries  Outcome: Progressing  Goal: Promote/optimize nutrition  Outcome: Progressing  Goal: Promote skin healing  Outcome: Progressing     Problem: Pain  Goal: My pain/discomfort is manageable  Outcome: Progressing     Problem: Safety  Goal: Patient will be injury free during hospitalization  Outcome: Progressing  Goal: I will remain free of falls  Outcome: Progressing     Problem: Daily Care  Goal: Daily care needs are met  Outcome: Progressing     Problem: Psychosocial Needs  Goal: Demonstrates ability to cope with hospitalization/illness  Outcome: Progressing  Goal: Collaborate with me, my family, and caregiver to identify my specific goals  Outcome: Progressing     Problem: Discharge Barriers  Goal: My discharge needs are met  Outcome: Progressing     Problem: Pain  Goal: Takes deep breaths with improved pain control throughout the shift  Outcome: Progressing  Goal: Turns in bed with improved pain control throughout the shift  Outcome: Progressing  Goal: Walks with improved pain control throughout the shift  Outcome: Progressing  Goal: Performs ADL's with improved pain control throughout shift  Outcome: Progressing  Goal: Participates in PT with improved pain control throughout the shift  Outcome: Progressing  Goal: Free from opioid side effects throughout the shift  Outcome: Progressing  Goal: Free from acute confusion related to pain meds throughout the shift  Outcome: Progressing     Problem: Pain - Adult  Goal: Verbalizes/displays adequate comfort level or baseline  comfort level  Outcome: Progressing     Problem: Safety - Adult  Goal: Free from fall injury  Outcome: Progressing     Problem: Discharge Planning  Goal: Discharge to home or other facility with appropriate resources  Outcome: Progressing     Problem: Chronic Conditions and Co-morbidities  Goal: Patient's chronic conditions and co-morbidity symptoms are monitored and maintained or improved  Outcome: Progressing     Problem: Fall/Injury  Goal: Not fall by end of shift  Outcome: Progressing  Goal: Be free from injury by end of the shift  Outcome: Progressing  Goal: Verbalize understanding of personal risk factors for fall in the hospital  Outcome: Progressing  Goal: Verbalize understanding of risk factor reduction measures to prevent injury from fall in the home  Outcome: Progressing  Goal: Use assistive devices by end of the shift  Outcome: Progressing  Goal: Pace activities to prevent fatigue by end of the shift  Outcome: Progressing

## 2024-03-11 NOTE — CONSULTS
ENT DEPARTMENT CONSULTATION NOTE  Name: Amita Todd  MRN: [id number]  : 1945  Consulting Team: Night acting chief resident/Felix team  Reason for Consult: Concern for upper airway obstruction, code white    History of Present Illness  The patient is a 78 y.o. female who presented to [facility] on 2024 for hypotension which resolved, E. coli bacteremia, hyponatremia and plan for PEG 3/12 with GI.  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  Patient follows with Dr. Tobin with ENT outpatient and presented in December with difficulty swallowing for a few months with office exam and scope showing a left hypopharyngeal mass.    ENT was consulted urgently as part of a code white due to increased oxygen requirements, noisy breathing, and desaturations.  ENT was made aware that the patient is DNR and DNI and requested family be made aware of the situation.  Family bedside gave verbal consent for scope exam and for procedure below.  They note she has developed noisy breathing which started today.    Review of Systems  14 point review of systems completed and all negative except as noted in HPI.    Past Medical History  Past Medical History:   Diagnosis Date    Alcohol dependence, in remission (CMS/[facility]) 2016    History of alcoholism    Anemia     Aneurysm of ascending aorta (CMS/[facility])     Arrhythmia     a-fib. a-flutter    Branch retinal artery occlusion of right eye 2023    CKD (chronic kidney disease)     Coronary artery disease     Depression     GERD (gastroesophageal reflux disease)     Hypertension     Intermittent claudication (CMS/[facility])     Myocardial infarction (CMS/[facility]) 10/06/2023    Other bursitis of hip, unspecified hip 2017    Hip bursitis    Other conditions influencing health status 2014    Malignant Neoplasm  Of Anterior Two-thirds Of Tongue    Pain in leg, unspecified 01/25/2022    Leg pain    Pain in right shoulder 07/01/2020    Bilateral shoulder pain    Pain in unspecified knee 03/31/2021    Joint pain, knee    Personal history of diseases of the blood and blood-forming organs and certain disorders involving the immune mechanism 02/09/2019    History of anemia    Personal history of malignant neoplasm of breast 04/29/2014    History of malignant neoplasm of female breast    Personal history of other diseases of the circulatory system 04/03/2014    Personal history of subarachnoid hemorrhage    Personal history of other diseases of the digestive system 03/08/2021    History of upper gastrointestinal hemorrhage    Personal history of other diseases of the digestive system 03/08/2021    History of melena    Personal history of other diseases of the nervous system and sense organs 08/12/2020    History of Bell's palsy    Personal history of other infectious and parasitic diseases 06/14/2019    History of herpes zoster    Personal history of other specified conditions 03/03/2020    History of lump of left breast    Personal history of other specified conditions 02/22/2021    History of shortness of breath    Personal history of transient ischemic attack (TIA), and cerebral infarction without residual deficits 04/24/2022    History of transient ischemic attack    Stenosis of carotid artery     right    Trigger finger, unspecified finger 04/24/2017    Acquired trigger finger       Past Surgical History  Past Surgical History:   Procedure Laterality Date    CT ANGIO NECK  06/06/2019    CT NECK ANGIO W AND WO IV CONTRAST 6/6/2019 [facility] CLINICAL LEGACY    CT ANGIO NECK  01/04/2021    CT NECK ANGIO W AND WO IV CONTRAST 1/4/2021 [facility] ANCILLARY LEGACY    CT HEAD ANGIO W AND WO IV CONTRAST  06/06/2019    CT HEAD ANGIO W AND WO IV CONTRAST 6/6/2019 [facility] CLINICAL LEGACY    CT HEAD ANGIO W AND WO IV CONTRAST  01/04/2021    CT HEAD  ANGIO W AND WO IV CONTRAST 1/4/2021 [facility] ANCILLARY LEGACY    MR HEAD ANGIO WO IV CONTRAST  06/06/2019    MR HEAD ANGIO WO IV CONTRAST 6/6/2019 [facility] CLINICAL LEGACY    MR NECK ANGIO WO IV CONTRAST  06/06/2019    MR NECK ANGIO WO IV CONTRAST 6/6/2019 [facility] CLINICAL LEGACY    OTHER SURGICAL HISTORY  03/03/2020    Oophorectomy    OTHER SURGICAL HISTORY  06/17/2019    Miscarriage surgical treatment    OTHER SURGICAL HISTORY  08/29/2019    multiple Catheter Ablation Atrial Flutter    OTHER SURGICAL HISTORY  04/23/2014    Previous Stent Placement    SEPTOPLASTY  09/22/2017    Septoplasty       Allergies  Allergies   Allergen Reactions    Oxycodone Hcl-Oxycodone-Asa Unknown    Oxycodone-Acetaminophen Unknown    Penicillin G Other     Nausea    Penicillins GI Upset and Other       Medications    Current Facility-Administered Medications:     acetaminophen (Tylenol) oral liquid 1,000 mg, 1,000 mg, nasogastric tube, q6h, Angelic Sarmiento MD, 1,000 mg at 03/11/24 0944    alteplase (Cathflo Activase) injection 1 mg, 1 mg, intra-catheter, PRN, Isaac Bledsoe MD    alteplase (Cathflo Activase) injection 1 mg, 1 mg, intra-catheter, PRN, Isaac Bledsoe MD, 1 mg at 02/28/24 2225    ascorbic acid (Vitamin C) tablet 500 mg, 500 mg, nasogastric tube, Daily, Mary Corbett MD, 500 mg at 03/11/24 0953    atorvastatin (Lipitor) tablet 20 mg, 20 mg, nasogastric tube, Daily, Angelic Sarmiento MD, 20 mg at 03/11/24 0953    buPROPion (Wellbutrin) tablet 150 mg, 150 mg, nasogastric tube, BID, Angelic Sarmiento MD, 150 mg at 03/11/24 0953    cholecalciferol (Vitamin D-3) tablet 1,000 Units, 1,000 Units, nasogastric tube, BID, Angelic Sarmiento MD, 1,000 Units at 03/11/24 0951    dapagliflozin propanediol (Farxiga) tablet 10 mg, 10 mg, oral, Daily, Justino Cooper MD, 10 mg at 03/11/24 0950    [Held by provider] enoxaparin (Lovenox) syringe 40 mg, 40 mg, subcutaneous, q24h, Baudilio Turcios MD, 40 mg at 03/11/24 0945    esomeprazole (NexIUM) suspension  40 mg, 40 mg, nasoduodenal tube, Daily before breakfast, Mary Corbett MD, 40 mg at 03/11/24 0600    fluconazole (Diflucan) tablet 400 mg, 400 mg, oral, Daily, Baudilio Turcios MD, 400 mg at 03/11/24 0950    guaiFENesin (Robitussin) 100 mg/5 mL syrup 200 mg, 200 mg, nasogastric tube, q4h PRN, Angelic Sarmiento MD, 200 mg at 03/09/24 0637    ipratropium-albuteroL (Duo-Neb) 0.5-2.5 mg/3 mL nebulizer solution 3 mL, 3 mL, nebulization, 4x daily, Debra Patrick MD, 3 mL at 03/11/24 0842    lido-diphen-Maalox 1:1:1 Magic Mouthwash, 10 mL, Swish & Spit, q4h PRN, Isaac Bledsoe MD, 10 mL at 03/08/24 1106    lidocaine (Xylocaine) 2 % jelly 1 Application, 1 Application, Topical, Once, Mary Corbett MD    loperamide (Imodium) capsule 2 mg, 2 mg, nasogastric tube, 4x daily PRN, Mary Corbett MD, 2 mg at 03/07/24 1043    losartan (Cozaar) tablet 25 mg, 25 mg, nasogastric tube, Daily, Angelic Sarmiento MD, 25 mg at 03/11/24 0950    [Held by provider] magnesium oxide (Mag-Ox) tablet 400 mg, 400 mg, nasogastric tube, Daily, Angelic Sarmiento MD, 400 mg at 03/03/24 0946    melatonin tablet 10 mg, 10 mg, oral, Nightly, Angelic Sarmiento MD, 10 mg at 03/10/24 1804    metoprolol tartrate (Lopressor) tablet 50 mg, 50 mg, nasogastric tube, BID, Angelic Sarmiento MD, 50 mg at 03/11/24 0950    moisturizing mouth (Biotene Dry Mouth) solution 15 mL, 15 mL, Swish & Spit, TID PRN, Baudilio Turcios MD, 15 mL at 03/11/24 0600    multivitamin with iron-minerals 9 mg iron/15 mL liquid 15 mL, 15 mL, nasogastric tube, Daily, Angelic Sarmiento MD, 15 mL at 03/11/24 0944    OLANZapine (ZyPREXA) tablet 2.5 mg, 2.5 mg, oral, q8h PRN, Baudilio Turcios MD, 2.5 mg at 03/10/24 1804    ondansetron (Zofran) injection 4 mg, 4 mg, intravenous, q6h PRN, Mary Corbett MD, 4 mg at 02/27/24 0815    oxygen (O2) therapy, , inhalation, Continuous PRN - O2/gases, Debra Patrick MD, 2 L/min at 03/11/24 0842    perflutren lipid microspheres (Definity) injection 0.5-10 mL of dilution, 0.5-10  mL of dilution, intravenous, Once in imaging, Justino Cooper MD    perflutren protein A microsphere (Optison) injection 0.5 mL, 0.5 mL, intravenous, Once in imaging, Justino Cooper MD    phenoL (Chloraseptic) 1.4 % mouth/throat spray 1 spray, 1 spray, Mouth/Throat, q2h PRN, Angelic Sarmiento MD    prochlorperazine (Compazine) injection 10 mg, 10 mg, intravenous, q6h PRN, Angelic Sarmiento MD, 10 mg at 02/27/24 1151    QUEtiapine (SEROquel) tablet 12.5 mg, 12.5 mg, oral, Nightly, Baudilio Turcios MD, 12.5 mg at 03/10/24 2133    racepinephrine (Asthmanefrin) 2.25 % nebulizer solution 0.5 mL, 0.5 mL, nebulization, q3h PRN, Baudilio Turcios MD, 0.5 mL at 03/10/24 1818    senna (Senokot) 8.8 mg/5 mL syrup 10 mL, 10 mL, nasogastric tube, BID, Angelic Sarmiento MD, 10 mL at 03/08/24 1020    sodium chloride 3 % nebulizer solution 3 mL, 3 mL, nebulization, 4x daily, Debra Patrick MD, 3 mL at 03/11/24 0842    spironolactone (Aldactone) tablet 12.5 mg, 12.5 mg, nasogastric tube, Daily, Angelic Sarmiento MD, 12.5 mg at 03/11/24 0950    sulfur hexafluoride microsphr (Lumason) injection 24.28 mg, 2 mL, intravenous, Once in imaging, Justino Cooper MD    venlafaxine (Effexor) tablet 75 mg, 75 mg, nasogastric tube, BID with meals, Angelic Sarmiento MD, 75 mg at 03/11/24 0952    Family History  Family History   Problem Relation Name Age of Onset    Breast cancer Mother      Other (GOODPASTURE'S DISEASE) Father      BRCA2 Positive Sister      Lung cancer Sister      Neuroblastoma Sister      BRCA2 Positive Daughter      Breast cancer Other G/P        Social History  Social History     Socioeconomic History    Marital status:      Spouse name: Not on file    Number of children: Not on file    Years of education: Not on file    Highest education level: Not on file   Occupational History    Not on file   Tobacco Use    Smoking status: Former     Packs/day: 2.00     Years: 30.00     Additional pack years: 0.00     Total pack years: 60.00     Types: Cigarettes     Quit  date:      Years since quittin.2    Smokeless tobacco: Never   Vaping Use    Vaping Use: Never used   Substance and Sexual Activity    Alcohol use: Not Currently     Comment: Prior Alcohol use. Stopped in early     Drug use: Never    Sexual activity: Not on file   Other Topics Concern    Not on file   Social History Narrative    Not on file     Social Determinants of Health     Financial Resource Strain: Low Risk  (2024)    Overall Financial Resource Strain (CARDIA)     Difficulty of Paying Living Expenses: Not hard at all   Food Insecurity: Not on file   Transportation Needs: No Transportation Needs (2024)    PRAPARE - Transportation     Lack of Transportation (Medical): No     Lack of Transportation (Non-Medical): No   Physical Activity: Not on file   Stress: Not on file   Social Connections: Not on file   Intimate Partner Violence: Not on file   Housing Stability: Low Risk  (2024)    Housing Stability Vital Sign     Unable to Pay for Housing in the Last Year: No     Number of Places Lived in the Last Year: 1     Unstable Housing in the Last Year: No       Vital Signs  Vitals:    24 0900   BP: 118/66   Pulse: 85   Resp: 20   Temp: 36.9 °C (98.4 °F)   SpO2: 100%       Physical Examination  GEN: The patient appears stated age in no acute distress  VOICE: Dysphonic which improved after lavage, see below  RESP: Labored on 30% FiO2 with audible stertor, poor cough  CV: Clinically well perfused   EYES: EOM grossly intact with no scleral icterus  NEURO: Alert and oriented with no focal deficits and CN II-XII symmetric and intact bilaterally  HEAD: Scalp is normocephalic and atraumatic  FACE: No abrasions or lacerations, no maxillary or mandibular stepoffs  EARS: Normal external ears, external auditory canals, and TMs to otoscopy, normal hearing to spoken voice  NOSE: External nose appears normal, no significant septal deviation, anterior rhinoscopy limited with no active bleeding or  lesions.  NG tube to left nare  OC: Dry mucous membranes  OP: Dry pharyngeal walls, hemostatic  NECK: Trachea midline, no significant lymphadenopathy    Laboratory and Data  Results for orders placed or performed during the hospital encounter of 02/22/24 (from the past 24 hour(s))   POCT GLUCOSE   Result Value Ref Range    POCT Glucose 101 (H) 74 - 99 mg/dL   Blood Gas Arterial Full Panel   Result Value Ref Range    POCT pH, Arterial 7.49 (H) 7.38 - 7.42 pH    POCT pCO2, Arterial 35 (L) 38 - 42 mm Hg    POCT pO2, Arterial 290 (H) 85 - 95 mm Hg    POCT SO2, Arterial 100 94 - 100 %    POCT Oxy Hemoglobin, Arterial 97.6 94.0 - 98.0 %    POCT Hematocrit Calculated, Arterial 26.0 (L) 36.0 - 46.0 %    POCT Sodium, Arterial 131 (L) 136 - 145 mmol/L    POCT Potassium, Arterial 4.8 3.5 - 5.3 mmol/L    POCT Chloride, Arterial 102 98 - 107 mmol/L    POCT Ionized Calcium, Arterial 1.24 1.10 - 1.33 mmol/L    POCT Glucose, Arterial 84 74 - 99 mg/dL    POCT Lactate, Arterial 1.5 0.4 - 2.0 mmol/L    POCT Base Excess, Arterial 3.3 (H) -2.0 - 3.0 mmol/L    POCT HCO3 Calculated, Arterial 26.7 (H) 22.0 - 26.0 mmol/L    POCT Hemoglobin, Arterial 8.8 (L) 12.0 - 16.0 g/dL    POCT Anion Gap, Arterial 7 (L) 10 - 25 mmo/L    Patient Temperature 37.0 degrees Celsius    FiO2 100 %   POCT GLUCOSE   Result Value Ref Range    POCT Glucose 74 74 - 99 mg/dL   ECG 12 lead   Result Value Ref Range    Ventricular Rate 91 BPM    Atrial Rate 120 BPM    QRS Duration 90 ms    QT Interval 382 ms    QTC Calculation(Bazett) 469 ms    R Axis 33 degrees    T Axis 92 degrees    QRS Count 15 beats    Q Onset 222 ms    T Offset 413 ms    QTC Fredericia 439 ms   POCT GLUCOSE   Result Value Ref Range    POCT Glucose 100 (H) 74 - 99 mg/dL   CBC and Auto Differential   Result Value Ref Range    WBC 5.5 4.4 - 11.3 x10*3/uL    nRBC 4.5 (H) 0.0 - 0.0 /100 WBCs    RBC 2.57 (L) 4.00 - 5.20 x10*6/uL    Hemoglobin 8.0 (L) 12.0 - 16.0 g/dL    Hematocrit 25.9 (L) 36.0 - 46.0  %     (H) 80 - 100 fL    MCH 31.1 26.0 - 34.0 pg    MCHC 30.9 (L) 32.0 - 36.0 g/dL    RDW 18.8 (H) 11.5 - 14.5 %    Platelets 91 (L) 150 - 450 x10*3/uL    Immature Granulocytes %, Automated 8.2 (H) 0.0 - 0.9 %    Immature Granulocytes Absolute, Automated 0.45 0.00 - 0.50 x10*3/uL   Renal function panel   Result Value Ref Range    Glucose 90 74 - 99 mg/dL    Sodium 137 136 - 145 mmol/L    Potassium 4.4 3.5 - 5.3 mmol/L    Chloride 101 98 - 107 mmol/L    Bicarbonate 30 21 - 32 mmol/L    Anion Gap 10 10 - 20 mmol/L    Urea Nitrogen 16 6 - 23 mg/dL    Creatinine 0.52 0.50 - 1.05 mg/dL    eGFR >90 >60 mL/min/1.73m*2    Calcium 8.2 (L) 8.6 - 10.6 mg/dL    Phosphorus 3.7 2.5 - 4.9 mg/dL    Albumin 2.4 (L) 3.4 - 5.0 g/dL   Magnesium   Result Value Ref Range    Magnesium 2.03 1.60 - 2.40 mg/dL   Manual Differential   Result Value Ref Range    Neutrophils %, Manual 87.7 40.0 - 80.0 %    Lymphocytes %, Manual 4.4 13.0 - 44.0 %    Monocytes %, Manual 6.1 2.0 - 10.0 %    Eosinophils %, Manual 0.0 0.0 - 6.0 %    Basophils %, Manual 1.8 0.0 - 2.0 %    Seg Neutrophils Absolute, Manual 4.82 1.60 - 5.00 x10*3/uL    Lymphocytes Absolute, Manual 0.24 (L) 0.80 - 3.00 x10*3/uL    Monocytes Absolute, Manual 0.34 0.05 - 0.80 x10*3/uL    Eosinophils Absolute, Manual 0.00 0.00 - 0.40 x10*3/uL    Basophils Absolute, Manual 0.10 0.00 - 0.10 x10*3/uL    Total Cells Counted 114     RBC Morphology See Below     Ovalocytes Few          PROCEDURE NOTE:  Nasopharyngolaryngoscopy    For better visualization because of increased work of breathing, a flexible fiberoptic nasopharyngolaryngoscopy was performed. Patient was correctly identified and verbal consent obtained.  After topical anesthesia with atomized 4% Lidocaine with Afrin, the flexible scope was introduced into the right naris.    The following areas were visualized:  Nasal septum, turbinates, nasopharynx, oropharynx, hypopharynx, soft palate, base of tongue, epiglottis, and  larynx.    These structures were found to be normal with the following notable findings:     -Bilateral vocal cord mobility  -Significant thick mucus plugging to glottis and supraglottis      Assessment  Amita Todd is a 78 y.o. female who is admitted for hypotension and found to have E. coli bacteremia.  Notable past medical history of left hypopharyngeal squamous cell carcinoma undergoing chemoradiation.  ENT consulted as part of code white for respiratory distress, stertorous noises, and found to have on scope exam partial obstructing mucous plug to glottis and supraglottis s/p saline lavage breaking up mucus and clinical improvement.    Recommendations  -Respiratory therapy consult for hypertonic saline nebulizer vs humidified face tent  -Humidified air/oxygen at all times  -Mouth swabs for oral care and moisture  - No acute ENT intervention, ENT will sign off    Yanick Morris MD - PGY1  Otolaryngology - Head & Neck Surgery  [facility]    ENT Consult pager: [phone]  ENT Peds pager: [phone]  ENT Head & Neck Surgery Phone: [phone]  ENT subspecialty team: [place] individual resident who wrote today's note  ENT Outpatient scheduling number: [phone] '

## 2024-03-11 NOTE — PROGRESS NOTES
Physical Therapy                 Therapy Communication Note    Patient Name: Amita Todd  MRN: [id number]  Today's Date: 3/11/2024     Discipline: Physical Therapy    Missed Visit Reason: Missed Visit Reason: Patient in a medical procedure (Pt currently off of the floor at radiation. Will reattempt as able.)    Missed Time: Attempt

## 2024-03-11 NOTE — PROGRESS NOTES
"Amita Todd is a 78 y.o. female on day 18 of admission presenting with Thrombocytopenia (CMS/HCC).    Subjective   Rapid called overnight for dyspnea and increased secretions w/ desaturation on room air. Placed on ventimask, ENT broke up mucus with saline, patient returned to normal saturation on room air. Doing better this AM. Denies nausea, vomiting, chest pain, SOB at this time, abdominal pain, hematochezia/melena.        Objective   Constitutional:       Comments: underweight   HENT:      Mouth/Throat:      Mouth: Mucous membranes are dry. improved well circumscribed plaques.  +DH with TF  Eyes:      Pupils: Pupils are equal, round, and reactive to light.   Cardiovascular:      Rate and Rhythm: Irregular rhythm. Tachycardic rate.     Pulses: Normal pulses.   Pulmonary:      Comments: mild crackles B/L, RUL>AGATHA. On RA  Abdominal:      General: Abdomen is flat. Bowel sounds are normal.      Palpations: Abdomen is soft.   Musculoskeletal:         General: Normal range of motion.      Cervical back: Normal range of motion.      Comments: No edema  Skin:     General: Skin is warm.      Capillary Refill: Capillary refill takes less than 2 seconds.      Findings: Bruising present.      Comments: Petechia over trunk; erythematous and scaly skin noted over neck and upper chest   Neurological:      General: No focal deficit present.      Mental Status: She is orietable, but delirious   Psychiatric:         Mood and Affect: Mood normal.       Last Recorded Vitals  Blood pressure 110/72, pulse 97, temperature 36.9 °C (98.4 °F), temperature source Temporal, resp. rate 20, height 1.635 m (5' 4.37\"), weight 68 kg (149 lb 14.6 oz), SpO2 97 %.  Intake/Output last 3 Shifts:  I/O last 3 completed shifts:  In: 220 (3.2 mL/kg) [NG/GT:220]  Out: 1200 (17.6 mL/kg) [Urine:1200 (0.5 mL/kg/hr)]  Weight: 68 kg     Relevant Results  Scheduled medications  acetaminophen, 1,000 mg, nasogastric tube, q6h  ascorbic acid, 500 mg, " nasogastric tube, Daily  atorvastatin, 20 mg, nasogastric tube, Daily  buPROPion, 150 mg, nasogastric tube, BID  cholecalciferol, 1,000 Units, nasogastric tube, BID  dapagliflozin propanediol, 10 mg, oral, Daily  [Held by provider] enoxaparin, 40 mg, subcutaneous, q24h  esomeprazole, 40 mg, nasoduodenal tube, Daily before breakfast  fluconazole, 400 mg, oral, Daily  ipratropium-albuteroL, 3 mL, nebulization, 4x daily  lidocaine, 1 Application, Topical, Once  losartan, 25 mg, nasogastric tube, Daily  [Held by provider] magnesium oxide, 400 mg, nasogastric tube, Daily  melatonin, 10 mg, oral, Nightly  metoprolol tartrate, 50 mg, nasogastric tube, BID  multivitamin with iron-minerals, 15 mL, nasogastric tube, Daily  perflutren lipid microspheres, 0.5-10 mL of dilution, intravenous, Once in imaging  perflutren protein A microsphere, 0.5 mL, intravenous, Once in imaging  QUEtiapine, 12.5 mg, oral, Nightly  senna, 10 mL, nasogastric tube, BID  sodium chloride, 3 mL, nebulization, 4x daily  spironolactone, 12.5 mg, nasogastric tube, Daily  sulfur hexafluoride microsphr, 2 mL, intravenous, Once in imaging  venlafaxine, 75 mg, nasogastric tube, BID with meals      Continuous medications     PRN medications  PRN medications: alteplase, alteplase, guaiFENesin, lidocaine-diphenhydraMINE-Maalox 1:1:1, loperamide, moisturizing mouth, OLANZapine, ondansetron, oxygen, phenoL, prochlorperazine, racepinephrine  Results for orders placed or performed during the hospital encounter of 02/22/24 (from the past 24 hour(s))   POCT GLUCOSE   Result Value Ref Range    POCT Glucose 101 (H) 74 - 99 mg/dL   Blood Gas Arterial Full Panel   Result Value Ref Range    POCT pH, Arterial 7.49 (H) 7.38 - 7.42 pH    POCT pCO2, Arterial 35 (L) 38 - 42 mm Hg    POCT pO2, Arterial 290 (H) 85 - 95 mm Hg    POCT SO2, Arterial 100 94 - 100 %    POCT Oxy Hemoglobin, Arterial 97.6 94.0 - 98.0 %    POCT Hematocrit Calculated, Arterial 26.0 (L) 36.0 - 46.0 %     POCT Sodium, Arterial 131 (L) 136 - 145 mmol/L    POCT Potassium, Arterial 4.8 3.5 - 5.3 mmol/L    POCT Chloride, Arterial 102 98 - 107 mmol/L    POCT Ionized Calcium, Arterial 1.24 1.10 - 1.33 mmol/L    POCT Glucose, Arterial 84 74 - 99 mg/dL    POCT Lactate, Arterial 1.5 0.4 - 2.0 mmol/L    POCT Base Excess, Arterial 3.3 (H) -2.0 - 3.0 mmol/L    POCT HCO3 Calculated, Arterial 26.7 (H) 22.0 - 26.0 mmol/L    POCT Hemoglobin, Arterial 8.8 (L) 12.0 - 16.0 g/dL    POCT Anion Gap, Arterial 7 (L) 10 - 25 mmo/L    Patient Temperature 37.0 degrees Celsius    FiO2 100 %   POCT GLUCOSE   Result Value Ref Range    POCT Glucose 74 74 - 99 mg/dL   ECG 12 lead   Result Value Ref Range    Ventricular Rate 91 BPM    Atrial Rate 120 BPM    QRS Duration 90 ms    QT Interval 382 ms    QTC Calculation(Bazett) 469 ms    R Axis 33 degrees    T Axis 92 degrees    QRS Count 15 beats    Q Onset 222 ms    T Offset 413 ms    QTC Fredericia 439 ms   POCT GLUCOSE   Result Value Ref Range    POCT Glucose 100 (H) 74 - 99 mg/dL   CBC and Auto Differential   Result Value Ref Range    WBC 5.5 4.4 - 11.3 x10*3/uL    nRBC 4.5 (H) 0.0 - 0.0 /100 WBCs    RBC 2.57 (L) 4.00 - 5.20 x10*6/uL    Hemoglobin 8.0 (L) 12.0 - 16.0 g/dL    Hematocrit 25.9 (L) 36.0 - 46.0 %     (H) 80 - 100 fL    MCH 31.1 26.0 - 34.0 pg    MCHC 30.9 (L) 32.0 - 36.0 g/dL    RDW 18.8 (H) 11.5 - 14.5 %    Platelets 91 (L) 150 - 450 x10*3/uL    Immature Granulocytes %, Automated 8.2 (H) 0.0 - 0.9 %    Immature Granulocytes Absolute, Automated 0.45 0.00 - 0.50 x10*3/uL   Renal function panel   Result Value Ref Range    Glucose 90 74 - 99 mg/dL    Sodium 137 136 - 145 mmol/L    Potassium 4.4 3.5 - 5.3 mmol/L    Chloride 101 98 - 107 mmol/L    Bicarbonate 30 21 - 32 mmol/L    Anion Gap 10 10 - 20 mmol/L    Urea Nitrogen 16 6 - 23 mg/dL    Creatinine 0.52 0.50 - 1.05 mg/dL    eGFR >90 >60 mL/min/1.73m*2    Calcium 8.2 (L) 8.6 - 10.6 mg/dL    Phosphorus 3.7 2.5 - 4.9 mg/dL     Albumin 2.4 (L) 3.4 - 5.0 g/dL   Magnesium   Result Value Ref Range    Magnesium 2.03 1.60 - 2.40 mg/dL   Manual Differential   Result Value Ref Range    Neutrophils %, Manual 87.7 40.0 - 80.0 %    Lymphocytes %, Manual 4.4 13.0 - 44.0 %    Monocytes %, Manual 6.1 2.0 - 10.0 %    Eosinophils %, Manual 0.0 0.0 - 6.0 %    Basophils %, Manual 1.8 0.0 - 2.0 %    Seg Neutrophils Absolute, Manual 4.82 1.60 - 5.00 x10*3/uL    Lymphocytes Absolute, Manual 0.24 (L) 0.80 - 3.00 x10*3/uL    Monocytes Absolute, Manual 0.34 0.05 - 0.80 x10*3/uL    Eosinophils Absolute, Manual 0.00 0.00 - 0.40 x10*3/uL    Basophils Absolute, Manual 0.10 0.00 - 0.10 x10*3/uL    Total Cells Counted 114     RBC Morphology See Below     Ovalocytes Few             Assessment/Plan   Principal Problem:    Thrombocytopenia (CMS/HCC)  77yo F w PMHx squamous cell carcinoma of hypopharynx, HFpEF, CAD s/p PCI, Afib s/p watchman, GERD, depression, HTN, AUD, ascending aortic aneurysm, distant breast cancer (s/p chemoradiotherapy and lumpectomy) who was directed to ED for hypotension which resolved. Currently treating E.Coli bacteremia with ceftriaxone (ID following), and plan to have EGD (assess esophagitis infectious vs radiation) + PEG (for nutrition); however ANC less than goal. Started neupogen (3/2) d/t low ANC, infection and procedure goal for ANC > 1000. Neupogen discontinued 3/8, plan for PEG 3/12 w/ GI.      3/11 updates:   -rapid called overnight for increased secretions, desaturation requiring ventimask  - ENT consulted, did laryngoscopy w/ partial obstructing mucous plug to glottis and supraglottis s/p saline lavage  - Patient's respiratory status improved after above  - Continue w/ hypertonic saline nebulizers for airway clearance  - Plan for PEG w/ GI on 3/12, NPO after midnight, hold lovenox  - Pt deferred radiation therapy today     #Goals of care  :: Patient and family expressing concern over radiation, as patient has been tolerating this  poorly with response to her comfort and secretions, though with clinical improvement per Dr. Leal and Dr. Cochran  - Will hold on radiation again 3/11, will address continued therapy with patient, family, and primary oncologist     #Acute hypoxic respiratory failure 2/2 upper airway mucus plug  :: Rapid called early AM 3/11 for hypoxia requiring ventimask, ENT consulted and scoped showing mucus at glottis, s/p saline lavage   Plan:   - Hypertonic saline nebulizer, scheduled duo nebs  - Vanc discontinued (3/7-3/8)  - Now off zosyn (3/7-3/9)  -MRSA nares negative  - Lactate normal  - Blood cx x2 collected; NGTD  - Wean O2 as able; on room air as of 3/11  - Encourage incentive spirometer, RT consult for airway clearance techniques  - Albuterol q6hr PRN for wheezing/shortness of breath      #HFpEF  ::Patient presents with hypotension over the past month in oncology clinic, has received IVF and discontinued home losartan/amlodipine during this time, BP spontaneously improving without intervention today to 100s/70s, appears grossly dry to euvolemic on exam without any symptoms of acute heart failure + positive orthostats in ED  ::BNP 2/22: 229 - appears to be at baseline  ::TTE 7/2023: EF 55-60% // TTE 2/2024 EF 50%  - Patient likely has less PO intake and with chemo does not need as many antihyptertensives/GDMT medication dose - emailed primary cardiologist  - Medications given via Dobhoff  - Continue Aldactone 12.5mg // Metoprolol Tartrate 50mg BID // Losartan 25mg // Dapagliflozin 10mg   - Atorvostatin 20mg   - Diuresis w/ lasix 20mg IV 3/10    #E coli bacteremia, resolved  :: Bcx from 2/29 growing E coli sensitive to ceftriaxone; Bcx from 3/2 NGTD  - ID consulted, appreciate recs:  - Given collection through mediport but no symptoms of sepsis, would treat w/ 10 days of ceftriaxone 2g q24hr infused through mediport  - Lock solution into catheter after infusion is complete   - CTX 2g q24 hr course 10 days; 3/1-3/9  planned; changed on 3/7 to vanc + zosyn as above  - Completed zosyn 3/9  - Per ID, no need for lock therapy as of 3/11     #SCC of Hypopharynx (Dr. Burkitt)  #Pain  #Delerium, improved   ::SLP librado 2/12 recommended soft solids with thin liquids, patient would prefer oral feeding to PEG   - nutrition recs appreciated: 100mg IV thiamine x7 days,  Isosource 1.5 at goal [50cc/hr]  - c/w home Guaifenesin  - c/w Zofran prn nausea, Compazine second line  - Holding opioid 2/2 previous delirium  - delirium precautions  - patient tolerating seroquel 12.5mg at bedtime for sleep and agitation, delirium improving   - Zyprexa 2.5mg PO q8hrs for agitation      #Oropharyngeal candidiasis  #Dysphagia  ::Moderate to severe candidiasis with multiple large lesions, improved   ::given ANC 0.16, consideration for opportunistic infections present. Entended antifungal course  - Fluconazole 200mg IV (2/22 - xx), typically for 7 days but continue given neutropenia and candidiasis; plan for 21 day course total; IV 2/22-3/6, plan for 400mg PO 3/7-3/13  - filgrastim discontinued 3/8  -Plan for PEG 3/12 per GI   - Oral care per nursing     #Thrombocytopenia  #Pancytopenia, resolved  ::ANC 0.16 // Plts 53 on admission with no concern for bleeding // HB 10.9 on presentation (BL 12-13)  - likely d/t myelosuppresion vs though patient has had poor po intake vs mixed  - Retic count 0.6% (fraction 1.8%) indicative of poor marrow response   - Patients neutropenia resolved, anemia at baseline; thrombocytopenia somewhat worsened, had been ~100k, now down to 88 k on 3/10; previously thought due to luz from chemo, but white count recovering  - DIC labs unrevealing  - Other etiologies include drug induced (escalated to zosyn on 3/7), HIT less likely   - Transfuse for platelets <50 prior to 3/12 placement of PEG with GI  - T&S updated, transfuse for active bleeding <50, or for <10  - continue lovenox for DVT ppx   - holding clopidogrel (for EGD + peg in  future)     #Normocytic Anemia  ::HB 10.9 on presentation (BL 12-13), no active bleeding, no recent iron studies  - Likely d/t chronic dz vs myelosuppresion, though patient has had poor po intake     #Atrial Fibrillation  ::Patient previously on AC, now s/p watchman procedure and is rate controlled   ::had 5 ablations per patient  - Continue metoprolol tartrate 50mg BID  - HR 90s to 120s     #Hx of TIA  - holding home Plavix 75mg daily     #GERD  - c/w home Pantoprazole     #Mood Disorder  #Misc  - c/w home Cymbalta and Wellbutrin  - c/w home supplements, mag-ox     F : caution, was diuresing. HfpEF  E: replete lytes prn, K>4, Mg >2  N: isosource 1.5 via dobhoff, NPO at midnight  A: PIVs     DVT prophylaxis: SCDs, Lovenox  GI prophylaxis: home PPI     Code Status: DNR/DNI       Baudilio Turcios MD

## 2024-03-11 NOTE — PROGRESS NOTES
Speech-Language Pathology    SL[facility] Adult Inpatient  Speech-Language Pathology Treatment     Patient Name: Amita Todd  MRN: [id number]  Today's Date: 3/11/2024  Time Calculation  Start Time: 1448  Stop Time: 1515  Time Calculation (min): 27 min       Assessment/Impression:   -Moderate/severe pharyngeal dysphagia per Modified Barium Swallow Study 2/24; suspect swallowing function has worsened further given overall clinical picture   -Swallowing function impacted by presence of hypopharyngeal mass and current radiation treatment  -High risk for development of aspiration PNA     Pt has DHT, plan is for tentative PEG placement 3/12.        Recommendations:  Solid Diet Recommendations : NPO  Liquid Diet Recommendations: NPO; allow 2-3 ice chips/hr for oral comfort, to prevent swallow disuse atrophy, and to help manage secretions; perform oral care before and following      Aspiration PNA Mitigation Strategies:  - Thorough oral infection control 3x/day  - Out of bed/mobile as tolerated         Plan:  SLP Plan: Recommend continued outpatient SLP at discharge   IP SLP Frequency: No further IP SLP warranted      Discussed POC: Patient, Medical Team   Discussed Risks/Benefits: Yes  Patient/Caregiver Agreeable: Yes         Subjective:  RN cleared pt for participation.  Pt properly identified and treated bedside.  Lethargic; reports feeling unwell in general.  O2 via NC.  DHT in place.  Weak vocal quality.  Erythematous and scaly skin noted over neck and upper chest.  Family present x1.      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  MBSS 1/25/24 showed mild oropharyngeal dysphagia and recommended minced & moist solids w/ thin liquids (chin tuck w/ all swallows).  Admitted 2' to hypotension. MBSS 2/24 showed moderate/severe pharyngeal dysphagia; aspiration of multiple consistencies; high risk for aspiration PNA.  Plan is  for PEG placement when cleared medically.  Required ENT scoping during episode of respiratory distress 3/11 early AM.  Found to have partial obstructing mucous plug to glottis and supraglottis s/p saline lavage to break up mucus- clinical improvement following.      Objective:   SLP completed oral infection control.  Pt tolerated fairly.  Dried blood tinged secretions on posterior tongue and soft palate; partially removed as able.  Pt tolerated small, single ice chips x5 with prolonged, but functional oral phase and no s/s of aspiration/pharyngeal dysphagia.  Encouraged effortful swallows with ice chips; fair efficiency.  Completed 10 laryngeal/pharyngeal strengthening exercises x3 reps each.  Written exercises left with pt for independent practice.  Encouraged pt to complete 2x daily as tolerated; stop if causing pain.  Reviewed plan for continued dysphagia therapy outpatient upon hospital discharge/completion of treatment.  Understanding indicated.  No needs voiced at end of session.  Call bell within reach.        03/11/24 at 5:30 PM - Aarti Alford, SLP

## 2024-03-11 NOTE — SIGNIFICANT EVENT
RT called to assist in suctioning patient. RT used a younker and got a copious amount of thick yellow blood sting ed secretions. Patient she felt much better. Lung sounded clear and problem seem to be upper airway and caused by aspiration.

## 2024-03-11 NOTE — SIGNIFICANT EVENT
Rapid Response RN Note    Rapid response page for RADAR score 6 due to the following VS: T 37.7 °Celsius;  ; RR 22; /85; SPO2 98%.     Reviewed above VS with bedside RN.  Per Bedside RN the patient is anxious and respiratory therapy at bedside suctioning pt.    Staff to page rapid response for any concerns or acute change in condition/VS.

## 2024-03-12 ENCOUNTER — HOSPITAL ENCOUNTER (OUTPATIENT)
Dept: RADIATION ONCOLOGY | Facility: HOSPITAL | Age: 79
Setting detail: RADIATION/ONCOLOGY SERIES
Discharge: HOME | End: 2024-03-12
Payer: MEDICARE

## 2024-03-12 DIAGNOSIS — Z51.0 ENCOUNTER FOR ANTINEOPLASTIC RADIATION THERAPY: ICD-10-CM

## 2024-03-12 DIAGNOSIS — C13.8 MALIGNANT NEOPLASM OF OVERLAPPING SITES OF HYPOPHARYNX (MULTI): ICD-10-CM

## 2024-03-12 LAB
ABO GROUP (TYPE) IN BLOOD: NORMAL
ALBUMIN SERPL BCP-MCNC: 2.2 G/DL (ref 3.4–5)
ANION GAP SERPL CALC-SCNC: 11 MMOL/L (ref 10–20)
ANTIBODY SCREEN: NORMAL
APTT PPP: 33 SECONDS (ref 27–38)
BASOPHILS # BLD MANUAL: 0.09 X10*3/UL (ref 0–0.1)
BASOPHILS NFR BLD MANUAL: 1.7 %
BUN SERPL-MCNC: 15 MG/DL (ref 6–23)
BURR CELLS BLD QL SMEAR: ABNORMAL
CALCIUM SERPL-MCNC: 8 MG/DL (ref 8.6–10.6)
CHLORIDE SERPL-SCNC: 101 MMOL/L (ref 98–107)
CO2 SERPL-SCNC: 28 MMOL/L (ref 21–32)
CREAT SERPL-MCNC: 0.49 MG/DL (ref 0.5–1.05)
DACRYOCYTES BLD QL SMEAR: ABNORMAL
EGFRCR SERPLBLD CKD-EPI 2021: >90 ML/MIN/1.73M*2
EOSINOPHIL # BLD MANUAL: 0 X10*3/UL (ref 0–0.4)
EOSINOPHIL NFR BLD MANUAL: 0 %
ERYTHROCYTE [DISTWIDTH] IN BLOOD BY AUTOMATED COUNT: 19.4 % (ref 11.5–14.5)
GLUCOSE BLD MANUAL STRIP-MCNC: 231 MG/DL (ref 74–99)
GLUCOSE BLD MANUAL STRIP-MCNC: 72 MG/DL (ref 74–99)
GLUCOSE BLD MANUAL STRIP-MCNC: 74 MG/DL (ref 74–99)
GLUCOSE SERPL-MCNC: 75 MG/DL (ref 74–99)
HCT VFR BLD AUTO: 26.8 % (ref 36–46)
HGB BLD-MCNC: 8.1 G/DL (ref 12–16)
IMM GRANULOCYTES # BLD AUTO: 0.39 X10*3/UL (ref 0–0.5)
IMM GRANULOCYTES NFR BLD AUTO: 7.6 % (ref 0–0.9)
INR PPP: 1.2 (ref 0.9–1.1)
LYMPHOCYTES # BLD MANUAL: 0.04 X10*3/UL (ref 0.8–3)
LYMPHOCYTES NFR BLD MANUAL: 0.8 %
MAGNESIUM SERPL-MCNC: 2.11 MG/DL (ref 1.6–2.4)
MCH RBC QN AUTO: 30.9 PG (ref 26–34)
MCHC RBC AUTO-ENTMCNC: 30.2 G/DL (ref 32–36)
MCV RBC AUTO: 102 FL (ref 80–100)
MONOCYTES # BLD MANUAL: 0.26 X10*3/UL (ref 0.05–0.8)
MONOCYTES NFR BLD MANUAL: 5.1 %
MYELOCYTES # BLD MANUAL: 0.09 X10*3/UL
MYELOCYTES NFR BLD MANUAL: 1.7 %
NEUTROPHILS # BLD MANUAL: 4.58 X10*3/UL (ref 1.6–5.5)
NEUTS BAND # BLD MANUAL: 0.7 X10*3/UL (ref 0–0.5)
NEUTS BAND NFR BLD MANUAL: 13.7 %
NEUTS SEG # BLD MANUAL: 3.88 X10*3/UL (ref 1.6–5)
NEUTS SEG NFR BLD MANUAL: 76.1 %
NRBC BLD-RTO: 5.1 /100 WBCS (ref 0–0)
OVALOCYTES BLD QL SMEAR: ABNORMAL
PHOSPHATE SERPL-MCNC: 3.7 MG/DL (ref 2.5–4.9)
PLATELET # BLD AUTO: 102 X10*3/UL (ref 150–450)
POLYCHROMASIA BLD QL SMEAR: ABNORMAL
POTASSIUM SERPL-SCNC: 4.6 MMOL/L (ref 3.5–5.3)
PROMYELOCYTES # BLD MANUAL: 0.05 X10*3/UL
PROMYELOCYTES NFR BLD MANUAL: 0.9 %
PROTHROMBIN TIME: 13.3 SECONDS (ref 9.8–12.8)
RAD ONC MSQ ACTUAL FRACTIONS DELIVERED: 30
RAD ONC MSQ ACTUAL SESSION DELIVERED DOSE: 200 CGRAY
RAD ONC MSQ ACTUAL TOTAL DOSE: 6000 CGRAY
RAD ONC MSQ ELAPSED DAYS: 48
RAD ONC MSQ LAST DATE: NORMAL
RAD ONC MSQ PRESCRIBED FRACTIONAL DOSE: 200 CGRAY
RAD ONC MSQ PRESCRIBED NUMBER OF FRACTIONS: 35
RAD ONC MSQ PRESCRIBED TECHNIQUE: NORMAL
RAD ONC MSQ PRESCRIBED TOTAL DOSE: 7000 CGRAY
RAD ONC MSQ PRESCRIPTION PATTERN COMMENT: NORMAL
RAD ONC MSQ START DATE: NORMAL
RAD ONC MSQ TREATMENT COURSE NUMBER: 1
RAD ONC MSQ TREATMENT SITE: NORMAL
RBC # BLD AUTO: 2.62 X10*6/UL (ref 4–5.2)
RBC MORPH BLD: ABNORMAL
RH FACTOR (ANTIGEN D): NORMAL
SODIUM SERPL-SCNC: 135 MMOL/L (ref 136–145)
TOTAL CELLS COUNTED BLD: 117
WBC # BLD AUTO: 5.1 X10*3/UL (ref 4.4–11.3)

## 2024-03-12 PROCEDURE — 94640 AIRWAY INHALATION TREATMENT: CPT

## 2024-03-12 PROCEDURE — 99232 SBSQ HOSP IP/OBS MODERATE 35: CPT

## 2024-03-12 PROCEDURE — 2500000004 HC RX 250 GENERAL PHARMACY W/ HCPCS (ALT 636 FOR OP/ED)

## 2024-03-12 PROCEDURE — 1200000002 HC GENERAL ROOM WITH TELEMETRY DAILY

## 2024-03-12 PROCEDURE — 86901 BLOOD TYPING SEROLOGIC RH(D): CPT

## 2024-03-12 PROCEDURE — 2500000001 HC RX 250 WO HCPCS SELF ADMINISTERED DRUGS (ALT 637 FOR MEDICARE OP)

## 2024-03-12 PROCEDURE — 80069 RENAL FUNCTION PANEL: CPT

## 2024-03-12 PROCEDURE — 31720 CLEARANCE OF AIRWAYS: CPT

## 2024-03-12 PROCEDURE — 2500000002 HC RX 250 W HCPCS SELF ADMINISTERED DRUGS (ALT 637 FOR MEDICARE OP, ALT 636 FOR OP/ED)

## 2024-03-12 PROCEDURE — 1200000003 HC ONCOLOGY  ROOM WITH TELEMETRY DAILY

## 2024-03-12 PROCEDURE — 2500000005 HC RX 250 GENERAL PHARMACY W/O HCPCS

## 2024-03-12 PROCEDURE — 82947 ASSAY GLUCOSE BLOOD QUANT: CPT

## 2024-03-12 PROCEDURE — 51702 INSERT TEMP BLADDER CATH: CPT

## 2024-03-12 PROCEDURE — 83735 ASSAY OF MAGNESIUM: CPT

## 2024-03-12 PROCEDURE — 85610 PROTHROMBIN TIME: CPT

## 2024-03-12 PROCEDURE — 77386 HC INTENSITY-MODULATED RADIATION THERAPY (IMRT), COMPLEX: CPT | Performed by: RADIOLOGY

## 2024-03-12 PROCEDURE — 2500000002 HC RX 250 W HCPCS SELF ADMINISTERED DRUGS (ALT 637 FOR MEDICARE OP, ALT 636 FOR OP/ED): Performed by: HOSPITALIST

## 2024-03-12 PROCEDURE — 85007 BL SMEAR W/DIFF WBC COUNT: CPT

## 2024-03-12 PROCEDURE — 85027 COMPLETE CBC AUTOMATED: CPT

## 2024-03-12 PROCEDURE — 77014 CHG CT GUIDANCE RADIATION THERAPY FLDS PLACEMENT: CPT | Performed by: STUDENT IN AN ORGANIZED HEALTH CARE EDUCATION/TRAINING PROGRAM

## 2024-03-12 PROCEDURE — 2500000005 HC RX 250 GENERAL PHARMACY W/O HCPCS: Performed by: HOSPITALIST

## 2024-03-12 RX ORDER — DEXTROSE 50 % IN WATER (D50W) INTRAVENOUS SYRINGE
25
Status: DISCONTINUED | OUTPATIENT
Start: 2024-03-12 | End: 2024-03-18 | Stop reason: HOSPADM

## 2024-03-12 RX ORDER — CEFAZOLIN SODIUM 2 G/100ML
2 INJECTION, SOLUTION INTRAVENOUS
Status: DISPENSED | OUTPATIENT
Start: 2024-03-12 | End: 2024-03-12

## 2024-03-12 RX ORDER — ENOXAPARIN SODIUM 100 MG/ML
40 INJECTION SUBCUTANEOUS ONCE
Status: COMPLETED | OUTPATIENT
Start: 2024-03-12 | End: 2024-03-12

## 2024-03-12 RX ORDER — DEXTROSE MONOHYDRATE 100 MG/ML
0.3 INJECTION, SOLUTION INTRAVENOUS ONCE AS NEEDED
Status: DISCONTINUED | OUTPATIENT
Start: 2024-03-12 | End: 2024-03-18 | Stop reason: HOSPADM

## 2024-03-12 RX ORDER — CEFAZOLIN SODIUM 2 G/100ML
2 INJECTION, SOLUTION INTRAVENOUS EVERY 8 HOURS
Status: DISCONTINUED | OUTPATIENT
Start: 2024-03-12 | End: 2024-03-12

## 2024-03-12 RX ADMIN — ACETAMINOPHEN 1000 MG: 650 SOLUTION ORAL at 12:13

## 2024-03-12 RX ADMIN — IPRATROPIUM BROMIDE AND ALBUTEROL SULFATE 3 ML: .5; 3 SOLUTION RESPIRATORY (INHALATION) at 11:23

## 2024-03-12 RX ADMIN — ACETAMINOPHEN 1000 MG: 650 SOLUTION ORAL at 05:06

## 2024-03-12 RX ADMIN — FLUCONAZOLE 400 MG: 200 TABLET ORAL at 08:17

## 2024-03-12 RX ADMIN — METOPROLOL TARTRATE 50 MG: 50 TABLET, FILM COATED ORAL at 09:00

## 2024-03-12 RX ADMIN — Medication 10 MG: at 20:13

## 2024-03-12 RX ADMIN — DEXTROSE MONOHYDRATE 25 G: 25 INJECTION, SOLUTION INTRAVENOUS at 05:31

## 2024-03-12 RX ADMIN — Medication 3 ML: at 22:15

## 2024-03-12 RX ADMIN — ATORVASTATIN CALCIUM 20 MG: 20 TABLET, FILM COATED ORAL at 09:00

## 2024-03-12 RX ADMIN — ENOXAPARIN SODIUM 40 MG: 100 INJECTION SUBCUTANEOUS at 17:25

## 2024-03-12 RX ADMIN — Medication 3 ML: at 17:34

## 2024-03-12 RX ADMIN — Medication 1000 UNITS: at 08:19

## 2024-03-12 RX ADMIN — VENLAFAXINE 75 MG: 75 TABLET ORAL at 08:00

## 2024-03-12 RX ADMIN — METOPROLOL TARTRATE 50 MG: 50 TABLET, FILM COATED ORAL at 20:13

## 2024-03-12 RX ADMIN — IPRATROPIUM BROMIDE AND ALBUTEROL SULFATE 3 ML: .5; 3 SOLUTION RESPIRATORY (INHALATION) at 17:34

## 2024-03-12 RX ADMIN — CEFAZOLIN SODIUM 2 G: 2 INJECTION, SOLUTION INTRAVENOUS at 00:00

## 2024-03-12 RX ADMIN — ESOMEPRAZOLE MAGNESIUM 40 MG: 40 FOR SUSPENSION ORAL at 07:00

## 2024-03-12 RX ADMIN — SPIRONOLACTONE 12.5 MG: 25 TABLET, FILM COATED ORAL at 08:17

## 2024-03-12 RX ADMIN — VENLAFAXINE 75 MG: 75 TABLET ORAL at 17:00

## 2024-03-12 RX ADMIN — GUAIFENESIN 200 MG: 200 SOLUTION ORAL at 20:13

## 2024-03-12 RX ADMIN — QUETIAPINE FUMARATE 12.5 MG: 25 TABLET ORAL at 20:13

## 2024-03-12 RX ADMIN — DAPAGLIFLOZIN 10 MG: 10 TABLET, FILM COATED ORAL at 08:20

## 2024-03-12 RX ADMIN — Medication 2 L/MIN: at 07:21

## 2024-03-12 RX ADMIN — Medication 3 ML: at 11:24

## 2024-03-12 RX ADMIN — GUAIFENESIN 200 MG: 200 SOLUTION ORAL at 10:20

## 2024-03-12 RX ADMIN — Medication 1000 UNITS: at 20:13

## 2024-03-12 RX ADMIN — BUPROPION HYDROCHLORIDE 150 MG: 100 TABLET, FILM COATED ORAL at 20:13

## 2024-03-12 RX ADMIN — IPRATROPIUM BROMIDE AND ALBUTEROL SULFATE 3 ML: .5; 3 SOLUTION RESPIRATORY (INHALATION) at 22:14

## 2024-03-12 RX ADMIN — LOSARTAN POTASSIUM 25 MG: 25 TABLET, FILM COATED ORAL at 08:18

## 2024-03-12 RX ADMIN — ACETAMINOPHEN 1000 MG: 650 SOLUTION ORAL at 17:25

## 2024-03-12 RX ADMIN — GUAIFENESIN 200 MG: 200 SOLUTION ORAL at 05:09

## 2024-03-12 RX ADMIN — BUPROPION HYDROCHLORIDE 150 MG: 100 TABLET, FILM COATED ORAL at 08:21

## 2024-03-12 RX ADMIN — ACETAMINOPHEN 1000 MG: 650 SOLUTION ORAL at 00:00

## 2024-03-12 ASSESSMENT — COGNITIVE AND FUNCTIONAL STATUS - GENERAL
EATING MEALS: TOTAL
TOILETING: TOTAL
MOVING FROM LYING ON BACK TO SITTING ON SIDE OF FLAT BED WITH BEDRAILS: A LOT
DAILY ACTIVITIY SCORE: 6
PERSONAL GROOMING: TOTAL
TURNING FROM BACK TO SIDE WHILE IN FLAT BAD: A LOT
DRESSING REGULAR UPPER BODY CLOTHING: TOTAL
DRESSING REGULAR LOWER BODY CLOTHING: TOTAL
TURNING FROM BACK TO SIDE WHILE IN FLAT BAD: TOTAL
CLIMB 3 TO 5 STEPS WITH RAILING: TOTAL
MOBILITY SCORE: 7
MOVING TO AND FROM BED TO CHAIR: TOTAL
HELP NEEDED FOR BATHING: TOTAL
MOVING TO AND FROM BED TO CHAIR: TOTAL
PERSONAL GROOMING: TOTAL
CLIMB 3 TO 5 STEPS WITH RAILING: TOTAL
WALKING IN HOSPITAL ROOM: TOTAL
EATING MEALS: TOTAL
TOILETING: TOTAL
MOBILITY SCORE: 8
DAILY ACTIVITIY SCORE: 6
DRESSING REGULAR UPPER BODY CLOTHING: TOTAL
WALKING IN HOSPITAL ROOM: TOTAL
DRESSING REGULAR LOWER BODY CLOTHING: TOTAL
STANDING UP FROM CHAIR USING ARMS: TOTAL
HELP NEEDED FOR BATHING: TOTAL
STANDING UP FROM CHAIR USING ARMS: TOTAL
MOVING FROM LYING ON BACK TO SITTING ON SIDE OF FLAT BED WITH BEDRAILS: A LOT

## 2024-03-12 ASSESSMENT — PAIN - FUNCTIONAL ASSESSMENT
PAIN_FUNCTIONAL_ASSESSMENT: 0-10
PAIN_FUNCTIONAL_ASSESSMENT: 0-10

## 2024-03-12 ASSESSMENT — PAIN SCALES - GENERAL
PAINLEVEL_OUTOF10: 0 - NO PAIN
PAINLEVEL_OUTOF10: 0 - NO PAIN

## 2024-03-12 NOTE — PROGRESS NOTES
"Amita Todd is a 78 y.o. female on day 19 of admission presenting with Thrombocytopenia (CMS/HCC).    Subjective   NAEO. Patient alert this AM, more talkative and seems to feel much better than previous days. Has not had any more issues with secretion clearance since starting aggressive pulmonary hygiene. Overall, patient feeling apprehensive today regarding her PEG as it is a procedure with anesthesia, but is looking forward to having her dobhoff tube out. Denies hemoptysis, fevers, chills, melena/hematochezia, shortness of breath, chest pain.        Objective   Constitutional:       Comments: underweight   HENT:      Mouth/Throat:      Mouth: Mucous membranes are dry. improved well circumscribed plaques.  +DH with TF  Eyes:      Pupils: Pupils are equal, round, and reactive to light.   Cardiovascular:      Rate and Rhythm: Irregular rhythm. Tachycardic rate.     Pulses: Normal pulses.   Pulmonary:      Comments: mild crackles B/L, RUL>AGATHA. On RA  Abdominal:      General: Abdomen is flat. Bowel sounds are normal.      Palpations: Abdomen is soft.   Musculoskeletal:         General: Normal range of motion.      Cervical back: Normal range of motion.      Comments: No edema  Skin:     General: Skin is warm.      Capillary Refill: Capillary refill takes less than 2 seconds.      Findings: Bruising present.      Comments: Petechia over trunk; erythematous and scaly skin noted over neck and upper chest   Neurological:      General: No focal deficit present.      Mental Status: A&Ox4  Psychiatric:         Mood and Affect: Mood normal.       Last Recorded Vitals  Blood pressure 114/72, pulse 88, temperature 36 °C (96.8 °F), temperature source Temporal, resp. rate 18, height 1.635 m (5' 4.37\"), weight 68 kg (149 lb 14.6 oz), SpO2 100 %.  Intake/Output last 3 Shifts:  I/O last 3 completed shifts:  In: 540 (7.9 mL/kg) [NG/GT:440; IV Piggyback:100]  Out: 1500 (22.1 mL/kg) [Urine:1500 (0.6 mL/kg/hr)]  Weight: 68 kg "     Relevant Results  Scheduled medications  acetaminophen, 1,000 mg, nasogastric tube, q6h  atorvastatin, 20 mg, nasogastric tube, Daily  buPROPion, 150 mg, nasogastric tube, BID  cholecalciferol, 1,000 Units, nasogastric tube, BID  dapagliflozin propanediol, 10 mg, oral, Daily  [Held by provider] enoxaparin, 40 mg, subcutaneous, q24h  esomeprazole, 40 mg, nasoduodenal tube, Daily before breakfast  fluconazole, 400 mg, oral, Daily  ipratropium-albuteroL, 3 mL, nebulization, 4x daily  lidocaine, 1 Application, Topical, Once  losartan, 25 mg, nasogastric tube, Daily  [Held by provider] magnesium oxide, 400 mg, nasogastric tube, Daily  melatonin, 10 mg, oral, Nightly  metoprolol tartrate, 50 mg, nasogastric tube, BID  perflutren lipid microspheres, 0.5-10 mL of dilution, intravenous, Once in imaging  perflutren protein A microsphere, 0.5 mL, intravenous, Once in imaging  QUEtiapine, 12.5 mg, oral, Nightly  senna, 10 mL, nasogastric tube, BID  sodium chloride, 3 mL, nebulization, 4x daily  spironolactone, 12.5 mg, nasogastric tube, Daily  sulfur hexafluoride microsphr, 2 mL, intravenous, Once in imaging  venlafaxine, 75 mg, nasogastric tube, BID with meals      Continuous medications     PRN medications  PRN medications: alteplase, alteplase, ceFAZolin, dextrose 10 % in water (D10W), dextrose, glucagon, guaiFENesin, lidocaine-diphenhydraMINE-Maalox 1:1:1, loperamide, moisturizing mouth, OLANZapine, ondansetron, oxygen, phenoL, prochlorperazine, racepinephrine  Results for orders placed or performed during the hospital encounter of 02/22/24 (from the past 24 hour(s))   POCT GLUCOSE   Result Value Ref Range    POCT Glucose 115 (H) 74 - 99 mg/dL   POCT GLUCOSE   Result Value Ref Range    POCT Glucose 95 74 - 99 mg/dL   Magnesium   Result Value Ref Range    Magnesium 2.11 1.60 - 2.40 mg/dL   Type And Screen   Result Value Ref Range    ABO TYPE O     Rh TYPE POS     ANTIBODY SCREEN NEG    Coagulation Screen   Result Value  Ref Range    Protime 13.3 (H) 9.8 - 12.8 seconds    INR 1.2 (H) 0.9 - 1.1    aPTT 33 27 - 38 seconds   CBC and Auto Differential   Result Value Ref Range    WBC 5.1 4.4 - 11.3 x10*3/uL    nRBC 5.1 (H) 0.0 - 0.0 /100 WBCs    RBC 2.62 (L) 4.00 - 5.20 x10*6/uL    Hemoglobin 8.1 (L) 12.0 - 16.0 g/dL    Hematocrit 26.8 (L) 36.0 - 46.0 %     (H) 80 - 100 fL    MCH 30.9 26.0 - 34.0 pg    MCHC 30.2 (L) 32.0 - 36.0 g/dL    RDW 19.4 (H) 11.5 - 14.5 %    Platelets 102 (L) 150 - 450 x10*3/uL    Immature Granulocytes %, Automated 7.6 (H) 0.0 - 0.9 %    Immature Granulocytes Absolute, Automated 0.39 0.00 - 0.50 x10*3/uL   Renal Function Panel   Result Value Ref Range    Glucose 75 74 - 99 mg/dL    Sodium 135 (L) 136 - 145 mmol/L    Potassium 4.6 3.5 - 5.3 mmol/L    Chloride 101 98 - 107 mmol/L    Bicarbonate 28 21 - 32 mmol/L    Anion Gap 11 10 - 20 mmol/L    Urea Nitrogen 15 6 - 23 mg/dL    Creatinine 0.49 (L) 0.50 - 1.05 mg/dL    eGFR >90 >60 mL/min/1.73m*2    Calcium 8.0 (L) 8.6 - 10.6 mg/dL    Phosphorus 3.7 2.5 - 4.9 mg/dL    Albumin 2.2 (L) 3.4 - 5.0 g/dL   Manual Differential   Result Value Ref Range    Neutrophils %, Manual 76.1 40.0 - 80.0 %    Bands %, Manual 13.7 0.0 - 5.0 %    Lymphocytes %, Manual 0.8 13.0 - 44.0 %    Monocytes %, Manual 5.1 2.0 - 10.0 %    Eosinophils %, Manual 0.0 0.0 - 6.0 %    Basophils %, Manual 1.7 0.0 - 2.0 %    Myelocytes %, Manual 1.7 0.0 - 0.0 %    Promyelocytes %, Manual 0.9 0.0 - 0.0 %    Seg Neutrophils Absolute, Manual 3.88 1.60 - 5.00 x10*3/uL    Bands Absolute, Manual 0.70 (H) 0.00 - 0.50 x10*3/uL    Lymphocytes Absolute, Manual 0.04 (L) 0.80 - 3.00 x10*3/uL    Monocytes Absolute, Manual 0.26 0.05 - 0.80 x10*3/uL    Eosinophils Absolute, Manual 0.00 0.00 - 0.40 x10*3/uL    Basophils Absolute, Manual 0.09 0.00 - 0.10 x10*3/uL    Myelocytes Absolute, Manual 0.09 0.00 - 0.00 x10*3/uL    Promyelocytes Absolute, Manual 0.05 0.00 - 0.00 x10*3/uL    Total Cells Counted 117      Neutrophils Absolute, Manual 4.58 1.60 - 5.50 x10*3/uL    RBC Morphology See Below     Polychromasia Mild     Ovalocytes Few     Teardrop Cells Few     [place] Cells Few    POCT GLUCOSE   Result Value Ref Range    POCT Glucose 72 (L) 74 - 99 mg/dL   POCT GLUCOSE   Result Value Ref Range    POCT Glucose 231 (H) 74 - 99 mg/dL            Assessment/Plan   Principal Problem:    Thrombocytopenia (CMS/HCC)  77yo F w PMHx squamous cell carcinoma of hypopharynx, HFpEF, CAD s/p PCI, Afib s/p watchman, GERD, depression, HTN, AUD, ascending aortic aneurysm, distant breast cancer (s/p chemoradiotherapy and lumpectomy) who was directed to ED for hypotension which resolved. Currently treating E.Coli bacteremia with ceftriaxone (ID following), and plan to have EGD (assess esophagitis infectious vs radiation) + PEG (for nutrition); however ANC less than goal. Started neupogen (3/2) d/t low ANC, infection and procedure goal for ANC > 1000. Neupogen discontinued 3/8, plan for PEG 3/12 w/ GI.      3/12 updates:   - s/p radiation therapy today   - Plan for PEG with GI today  - Per patient and family preference, likely discharge home with home care and home PT/OT as opposed to recommended SNF    #Goals of care  :: Per discussion with patient on 3/12, she says that while the radiation therapy has been hard on her, she would like to pursue any treatment that is going to treat her cancer  - Per primary oncologist, patient responding well to treatment  - Will continue with current treatment plan per primary oncologist and radiation oncologist     #Acute hypoxic respiratory failure 2/2 upper airway mucus plug  :: Rapid called early AM 3/11 for hypoxia requiring ventimask, ENT consulted and scoped showing mucus at glottis, s/p saline lavage   Plan:   - Hypertonic saline nebulizer, scheduled duo nebs  - Vanc discontinued (3/7-3/8)  - Now off zosyn (3/7-3/9)  -MRSA nares negative  - Lactate normal  - Blood cx x2 collected; NGTD  - Wean O2 as able;  on room air as of 3/11  - Encourage incentive spirometer, RT consult for airway clearance techniques  - Albuterol q6hr PRN for wheezing/shortness of breath      #HFpEF  ::Patient presents with hypotension over the past month in oncology clinic, has received IVF and discontinued home losartan/amlodipine during this time, BP spontaneously improving without intervention today to 100s/70s, appears grossly dry to euvolemic on exam without any symptoms of acute heart failure + positive orthostats in ED  ::BNP 2/22: 229 - appears to be at baseline  ::TTE 7/2023: EF 55-60% // TTE 2/2024 EF 50%  - Patient likely has less PO intake and with chemo does not need as many antihyptertensives/GDMT medication dose - emailed primary cardiologist  - Medications given via Dobhoff  - Continue Aldactone 12.5mg // Metoprolol Tartrate 50mg BID // Losartan 25mg // Dapagliflozin 10mg   - Atorvostatin 20mg   - Diuresis w/ lasix 20mg IV 3/10    #E coli bacteremia, resolved  :: Bcx from 2/29 growing E coli sensitive to ceftriaxone; Bcx from 3/2 NGTD  - ID consulted, appreciate recs:  - Given collection through mediport but no symptoms of sepsis, would treat w/ 10 days of ceftriaxone 2g q24hr infused through mediport  - Lock solution into catheter after infusion is complete   - CTX 2g q24 hr course 10 days; 3/1-3/9 planned; changed on 3/7 to vanc + zosyn as above  - Completed zosyn 3/9  - Per ID, no need for lock therapy as of 3/11     #SCC of Hypopharynx (Dr. Burkitt)  #Pain  #Delerium, resolved   ::SLP eval 2/12 recommended soft solids with thin liquids, patient would prefer oral feeding to PEG   - nutrition recs appreciated: 100mg IV thiamine x7 days,  Isosource 1.5 at goal [50cc/hr]  - c/w home Guaifenesin  - c/w Zofran prn nausea, Compazine second line  - Holding opioid 2/2 previous delirium  - delirium precautions  - patient tolerating seroquel 12.5mg at bedtime for sleep and agitation, delirium improving   - Zyprexa 2.5mg PO q8hrs for  agitation      #Oropharyngeal candidiasis  #Dysphagia  ::Moderate to severe candidiasis with multiple large lesions, improved   ::given ANC 0.16, consideration for opportunistic infections present. Entended antifungal course  - Fluconazole 200mg IV (2/22 - xx), typically for 7 days but continue given neutropenia and candidiasis; plan for 21 day course total; IV 2/22-3/6, plan for 400mg PO 3/7-3/13  - filgrastim discontinued 3/8  -Plan for PEG 3/12 per GI   - Oral care per nursing     #Thrombocytopenia  #Pancytopenia, resolved  ::ANC 0.16 // Plts 53 on admission with no concern for bleeding // HB 10.9 on presentation (BL 12-13)  - likely d/t myelosuppresion vs though patient has had poor po intake vs mixed  - Retic count 0.6% (fraction 1.8%) indicative of poor marrow response   - Patients neutropenia resolved, anemia at baseline; thrombocytopenia somewhat worsened, had been ~100k, now down to 88 k on 3/10; previously thought due to luz from chemo, but white count recovering  - DIC labs unrevealing  - Other etiologies include drug induced (escalated to zosyn on 3/7), HIT less likely   - Transfuse for platelets <50 prior to 3/12 placement of PEG with GI  - T&S updated, transfuse for active bleeding <50, or for <10  - continue lovenox for DVT ppx   - holding clopidogrel (for EGD + peg in future)     #Normocytic Anemia  ::HB 10.9 on presentation (BL 12-13), no active bleeding, no recent iron studies  - Likely d/t chronic dz vs myelosuppresion, though patient has had poor po intake     #Atrial Fibrillation  ::Patient previously on AC, now s/p watchman procedure and is rate controlled   ::had 5 ablations per patient  - Continue metoprolol tartrate 50mg BID  - HR 90s to 120s     #Hx of TIA  - holding home Plavix 75mg daily     #GERD  - c/w home Pantoprazole     #Mood Disorder  #Misc  - c/w home Cymbalta and Wellbutrin  - c/w home supplements, mag-ox     F : caution, was diuresing. HfpEF  E: replete lytes prn, K>4, Mg  >2  N: isosource 1.5 via dobhoff, NPO at midnight  A: PIVs     DVT prophylaxis: SCDs, Lovenox  GI prophylaxis: home PPI     Code Status: DNR/DNI       Baudilio Turcios MD

## 2024-03-12 NOTE — CARE PLAN
The patient's goals for the shift include      The clinical goals for the shift include pt will remain safe and free from injury through shift      Problem: Skin  Goal: Prevent/manage excess moisture  Outcome: Progressing     Problem: Safety  Goal: Patient will be injury free during hospitalization  Outcome: Progressing     Problem: Daily Care  Goal: Daily care needs are met  Outcome: Progressing

## 2024-03-12 NOTE — PROGRESS NOTES
Physical Therapy                 Therapy Communication Note    Patient Name: Amita Todd  MRN: [id number]  Today's Date: 3/12/2024     Discipline: Physical Therapy    Missed Visit Reason: Missed Visit Reason:  (Per RN patient scheduled to go for procedure soon (PEG placement). Will hold PT at this time and reattempt at later date.)    Missed Time: Attempt

## 2024-03-12 NOTE — PROGRESS NOTES
02/28/24 1507   Discharge Planning   Living Arrangements Spouse/significant other   Support Systems Spouse/significant other;Children;Family members;Friends/neighbors   Type of Residence Private residence   Who is requesting discharge planning? Provider   Home or Post Acute Services In home services   Type of Home Care Services Home OT;Home PT;Home nursing visits;Home health aide   Patient expects to be discharged to: home   Does the patient need discharge transport arranged? No     2/28/24 @ 1505  Met with patient and her daughter Tati at bedside. Patient lives with her  in [place]. They have not used home care before and appreciate recommendations. They are fine with using [facility]. Asked team to place referral for [facility] for RN, PT/OT, HHA.  Patient is to get PEG placed on Friday. Explained to patient and daughter that we will need to get a tube feed supplier. Once the team places orders and the patient gets her PEG tube, we can get that set up for them.  Explained that they will get teaching at bedside by nurses and TF supplier before going home. Daughter Tati and her  will be learners. No other needs at this time. Will continue to follow patient during her hospital stay.  Elvi Castillo RN [facility]    3/5/24 @ 1400  [facility] referral placed for RN, PT/OT. [facility] unable to provide home health aide in her area. Patient will need RN for tube feeds, IV abx, and wound care. Tube feed orders sent to [facility] via [facility]. Patient will discharge home with [facility]. Unable to place PEG at this time during her hospital stay. Will continue to follow patient and update [facility] on discharge needs. JEAN-PAUL Corbett MD updated.  Elvi Castillo RN [facility]    3/12/24 @ 1510  Met with patient, sister, and daughter at bedside about discharge planning. They plan to hire a private nurse and home health aide. Gave them [employer] as a company to look up for this. [facility] referral placed for PT/OT, HHA, and RT. The family plans to coordinate care  between the two. Patient has yet to get PEG. Planned for today, 3/12. Home going TF orders sent to [facility] via [facility]. Patient has rollator, walker, cane, hospital bed, and commode at home already. Will follow up with patient and family tomorrow after she gets her PEG.  Elvi Castillo RN [facility]

## 2024-03-12 NOTE — PROGRESS NOTES
Occupational Therapy                 Therapy Communication Note    Patient Name: Amita Todd  MRN: [id number]  Today's Date: 3/12/2024     Discipline: Occupational Therapy    Missed Visit Reason: Missed Visit Reason: Patient in a medical procedure    Missed Time: Attempt    Comment:

## 2024-03-12 NOTE — PROGRESS NOTES
"[facility]  [facility] [place]  CONSULT FOLLOW-UP     Reason For Consult  PEG tube assessment     SUBJECTIVE     - NAOE, pt afebrile and HDS    EXAM     Last Recorded Vitals  Blood pressure 107/76, pulse 84, temperature 36.3 °C (97.3 °F), temperature source Temporal, resp. rate 20, height 1.635 m (5' 4.37\"), weight 68 kg (149 lb 14.6 oz), SpO2 100 %.      Intake/Output Summary (Last 24 hours) at 3/12/2024 0719  Last data filed at 3/12/2024 0540  Gross per 24 hour   Intake 320 ml   Output 900 ml   Net -580 ml       Physical Exam   GENERAL: Chronically ill.  NEUROLOGIC: A and O x 3. Cranial nerves 2 through 12 grossly intact. Intact motor and sensory systems.  HEENT: Pupils are equal, round, and reactive to light and accommodation. Extraocular motion intact. CARDIAC: S1 + S2, RRR, no M/R/G.    LUNGS: CTA BL. No wheezes or coarse breath sounds. Symmetric respirations. No increased work of breathing.   ABDOMEN: Soft, non-tender to palpation. No rebound or guarding.  PSYCH: Low mood.    OBJECTIVE                                                                              Medications             Current Facility-Administered Medications:     acetaminophen (Tylenol) oral liquid 1,000 mg, 1,000 mg, nasogastric tube, q6h, Angelic Sarmiento MD, 1,000 mg at 03/12/24 0506    alteplase (Cathflo Activase) injection 1 mg, 1 mg, intra-catheter, PRN, Isaac Bledsoe MD    alteplase (Cathflo Activase) injection 1 mg, 1 mg, intra-catheter, PRN, Isaac Bledsoe MD, 1 mg at 02/28/24 2225    atorvastatin (Lipitor) tablet 20 mg, 20 mg, nasogastric tube, Daily, Angelic Sarmiento MD, 20 mg at 03/11/24 0953    buPROPion (Wellbutrin) tablet 150 mg, 150 mg, nasogastric tube, BID, Angelic Sarmiento MD, 150 mg at 03/11/24 2101    cholecalciferol (Vitamin D-3) tablet 1,000 Units, 1,000 Units, nasogastric tube, BID, Angelic Sarmiento MD, 1,000 Units at 03/11/24 2101    dapagliflozin propanediol " (Farxiga) tablet 10 mg, 10 mg, oral, Daily, Justino Cooper MD, 10 mg at 03/11/24 0950    dextrose 10 % in water (D10W) infusion, 0.3 g/kg/hr, intravenous, Once PRN, Eugene Quevedo DO    dextrose 50 % injection 25 g, 25 g, intravenous, q15 min PRN, Eugene Quevedo DO, 25 g at 03/12/24 0531    [Held by provider] enoxaparin (Lovenox) syringe 40 mg, 40 mg, subcutaneous, q24h, Baudilio Turcios MD, 40 mg at 03/11/24 0945    esomeprazole (NexIUM) suspension 40 mg, 40 mg, nasoduodenal tube, Daily before breakfast, Mary Corbett MD, 40 mg at 03/12/24 0700    fluconazole (Diflucan) tablet 400 mg, 400 mg, oral, Daily, Baudilio Turcios MD, 400 mg at 03/11/24 0950    glucagon (Glucagen) injection 1 mg, 1 mg, intramuscular, q15 min PRN, Eugene Quevedo DO    guaiFENesin (Robitussin) 100 mg/5 mL syrup 200 mg, 200 mg, nasogastric tube, q4h PRN, Angelic Sarmiento MD, 200 mg at 03/12/24 0509    ipratropium-albuteroL (Duo-Neb) 0.5-2.5 mg/3 mL nebulizer solution 3 mL, 3 mL, nebulization, 4x daily, Debra Patrick MD, 3 mL at 03/11/24 2139    lido-diphen-Maalox 1:1:1 Magic Mouthwash, 10 mL, Swish & Spit, q4h PRN, Isaac Bledsoe MD, 10 mL at 03/08/24 1106    lidocaine (Xylocaine) 2 % jelly 1 Application, 1 Application, Topical, Once, Mary Corbett MD    loperamide (Imodium) capsule 2 mg, 2 mg, nasogastric tube, 4x daily PRN, Mary Corbett MD, 2 mg at 03/07/24 1043    losartan (Cozaar) tablet 25 mg, 25 mg, nasogastric tube, Daily, Angelic Sarmiento MD, 25 mg at 03/11/24 0950    [Held by provider] magnesium oxide (Mag-Ox) tablet 400 mg, 400 mg, nasogastric tube, Daily, Angelic Sarmiento MD, 400 mg at 03/03/24 0946    melatonin tablet 10 mg, 10 mg, oral, Nightly, Angelic Sarmiento MD, 10 mg at 03/11/24 2101    metoprolol tartrate (Lopressor) tablet 50 mg, 50 mg, nasogastric tube, BID, Angelic Sarmiento MD, 50 mg at 03/11/24 2101    moisturizing mouth (Biotene Dry Mouth) solution 15 mL, 15 mL, Swish & Spit, TID PRN, Baudilio Turcios MD, 15 mL at 03/11/24 0600     OLANZapine (ZyPREXA) tablet 2.5 mg, 2.5 mg, oral, q8h PRN, Baudilio Turcios MD, 2.5 mg at 03/10/24 1804    ondansetron (Zofran) injection 4 mg, 4 mg, intravenous, q6h PRN, Mary Corbett MD, 4 mg at 02/27/24 0815    oxygen (O2) therapy, , inhalation, Continuous PRN - O2/gases, Debra Patrick MD, 2 L/min at 03/11/24 2055    perflutren lipid microspheres (Definity) injection 0.5-10 mL of dilution, 0.5-10 mL of dilution, intravenous, Once in imaging, Justino Cooper MD    perflutren protein A microsphere (Optison) injection 0.5 mL, 0.5 mL, intravenous, Once in imaging, Justino Cooper MD    phenoL (Chloraseptic) 1.4 % mouth/throat spray 1 spray, 1 spray, Mouth/Throat, q2h PRN, Angelic Sarmiento MD    prochlorperazine (Compazine) injection 10 mg, 10 mg, intravenous, q6h PRN, Angelic Sarmiento MD, 10 mg at 02/27/24 1151    QUEtiapine (SEROquel) tablet 12.5 mg, 12.5 mg, oral, Nightly, Baudilio Turcios MD, 12.5 mg at 03/11/24 2101    racepinephrine (Asthmanefrin) 2.25 % nebulizer solution 0.5 mL, 0.5 mL, nebulization, q3h PRN, Baudilio Turcios MD, 0.5 mL at 03/10/24 1818    senna (Senokot) 8.8 mg/5 mL syrup 10 mL, 10 mL, nasogastric tube, BID, Angelic Sarmiento MD, 10 mL at 03/08/24 1020    sodium chloride 3 % nebulizer solution 3 mL, 3 mL, nebulization, 4x daily, Debra Patrick MD, 3 mL at 03/11/24 2139    spironolactone (Aldactone) tablet 12.5 mg, 12.5 mg, nasogastric tube, Daily, Angelic Sarmiento MD, 12.5 mg at 03/11/24 0950    sulfur hexafluoride microsphr (Lumason) injection 24.28 mg, 2 mL, intravenous, Once in imaging, Justino Cooper MD    venlafaxine (Effexor) tablet 75 mg, 75 mg, nasogastric tube, BID with meals, Angelic Sarmiento MD, 75 mg at 03/11/24 1837                                                                            Labs     Results for orders placed or performed during the hospital encounter of 02/22/24 (from the past 24 hour(s))   CBC and Auto Differential   Result Value Ref Range    WBC 5.5 4.4 - 11.3 x10*3/uL    nRBC 4.5 (H) 0.0 - 0.0  /100 WBCs    RBC 2.57 (L) 4.00 - 5.20 x10*6/uL    Hemoglobin 8.0 (L) 12.0 - 16.0 g/dL    Hematocrit 25.9 (L) 36.0 - 46.0 %     (H) 80 - 100 fL    MCH 31.1 26.0 - 34.0 pg    MCHC 30.9 (L) 32.0 - 36.0 g/dL    RDW 18.8 (H) 11.5 - 14.5 %    Platelets 91 (L) 150 - 450 x10*3/uL    Immature Granulocytes %, Automated 8.2 (H) 0.0 - 0.9 %    Immature Granulocytes Absolute, Automated 0.45 0.00 - 0.50 x10*3/uL   Renal function panel   Result Value Ref Range    Glucose 90 74 - 99 mg/dL    Sodium 137 136 - 145 mmol/L    Potassium 4.4 3.5 - 5.3 mmol/L    Chloride 101 98 - 107 mmol/L    Bicarbonate 30 21 - 32 mmol/L    Anion Gap 10 10 - 20 mmol/L    Urea Nitrogen 16 6 - 23 mg/dL    Creatinine 0.52 0.50 - 1.05 mg/dL    eGFR >90 >60 mL/min/1.73m*2    Calcium 8.2 (L) 8.6 - 10.6 mg/dL    Phosphorus 3.7 2.5 - 4.9 mg/dL    Albumin 2.4 (L) 3.4 - 5.0 g/dL   Magnesium   Result Value Ref Range    Magnesium 2.03 1.60 - 2.40 mg/dL   Manual Differential   Result Value Ref Range    Neutrophils %, Manual 87.7 40.0 - 80.0 %    Lymphocytes %, Manual 4.4 13.0 - 44.0 %    Monocytes %, Manual 6.1 2.0 - 10.0 %    Eosinophils %, Manual 0.0 0.0 - 6.0 %    Basophils %, Manual 1.8 0.0 - 2.0 %    Seg Neutrophils Absolute, Manual 4.82 1.60 - 5.00 x10*3/uL    Lymphocytes Absolute, Manual 0.24 (L) 0.80 - 3.00 x10*3/uL    Monocytes Absolute, Manual 0.34 0.05 - 0.80 x10*3/uL    Eosinophils Absolute, Manual 0.00 0.00 - 0.40 x10*3/uL    Basophils Absolute, Manual 0.10 0.00 - 0.10 x10*3/uL    Total Cells Counted 114     RBC Morphology See Below     Ovalocytes Few    POCT GLUCOSE   Result Value Ref Range    POCT Glucose 115 (H) 74 - 99 mg/dL   POCT GLUCOSE   Result Value Ref Range    POCT Glucose 95 74 - 99 mg/dL   Magnesium   Result Value Ref Range    Magnesium 2.11 1.60 - 2.40 mg/dL   Coagulation Screen   Result Value Ref Range    Protime 13.3 (H) 9.8 - 12.8 seconds    INR 1.2 (H) 0.9 - 1.1    aPTT 33 27 - 38 seconds   CBC and Auto Differential   Result  Value Ref Range    WBC 5.1 4.4 - 11.3 x10*3/uL    nRBC 5.1 (H) 0.0 - 0.0 /100 WBCs    RBC 2.62 (L) 4.00 - 5.20 x10*6/uL    Hemoglobin 8.1 (L) 12.0 - 16.0 g/dL    Hematocrit 26.8 (L) 36.0 - 46.0 %     (H) 80 - 100 fL    MCH 30.9 26.0 - 34.0 pg    MCHC 30.2 (L) 32.0 - 36.0 g/dL    RDW 19.4 (H) 11.5 - 14.5 %    Platelets 102 (L) 150 - 450 x10*3/uL    Neutrophils %      Immature Granulocytes %, Automated      Lymphocytes %      Monocytes %      Eosinophils %      Basophils %      Neutrophils Absolute      Lymphocytes Absolute      Monocytes Absolute      Eosinophils Absolute      Basophils Absolute     Renal Function Panel   Result Value Ref Range    Glucose 75 74 - 99 mg/dL    Sodium 135 (L) 136 - 145 mmol/L    Potassium 4.6 3.5 - 5.3 mmol/L    Chloride 101 98 - 107 mmol/L    Bicarbonate 28 21 - 32 mmol/L    Anion Gap 11 10 - 20 mmol/L    Urea Nitrogen 15 6 - 23 mg/dL    Creatinine 0.49 (L) 0.50 - 1.05 mg/dL    eGFR >90 >60 mL/min/1.73m*2    Calcium 8.0 (L) 8.6 - 10.6 mg/dL    Phosphorus 3.7 2.5 - 4.9 mg/dL    Albumin 2.2 (L) 3.4 - 5.0 g/dL   POCT GLUCOSE   Result Value Ref Range    POCT Glucose 72 (L) 74 - 99 mg/dL   POCT GLUCOSE   Result Value Ref Range    POCT Glucose 231 (H) 74 - 99 mg/dL                                                                             Imaging           02/24/24 MBS:  Oral Phase:  WFL     Adequate bolus acceptance, formation, and transit w/ all consistencies assessed.       -Pt does note significant odynophagia (pain orally and pharyngeally) with all PO intake.     Pharyngeal Phase:  Moderate/severe deficits     Tissue protrusion within hypopharynx impacting bolus flow.  No nasal regurgitation on this assessment, but backflow of boluses observed.  Hyolaryngeal elevation/excursion impaired.  Reduced airway protection during the swallow resulting in deep penetration to the vocal cords w/ all liquid consistencies, and eventual trace aspiration of thin and mildly thick liquids.   Independent cough/throat clear, but this did not clear aspirated material. Pharyngeal stasis at the valleculae and piriforms following the swallow with all liquid consistencies.  Aspiration in min-mod amounts following the swallow with thin and mildly thick liquids as a result of residue.  Again, cough response did not clear.  Chin tuck and head turn R did not improve safety or efficiency of swallow.  Head turn L (toward side of mass) improved swallow partially, but did not entirely resolve deficits.  With most viscous consistency assessed (puree) , there was no penetration/aspiration or significant stasis.       Esophageal Phase:  No overt deficits                                                                           GI Procedures      02/2021 EGD:  Impression:     - Z-line, 39 cm from the incisors.                         - Normal esophagus.                         - 3 parallel streaks of hemorrhagic appearance and                          linear erosions mucosa in the gastric body. Biopsied.                         - Normal examined duodenum.                         - Unclear if the gastric lesions are her sole source                          of bleeding as not seen on two previous endoscopie                          over past 6 months with recurrent UGI bleeding.     ASSESSMENT / PLAN                  ASSESSMENT/PLAN:     Amita Todd is a 78 y.o. 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      GI is consulted for PEG tube assessment.    Last dose of Plavix was on the 2/25/24.   Initially planned ot place PEG tube placement on 03/01/2024 after Plavix washout. However patient developed bacteremia followed by neutropenia. Gastroenterology team re-engaged on 3/8 for PEG tube placement with improved WBC counts and no longer on Filgrastim     Recommendations:  - Plan for PEG tube placement on 3/13   - Hold Plavix for 5 day prior to PEG tube placement.  - Hold tube feeds @ MN   - please hold all heparin products (including prophylaxis) at 4AM day prior to procedure  - surgical prophylaxis of cefazolin 2 g IV 30 minutes prior to surgical incision (for nonobese patients weighing <120 kg)    Patient was seen and discussed with Dr. Brannon     Gastroenterology will continue to follow.  During weekday hours of 7am-5pm please do not hesitate to contact me on [employer] Chat or page [phone], if there are any further questions between the weekday hours of 7am-5pm.   After hours, on weekends, and on holidays, please page the [place]-call GI fellow[place] at [phone]. Thank you.     Danish Manjarrez MD  PGY-4 Gastroenterology and Hepatology Fellow  [facility]

## 2024-03-12 NOTE — CARE PLAN
Problem: Skin  Goal: Decreased wound size/increased tissue granulation at next dressing change  Outcome: Progressing  Flowsheets (Taken 3/12/2024 1058)  Decreased wound size/increased tissue granulation at next dressing change:   Promote sleep for wound healing   Protective dressings over bony prominences  Goal: Participates in plan/prevention/treatment measures  Outcome: Progressing  Flowsheets (Taken 3/12/2024 1058)  Participates in plan/prevention/treatment measures:   Discuss with provider PT/OT consult   Elevate heels  Goal: Prevent/manage excess moisture  Outcome: Progressing  Flowsheets (Taken 3/12/2024 1058)  Prevent/manage excess moisture:   Moisturize dry skin   Cleanse incontinence/protect with barrier cream   Monitor for/manage infection if present  Goal: Prevent/minimize sheer/friction injuries  Outcome: Progressing  Flowsheets (Taken 3/12/2024 1058)  Prevent/minimize sheer/friction injuries:   Use pull sheet   Turn/reposition every 2 hours/use positioning/transfer devices   HOB 30 degrees or less  Goal: Promote/optimize nutrition  Outcome: Progressing  Flowsheets (Taken 3/12/2024 1058)  Promote/optimize nutrition: Monitor/record intake including meals  Goal: Promote skin healing  Outcome: Progressing  Flowsheets (Taken 3/12/2024 1058)  Promote skin healing:   Protective dressings over bony prominences   Turn/reposition every 2 hours/use positioning/transfer devices     Problem: Pain  Goal: My pain/discomfort is manageable  Outcome: Progressing     Problem: Safety  Goal: Patient will be injury free during hospitalization  Outcome: Progressing  Goal: I will remain free of falls  Outcome: Progressing     Problem: Daily Care  Goal: Daily care needs are met  Outcome: Progressing     Problem: Psychosocial Needs  Goal: Demonstrates ability to cope with hospitalization/illness  Outcome: Progressing  Goal: Collaborate with me, my family, and caregiver to identify my specific goals  Outcome: Progressing      Problem: Discharge Barriers  Goal: My discharge needs are met  Outcome: Progressing     Problem: Pain  Goal: Takes deep breaths with improved pain control throughout the shift  Outcome: Progressing  Goal: Turns in bed with improved pain control throughout the shift  Outcome: Progressing  Goal: Walks with improved pain control throughout the shift  Outcome: Progressing  Goal: Performs ADL's with improved pain control throughout shift  Outcome: Progressing  Goal: Participates in PT with improved pain control throughout the shift  Outcome: Progressing  Goal: Free from opioid side effects throughout the shift  Outcome: Progressing  Goal: Free from acute confusion related to pain meds throughout the shift  Outcome: Progressing     Problem: Pain - Adult  Goal: Verbalizes/displays adequate comfort level or baseline comfort level  Outcome: Progressing     Problem: Safety - Adult  Goal: Free from fall injury  Outcome: Progressing     Problem: Discharge Planning  Goal: Discharge to home or other facility with appropriate resources  Outcome: Progressing     Problem: Chronic Conditions and Co-morbidities  Goal: Patient's chronic conditions and co-morbidity symptoms are monitored and maintained or improved  Outcome: Progressing     Problem: Fall/Injury  Goal: Not fall by end of shift  Outcome: Progressing  Goal: Be free from injury by end of the shift  Outcome: Progressing  Goal: Verbalize understanding of personal risk factors for fall in the hospital  Outcome: Progressing  Goal: Verbalize understanding of risk factor reduction measures to prevent injury from fall in the home  Outcome: Progressing  Goal: Use assistive devices by end of the shift  Outcome: Progressing  Goal: Pace activities to prevent fatigue by end of the shift  Outcome: Progressing   The patient's goals for the shift include tolerate PEG placement and radiation; no signs of respiratory distress or hypoxia     The clinical goals for the shift include pt will  remain safe and free from injury through shift    Over the shift, the patient did make progress toward the following goals. Barriers to progression include tenacious secretions. Recommendations to address these barriers include vigilant RT nebs and suctioning.

## 2024-03-13 ENCOUNTER — HOSPITAL ENCOUNTER (OUTPATIENT)
Dept: RADIATION ONCOLOGY | Facility: HOSPITAL | Age: 79
Setting detail: RADIATION/ONCOLOGY SERIES
Discharge: HOME | End: 2024-03-13
Payer: MEDICARE

## 2024-03-13 ENCOUNTER — ANESTHESIA (OUTPATIENT)
Dept: GASTROENTEROLOGY | Facility: HOSPITAL | Age: 79
DRG: 314 | End: 2024-03-13
Payer: MEDICARE

## 2024-03-13 ENCOUNTER — ANESTHESIA EVENT (OUTPATIENT)
Dept: GASTROENTEROLOGY | Facility: HOSPITAL | Age: 79
DRG: 314 | End: 2024-03-13
Payer: MEDICARE

## 2024-03-13 ENCOUNTER — APPOINTMENT (OUTPATIENT)
Dept: GASTROENTEROLOGY | Facility: HOSPITAL | Age: 79
DRG: 314 | End: 2024-03-13
Payer: MEDICARE

## 2024-03-13 DIAGNOSIS — C13.8 MALIGNANT NEOPLASM OF OVERLAPPING SITES OF HYPOPHARYNX (MULTI): ICD-10-CM

## 2024-03-13 DIAGNOSIS — Z51.0 ENCOUNTER FOR ANTINEOPLASTIC RADIATION THERAPY: ICD-10-CM

## 2024-03-13 PROBLEM — I67.1 CEREBRAL ANEURYSM (HHS-HCC): Status: ACTIVE | Noted: 2024-03-13

## 2024-03-13 LAB
25(OH)D3 SERPL-MCNC: 84 NG/ML (ref 30–100)
ALBUMIN SERPL BCP-MCNC: 2.3 G/DL (ref 3.4–5)
ANION GAP SERPL CALC-SCNC: 13 MMOL/L (ref 10–20)
BASOPHILS # BLD MANUAL: 0 X10*3/UL (ref 0–0.1)
BASOPHILS NFR BLD MANUAL: 0 %
BUN SERPL-MCNC: 13 MG/DL (ref 6–23)
CALCIUM SERPL-MCNC: 8.5 MG/DL (ref 8.6–10.6)
CHLORIDE SERPL-SCNC: 102 MMOL/L (ref 98–107)
CO2 SERPL-SCNC: 26 MMOL/L (ref 21–32)
CREAT SERPL-MCNC: 0.49 MG/DL (ref 0.5–1.05)
DACRYOCYTES BLD QL SMEAR: ABNORMAL
EGFRCR SERPLBLD CKD-EPI 2021: >90 ML/MIN/1.73M*2
EOSINOPHIL # BLD MANUAL: 0 X10*3/UL (ref 0–0.4)
EOSINOPHIL NFR BLD MANUAL: 0 %
ERYTHROCYTE [DISTWIDTH] IN BLOOD BY AUTOMATED COUNT: 19.6 % (ref 11.5–14.5)
GLUCOSE BLD MANUAL STRIP-MCNC: 115 MG/DL (ref 74–99)
GLUCOSE BLD MANUAL STRIP-MCNC: 81 MG/DL (ref 74–99)
GLUCOSE BLD MANUAL STRIP-MCNC: 92 MG/DL (ref 74–99)
GLUCOSE SERPL-MCNC: 89 MG/DL (ref 74–99)
HCT VFR BLD AUTO: 26.4 % (ref 36–46)
HGB BLD-MCNC: 8.1 G/DL (ref 12–16)
IMM GRANULOCYTES # BLD AUTO: 0.44 X10*3/UL (ref 0–0.5)
IMM GRANULOCYTES NFR BLD AUTO: 7.9 % (ref 0–0.9)
LYMPHOCYTES # BLD MANUAL: 0.17 X10*3/UL (ref 0.8–3)
LYMPHOCYTES NFR BLD MANUAL: 3 %
MAGNESIUM SERPL-MCNC: 1.92 MG/DL (ref 1.6–2.4)
MCH RBC QN AUTO: 31.2 PG (ref 26–34)
MCHC RBC AUTO-ENTMCNC: 30.7 G/DL (ref 32–36)
MCV RBC AUTO: 102 FL (ref 80–100)
METAMYELOCYTES # BLD MANUAL: 0.06 X10*3/UL
METAMYELOCYTES NFR BLD MANUAL: 1 %
MONOCYTES # BLD MANUAL: 0.28 X10*3/UL (ref 0.05–0.8)
MONOCYTES NFR BLD MANUAL: 5 %
MYELOCYTES # BLD MANUAL: 0.06 X10*3/UL
MYELOCYTES NFR BLD MANUAL: 1 %
NEUTROPHILS # BLD MANUAL: 5.04 X10*3/UL (ref 1.6–5.5)
NEUTS BAND # BLD MANUAL: 0.45 X10*3/UL (ref 0–0.5)
NEUTS BAND NFR BLD MANUAL: 8 %
NEUTS SEG # BLD MANUAL: 4.59 X10*3/UL (ref 1.6–5)
NEUTS SEG NFR BLD MANUAL: 82 %
NRBC BLD-RTO: 3.9 /100 WBCS (ref 0–0)
OVALOCYTES BLD QL SMEAR: ABNORMAL
PHOSPHATE SERPL-MCNC: 3.8 MG/DL (ref 2.5–4.9)
PLATELET # BLD AUTO: 119 X10*3/UL (ref 150–450)
POLYCHROMASIA BLD QL SMEAR: ABNORMAL
POTASSIUM SERPL-SCNC: 4.7 MMOL/L (ref 3.5–5.3)
RAD ONC MSQ ACTUAL FRACTIONS DELIVERED: 31
RAD ONC MSQ ACTUAL SESSION DELIVERED DOSE: 200 CGRAY
RAD ONC MSQ ACTUAL TOTAL DOSE: 6200 CGRAY
RAD ONC MSQ ELAPSED DAYS: 49
RAD ONC MSQ LAST DATE: NORMAL
RAD ONC MSQ PRESCRIBED FRACTIONAL DOSE: 200 CGRAY
RAD ONC MSQ PRESCRIBED NUMBER OF FRACTIONS: 35
RAD ONC MSQ PRESCRIBED TECHNIQUE: NORMAL
RAD ONC MSQ PRESCRIBED TOTAL DOSE: 7000 CGRAY
RAD ONC MSQ PRESCRIPTION PATTERN COMMENT: NORMAL
RAD ONC MSQ START DATE: NORMAL
RAD ONC MSQ TREATMENT COURSE NUMBER: 1
RAD ONC MSQ TREATMENT SITE: NORMAL
RBC # BLD AUTO: 2.6 X10*6/UL (ref 4–5.2)
RBC MORPH BLD: ABNORMAL
SODIUM SERPL-SCNC: 136 MMOL/L (ref 136–145)
TOTAL CELLS COUNTED BLD: 100
TOXIC GRANULES BLD QL SMEAR: PRESENT
WBC # BLD AUTO: 5.6 X10*3/UL (ref 4.4–11.3)

## 2024-03-13 PROCEDURE — 2500000005 HC RX 250 GENERAL PHARMACY W/O HCPCS: Performed by: HOSPITALIST

## 2024-03-13 PROCEDURE — 2500000002 HC RX 250 W HCPCS SELF ADMINISTERED DRUGS (ALT 637 FOR MEDICARE OP, ALT 636 FOR OP/ED)

## 2024-03-13 PROCEDURE — 97530 THERAPEUTIC ACTIVITIES: CPT | Mod: GP

## 2024-03-13 PROCEDURE — 2500000001 HC RX 250 WO HCPCS SELF ADMINISTERED DRUGS (ALT 637 FOR MEDICARE OP)

## 2024-03-13 PROCEDURE — 1200000002 HC GENERAL ROOM WITH TELEMETRY DAILY

## 2024-03-13 PROCEDURE — 3700000001 HC GENERAL ANESTHESIA TIME - INITIAL BASE CHARGE: Performed by: INTERNAL MEDICINE

## 2024-03-13 PROCEDURE — 7100000009 HC PHASE TWO TIME - INITIAL BASE CHARGE: Performed by: INTERNAL MEDICINE

## 2024-03-13 PROCEDURE — 3700000002 HC GENERAL ANESTHESIA TIME - EACH INCREMENTAL 1 MINUTE: Performed by: INTERNAL MEDICINE

## 2024-03-13 PROCEDURE — 80069 RENAL FUNCTION PANEL: CPT

## 2024-03-13 PROCEDURE — 82306 VITAMIN D 25 HYDROXY: CPT

## 2024-03-13 PROCEDURE — 83735 ASSAY OF MAGNESIUM: CPT

## 2024-03-13 PROCEDURE — A43246 PR EDG PERCUTANEOUS PLACEMENT GASTROSTOMY TUBE: Performed by: NURSE ANESTHETIST, CERTIFIED REGISTERED

## 2024-03-13 PROCEDURE — 99232 SBSQ HOSP IP/OBS MODERATE 35: CPT

## 2024-03-13 PROCEDURE — 1200000003 HC ONCOLOGY  ROOM WITH TELEMETRY DAILY

## 2024-03-13 PROCEDURE — 2500000004 HC RX 250 GENERAL PHARMACY W/ HCPCS (ALT 636 FOR OP/ED): Performed by: ANESTHESIOLOGY

## 2024-03-13 PROCEDURE — 85007 BL SMEAR W/DIFF WBC COUNT: CPT

## 2024-03-13 PROCEDURE — 2500000004 HC RX 250 GENERAL PHARMACY W/ HCPCS (ALT 636 FOR OP/ED): Performed by: NURSE ANESTHETIST, CERTIFIED REGISTERED

## 2024-03-13 PROCEDURE — 77014 CHG CT GUIDANCE RADIATION THERAPY FLDS PLACEMENT: CPT | Performed by: STUDENT IN AN ORGANIZED HEALTH CARE EDUCATION/TRAINING PROGRAM

## 2024-03-13 PROCEDURE — 77386 HC INTENSITY-MODULATED RADIATION THERAPY (IMRT), COMPLEX: CPT | Performed by: RADIOLOGY

## 2024-03-13 PROCEDURE — 2500000002 HC RX 250 W HCPCS SELF ADMINISTERED DRUGS (ALT 637 FOR MEDICARE OP, ALT 636 FOR OP/ED): Performed by: HOSPITALIST

## 2024-03-13 PROCEDURE — 94640 AIRWAY INHALATION TREATMENT: CPT

## 2024-03-13 PROCEDURE — 77336 RADIATION PHYSICS CONSULT: CPT | Performed by: RADIOLOGY

## 2024-03-13 PROCEDURE — 82947 ASSAY GLUCOSE BLOOD QUANT: CPT

## 2024-03-13 PROCEDURE — 85027 COMPLETE CBC AUTOMATED: CPT

## 2024-03-13 PROCEDURE — 2500000004 HC RX 250 GENERAL PHARMACY W/ HCPCS (ALT 636 FOR OP/ED)

## 2024-03-13 PROCEDURE — 43246 EGD PLACE GASTROSTOMY TUBE: CPT | Performed by: INTERNAL MEDICINE

## 2024-03-13 PROCEDURE — 7100000010 HC PHASE TWO TIME - EACH INCREMENTAL 1 MINUTE: Performed by: INTERNAL MEDICINE

## 2024-03-13 PROCEDURE — A43246 PR EDG PERCUTANEOUS PLACEMENT GASTROSTOMY TUBE: Performed by: ANESTHESIOLOGY

## 2024-03-13 PROCEDURE — 99100 ANES PT EXTEME AGE<1 YR&>70: CPT | Performed by: ANESTHESIOLOGY

## 2024-03-13 PROCEDURE — 2780000003 HC OR 278 NO HCPCS: Performed by: INTERNAL MEDICINE

## 2024-03-13 RX ORDER — MAGNESIUM SULFATE HEPTAHYDRATE 40 MG/ML
2 INJECTION, SOLUTION INTRAVENOUS ONCE
Status: COMPLETED | OUTPATIENT
Start: 2024-03-13 | End: 2024-03-13

## 2024-03-13 RX ORDER — CEFAZOLIN 1 G/1
INJECTION, POWDER, FOR SOLUTION INTRAVENOUS AS NEEDED
Status: DISCONTINUED | OUTPATIENT
Start: 2024-03-13 | End: 2024-03-13

## 2024-03-13 RX ORDER — CEFAZOLIN SODIUM 2 G/100ML
2 INJECTION, SOLUTION INTRAVENOUS
Status: DISPENSED | OUTPATIENT
Start: 2024-03-13 | End: 2024-03-13

## 2024-03-13 RX ORDER — LIDOCAINE HYDROCHLORIDE 10 MG/ML
0.1 INJECTION INFILTRATION; PERINEURAL ONCE
Status: DISCONTINUED | OUTPATIENT
Start: 2024-03-13 | End: 2024-03-14 | Stop reason: ALTCHOICE

## 2024-03-13 RX ORDER — PHENYLEPHRINE HCL IN 0.9% NACL 1 MG/10 ML
SYRINGE (ML) INTRAVENOUS AS NEEDED
Status: DISCONTINUED | OUTPATIENT
Start: 2024-03-13 | End: 2024-03-13

## 2024-03-13 RX ORDER — PROPOFOL 10 MG/ML
INJECTION, EMULSION INTRAVENOUS AS NEEDED
Status: DISCONTINUED | OUTPATIENT
Start: 2024-03-13 | End: 2024-03-13

## 2024-03-13 RX ORDER — SODIUM CHLORIDE, SODIUM LACTATE, POTASSIUM CHLORIDE, CALCIUM CHLORIDE 600; 310; 30; 20 MG/100ML; MG/100ML; MG/100ML; MG/100ML
INJECTION, SOLUTION INTRAVENOUS CONTINUOUS PRN
Status: DISCONTINUED | OUTPATIENT
Start: 2024-03-13 | End: 2024-03-13

## 2024-03-13 RX ORDER — SODIUM CHLORIDE, SODIUM LACTATE, POTASSIUM CHLORIDE, CALCIUM CHLORIDE 600; 310; 30; 20 MG/100ML; MG/100ML; MG/100ML; MG/100ML
100 INJECTION, SOLUTION INTRAVENOUS CONTINUOUS
Status: DISCONTINUED | OUTPATIENT
Start: 2024-03-13 | End: 2024-03-14

## 2024-03-13 RX ORDER — PROPOFOL 10 MG/ML
INJECTION, EMULSION INTRAVENOUS CONTINUOUS PRN
Status: DISCONTINUED | OUTPATIENT
Start: 2024-03-13 | End: 2024-03-13

## 2024-03-13 RX ADMIN — ACETAMINOPHEN 1000 MG: 650 SOLUTION ORAL at 01:02

## 2024-03-13 RX ADMIN — Medication 200 MCG: at 13:53

## 2024-03-13 RX ADMIN — Medication 40 MCG: at 13:40

## 2024-03-13 RX ADMIN — DAPAGLIFLOZIN 10 MG: 10 TABLET, FILM COATED ORAL at 10:10

## 2024-03-13 RX ADMIN — GUAIFENESIN 200 MG: 200 SOLUTION ORAL at 06:15

## 2024-03-13 RX ADMIN — BUPROPION HYDROCHLORIDE 150 MG: 100 TABLET, FILM COATED ORAL at 10:09

## 2024-03-13 RX ADMIN — PROPOFOL 50 MG: 10 INJECTION, EMULSION INTRAVENOUS at 13:31

## 2024-03-13 RX ADMIN — GUAIFENESIN 200 MG: 200 SOLUTION ORAL at 21:15

## 2024-03-13 RX ADMIN — SPIRONOLACTONE 12.5 MG: 25 TABLET, FILM COATED ORAL at 10:06

## 2024-03-13 RX ADMIN — Medication 1000 UNITS: at 10:07

## 2024-03-13 RX ADMIN — Medication 120 MCG: at 13:49

## 2024-03-13 RX ADMIN — METOPROLOL TARTRATE 50 MG: 50 TABLET, FILM COATED ORAL at 21:15

## 2024-03-13 RX ADMIN — VENLAFAXINE 75 MG: 75 TABLET ORAL at 18:39

## 2024-03-13 RX ADMIN — CEFAZOLIN 2 G: 330 INJECTION, POWDER, FOR SOLUTION INTRAMUSCULAR; INTRAVENOUS at 13:35

## 2024-03-13 RX ADMIN — SODIUM CHLORIDE, POTASSIUM CHLORIDE, SODIUM LACTATE AND CALCIUM CHLORIDE 100 ML/HR: 600; 310; 30; 20 INJECTION, SOLUTION INTRAVENOUS at 15:57

## 2024-03-13 RX ADMIN — Medication 120 MCG: at 13:43

## 2024-03-13 RX ADMIN — BUPROPION HYDROCHLORIDE 150 MG: 100 TABLET, FILM COATED ORAL at 22:50

## 2024-03-13 RX ADMIN — METOPROLOL TARTRATE 50 MG: 50 TABLET, FILM COATED ORAL at 10:13

## 2024-03-13 RX ADMIN — Medication 10 MG: at 22:50

## 2024-03-13 RX ADMIN — Medication 3 ML: at 09:34

## 2024-03-13 RX ADMIN — FLUCONAZOLE 400 MG: 200 TABLET ORAL at 10:06

## 2024-03-13 RX ADMIN — ACETAMINOPHEN 1000 MG: 650 SOLUTION ORAL at 06:14

## 2024-03-13 RX ADMIN — QUETIAPINE FUMARATE 12.5 MG: 25 TABLET ORAL at 22:52

## 2024-03-13 RX ADMIN — PROPOFOL 10 MG: 10 INJECTION, EMULSION INTRAVENOUS at 13:32

## 2024-03-13 RX ADMIN — LOSARTAN POTASSIUM 25 MG: 25 TABLET, FILM COATED ORAL at 10:09

## 2024-03-13 RX ADMIN — Medication 1000 UNITS: at 21:15

## 2024-03-13 RX ADMIN — ACETAMINOPHEN 1000 MG: 650 SOLUTION ORAL at 23:01

## 2024-03-13 RX ADMIN — VENLAFAXINE 75 MG: 75 TABLET ORAL at 08:00

## 2024-03-13 RX ADMIN — Medication 3 ML: at 22:07

## 2024-03-13 RX ADMIN — ACETAMINOPHEN 1000 MG: 650 SOLUTION ORAL at 18:38

## 2024-03-13 RX ADMIN — MAGNESIUM SULFATE HEPTAHYDRATE 2 G: 40 INJECTION, SOLUTION INTRAVENOUS at 10:14

## 2024-03-13 RX ADMIN — IPRATROPIUM BROMIDE AND ALBUTEROL SULFATE 3 ML: .5; 3 SOLUTION RESPIRATORY (INHALATION) at 22:07

## 2024-03-13 RX ADMIN — ATORVASTATIN CALCIUM 20 MG: 20 TABLET, FILM COATED ORAL at 10:08

## 2024-03-13 RX ADMIN — SODIUM CHLORIDE, POTASSIUM CHLORIDE, SODIUM LACTATE AND CALCIUM CHLORIDE: 600; 310; 30; 20 INJECTION, SOLUTION INTRAVENOUS at 13:24

## 2024-03-13 RX ADMIN — PROPOFOL 100 MCG/KG/MIN: 10 INJECTION, EMULSION INTRAVENOUS at 13:32

## 2024-03-13 RX ADMIN — PROPOFOL 10 MG: 10 INJECTION, EMULSION INTRAVENOUS at 13:33

## 2024-03-13 RX ADMIN — ESOMEPRAZOLE MAGNESIUM 40 MG: 40 FOR SUSPENSION ORAL at 06:14

## 2024-03-13 RX ADMIN — IPRATROPIUM BROMIDE AND ALBUTEROL SULFATE 3 ML: .5; 3 SOLUTION RESPIRATORY (INHALATION) at 09:34

## 2024-03-13 ASSESSMENT — COGNITIVE AND FUNCTIONAL STATUS - GENERAL
MOBILITY SCORE: 8
PERSONAL GROOMING: TOTAL
MOVING FROM LYING ON BACK TO SITTING ON SIDE OF FLAT BED WITH BEDRAILS: A LOT
MOVING TO AND FROM BED TO CHAIR: TOTAL
STANDING UP FROM CHAIR USING ARMS: TOTAL
TURNING FROM BACK TO SIDE WHILE IN FLAT BAD: A LOT
DRESSING REGULAR UPPER BODY CLOTHING: TOTAL
DRESSING REGULAR LOWER BODY CLOTHING: TOTAL
STANDING UP FROM CHAIR USING ARMS: TOTAL
CLIMB 3 TO 5 STEPS WITH RAILING: TOTAL
DAILY ACTIVITIY SCORE: 6
MOVING FROM LYING ON BACK TO SITTING ON SIDE OF FLAT BED WITH BEDRAILS: A LOT
MOBILITY SCORE: 8
TURNING FROM BACK TO SIDE WHILE IN FLAT BAD: A LOT
EATING MEALS: TOTAL
HELP NEEDED FOR BATHING: TOTAL
TOILETING: TOTAL
MOVING TO AND FROM BED TO CHAIR: TOTAL
WALKING IN HOSPITAL ROOM: TOTAL
CLIMB 3 TO 5 STEPS WITH RAILING: TOTAL
WALKING IN HOSPITAL ROOM: TOTAL

## 2024-03-13 ASSESSMENT — PAIN SCALES - GENERAL
PAINLEVEL_OUTOF10: 0 - NO PAIN

## 2024-03-13 ASSESSMENT — COLUMBIA-SUICIDE SEVERITY RATING SCALE - C-SSRS
6. HAVE YOU EVER DONE ANYTHING, STARTED TO DO ANYTHING, OR PREPARED TO DO ANYTHING TO END YOUR LIFE?: NO
1. IN THE PAST MONTH, HAVE YOU WISHED YOU WERE DEAD OR WISHED YOU COULD GO TO SLEEP AND NOT WAKE UP?: NO
2. HAVE YOU ACTUALLY HAD ANY THOUGHTS OF KILLING YOURSELF?: NO

## 2024-03-13 ASSESSMENT — PAIN - FUNCTIONAL ASSESSMENT
PAIN_FUNCTIONAL_ASSESSMENT: 0-10

## 2024-03-13 NOTE — PROGRESS NOTES
02/28/24 1507   Discharge Planning   Living Arrangements Spouse/significant other   Support Systems Spouse/significant other;Children;Family members;Friends/neighbors   Type of Residence Private residence   Who is requesting discharge planning? Provider   Home or Post Acute Services In home services   Type of Home Care Services Home OT;Home PT;Home nursing visits;Home health aide   Patient expects to be discharged to: home   Does the patient need discharge transport arranged? No     2/28/24 @ 1505  Met with patient and her daughter Tati at bedside. Patient lives with her  in [place]. They have not used home care before and appreciate recommendations. They are fine with using [facility]. Asked team to place referral for [facility] for RN, PT/OT, HHA.  Patient is to get PEG placed on Friday. Explained to patient and daughter that we will need to get a tube feed supplier. Once the team places orders and the patient gets her PEG tube, we can get that set up for them.  Explained that they will get teaching at bedside by nurses and TF supplier before going home. Daughter Tati and her  will be learners. No other needs at this time. Will continue to follow patient during her hospital stay.  Elvi Castillo RN [facility]    3/5/24 @ 1400  [facility] referral placed for RN, PT/OT. [facility] unable to provide home health aide in her area. Patient will need RN for tube feeds, IV abx, and wound care. Tube feed orders sent to [facility] via [facility]. Patient will discharge home with [employer]. Unable to place PEG at this time during her hospital stay. Will continue to follow patient and update [facility] on discharge needs. JEAN-PAUL Corbett MD updated.  Elvi Castillo RN [facility]    3/12/24 @ 1510  Met with patient, sister, and daughter at bedside about discharge planning. They plan to hire a private nurse and home health aide. Gave them [employer] as a company to look up for this. [facility] referral placed for PT/OT, HHA, and RT. The family plans to coordinate care  between the two. Patient has yet to get PEG. Planned for today, 3/12. Home going TF orders sent to [facility] via [facility]. Patient has rollator, walker, cane, hospital bed, and commode at home already. Will follow up with patient and family tomorrow after she gets her PEG.  MAGDALENA Muniz    3/13/24 @ 6091  [facility] will meet with the daughter and patient tomorrow at noon to do teaching at the bedside. Patient to get her PEG today, as she did not get it yesterday.  Elvi Castillo RN [facility]

## 2024-03-13 NOTE — PROGRESS NOTES
"Amita Todd is a 78 y.o. female on day 20 of admission presenting with Thrombocytopenia (CMS/HCC).    Subjective   NAEO. Patient feels well, tolerating radiation well it seems. Last day for radiation (if uninterrupted) is 3/19. Patient looking forward to having dobhoff removed. Denies fevers, chills, nausea, vomiting, chest pain, shortness of breath, palpitations.        Objective   Constitutional:       Comments: underweight   HENT:      Mouth/Throat:      Mouth: Mucous membranes are dry. improved well circumscribed plaques.  +DH with TF  Eyes:      Pupils: Pupils are equal, round, and reactive to light.   Cardiovascular:      Rate and Rhythm: Irregular rhythm. Regular rate.     Pulses: Normal pulses.   Pulmonary:      Comments: mild crackles B/L, RUL>AGATHA. On RA  Abdominal:      General: Abdomen is flat. Bowel sounds are normal.      Palpations: Abdomen is soft.   Musculoskeletal:         General: Normal range of motion.      Cervical back: Normal range of motion.      Comments: No edema  Skin:     General: Skin is warm.      Capillary Refill: Capillary refill takes less than 2 seconds.      Findings: No bruising.      Comments: Petechia over trunk; erythematous and scaly skin noted over neck and upper chest   Neurological:      General: No focal deficit present.      Mental Status: A&Ox4  Psychiatric:         Mood and Affect: Mood normal.       Last Recorded Vitals  Blood pressure (!) 147/101, pulse 92, temperature 36.8 °C (98.2 °F), temperature source Temporal, resp. rate 18, height 1.635 m (5' 4.37\"), weight 68 kg (149 lb 14.6 oz), SpO2 100 %.  Intake/Output last 3 Shifts:  I/O last 3 completed shifts:  In: 790 (11.6 mL/kg) [NG/GT:690; IV Piggyback:100]  Out: 1450 (21.3 mL/kg) [Urine:1450 (0.6 mL/kg/hr)]  Weight: 68 kg     Relevant Results  Scheduled medications  acetaminophen, 1,000 mg, nasogastric tube, q6h  atorvastatin, 20 mg, nasogastric tube, Daily  buPROPion, 150 mg, nasogastric tube, " BID  cholecalciferol, 1,000 Units, nasogastric tube, BID  dapagliflozin propanediol, 10 mg, oral, Daily  [Held by provider] enoxaparin, 40 mg, subcutaneous, q24h  esomeprazole, 40 mg, nasoduodenal tube, Daily before breakfast  ipratropium-albuteroL, 3 mL, nebulization, 4x daily  lidocaine, 1 Application, Topical, Once  losartan, 25 mg, nasogastric tube, Daily  [Held by provider] magnesium oxide, 400 mg, nasogastric tube, Daily  melatonin, 10 mg, oral, Nightly  metoprolol tartrate, 50 mg, nasogastric tube, BID  perflutren lipid microspheres, 0.5-10 mL of dilution, intravenous, Once in imaging  perflutren protein A microsphere, 0.5 mL, intravenous, Once in imaging  QUEtiapine, 12.5 mg, oral, Nightly  senna, 10 mL, nasogastric tube, BID  sodium chloride, 3 mL, nebulization, 4x daily  spironolactone, 12.5 mg, nasogastric tube, Daily  sulfur hexafluoride microsphr, 2 mL, intravenous, Once in imaging  venlafaxine, 75 mg, nasogastric tube, BID with meals      Continuous medications     PRN medications  PRN medications: alteplase, alteplase, ceFAZolin, dextrose 10 % in water (D10W), dextrose, glucagon, guaiFENesin, lidocaine-diphenhydraMINE-Maalox 1:1:1, loperamide, moisturizing mouth, OLANZapine, ondansetron, oxygen, phenoL, prochlorperazine, racepinephrine  Results for orders placed or performed during the hospital encounter of 02/22/24 (from the past 24 hour(s))   POCT GLUCOSE   Result Value Ref Range    POCT Glucose 74 74 - 99 mg/dL   POCT GLUCOSE   Result Value Ref Range    POCT Glucose 92 74 - 99 mg/dL   Vitamin D 25-Hydroxy,Total (for eval of Vitamin D levels)   Result Value Ref Range    Vitamin D, 25-Hydroxy, Total 84 30 - 100 ng/mL   Magnesium   Result Value Ref Range    Magnesium 1.92 1.60 - 2.40 mg/dL   Renal Function Panel   Result Value Ref Range    Glucose 89 74 - 99 mg/dL    Sodium 136 136 - 145 mmol/L    Potassium 4.7 3.5 - 5.3 mmol/L    Chloride 102 98 - 107 mmol/L    Bicarbonate 26 21 - 32 mmol/L    Anion  Gap 13 10 - 20 mmol/L    Urea Nitrogen 13 6 - 23 mg/dL    Creatinine 0.49 (L) 0.50 - 1.05 mg/dL    eGFR >90 >60 mL/min/1.73m*2    Calcium 8.5 (L) 8.6 - 10.6 mg/dL    Phosphorus 3.8 2.5 - 4.9 mg/dL    Albumin 2.3 (L) 3.4 - 5.0 g/dL   CBC and Auto Differential   Result Value Ref Range    WBC 5.6 4.4 - 11.3 x10*3/uL    nRBC 3.9 (H) 0.0 - 0.0 /100 WBCs    RBC 2.60 (L) 4.00 - 5.20 x10*6/uL    Hemoglobin 8.1 (L) 12.0 - 16.0 g/dL    Hematocrit 26.4 (L) 36.0 - 46.0 %     (H) 80 - 100 fL    MCH 31.2 26.0 - 34.0 pg    MCHC 30.7 (L) 32.0 - 36.0 g/dL    RDW 19.6 (H) 11.5 - 14.5 %    Platelets 119 (L) 150 - 450 x10*3/uL    Immature Granulocytes %, Automated 7.9 (H) 0.0 - 0.9 %    Immature Granulocytes Absolute, Automated 0.44 0.00 - 0.50 x10*3/uL   Manual Differential   Result Value Ref Range    Neutrophils %, Manual 82.0 40.0 - 80.0 %    Bands %, Manual 8.0 0.0 - 5.0 %    Lymphocytes %, Manual 3.0 13.0 - 44.0 %    Monocytes %, Manual 5.0 2.0 - 10.0 %    Eosinophils %, Manual 0.0 0.0 - 6.0 %    Basophils %, Manual 0.0 0.0 - 2.0 %    Metamyelocytes %, Manual 1.0 0.0 - 0.0 %    Myelocytes %, Manual 1.0 0.0 - 0.0 %    Seg Neutrophils Absolute, Manual 4.59 1.60 - 5.00 x10*3/uL    Bands Absolute, Manual 0.45 0.00 - 0.50 x10*3/uL    Lymphocytes Absolute, Manual 0.17 (L) 0.80 - 3.00 x10*3/uL    Monocytes Absolute, Manual 0.28 0.05 - 0.80 x10*3/uL    Eosinophils Absolute, Manual 0.00 0.00 - 0.40 x10*3/uL    Basophils Absolute, Manual 0.00 0.00 - 0.10 x10*3/uL    Metamyelocytes Absolute, Manual 0.06 0.00 - 0.00 x10*3/uL    Myelocytes Absolute, Manual 0.06 0.00 - 0.00 x10*3/uL    Total Cells Counted 100     Neutrophils Absolute, Manual 5.04 1.60 - 5.50 x10*3/uL    RBC Morphology See Below     Polychromasia Mild     Ovalocytes Few     Teardrop Cells Few     Toxic Granulation Present    POCT GLUCOSE   Result Value Ref Range    POCT Glucose 81 74 - 99 mg/dL            Assessment/Plan   Principal Problem:    Thrombocytopenia  (CMS/HCC)  77yo F w PMHx squamous cell carcinoma of hypopharynx, HFpEF, CAD s/p PCI, Afib s/p watchman, GERD, depression, HTN, AUD, ascending aortic aneurysm, distant breast cancer (s/p chemoradiotherapy and lumpectomy) who was directed to ED for hypotension which resolved. Currently treating E.Coli bacteremia with ceftriaxone (ID following), and plan to have EGD (assess esophagitis infectious vs radiation) + PEG (for nutrition); however ANC less than goal. Started neupogen (3/2) d/t low ANC, infection and procedure goal for ANC > 1000. Neupogen discontinued 3/8, plan for PEG 3/13 w/ GI.      3/13 updates:   - s/p radiation therapy today   - Plan for PEG with GI today  - Per discussion with patient and daughter, would be amenable to going to SNF now that patient's delirium has improved     #SCC of Hypopharynx (Dr. Burkitt)  #Delerium, resolved   ::SLP eval 2/12 recommended soft solids with thin liquids, patient would prefer oral feeding to PEG   - nutrition recs appreciated: 100mg IV thiamine x7 days,  Isosource 1.5 at goal [50cc/hr]  - c/w home Guaifenesin  - c/w Zofran prn nausea, Compazine second line  - Holding opioid 2/2 previous delirium  - delirium precautions  - patient tolerating seroquel 12.5mg at bedtime for sleep and agitation, delirium improving   - Zyprexa 2.5mg PO q8hrs for agitation      #Oropharyngeal candidiasis  #Dysphagia  ::Moderate to severe candidiasis with multiple large lesions, improved   ::given ANC 0.16, consideration for opportunistic infections present. Entended antifungal course  - Fluconazole 200mg IV (2/22 - xx), typically for 7 days but continue given neutropenia and candidiasis; plan for 21 day course total; IV 2/22-3/6, plan for 400mg PO 3/7-3/13  - filgrastim discontinued 3/8  -Plan for PEG 3/13 per GI   - Oral care per nursing    #Acute hypoxic respiratory failure 2/2 upper airway mucus plug, resolved   :: Rapid called early AM 3/11 for hypoxia requiring ventimask, ENT consulted  and scoped showing mucus at glottis, s/p saline lavage   Plan:   - Hypertonic saline nebulizer, scheduled duo nebs  - Vanc discontinued (3/7-3/8)  - Now off zosyn (3/7-3/9)  -MRSA nares negative  - Lactate normal  - Blood cx x2 collected; NGTD  - Wean O2 as able; on room air as of 3/11  - Encourage incentive spirometer, RT consult for airway clearance techniques  - Albuterol q6hr PRN for wheezing/shortness of breath     #Goals of care  :: Per discussion with patient on 3/12, she says that while the radiation therapy has been hard on her, she would like to pursue any treatment that is going to treat her cancer  - Per primary oncologist, patient responding well to treatment  - Will continue with current treatment plan per primary oncologist and radiation oncologist      #HFpEF  ::Patient presents with hypotension over the past month in oncology clinic, has received IVF and discontinued home losartan/amlodipine during this time, BP spontaneously improving without intervention today to 100s/70s, appears grossly dry to euvolemic on exam without any symptoms of acute heart failure + positive orthostats in ED  ::BNP 2/22: 229 - appears to be at baseline  ::TTE 7/2023: EF 55-60% // TTE 2/2024 EF 50%  - Patient likely has less PO intake and with chemo does not need as many antihyptertensives/GDMT medication dose - emailed primary cardiologist  - Medications given via Dobhoff  - Continue Aldactone 12.5mg // Metoprolol Tartrate 50mg BID // Losartan 25mg // Dapagliflozin 10mg   - Atorvostatin 20mg   - Diuresis w/ lasix 20mg IV 3/10    #E coli bacteremia, resolved  :: Bcx from 2/29 growing E coli sensitive to ceftriaxone; Bcx from 3/2 NGTD  - ID consulted, appreciate recs:  - Given collection through mediport but no symptoms of sepsis, would treat w/ 10 days of ceftriaxone 2g q24hr infused through mediport  - Lock solution into catheter after infusion is complete   - CTX 2g q24 hr course 10 days; 3/1-3/9 planned; changed on 3/7  to vanc + zosyn as above  - Completed zosyn 3/9  - Per ID, no need for lock therapy as of 3/11     #Thrombocytopenia, improving  #Pancytopenia, resolved  ::ANC 0.16 // Plts 53 on admission with no concern for bleeding // HB 10.9 on presentation (BL 12-13)  - likely d/t myelosuppresion vs though patient has had poor po intake vs mixed  - Retic count 0.6% (fraction 1.8%) indicative of poor marrow response   - Patients neutropenia resolved, anemia at baseline; thrombocytopenia somewhat worsened, had been ~100k, now down to 88 k on 3/10; previously thought due to luz from chemo, but white count recovering  - DIC labs unrevealing  - Other etiologies include drug induced (escalated to zosyn on 3/7), HIT less likely   - Transfuse for platelets <50 prior to 3/12 placement of PEG with GI  - T&S updated, transfuse for active bleeding <50, or for <10  - continue lovenox for DVT ppx   - holding clopidogrel (for EGD + peg in future)     #Normocytic Anemia  ::HB 10.9 on presentation (BL 12-13), no active bleeding, no recent iron studies  - Likely d/t chronic dz vs myelosuppresion, though patient has had poor po intake     #Atrial Fibrillation  ::Patient previously on AC, now s/p watchman procedure and is rate controlled   ::had 5 ablations per patient  - Continue metoprolol tartrate 50mg BID  - HR 90s to 120s     #Hx of TIA  - holding home Plavix 75mg daily     #GERD  - c/w home Pantoprazole     #Mood Disorder  #Misc  - c/w home Cymbalta and Wellbutrin  - c/w home supplements, mag-ox     F : caution, was diuresing. HfpEF  E: replete lytes prn, K>4, Mg >2  N: isosource 1.5 via dobhoff, NPO at midnight  A: PIVs     DVT prophylaxis: SCDs, Lovenox  GI prophylaxis: home PPI     Code Status: DNR/DNI (will be reversed for PEG)       Baudilio Turcios MD

## 2024-03-13 NOTE — ANESTHESIA PREPROCEDURE EVALUATION
Patient: Amita Todd    Procedure Information       Date/Time: 03/13/24 1240    Scheduled providers: Brice Carmichael MD; Lindy Hooper MD    Procedure: EGD    Location: [facility]            Relevant Problems   Anesthesia (within normal limits)  No family hx      Cardiovascular   (+) Aneurysm, ascending aorta (CMS/HCC)   (+) Atrial fibrillation, persistent (CMS/HCC)   (+) Atrial tachycardia   (+) CAD (coronary artery disease)   (+) Carotid stenosis (TIA 2019)   (+) HFrEF (heart failure with reduced ejection fraction) (CMS/HCC)   (+) Hypertension   (+) Mitral regurgitation   (+) Mixed hyperlipidemia   (+) PAD (peripheral artery disease) (CMS/HCC)      Endocrine (within normal limits)      GI  Radiation induced dysphagia   (+) Esophageal reflux      /Renal   (+) Acute cystitis with hematuria      Neuro/Psych   (+) Carotid stenosis (TIA 2019)   (+) Depression      Pulmonary   (+) Asthma   (+) Dyspnea on exertion   (+) Exertional dyspnea      GI/Hepatic   (+) Cancer of anterior two-thirds of tongue (CMS/HCC)   (+) Malignant neoplasm of hypopharynx (CMS/HCC)      Hematology   (+) Iron deficiency anemia due to chronic blood loss   (+) Thrombocytopenia (CMS/HCC)      Musculoskeletal   (+) Osteoarthritis      Eyes, Ears, Nose, and Throat  On 3/11  Acute hypoxemic respiratory failure likely 2/2 upper airway obstruction/mucus plugging  CA tongue hypopharynx radiation      Infectious Disease   (+) Vaginal yeast infection    Thrombocytopenia (CMS/HCC)  77yo F w PMHx squamous cell carcinoma of hypopharynx, HFpEF, CAD s/p PCI, Afib s/p watchman, GERD, depression, HTN, AUD, ascending aortic aneurysm, distant breast cancer (s/p chemoradiotherapy and lumpectomy)     Clinical information reviewed:   Tobacco  Allergies  Meds   Med Hx  Surg Hx  OB Status  Fam Hx  Soc   Hx      ECHO  TTE 7/2023: EF 55-60% // TTE 2/2024 EF 50%   NPO Detail:  NPO/Void Status  Date of Last Liquid: 03/12/24  Time of Last  Liquid: 0000 (mouth swabs)  Date of Last Solid: 02/13/24  Last Intake Type: Clear fluids  Time of Last Void: 0000 (milner)         Physical Exam    Airway  Mallampati: II  TM distance: >3 FB  Neck ROM: full     Cardiovascular    Dental   Comments: intact   Pulmonary    Abdominal          Anesthesia Plan    History of general anesthesia?: yes  History of complications of general anesthesia?: no    ASA 3     MAC     Anesthetic plan and risks discussed with patient.  Use of blood products discussed with father who consented to blood products.    Plan discussed with CRNA.

## 2024-03-13 NOTE — ANESTHESIA POSTPROCEDURE EVALUATION
Patient: Amita Todd    Procedure Summary       Date: 03/13/24 Room / Location: [facility]    Anesthesia Start: 1323 Anesthesia Stop: 1404    Procedure: EGD Diagnosis:       Malignant neoplasm of hypopharynx (CMS/HCC)      Hypopharyngeal lesion      Oropharyngeal dysphagia    Scheduled Providers: Brice Carmichael MD; Lindy Hooper MD Responsible Provider: Lindy Hooper MD    Anesthesia Type: MAC ASA Status: 3            Anesthesia Type: MAC    Vitals Value Taken Time   /67 03/13/24 1429   Temp 36 °C (96.8 °F) 03/13/24 1359   Pulse 88 03/13/24 1429   Resp 20 03/13/24 1429   SpO2 100 % 03/13/24 1429       Anesthesia Post Evaluation    Patient location during evaluation: PACU  Patient participation: complete - patient participated  Level of consciousness: awake  Pain management: adequate  Airway patency: patent  Cardiovascular status: acceptable  Respiratory status: acceptable  Hydration status: acceptable  Postoperative Nausea and Vomiting: none    No notable events documented.

## 2024-03-13 NOTE — PROGRESS NOTES
Occupational Therapy                 Therapy Communication Note    Patient Name: Amita Todd  MRN: [id number]  Today's Date: 3/13/2024     Discipline: Occupational Therapy    Missed Visit Reason: Missed Visit Reason: Patient in a medical procedure    Missed Time: Attempt    Comment:

## 2024-03-13 NOTE — SIGNIFICANT EVENT
Updated GI assessment and plan     Assessment and plan     Amita Todd is a 78 y.o. 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      GI is consulted for PEG tube assessment.    Last dose of Plavix was on the 2/25/24.  Initially planned ot place PEG tube placement on 03/01/2024 after Plavix washout. However patient developed bacteremia followed by neutropenia. Gastroenterology team re-engaged on 3/8 for PEG tube placement with improved WBC counts and no longer on Filgrastim     Recommendations:  -PEG tube placed successfully with bumper at 2.5 cm  -OK to use PEG for feeds, meds, water and/or venting if needed  -Keep gauze above the external bumper  -Never place anything between the external bumper and the skin  -Make sure to flush the PEG with water BID to keep it patent  -If accidentally removed at any time please place milner catheter to secure tract   -PEG tube assessed by GI team

## 2024-03-13 NOTE — PROGRESS NOTES
Physical Therapy    Physical Therapy Treatment    Patient Name: Amita Todd  MRN: [id number]  Today's Date: 3/13/2024  Time Calculation  Start Time: 1009  Stop Time: 1025  Time Calculation (min): 16 min       Assessment/Plan   PT Assessment  PT Assessment Results: Decreased strength, Decreased endurance, Impaired balance, Decreased mobility, Pain, Decreased cognition, Impaired judgement, Decreased safety awareness  End of Session Communication: Bedside nurse  End of Session Patient Position: Bed, 3 rail up, Alarm off, caregiver present  PT Plan  Inpatient/Swing Bed or Outpatient: Inpatient  PT Plan  Treatment/Interventions: Bed mobility, Transfer training, Gait training, Stair training, Balance training, Neuromuscular re-education, Strengthening, Endurance training, Therapeutic exercise, Therapeutic activity, Home exercise program, Postural re-education, Positioning  PT Plan: Skilled PT  PT Frequency: 3 times per week  PT Discharge Recommendations: Moderate intensity level of continued care (vs Low intensity PT w 24 hr assist (family to provide assist))  Equipment Recommended upon Discharge:  (family to buy commode and move bed to first floor, buying bed rails)  PT Recommended Transfer Status: Assist x2  PT - OK to Discharge: Yes      General Visit Information:   PT  Visit  PT Received On: 03/13/24  General  Missed Visit: Yes  Missed Visit Reason:  (Per RN patient scheduled to go for procedure soon (PEG placement). Will hold PT at this time and reattempt at later date.)  Family/Caregiver Present: Yes  Caregiver Feedback: RN and daughter present upon PT's entry. Pt reportedly to have PEG placement today (pushed back from yesterday) as well as radiation soon. Focus of visit on bed level activity/exercise on this date.  Patient Position Received: Bed, 3 rail up, Alarm on  General Comment: Pt willing to work with PT. Experiencing increasing shaking/tremors while at rest. Focus of visit on bed level  exercises/ROM.    Subjective   Precautions:  Precautions  Medical Precautions: Fall precautions    Objective   Pain:  Pain Assessment  Pain Assessment: 0-10 (No complaints of pain)  Pain Score: 0 - No pain  Cognition:  Cognition  Overall Cognitive Status: Within Functional Limits  Arousal/Alertness: Appropriate responses to stimuli  Orientation Level: Oriented X4  Cognition Comments: Improved mentation on this date from previous visits/dates.  Postural Control:  Static Sitting Balance  Static Sitting-Balance Support:  (Not performed on this date though anticipate would require assistance w static sit.)  Static Standing Balance  Static Standing-Balance Support:  (Not performed)    Activity Tolerance:  Activity Tolerance  Endurance: Tolerates 10 - 20 min exercise with multiple rests  Treatments:  Therapeutic Exercise  Therapeutic Exercise Performed: Yes  Therapeutic Exercise Activity 1: Supine, BLE: AP, QS, GS, HS, SLR, hip int/ext rotation. BUE shoulder flext/abd/ext roation, elbow flex/ext, wrist/hand/finger flex/ext. Each extremity x approximately 15 with assist as needed (grossly min/mod assist)    Bed Mobility  Bed Mobility: No    Ambulation/Gait Training  Ambulation/Gait Training Performed: No  Transfers  Transfer: No    Outcome Measures:  [facility] Basic Mobility  Turning from your back to your side while in a flat bed without using bedrails: A lot  Moving from lying on your back to sitting on the side of a flat bed without using bedrails: A lot  Moving to and from bed to chair (including a wheelchair): Total  Standing up from a chair using your arms (e.g. wheelchair or bedside chair): Total  To walk in hospital room: Total  Climbing 3-5 steps with railing: Total  Basic Mobility - Total Score: 8    Education Documentation  Mobility Training, taught by Dario Santizo PT at 3/13/2024 10:42 AM.  Learner: Patient  Readiness: Acceptance  Method: Explanation  Response: Verbalizes Understanding    Education Comments  No  comments found.      Encounter Problems       Encounter Problems (Active)       General Goals       ind with HEP supine, sitting HEP with supervisoin (Not Progressing)       Start:  02/26/24    Expected End:  03/15/24            supine to/from sit, HOB elevated no rail, modified independent (negative orthostatic hypotension, no dizziness) (Progressing)       Start:  02/26/24    Expected End:  03/15/24               Mobility       sit to/from stand, bed to/from chair with LRAD and supervision, negative for orthostatic hypotension, no LOB (Not Progressing)       Start:  02/26/24    Expected End:  03/15/24            ambulate 250' with LRAD and supervision, stable vitals, no LOB (Not Progressing)       Start:  02/26/24    Expected End:  03/15/24            up/down 12 steps with 1 rail with SBA (Not Progressing)       Start:  02/26/24    Expected End:  03/15/24               Pain - Adult

## 2024-03-13 NOTE — CARE PLAN
The patient's goals for the shift include      The clinical goals for the shift include pt will remain safe and free from injury through shift      Problem: Skin  Goal: Prevent/manage excess moisture  Outcome: Progressing  Flowsheets (Taken 3/12/2024 2228)  Prevent/manage excess moisture: Moisturize dry skin     Problem: Safety  Goal: Patient will be injury free during hospitalization  Outcome: Progressing     Problem: Daily Care  Goal: Daily care needs are met  Outcome: Progressing

## 2024-03-13 NOTE — SIGNIFICANT EVENT
Discussed with daughter and patient that prior to undergoing endoscopic procedure, code status needs to be reversed. Both patient and daughter in agreement to reverse DNR/DNI code status for the duration of EGD with PEG tube placement.

## 2024-03-14 ENCOUNTER — APPOINTMENT (OUTPATIENT)
Dept: CARDIOLOGY | Facility: HOSPITAL | Age: 79
End: 2024-03-14
Payer: MEDICARE

## 2024-03-14 ENCOUNTER — HOSPITAL ENCOUNTER (OUTPATIENT)
Dept: RADIATION ONCOLOGY | Facility: HOSPITAL | Age: 79
Setting detail: RADIATION/ONCOLOGY SERIES
Discharge: HOME | End: 2024-03-14
Payer: MEDICARE

## 2024-03-14 DIAGNOSIS — C13.8 MALIGNANT NEOPLASM OF OVERLAPPING SITES OF HYPOPHARYNX (MULTI): ICD-10-CM

## 2024-03-14 DIAGNOSIS — Z51.0 ENCOUNTER FOR ANTINEOPLASTIC RADIATION THERAPY: ICD-10-CM

## 2024-03-14 PROBLEM — G40.909 SEIZURE DISORDER (MULTI): Status: ACTIVE | Noted: 2024-03-14

## 2024-03-14 PROBLEM — F06.4 ANXIETY DISORDER DUE TO GENERAL MEDICAL CONDITION: Status: ACTIVE | Noted: 2024-03-14

## 2024-03-14 PROBLEM — G51.0 BELL'S PALSY: Status: ACTIVE | Noted: 2024-03-14

## 2024-03-14 PROBLEM — G45.9 TRANSIENT ISCHEMIC ATTACK: Status: ACTIVE | Noted: 2023-10-04

## 2024-03-14 PROBLEM — R42 LIGHTHEADEDNESS: Status: ACTIVE | Noted: 2024-03-14

## 2024-03-14 PROBLEM — F10.21 HISTORY OF ALCOHOLISM (MULTI): Status: ACTIVE | Noted: 2024-03-14

## 2024-03-14 PROBLEM — G80.9 CEREBRAL PALSY (MULTI): Status: ACTIVE | Noted: 2024-03-14

## 2024-03-14 LAB
ALBUMIN SERPL BCP-MCNC: 2.3 G/DL (ref 3.4–5)
ANION GAP SERPL CALC-SCNC: 10 MMOL/L (ref 10–20)
BASOPHILS # BLD MANUAL: 0.05 X10*3/UL (ref 0–0.1)
BASOPHILS NFR BLD MANUAL: 0.9 %
BUN SERPL-MCNC: 10 MG/DL (ref 6–23)
CALCIUM SERPL-MCNC: 8.1 MG/DL (ref 8.6–10.6)
CHLORIDE SERPL-SCNC: 102 MMOL/L (ref 98–107)
CO2 SERPL-SCNC: 27 MMOL/L (ref 21–32)
CREAT SERPL-MCNC: 0.45 MG/DL (ref 0.5–1.05)
EGFRCR SERPLBLD CKD-EPI 2021: >90 ML/MIN/1.73M*2
EOSINOPHIL # BLD MANUAL: 0 X10*3/UL (ref 0–0.4)
EOSINOPHIL NFR BLD MANUAL: 0 %
ERYTHROCYTE [DISTWIDTH] IN BLOOD BY AUTOMATED COUNT: 19.8 % (ref 11.5–14.5)
GLUCOSE BLD MANUAL STRIP-MCNC: 111 MG/DL (ref 74–99)
GLUCOSE BLD MANUAL STRIP-MCNC: 85 MG/DL (ref 74–99)
GLUCOSE BLD MANUAL STRIP-MCNC: 97 MG/DL (ref 74–99)
GLUCOSE SERPL-MCNC: 80 MG/DL (ref 74–99)
HCT VFR BLD AUTO: 24.5 % (ref 36–46)
HGB BLD-MCNC: 7.7 G/DL (ref 12–16)
IMM GRANULOCYTES # BLD AUTO: 0.45 X10*3/UL (ref 0–0.5)
IMM GRANULOCYTES NFR BLD AUTO: 7.7 % (ref 0–0.9)
LYMPHOCYTES # BLD MANUAL: 0.2 X10*3/UL (ref 0.8–3)
LYMPHOCYTES NFR BLD MANUAL: 3.4 %
MAGNESIUM SERPL-MCNC: 1.9 MG/DL (ref 1.6–2.4)
MCH RBC QN AUTO: 31.6 PG (ref 26–34)
MCHC RBC AUTO-ENTMCNC: 31.4 G/DL (ref 32–36)
MCV RBC AUTO: 100 FL (ref 80–100)
METAMYELOCYTES # BLD MANUAL: 0.15 X10*3/UL
METAMYELOCYTES NFR BLD MANUAL: 2.6 %
MONOCYTES # BLD MANUAL: 0.25 X10*3/UL (ref 0.05–0.8)
MONOCYTES NFR BLD MANUAL: 4.3 %
MYELOCYTES # BLD MANUAL: 0.1 X10*3/UL
MYELOCYTES NFR BLD MANUAL: 1.7 %
NEUTS SEG # BLD MANUAL: 5.14 X10*3/UL (ref 1.6–5)
NEUTS SEG NFR BLD MANUAL: 87.1 %
NRBC BLD-RTO: 3.9 /100 WBCS (ref 0–0)
PHOSPHATE SERPL-MCNC: 3.5 MG/DL (ref 2.5–4.9)
PLATELET # BLD AUTO: 140 X10*3/UL (ref 150–450)
POLYCHROMASIA BLD QL SMEAR: ABNORMAL
POTASSIUM SERPL-SCNC: 4.4 MMOL/L (ref 3.5–5.3)
RAD ONC MSQ ACTUAL FRACTIONS DELIVERED: 32
RAD ONC MSQ ACTUAL SESSION DELIVERED DOSE: 200 CGRAY
RAD ONC MSQ ACTUAL TOTAL DOSE: 6400 CGRAY
RAD ONC MSQ ELAPSED DAYS: 50
RAD ONC MSQ LAST DATE: NORMAL
RAD ONC MSQ PRESCRIBED FRACTIONAL DOSE: 200 CGRAY
RAD ONC MSQ PRESCRIBED NUMBER OF FRACTIONS: 35
RAD ONC MSQ PRESCRIBED TECHNIQUE: NORMAL
RAD ONC MSQ PRESCRIBED TOTAL DOSE: 7000 CGRAY
RAD ONC MSQ PRESCRIPTION PATTERN COMMENT: NORMAL
RAD ONC MSQ START DATE: NORMAL
RAD ONC MSQ TREATMENT COURSE NUMBER: 1
RAD ONC MSQ TREATMENT SITE: NORMAL
RBC # BLD AUTO: 2.44 X10*6/UL (ref 4–5.2)
RBC MORPH BLD: ABNORMAL
SODIUM SERPL-SCNC: 135 MMOL/L (ref 136–145)
TOTAL CELLS COUNTED BLD: 116
WBC # BLD AUTO: 5.9 X10*3/UL (ref 4.4–11.3)

## 2024-03-14 PROCEDURE — 2500000005 HC RX 250 GENERAL PHARMACY W/O HCPCS: Performed by: HOSPITALIST

## 2024-03-14 PROCEDURE — 77014 CHG CT GUIDANCE RADIATION THERAPY FLDS PLACEMENT: CPT | Performed by: RADIOLOGY

## 2024-03-14 PROCEDURE — 2500000002 HC RX 250 W HCPCS SELF ADMINISTERED DRUGS (ALT 637 FOR MEDICARE OP, ALT 636 FOR OP/ED)

## 2024-03-14 PROCEDURE — 85007 BL SMEAR W/DIFF WBC COUNT: CPT

## 2024-03-14 PROCEDURE — 1200000003 HC ONCOLOGY  ROOM WITH TELEMETRY DAILY

## 2024-03-14 PROCEDURE — 82947 ASSAY GLUCOSE BLOOD QUANT: CPT

## 2024-03-14 PROCEDURE — 83735 ASSAY OF MAGNESIUM: CPT

## 2024-03-14 PROCEDURE — 2500000004 HC RX 250 GENERAL PHARMACY W/ HCPCS (ALT 636 FOR OP/ED): Performed by: ANESTHESIOLOGY

## 2024-03-14 PROCEDURE — 94760 N-INVAS EAR/PLS OXIMETRY 1: CPT

## 2024-03-14 PROCEDURE — 77386 HC INTENSITY-MODULATED RADIATION THERAPY (IMRT), COMPLEX: CPT | Performed by: RADIOLOGY

## 2024-03-14 PROCEDURE — 99232 SBSQ HOSP IP/OBS MODERATE 35: CPT

## 2024-03-14 PROCEDURE — 1200000002 HC GENERAL ROOM WITH TELEMETRY DAILY

## 2024-03-14 PROCEDURE — 2500000001 HC RX 250 WO HCPCS SELF ADMINISTERED DRUGS (ALT 637 FOR MEDICARE OP)

## 2024-03-14 PROCEDURE — 2500000004 HC RX 250 GENERAL PHARMACY W/ HCPCS (ALT 636 FOR OP/ED)

## 2024-03-14 PROCEDURE — 80069 RENAL FUNCTION PANEL: CPT

## 2024-03-14 PROCEDURE — 85027 COMPLETE CBC AUTOMATED: CPT

## 2024-03-14 PROCEDURE — 94640 AIRWAY INHALATION TREATMENT: CPT

## 2024-03-14 PROCEDURE — 2500000002 HC RX 250 W HCPCS SELF ADMINISTERED DRUGS (ALT 637 FOR MEDICARE OP, ALT 636 FOR OP/ED): Performed by: HOSPITALIST

## 2024-03-14 RX ORDER — OLANZAPINE 5 MG/1
2.5 TABLET ORAL EVERY 8 HOURS PRN
Status: DISCONTINUED | OUTPATIENT
Start: 2024-03-14 | End: 2024-03-18 | Stop reason: HOSPADM

## 2024-03-14 RX ORDER — ESOMEPRAZOLE MAGNESIUM 40 MG/1
40 GRANULE, DELAYED RELEASE ORAL
Status: DISCONTINUED | OUTPATIENT
Start: 2024-03-15 | End: 2024-03-18 | Stop reason: HOSPADM

## 2024-03-14 RX ORDER — QUETIAPINE FUMARATE 25 MG/1
12.5 TABLET, FILM COATED ORAL NIGHTLY
Status: DISCONTINUED | OUTPATIENT
Start: 2024-03-14 | End: 2024-03-18 | Stop reason: HOSPADM

## 2024-03-14 RX ORDER — METOPROLOL TARTRATE 50 MG/1
50 TABLET ORAL 2 TIMES DAILY
Status: DISCONTINUED | OUTPATIENT
Start: 2024-03-14 | End: 2024-03-18 | Stop reason: HOSPADM

## 2024-03-14 RX ORDER — GUAIFENESIN 100 MG/5ML
200 SOLUTION ORAL EVERY 4 HOURS PRN
Status: DISCONTINUED | OUTPATIENT
Start: 2024-03-14 | End: 2024-03-18 | Stop reason: HOSPADM

## 2024-03-14 RX ORDER — VENLAFAXINE 75 MG/1
75 TABLET ORAL
Status: DISCONTINUED | OUTPATIENT
Start: 2024-03-14 | End: 2024-03-18 | Stop reason: HOSPADM

## 2024-03-14 RX ORDER — CLOPIDOGREL BISULFATE 75 MG/1
75 TABLET ORAL DAILY
Status: DISCONTINUED | OUTPATIENT
Start: 2024-03-14 | End: 2024-03-18 | Stop reason: HOSPADM

## 2024-03-14 RX ORDER — MAGNESIUM SULFATE HEPTAHYDRATE 40 MG/ML
2 INJECTION, SOLUTION INTRAVENOUS ONCE
Status: COMPLETED | OUTPATIENT
Start: 2024-03-14 | End: 2024-03-14

## 2024-03-14 RX ORDER — LOSARTAN POTASSIUM 25 MG/1
25 TABLET ORAL DAILY
Status: DISCONTINUED | OUTPATIENT
Start: 2024-03-15 | End: 2024-03-18 | Stop reason: HOSPADM

## 2024-03-14 RX ORDER — ATORVASTATIN CALCIUM 20 MG/1
20 TABLET, FILM COATED ORAL DAILY
Status: DISCONTINUED | OUTPATIENT
Start: 2024-03-15 | End: 2024-03-18 | Stop reason: HOSPADM

## 2024-03-14 RX ORDER — ACETAMINOPHEN 160 MG/5ML
975 SOLUTION ORAL EVERY 6 HOURS
Status: DISCONTINUED | OUTPATIENT
Start: 2024-03-14 | End: 2024-03-18 | Stop reason: HOSPADM

## 2024-03-14 RX ORDER — CHOLECALCIFEROL (VITAMIN D3) 25 MCG
1000 TABLET ORAL 2 TIMES DAILY
Status: DISCONTINUED | OUTPATIENT
Start: 2024-03-14 | End: 2024-03-18 | Stop reason: HOSPADM

## 2024-03-14 RX ORDER — DAPAGLIFLOZIN 10 MG/1
10 TABLET, FILM COATED ORAL DAILY
Status: DISCONTINUED | OUTPATIENT
Start: 2024-03-15 | End: 2024-03-18 | Stop reason: HOSPADM

## 2024-03-14 RX ORDER — BUPROPION HYDROCHLORIDE 75 MG/1
150 TABLET ORAL 2 TIMES DAILY
Status: DISCONTINUED | OUTPATIENT
Start: 2024-03-14 | End: 2024-03-18 | Stop reason: HOSPADM

## 2024-03-14 RX ORDER — SPIRONOLACTONE 25 MG/1
12.5 TABLET ORAL DAILY
Status: DISCONTINUED | OUTPATIENT
Start: 2024-03-15 | End: 2024-03-18 | Stop reason: HOSPADM

## 2024-03-14 RX ORDER — ACETAMINOPHEN 500 MG
10 TABLET ORAL NIGHTLY
Status: DISCONTINUED | OUTPATIENT
Start: 2024-03-14 | End: 2024-03-18 | Stop reason: HOSPADM

## 2024-03-14 RX ORDER — SENNOSIDES 8.8 MG/5ML
10 LIQUID ORAL 2 TIMES DAILY
Status: DISCONTINUED | OUTPATIENT
Start: 2024-03-14 | End: 2024-03-18 | Stop reason: HOSPADM

## 2024-03-14 RX ADMIN — ATORVASTATIN CALCIUM 20 MG: 20 TABLET, FILM COATED ORAL at 08:39

## 2024-03-14 RX ADMIN — Medication 15 ML: at 06:43

## 2024-03-14 RX ADMIN — METOPROLOL TARTRATE 50 MG: 50 TABLET, FILM COATED ORAL at 08:39

## 2024-03-14 RX ADMIN — Medication 3 ML: at 20:00

## 2024-03-14 RX ADMIN — BUPROPION HYDROCHLORIDE 150 MG: 75 TABLET, FILM COATED ORAL at 20:00

## 2024-03-14 RX ADMIN — Medication 3 ML: at 13:48

## 2024-03-14 RX ADMIN — GUAIFENESIN 200 MG: 200 SOLUTION ORAL at 06:09

## 2024-03-14 RX ADMIN — QUETIAPINE FUMARATE 12.5 MG: 25 TABLET ORAL at 20:00

## 2024-03-14 RX ADMIN — OLANZAPINE 2.5 MG: 5 TABLET, FILM COATED ORAL at 19:36

## 2024-03-14 RX ADMIN — ACETAMINOPHEN 1000 MG: 650 SOLUTION ORAL at 13:49

## 2024-03-14 RX ADMIN — SODIUM CHLORIDE, POTASSIUM CHLORIDE, SODIUM LACTATE AND CALCIUM CHLORIDE 100 ML/HR: 600; 310; 30; 20 INJECTION, SOLUTION INTRAVENOUS at 06:19

## 2024-03-14 RX ADMIN — VENLAFAXINE 75 MG: 75 TABLET ORAL at 08:40

## 2024-03-14 RX ADMIN — Medication 1000 UNITS: at 08:39

## 2024-03-14 RX ADMIN — MAGNESIUM SULFATE HEPTAHYDRATE 2 G: 40 INJECTION, SOLUTION INTRAVENOUS at 08:49

## 2024-03-14 RX ADMIN — ACETAMINOPHEN 1000 MG: 650 SOLUTION ORAL at 17:38

## 2024-03-14 RX ADMIN — IPRATROPIUM BROMIDE AND ALBUTEROL SULFATE 3 ML: .5; 3 SOLUTION RESPIRATORY (INHALATION) at 19:40

## 2024-03-14 RX ADMIN — LOSARTAN POTASSIUM 25 MG: 25 TABLET, FILM COATED ORAL at 08:39

## 2024-03-14 RX ADMIN — METOPROLOL TARTRATE 50 MG: 50 TABLET, FILM COATED ORAL at 20:00

## 2024-03-14 RX ADMIN — Medication 3 ML: at 08:59

## 2024-03-14 RX ADMIN — ESOMEPRAZOLE MAGNESIUM 40 MG: 40 FOR SUSPENSION ORAL at 06:02

## 2024-03-14 RX ADMIN — SPIRONOLACTONE 12.5 MG: 25 TABLET, FILM COATED ORAL at 08:39

## 2024-03-14 RX ADMIN — ACETAMINOPHEN 1000 MG: 650 SOLUTION ORAL at 23:47

## 2024-03-14 RX ADMIN — Medication 10 MG: at 17:38

## 2024-03-14 RX ADMIN — SODIUM CHLORIDE, POTASSIUM CHLORIDE, SODIUM LACTATE AND CALCIUM CHLORIDE 500 ML: 600; 310; 30; 20 INJECTION, SOLUTION INTRAVENOUS at 02:18

## 2024-03-14 RX ADMIN — ACETAMINOPHEN 1000 MG: 650 SOLUTION ORAL at 06:02

## 2024-03-14 RX ADMIN — BUPROPION HYDROCHLORIDE 150 MG: 100 TABLET, FILM COATED ORAL at 08:39

## 2024-03-14 RX ADMIN — DAPAGLIFLOZIN 10 MG: 10 TABLET, FILM COATED ORAL at 08:40

## 2024-03-14 RX ADMIN — Medication 1000 UNITS: at 20:00

## 2024-03-14 RX ADMIN — IPRATROPIUM BROMIDE AND ALBUTEROL SULFATE 3 ML: .5; 3 SOLUTION RESPIRATORY (INHALATION) at 08:59

## 2024-03-14 RX ADMIN — CLOPIDOGREL BISULFATE 75 MG: 75 TABLET ORAL at 13:50

## 2024-03-14 RX ADMIN — VENLAFAXINE 75 MG: 75 TABLET ORAL at 17:39

## 2024-03-14 RX ADMIN — ONDANSETRON 4 MG: 2 INJECTION INTRAMUSCULAR; INTRAVENOUS at 06:43

## 2024-03-14 RX ADMIN — IPRATROPIUM BROMIDE AND ALBUTEROL SULFATE 3 ML: .5; 3 SOLUTION RESPIRATORY (INHALATION) at 13:48

## 2024-03-14 ASSESSMENT — COGNITIVE AND FUNCTIONAL STATUS - GENERAL
TOILETING: A LOT
MOBILITY SCORE: 12
DRESSING REGULAR UPPER BODY CLOTHING: A LOT
STANDING UP FROM CHAIR USING ARMS: A LOT
MOVING FROM LYING ON BACK TO SITTING ON SIDE OF FLAT BED WITH BEDRAILS: A LOT
MOVING TO AND FROM BED TO CHAIR: A LOT
WALKING IN HOSPITAL ROOM: A LOT
CLIMB 3 TO 5 STEPS WITH RAILING: A LOT
TURNING FROM BACK TO SIDE WHILE IN FLAT BAD: A LOT
PERSONAL GROOMING: A LOT
EATING MEALS: A LOT
HELP NEEDED FOR BATHING: A LOT
DAILY ACTIVITIY SCORE: 12
DRESSING REGULAR LOWER BODY CLOTHING: A LOT

## 2024-03-14 ASSESSMENT — ENCOUNTER SYMPTOMS: COUGH: 1

## 2024-03-14 ASSESSMENT — PAIN SCALES - GENERAL: PAINLEVEL_OUTOF10: 0 - NO PAIN

## 2024-03-14 NOTE — PROGRESS NOTES
"Amita Todd is a 78 y.o. female on day 21 of admission presenting with Thrombocytopenia (CMS/HCC).    Subjective   Overnight while patient was sleeping, had asymptomatic blood pressure of 83/53, increased to 110/75 with 500mL bolus. This AM, patient doesn't have any complaints. Very glad to have dobhoff out. Denies any pain at site of PEG. Is having a bit of delirium this morning, commenting on a baby that is standing in the room. However, pleasant and conversant. Denies chest pain, abdominal pain, nausea, vomiting, shortness of breath, palpitations.        Objective   Constitutional:       Comments: underweight   HENT:      Mouth/Throat:      Mouth: Mucous membranes are dry. improved well circumscribed plaques.  +DH with TF  Eyes:      Pupils: Pupils are equal, round, and reactive to light.   Cardiovascular:      Rate and Rhythm: Irregular rhythm. Regular rate.     Pulses: Normal pulses.   Pulmonary:      Comments: mild crackles B/L, RUL>AGATHA. On RA  Abdominal:      General: Abdomen is flat. Bowel sounds are normal.      Palpations: Abdomen is soft.   Musculoskeletal:         General: Normal range of motion.      Cervical back: Normal range of motion.      Comments: No edema  Skin:     General: Skin is warm.      Capillary Refill: Capillary refill takes less than 2 seconds.      Findings: No bruising.      Comments: Petechia over trunk; erythematous and scaly skin noted over neck and upper chest   Neurological:      General: No focal deficit present.      Mental Status: A&Ox4  Psychiatric:         Mood and Affect: Mood normal.       Last Recorded Vitals  Blood pressure 99/60, pulse 82, temperature 36.9 °C (98.4 °F), temperature source Temporal, resp. rate 20, height 1.635 m (5' 4.37\"), weight 68 kg (149 lb 14.6 oz), SpO2 100 %.  Intake/Output last 3 Shifts:  I/O last 3 completed shifts:  In: 2461.7 (36.2 mL/kg) [I.V.:1508.3 (22.2 mL/kg); NG/GT:720; IV Piggyback:233.3]  Out: 2300 (33.8 mL/kg) [Urine:2300 " (0.9 mL/kg/hr)]  Weight: 68 kg     Relevant Results  Scheduled medications  acetaminophen, 1,000 mg, g-tube, q6h  [START ON 3/15/2024] atorvastatin, 20 mg, g-tube, Daily  buPROPion, 150 mg, g-tube, BID  cholecalciferol, 1,000 Units, g-tube, BID  clopidogrel, 75 mg, oral, Daily  [START ON 3/15/2024] dapagliflozin propanediol, 10 mg, g-tube, Daily  [Held by provider] enoxaparin, 40 mg, subcutaneous, q24h  [START ON 3/15/2024] esomeprazole, 40 mg, g-tube, Daily before breakfast  ipratropium-albuteroL, 3 mL, nebulization, 4x daily  [START ON 3/15/2024] losartan, 25 mg, g-tube, Daily  melatonin, 10 mg, g-tube, Nightly  metoprolol tartrate, 50 mg, g-tube, BID  QUEtiapine, 12.5 mg, g-tube, Nightly  senna, 10 mL, g-tube, BID  sodium chloride, 3 mL, nebulization, 4x daily  [START ON 3/15/2024] spironolactone, 12.5 mg, g-tube, Daily  venlafaxine, 75 mg, g-tube, BID with meals      Continuous medications     PRN medications  PRN medications: alteplase, dextrose 10 % in water (D10W), dextrose, glucagon, guaiFENesin, lidocaine-diphenhydraMINE-Maalox 1:1:1, moisturizing mouth, OLANZapine, ondansetron, oxygen, oxygen, phenoL, prochlorperazine, racepinephrine  Results for orders placed or performed during the hospital encounter of 02/22/24 (from the past 24 hour(s))   POCT GLUCOSE   Result Value Ref Range    POCT Glucose 115 (H) 74 - 99 mg/dL   CBC and Auto Differential   Result Value Ref Range    WBC 5.9 4.4 - 11.3 x10*3/uL    nRBC 3.9 (H) 0.0 - 0.0 /100 WBCs    RBC 2.44 (L) 4.00 - 5.20 x10*6/uL    Hemoglobin 7.7 (L) 12.0 - 16.0 g/dL    Hematocrit 24.5 (L) 36.0 - 46.0 %     80 - 100 fL    MCH 31.6 26.0 - 34.0 pg    MCHC 31.4 (L) 32.0 - 36.0 g/dL    RDW 19.8 (H) 11.5 - 14.5 %    Platelets 140 (L) 150 - 450 x10*3/uL    Immature Granulocytes %, Automated 7.7 (H) 0.0 - 0.9 %    Immature Granulocytes Absolute, Automated 0.45 0.00 - 0.50 x10*3/uL   Renal Function Panel   Result Value Ref Range    Glucose 80 74 - 99 mg/dL     Sodium 135 (L) 136 - 145 mmol/L    Potassium 4.4 3.5 - 5.3 mmol/L    Chloride 102 98 - 107 mmol/L    Bicarbonate 27 21 - 32 mmol/L    Anion Gap 10 10 - 20 mmol/L    Urea Nitrogen 10 6 - 23 mg/dL    Creatinine 0.45 (L) 0.50 - 1.05 mg/dL    eGFR >90 >60 mL/min/1.73m*2    Calcium 8.1 (L) 8.6 - 10.6 mg/dL    Phosphorus 3.5 2.5 - 4.9 mg/dL    Albumin 2.3 (L) 3.4 - 5.0 g/dL   Magnesium   Result Value Ref Range    Magnesium 1.90 1.60 - 2.40 mg/dL   Manual Differential   Result Value Ref Range    Neutrophils %, Manual 87.1 40.0 - 80.0 %    Lymphocytes %, Manual 3.4 13.0 - 44.0 %    Monocytes %, Manual 4.3 2.0 - 10.0 %    Eosinophils %, Manual 0.0 0.0 - 6.0 %    Basophils %, Manual 0.9 0.0 - 2.0 %    Metamyelocytes %, Manual 2.6 0.0 - 0.0 %    Myelocytes %, Manual 1.7 0.0 - 0.0 %    Seg Neutrophils Absolute, Manual 5.14 (H) 1.60 - 5.00 x10*3/uL    Lymphocytes Absolute, Manual 0.20 (L) 0.80 - 3.00 x10*3/uL    Monocytes Absolute, Manual 0.25 0.05 - 0.80 x10*3/uL    Eosinophils Absolute, Manual 0.00 0.00 - 0.40 x10*3/uL    Basophils Absolute, Manual 0.05 0.00 - 0.10 x10*3/uL    Metamyelocytes Absolute, Manual 0.15 0.00 - 0.00 x10*3/uL    Myelocytes Absolute, Manual 0.10 0.00 - 0.00 x10*3/uL    Total Cells Counted 116     RBC Morphology See Below     Polychromasia Mild    POCT GLUCOSE   Result Value Ref Range    POCT Glucose 85 74 - 99 mg/dL            Assessment/Plan   Principal Problem:    Thrombocytopenia (CMS/HCC)  77yo F w PMHx squamous cell carcinoma of hypopharynx, HFpEF, CAD s/p PCI, Afib s/p watchman, GERD, depression, HTN, AUD, ascending aortic aneurysm, distant breast cancer (s/p chemoradiotherapy and lumpectomy) who was directed to ED for hypotension which resolved. Currently treating E.Coli bacteremia with ceftriaxone (ID following), and plan to have EGD (assess esophagitis infectious vs radiation) + PEG (for nutrition); however ANC less than goal. Started neupogen (3/2) d/t low ANC, infection and procedure goal for  ANC > 1000. Neupogen discontinued 3/8, s/p PEG on 3/13 for enteral nutrition.      3/14 updates:   - s/p peg on 3/13, tolerating bolus feed without issue  - finishing radiation 3/19, can be discharged same day     #SCC of Hypopharynx (Dr. Burkitt)  #Delerium, resolved   - nutrition recs appreciated: 100mg IV thiamine x7 days,  Isosource 1.5 at goal 310mL bolus 4x/day   - c/w home Guaifenesin  - c/w Zofran prn nausea, Compazine second line  - Holding opioid 2/2 previous delirium  - delirium precautions  - patient tolerating seroquel 12.5mg at bedtime for sleep and agitation, delirium improving   - Zyprexa 2.5mg PO q8hrs for agitation      #Oropharyngeal candidiasis  #Dysphagia  ::Moderate to severe candidiasis with multiple large lesions, improved   ::given ANC 0.16, consideration for opportunistic infections present. Entended antifungal course  - Fluconazole 200mg IV (2/22 - xx), typically for 7 days but continue given neutropenia and candidiasis; plan for 21 day course total; IV 2/22-3/6, plan for 400mg PO 3/7-3/13  - filgrastim discontinued 3/8  - s/p PEG 3/13, bolus feeds started 3/14  - Oral care per nursing    #Acute hypoxic respiratory failure 2/2 upper airway mucus plug, resolved   :: Rapid called early AM 3/11 for hypoxia requiring ventimask, ENT consulted and scoped showing mucus at glottis, s/p saline lavage   Plan:   - Hypertonic saline nebulizer, scheduled duo nebs  - Vanc discontinued (3/7-3/8)  - Now off zosyn (3/7-3/9)  -MRSA nares negative  - Lactate normal  - Blood cx x2 collected; NGTD  - Wean O2 as able; on room air as of 3/11  - Encourage incentive spirometer, RT consult for airway clearance techniques  - Albuterol q6hr PRN for wheezing/shortness of breath     #Goals of care  :: Per discussion with patient on 3/12, she says that while the radiation therapy has been hard on her, she would like to pursue any treatment that is going to treat her cancer  - Per primary oncologist, patient responding  well to treatment  - Will continue with current treatment plan per primary oncologist and radiation oncologist      #HFpEF  ::Patient presents with hypotension over the past month in oncology clinic, has received IVF and discontinued home losartan/amlodipine during this time, BP spontaneously improving without intervention today to 100s/70s, appears grossly dry to euvolemic on exam without any symptoms of acute heart failure + positive orthostats in ED  ::BNP 2/22: 229 - appears to be at baseline  ::TTE 7/2023: EF 55-60% // TTE 2/2024 EF 50%  - Patient likely has less PO intake and with chemo does not need as many antihyptertensives/GDMT medication dose - emailed primary cardiologist  - Medications given via Dobhoff  - Continue Aldactone 12.5mg // Metoprolol Tartrate 50mg BID // Losartan 25mg // Dapagliflozin 10mg   - Atorvostatin 20mg   - Diuresis w/ lasix 20mg IV 3/10    #E coli bacteremia, resolved  :: Bcx from 2/29 growing E coli sensitive to ceftriaxone; Bcx from 3/2 NGTD  - ID consulted, appreciate recs:  - Given collection through mediport but no symptoms of sepsis, would treat w/ 10 days of ceftriaxone 2g q24hr infused through mediport  - Lock solution into catheter after infusion is complete   - CTX 2g q24 hr course 10 days; 3/1-3/9 planned; changed on 3/7 to vanc + zosyn as above  - Completed zosyn 3/9  - Per ID, no need for lock therapy as of 3/11     #Thrombocytopenia, improving  #Pancytopenia, resolved  ::ANC 0.16 // Plts 53 on admission with no concern for bleeding // HB 10.9 on presentation (BL 12-13)  - likely d/t myelosuppresion vs though patient has had poor po intake vs mixed  - Retic count 0.6% (fraction 1.8%) indicative of poor marrow response   - Patients neutropenia resolved, anemia at baseline; thrombocytopenia somewhat worsened, had been ~100k, now down to 88 k on 3/10; previously thought due to luz from chemo, but white count recovering  - DIC labs unrevealing  - Other etiologies include  drug induced (escalated to zosyn on 3/7), HIT less likely   - Transfuse for platelets <50 prior to 3/12 placement of PEG with GI  - T&S updated, transfuse for active bleeding <50, or for <10  - continue lovenox for DVT ppx   - holding clopidogrel (for EGD + peg in future)     #Normocytic Anemia  ::HB 10.9 on presentation (BL 12-13), no active bleeding, no recent iron studies  - Likely d/t chronic dz vs myelosuppresion, though patient has had poor po intake     #Atrial Fibrillation  ::Patient previously on AC, now s/p watchman procedure and is rate controlled   ::had 5 ablations per patient  - Continue metoprolol tartrate 50mg BID  - HR 90s to 120s     #Hx of TIA  - Restart home Plavix 75mg daily 3/14     #GERD  - c/w home Pantoprazole     #Mood Disorder  #Misc  - c/w home Cymbalta and Wellbutrin  - c/w home supplements, mag-ox     F : caution, was diuresing. HfpEF  E: replete lytes prn, K>4, Mg >2  N: isosource 1.5 via dobhoff, NPO at midnight  A: PIVs     DVT prophylaxis: SCDs, Lovenox (held while restarting plavix, can restart 3/15)  GI prophylaxis: home PPI     Code Status: DNR/DNI        Baudilio Turcios MD

## 2024-03-14 NOTE — CARE PLAN
The patient's goals for the shift include      The clinical goals for the shift include pt will remain injury free      Problem: Skin  Goal: Decreased wound size/increased tissue granulation at next dressing change  Outcome: Progressing  Flowsheets (Taken 3/14/2024 0755)  Decreased wound size/increased tissue granulation at next dressing change: Promote sleep for wound healing  Goal: Participates in plan/prevention/treatment measures  Outcome: Progressing  Flowsheets (Taken 3/14/2024 0755)  Participates in plan/prevention/treatment measures: Elevate heels  Goal: Prevent/manage excess moisture  Outcome: Progressing  Flowsheets (Taken 3/14/2024 0755)  Prevent/manage excess moisture: Moisturize dry skin  Goal: Prevent/minimize sheer/friction injuries  Outcome: Progressing  Flowsheets (Taken 3/14/2024 0755)  Prevent/minimize sheer/friction injuries: Use pull sheet  Goal: Promote/optimize nutrition  Outcome: Progressing  Flowsheets (Taken 3/14/2024 0755)  Promote/optimize nutrition: Offer water/supplements/favorite foods  Goal: Promote skin healing  Outcome: Progressing  Flowsheets (Taken 3/14/2024 0755)  Promote skin healing: Protective dressings over bony prominences     Problem: Pain  Goal: My pain/discomfort is manageable  Outcome: Progressing     Problem: Safety  Goal: Patient will be injury free during hospitalization  Outcome: Progressing  Goal: I will remain free of falls  Outcome: Progressing     Problem: Daily Care  Goal: Daily care needs are met  Outcome: Progressing     Problem: Psychosocial Needs  Goal: Demonstrates ability to cope with hospitalization/illness  Outcome: Progressing  Goal: Collaborate with me, my family, and caregiver to identify my specific goals  Outcome: Progressing     Problem: Discharge Barriers  Goal: My discharge needs are met  Outcome: Progressing     Problem: Pain  Goal: Takes deep breaths with improved pain control throughout the shift  Outcome: Progressing  Goal: Turns in bed with  improved pain control throughout the shift  Outcome: Progressing  Goal: Walks with improved pain control throughout the shift  Outcome: Progressing  Goal: Performs ADL's with improved pain control throughout shift  Outcome: Progressing  Goal: Participates in PT with improved pain control throughout the shift  Outcome: Progressing  Goal: Free from opioid side effects throughout the shift  Outcome: Progressing  Goal: Free from acute confusion related to pain meds throughout the shift  Outcome: Progressing     Problem: Pain - Adult  Goal: Verbalizes/displays adequate comfort level or baseline comfort level  Outcome: Progressing     Problem: Safety - Adult  Goal: Free from fall injury  Outcome: Progressing     Problem: Discharge Planning  Goal: Discharge to home or other facility with appropriate resources  Outcome: Progressing     Problem: Chronic Conditions and Co-morbidities  Goal: Patient's chronic conditions and co-morbidity symptoms are monitored and maintained or improved  Outcome: Progressing     Problem: Fall/Injury  Goal: Not fall by end of shift  Outcome: Progressing  Goal: Be free from injury by end of the shift  Outcome: Progressing  Goal: Verbalize understanding of personal risk factors for fall in the hospital  Outcome: Progressing  Goal: Verbalize understanding of risk factor reduction measures to prevent injury from fall in the home  Outcome: Progressing  Goal: Use assistive devices by end of the shift  Outcome: Progressing  Goal: Pace activities to prevent fatigue by end of the shift  Outcome: Progressing

## 2024-03-14 NOTE — PROGRESS NOTES
Nutrition Note:   The patient is a 78 y.o. female who is hospital day #21.    Food and Nutrient History: Pt had PEG placed 03/13. Has received bolus feed and pt tolerated well. Education on TF administration provided by DME today. Discussed with family plan for pt to get 5 cartons of Isosource 1.5 per day. Current plan to give 4 feeds and emphasized that this can always be adjusted to better fit's pt's life and schedule. Questions about reintroduction of solids/liquids to pt's diet - Explained SLP will determine when it is safe for pt to reintroduce PO intake and when that happens TF regimen can be adjusted depending on how much she consumes. Provided pt with information on the [employer] for additional resources/support at home.    Time Spent (min): 60 minutes

## 2024-03-14 NOTE — PROGRESS NOTES
02/28/24 1507   Discharge Planning   Living Arrangements Spouse/significant other   Support Systems Spouse/significant other;Children;Family members;Friends/neighbors   Type of Residence Private residence   Who is requesting discharge planning? Provider   Home or Post Acute Services In home services   Type of Home Care Services Home OT;Home PT;Home nursing visits;Home health aide   Patient expects to be discharged to: home   Does the patient need discharge transport arranged? No     2/28/24 @ 1505  Met with patient and her daughter Tati at bedside. Patient lives with her  in [place]. They have not used home care before and appreciate recommendations. They are fine with using [facility]. Asked team to place referral for [facility] for RN, PT/OT, HHA.  Patient is to get PEG placed on Friday. Explained to patient and daughter that we will need to get a tube feed supplier. Once the team places orders and the patient gets her PEG tube, we can get that set up for them.  Explained that they will get teaching at bedside by nurses and TF supplier before going home. Daughter Tati and her  will be learners. No other needs at this time. Will continue to follow patient during her hospital stay.  Elvi Castillo RN [facility]    3/5/24 @ 1400  [facility] referral placed for RN, PT/OT. [facility] unable to provide home health aide in her area. Patient will need RN for tube feeds, IV abx, and wound care. Tube feed orders sent to [facility] via [facility]. Patient will discharge home with [facility]. Unable to place PEG at this time during her hospital stay. Will continue to follow patient and update [facility] on discharge needs. JEAN-PAUL Corbett MD updated.  Elvi Castillo RN [facility]    3/12/24 @ 1510  Met with patient, sister, and daughter at bedside about discharge planning. They plan to hire a private nurse and home health aide. Gave them [employer] as a company to look up for this. [facility] referral placed for PT/OT, HHA, and RT. The family plans to coordinate care  between the two. Patient has yet to get PEG. Planned for today, 3/12. [facility]. Patient has rollator, walker, cane, hospital bed, and commode at home already. Will follow up with patient and family tomorrow after she gets her PEG.  Elvi Castillo RN [facility]    3/13/24 @ 1106  [facility] and [facility] tomorrow at noon to do teaching at the bedside. Patient to get her PEG today, as she did not get it yesterday.  Elvi Castillo RN [facility]    3/14/24 @ 1325  Met with patient, daughter, and sister at bedside. Referral sent to [facility] per request. Will wait to see if facility can accept. Patient will discharge on 3/19 after her last radiation treatment as transport is an issue. PEG tube feed teaching done at bedside with Alfredo.   UPDATE 1530: [facility] will try their best to accept her and would like [facility] to touch base with them on Monday to see if there is a bed available.  Elvi Castillo RN [facility]

## 2024-03-14 NOTE — PROGRESS NOTES
Music Therapy Note      Therapy Session  Referral Type: New referral this admission  Visit Type: Follow-up visit  Session Start Time: 1330  Intervention Delivery: In-person  Conflict of Service: Working with other staff  Number of family members present: 3               Treatment/Interventions            Narrative  Assessment Detail: MTs attempted to see pt 2x, several family and staff in and out of room. Will check back as able.  Follow-up: Will continue to follow.    Education Documentation  No documentation found.

## 2024-03-15 ENCOUNTER — TELEPHONE (OUTPATIENT)
Dept: RADIATION ONCOLOGY | Facility: HOSPITAL | Age: 79
End: 2024-03-15
Payer: MEDICARE

## 2024-03-15 ENCOUNTER — APPOINTMENT (OUTPATIENT)
Dept: CARDIOLOGY | Facility: HOSPITAL | Age: 79
DRG: 314 | End: 2024-03-15
Payer: MEDICARE

## 2024-03-15 LAB
ALBUMIN SERPL BCP-MCNC: 2.3 G/DL (ref 3.4–5)
ANION GAP SERPL CALC-SCNC: 11 MMOL/L (ref 10–20)
BASOPHILS # BLD MANUAL: 0.11 X10*3/UL (ref 0–0.1)
BASOPHILS NFR BLD MANUAL: 1.7 %
BUN SERPL-MCNC: 11 MG/DL (ref 6–23)
CALCIUM SERPL-MCNC: 8.4 MG/DL (ref 8.6–10.6)
CHLORIDE SERPL-SCNC: 102 MMOL/L (ref 98–107)
CO2 SERPL-SCNC: 27 MMOL/L (ref 21–32)
CREAT SERPL-MCNC: 0.47 MG/DL (ref 0.5–1.05)
EGFRCR SERPLBLD CKD-EPI 2021: >90 ML/MIN/1.73M*2
EOSINOPHIL # BLD MANUAL: 0 X10*3/UL (ref 0–0.4)
EOSINOPHIL NFR BLD MANUAL: 0 %
ERYTHROCYTE [DISTWIDTH] IN BLOOD BY AUTOMATED COUNT: 16.5 % (ref 11.5–14.5)
ERYTHROCYTE [DISTWIDTH] IN BLOOD BY AUTOMATED COUNT: 20.3 % (ref 11.5–14.5)
GLUCOSE BLD MANUAL STRIP-MCNC: 106 MG/DL (ref 74–99)
GLUCOSE BLD MANUAL STRIP-MCNC: 86 MG/DL (ref 74–99)
GLUCOSE SERPL-MCNC: 99 MG/DL (ref 74–99)
HCT VFR BLD AUTO: 25.9 % (ref 36–46)
HCT VFR BLD AUTO: 30.6 % (ref 36–46)
HGB BLD-MCNC: 7.9 G/DL (ref 12–16)
HGB BLD-MCNC: 9.7 G/DL (ref 12–16)
IMM GRANULOCYTES # BLD AUTO: 0 X10*3/UL (ref 0–0.5)
IMM GRANULOCYTES # BLD AUTO: 0.41 X10*3/UL (ref 0–0.5)
IMM GRANULOCYTES NFR BLD AUTO: 0 % (ref 0–0.9)
IMM GRANULOCYTES NFR BLD AUTO: 6.4 % (ref 0–0.9)
LYMPHOCYTES # BLD MANUAL: 0.11 X10*3/UL (ref 0.8–3)
LYMPHOCYTES NFR BLD MANUAL: 1.7 %
MAGNESIUM SERPL-MCNC: 1.91 MG/DL (ref 1.6–2.4)
MCH RBC QN AUTO: 31 PG (ref 26–34)
MCH RBC QN AUTO: 31.2 PG (ref 26–34)
MCHC RBC AUTO-ENTMCNC: 30.5 G/DL (ref 32–36)
MCHC RBC AUTO-ENTMCNC: 31.7 G/DL (ref 32–36)
MCV RBC AUTO: 102 FL (ref 80–100)
MCV RBC AUTO: 98 FL (ref 80–100)
MONOCYTES # BLD MANUAL: 0.12 X10*3/UL (ref 0.05–0.8)
MONOCYTES NFR BLD MANUAL: 1.8 %
MYELOCYTES # BLD MANUAL: 0.06 X10*3/UL
MYELOCYTES NFR BLD MANUAL: 0.9 %
NEUTROPHILS # BLD MANUAL: 6.01 X10*3/UL (ref 1.6–5.5)
NEUTS BAND # BLD MANUAL: 0.06 X10*3/UL (ref 0–0.5)
NEUTS BAND NFR BLD MANUAL: 0.9 %
NEUTS SEG # BLD MANUAL: 5.95 X10*3/UL (ref 1.6–5)
NEUTS SEG NFR BLD MANUAL: 93 %
NRBC BLD-RTO: 3.7 /100 WBCS (ref 0–0)
NRBC BLD-RTO: 4.9 /100 WBCS (ref 0–0)
OVALOCYTES BLD QL SMEAR: ABNORMAL
PATH REVIEW-CBC DIFFERENTIAL: NORMAL
PHOSPHATE SERPL-MCNC: 3.1 MG/DL (ref 2.5–4.9)
PLATELET # BLD AUTO: 187 X10*3/UL (ref 150–450)
PLATELET # BLD AUTO: 70 X10*3/UL (ref 150–450)
POLYCHROMASIA BLD QL SMEAR: ABNORMAL
POTASSIUM SERPL-SCNC: 4.4 MMOL/L (ref 3.5–5.3)
RBC # BLD AUTO: 2.55 X10*6/UL (ref 4–5.2)
RBC # BLD AUTO: 3.11 X10*6/UL (ref 4–5.2)
RBC MORPH BLD: ABNORMAL
SODIUM SERPL-SCNC: 136 MMOL/L (ref 136–145)
TOTAL CELLS COUNTED BLD: 114
WBC # BLD AUTO: 1 X10*3/UL (ref 4.4–11.3)
WBC # BLD AUTO: 6.4 X10*3/UL (ref 4.4–11.3)

## 2024-03-15 PROCEDURE — 85007 BL SMEAR W/DIFF WBC COUNT: CPT

## 2024-03-15 PROCEDURE — 2500000004 HC RX 250 GENERAL PHARMACY W/ HCPCS (ALT 636 FOR OP/ED)

## 2024-03-15 PROCEDURE — 31720 CLEARANCE OF AIRWAYS: CPT

## 2024-03-15 PROCEDURE — 82947 ASSAY GLUCOSE BLOOD QUANT: CPT

## 2024-03-15 PROCEDURE — 1200000003 HC ONCOLOGY  ROOM WITH TELEMETRY DAILY

## 2024-03-15 PROCEDURE — 2500000001 HC RX 250 WO HCPCS SELF ADMINISTERED DRUGS (ALT 637 FOR MEDICARE OP)

## 2024-03-15 PROCEDURE — 99232 SBSQ HOSP IP/OBS MODERATE 35: CPT

## 2024-03-15 PROCEDURE — 1200000002 HC GENERAL ROOM WITH TELEMETRY DAILY

## 2024-03-15 PROCEDURE — 99233 SBSQ HOSP IP/OBS HIGH 50: CPT | Performed by: NURSE PRACTITIONER

## 2024-03-15 PROCEDURE — 94762 N-INVAS EAR/PLS OXIMTRY CONT: CPT

## 2024-03-15 PROCEDURE — 94761 N-INVAS EAR/PLS OXIMETRY MLT: CPT

## 2024-03-15 PROCEDURE — 2500000002 HC RX 250 W HCPCS SELF ADMINISTERED DRUGS (ALT 637 FOR MEDICARE OP, ALT 636 FOR OP/ED): Performed by: HOSPITALIST

## 2024-03-15 PROCEDURE — 2500000005 HC RX 250 GENERAL PHARMACY W/O HCPCS

## 2024-03-15 PROCEDURE — 83735 ASSAY OF MAGNESIUM: CPT

## 2024-03-15 PROCEDURE — 94760 N-INVAS EAR/PLS OXIMETRY 1: CPT

## 2024-03-15 PROCEDURE — 85027 COMPLETE CBC AUTOMATED: CPT

## 2024-03-15 PROCEDURE — 2500000005 HC RX 250 GENERAL PHARMACY W/O HCPCS: Performed by: HOSPITALIST

## 2024-03-15 PROCEDURE — 80069 RENAL FUNCTION PANEL: CPT

## 2024-03-15 PROCEDURE — 94640 AIRWAY INHALATION TREATMENT: CPT

## 2024-03-15 PROCEDURE — 2500000002 HC RX 250 W HCPCS SELF ADMINISTERED DRUGS (ALT 637 FOR MEDICARE OP, ALT 636 FOR OP/ED)

## 2024-03-15 RX ORDER — VENLAFAXINE 75 MG/1
75 TABLET ORAL
Qty: 60 TABLET | Refills: 0 | Status: CANCELLED | OUTPATIENT
Start: 2024-03-15 | End: 2024-04-14

## 2024-03-15 RX ORDER — CLOPIDOGREL BISULFATE 75 MG/1
75 TABLET ORAL DAILY
Qty: 30 TABLET | Refills: 0 | Status: CANCELLED | OUTPATIENT
Start: 2024-03-15 | End: 2024-04-14

## 2024-03-15 RX ORDER — PROCHLORPERAZINE EDISYLATE 5 MG/ML
10 INJECTION INTRAMUSCULAR; INTRAVENOUS EVERY 6 HOURS PRN
Qty: 2 ML | Refills: 0 | Status: CANCELLED | OUTPATIENT
Start: 2024-03-15 | End: 2024-04-14

## 2024-03-15 RX ORDER — GUAIFENESIN 100 MG/5ML
200 SOLUTION ORAL EVERY 4 HOURS PRN
Qty: 120 ML | Refills: 0 | Status: CANCELLED | OUTPATIENT
Start: 2024-03-15

## 2024-03-15 RX ORDER — DAPAGLIFLOZIN 10 MG/1
10 TABLET, FILM COATED ORAL DAILY
Qty: 30 TABLET | Refills: 0 | Status: CANCELLED | OUTPATIENT
Start: 2024-03-15 | End: 2024-04-14

## 2024-03-15 RX ORDER — ESOMEPRAZOLE MAGNESIUM 40 MG/1
40 GRANULE, DELAYED RELEASE ORAL
Qty: 30 PACKET | Refills: 0 | Status: CANCELLED | OUTPATIENT
Start: 2024-03-16 | End: 2024-04-15

## 2024-03-15 RX ORDER — BUPROPION HYDROCHLORIDE 75 MG/1
150 TABLET ORAL 2 TIMES DAILY
Qty: 120 TABLET | Refills: 0 | Status: CANCELLED | OUTPATIENT
Start: 2024-03-15 | End: 2024-04-14

## 2024-03-15 RX ORDER — SODIUM CHLORIDE FOR INHALATION 3 %
3 VIAL, NEBULIZER (ML) INHALATION 4 TIMES DAILY
Qty: 360 ML | Refills: 0 | Status: CANCELLED | OUTPATIENT
Start: 2024-03-15 | End: 2024-04-14

## 2024-03-15 RX ORDER — METOPROLOL TARTRATE 50 MG/1
50 TABLET ORAL 2 TIMES DAILY
Qty: 60 TABLET | Refills: 0 | Status: CANCELLED | OUTPATIENT
Start: 2024-03-15 | End: 2024-04-14

## 2024-03-15 RX ORDER — SPIRONOLACTONE 25 MG/1
12.5 TABLET ORAL DAILY
Qty: 15 TABLET | Refills: 0 | Status: CANCELLED | OUTPATIENT
Start: 2024-03-15 | End: 2024-04-14

## 2024-03-15 RX ORDER — SODIUM CHLORIDE FOR INHALATION 3 %
3 VIAL, NEBULIZER (ML) INHALATION 4 TIMES DAILY PRN
Status: DISCONTINUED | OUTPATIENT
Start: 2024-03-15 | End: 2024-03-18 | Stop reason: HOSPADM

## 2024-03-15 RX ORDER — SENNOSIDES 8.8 MG/5ML
10 LIQUID ORAL 2 TIMES DAILY
Qty: 240 ML | Refills: 0 | Status: CANCELLED | OUTPATIENT
Start: 2024-03-15

## 2024-03-15 RX ORDER — OLANZAPINE 2.5 MG/1
2.5 TABLET ORAL EVERY 8 HOURS PRN
Qty: 30 TABLET | Refills: 0 | Status: CANCELLED | OUTPATIENT
Start: 2024-03-15 | End: 2024-04-14

## 2024-03-15 RX ORDER — CHOLECALCIFEROL (VITAMIN D3) 25 MCG
1000 TABLET ORAL 2 TIMES DAILY
Qty: 60 TABLET | Refills: 0 | Status: CANCELLED | OUTPATIENT
Start: 2024-03-15 | End: 2024-04-14

## 2024-03-15 RX ORDER — ACETAMINOPHEN, DIPHENHYDRAMINE HCL, PHENYLEPHRINE HCL 325; 25; 5 MG/1; MG/1; MG/1
10 TABLET ORAL NIGHTLY
Qty: 30 TABLET | Refills: 0 | Status: CANCELLED | OUTPATIENT
Start: 2024-03-15 | End: 2024-04-14

## 2024-03-15 RX ORDER — ENOXAPARIN SODIUM 100 MG/ML
40 INJECTION SUBCUTANEOUS EVERY 24 HOURS
Status: DISCONTINUED | OUTPATIENT
Start: 2024-03-15 | End: 2024-03-18 | Stop reason: HOSPADM

## 2024-03-15 RX ORDER — ATORVASTATIN CALCIUM 20 MG/1
20 TABLET, FILM COATED ORAL DAILY
Qty: 30 TABLET | Refills: 0 | Status: CANCELLED | OUTPATIENT
Start: 2024-03-15 | End: 2024-04-14

## 2024-03-15 RX ORDER — LOSARTAN POTASSIUM 25 MG/1
25 TABLET ORAL DAILY
Qty: 30 TABLET | Refills: 0 | Status: CANCELLED | OUTPATIENT
Start: 2024-03-15 | End: 2024-04-14

## 2024-03-15 RX ORDER — QUETIAPINE FUMARATE 25 MG/1
12.5 TABLET, FILM COATED ORAL NIGHTLY
Qty: 15 TABLET | Refills: 0 | Status: CANCELLED | OUTPATIENT
Start: 2024-03-15 | End: 2024-04-14

## 2024-03-15 RX ORDER — IPRATROPIUM BROMIDE AND ALBUTEROL SULFATE 2.5; .5 MG/3ML; MG/3ML
3 SOLUTION RESPIRATORY (INHALATION) 4 TIMES DAILY
Qty: 3 ML | Refills: 0 | Status: CANCELLED | OUTPATIENT
Start: 2024-03-15 | End: 2024-04-14

## 2024-03-15 RX ADMIN — ESOMEPRAZOLE MAGNESIUM 40 MG: 40 FOR SUSPENSION ORAL at 06:45

## 2024-03-15 RX ADMIN — IPRATROPIUM BROMIDE AND ALBUTEROL SULFATE 3 ML: .5; 3 SOLUTION RESPIRATORY (INHALATION) at 20:29

## 2024-03-15 RX ADMIN — OLANZAPINE 2.5 MG: 5 TABLET, FILM COATED ORAL at 22:06

## 2024-03-15 RX ADMIN — Medication 3 ML: at 14:17

## 2024-03-15 RX ADMIN — Medication 1000 UNITS: at 09:27

## 2024-03-15 RX ADMIN — DAPAGLIFLOZIN 10 MG: 10 TABLET, FILM COATED ORAL at 09:28

## 2024-03-15 RX ADMIN — OLANZAPINE 2.5 MG: 5 TABLET, FILM COATED ORAL at 14:17

## 2024-03-15 RX ADMIN — Medication 10 MG: at 18:47

## 2024-03-15 RX ADMIN — METOPROLOL TARTRATE 50 MG: 50 TABLET, FILM COATED ORAL at 09:27

## 2024-03-15 RX ADMIN — METOPROLOL TARTRATE 50 MG: 50 TABLET, FILM COATED ORAL at 22:06

## 2024-03-15 RX ADMIN — ACETAMINOPHEN 1000 MG: 650 SOLUTION ORAL at 13:21

## 2024-03-15 RX ADMIN — SPIRONOLACTONE 12.5 MG: 25 TABLET, FILM COATED ORAL at 09:27

## 2024-03-15 RX ADMIN — ACETAMINOPHEN 1000 MG: 650 SOLUTION ORAL at 22:06

## 2024-03-15 RX ADMIN — QUETIAPINE FUMARATE 12.5 MG: 25 TABLET ORAL at 22:06

## 2024-03-15 RX ADMIN — LOSARTAN POTASSIUM 25 MG: 25 TABLET, FILM COATED ORAL at 09:27

## 2024-03-15 RX ADMIN — IPRATROPIUM BROMIDE AND ALBUTEROL SULFATE 3 ML: .5; 3 SOLUTION RESPIRATORY (INHALATION) at 09:25

## 2024-03-15 RX ADMIN — Medication: at 00:03

## 2024-03-15 RX ADMIN — CLOPIDOGREL BISULFATE 75 MG: 75 TABLET ORAL at 09:27

## 2024-03-15 RX ADMIN — ATORVASTATIN CALCIUM 20 MG: 20 TABLET, FILM COATED ORAL at 09:27

## 2024-03-15 RX ADMIN — BUPROPION HYDROCHLORIDE 150 MG: 75 TABLET, FILM COATED ORAL at 09:27

## 2024-03-15 RX ADMIN — IPRATROPIUM BROMIDE AND ALBUTEROL SULFATE 3 ML: .5; 3 SOLUTION RESPIRATORY (INHALATION) at 05:49

## 2024-03-15 RX ADMIN — SODIUM CHLORIDE SOLN NEBU 3% 3 ML: 3 NEBU SOLN at 20:29

## 2024-03-15 RX ADMIN — BUPROPION HYDROCHLORIDE 150 MG: 75 TABLET, FILM COATED ORAL at 22:06

## 2024-03-15 RX ADMIN — ACETAMINOPHEN 1000 MG: 650 SOLUTION ORAL at 06:45

## 2024-03-15 RX ADMIN — ACETAMINOPHEN 1000 MG: 650 SOLUTION ORAL at 18:47

## 2024-03-15 RX ADMIN — VENLAFAXINE 75 MG: 75 TABLET ORAL at 09:27

## 2024-03-15 RX ADMIN — Medication 15 ML: at 00:06

## 2024-03-15 RX ADMIN — ENOXAPARIN SODIUM 40 MG: 100 INJECTION SUBCUTANEOUS at 13:21

## 2024-03-15 RX ADMIN — Medication 15 ML: at 09:28

## 2024-03-15 RX ADMIN — Medication 3 ML: at 09:27

## 2024-03-15 RX ADMIN — Medication 1000 UNITS: at 22:06

## 2024-03-15 RX ADMIN — GUAIFENESIN 200 MG: 200 SOLUTION ORAL at 09:37

## 2024-03-15 RX ADMIN — VENLAFAXINE 75 MG: 75 TABLET ORAL at 18:47

## 2024-03-15 ASSESSMENT — COGNITIVE AND FUNCTIONAL STATUS - GENERAL
DAILY ACTIVITIY SCORE: 6
HELP NEEDED FOR BATHING: A LOT
WALKING IN HOSPITAL ROOM: TOTAL
EATING MEALS: TOTAL
DRESSING REGULAR LOWER BODY CLOTHING: TOTAL
MOVING TO AND FROM BED TO CHAIR: TOTAL
DRESSING REGULAR LOWER BODY CLOTHING: TOTAL
MOVING FROM LYING ON BACK TO SITTING ON SIDE OF FLAT BED WITH BEDRAILS: A LOT
STANDING UP FROM CHAIR USING ARMS: TOTAL
DAILY ACTIVITIY SCORE: 10
DRESSING REGULAR UPPER BODY CLOTHING: TOTAL
MOVING FROM LYING ON BACK TO SITTING ON SIDE OF FLAT BED WITH BEDRAILS: A LOT
MOBILITY SCORE: 9
PERSONAL GROOMING: A LOT
STANDING UP FROM CHAIR USING ARMS: TOTAL
HELP NEEDED FOR BATHING: TOTAL
TOILETING: TOTAL
EATING MEALS: A LOT
TURNING FROM BACK TO SIDE WHILE IN FLAT BAD: A LOT
DRESSING REGULAR UPPER BODY CLOTHING: A LOT
MOBILITY SCORE: 7
CLIMB 3 TO 5 STEPS WITH RAILING: TOTAL
MOVING TO AND FROM BED TO CHAIR: A LOT
WALKING IN HOSPITAL ROOM: TOTAL
PERSONAL GROOMING: TOTAL
CLIMB 3 TO 5 STEPS WITH RAILING: TOTAL
TURNING FROM BACK TO SIDE WHILE IN FLAT BAD: TOTAL
TOILETING: TOTAL

## 2024-03-15 ASSESSMENT — PAIN SCALES - GENERAL
PAINLEVEL_OUTOF10: 0 - NO PAIN
PAINLEVEL_OUTOF10: 3

## 2024-03-15 ASSESSMENT — PAIN - FUNCTIONAL ASSESSMENT: PAIN_FUNCTIONAL_ASSESSMENT: 0-10

## 2024-03-15 NOTE — CODE DOCUMENTATION
Code white called 1750.  Pt anxious and SOB states feels like a mucous plug    1800: /80  HR 96  Oxygen: 96% on RA  RR 22     Pt NT suctioned by respiratory

## 2024-03-15 NOTE — PROGRESS NOTES
02/28/24 1507   Discharge Planning   Living Arrangements Spouse/significant other   Support Systems Spouse/significant other;Children;Family members;Friends/neighbors   Type of Residence Private residence   Who is requesting discharge planning? Provider   Home or Post Acute Services In home services   Type of Home Care Services Home OT;Home PT;Home nursing visits;Home health aide   Patient expects to be discharged to: home   Does the patient need discharge transport arranged? No     2/28/24 @ 1505  Met with patient and her daughter Tati at bedside. Patient lives with her  in [place]. They have not used home care before and appreciate recommendations. They are fine with using [facility]. Asked team to place referral for [facility] for RN, PT/OT, HHA.  Patient is to get PEG placed on Friday. Explained to patient and daughter that we will need to get a tube feed supplier. Once the team places orders and the patient gets her PEG tube, we can get that set up for them.  Explained that they will get teaching at bedside by nurses and TF supplier before going home. Daughter Tati and her  will be learners. No other needs at this time. Will continue to follow patient during her hospital stay.  Elvi Castillo RN [facility]    3/5/24 @ 1400  [facility] referral placed for RN, PT/OT. [facility] unable to provide home health aide in her area. Patient will need RN for tube feeds, IV abx, and wound care. Tube feed orders sent to [facility] via [facility]. Patient will discharge home with [facility]. Unable to place PEG at this time during her hospital stay. Will continue to follow patient and update [facility] on discharge needs. JEAN-PAUL Corbett MD updated.  Elvi Castillo RN [facility]    3/12/24 @ 1510  Met with patient, sister, and daughter at bedside about discharge planning. They plan to hire a private nurse and home health aide. Gave them [employer] as a company to look up for this. [facility] referral placed for PT/OT, HHA, and RT. The family plans to coordinate care  between the two. Patient has yet to get PEG. Planned for today, 3/12. Home going TF orders sent to [facility] via [facility]. Patient has rollator, walker, cane, hospital bed, and commode at home already. Will follow up with patient and family tomorrow after she gets her PEG.  MAGDALENA Muniz    3/13/24 @ 1105  [facility] and patient tomorrow at noon to do teaching at the bedside. Patient to get her PEG today, as she did not get it yesterday.  MAGDALENA Muniz    3/14/24 @ 1325  Met with patient, daughter, and sister at bedside. Referral sent to [facility] per request. Will wait to see if facility can accept. Patient will discharge on 3/19 after her last radiation treatment as transport is an issue. PEG tube feed teaching done at bedside with Alfredo.   UPDATE 1530: [facility] will try their best to accept her and would like [facility] to touch base with them on Monday to see if there is a bed available.  MAGDALENA Muniz    3/15/24 @ 1535  Spoke with daughter and patient at bedside. In case [facility] cannot accept, they wanted a referral sent to [facility]. Referral sent in [facility]. ADOD 3/19 after radiation.  Elvi Castillo RN [facility]

## 2024-03-15 NOTE — PROGRESS NOTES
"Amita Todd is a 78 y.o. female on day 22 of admission presenting with Thrombocytopenia (CMS/HCC).    Subjective   NAEO. Feels well, didn't sleep well last night however. Denies chest pain, shortness of breath, nausea, vomiting, fevers, chills, hemoptysis, melena/hematochezia.        Objective   Constitutional:       Comments: underweight   HENT:      Mouth/Throat:      Mouth: Mucous membranes are dry. improved well circumscribed plaques.  +DH with TF  Eyes:      Pupils: Pupils are equal, round, and reactive to light.   Cardiovascular:      Rate and Rhythm: Irregular rhythm. Regular rate.     Pulses: Normal pulses.   Pulmonary:      Comments: mild crackles B/L, RUL>AGATHA. On RA  Abdominal:      General: Abdomen is flat. Bowel sounds are normal.      Palpations: Abdomen is soft.   Musculoskeletal:         General: Normal range of motion.      Cervical back: Normal range of motion.      Comments: No edema  Skin:     General: Skin is warm.      Capillary Refill: Capillary refill takes less than 2 seconds.      Findings: No bruising.      Comments: Petechia over trunk; erythematous and scaly skin noted over neck and upper chest   Neurological:      General: No focal deficit present.      Mental Status: A&Ox4  Psychiatric:         Mood and Affect: Mood normal.       Last Recorded Vitals  Blood pressure 117/85, pulse 87, temperature 36.2 °C (97.2 °F), temperature source Temporal, resp. rate 20, height 1.635 m (5' 4.37\"), weight 68 kg (149 lb 14.6 oz), SpO2 96 %.  Intake/Output last 3 Shifts:  I/O last 3 completed shifts:  In: 3441.7 (50.6 mL/kg) [I.V.:1308.3 (19.2 mL/kg); NG/GT:1900; IV Piggyback:233.3]  Out: 3975 (58.5 mL/kg) [Urine:3900 (1.6 mL/kg/hr); Emesis/NG output:75]  Weight: 68 kg     Relevant Results  Scheduled medications  acetaminophen, 1,000 mg, g-tube, q6h  atorvastatin, 20 mg, g-tube, Daily  buPROPion, 150 mg, g-tube, BID  cholecalciferol, 1,000 Units, g-tube, BID  clopidogrel, 75 mg, oral, " Daily  dapagliflozin propanediol, 10 mg, g-tube, Daily  enoxaparin, 40 mg, subcutaneous, q24h  esomeprazole, 40 mg, g-tube, Daily before breakfast  ipratropium-albuteroL, 3 mL, nebulization, 4x daily  losartan, 25 mg, g-tube, Daily  melatonin, 10 mg, g-tube, Nightly  metoprolol tartrate, 50 mg, g-tube, BID  QUEtiapine, 12.5 mg, g-tube, Nightly  senna, 10 mL, g-tube, BID  sodium chloride, 3 mL, nebulization, 4x daily  spironolactone, 12.5 mg, g-tube, Daily  venlafaxine, 75 mg, g-tube, BID with meals      Continuous medications     PRN medications  PRN medications: alteplase, dextrose 10 % in water (D10W), dextrose, eucerin, glucagon, guaiFENesin, lidocaine-diphenhydraMINE-Maalox 1:1:1, moisturizing mouth, OLANZapine, ondansetron, oxygen, oxygen, phenoL, prochlorperazine, racepinephrine  Results for orders placed or performed during the hospital encounter of 02/22/24 (from the past 24 hour(s))   POCT GLUCOSE   Result Value Ref Range    POCT Glucose 97 74 - 99 mg/dL   Magnesium   Result Value Ref Range    Magnesium 1.91 1.60 - 2.40 mg/dL   Renal Function Panel   Result Value Ref Range    Glucose 99 74 - 99 mg/dL    Sodium 136 136 - 145 mmol/L    Potassium 4.4 3.5 - 5.3 mmol/L    Chloride 102 98 - 107 mmol/L    Bicarbonate 27 21 - 32 mmol/L    Anion Gap 11 10 - 20 mmol/L    Urea Nitrogen 11 6 - 23 mg/dL    Creatinine 0.47 (L) 0.50 - 1.05 mg/dL    eGFR >90 >60 mL/min/1.73m*2    Calcium 8.4 (L) 8.6 - 10.6 mg/dL    Phosphorus 3.1 2.5 - 4.9 mg/dL    Albumin 2.3 (L) 3.4 - 5.0 g/dL   CBC and Auto Differential   Result Value Ref Range    WBC 6.4 4.4 - 11.3 x10*3/uL    nRBC 3.7 (H) 0.0 - 0.0 /100 WBCs    RBC 2.55 (L) 4.00 - 5.20 x10*6/uL    Hemoglobin 7.9 (L) 12.0 - 16.0 g/dL    Hematocrit 25.9 (L) 36.0 - 46.0 %     (H) 80 - 100 fL    MCH 31.0 26.0 - 34.0 pg    MCHC 30.5 (L) 32.0 - 36.0 g/dL    RDW 20.3 (H) 11.5 - 14.5 %    Platelets 187 150 - 450 x10*3/uL    Immature Granulocytes %, Automated 6.4 (H) 0.0 - 0.9 %     Immature Granulocytes Absolute, Automated 0.41 0.00 - 0.50 x10*3/uL   Manual Differential   Result Value Ref Range    Neutrophils %, Manual 93.0 40.0 - 80.0 %    Bands %, Manual 0.9 0.0 - 5.0 %    Lymphocytes %, Manual 1.7 13.0 - 44.0 %    Monocytes %, Manual 1.8 2.0 - 10.0 %    Eosinophils %, Manual 0.0 0.0 - 6.0 %    Basophils %, Manual 1.7 0.0 - 2.0 %    Myelocytes %, Manual 0.9 0.0 - 0.0 %    Seg Neutrophils Absolute, Manual 5.95 (H) 1.60 - 5.00 x10*3/uL    Bands Absolute, Manual 0.06 0.00 - 0.50 x10*3/uL    Lymphocytes Absolute, Manual 0.11 (L) 0.80 - 3.00 x10*3/uL    Monocytes Absolute, Manual 0.12 0.05 - 0.80 x10*3/uL    Eosinophils Absolute, Manual 0.00 0.00 - 0.40 x10*3/uL    Basophils Absolute, Manual 0.11 (H) 0.00 - 0.10 x10*3/uL    Myelocytes Absolute, Manual 0.06 0.00 - 0.00 x10*3/uL    Total Cells Counted 114     Neutrophils Absolute, Manual 6.01 (H) 1.60 - 5.50 x10*3/uL    RBC Morphology See Below     Polychromasia Mild     Ovalocytes Few    POCT GLUCOSE   Result Value Ref Range    POCT Glucose 86 74 - 99 mg/dL            Assessment/Plan   Principal Problem:    Thrombocytopenia (CMS/HCC)  77yo F w PMHx squamous cell carcinoma of hypopharynx, HFpEF, CAD s/p PCI, Afib s/p watchman, GERD, depression, HTN, AUD, ascending aortic aneurysm, distant breast cancer (s/p chemoradiotherapy and lumpectomy) who was directed to ED for hypotension which resolved. Currently treating E.Coli bacteremia with ceftriaxone (ID following), and plan to have EGD (assess esophagitis infectious vs radiation) + PEG (for nutrition); however ANC less than goal. Started neupogen (3/2) d/t low ANC, infection and procedure goal for ANC > 1000. Neupogen discontinued 3/8, s/p PEG on 3/13 for enteral nutrition.      3/15 updates:   - restart ppx lovenox  - Awaiting radiation, SNF placement will be on 3/19    #SCC of Hypopharynx (Dr. Burkitt)  #Delerium, resolved   - nutrition recs appreciated: 100mg IV thiamine x7 days,  Isosource 1.5 at  goal 310mL bolus 4x/day   - c/w home Guaifenesin  - c/w Zofran prn nausea, Compazine second line  - Holding opioid 2/2 previous delirium  - delirium precautions  - patient tolerating seroquel 12.5mg at bedtime for sleep and agitation, delirium improving   - Zyprexa 2.5mg PO q8hrs for agitation      #Oropharyngeal candidiasis, resolved  #Dysphagia  ::Moderate to severe candidiasis with multiple large lesions, improved   ::given ANC 0.16, consideration for opportunistic infections present. Entended antifungal course  - Fluconazole 200mg IV (2/22 - xx), typically for 7 days but continue given neutropenia and candidiasis; plan for 21 day course total; IV 2/22-3/6, plan for 400mg PO 3/7-3/13  - filgrastim discontinued 3/8  - s/p PEG 3/13, bolus feeds started 3/14  - Oral care per nursing    #Acute hypoxic respiratory failure 2/2 upper airway mucus plug, resolved   :: Rapid called early AM 3/11 for hypoxia requiring ventimask, ENT consulted and scoped showing mucus at glottis, s/p saline lavage   Plan:   - Hypertonic saline nebulizer, scheduled duo nebs  - Vanc discontinued (3/7-3/8)  - Now off zosyn (3/7-3/9)  -MRSA nares negative  - Lactate normal  - Blood cx x2 collected; NGTD  - Wean O2 as able; on room air as of 3/11  - Encourage incentive spirometer, RT consult for airway clearance techniques  - Albuterol q6hr PRN for wheezing/shortness of breath     #Goals of care  :: Per discussion with patient on 3/12, she says that while the radiation therapy has been hard on her, she would like to pursue any treatment that is going to treat her cancer  - Per primary oncologist, patient responding well to treatment  - Will continue with current treatment plan per primary oncologist and radiation oncologist      #HFpEF  ::Patient presents with hypotension over the past month in oncology clinic, has received IVF and discontinued home losartan/amlodipine during this time, BP spontaneously improving without intervention today to  100s/70s, appears grossly dry to euvolemic on exam without any symptoms of acute heart failure + positive orthostats in ED  ::BNP 2/22: 229 - appears to be at baseline  ::TTE 7/2023: EF 55-60% // TTE 2/2024 EF 50%  - Patient likely has less PO intake and with chemo does not need as many antihyptertensives/GDMT medication dose - emailed primary cardiologist  - Medications given via Dobhoff  - Continue Aldactone 12.5mg // Metoprolol Tartrate 50mg BID // Losartan 25mg // Dapagliflozin 10mg   - Atorvostatin 20mg   - Diuresis w/ lasix 20mg IV 3/10    #E coli bacteremia, resolved  :: Bcx from 2/29 growing E coli sensitive to ceftriaxone; Bcx from 3/2 NGTD  - ID consulted, appreciate recs:  - Given collection through mediport but no symptoms of sepsis, would treat w/ 10 days of ceftriaxone 2g q24hr infused through mediport  - Lock solution into catheter after infusion is complete   - CTX 2g q24 hr course 10 days; 3/1-3/9 planned; changed on 3/7 to vanc + zosyn as above  - Completed zosyn 3/9  - Per ID, no need for lock therapy as of 3/11     #Thrombocytopenia, resolved  #Pancytopenia, resolved  ::ANC 0.16 // Plts 53 on admission with no concern for bleeding // HB 10.9 on presentation (BL 12-13)  - likely d/t myelosuppresion vs though patient has had poor po intake vs mixed  - Retic count 0.6% (fraction 1.8%) indicative of poor marrow response   - Patients neutropenia resolved, anemia at baseline; thrombocytopenia somewhat worsened, had been ~100k, now down to 88 k on 3/10; previously thought due to luz from chemo, but white count recovering  - DIC labs unrevealing  - Other etiologies include drug induced (escalated to zosyn on 3/7), HIT less likely   - Transfuse for platelets <50 prior to 3/12 placement of PEG with GI  - T&S updated, transfuse for active bleeding <50, or for <10  - continue lovenox for DVT ppx   - holding clopidogrel (for EGD + peg in future)     #Normocytic Anemia  ::HB 10.9 on presentation (BL 12-13),  no active bleeding, no recent iron studies  - Likely d/t chronic dz vs myelosuppresion, though patient has had poor po intake     #Atrial Fibrillation  ::Patient previously on AC, now s/p watchman procedure and is rate controlled   ::had 5 ablations per patient  - Continue metoprolol tartrate 50mg BID  - HR 90s to 120s     #Hx of TIA  - Restart home Plavix 75mg daily 3/14     #GERD  - c/w home Pantoprazole     #Mood Disorder  #Misc  - c/w home Cymbalta and Wellbutrin  - c/w home supplements, mag-ox     F : caution, was diuresing. HfpEF  E: replete lytes prn, K>4, Mg >2  N: isosource 1.5 via dobhoff, NPO at midnight  A: PIVs     DVT prophylaxis: SCDs, Lovenox (held while restarting plavix, can restart 3/15)  GI prophylaxis: home PPI     Code Status: DNR/DNI        Baudilio Turcios MD

## 2024-03-15 NOTE — PROGRESS NOTES
Amita Todd is a 78 y.o. female on day 22 of admission presenting with Thrombocytopenia (CMS/HCC).    SUPPORTIVE AND PALLIATIVE ONCOLOGY INPATIENT FOLLOW-UP      SERVICE DATE: 03/15/24     SUBJECTIVE:  Interval Events:  Pt continues with XRT- scheduled for last fraction on Tuesday.  Having increased skin changes.  Pt denies pain.    Did not sleep well last night and is very tired today.  Per RN- secretions are very thick.    Symptom Assessment:  Lack of energy very much   Shortness of breath a little  Nausea a little    Information obtained from: chart review, interview of patient, interview of family, discussion with RN, and discussion with primary team  ______________________________________________________________________        OBJECTIVE:    Lab Results   Component Value Date    WBC 6.4 03/15/2024    HGB 7.9 (L) 03/15/2024    HCT 25.9 (L) 03/15/2024     (H) 03/15/2024     03/15/2024      Lab Results   Component Value Date    GLUCOSE 99 03/15/2024    CALCIUM 8.4 (L) 03/15/2024     03/15/2024    K 4.4 03/15/2024    CO2 27 03/15/2024     03/15/2024    BUN 11 03/15/2024    CREATININE 0.47 (L) 03/15/2024     Lab Results   Component Value Date    ALT 23 03/06/2024    AST 24 03/06/2024    ALKPHOS 70 03/06/2024    BILITOT 0.4 03/06/2024     Estimated Creatinine Clearance: 95.9 mL/min (A) (by C-G formula based on SCr of 0.47 mg/dL (L)).     Scheduled medications  acetaminophen, 1,000 mg, g-tube, q6h  atorvastatin, 20 mg, g-tube, Daily  buPROPion, 150 mg, g-tube, BID  cholecalciferol, 1,000 Units, g-tube, BID  clopidogrel, 75 mg, oral, Daily  dapagliflozin propanediol, 10 mg, g-tube, Daily  enoxaparin, 40 mg, subcutaneous, q24h  esomeprazole, 40 mg, g-tube, Daily before breakfast  ipratropium-albuteroL, 3 mL, nebulization, 4x daily  losartan, 25 mg, g-tube, Daily  melatonin, 10 mg, g-tube, Nightly  metoprolol tartrate, 50 mg, g-tube, BID  QUEtiapine, 12.5 mg, g-tube, Nightly  senna, 10  mL, g-tube, BID  sodium chloride, 3 mL, nebulization, 4x daily  spironolactone, 12.5 mg, g-tube, Daily  venlafaxine, 75 mg, g-tube, BID with meals      Continuous medications     PRN medications  PRN medications: alteplase, dextrose 10 % in water (D10W), dextrose, eucerin, glucagon, guaiFENesin, lidocaine-diphenhydraMINE-Maalox 1:1:1, moisturizing mouth, OLANZapine, ondansetron, oxygen, oxygen, phenoL, prochlorperazine, racepinephrine   }     PHYSICAL EXAMINATION:    Vital Signs:   Vital signs reviewed  Visit Vitals  /85 (BP Location: Right arm, Patient Position: Lying)   Pulse 87   Temp 36.2 °C (97.2 °F) (Temporal)   Resp 20        Pain Score: 0 - No pain       Physical Exam   Well-developed chronically ill-appearing F Laying in bed, NAD  A&O x 2, lethargic but answers appropriately  Breathing comfortably on RA  Skin on neck erythematous, dry scaling- some open areas  Abd soft, obese, NTND, BS +  No edema in ext     ASSESSMENT/PLAN:  Amita Todd is a 78 y.o. female diagnosed with SCC of hypopharynx currently undergoing concurrent CRT (weekly Taxol, Carbo, 35 fx RT). PMH significant for breast cancer s/p lumpectomy, RT, tamoxifen, oral cavity cancer 2008, GERD, HTN, CAD s/p PCI, afib/flutter, HFpEF, TIA, anxiety/depression, AUD. Admitted 2/22/2024 for further evaluation and management of hypotension. Course complicated by radiation mucositis/esophagitis, candidiasis, dysphagia now NPO. Supportive and Palliative Oncology is consulted for pain management and Introduction to Supportive and Palliative Oncology Services.      Pain:  Mouth and throat pain related to radiation mucositis/candidiasis  Pain is: cancer related pain  Type: somatic  Pain control: well-controlled  Home regimen:  Oxycodone 5 mg prn  Intolerances/previously tried: denies   Personalized pain goal: 3  Total OME usage for the past 24 hours:  0  Became confused with oxycodone  Continue BMX  START NS nebulizer treatments QID to loosen  secretions  Start frequent oral rinses, pt given saline to swish and spit frequently throughout the day  Thrush should improve with treatment and WBC count recovery  Pt has 2 additional fractions of RT to complete, expect radiation mucositis to worsen   Wound Care consult for radiation related skin care- continue eucerin for now  Continue to monitor pain scores and administer PRN medications as appropriate  Continue/initiate nonpharmacologic pain management strategies including ice/heat therapy, distraction techniques, deep breathing/relaxation techniques, calming music, and repositioning  Continue to monitor for signs of opioid efficacy (pain scores, improved functionality) and toxicity (pinpoint pupils, excess sedation/drowsiness/confusion, respiratory depression, etc.)     Nausea:  At risk for nausea with vomiting related to opioids, thickened oral secretions with gagging  Nausea overnight  Continue Ondansetron 4 mg IV q6h prn nausea first line   Continue compazine 10 mg IV q6h prn nausea second line     Constipation  At risk for constipation related to opioids, currently not constipated  Usual bowel pattern: every 2-3 days and irregular with frequent constipation  Home regimen: Docusate prn  LBM today  Monitor BM frequency, adjust regimen as needed  Goal to have BM without straining q48-72h  Continue senna 2 tabs BID once has enteral access  Continue miralax 17 g daily once has enteral access      Altered Mood:  Chronic anxiety and depression  controlled with home regimen   Home regimen: Duloxetine DR 60 mg and Wellbutrin 150 mg BID  Continue Buproprion immediate release 150 mg BID and venlafaxine 75 mg FT BID     Decreased oral intake  Decreased intake related to taste changes, disease process, and dysphagia  Nutrition consult  Weight loss yes  NPO due to dysphagia  PEG     Medical Decision Making/Goals of Care/Advance Care Plannin2024  Patient's current clinical condition, including diagnosis,  prognosis, and management plan, and goals of care were discussed.   Life limiting disease:  cancer  Family: Supportive , children, siblings  Performance status: Minor limitations due to disease process  Joys/meaning/strength: Family  Understanding of health: Demonstrates good prognostic understanding of disease process, understands plan for PEG placement  Information:Wants full disclosure  Medical update:Clarified patient's NPO status due to aspiration risk, plan for PEG tube  Goals: symptom control and be able to eat again, she does not want her chewing and swallowing muscles to atrophy  Worries and fears now and future:  inability to eat, loss of swallowing strength       Advance Directives  Existence of Advance Directives:Yes, but NOT documented in medical record  Decision maker: Surrogate decision maker is  Smith  Code Status: Full code     Supportive and Palliative Oncology encounter:  Spoke with patient at bedside  Emotional support provided  Coordination of care      Disposition:  Plan to complete XRT on 3/19/24 and transfer to SNF for rehab  Discharge date: unknown pending acute issues  Will assess if patient needs an appointment with Outpatient Supportive Oncology- currently does not qualify      Signature and billing:  Thank you for allowing us to participate in the care of this patient. Recommendations will be communicated back to the consulting service by way of shared electronic medical record or face-to-face.    Medical complexity was high level due to due to complexity of problems, extensive data review, and high risk of management/treatment.    I spent 45 minutes in the care of this patient which included chart review, interviewing patient/family, discussion with primary team, coordination of care, and documentation.    Data:   Diagnostic tests and information reviewed for today's visit:  Conversation with primary team, Most recent labs, Medications       Some elements copied from GURVINDER  Brayan note on 2/27/24, the elements have been updated and all reflect current decision making from today, 03/15/24       Plan of Care discussed with: Provider, RN, Patient    Thank you for asking Supportive and Palliative Oncology to assist with care of this patient.  We will continue to follow  Please contact us for additional questions or concerns.      SIGNATURE: Judy Guevara, APRN-CNP   PAGER/CONTACT:  Contact information:  Supportive and Palliative Oncology  Monday-Friday 8 AM-5 PM  Epic Secure chat or pager [phone].  After hours and weekends:  pager [phone]

## 2024-03-15 NOTE — SIGNIFICANT EVENT
RAPID RESPONSE RN NOTE:       03/15/24 1755   Onset Documentation   Rapid Response Initiated By RN;Physician   Location/Room [facility]  ([facility])   Pager Time [phone]   Arrival Time 1800   Event End Time 1840   Level II Called No   Primary Reason for Call Staff concern     Rapid response paged for concerns for mucus plugging and shortness of breath. On my arrival, primary team at bedside.  Pt endorses some shortness of breath and feels like she has something stuck.  Respirations unlabored, lungs clear.  Pulse ox >95% on RA.  Primary team requesting NT suctioning; respiratory therapist at bedside performed NT suctioning.  No significant secretions suctioned out; though pt states she feels better.  Face tent applied for humidification only, no oxygen.  Pt tolerating face tent.  Please page rapid response for any clinical deterioration or assistance needed.  Pt remains on continuous pulse ox.

## 2024-03-15 NOTE — CARE PLAN
The patient's goals for the shift include      The clinical goals for the shift include pt will remain injury free      Problem: Skin  Goal: Decreased wound size/increased tissue granulation at next dressing change  Outcome: Progressing  Flowsheets (Taken 3/15/2024 0751)  Decreased wound size/increased tissue granulation at next dressing change: Promote sleep for wound healing  Goal: Participates in plan/prevention/treatment measures  Outcome: Progressing  Flowsheets (Taken 3/15/2024 0751)  Participates in plan/prevention/treatment measures: Elevate heels  Goal: Prevent/manage excess moisture  Outcome: Progressing  Flowsheets (Taken 3/15/2024 0751)  Prevent/manage excess moisture: Moisturize dry skin  Goal: Prevent/minimize sheer/friction injuries  Outcome: Progressing  Flowsheets (Taken 3/15/2024 0751)  Prevent/minimize sheer/friction injuries: Turn/reposition every 2 hours/use positioning/transfer devices  Goal: Promote/optimize nutrition  Outcome: Progressing  Flowsheets (Taken 3/15/2024 0751)  Promote/optimize nutrition: Assist with feeding  Goal: Promote skin healing  Outcome: Progressing  Flowsheets (Taken 3/15/2024 0751)  Promote skin healing: Protective dressings over bony prominences     Problem: Pain  Goal: My pain/discomfort is manageable  Outcome: Progressing     Problem: Safety  Goal: Patient will be injury free during hospitalization  Outcome: Progressing  Goal: I will remain free of falls  Outcome: Progressing     Problem: Daily Care  Goal: Daily care needs are met  Outcome: Progressing     Problem: Psychosocial Needs  Goal: Demonstrates ability to cope with hospitalization/illness  Outcome: Progressing  Goal: Collaborate with me, my family, and caregiver to identify my specific goals  Outcome: Progressing  Flowsheets (Taken 3/15/2024 0700)  Cultural Requests During Hospitalization: n/a  Spiritual Requests During Hospitalization: n/a     Problem: Discharge Barriers  Goal: My discharge needs are  met  Outcome: Progressing     Problem: Pain  Goal: Takes deep breaths with improved pain control throughout the shift  Outcome: Progressing  Goal: Turns in bed with improved pain control throughout the shift  Outcome: Progressing  Goal: Walks with improved pain control throughout the shift  Outcome: Progressing  Goal: Performs ADL's with improved pain control throughout shift  Outcome: Progressing  Goal: Participates in PT with improved pain control throughout the shift  Outcome: Progressing  Goal: Free from opioid side effects throughout the shift  Outcome: Progressing  Goal: Free from acute confusion related to pain meds throughout the shift  Outcome: Progressing     Problem: Pain - Adult  Goal: Verbalizes/displays adequate comfort level or baseline comfort level  Outcome: Progressing     Problem: Safety - Adult  Goal: Free from fall injury  Outcome: Progressing     Problem: Discharge Planning  Goal: Discharge to home or other facility with appropriate resources  Outcome: Progressing     Problem: Chronic Conditions and Co-morbidities  Goal: Patient's chronic conditions and co-morbidity symptoms are monitored and maintained or improved  Outcome: Progressing     Problem: Fall/Injury  Goal: Not fall by end of shift  Outcome: Progressing  Goal: Be free from injury by end of the shift  Outcome: Progressing  Goal: Verbalize understanding of personal risk factors for fall in the hospital  Outcome: Progressing  Goal: Verbalize understanding of risk factor reduction measures to prevent injury from fall in the home  Outcome: Progressing  Goal: Use assistive devices by end of the shift  Outcome: Progressing  Goal: Pace activities to prevent fatigue by end of the shift  Outcome: Progressing

## 2024-03-15 NOTE — SIGNIFICANT EVENT
"Notified by RN around 0500 that patient appears more \"lethargic\" than before. Vitals were reportedly stable, no new O2 requirement. Pt seen and examined. States she feels \"worn out\" and slept terribly overnight. Denies new SOB, CP, N/V/abd pain, F/c. Denies new vision changes, numbness, weakness. No other symptoms reported.    Vitals: Temp 36.9, /83, HR 88, RR 20, 96% on RA    Exam: Awake and A&Ox3, CTA b/l with b/l rhonchi, HRRR, abd soft and NTTP, skin warm and dry  Neuro: CN II-XII intact, sensation intact to light touch throughout, no pronator drift or LE drift to bed, pt following commands throughout interview    A/P: Low c/f acute neurological process. Pt appeared awake and alert throughout interview. Neurological exam reassuring, vitals stable with no new O2 requirement.   -CTM s/sx of worsening neurological status, low threshold for CTH or further workup  -If new O2 requirement, low threshold for CXR  -No further intervention or workup at this time    Eugene Quevedo, DO  PGY-1 Neurology  "

## 2024-03-16 VITALS
HEIGHT: 64 IN | RESPIRATION RATE: 10 BRPM | HEART RATE: 86 BPM | OXYGEN SATURATION: 95 % | WEIGHT: 149.91 LBS | BODY MASS INDEX: 25.59 KG/M2 | TEMPERATURE: 97 F | SYSTOLIC BLOOD PRESSURE: 114 MMHG | DIASTOLIC BLOOD PRESSURE: 70 MMHG

## 2024-03-16 LAB
ALBUMIN SERPL BCP-MCNC: 2.4 G/DL (ref 3.4–5)
ANION GAP SERPL CALC-SCNC: 12 MMOL/L (ref 10–20)
BASOPHILS # BLD MANUAL: 0 X10*3/UL (ref 0–0.1)
BASOPHILS NFR BLD MANUAL: 0 %
BUN SERPL-MCNC: 11 MG/DL (ref 6–23)
CALCIUM SERPL-MCNC: 8.5 MG/DL (ref 8.6–10.6)
CHLORIDE SERPL-SCNC: 105 MMOL/L (ref 98–107)
CO2 SERPL-SCNC: 28 MMOL/L (ref 21–32)
CREAT SERPL-MCNC: 0.42 MG/DL (ref 0.5–1.05)
EGFRCR SERPLBLD CKD-EPI 2021: >90 ML/MIN/1.73M*2
EOSINOPHIL # BLD MANUAL: 0 X10*3/UL (ref 0–0.4)
EOSINOPHIL NFR BLD MANUAL: 0 %
ERYTHROCYTE [DISTWIDTH] IN BLOOD BY AUTOMATED COUNT: 21 % (ref 11.5–14.5)
GLUCOSE BLD MANUAL STRIP-MCNC: 145 MG/DL (ref 74–99)
GLUCOSE BLD MANUAL STRIP-MCNC: 92 MG/DL (ref 74–99)
GLUCOSE SERPL-MCNC: 91 MG/DL (ref 74–99)
HCT VFR BLD AUTO: 25.5 % (ref 36–46)
HGB BLD-MCNC: 8.1 G/DL (ref 12–16)
IMM GRANULOCYTES # BLD AUTO: 0.4 X10*3/UL (ref 0–0.5)
IMM GRANULOCYTES NFR BLD AUTO: 6.1 % (ref 0–0.9)
LYMPHOCYTES # BLD MANUAL: 0.06 X10*3/UL (ref 0.8–3)
LYMPHOCYTES NFR BLD MANUAL: 0.9 %
MAGNESIUM SERPL-MCNC: 1.91 MG/DL (ref 1.6–2.4)
MCH RBC QN AUTO: 31.8 PG (ref 26–34)
MCHC RBC AUTO-ENTMCNC: 31.8 G/DL (ref 32–36)
MCV RBC AUTO: 100 FL (ref 80–100)
METAMYELOCYTES # BLD MANUAL: 0.06 X10*3/UL
METAMYELOCYTES NFR BLD MANUAL: 0.9 %
MONOCYTES # BLD MANUAL: 0.4 X10*3/UL (ref 0.05–0.8)
MONOCYTES NFR BLD MANUAL: 6 %
MYELOCYTES # BLD MANUAL: 0.11 X10*3/UL
MYELOCYTES NFR BLD MANUAL: 1.7 %
NEUTROPHILS # BLD MANUAL: 5.86 X10*3/UL (ref 1.6–5.5)
NEUTS BAND # BLD MANUAL: 0.17 X10*3/UL (ref 0–0.5)
NEUTS BAND NFR BLD MANUAL: 2.6 %
NEUTS SEG # BLD MANUAL: 5.69 X10*3/UL (ref 1.6–5)
NEUTS SEG NFR BLD MANUAL: 86.2 %
NRBC BLD-RTO: 4.6 /100 WBCS (ref 0–0)
OVALOCYTES BLD QL SMEAR: ABNORMAL
PHOSPHATE SERPL-MCNC: 3.5 MG/DL (ref 2.5–4.9)
PLATELET # BLD AUTO: 220 X10*3/UL (ref 150–450)
POLYCHROMASIA BLD QL SMEAR: ABNORMAL
POTASSIUM SERPL-SCNC: 4.5 MMOL/L (ref 3.5–5.3)
PROMYELOCYTES # BLD MANUAL: 0.11 X10*3/UL
PROMYELOCYTES NFR BLD MANUAL: 1.7 %
RBC # BLD AUTO: 2.55 X10*6/UL (ref 4–5.2)
RBC MORPH BLD: ABNORMAL
SODIUM SERPL-SCNC: 140 MMOL/L (ref 136–145)
TOTAL CELLS COUNTED BLD: 116
WBC # BLD AUTO: 6.6 X10*3/UL (ref 4.4–11.3)

## 2024-03-16 PROCEDURE — 2500000002 HC RX 250 W HCPCS SELF ADMINISTERED DRUGS (ALT 637 FOR MEDICARE OP, ALT 636 FOR OP/ED): Performed by: INTERNAL MEDICINE

## 2024-03-16 PROCEDURE — 80069 RENAL FUNCTION PANEL: CPT

## 2024-03-16 PROCEDURE — 85007 BL SMEAR W/DIFF WBC COUNT: CPT

## 2024-03-16 PROCEDURE — 99232 SBSQ HOSP IP/OBS MODERATE 35: CPT

## 2024-03-16 PROCEDURE — 1200000003 HC ONCOLOGY  ROOM WITH TELEMETRY DAILY

## 2024-03-16 PROCEDURE — 2500000004 HC RX 250 GENERAL PHARMACY W/ HCPCS (ALT 636 FOR OP/ED)

## 2024-03-16 PROCEDURE — 2500000002 HC RX 250 W HCPCS SELF ADMINISTERED DRUGS (ALT 637 FOR MEDICARE OP, ALT 636 FOR OP/ED)

## 2024-03-16 PROCEDURE — 85027 COMPLETE CBC AUTOMATED: CPT

## 2024-03-16 PROCEDURE — 2500000001 HC RX 250 WO HCPCS SELF ADMINISTERED DRUGS (ALT 637 FOR MEDICARE OP)

## 2024-03-16 PROCEDURE — 1200000002 HC GENERAL ROOM WITH TELEMETRY DAILY

## 2024-03-16 PROCEDURE — 83735 ASSAY OF MAGNESIUM: CPT

## 2024-03-16 PROCEDURE — 82947 ASSAY GLUCOSE BLOOD QUANT: CPT

## 2024-03-16 RX ORDER — MORPHINE SULFATE 2 MG/ML
2 INJECTION, SOLUTION INTRAMUSCULAR; INTRAVENOUS ONCE
Status: COMPLETED | OUTPATIENT
Start: 2024-03-16 | End: 2024-03-16

## 2024-03-16 RX ORDER — MORPHINE SULFATE 4 MG/ML
4 INJECTION INTRAVENOUS
Status: DISCONTINUED | OUTPATIENT
Start: 2024-03-16 | End: 2024-03-18 | Stop reason: HOSPADM

## 2024-03-16 RX ORDER — HALOPERIDOL 5 MG/ML
1 INJECTION INTRAMUSCULAR EVERY 4 HOURS PRN
Status: DISCONTINUED | OUTPATIENT
Start: 2024-03-16 | End: 2024-03-18 | Stop reason: HOSPADM

## 2024-03-16 RX ORDER — MORPHINE SULFATE 2 MG/ML
2 INJECTION, SOLUTION INTRAMUSCULAR; INTRAVENOUS
Status: DISCONTINUED | OUTPATIENT
Start: 2024-03-16 | End: 2024-03-18 | Stop reason: HOSPADM

## 2024-03-16 RX ORDER — IPRATROPIUM BROMIDE AND ALBUTEROL SULFATE 2.5; .5 MG/3ML; MG/3ML
3 SOLUTION RESPIRATORY (INHALATION) EVERY 6 HOURS
Status: DISCONTINUED | OUTPATIENT
Start: 2024-03-16 | End: 2024-03-18 | Stop reason: HOSPADM

## 2024-03-16 RX ORDER — HYOSCYAMINE SULFATE 0.12 MG/1
0.12 TABLET, ORALLY DISINTEGRATING ORAL EVERY 4 HOURS PRN
Status: DISCONTINUED | OUTPATIENT
Start: 2024-03-16 | End: 2024-03-18 | Stop reason: HOSPADM

## 2024-03-16 RX ORDER — LORAZEPAM 0.5 MG/1
0.5 TABLET ORAL EVERY 4 HOURS PRN
Status: DISCONTINUED | OUTPATIENT
Start: 2024-03-16 | End: 2024-03-18 | Stop reason: HOSPADM

## 2024-03-16 RX ORDER — MORPHINE SULFATE IN 0.9 % NACL 30 MG/30ML
PATIENT CONTROLLED ANALGESIA SYRINGE INTRAVENOUS CONTINUOUS
Status: DISCONTINUED | OUTPATIENT
Start: 2024-03-16 | End: 2024-03-18 | Stop reason: HOSPADM

## 2024-03-16 RX ORDER — GLYCOPYRROLATE 0.2 MG/ML
0.2 INJECTION INTRAMUSCULAR; INTRAVENOUS EVERY 4 HOURS PRN
Status: DISCONTINUED | OUTPATIENT
Start: 2024-03-16 | End: 2024-03-18 | Stop reason: HOSPADM

## 2024-03-16 RX ORDER — ALBUTEROL SULFATE 0.83 MG/ML
2.5 SOLUTION RESPIRATORY (INHALATION) EVERY 4 HOURS PRN
Status: DISCONTINUED | OUTPATIENT
Start: 2024-03-16 | End: 2024-03-18 | Stop reason: HOSPADM

## 2024-03-16 RX ADMIN — OLANZAPINE 2.5 MG: 5 TABLET, FILM COATED ORAL at 13:38

## 2024-03-16 RX ADMIN — ESOMEPRAZOLE MAGNESIUM 40 MG: 40 FOR SUSPENSION ORAL at 09:33

## 2024-03-16 RX ADMIN — IPRATROPIUM BROMIDE AND ALBUTEROL SULFATE 3 ML: .5; 3 SOLUTION RESPIRATORY (INHALATION) at 10:43

## 2024-03-16 RX ADMIN — MORPHINE SULFATE 4 MG: 4 INJECTION INTRAVENOUS at 13:09

## 2024-03-16 RX ADMIN — LORAZEPAM 0.5 MG: 0.5 TABLET ORAL at 17:05

## 2024-03-16 RX ADMIN — Medication 1000 UNITS: at 09:30

## 2024-03-16 RX ADMIN — VENLAFAXINE 75 MG: 75 TABLET ORAL at 09:31

## 2024-03-16 RX ADMIN — MORPHINE SULFATE 4 MG: 4 INJECTION INTRAVENOUS at 13:59

## 2024-03-16 RX ADMIN — MORPHINE SULFATE 2 MG: 2 INJECTION, SOLUTION INTRAMUSCULAR; INTRAVENOUS at 12:19

## 2024-03-16 RX ADMIN — GLYCOPYRROLATE 0.2 MG: 0.2 INJECTION, SOLUTION INTRAMUSCULAR; INTRAVENOUS at 12:59

## 2024-03-16 RX ADMIN — METOPROLOL TARTRATE 50 MG: 50 TABLET, FILM COATED ORAL at 09:30

## 2024-03-16 RX ADMIN — MORPHINE SULFATE 2 MG: 2 INJECTION, SOLUTION INTRAMUSCULAR; INTRAVENOUS at 18:15

## 2024-03-16 RX ADMIN — MORPHINE SULFATE: 10 INJECTION INTRAVENOUS at 14:45

## 2024-03-16 RX ADMIN — MORPHINE SULFATE 2 MG: 2 INJECTION, SOLUTION INTRAMUSCULAR; INTRAVENOUS at 16:00

## 2024-03-16 RX ADMIN — DAPAGLIFLOZIN 10 MG: 10 TABLET, FILM COATED ORAL at 09:31

## 2024-03-16 RX ADMIN — GLYCOPYRROLATE 0.2 MG: 0.2 INJECTION, SOLUTION INTRAMUSCULAR; INTRAVENOUS at 22:08

## 2024-03-16 RX ADMIN — LOSARTAN POTASSIUM 25 MG: 25 TABLET, FILM COATED ORAL at 09:31

## 2024-03-16 RX ADMIN — BUPROPION HYDROCHLORIDE 150 MG: 75 TABLET, FILM COATED ORAL at 09:31

## 2024-03-16 RX ADMIN — ATORVASTATIN CALCIUM 20 MG: 20 TABLET, FILM COATED ORAL at 09:31

## 2024-03-16 RX ADMIN — MORPHINE SULFATE 2 MG: 2 INJECTION, SOLUTION INTRAMUSCULAR; INTRAVENOUS at 12:17

## 2024-03-16 RX ADMIN — MORPHINE SULFATE 2 MG: 2 INJECTION, SOLUTION INTRAMUSCULAR; INTRAVENOUS at 18:00

## 2024-03-16 RX ADMIN — HALOPERIDOL LACTATE 1 MG: 5 INJECTION, SOLUTION INTRAMUSCULAR at 14:02

## 2024-03-16 RX ADMIN — MORPHINE SULFATE 4 MG: 4 INJECTION INTRAVENOUS at 14:52

## 2024-03-16 RX ADMIN — ACETAMINOPHEN 1000 MG: 650 SOLUTION ORAL at 11:51

## 2024-03-16 RX ADMIN — ENOXAPARIN SODIUM 40 MG: 100 INJECTION SUBCUTANEOUS at 09:31

## 2024-03-16 RX ADMIN — MORPHINE SULFATE 4 MG: 4 INJECTION INTRAVENOUS at 14:24

## 2024-03-16 RX ADMIN — MORPHINE SULFATE 2 MG: 2 INJECTION, SOLUTION INTRAMUSCULAR; INTRAVENOUS at 12:53

## 2024-03-16 RX ADMIN — CLOPIDOGREL BISULFATE 75 MG: 75 TABLET ORAL at 09:31

## 2024-03-16 RX ADMIN — MORPHINE SULFATE 2 MG: 2 INJECTION, SOLUTION INTRAMUSCULAR; INTRAVENOUS at 12:34

## 2024-03-16 RX ADMIN — MORPHINE SULFATE 2 MG: 2 INJECTION, SOLUTION INTRAMUSCULAR; INTRAVENOUS at 17:30

## 2024-03-16 RX ADMIN — MORPHINE SULFATE 2 MG: 2 INJECTION, SOLUTION INTRAMUSCULAR; INTRAVENOUS at 16:50

## 2024-03-16 RX ADMIN — MORPHINE SULFATE 4 MG: 4 INJECTION INTRAVENOUS at 13:31

## 2024-03-16 RX ADMIN — SPIRONOLACTONE 12.5 MG: 25 TABLET, FILM COATED ORAL at 09:30

## 2024-03-16 ASSESSMENT — RESPIRATORY DISTRESS OBSERVATION SCALE (RDOS)
LOOK OF FEAR: 0 - NONE
RESPIRATORY RATE PER MINUTE: 0 - <19 BREATHS
RESTLESS NONPURPOSEFUL MOVEMENTS: 0 - NONE
PARADOXICAL BREATHING PATTERN: 0 - NONE
HEART RATE PER MINUTE: 0 - <90 BEATS
RDOS TOTAL SCORE: 0
ACCESSORY MUSCLE RISE IN CLAVICLE DURING INSPIRATION: 0 - NONE
INVOLUNTARY NASAL FLARING: 0 - NONE
GRUNTING AT END OF EXPIRATION: 0 - NONE

## 2024-03-16 ASSESSMENT — PAIN SCALES - PAIN ASSESSMENT IN ADVANCED DEMENTIA (PAINAD)
CONSOLABILITY: UNABLE TO CONSOLE, DISTRACT OR REASSURE
FACIALEXPRESSION: FACIAL GRIMACING
TOTALSCORE: 7
BODYLANGUAGE: TENSE, DISTRESSED PACING, FIDGETING
FACIALEXPRESSION: FACIAL GRIMACING
BREATHING: NOISY LABORED BREATHING, LONG PERIODS OF HYPERVENTILATION, CHEYNE-STOKES RESPIRATIONS
TOTALSCORE: 7
CONSOLABILITY: UNABLE TO CONSOLE, DISTRACT OR REASSURE
BREATHING: NOISY LABORED BREATHING, LONG PERIODS OF HYPERVENTILATION, CHEYNE-STOKES RESPIRATIONS
TOTALSCORE: 7
BODYLANGUAGE: TENSE, DISTRESSED PACING, FIDGETING
BREATHING: NOISY LABORED BREATHING, LONG PERIODS OF HYPERVENTILATION, CHEYNE-STOKES RESPIRATIONS
CONSOLABILITY: UNABLE TO CONSOLE, DISTRACT OR REASSURE
BODYLANGUAGE: TENSE, DISTRESSED PACING, FIDGETING
BREATHING: NOISY LABORED BREATHING, LONG PERIODS OF HYPERVENTILATION, CHEYNE-STOKES RESPIRATIONS
TOTALSCORE: 7
CONSOLABILITY: UNABLE TO CONSOLE, DISTRACT OR REASSURE
FACIALEXPRESSION: FACIAL GRIMACING
TOTALSCORE: 6
BREATHING: NOISY LABORED BREATHING, LONG PERIODS OF HYPERVENTILATION, CHEYNE-STOKES RESPIRATIONS
BREATHING: NOISY LABORED BREATHING, LONG PERIODS OF HYPERVENTILATION, CHEYNE-STOKES RESPIRATIONS
BODYLANGUAGE: TENSE, DISTRESSED PACING, FIDGETING
CONSOLABILITY: UNABLE TO CONSOLE, DISTRACT OR REASSURE
BODYLANGUAGE: TENSE, DISTRESSED PACING, FIDGETING
CONSOLABILITY: UNABLE TO CONSOLE, DISTRACT OR REASSURE
TOTALSCORE: 7
FACIALEXPRESSION: FACIAL GRIMACING
TOTALSCORE: 7
BREATHING: NOISY LABORED BREATHING, LONG PERIODS OF HYPERVENTILATION, CHEYNE-STOKES RESPIRATIONS
CONSOLABILITY: UNABLE TO CONSOLE, DISTRACT OR REASSURE
TOTALSCORE: 7
CONSOLABILITY: UNABLE TO CONSOLE, DISTRACT OR REASSURE
BREATHING: NOISY LABORED BREATHING, LONG PERIODS OF HYPERVENTILATION, CHEYNE-STOKES RESPIRATIONS
BODYLANGUAGE: TENSE, DISTRESSED PACING, FIDGETING
FACIALEXPRESSION: FACIAL GRIMACING
FACIALEXPRESSION: FACIAL GRIMACING
FACIALEXPRESSION: SAD, FRIGHTENED, FROWN
FACIALEXPRESSION: FACIAL GRIMACING

## 2024-03-16 ASSESSMENT — COGNITIVE AND FUNCTIONAL STATUS - GENERAL
STANDING UP FROM CHAIR USING ARMS: TOTAL
DAILY ACTIVITIY SCORE: 6
MOVING TO AND FROM BED TO CHAIR: A LOT
MOVING FROM LYING ON BACK TO SITTING ON SIDE OF FLAT BED WITH BEDRAILS: A LOT
MOVING FROM LYING ON BACK TO SITTING ON SIDE OF FLAT BED WITH BEDRAILS: A LOT
HELP NEEDED FOR BATHING: TOTAL
MOVING TO AND FROM BED TO CHAIR: TOTAL
TOILETING: TOTAL
DRESSING REGULAR LOWER BODY CLOTHING: TOTAL
TOILETING: TOTAL
DRESSING REGULAR UPPER BODY CLOTHING: TOTAL
EATING MEALS: TOTAL
HELP NEEDED FOR BATHING: TOTAL
DRESSING REGULAR UPPER BODY CLOTHING: TOTAL
DRESSING REGULAR LOWER BODY CLOTHING: TOTAL
MOBILITY SCORE: 7
TURNING FROM BACK TO SIDE WHILE IN FLAT BAD: TOTAL
PERSONAL GROOMING: TOTAL
STANDING UP FROM CHAIR USING ARMS: TOTAL
DAILY ACTIVITIY SCORE: 6
EATING MEALS: TOTAL
TURNING FROM BACK TO SIDE WHILE IN FLAT BAD: A LOT
CLIMB 3 TO 5 STEPS WITH RAILING: TOTAL
MOBILITY SCORE: 9
WALKING IN HOSPITAL ROOM: TOTAL
WALKING IN HOSPITAL ROOM: TOTAL
CLIMB 3 TO 5 STEPS WITH RAILING: TOTAL
PERSONAL GROOMING: TOTAL

## 2024-03-16 ASSESSMENT — PAIN SCALES - GENERAL
PAINLEVEL_OUTOF10: 0 - NO PAIN
PAINLEVEL_OUTOF10: 0 - NO PAIN

## 2024-03-16 ASSESSMENT — PAIN - FUNCTIONAL ASSESSMENT: PAIN_FUNCTIONAL_ASSESSMENT: PAINAD (PAIN ASSESSMENT IN ADVANCED DEMENTIA SCALE)

## 2024-03-16 NOTE — CARE PLAN
The patient's goals for the shift include      The clinical goals for the shift include pt will remain safe and free from injury throughout shift 03/16/24 0700

## 2024-03-16 NOTE — PROGRESS NOTES
Amita Todd is a 78 y.o. female on day 23 of admission presenting with Thrombocytopenia (CMS/HCC).    Subjective   Rapid response called twice yesterday, for mucous plugging seen by ENT and there was copious thick glue like secretions at the base of tongue and sitting at just above the glottis. Multiple rounds of suctioning via bronchoscope, with NT suction catheter, and deep oropharyngeal suctioning with the Yankauer were performed. This cleared a significant portion of secretion before patient could no longer tolerate the intervention.     This morning she still has the same stridorous sound and also still feeling a little confused.   She reported that she doesn't know what she needs any more, daughter was at the bedside and was talking with the patient about the need for suctioning and scope to compeltely remove the mucous plug which the patient was not making a clear cut decision on    The  was later in the room and family meeting was held with him and the daughter on how to move forward, they reported that they had a meeting with rad/onc and would like probably to stop radiation and explore other options, it was clarified to them that the current situation also warrants to make a decision on regarding if they want the patient to be transitioned to comfort care/hopsice and they were going to discuss that.     They had decided later that they want to continue managing the patient and aim for rehab but with no radiation.     Eventuially during the day the patient decompensated and was agonally breathing at which the family at the bedside including daughter,  and sister decided that they don't want any suctioning any more and want morphine for comfort and transition to comfort care.       Objective   Vitals:    03/16/24 1043   BP:    Pulse:    Resp:    Temp:    SpO2: 95%       Physical Exam  Constitutional:       Comments: underweight   HENT:      Mouth/Throat:      Mouth: Mucous membranes  "are dry. improved well circumscribed plaques.  +DH with TF  Eyes:      Pupils: Pupils are equal, round, and reactive to light.   Cardiovascular:      Rate and Rhythm: Irregular rhythm. Regular rate.     Pulses: Normal pulses.   Pulmonary:      Comments: mild crackles B/L, RUL>AGATHA. On RA  Abdominal:      General: Abdomen is flat. Bowel sounds are normal.      Palpations: Abdomen is soft.   Musculoskeletal:         General: Normal range of motion.      Cervical back: Normal range of motion.      Comments: No edema  Skin:     General: Skin is warm.      Capillary Refill: Capillary refill takes less than 2 seconds.      Findings: No bruising.      Comments: Petechia over trunk; erythematous and scaly skin noted over neck and upper chest   Neurological:      General: No focal deficit present.      Mental Status: A&Ox4  Psychiatric:         Mood and Affect: Mood normal.       At that time of rapid response this morning, she was agonally breathing, looking uncomfortable and family at bedside were emotional and deciuded comfort care for which physical exam was deferred,     Last Recorded Vitals  Blood pressure 114/70, pulse 86, temperature 36.1 °C (97 °F), resp. rate 18, height 1.635 m (5' 4.37\"), weight 68 kg (149 lb 14.6 oz), SpO2 95 %.  Intake/Output last 3 Shifts:  I/O last 3 completed shifts:  In: 2480 (36.5 mL/kg) [NG/GT:2480]  Out: 5150 (75.7 mL/kg) [Urine:5150 (2.1 mL/kg/hr)]  Weight: 68 kg     Relevant Results  Results for orders placed or performed during the hospital encounter of 02/22/24 (from the past 24 hour(s))   POCT GLUCOSE   Result Value Ref Range    POCT Glucose 106 (H) 74 - 99 mg/dL   POCT GLUCOSE   Result Value Ref Range    POCT Glucose 145 (H) 74 - 99 mg/dL   Magnesium   Result Value Ref Range    Magnesium 1.91 1.60 - 2.40 mg/dL   Renal Function Panel   Result Value Ref Range    Glucose 91 74 - 99 mg/dL    Sodium 140 136 - 145 mmol/L    Potassium 4.5 3.5 - 5.3 mmol/L    Chloride 105 98 - 107 mmol/L    " Bicarbonate 28 21 - 32 mmol/L    Anion Gap 12 10 - 20 mmol/L    Urea Nitrogen 11 6 - 23 mg/dL    Creatinine 0.42 (L) 0.50 - 1.05 mg/dL    eGFR >90 >60 mL/min/1.73m*2    Calcium 8.5 (L) 8.6 - 10.6 mg/dL    Phosphorus 3.5 2.5 - 4.9 mg/dL    Albumin 2.4 (L) 3.4 - 5.0 g/dL   CBC and Auto Differential   Result Value Ref Range    WBC 6.6 4.4 - 11.3 x10*3/uL    nRBC 4.6 (H) 0.0 - 0.0 /100 WBCs    RBC 2.55 (L) 4.00 - 5.20 x10*6/uL    Hemoglobin 8.1 (L) 12.0 - 16.0 g/dL    Hematocrit 25.5 (L) 36.0 - 46.0 %     80 - 100 fL    MCH 31.8 26.0 - 34.0 pg    MCHC 31.8 (L) 32.0 - 36.0 g/dL    RDW 21.0 (H) 11.5 - 14.5 %    Platelets 220 150 - 450 x10*3/uL    Immature Granulocytes %, Automated 6.1 (H) 0.0 - 0.9 %    Immature Granulocytes Absolute, Automated 0.40 0.00 - 0.50 x10*3/uL   Manual Differential   Result Value Ref Range    Neutrophils %, Manual 86.2 40.0 - 80.0 %    Bands %, Manual 2.6 0.0 - 5.0 %    Lymphocytes %, Manual 0.9 13.0 - 44.0 %    Monocytes %, Manual 6.0 2.0 - 10.0 %    Eosinophils %, Manual 0.0 0.0 - 6.0 %    Basophils %, Manual 0.0 0.0 - 2.0 %    Metamyelocytes %, Manual 0.9 0.0 - 0.0 %    Myelocytes %, Manual 1.7 0.0 - 0.0 %    Promyelocytes %, Manual 1.7 0.0 - 0.0 %    Seg Neutrophils Absolute, Manual 5.69 (H) 1.60 - 5.00 x10*3/uL    Bands Absolute, Manual 0.17 0.00 - 0.50 x10*3/uL    Lymphocytes Absolute, Manual 0.06 (L) 0.80 - 3.00 x10*3/uL    Monocytes Absolute, Manual 0.40 0.05 - 0.80 x10*3/uL    Eosinophils Absolute, Manual 0.00 0.00 - 0.40 x10*3/uL    Basophils Absolute, Manual 0.00 0.00 - 0.10 x10*3/uL    Metamyelocytes Absolute, Manual 0.06 0.00 - 0.00 x10*3/uL    Myelocytes Absolute, Manual 0.11 0.00 - 0.00 x10*3/uL    Promyelocytes Absolute, Manual 0.11 0.00 - 0.00 x10*3/uL    Total Cells Counted 116     Neutrophils Absolute, Manual 5.86 (H) 1.60 - 5.50 x10*3/uL    RBC Morphology See Below     Polychromasia Mild     Ovalocytes Few    POCT GLUCOSE   Result Value Ref Range    POCT Glucose 92 74  - 99 mg/dL            This patient has a urinary catheter   Reason for the urinary catheter remaining today? end-of-life care               Assessment/Plan   Principal Problem:    Thrombocytopenia (CMS/HCC)    77yo F w PMHx squamous cell carcinoma of hypopharynx, HFpEF, CAD s/p PCI, Afib s/p watchman, GERD, depression, HTN, AUD, ascending aortic aneurysm, distant breast cancer (s/p chemoradiotherapy and lumpectomy) who was directed to ED for hypotension which resolved. Currently treating E.Coli bacteremia with ceftriaxone (ID following), and plan to have EGD (assess esophagitis infectious vs radiation) + PEG (for nutrition); however ANC less than goal. Started neupogen (3/2) d/t low ANC, infection and procedure goal for ANC > 1000. Neupogen discontinued 3/8, s/p PEG on 3/13 for enteral nutrition. Decompensated later in the day from mucous plugging for which family decided to go Comfort care      3/16  - Comfort care      #SCC of Hypopharynx (Dr. Burkitt)  #Delerium, resolved   - nutrition recs appreciated: 100mg IV thiamine x7 days,  Isosource 1.5 at goal 310mL bolus 4x/day   - c/w home Guaifenesin  - c/w Zofran prn nausea, Compazine second line  - Comfort care order set in place   - Zyprexa 2.5mg PO q8hrs for agitation dced as per daughter makes the patient worse      #Oropharyngeal candidiasis, resolved  #Dysphagia  ::Moderate to severe candidiasis with multiple large lesions, improved   ::given ANC 0.16, consideration for opportunistic infections present. Entended antifungal course  - Fluconazole 200mg IV (2/22 - xx), typically for 7 days but continue given neutropenia and candidiasis; plan for 21 day course total; IV 2/22-3/6, plan for 400mg PO 3/7-3/13  - filgrastim discontinued 3/8  - s/p PEG 3/13, bolus feeds started 3/14  - Oral care per nursing     #Acute hypoxic respiratory failure 2/2 upper airway mucus plug, resolved   :: Rapid called early AM 3/11 for hypoxia requiring ventimask, ENT consulted and  scoped showing mucus at glottis, s/p saline lavage   Plan:   - Hypertonic saline nebulizer, scheduled duo nebs  - Vanc discontinued (3/7-3/8)  - Now off zosyn (3/7-3/9)  -MRSA nares negative  - Lactate normal  - Blood cx x2 collected; NGTD  - Wean O2 as able; on room air as of 3/11  - Encourage incentive spirometer, RT consult for airway clearance techniques  - Albuterol q6hr PRN for wheezing/shortness of breath       #HFpEF  ::Patient presents with hypotension over the past month in oncology clinic, has received IVF and discontinued home losartan/amlodipine during this time, BP spontaneously improving without intervention today to 100s/70s, appears grossly dry to euvolemic on exam without any symptoms of acute heart failure + positive orthostats in ED  ::BNP 2/22: 229 - appears to be at baseline  ::TTE 7/2023: EF 55-60% // TTE 2/2024 EF 50%  - Patient likely has less PO intake and with chemo does not need as many antihyptertensives/GDMT medication dose - emailed primary cardiologist  - Medications given via [facility]  - discontinue  Aldactone 12.5mg // Metoprolol Tartrate 50mg BID // Losartan 25mg // Dapagliflozin 10mg   - stop Atorvostatin 20mg   - Diuresis w/ lasix 20mg IV 3/10     #E coli bacteremia, resolved  :: Bcx from 2/29 growing E coli sensitive to ceftriaxone; Bcx from 3/2 NGTD  - ID consulted, appreciate recs:  - Given collection through mediport but no symptoms of sepsis, would treat w/ 10 days of ceftriaxone 2g q24hr infused through mediport  - Lock solution into catheter after infusion is complete   - CTX 2g q24 hr course 10 days; 3/1-3/9 planned; changed on 3/7 to vanc + zosyn as above  - Completed zosyn 3/9  - Per ID, no need for lock therapy as of 3/11     #Thrombocytopenia, resolved  #Pancytopenia, resolved  ::ANC 0.16 // Plts 53 on admission with no concern for bleeding // HB 10.9 on presentation (BL 12-13)  -stop doing labs      #Normocytic Anemia  ::HB 10.9 on presentation (BL 12-13), no active  bleeding, no recent iron studies  - Likely d/t chronic dz vs myelosuppresion, though patient has had poor po intake     #Atrial Fibrillation  ::Patient previously on AC, now s/p watchman procedure and is rate controlled   ::had 5 ablations per patient  - Hold metop      #Hx of TIA  - Hold plavix     #GERD  - c/w home Pantoprazole     #Mood Disorder  #Misc  - c/w home Cymbalta and Wellbutrin  - c/w home supplements, mag-ox     F : caution, was diuresing. HfpEF  E: replete lytes prn, K>4, Mg >2  N: As per family and patient   A: PIVs     DVT prophylaxis: SCDs, hold lovenox   GI prophylaxis: home PPI     Code Status: DNR/DNI Comfort care               July Rodriges MD

## 2024-03-16 NOTE — CARE PLAN
Problem: Skin  Goal: Decreased wound size/increased tissue granulation at next dressing change  Outcome: Progressing  Flowsheets (Taken 3/16/2024 0057)  Decreased wound size/increased tissue granulation at next dressing change: Promote sleep for wound healing  Goal: Participates in plan/prevention/treatment measures  Outcome: Progressing  Flowsheets (Taken 3/16/2024 0057)  Participates in plan/prevention/treatment measures: Elevate heels  Goal: Prevent/manage excess moisture  Outcome: Progressing  Flowsheets (Taken 3/16/2024 0057)  Prevent/manage excess moisture: Moisturize dry skin  Goal: Prevent/minimize sheer/friction injuries  Outcome: Progressing  Flowsheets (Taken 3/16/2024 0057)  Prevent/minimize sheer/friction injuries: Turn/reposition every 2 hours/use positioning/transfer devices  Goal: Promote/optimize nutrition  Outcome: Progressing  Flowsheets (Taken 3/16/2024 0057)  Promote/optimize nutrition: Assist with feeding  Goal: Promote skin healing  Outcome: Progressing  Flowsheets (Taken 3/16/2024 0057)  Promote skin healing: Protective dressings over bony prominences     Problem: Pain  Goal: My pain/discomfort is manageable  Outcome: Progressing     Problem: Safety  Goal: Patient will be injury free during hospitalization  Outcome: Progressing  Goal: I will remain free of falls  Outcome: Progressing     Problem: Daily Care  Goal: Daily care needs are met  Outcome: Progressing     Problem: Psychosocial Needs  Goal: Demonstrates ability to cope with hospitalization/illness  Outcome: Progressing  Goal: Collaborate with me, my family, and caregiver to identify my specific goals  Outcome: Progressing     Problem: Discharge Barriers  Goal: My discharge needs are met  Outcome: Progressing     Problem: Pain  Goal: Takes deep breaths with improved pain control throughout the shift  Outcome: Progressing  Goal: Turns in bed with improved pain control throughout the shift  Outcome: Progressing  Goal: Walks with  improved pain control throughout the shift  Outcome: Progressing  Goal: Performs ADL's with improved pain control throughout shift  Outcome: Progressing  Goal: Participates in PT with improved pain control throughout the shift  Outcome: Progressing  Goal: Free from opioid side effects throughout the shift  Outcome: Progressing  Goal: Free from acute confusion related to pain meds throughout the shift  Outcome: Progressing     Problem: Safety - Adult  Goal: Free from fall injury  Outcome: Progressing

## 2024-03-16 NOTE — SIGNIFICANT EVENT
Called by nursing to assess patient with concern for mucus plug. Per report a rapid response was called for similar concerns around 6PM at which time respiratory assessed and performed NT suctioning. On arrival patient appears tachypneic, saturation 96%. Stertor noted with wet sounding secretion burden. Permission was obtained by the patient to perform flexible laryngoscopy/bronchoscopy at the bedside. 4% lidocaine with Afrin was administered to the bilateral nares. The flexible scope was passed through the right nare. There was copious thick glue like secretions at the base of tongue and sitting at just above the glottis. Multiple rounds of suctioning via bronchoscope, with NT suction catheter, and deep oropharyngeal suctioning with the Yankauer were performed. This cleared a significant portion of secretion before patient could no longer tolerate the intervention. Patient declined further evaluation by flexible scope to ensure complete removal of the mucus plug.     Recommendation:  -continuous pulse ox  -humidified facemask at all times  -hypertonic saline nebulizer treatments consider scheduling these  -guaifenesin consider scheduling  -regular deep suctioning with Yankauer vs. NT suctioning   -frequent oral swabs for moisture  -ENT to re-evaluate after breathing treatments, will repeat flexible scope if patient is agreeable

## 2024-03-16 NOTE — SIGNIFICANT EVENT
Rapid Response RN Note    Rapid response RN at bedside for increased WOB/mucos plugging and inability to clear secretion d/t ineffective cough. Upon arrival, ENT at bedside performing bedside bronch. Pt lavaged and NT/Oral suctioned, clearing some thick secretions. Primary team and DACR at bedside. Nebulizers given.     DACR held goals of care discussions noting that secretions will presumably continue to build up and further worsen her WOB d/t her inability to clear secretions. Daughter confirmed she that the pt does not want to be intubated in the event of worsening oxygenation and mucos plugging. Daughter currently on phone with pt's spouse discussing goals/plans. At current time, pt remains DNAR DNI. No additional interventions by rapid response team indicated at this time. Plan to continue breathing tx and NT/Oral suctioning if indicated to assist with secretion clearance.     Staff to page rapid response for any concerns or acute change in condition/VS. Guzman Sevilla RN.

## 2024-03-16 NOTE — PROGRESS NOTES
Spiritual Care Visit    Clinical Encounter Type  Visited With: Patient and family together  Routine Visit: Follow-up  Continue Visiting: Yes  Referral From: Nurse  Referral To:     Advent Encounters  Advent Needs: Prayer         Sacramental Encounters  Other Sacrament: CD    Patient received the Commendation of the Dying by Fr. Lalo Pride,  [employer] day . Family members were preset.    Patient had received the Sacrament of the Anointing of the Sick on 3/2/24.

## 2024-03-16 NOTE — SIGNIFICANT EVENT
In setting of pt's consecutive rapids for mucous plugging and SOB in the past few hours, discussion of further care and code status was re-visited by the writer (DACR) with Lidia (daughter) at bedside. We discussed that pt's thick secretions are very likely to reoccur tonight and although the past two rapids were able to somewhat alleviate her mucous plug, it may not be the case for future episodes. We discussed that during future mucous plugging episodes where RT and ENT are not able to adequately clear her secretions and/or she becomes hypoxic due to these episodes, escalating her care to the ICU may not be in line with her wishes of being DNAR/DNI. Ldiia agreed that Mrs. Todd does not wish to be intubated and understood that if her secretions were to re-occur (and unmanageable by RT or ENT), this will likely lead to hypoxia and cardiac arrest. Lidia agreed that if it came to a point where pt is persistently unable to manage her secretions  or is becoming hypoxic, transitioning care to make her comfortable would be most in-line with her wishes. She will call her father (pt's ) to confirm this. No change in code status at this time.

## 2024-03-17 LAB
GLUCOSE BLD MANUAL STRIP-MCNC: 62 MG/DL (ref 74–99)
GLUCOSE BLD MANUAL STRIP-MCNC: 80 MG/DL (ref 74–99)
GLUCOSE BLD MANUAL STRIP-MCNC: 95 MG/DL (ref 74–99)

## 2024-03-17 PROCEDURE — 1200000002 HC GENERAL ROOM WITH TELEMETRY DAILY

## 2024-03-17 PROCEDURE — 2500000004 HC RX 250 GENERAL PHARMACY W/ HCPCS (ALT 636 FOR OP/ED)

## 2024-03-17 PROCEDURE — 99232 SBSQ HOSP IP/OBS MODERATE 35: CPT

## 2024-03-17 RX ORDER — DIAZEPAM 5 MG/ML
2 INJECTION, SOLUTION INTRAMUSCULAR; INTRAVENOUS EVERY 4 HOURS PRN
Status: DISCONTINUED | OUTPATIENT
Start: 2024-03-17 | End: 2024-03-17

## 2024-03-17 RX ORDER — LORAZEPAM 2 MG/ML
0.5 INJECTION INTRAMUSCULAR ONCE
Status: COMPLETED | OUTPATIENT
Start: 2024-03-17 | End: 2024-03-17

## 2024-03-17 RX ORDER — DIAZEPAM 5 MG/ML
2 INJECTION, SOLUTION INTRAMUSCULAR; INTRAVENOUS EVERY 2 HOUR PRN
Status: DISCONTINUED | OUTPATIENT
Start: 2024-03-17 | End: 2024-03-17 | Stop reason: DRUGHIGH

## 2024-03-17 RX ORDER — DIAZEPAM 5 MG/ML
2 INJECTION, SOLUTION INTRAMUSCULAR; INTRAVENOUS EVERY 2 HOUR PRN
Status: DISCONTINUED | OUTPATIENT
Start: 2024-03-17 | End: 2024-03-18 | Stop reason: HOSPADM

## 2024-03-17 RX ADMIN — DIAZEPAM 2 MG: 5 INJECTION, SOLUTION INTRAMUSCULAR; INTRAVENOUS at 14:01

## 2024-03-17 RX ADMIN — DIAZEPAM 2 MG: 5 INJECTION, SOLUTION INTRAMUSCULAR; INTRAVENOUS at 11:09

## 2024-03-17 RX ADMIN — MORPHINE SULFATE 2 MG: 2 INJECTION, SOLUTION INTRAMUSCULAR; INTRAVENOUS at 13:34

## 2024-03-17 RX ADMIN — MORPHINE SULFATE 2 MG: 2 INJECTION, SOLUTION INTRAMUSCULAR; INTRAVENOUS at 20:00

## 2024-03-17 RX ADMIN — MORPHINE SULFATE 2 MG: 2 INJECTION, SOLUTION INTRAMUSCULAR; INTRAVENOUS at 18:42

## 2024-03-17 RX ADMIN — GLYCOPYRROLATE 0.2 MG: 0.2 INJECTION, SOLUTION INTRAMUSCULAR; INTRAVENOUS at 09:23

## 2024-03-17 RX ADMIN — MORPHINE SULFATE 4 MG: 4 INJECTION INTRAVENOUS at 04:23

## 2024-03-17 RX ADMIN — DIAZEPAM 2 MG: 5 INJECTION, SOLUTION INTRAMUSCULAR; INTRAVENOUS at 20:09

## 2024-03-17 RX ADMIN — MORPHINE SULFATE 4 MG: 4 INJECTION INTRAVENOUS at 04:07

## 2024-03-17 RX ADMIN — MORPHINE SULFATE 2 MG: 2 INJECTION, SOLUTION INTRAMUSCULAR; INTRAVENOUS at 10:47

## 2024-03-17 RX ADMIN — MORPHINE SULFATE: 10 INJECTION INTRAVENOUS at 13:28

## 2024-03-17 RX ADMIN — GLYCOPYRROLATE 0.2 MG: 0.2 INJECTION, SOLUTION INTRAMUSCULAR; INTRAVENOUS at 13:27

## 2024-03-17 RX ADMIN — DIAZEPAM 2 MG: 5 INJECTION, SOLUTION INTRAMUSCULAR; INTRAVENOUS at 18:01

## 2024-03-17 RX ADMIN — LORAZEPAM 0.5 MG: 2 INJECTION INTRAMUSCULAR; INTRAVENOUS at 04:25

## 2024-03-17 RX ADMIN — GLYCOPYRROLATE 0.2 MG: 0.2 INJECTION, SOLUTION INTRAMUSCULAR; INTRAVENOUS at 17:25

## 2024-03-17 RX ADMIN — DIAZEPAM 2 MG: 5 INJECTION, SOLUTION INTRAMUSCULAR; INTRAVENOUS at 16:01

## 2024-03-17 ASSESSMENT — RESPIRATORY DISTRESS OBSERVATION SCALE (RDOS)
LOOK OF FEAR: 2 - EYES WIDE OPEN, FACIAL MUSCLES TENSE, BROW FURROWED, MOUTH OPEN
GRUNTING AT END OF EXPIRATION: 0 - NONE
RESPIRATORY RATE PER MINUTE: 0 - <19 BREATHS
RDOS TOTAL SCORE: 8
PARADOXICAL BREATHING PATTERN: 2 - PRESENT
ACCESSORY MUSCLE RISE IN CLAVICLE DURING INSPIRATION: 1 - SLIGHT RISE
INVOLUNTARY NASAL FLARING: 0 - NONE
RESTLESS NONPURPOSEFUL MOVEMENTS: 2 - FREQUENT MOVEMENTS
HEART RATE PER MINUTE: 1 - 90-109 BEATS

## 2024-03-17 ASSESSMENT — COGNITIVE AND FUNCTIONAL STATUS - GENERAL
DRESSING REGULAR UPPER BODY CLOTHING: TOTAL
CLIMB 3 TO 5 STEPS WITH RAILING: TOTAL
HELP NEEDED FOR BATHING: TOTAL
DRESSING REGULAR LOWER BODY CLOTHING: TOTAL
EATING MEALS: TOTAL
STANDING UP FROM CHAIR USING ARMS: TOTAL
WALKING IN HOSPITAL ROOM: TOTAL
WALKING IN HOSPITAL ROOM: TOTAL
HELP NEEDED FOR BATHING: TOTAL
PERSONAL GROOMING: TOTAL
MOVING FROM LYING ON BACK TO SITTING ON SIDE OF FLAT BED WITH BEDRAILS: TOTAL
MOVING TO AND FROM BED TO CHAIR: TOTAL
TURNING FROM BACK TO SIDE WHILE IN FLAT BAD: TOTAL
TOILETING: TOTAL
MOBILITY SCORE: 10
PERSONAL GROOMING: TOTAL
DRESSING REGULAR UPPER BODY CLOTHING: TOTAL
MOBILITY SCORE: 6
CLIMB 3 TO 5 STEPS WITH RAILING: TOTAL
DAILY ACTIVITIY SCORE: 6
TURNING FROM BACK TO SIDE WHILE IN FLAT BAD: A LOT
MOVING TO AND FROM BED TO CHAIR: A LOT
DAILY ACTIVITIY SCORE: 6
TOILETING: TOTAL
EATING MEALS: TOTAL
MOVING FROM LYING ON BACK TO SITTING ON SIDE OF FLAT BED WITH BEDRAILS: A LOT
STANDING UP FROM CHAIR USING ARMS: A LOT
DRESSING REGULAR LOWER BODY CLOTHING: TOTAL

## 2024-03-17 ASSESSMENT — PAIN SCALES - PAIN ASSESSMENT IN ADVANCED DEMENTIA (PAINAD)
FACIALEXPRESSION: SMILING OR INEXPRESSIVE
CONSOLABILITY: NO NEED TO CONSOLE
CONSOLABILITY: UNABLE TO CONSOLE, DISTRACT OR REASSURE
CONSOLABILITY: NO NEED TO CONSOLE
NEGVOCALIZATION: OCCASIONAL MOAN/GROAN, LOW SPEECH, NEGATIVE/DISAPPROVING QUALITY
TOTALSCORE: 3
FACIALEXPRESSION: SMILING OR INEXPRESSIVE
BREATHING: NOISY LABORED BREATHING, LONG PERIODS OF HYPERVENTILATION, CHEYNE-STOKES RESPIRATIONS
BREATHING: OCCASIONAL LABORED BREATHING, SHORT PERIOD OF HYPERVENTILATION
TOTALSCORE: 1
NEGVOCALIZATION: OCCASIONAL MOAN/GROAN, LOW SPEECH, NEGATIVE/DISAPPROVING QUALITY
BODYLANGUAGE: RELAXED
FACIALEXPRESSION: FACIAL GRIMACING
TOTALSCORE: 3
TOTALSCORE: 1
TOTALSCORE: 2
FACIALEXPRESSION: FACIAL GRIMACING
TOTALSCORE: 1
FACIALEXPRESSION: FACIAL GRIMACING
NEGVOCALIZATION: REPEATED TROUBLED CALLING OUT, LOUD MOANING/GROANING, CRYING
BODYLANGUAGE: RELAXED
BODYLANGUAGE: RELAXED
FACIALEXPRESSION: SMILING OR INEXPRESSIVE
CONSOLABILITY: NO NEED TO CONSOLE
FACIALEXPRESSION: SMILING OR INEXPRESSIVE
BREATHING: OCCASIONAL LABORED BREATHING, SHORT PERIOD OF HYPERVENTILATION
TOTALSCORE: MEDICATION (SEE MAR)
BODYLANGUAGE: TENSE, DISTRESSED PACING, FIDGETING
CONSOLABILITY: NO NEED TO CONSOLE
BODYLANGUAGE: RELAXED
CONSOLABILITY: NO NEED TO CONSOLE
BODYLANGUAGE: TENSE, DISTRESSED PACING, FIDGETING
CONSOLABILITY: NO NEED TO CONSOLE
NEGVOCALIZATION: OCCASIONAL MOAN/GROAN, LOW SPEECH, NEGATIVE/DISAPPROVING QUALITY
BREATHING: NOISY LABORED BREATHING, LONG PERIODS OF HYPERVENTILATION, CHEYNE-STOKES RESPIRATIONS
NEGVOCALIZATION: REPEATED TROUBLED CALLING OUT, LOUD MOANING/GROANING, CRYING
BREATHING: NOISY LABORED BREATHING, LONG PERIODS OF HYPERVENTILATION, CHEYNE-STOKES RESPIRATIONS
TOTALSCORE: 3
BREATHING: NOISY LABORED BREATHING, LONG PERIODS OF HYPERVENTILATION, CHEYNE-STOKES RESPIRATIONS
FACIALEXPRESSION: SMILING OR INEXPRESSIVE
BODYLANGUAGE: RELAXED
TOTALSCORE: MEDICATION (SEE MAR)
TOTALSCORE: 9
BREATHING: NOISY LABORED BREATHING, LONG PERIODS OF HYPERVENTILATION, CHEYNE-STOKES RESPIRATIONS
FACIALEXPRESSION: SMILING OR INEXPRESSIVE
CONSOLABILITY: UNABLE TO CONSOLE, DISTRACT OR REASSURE
TOTALSCORE: 1
FACIALEXPRESSION: SMILING OR INEXPRESSIVE
TOTALSCORE: 1
BREATHING: OCCASIONAL LABORED BREATHING, SHORT PERIOD OF HYPERVENTILATION
CONSOLABILITY: NO NEED TO CONSOLE
BREATHING: OCCASIONAL LABORED BREATHING, SHORT PERIOD OF HYPERVENTILATION
TOTALSCORE: MEDICATION (SEE MAR)
NEGVOCALIZATION: OCCASIONAL MOAN/GROAN, LOW SPEECH, NEGATIVE/DISAPPROVING QUALITY
BODYLANGUAGE: RELAXED
TOTALSCORE: 8
CONSOLABILITY: NO NEED TO CONSOLE
BREATHING: NOISY LABORED BREATHING, LONG PERIODS OF HYPERVENTILATION, CHEYNE-STOKES RESPIRATIONS
BODYLANGUAGE: RELAXED
BODYLANGUAGE: TENSE, DISTRESSED PACING, FIDGETING
FACIALEXPRESSION: SMILING OR INEXPRESSIVE
NEGVOCALIZATION: OCCASIONAL MOAN/GROAN, LOW SPEECH, NEGATIVE/DISAPPROVING QUALITY
BREATHING: OCCASIONAL LABORED BREATHING, SHORT PERIOD OF HYPERVENTILATION
FACIALEXPRESSION: SMILING OR INEXPRESSIVE
TOTALSCORE: MEDICATION (SEE MAR)
CONSOLABILITY: UNABLE TO CONSOLE, DISTRACT OR REASSURE
CONSOLABILITY: NO NEED TO CONSOLE
TOTALSCORE: 3
BREATHING: NOISY LABORED BREATHING, LONG PERIODS OF HYPERVENTILATION, CHEYNE-STOKES RESPIRATIONS
BREATHING: NOISY LABORED BREATHING, LONG PERIODS OF HYPERVENTILATION, CHEYNE-STOKES RESPIRATIONS
TOTALSCORE: MEDICATION (SEE MAR);REPOSITIONED;MD NOTIFIED (COMMENT)
TOTALSCORE: 9
BODYLANGUAGE: RELAXED
BODYLANGUAGE: RELAXED
CONSOLABILITY: NO NEED TO CONSOLE
FACIALEXPRESSION: SMILING OR INEXPRESSIVE
BODYLANGUAGE: RELAXED

## 2024-03-17 ASSESSMENT — PAIN - FUNCTIONAL ASSESSMENT
PAIN_FUNCTIONAL_ASSESSMENT: PAINAD (PAIN ASSESSMENT IN ADVANCED DEMENTIA SCALE)

## 2024-03-17 NOTE — PROGRESS NOTES
Discharge Planning note:      Amita Todd is a 78 y.o. female on day 24 of admission presenting with Thrombocytopenia (CMS/HCC).      [facility] will attempt to admit this patient has family members at the SNF. Will follow up with [facility] on Monday on status.                      Dafne Grande RN [facility]

## 2024-03-17 NOTE — PROGRESS NOTES
"Amita Todd is a 78 y.o. female on day 24 of admission presenting with Thrombocytopenia (CMS/HCC).    Subjective   Patient transition to comfort care yesterday given repeated episodes of mucous plugging and for respiratory failure.  Patient and her family expressed that she would not want to be intubated, but had no other aggressive measures.  Overnight patient required 1 additional dose of IV lorazepam for restlessness and agitation.  After receiving this, patient resting this morning.    Objective   Vitals:    03/16/24 1428   BP:    Pulse:    Resp: 10   Temp:    SpO2:        Physical Exam  Constitutional:       Comments: underweight   HENT:      Mouth/Throat:      Mouth: Mucous membranes are dry. improved well circumscribed plaques.  +DH with TF  Eyes:      Pupils: Pupils are equal, round, and reactive to light.   Cardiovascular:      Rate and Rhythm: Irregular rhythm. Regular rate.     Pulses: Normal pulses.   Pulmonary:      Comments: mild crackles B/L, RUL>AGATHA. On RA  Abdominal:      General: Abdomen is flat. Bowel sounds are normal.      Palpations: Abdomen is soft.   Musculoskeletal:         General: Normal range of motion.      Cervical back: Normal range of motion.      Comments: No edema  Skin:     General: Skin is warm.      Capillary Refill: Capillary refill takes less than 2 seconds.      Findings: No bruising.      Comments: Petechia over trunk; erythematous and scaly skin noted over neck and upper chest   Neurological:      General: No focal deficit present.      Mental Status: A&Ox4  Psychiatric:         Mood and Affect: Mood normal.       At that time of rapid response this morning, she was agonally breathing, looking uncomfortable and family at bedside were emotional and deciuded comfort care for which physical exam was deferred,     Last Recorded Vitals  Blood pressure 114/70, pulse 86, temperature 36.1 °C (97 °F), resp. rate 10, height 1.635 m (5' 4.37\"), weight 68 kg (149 lb 14.6 oz), " SpO2 95 %.  Intake/Output last 3 Shifts:  I/O last 3 completed shifts:  In: 400 (5.9 mL/kg) [NG/GT:400]  Out: 2200 (32.4 mL/kg) [Urine:2200 (0.9 mL/kg/hr)]  Weight: 68 kg     Relevant Results  No results found for this or any previous visit (from the past 24 hour(s)).           This patient has a urinary catheter   Reason for the urinary catheter remaining today? end-of-life care               Assessment/Plan   Principal Problem:    Thrombocytopenia (CMS/HCC)    79yo F w PMHx squamous cell carcinoma of hypopharynx, HFpEF, CAD s/p PCI, Afib s/p watchman, GERD, depression, HTN, AUD, ascending aortic aneurysm, distant breast cancer (s/p chemoradiotherapy and lumpectomy) who was directed to ED for hypotension which resolved. Currently treating E.Coli bacteremia with ceftriaxone (ID following), and plan to have EGD (assess esophagitis infectious vs radiation) + PEG (for nutrition); however ANC less than goal. Started neupogen (3/2) d/t low ANC, infection and procedure goal for ANC > 1000. Neupogen discontinued 3/8, s/p PEG on 3/13 for enteral nutrition. Decompensated later in the day from mucous plugging for which family decided to go Comfort care      3/17 updates  -Continue with comfort care measures as started on 3/16  -Will email primary oncologist Dr. Burkitt to make her aware  - Increase morphine gtt to 3mg/hr  - Add diazepam PRN for anxiety   - Consider stopping other medications, will discuss with family; continue for now    #Goals of care  - Given patient with multiple episodes of mucus plugging and upper airway obstruction causing respiratory distress, family has decided that given patient's previous expression of what she would want, they do not want aggressive respiratory care/intervention and decided to transition the patient to comfort care on 3/17  - Emailed Dr. Burkitt to make aware of this change      #SCC of Hypopharynx (Dr. Burkitt)  #Delirium, resolved   - nutrition recs appreciated: 100mg IV thiamine  x7 days,  Isosource 1.5 at goal 310mL bolus 4x/day   - c/w home Guaifenesin  - c/w Zofran prn nausea, Compazine second line  - Comfort care order set in place   - Zyprexa 2.5mg PO q8hrs for agitation dced as per daughter makes the patient worse      #Oropharyngeal candidiasis, resolved  #Dysphagia  ::Moderate to severe candidiasis with multiple large lesions, improved   ::given ANC 0.16, consideration for opportunistic infections present. Entended antifungal course  - Fluconazole 200mg IV (2/22 - xx), typically for 7 days but continue given neutropenia and candidiasis; plan for 21 day course total; IV 2/22-3/6, plan for 400mg PO 3/7-3/13  - filgrastim discontinued 3/8  - s/p PEG 3/13, bolus feeds started 3/14  - Oral care per nursing     #Acute hypoxic respiratory failure 2/2 upper airway mucus plug  :: Rapid called early AM 3/11 for hypoxia requiring ventimask, ENT consulted and scoped showing mucus at glottis, s/p saline lavage  - Rapid response again 3/15 and 3/16, as above under goals of care, pursuing comfort measures       #HFpEF  ::Patient presents with hypotension over the past month in oncology clinic, has received IVF and discontinued home losartan/amlodipine during this time, BP spontaneously improving without intervention today to 100s/70s, appears grossly dry to euvolemic on exam without any symptoms of acute heart failure + positive orthostats in ED  ::BNP 2/22: 229 - appears to be at baseline  ::TTE 7/2023: EF 55-60% // TTE 2/2024 EF 50%  - Patient likely has less PO intake and with chemo does not need as many antihyptertensives/GDMT medication dose - emailed primary cardiologist  - Medications given via [facility]  - discontinue  Aldactone 12.5mg // Metoprolol Tartrate 50mg BID // Losartan 25mg // Dapagliflozin 10mg   - stop Atorvostatin 20mg   - Diuresis w/ lasix 20mg IV 3/10     #E coli bacteremia, resolved  :: Bcx from 2/29 growing E coli sensitive to ceftriaxone; Bcx from 3/2 NGTD  - ID consulted,  appreciate recs:  - Given collection through mediport but no symptoms of sepsis, would treat w/ 10 days of ceftriaxone 2g q24hr infused through mediport  - Lock solution into catheter after infusion is complete   - CTX 2g q24 hr course 10 days; 3/1-3/9 planned; changed on 3/7 to vanc + zosyn as above  - Completed zosyn 3/9  - Per ID, no need for lock therapy as of 3/11     #Thrombocytopenia, resolved  #Pancytopenia, resolved  ::ANC 0.16 // Plts 53 on admission with no concern for bleeding // HB 10.9 on presentation (BL 12-13)  -stop doing labs      #Normocytic Anemia  ::HB 10.9 on presentation (BL 12-13), no active bleeding, no recent iron studies  - Likely d/t chronic dz vs myelosuppresion, though patient has had poor po intake     #Atrial Fibrillation  ::Patient previously on AC, now s/p watchman procedure and is rate controlled   ::had 5 ablations per patient  - Hold metop      #Hx of TIA  - Hold plavix     #GERD  - c/w home Pantoprazole     #Mood Disorder  #Misc  - c/w home Cymbalta and Wellbutrin  - c/w home supplements, mag-ox     F : caution, was diuresing. HfpEF  E: replete lytes prn, K>4, Mg >2  N: As per family and patient   A: PIVs     DVT prophylaxis: SCDs, hold lovenox   GI prophylaxis: home PPI     Code Status: DNR/DNI Comfort care               Baudilio Turcios MD

## 2024-03-18 ENCOUNTER — APPOINTMENT (OUTPATIENT)
Dept: RADIATION ONCOLOGY | Facility: HOSPITAL | Age: 79
End: 2024-03-18
Payer: MEDICARE

## 2024-03-18 ENCOUNTER — HOSPITAL ENCOUNTER (OUTPATIENT)
Dept: RADIATION ONCOLOGY | Facility: HOSPITAL | Age: 79
Setting detail: RADIATION/ONCOLOGY SERIES
Discharge: HOME | End: 2024-03-18
Payer: MEDICARE

## 2024-03-18 NOTE — SIGNIFICANT EVENT
I was called to patient's bedside to pronounce that patient has . No spontaneous movements were present. There was not response to verbal or tactile stimuli. Pupils were mid-dilated and fixed. No breath sounds were appreciated over either lung field. No carotid pulses were palpable. No heart sounds were auscultator over entire precordium. Patient pronounced dead on 3/17/2024 at 22:18 Family (Daughter, sister and nephew) at the bedside and condolences was provided.    Lionel Ortiz MD  Internal Medicine, PGY1  [facility] Team (NF)

## 2024-03-18 NOTE — PROGRESS NOTES
Spiritual Care Visit      checked in with the family who reported no needs at this time.  let them know there is ongoing spiritual support if they need anything.     Rev. Maik Pham, Supportive Oncology

## 2024-03-18 NOTE — PROGRESS NOTES
Discharge Summary    Admission date: 2024  Discharge date: 3/17/2024  Attending physician at discharge: Dr. Jocelyn Washburn MD    Admission diagnosis:  Hypotension, HFpEF  2.   SCC Hypopharynx  3.   Oropharyngeal Candidiasis    Discharge diagnosis:  SCC Hypopharynx  2.   Acute Hypoxic Respiratory Failure, Mucus Plugging  3.   Hypotension, HFpEF    Discharge disposition:  Patient is .     Comfort care measures were started on 3/17, primary oncologist Dr. Burkitt was made aware. Given patient was having multiple episodes of mucus plugging, upper airway obstruction causing AHRF, respiratory distress, family decided that given patient's previous wishes, they would not want aggressive respiratory care/intervention, transitioned to comfort care 3/17.    Discharge medications:  None    Hospital course:  77 yo M PMHx squamous cell carcinoma of hypopharynx, HFpEF, CAD s/p PCI, Afib s/p watchman, GERD, depression, HTN, AUD, ascending aortic aneurysm, distant breast cancer (s/p chemoradiotherapy and lumpectomy) who was directed to ED for hypotension from an outpatient appointment. Hypotension was most likely 2/2 to her GDMT for heart failure. Resolution with discontinuation of GDMT and IVF. Patient ended up receiving inpatient chemotherapy/radiation with ANC downtrending so the patient did not tolerate. In addition, the patient failed a MBS, plan was for PEG per GI but could not proceed due to low ANC. Filgrastim/Neupogen was started with increment, received PEG. Plan was to go to SNF after receiving PEG, but then family decided they would like to take the patient home. Hospital stay was complicated by delirium which resolved, plan was for SNF then again for PMNR. Patient also had e.coli bactermeia sensitive to ceftriaxone, planned to receive 10 days of ceftriaxone 2g q24hr through [facility], then changed to vanc zosyn.  Hospital stay was also complicated by multiple episodes of thick orophayngeal secretion which the  patient could not control. Had multiple rapid responses for this w/ AHRF, decompensation with ENT scoping bedside to suction secretions. Respiratory status continued to worsen so the family opted to make the patient DNR/DNI and then comfort care. Patient passed night of 3/17    To be followed up:   None

## 2024-03-19 ENCOUNTER — APPOINTMENT (OUTPATIENT)
Dept: RADIATION ONCOLOGY | Facility: HOSPITAL | Age: 79
End: 2024-03-19
Payer: MEDICARE

## 2024-03-20 ENCOUNTER — APPOINTMENT (OUTPATIENT)
Dept: RADIATION ONCOLOGY | Facility: HOSPITAL | Age: 79
End: 2024-03-20
Payer: MEDICARE

## 2024-03-20 ENCOUNTER — APPOINTMENT (OUTPATIENT)
Dept: HEMATOLOGY/ONCOLOGY | Facility: HOSPITAL | Age: 79
End: 2024-03-20
Payer: MEDICARE

## 2024-03-20 NOTE — SIGNIFICANT EVENT
ENT notified of rapid response event called for this patient - patient was previously followed by ENT and has had recurrent mucous plugging with upper airway obstruction, but was refusing further oropharyngeal suctioning.    Upon arrival to bedside, ENT notified that patient was decompensating with increased work of breathing. Discussion by primary team with patient's family revealed that they did not want suctioning any more and wanted to transition to comfort care.    Derek Harley MD  Dept. of Otolaryngology - Head and Neck Surgery, PGY-2  ENT Adult: [id number]  ENT Peds: [id number]  ENT Outpatient scheduling number: [phone]

## 2024-04-04 ENCOUNTER — APPOINTMENT (OUTPATIENT)
Dept: GASTROENTEROLOGY | Facility: HOSPITAL | Age: 79
End: 2024-04-04
Payer: MEDICARE

## 2024-04-07 NOTE — DISCHARGE SUMMARY
Discharge Diagnosis  Thrombocytopenia (CMS/HCC)    Issues Requiring Follow-Up      Test Results Pending At Discharge  Pending Labs       No current pending labs.            Hospital Course      Pertinent Physical Exam At Time of Discharge      Home Medications     Medication List      ASK your doctor about these medications     acetaminophen 325 mg tablet; Commonly known as: Tylenol   albuterol 90 mcg/actuation aerosol powdr breath activated inhaler   ascorbic acid 500 mg tablet; Commonly known as: Vitamin C   cefTRIAXone 2 gram/50 mL IV; Commonly known as: Rocephin; Infuse 50 mL   (2 g) at 100 mL/hr over 30 minutes into a venous catheter once every 24   hours for 4 days.; Ask about: Should I take this medication?   cholecalciferol 25 MCG (1000 UT) tablet; Commonly known as: Vitamin D-3   CoQ-10 100 mg capsule; Generic drug: coenzyme Q-10   dexAMETHasone 4 mg tablet; Commonly known as: Decadron; Take 1 tablet (4   mg) by mouth 2 times a day with meals.   losartan 50 mg tablet; Commonly known as: Cozaar   magnesium oxide 400 mg (241.3 mg magnesium) tablet; Commonly known as:   Mag-Ox   multivitamin with minerals tablet   spironolactone 25 mg tablet; Commonly known as: Aldactone   Wellbutrin  mg 12 hr tablet; Generic drug: buPROPion SR   Whole Body Joint Support capsule; Generic drug: herbal complex no.239       Outpatient Follow-Up  No future appointments.    Jocelyn Washburn MD

## 2024-04-17 ENCOUNTER — APPOINTMENT (OUTPATIENT)
Dept: OTOLARYNGOLOGY | Facility: CLINIC | Age: 79
End: 2024-04-17
Payer: MEDICARE

## 2024-07-11 ENCOUNTER — APPOINTMENT (OUTPATIENT)
Dept: CARDIOLOGY | Facility: HOSPITAL | Age: 79
End: 2024-07-11
Payer: MEDICARE

## 2024-07-11 ENCOUNTER — APPOINTMENT (OUTPATIENT)
Dept: VASCULAR MEDICINE | Facility: HOSPITAL | Age: 79
End: 2024-07-11
Payer: MEDICARE

## (undated) DEVICE — ADAPTER, WATER BOTTLE, SMART CAP, 140/160/180 SERIES

## (undated) DEVICE — MANIFOLD, 4 PORT NEPTUNE STANDARD

## (undated) DEVICE — VALVE, DEFENDO, 5 PCS BUTTON SET, SINGLE USE

## (undated) DEVICE — DRESSING, GAUZE, 16 PLY, 4 X 4 IN, STERILE

## (undated) DEVICE — COVER, CART, 45 X 27 X 48 IN, CLEAR

## (undated) DEVICE — REST, HEAD, BAGEL, 9 IN